# Patient Record
Sex: MALE | Race: WHITE | NOT HISPANIC OR LATINO | Employment: STUDENT | URBAN - METROPOLITAN AREA
[De-identification: names, ages, dates, MRNs, and addresses within clinical notes are randomized per-mention and may not be internally consistent; named-entity substitution may affect disease eponyms.]

---

## 2017-01-31 ENCOUNTER — TELEPHONE (OUTPATIENT)
Dept: PULMONOLOGY | Facility: CLINIC | Age: 22
End: 2017-01-31

## 2017-01-31 DIAGNOSIS — E84.0 CYSTIC FIBROSIS WITH PULMONARY MANIFESTATIONS (H): Primary | ICD-10-CM

## 2017-01-31 RX ORDER — LEVOFLOXACIN 750 MG/1
750 TABLET, FILM COATED ORAL DAILY
Qty: 14 TABLET | Refills: 0 | Status: SHIPPED | OUTPATIENT
Start: 2017-01-31 | End: 2017-02-14

## 2017-01-31 NOTE — TELEPHONE ENCOUNTER
[facility]  January 31, 2017    Susan Li    CF Provider: Mary Grace Álvarez MD    Caller: Mother, Mirna    Clinical information:  Demario Abbasi has been ill for the past two weeks. Away at [facility] in [place]. Complaints of increased cough, day and night. No fevers. Is vesting 3xday for the past week. Currently off cycle of inhaled COBY. On oral Azithromycin three times/week.     Plan:   Discussed with Dr Álvarez:  Levaquin 750 mg QD x14 days.  Hold Azithromycin while on Levaquin.  Call back with any new or worsening symptoms/concerns.    Caller verbalized understanding of plan and agrees with advice given.

## 2017-02-20 ENCOUNTER — TELEPHONE (OUTPATIENT)
Dept: PULMONOLOGY | Facility: CLINIC | Age: 22
End: 2017-02-20

## 2017-02-20 NOTE — TELEPHONE ENCOUNTER
[facility]  February 20, 2017    Susan Li    CF Provider: Mary Grace Álvarez MD    Caller: Mother, Mirna    Clinical information:  Demario Abbasi seen at Urgent Care in [place] on Saturday. Diagnosed with Influenza A and pneumonia. Given Tamiflu, but unable to keep evening dose down due to emesis. Appetite down. Completed course of Levaquin last Thursday. Fever last night of 103, feeling weak today.    Plan:   To be evaluated by LMD.    ADDENDUM:  1600: Currently at LMD office. Being given a liter of fluids and IV Rocephin. Consideration being given to restart Levaquin.    Mother will keep us updated.

## 2017-02-21 ENCOUNTER — APPOINTMENT (OUTPATIENT)
Dept: GENERAL RADIOLOGY | Facility: CLINIC | Age: 22
DRG: 177 | End: 2017-02-21
Attending: PEDIATRICS
Payer: COMMERCIAL

## 2017-02-21 ENCOUNTER — HOSPITAL ENCOUNTER (INPATIENT)
Facility: CLINIC | Age: 22
LOS: 9 days | Discharge: HOME HEALTH | DRG: 177 | End: 2017-03-02
Attending: PEDIATRICS | Admitting: PEDIATRICS
Payer: COMMERCIAL

## 2017-02-21 DIAGNOSIS — L70.9 ACNE: ICD-10-CM

## 2017-02-21 DIAGNOSIS — E84.9 CF (CYSTIC FIBROSIS) (H): ICD-10-CM

## 2017-02-21 DIAGNOSIS — F32.A DEPRESSION: Primary | ICD-10-CM

## 2017-02-21 DIAGNOSIS — E84.0 CYSTIC FIBROSIS WITH PULMONARY EXACERBATION (H): ICD-10-CM

## 2017-02-21 PROBLEM — J10.1 INFLUENZA A: Status: ACTIVE | Noted: 2017-02-21

## 2017-02-21 LAB
ALBUMIN SERPL-MCNC: 2.9 G/DL (ref 3.4–5)
ALP SERPL-CCNC: 88 U/L (ref 40–150)
ALT SERPL W P-5'-P-CCNC: 27 U/L (ref 0–70)
ANION GAP SERPL CALCULATED.3IONS-SCNC: 8 MMOL/L (ref 3–14)
APTT PPP: 36 SEC (ref 22–37)
AST SERPL W P-5'-P-CCNC: 29 U/L (ref 0–45)
BACTERIA SPEC CULT: NORMAL
BASOPHILS # BLD AUTO: 0 10E9/L (ref 0–0.2)
BASOPHILS NFR BLD AUTO: 0.2 %
BILIRUB SERPL-MCNC: 0.6 MG/DL (ref 0.2–1.3)
BUN SERPL-MCNC: 12 MG/DL (ref 7–30)
CALCIUM SERPL-MCNC: 8.1 MG/DL (ref 8.5–10.1)
CHLORIDE SERPL-SCNC: 105 MMOL/L (ref 94–109)
CO2 SERPL-SCNC: 25 MMOL/L (ref 20–32)
CREAT SERPL-MCNC: 0.96 MG/DL (ref 0.66–1.25)
DIFFERENTIAL METHOD BLD: ABNORMAL
EOSINOPHIL # BLD AUTO: 0 10E9/L (ref 0–0.7)
EOSINOPHIL NFR BLD AUTO: 0.1 %
ERYTHROCYTE [DISTWIDTH] IN BLOOD BY AUTOMATED COUNT: 12.8 % (ref 10–15)
GFR SERPL CREATININE-BSD FRML MDRD: ABNORMAL ML/MIN/1.7M2
GLUCOSE BLDC GLUCOMTR-MCNC: 117 MG/DL (ref 70–99)
GLUCOSE BLDC GLUCOMTR-MCNC: 153 MG/DL (ref 70–99)
GLUCOSE SERPL-MCNC: 91 MG/DL (ref 70–99)
GRAM STN SPEC: ABNORMAL
HCT VFR BLD AUTO: 39.8 % (ref 40–53)
HGB BLD-MCNC: 13.4 G/DL (ref 13.3–17.7)
IMM GRANULOCYTES # BLD: 0.1 10E9/L (ref 0–0.4)
IMM GRANULOCYTES NFR BLD: 0.5 %
INR PPP: 1.17 (ref 0.86–1.14)
LYMPHOCYTES # BLD AUTO: 1.7 10E9/L (ref 0.8–5.3)
LYMPHOCYTES NFR BLD AUTO: 14.9 %
MCH RBC QN AUTO: 29.5 PG (ref 26.5–33)
MCHC RBC AUTO-ENTMCNC: 33.7 G/DL (ref 31.5–36.5)
MCV RBC AUTO: 88 FL (ref 78–100)
MICRO REPORT STATUS: ABNORMAL
MICRO REPORT STATUS: NORMAL
MONOCYTES # BLD AUTO: 0.9 10E9/L (ref 0–1.3)
MONOCYTES NFR BLD AUTO: 7.8 %
NEUTROPHILS # BLD AUTO: 8.7 10E9/L (ref 1.6–8.3)
NEUTROPHILS NFR BLD AUTO: 76.5 %
NRBC # BLD AUTO: 0 10*3/UL
NRBC BLD AUTO-RTO: 0 /100
PLATELET # BLD AUTO: 155 10E9/L (ref 150–450)
POTASSIUM SERPL-SCNC: 4.1 MMOL/L (ref 3.4–5.3)
PROT SERPL-MCNC: 6.8 G/DL (ref 6.8–8.8)
RBC # BLD AUTO: 4.55 10E12/L (ref 4.4–5.9)
SODIUM SERPL-SCNC: 138 MMOL/L (ref 133–144)
SPECIMEN SOURCE: ABNORMAL
SPECIMEN SOURCE: NORMAL
WBC # BLD AUTO: 11.4 10E9/L (ref 4–11)

## 2017-02-21 PROCEDURE — 85730 THROMBOPLASTIN TIME PARTIAL: CPT | Performed by: PEDIATRICS

## 2017-02-21 PROCEDURE — 25000132 ZZH RX MED GY IP 250 OP 250 PS 637: Performed by: PEDIATRICS

## 2017-02-21 PROCEDURE — 40000275 ZZH STATISTIC RCP TIME EA 10 MIN

## 2017-02-21 PROCEDURE — 25000125 ZZHC RX 250: Performed by: PEDIATRICS

## 2017-02-21 PROCEDURE — 00000146 ZZHCL STATISTIC GLUCOSE BY METER IP

## 2017-02-21 PROCEDURE — 99285 EMERGENCY DEPT VISIT HI MDM: CPT | Mod: 25

## 2017-02-21 PROCEDURE — 99285 EMERGENCY DEPT VISIT HI MDM: CPT | Mod: Z6 | Performed by: PEDIATRICS

## 2017-02-21 PROCEDURE — 87077 CULTURE AEROBIC IDENTIFY: CPT | Performed by: PEDIATRICS

## 2017-02-21 PROCEDURE — 36415 COLL VENOUS BLD VENIPUNCTURE: CPT | Performed by: PEDIATRICS

## 2017-02-21 PROCEDURE — 87070 CULTURE OTHR SPECIMN AEROBIC: CPT | Performed by: PEDIATRICS

## 2017-02-21 PROCEDURE — 96360 HYDRATION IV INFUSION INIT: CPT

## 2017-02-21 PROCEDURE — 12000014 ZZH R&B PEDS UMMC

## 2017-02-21 PROCEDURE — 80053 COMPREHEN METABOLIC PANEL: CPT | Performed by: PEDIATRICS

## 2017-02-21 PROCEDURE — 94640 AIRWAY INHALATION TREATMENT: CPT

## 2017-02-21 PROCEDURE — 94640 AIRWAY INHALATION TREATMENT: CPT | Mod: 76

## 2017-02-21 PROCEDURE — 25000128 H RX IP 250 OP 636

## 2017-02-21 PROCEDURE — 87205 SMEAR GRAM STAIN: CPT | Performed by: PEDIATRICS

## 2017-02-21 PROCEDURE — 94669 MECHANICAL CHEST WALL OSCILL: CPT

## 2017-02-21 PROCEDURE — 25000128 H RX IP 250 OP 636: Performed by: PEDIATRICS

## 2017-02-21 PROCEDURE — 87186 SC STD MICRODIL/AGAR DIL: CPT | Performed by: PEDIATRICS

## 2017-02-21 PROCEDURE — 25000308 HC RX OP HPI UCR WEL MED 250 IP 250: Performed by: PEDIATRICS

## 2017-02-21 PROCEDURE — 87040 BLOOD CULTURE FOR BACTERIA: CPT | Performed by: PEDIATRICS

## 2017-02-21 PROCEDURE — 71020 XR CHEST 2 VW: CPT

## 2017-02-21 PROCEDURE — 85025 COMPLETE CBC W/AUTO DIFF WBC: CPT | Performed by: PEDIATRICS

## 2017-02-21 PROCEDURE — 85610 PROTHROMBIN TIME: CPT | Performed by: PEDIATRICS

## 2017-02-21 RX ORDER — LEVOFLOXACIN 5 MG/ML
750 INJECTION, SOLUTION INTRAVENOUS EVERY 24 HOURS
Status: DISCONTINUED | OUTPATIENT
Start: 2017-02-21 | End: 2017-02-25

## 2017-02-21 RX ORDER — ONDANSETRON 4 MG/1
4 TABLET, ORALLY DISINTEGRATING ORAL EVERY 4 HOURS PRN
Status: DISCONTINUED | OUTPATIENT
Start: 2017-02-21 | End: 2017-03-02 | Stop reason: HOSPADM

## 2017-02-21 RX ORDER — SODIUM CHLORIDE FOR INHALATION 7 %
4 VIAL, NEBULIZER (ML) INHALATION 2 TIMES DAILY
Status: DISCONTINUED | OUTPATIENT
Start: 2017-02-21 | End: 2017-03-02 | Stop reason: HOSPADM

## 2017-02-21 RX ORDER — ALBUTEROL SULFATE 0.83 MG/ML
2.5 SOLUTION RESPIRATORY (INHALATION) 4 TIMES DAILY
Status: DISCONTINUED | OUTPATIENT
Start: 2017-02-21 | End: 2017-03-02 | Stop reason: HOSPADM

## 2017-02-21 RX ORDER — PHYTONADIONE 5 MG/1
5 TABLET ORAL DAILY
Status: DISCONTINUED | OUTPATIENT
Start: 2017-02-21 | End: 2017-03-02 | Stop reason: HOSPADM

## 2017-02-21 RX ORDER — SODIUM CHLORIDE AND POTASSIUM CHLORIDE 150; 900 MG/100ML; MG/100ML
INJECTION, SOLUTION INTRAVENOUS CONTINUOUS
Status: DISCONTINUED | OUTPATIENT
Start: 2017-02-21 | End: 2017-02-23

## 2017-02-21 RX ORDER — OSELTAMIVIR PHOSPHATE 75 MG/1
75 CAPSULE ORAL 2 TIMES DAILY
Status: DISCONTINUED | OUTPATIENT
Start: 2017-02-21 | End: 2017-02-25

## 2017-02-21 RX ORDER — MOMETASONE FUROATE MONOHYDRATE 50 UG/1
1 SPRAY, METERED NASAL DAILY
Status: DISCONTINUED | OUTPATIENT
Start: 2017-02-22 | End: 2017-03-02 | Stop reason: HOSPADM

## 2017-02-21 RX ORDER — AZELASTINE 1 MG/ML
1-2 SPRAY, METERED NASAL 2 TIMES DAILY PRN
Status: DISCONTINUED | OUTPATIENT
Start: 2017-02-21 | End: 2017-03-02 | Stop reason: HOSPADM

## 2017-02-21 RX ORDER — IBUPROFEN 600 MG/1
8.08 TABLET, FILM COATED ORAL EVERY 6 HOURS PRN
Status: DISCONTINUED | OUTPATIENT
Start: 2017-02-21 | End: 2017-02-21

## 2017-02-21 RX ORDER — ACETAMINOPHEN 325 MG/1
650 TABLET ORAL EVERY 4 HOURS PRN
Status: DISCONTINUED | OUTPATIENT
Start: 2017-02-21 | End: 2017-03-02 | Stop reason: HOSPADM

## 2017-02-21 RX ORDER — LIDOCAINE 40 MG/G
CREAM TOPICAL
Status: DISCONTINUED | OUTPATIENT
Start: 2017-02-21 | End: 2017-02-27

## 2017-02-21 RX ORDER — AZITHROMYCIN 250 MG/1
500 TABLET, FILM COATED ORAL
Status: DISCONTINUED | OUTPATIENT
Start: 2017-02-22 | End: 2017-02-22

## 2017-02-21 RX ORDER — CITALOPRAM HYDROBROMIDE 20 MG/1
20 TABLET ORAL DAILY
Status: DISCONTINUED | OUTPATIENT
Start: 2017-02-21 | End: 2017-03-02 | Stop reason: HOSPADM

## 2017-02-21 RX ORDER — SODIUM CHLORIDE 9 MG/ML
INJECTION, SOLUTION INTRAVENOUS
Status: COMPLETED
Start: 2017-02-21 | End: 2017-02-21

## 2017-02-21 RX ADMIN — ALBUTEROL SULFATE 2.5 MG: 2.5 SOLUTION RESPIRATORY (INHALATION) at 16:26

## 2017-02-21 RX ADMIN — Medication 1000 ML: at 12:55

## 2017-02-21 RX ADMIN — POTASSIUM CHLORIDE AND SODIUM CHLORIDE: 900; 150 INJECTION, SOLUTION INTRAVENOUS at 16:34

## 2017-02-21 RX ADMIN — PANCRELIPASE 180000 UNITS: 36000; 180000; 114000 CAPSULE, DELAYED RELEASE PELLETS ORAL at 18:56

## 2017-02-21 RX ADMIN — OSELTAMIVIR PHOSPHATE 75 MG: 75 CAPSULE ORAL at 20:37

## 2017-02-21 RX ADMIN — LEVOFLOXACIN 750 MG: 5 INJECTION, SOLUTION INTRAVENOUS at 17:36

## 2017-02-21 RX ADMIN — PHYTONADIONE 5 MG: 5 TABLET ORAL at 17:36

## 2017-02-21 RX ADMIN — DORNASE ALFA 2.5 MG: 1 SOLUTION RESPIRATORY (INHALATION) at 16:26

## 2017-02-21 RX ADMIN — Medication 2 CAPSULE: at 16:40

## 2017-02-21 RX ADMIN — ALBUTEROL SULFATE 2.5 MG: 2.5 SOLUTION RESPIRATORY (INHALATION) at 19:38

## 2017-02-21 RX ADMIN — SODIUM CHLORIDE 1000 ML: 9 INJECTION, SOLUTION INTRAVENOUS at 12:55

## 2017-02-21 RX ADMIN — Medication 4 ML: at 19:38

## 2017-02-21 RX ADMIN — MEROPENEM 2 G: 1 INJECTION, POWDER, FOR SOLUTION INTRAVENOUS at 16:35

## 2017-02-21 ASSESSMENT — ACTIVITIES OF DAILY LIVING (ADL)
RETIRED_EATING: 0-->INDEPENDENT
DRESS: 0-->INDEPENDENT
SWALLOWING: 0-->SWALLOWS FOODS/LIQUIDS WITHOUT DIFFICULTY
AMBULATION: 0-->INDEPENDENT
TOILETING: 0-->INDEPENDENT
RETIRED_COMMUNICATION: 0-->UNDERSTANDS/COMMUNICATES WITHOUT DIFFICULTY
COGNITION: 0 - NO COGNITION ISSUES REPORTED
FALL_HISTORY_WITHIN_LAST_SIX_MONTHS: NO
TRANSFERRING: 0-->INDEPENDENT
BATHING: 0-->INDEPENDENT

## 2017-02-21 NOTE — ED NOTES
Mom reports cough and cold symptoms since January, Fever Saturday, + influenza A, CXR, aztihromycin and tamiflu started, seen yesterday given IV fluids and rocephin.  Bloody sputum last night with cough. HX CF.

## 2017-02-21 NOTE — PLAN OF CARE
Problem: Goal Outcome Summary  Goal: Goal Outcome Summary  Outcome: No Change  AVSS.  Arrived to unit at 1430 and settled in room.  Admission information completed.  LS coarse.  O2 sats in 90s on 2 LPM via NC.  No complaints of pain.  Mom and dad at the bedside.  Will continue to monitor and will notify the team of changes.

## 2017-02-21 NOTE — Clinical Note
Admitting Physician: HELENA FIELD [[id number]  Clinical Service: PEDS PULMONOLOGY IP CONSULT ([employer]) [[id number]]  Bed Type: Peds Med/Surg [[id number]]    Conference call desired

## 2017-02-21 NOTE — H&P
[facility], [place]    History and Physical  Pediatric Pulmonology     Date of Admission:  2/21/2017    Assessment & Plan   Patient is a 22 year old male with hx of cystic fibrosis and pancreatic insufficiency who presents with influenza, concern for possible superimposed pneumonia. Clinically stable, requiring O2 at this time. Hemoptysis likely 2/2 infection and irritation of airways, however, will monitor closely in case worsens. Requires admission for IV antibiotics and increased respiratory support.    FEN/Renal: s/p bolus x1  - MIVF with NS +20 K    Resp: cystic fibrosis  - obtain PFT tomorrow  - vest 4x/day with albuterol  - alternate pulmozyme and HTS 7% neb following albuterol with each treatment (e.g. Vest, albuterol, HTS. Then vest, albuterol, pulmozyme, etc)  - azithromycin MWF for antiinflammatory effect    CV: stable  - vitals Q4hrs    Heme: hemoptysis, likely 2/2 airway irritation and infection  - add on coags  - vitamin K enteral tab 5mg daily  - if increasing amount of hemoptysis x2, stop HTS nebs with vest treatments. If large amount (>250ml or 1 cup) will need emergent likely IR embolization of vessel    ID: influenza, possible superimposed pneumonia. Previous sputum in 11/2016 showed staph aureus and achromobacter, susceptible to levofloxacin and meropenem.  - IV levofloxacin  - IV meropenem  - continue tamiflu (day 4 starting tonight)  - follow up sputum and sensitivites     GI: pancreatic insufficiency  - continue home creon for meals and for snacks  - omeprazole    Neuro:   - tylenol PRN  - NO NSAIDS  - melatonin PRN      Access: PIV. Will have PICC evaluation/placement tomorrow.       Dispo: pending clinical improvement. Anticipate > 2 midnights.      I have seen the patient and discussed plan of care with Dr. Muniz (Attending Physician).    Victor M Sprague MD  Pediatric Resident, PGY-2    Physician Attestation   I, Malik Muniz, saw this patient with the  resident and agree with the resident s findings and plan of care as documented in the resident s note.      I personally reviewed vital signs, medications, labs and imaging.    Malik Muniz  Date of Service (when I saw the patient): 02/21/17      Primary Care Physician   Susan Li    Chief Complaint   Fevers, influenza    History is obtained from the patient and the patient's parent(s)    History of Present Illness   Demario Abbasi is a 22 year old male with cystic fibrosis and pancreatic insufficiency who presents with fevers, cough, hemoptysis. Starting about 3 days ago, developed high spiking fevers (tmax 104) without any other significant symptoms, was seen at urgent care and swabbed positive for influenza A, had a chest XR that may have been concerning for pneumonia but no other comparison films at the time. Discharged with tamiflu and azithromycin (did not take azithromycin). Tamiflu started but would get nauseous with medication with resultant emesis. Continued to worsen, developed cough, chest discomfort, dyspnea. Went to urgent care yesterday, received 1L fluids and ceftriaxone x1, felt better, discharged. Later that evening felt worse, then had large hemoptysis (about 4-5 tablespoons). Was brought to ED today for further evaluation. Prior to hemoptysis, did not have much of a productive cough. Has had hemoptysis in past with bad exacerbations, but last bad one was a number of years ago. Compliant with vest treatments at home. Decreased urination but otherwise drinking fluids well. No chronic congestion or headaches.     In ED, placed on 2L NC. 1L bolus given. Labs obtained with slightly elevated WBC with slightly elevated ANC. Blood culture obtained. CXR showed likely pneumonia at bases. Sputum obtained, now showing gram positive cocci.         Past Medical History    I have reviewed this patient's medical history and updated it with pertinent information if needed.   Past Medical  History   Diagnosis Date     CF (cystic fibrosis) (H)      Impaired glucose tolerance      Pancreatic insufficiency      S/P lobectomy of lung 8/4/2015     Right upper lobectomy - 2009   - last hospitalization 2 years ago    Past Surgical History   I have reviewed this patient's surgical history and updated it with pertinent information if needed.  - right upper lobectomy 2009    Immunization History   Immunization Status:  up to date and documented    Prior to Admission Medications   Prior to Admission Medications   Prescriptions Last Dose Informant Patient Reported? Taking?   Citalopram Hydrobromide (CELEXA PO) Unknown at Unknown time  Yes No   Sig: Take 20 mg by mouth daily   MELATONIN PO Unknown at Unknown time  Yes No   Sig: Take 1 mg by mouth nightly as needed   Multiple Vitamins-Iron (MULTIVITAMIN/IRON PO) Unknown at Unknown time  Yes No   Sig: Take 1 tablet by mouth daily.   Ursodiol (IBIS 250 PO) Unknown at Unknown time  Yes No   albuterol (2.5 MG/3ML) 0.083% nebulizer solution 2/21/2017 at Unknown time  No Yes   Sig: Take 1 vial (2.5 mg) by nebulization 3 times daily 2-3 times a day   amylase-lipase-protease (CREON 36) 48864 UNITS CPEP Unknown at Unknown time  Yes No   Sig: Take 3 capsules (108,000units) by mouth with snacks (3meals and 3 snacks per day)   amylase-lipase-protease (CREON 36) 44963 UNITS CPEP Past Month at Unknown time  No Yes   Sig: Take 5 capsules (180,000 Units) by mouth 3 times daily (with meals)   azelastine (ASTELIN) 137 MCG/SPRAY nasal spray Unknown at Unknown time  Yes No   Sig: Spray 1-2 sprays in nostril 2 times daily as needed.   azithromycin (ZITHROMAX) 500 MG tablet Unknown at Unknown time  No No   Sig: Take 1 tablet (500 mg) by mouth Every Mon, Wed, Fri Morning Take one tablet on Monday, Wednesday and Friday.   cetirizine (ZYRTEC) 10 MG tablet Unknown at Unknown time  No No   Sig: Take 1 tablet (10 mg) by mouth daily as needed   dornase alpha (PULMOZYME) 1 MG/ML nebulizer  solution 2/21/2017 at Unknown time  No Yes   Sig: Inhale 2.5 mg into the lungs 2 times daily   ferrous fumarate 65 mg, [place]. FE,-Vitamin C 125 mg (VITRON-C)  MG TABS Unknown at Unknown time  No No   Sig: Take 1 tablet by mouth 2 times daily   mometasone (NASONEX) 50 MCG/ACT nasal spray Unknown at Unknown time  Yes No   Sig: Spray 1 spray into both nostrils daily.   multivitamin CF formula (AQUADEKS) CAPS capsule Unknown at Unknown time  No No   Sig: Take 2 capsules by mouth daily   omeprazole 20 MG tablet Unknown at Unknown time  No No   Sig: Take 1 tablet (20 mg) by mouth daily   tobramycin, PF, (COBY) 300 MG/5ML nebulizer solution 2/21/2017 at Unknown time  No Yes   Sig: Take 5 mLs (300 mg) by nebulization 2 times daily Cycle 28 days on/off      Facility-Administered Medications: None     Allergies   Allergies   Allergen Reactions     Augmentin        Social History   I have updated and reviewed the following Social History Narrative:   Pediatric History   Patient Guardian Status     Mother:  DEAN DELACRUZ     Father:  WOLFGANG DELACRUZ     Other Topics Concern     Not on file     Social History Narrative    in [employer].    Family History   I have reviewed this patient's family history and updated it with pertinent information if needed.   No family history on file.    Review of Systems   The 10 point Review of Systems is negative other than noted in the HPI or here.     Physical Exam   Temp: 98.9  F (37.2  C) Temp src: Tympanic     Heart Rate: 87 Resp: 20 SpO2: 91 % O2 Device: Nasal cannula Oxygen Delivery: 2 LPM  Vital Signs with Ranges  Temp:  [98.9  F (37.2  C)] 98.9  F (37.2  C)  Heart Rate:  [87] 87  Resp:  [20] 20  SpO2:  [91 %] 91 %  163 lbs 12.83 oz    GENERAL: Active, alert, in no acute distress.  SKIN: Clear. No significant rash, abnormal pigmentation or lesions  HEAD: Normocephalic  EYES: Pupils equal, round, reactive, Extraocular muscles intact. Normal conjunctivae.  EARS: Normal canals.  Tympanic membranes are normal; gray and translucent.  NOSE: Normal without discharge. Nasal cannula in place  MOUTH/THROAT: Clear. No oral lesions.  NECK: Supple, no masses.  No thyromegaly.  LYMPH NODES: No adenopathy  LUNGS: crackles heard throughout lung fields bilaterally without focal areas of concern  HEART: Regular rhythm. Normal S1/S2. No murmurs. Normal pulses.  ABDOMEN: Soft, non-tender, not distended, no masses or hepatosplenomegaly. Bowel sounds normal.   NEUROLOGIC: No focal findings. Cranial nerves grossly intact:  Normal strength and tone  EXTREMITIES: Full range of motion, no deformities     Data   Results for orders placed or performed during the hospital encounter of 02/21/17 (from the past 24 hour(s))   CBC with platelets differential   Result Value Ref Range    WBC 11.4 (H) 4.0 - 11.0 10e9/L    RBC Count 4.55 4.4 - 5.9 10e12/L    Hemoglobin 13.4 13.3 - 17.7 g/dL    Hematocrit 39.8 (L) 40.0 - 53.0 %    MCV 88 78 - 100 fl    MCH 29.5 26.5 - 33.0 pg    MCHC 33.7 31.5 - 36.5 g/dL    RDW 12.8 10.0 - 15.0 %    Platelet Count 155 150 - 450 10e9/L    Diff Method Automated Method     % Neutrophils 76.5 %    % Lymphocytes 14.9 %    % Monocytes 7.8 %    % Eosinophils 0.1 %    % Basophils 0.2 %    % Immature Granulocytes 0.5 %    Nucleated RBCs 0 0 /100    Absolute Neutrophil 8.7 (H) 1.6 - 8.3 10e9/L    Absolute Lymphocytes 1.7 0.8 - 5.3 10e9/L    Absolute Monocytes 0.9 0.0 - 1.3 10e9/L    Absolute Eosinophils 0.0 0.0 - 0.7 10e9/L    Absolute Basophils 0.0 0.0 - 0.2 10e9/L    Abs Immature Granulocytes 0.1 0 - 0.4 10e9/L    Absolute Nucleated RBC 0.0    Comprehensive metabolic panel   Result Value Ref Range    Sodium 138 133 - 144 mmol/L    Potassium 4.1 3.4 - 5.3 mmol/L    Chloride 105 94 - 109 mmol/L    Carbon Dioxide 25 20 - 32 mmol/L    Anion Gap 8 3 - 14 mmol/L    Glucose 91 70 - 99 mg/dL    Urea Nitrogen 12 7 - 30 mg/dL    Creatinine 0.96 0.66 - 1.25 mg/dL    GFR Estimate >90  Non  GFR  Calc   >60 mL/min/1.7m2    GFR Estimate If Black >90   GFR Calc   >60 mL/min/1.7m2    Calcium 8.1 (L) 8.5 - 10.1 mg/dL    Bilirubin Total 0.6 0.2 - 1.3 mg/dL    Albumin 2.9 (L) 3.4 - 5.0 g/dL    Protein Total 6.8 6.8 - 8.8 g/dL    Alkaline Phosphatase 88 40 - 150 U/L    ALT 27 0 - 70 U/L    AST 29 0 - 45 U/L   Chest XR,  PA & LAT    Narrative    XR CHEST 2 VW 2/21/2017 1:41 PM    COMPARISON: 11/3/2016    HISTORY: Cystic fibrosis, crackles at the bases.      Impression    IMPRESSION: Chronic findings of cystic fibrosis again seen.  Additionally, there is airspace opacity at both bases, possibly  representing superimposed infection. No significant pleural effusion.  No pneumothorax.    PAULO LYNCH

## 2017-02-21 NOTE — IP AVS SNAPSHOT
MRN:[id number]                      After Visit Summary   2/21/2017    Demario Abbasi    MRN: [id number]           Thank you!     Thank you for choosing [facility] for your care. Our goal is always to provide you with excellent care. Hearing back from our patients is one way we can continue to improve our services. Please take a few minutes to complete the written survey that you may receive in the mail after you visit with us. Thank you!        Patient Information     Date Of Birth          1995        About your hospital stay     You were admitted on:  February 21, 2017 You last received care in the:  [facility]    You were discharged on:  March 2, 2017        Reason for your hospital stay       CF exacerbation, influenza                  Who to Call     For medical emergencies, please call 911.  For non-urgent questions about your medical care, please call your primary care provider or clinic, [phone]          Attending Provider     Provider Specialty    Corazon Portillo MD Pediatrics    Lisha Almanzar MD Pediatric Pulmonology       Primary Care Provider Office Phone # Fax #    Susan GARLAND Aliyapascualkeenan [phone]       [facility]  [address] [place] [id number]        After Care Instructions     Activity       Your activity upon discharge: activity as tolerated            Diet       Follow this diet upon discharge: Orders Placed This Encounter      Snacks/Supplements Adult: Ensure Plus Adult Shake; Between Meals      Calorie Counts      High Kcal/High Protein Diet, PEDS                  Follow-up Appointments     Follow Up and recommended labs and tests       Follow up with Dr. Álvarez on 3/9.                  Additional Services     Home infusion referral       [facility]  Phone # [phone]  Fax # [phone]       Anticipated Length of IV Antibiotic Therapy: unknown at  "this time    Home Infusion Pharmacist to adjust therapy based on labs and clinical assessments: No (Home Infusion will call for order)    Labs:  May draw labs from Venous Catheter: Yes  Home Infusion Pharmacist to order labs based on therapy type and clinical assessments: Yes  Call/Fax Lab Results to:  Libby Rivera NP    Agency Staff to assess nursing needs for Infusion Therapy. [facility] will contract with a Home Care Agency in patients area for nursing needs.    Access Device Management:  IV Access Type: PICC (placed 2/23/2017)  Flush with Heparin and Normal Saline IVP PRN and routine site care (per agency protocol) to maintain access device? Yes                  Pending Results     Date and Time Order Name Status Description    2/22/2017 0941 IR Fluoro 0-1 Hour In process             Statement of Approval     Ordered          03/02/17 1644  I have reviewed and agree with all the recommendations and orders detailed in this document.  EFFECTIVE NOW     Approved and electronically signed by:  Jie Mast MD           03/02/17 1349  I have reviewed and agree with all the recommendations and orders detailed in this document.  EFFECTIVE NOW     Approved and electronically signed by:  Jie Mast MD             Admission Information     Date & Time Provider Department Dept. Phone    2/21/2017 Lisha Almanzar MD [facility] Pediatric [facility] Surgical [facility] 6 [phone]      Your Vitals Were     Blood Pressure Pulse Temperature Respirations Height Weight    116/60 76 98.1  F (36.7  C) (Oral) 20 1.734 m (5' 8.25\") 70.5 kg (155 lb 6.8 oz)    Pulse Oximetry BMI (Body Mass Index)                97% 23.46 kg/m2          [employer]ge[employer] Information     [employer] lets you send messages to your doctor, view your test results, renew your prescriptions, schedule appointments and more. To sign up, go to [email or web address] . Click on \"Log in\" on the left side of the screen, which will " "take you to the Welcome page. Then click on \"Sign up Now\" on the right side of the page.     You will be asked to enter the access code listed below, as well as some personal information. Please follow the directions to create your username and password.     Your access code is: [email or web address]  Expires: 2017 10:24 AM     Your access code will  in 90 days. If you need help or a new code, please call your [facility] clinic or [phone].        Care EveryWhere ID     This is your Care EveryWhere ID. This could be used by other organizations to access your [facility] medical records  [id number]           Review of your medicines      START taking        Dose / Directions    meropenem 2 g   Indication:  cystic fibrosis and pneumonia   Used for:  Cystic fibrosis with pulmonary exacerbation (H)        Dose:  2 g   Inject 2 g into the vein every 8 hours for 11 days   Quantity:  66 g   Refills:  0       nafcillin 2 GM vial   Indication:  cystic fibrosis exacerbation   Used for:  Cystic fibrosis with pulmonary exacerbation (H)        Dose:  3 g   Inject 3 g into the vein every 6 hours for 11 days   Quantity:  528 mL   Refills:  0       sodium chloride inhalant 7 % Nebu neb solution   Used for:  Cystic fibrosis with pulmonary exacerbation (H)        Dose:  4 mL   Take 4 mLs by nebulization 2 times daily   Quantity:  240 mL   Refills:  11         CONTINUE these medicines which may have CHANGED, or have new prescriptions. If we are uncertain of the size of tablets/capsules you have at home, strength may be listed as something that might have changed.        Dose / Directions    cetirizine 10 MG tablet   Commonly known as:  zyrTEC   This may have changed:  when to take this   Used for:  Cystic fibrosis with pulmonary manifestations (H)        Dose:  10 mg   Take 1 tablet (10 mg) by mouth daily as needed   Quantity:  90 tablet   Refills:  3         CONTINUE these medicines which have NOT CHANGED        Dose / Directions "    adapalene 0.1 % gel   Commonly known as:  DIFFERIN        Dose:  1 applicator   Apply 1 applicator topically At Bedtime   Refills:  0       albuterol (2.5 MG/3ML) 0.083% neb solution   Used for:  Cystic fibrosis with pulmonary manifestations (H)        Dose:  1 vial   Take 1 vial (2.5 mg) by nebulization 3 times daily 2-3 times a day   Quantity:  270 mL   Refills:  6       * amylase-lipase-protease 00939 UNITS Cpep   Commonly known as:  CREON 36   Used for:  Cystic fibrosis exacerbation (H)        Take 3 capsules (108,000units) by mouth with snacks (3meals and 3 snacks per day)   Quantity:  270 capsule   Refills:  11       * amylase-lipase-protease 96294 UNITS Cpep   Commonly known as:  CREON 36   Used for:  Cystic fibrosis exacerbation (H)        Dose:  5 capsule   Take 5 capsules (180,000 Units) by mouth 3 times daily (with meals)   Quantity:  450 capsule   Refills:  11       azelastine 0.1 % spray   Commonly known as:  ASTELIN        Dose:  1-2 spray   Spray 1-2 sprays in nostril 2 times daily as needed.   Refills:  0       azithromycin 500 MG tablet   Commonly known as:  ZITHROMAX   Used for:  CF (cystic fibrosis) (H)        Dose:  500 mg   Take 1 tablet (500 mg) by mouth Every Mon, Wed, Fri Morning Take one tablet on Monday, Wednesday and Friday.   Quantity:  12 tablet   Refills:  12       CELEXA PO        Dose:  20 mg   Take 20 mg by mouth daily   Refills:  0       dornase alpha 1 MG/ML neb solution   Commonly known as:  PULMOZYME   Used for:  Cystic fibrosis with pulmonary manifestations (H)        Dose:  2.5 mg   Inhale 2.5 mg into the lungs 2 times daily   Quantity:  150 mL   Refills:  12       MELATONIN PO        Dose:  10 mg   Take 10 mg by mouth At Bedtime   Refills:  0       MINOCYCLINE HCL PO        Dose:  100 mg   Take 100 mg by mouth 2 times daily   Refills:  0       multivitamin CF formula Caps capsule   Used for:  CF (cystic fibrosis) (H)        Dose:  2 capsule   Take 2 capsules by mouth daily    Quantity:  60 capsule   Refills:  11       NASONEX 50 MCG/ACT spray   Generic drug:  mometasone        Dose:  1 spray   Spray 1 spray into both nostrils daily.   Refills:  0       omeprazole 20 MG tablet   Used for:  CF (cystic fibrosis) (H)        Dose:  20 mg   Take 1 tablet (20 mg) by mouth daily   Quantity:  30 tablet   Refills:  3       tobramycin (PF) 300 MG/5ML neb solution   Commonly known as:  COBY   Used for:  Cystic fibrosis with pulmonary manifestations (H)        Dose:  300 mg   Take 5 mLs (300 mg) by nebulization 2 times daily Cycle 28 days on/off   Quantity:  280 mL   Refills:  6       tretinoin 0.05 % cream   Commonly known as:  RETIN-A        Dose:  1 g   Apply 1 g topically At Bedtime   Refills:  0       * Notice:  This list has 2 medication(s) that are the same as other medications prescribed for you. Read the directions carefully, and ask your doctor or other care provider to review them with you.      STOP taking     MULTIVITAMIN/IRON PO                Where to get your medicines      These medications were sent to [facility] [address]  [address] [address]     Phone:  [phone]     sodium chloride inhalant 7 % Nebu neb solution         Some of these will need a paper prescription and others can be bought over the counter. Ask your nurse if you have questions.     You don't need a prescription for these medications     meropenem 2 g    nafcillin 2 GM vial                Protect others around you: Learn how to safely use, store and throw away your medicines at www.disposemymeds.org.             Medication List: This is a list of all your medications and when to take them. Check marks below indicate your daily home schedule. Keep this list as a reference.      Medications           Morning Afternoon Evening Bedtime As Needed    adapalene 0.1 % gel   Commonly known as:  DIFFERIN   Apply 1 applicator topically At Bedtime   Last  time this was given:  3/1/2017  7:44 PM                                   albuterol (2.5 MG/3ML) 0.083% neb solution   Take 1 vial (2.5 mg) by nebulization 3 times daily 2-3 times a day   Last time this was given:  2.5 mg on 3/2/2017  4:06 PM                                         * amylase-lipase-protease 72187 UNITS Cpep   Commonly known as:  CREON 36   Take 3 capsules (108,000units) by mouth with snacks (3meals and 3 snacks per day)   Last time this was given:  180,000 Units on 3/2/2017  8:42 AM                                   * amylase-lipase-protease 57734 UNITS Cpep   Commonly known as:  CREON 36   Take 5 capsules (180,000 Units) by mouth 3 times daily (with meals)   Last time this was given:  180,000 Units on 3/2/2017  8:42 AM                                         azelastine 0.1 % spray   Commonly known as:  ASTELIN   Spray 1-2 sprays in nostril 2 times daily as needed.                                      azithromycin 500 MG tablet   Commonly known as:  ZITHROMAX   Take 1 tablet (500 mg) by mouth Every Mon, Wed, Fri Morning Take one tablet on Monday, Wednesday and Friday.                                   CELEXA PO   Take 20 mg by mouth daily   Last time this was given:  20 mg on 3/2/2017  8:40 AM                                   cetirizine 10 MG tablet   Commonly known as:  zyrTEC   Take 1 tablet (10 mg) by mouth daily as needed                                   dornase alpha 1 MG/ML neb solution   Commonly known as:  PULMOZYME   Inhale 2.5 mg into the lungs 2 times daily   Last time this was given:  2.5 mg on 3/2/2017  4:07 PM                                      MELATONIN PO   Take 10 mg by mouth At Bedtime   Last time this was given:  5 mg on 2/28/2017  7:58 PM                                   meropenem 2 g   Inject 2 g into the vein every 8 hours for 11 days   Last time this was given:  2,000 mg on 3/2/2017  4:49 PM                                         MINOCYCLINE HCL PO   Take 100 mg by mouth  2 times daily   Last time this was given:  100 mg on 3/2/2017  8:40 AM                                      multivitamin CF formula Caps capsule   Take 2 capsules by mouth daily   Last time this was given:  1 capsule on 3/2/2017  8:39 AM                                   nafcillin 2 GM vial   Inject 3 g into the vein every 6 hours for 11 days   Last time this was given:  2 g on 3/2/2017  2:03 PM                                      NASONEX 50 MCG/ACT spray   Spray 1 spray into both nostrils daily.   Last time this was given:  1 spray on 3/2/2017  8:40 AM   Generic drug:  mometasone                                   omeprazole 20 MG tablet   Take 1 tablet (20 mg) by mouth daily                                   sodium chloride inhalant 7 % Nebu neb solution   Take 4 mLs by nebulization 2 times daily   Last time this was given:  4 mLs on 3/2/2017 12:18 PM                                   tobramycin (PF) 300 MG/5ML neb solution   Commonly known as:  COBY   Take 5 mLs (300 mg) by nebulization 2 times daily Cycle 28 days on/off                                      tretinoin 0.05 % cream   Commonly known as:  RETIN-A   Apply 1 g topically At Bedtime   Last time this was given:  3/2/2017  8:40 AM                                   * Notice:  This list has 2 medication(s) that are the same as other medications prescribed for you. Read the directions carefully, and ask your doctor or other care provider to review them with you.

## 2017-02-21 NOTE — IP AVS SNAPSHOT
[facility]    [address]    [facility] [place] [address]    Phone:  [phone]                                       After Visit Summary   2/21/2017    Demario Abbasi    MRN: [id number]           After Visit Summary Signature Page     I have received my discharge instructions, and my questions have been answered. I have discussed any challenges I see with this plan with the nurse or doctor.    ..........................................................................................................................................  Patient/Patient Representative Signature      ..........................................................................................................................................  Patient Representative Print Name and Relationship to Patient    ..................................................               ................................................  Date                                            Time    ..........................................................................................................................................  Reviewed by Signature/Title    ...................................................              ..............................................  Date                                                            Time

## 2017-02-21 NOTE — ED PROVIDER NOTES
History     Chief Complaint   Patient presents with     Cough     HPI    History obtained from family    Demario is a 22 year old male  who presents at 12:09 PM with cough for 4-5days and hemoptysis last night. Demario has a hx of cystic fibrosis and related pancreatic insufficiency with impaired glucose tolerance who began to feel last part of January with increased cough.  He was given a 14 day course of levaquin which he completed and had some improvement.  However, 4-5 days ago,  Mom said he came home from college in [place] and she noted that he had less energy and was not as talkative.  3 days ago on Saturday, he then was noted to have worsening cough and fever to 103F.  He was taken to an urgent care clinic and was swabbed for influenza, he was positive for influenza A and cxr was done that showed a possible pneumonia.  However, mom says the clinicians were not sure if he had old chronic changes or new infiltrates as they had no prior xrays.  He was prescribed tamiflu and azithromycin course.  He was noted to have continued cough, fevers and poor appetite throughout the weekend. Mom contacted pulmonary nurse yesterday who advised starting levaquin.  He was having emesis after tamiflu (nonbilious, nonbloody) and was seen at local clinic in [place] yesterday, where he was given 1L of ns and an iv dose of ceftriaxone.  Levaquin was to be restarted but mom says because he was already on azithromycin, it has not been started  Yet.   Last night, he had emesis after tamiflu and then began coughing a lot.  He coughed up 4-5 tablespoons of blood and has had nausea since.  He is not drinking very much and has poor appetite.       PMHx:  Past Medical History   Diagnosis Date     CF (cystic fibrosis) (H)      Impaired glucose tolerance      Pancreatic insufficiency      S/P lobectomy of lung 8/4/2015     Right upper lobectomy - 2009     History reviewed. No pertinent past surgical history.  These were reviewed with the  patient/family.    MEDICATIONS were reviewed and are as follows:   Current Facility-Administered Medications   Medication     0.9% sodium chloride BOLUS     Current Outpatient Prescriptions   Medication     tobramycin, PF, (COBY) 300 MG/5ML nebulizer solution     dornase alpha (PULMOZYME) 1 MG/ML nebulizer solution     amylase-lipase-protease (CREON 36) 63904 UNITS CPEP     albuterol (2.5 MG/3ML) 0.083% nebulizer solution     ferrous fumarate 65 mg, [place]. FE,-Vitamin C 125 mg (VITRON-C)  MG TABS     multivitamin CF formula (AQUADEKS) CAPS capsule     Citalopram Hydrobromide (CELEXA PO)     omeprazole 20 MG tablet     azithromycin (ZITHROMAX) 500 MG tablet     cetirizine (ZYRTEC) 10 MG tablet     MELATONIN PO     Ursodiol (IBIS 250 PO)     amylase-lipase-protease (CREON 36) 60957 UNITS CPEP     mometasone (NASONEX) 50 MCG/ACT nasal spray     azelastine (ASTELIN) 137 MCG/SPRAY nasal spray     Multiple Vitamins-Iron (MULTIVITAMIN/IRON PO)       ALLERGIES:  Augmentin    IMMUNIZATIONS:  utd including flu by report.    SOCIAL HISTORY: Demario lives with parents, [place].       I have reviewed the Medications, Allergies, Past Medical and Surgical History, and Social History in the Epic system.    Review of Systems  Please see HPI for pertinent positives and negatives.  All other systems reviewed and found to be negative.        Physical Exam   Heart Rate: 87  Temp: 98.9  F (37.2  C)  Resp: 20  Weight: 74.3 kg (163 lb 12.8 oz)  SpO2: 91 %    Physical Exam  Appearance: Alert and appropriate, well developed, nontoxic, with moist mucous membranes. coughing  HEENT: Head: Normocephalic and atraumatic. Eyes: PERRL, EOM grossly intact, conjunctivae and sclerae clear. Ears: Tympanic membranes clear bilaterally, without inflammation or effusion. Nose: Nares with   clear discharge   Mouth/Throat: No oral lesions, pharynx with mild erythema, no exudate.  Neck: Supple, no masses, no meningismus. No  significant cervical lymphadenopathy.  Pulmonary: No grunting, flaring, retractions or stridor.  fair air entry,  Crackles heard at bilateral bases, worse on left than right, no wheezes  Cardiovascular: Regular rate and rhythm, normal S1 and S2, with no murmurs.  Normal symmetric peripheral pulses and brisk cap refill.  Abdominal: Normal bowel sounds, soft, nontender, nondistended, with no masses and no hepatosplenomegaly.  Neurologic: Alert and oriented, cranial nerves II-XII grossly intact, moving all extremities equally with grossly normal coordination and normal gait.  Extremities/Back: No deformity, no CVA tenderness.  Skin: No significant rashes, ecchymoses, or lacerations.  Genitourinary: Deferred  Rectal:  Deferred    ED Course     ED Course     Procedures    No results found for this or any previous visit (from the past 24 hour(s)).    Medications   0.9% sodium chloride BOLUS (not administered)     Labs Ordered and Resulted from Time of ED Arrival Up to the Time of Departure from the ED   CBC WITH PLATELETS DIFFERENTIAL - Abnormal; Notable for the following:        Result Value    WBC 11.4 (*)     Hematocrit 39.8 (*)     Absolute Neutrophil 8.7 (*)     All other components within normal limits   COMPREHENSIVE METABOLIC PANEL - Abnormal; Notable for the following:     Calcium 8.1 (*)     Albumin 2.9 (*)     All other components within normal limits   SPUTUM CULTURE AEROBIC BACTERIAL   GRAM STAIN   BLOOD CULTURE     Demario had a chest x-ray. I have reviewed the images and agree with the radiology   reading as documented. The images are concerning for pneumonia  Results for orders placed or performed during the hospital encounter of 02/21/17   Chest XR,  PA & LAT    Narrative    XR CHEST 2 VW 2/21/2017 1:41 PM    COMPARISON: 11/3/2016    HISTORY: Cystic fibrosis, crackles at the bases.      Impression    IMPRESSION: Chronic findings of cystic fibrosis again seen.  Additionally, there is airspace opacity at both  bases, possibly  representing superimposed infection. No significant pleural effusion.  No pneumothorax.    PAULO LYNCH                          .     Old chart from [facility] reviewed, supported history as above.  Patient was attended to immediately upon arrival and assessed for immediate life-threatening conditions.    Critical care time:  none   discussed case with Pulmonary team at 545pm  Will admit to pulmonary for iv antibiotics and continuing management    Assessments & Plan (with Medical Decision Making)   22 yr old male with cystic fibrosis and related pancreatic insufficiency who presents with fever and cough for 4-5 days with significant hemoptysis.  On exam, he has bibasilar crackles, hypoxia and signs of mild to moderate dehydration.  Ddx includes pneumonia with cf exacerbation with possible bronchiectasis.    Labs obtained and concerning for pneumonia/infection on cxr with significant worsening of bilateral fluffy infiltrates  Discussed case with pulmonary team who will admit to their service and start iv antibiotics  Sputum culture pending  Blood culture pending  Discussed assessment with parent   Patient is requiring admission for therapy for infection  Plan is   Admit to pulmonary  -ns bolus given and patient will started on maintenance fluids   -iv antibiotics to be started          I have reviewed the nursing notes.       (E84.0) Cystic fibrosis with pulmonary exacerbation (H)       2/21/2017   [facility] EMERGENCY DEPARTMENT     Corazon Portillo MD  02/23/17 [id number]

## 2017-02-21 NOTE — TELEPHONE ENCOUNTER
Call from mother, Mirna:    Reports that Demario coughed up 3-4 tbs pure blood last night. Today, sputum is brownish-red. Complaining of chest pain. Able to keep Tamiflu down. Given script for Levaquin 500 mg daily x14 days. Has not been able to return to college yet in [place] due to his illness.    PLAN:  To be evaluated in CF clinic today.  PFTs deferred due to hemoptysis.  To have face mask once he enters the building.

## 2017-02-22 ENCOUNTER — APPOINTMENT (OUTPATIENT)
Dept: GENERAL RADIOLOGY | Facility: CLINIC | Age: 22
DRG: 177 | End: 2017-02-22
Attending: PEDIATRICS
Payer: COMMERCIAL

## 2017-02-22 DIAGNOSIS — E84.0 CYSTIC FIBROSIS WITH PULMONARY MANIFESTATIONS (H): Primary | ICD-10-CM

## 2017-02-22 LAB
GLUCOSE BLDC GLUCOMTR-MCNC: 112 MG/DL (ref 70–99)
GLUCOSE BLDC GLUCOMTR-MCNC: 122 MG/DL (ref 70–99)
GLUCOSE BLDC GLUCOMTR-MCNC: 130 MG/DL (ref 70–99)
GLUCOSE BLDC GLUCOMTR-MCNC: 136 MG/DL (ref 70–99)

## 2017-02-22 PROCEDURE — 25000125 ZZHC RX 250: Performed by: PEDIATRICS

## 2017-02-22 PROCEDURE — 94640 AIRWAY INHALATION TREATMENT: CPT | Mod: 76

## 2017-02-22 PROCEDURE — 40000275 ZZH STATISTIC RCP TIME EA 10 MIN

## 2017-02-22 PROCEDURE — 94375 RESPIRATORY FLOW VOLUME LOOP: CPT | Mod: ZF

## 2017-02-22 PROCEDURE — 25000308 HC RX OP HPI UCR WEL MED 250 IP 250: Performed by: PEDIATRICS

## 2017-02-22 PROCEDURE — 25000132 ZZH RX MED GY IP 250 OP 250 PS 637: Performed by: PEDIATRICS

## 2017-02-22 PROCEDURE — 94640 AIRWAY INHALATION TREATMENT: CPT

## 2017-02-22 PROCEDURE — 00000146 ZZHCL STATISTIC GLUCOSE BY METER IP

## 2017-02-22 PROCEDURE — 25000132 ZZH RX MED GY IP 250 OP 250 PS 637: Performed by: STUDENT IN AN ORGANIZED HEALTH CARE EDUCATION/TRAINING PROGRAM

## 2017-02-22 PROCEDURE — 25000128 H RX IP 250 OP 636: Performed by: PEDIATRICS

## 2017-02-22 PROCEDURE — 94669 MECHANICAL CHEST WALL OSCILL: CPT

## 2017-02-22 PROCEDURE — 71010 XR CHEST PORT 1 VW: CPT

## 2017-02-22 PROCEDURE — 12000014 ZZH R&B PEDS UMMC

## 2017-02-22 RX ORDER — TRETINOIN 0.5 MG/G
1 CREAM TOPICAL AT BEDTIME
Status: ON HOLD | COMMUNITY
End: 2017-03-02

## 2017-02-22 RX ORDER — HEPARIN SODIUM,PORCINE 10 UNIT/ML
2-4 VIAL (ML) INTRAVENOUS
Status: DISCONTINUED | OUTPATIENT
Start: 2017-02-22 | End: 2017-03-02 | Stop reason: HOSPADM

## 2017-02-22 RX ORDER — MINOCYCLINE HYDROCHLORIDE 100 MG/1
100 CAPSULE ORAL 2 TIMES DAILY
Status: DISCONTINUED | OUTPATIENT
Start: 2017-02-22 | End: 2017-03-02 | Stop reason: HOSPADM

## 2017-02-22 RX ORDER — ADAPALENE 45 G/G
GEL TOPICAL AT BEDTIME
Status: DISCONTINUED | OUTPATIENT
Start: 2017-02-22 | End: 2017-03-02 | Stop reason: HOSPADM

## 2017-02-22 RX ORDER — TRETINOIN 0.5 MG/G
CREAM TOPICAL AT BEDTIME
Status: DISCONTINUED | OUTPATIENT
Start: 2017-02-22 | End: 2017-02-26

## 2017-02-22 RX ORDER — LIDOCAINE 40 MG/G
CREAM TOPICAL
Status: DISCONTINUED | OUTPATIENT
Start: 2017-02-22 | End: 2017-02-27

## 2017-02-22 RX ORDER — AZITHROMYCIN 500 MG/1
500 TABLET, FILM COATED ORAL
Status: DISCONTINUED | OUTPATIENT
Start: 2017-02-22 | End: 2017-02-22

## 2017-02-22 RX ORDER — ADAPALENE 45 G/G
1 GEL TOPICAL AT BEDTIME
COMMUNITY
End: 2018-08-23

## 2017-02-22 RX ADMIN — ACETAMINOPHEN 650 MG: 325 TABLET, FILM COATED ORAL at 11:54

## 2017-02-22 RX ADMIN — MEROPENEM 2 G: 1 INJECTION, POWDER, FOR SOLUTION INTRAVENOUS at 01:12

## 2017-02-22 RX ADMIN — MINOCYCLINE HYDROCHLORIDE 100 MG: 100 CAPSULE ORAL at 20:59

## 2017-02-22 RX ADMIN — Medication 4 ML: at 11:58

## 2017-02-22 RX ADMIN — OSELTAMIVIR PHOSPHATE 75 MG: 75 CAPSULE ORAL at 20:59

## 2017-02-22 RX ADMIN — ACETAMINOPHEN 650 MG: 325 TABLET, FILM COATED ORAL at 22:07

## 2017-02-22 RX ADMIN — Medication 1 CAPSULE: at 09:04

## 2017-02-22 RX ADMIN — ALBUTEROL SULFATE 2.5 MG: 2.5 SOLUTION RESPIRATORY (INHALATION) at 16:03

## 2017-02-22 RX ADMIN — ACETAMINOPHEN 650 MG: 325 TABLET, FILM COATED ORAL at 00:32

## 2017-02-22 RX ADMIN — PANCRELIPASE 180000 UNITS: 36000; 180000; 114000 CAPSULE, DELAYED RELEASE PELLETS ORAL at 18:33

## 2017-02-22 RX ADMIN — ALBUTEROL SULFATE 2.5 MG: 2.5 SOLUTION RESPIRATORY (INHALATION) at 20:08

## 2017-02-22 RX ADMIN — MEROPENEM 2 G: 1 INJECTION, POWDER, FOR SOLUTION INTRAVENOUS at 07:39

## 2017-02-22 RX ADMIN — DORNASE ALFA 2.5 MG: 1 SOLUTION RESPIRATORY (INHALATION) at 16:03

## 2017-02-22 RX ADMIN — PHYTONADIONE 5 MG: 5 TABLET ORAL at 09:04

## 2017-02-22 RX ADMIN — DORNASE ALFA 2.5 MG: 1 SOLUTION RESPIRATORY (INHALATION) at 07:52

## 2017-02-22 RX ADMIN — Medication 4 ML: at 20:08

## 2017-02-22 RX ADMIN — OSELTAMIVIR PHOSPHATE 75 MG: 75 CAPSULE ORAL at 09:04

## 2017-02-22 RX ADMIN — POTASSIUM CHLORIDE AND SODIUM CHLORIDE: 900; 150 INJECTION, SOLUTION INTRAVENOUS at 05:23

## 2017-02-22 RX ADMIN — MEROPENEM 2 G: 1 INJECTION, POWDER, FOR SOLUTION INTRAVENOUS at 15:54

## 2017-02-22 RX ADMIN — ALBUTEROL SULFATE 2.5 MG: 2.5 SOLUTION RESPIRATORY (INHALATION) at 07:52

## 2017-02-22 RX ADMIN — Medication 10 MG: at 22:00

## 2017-02-22 RX ADMIN — Medication 1 TABLET: at 09:04

## 2017-02-22 RX ADMIN — ALBUTEROL SULFATE 2.5 MG: 2.5 SOLUTION RESPIRATORY (INHALATION) at 11:58

## 2017-02-22 RX ADMIN — CITALOPRAM HYDROBROMIDE 20 MG: 20 TABLET ORAL at 09:04

## 2017-02-22 RX ADMIN — LEVOFLOXACIN 750 MG: 5 INJECTION, SOLUTION INTRAVENOUS at 17:03

## 2017-02-22 RX ADMIN — ADAPALENE: 45 GEL TOPICAL at 22:00

## 2017-02-22 RX ADMIN — OMEPRAZOLE 20 MG: 20 CAPSULE, DELAYED RELEASE ORAL at 09:04

## 2017-02-22 RX ADMIN — PANCRELIPASE 180000 UNITS: 36000; 180000; 114000 CAPSULE, DELAYED RELEASE PELLETS ORAL at 09:05

## 2017-02-22 RX ADMIN — PAROXETINE HYDROCHLORIDE 1 SPRAY: 20 TABLET, FILM COATED ORAL at 09:28

## 2017-02-22 RX ADMIN — Medication 1 CAPSULE: at 20:59

## 2017-02-22 NOTE — PROGRESS NOTES
[facility], [place]    History and Physical  Pediatric Pulmonology     Progress Note    Date of Admission:  2/21/2017    Assessment & Plan   Patient is a 22 year old male with hx of cystic fibrosis and pancreatic insufficiency who presents with CF exacerbation with hemoptysis 2/2 influenza and CXR concerning for possible superimposed PNA.  influenza, concern for possible superimposed pneumonia. Clinically stable, requiring O2 at this time. Hemoptysis likely 2/2 infection and irritation of airways, however, will monitor closely in case worsens. Requires admission for IV antibiotics and increased respiratory support.    FEN/Renal: s/p bolus x1,  Euvolemic this morning, taking adequate PO  -  NS +20 K changed from MIVF to TKO 10ml/hr  -encourage PO intake.     Resp: cystic fibrosis. Crackles improving  - PFTs this evening.   -recheck PFTs next week  - vest 4x/day with albuterol  - alternate pulmozyme and HTS 7% neb following albuterol with each treatment (e.g. Vest, albuterol, HTS. Then vest, albuterol, pulmozyme, etc)  - azithromycin d/c today( had not been taking at home per primary pulmonologist recs, was concerned for possible interactions for prolonging QT with celexa, levoquin, and azithro)    CV: stable  - vitals Q4hrs    Heme: hemoptysis improving today, likely 2/2 airway irritation and infection. INR 1.18 and PTT normal   - vitamin K enteral tab 5mg daily  - if increasing amount of hemoptysis x2, stop HTS nebs with vest treatments. If large amount (>250ml or 1 cup) will need emergent likely IR embolization of vessel    ID: influenza, possible superimposed pneumonia. Previous sputum in 11/2016 showed staph aureus and achromobacter, susceptible to levofloxacin and meropenem. Gram stain of sputum shows gram positive cocci and gram negative rods, likely staph aureus and achromobacter.   - IV levofloxacin  - IV meropenem  - continue tamiflu will do a 10 day course with treatment dosing    (day 5 starting tonight)  - follow up sputum sensitivites     GI: pancreatic insufficiency  - continue home creon for meals and for snacks  -AquaDEKs BID   - omeprazole    Neuro/Psych:   - tylenol PRN  - NO NSAIDS  - melatonin PRN  -home celexa 20 mg daily     Access: PIV. Will have PICC evaluation/placement tomorrow 2/23 (scheduled for 2pm)        Dispo: Pending improvement with PFTs, and adequate weight gain. Likely 1 week.       I have seen the patient and discussed plan of care with Dr. Muniz (Attending Physician).    Jie Mast MD PGY-1  Pager: [phone]    Physician Attestation   I, Malik Muniz, saw this patient with the resident and agree with the resident s findings and plan of care as documented in the resident s note.      I personally reviewed vital signs, medications, labs and imaging.  Interval events: patient has continued to be febrile although almost done with 5 day course of tamiflu, cough continues as well as crackles, however the latter is improved, patient denies pain but continues to have poor appetite and po intake    Jie Mast  Date of Service (when I saw the patient): 02/22/17      Primary Care Physician   Susan Li      Review of Systems   The 10 point Review of Systems is negative other than noted in the HPI or here.     Physical Exam   Temp: 99  F (37.2  C) Temp src: Oral BP: 125/69   Heart Rate: 89 Resp: 28 SpO2: 93 % O2 Device: None (Room air) Oxygen Delivery: 2 LPM  Vital Signs with Ranges  Temp:  [98.4  F (36.9  C)-102.3  F (39.1  C)] 99  F (37.2  C)  Heart Rate:  [78-96] 89  Resp:  [18-28] 28  BP: (119-125)/(58-69) 125/69  SpO2:  [91 %-98 %] 93 %  163 lbs 12.83 oz    GENERAL: Active, alert, in no acute distress.  SKIN: Clear. No significant rash, abnormal pigmentation or lesions  HEAD: Normocephalic  EYES: Pupils equal, round, reactive, Extraocular muscles intact. Normal conjunctivae.  EARS: Normal canals. Tympanic membranes are normal; gray and  translucent.  NOSE: Normal without discharge. Nasal cannula in place  MOUTH/THROAT: Clear. No oral lesions.  NECK: Supple, no masses.  No thyromegaly.  LYMPH NODES: No adenopathy  LUNGS: diffuse crackles heard throughout lung fields bilaterally   HEART: Regular rhythm. Normal S1/S2. No murmurs. Normal pulses.  ABDOMEN: Soft, non-tender, not distended, no masses or hepatosplenomegaly. Bowel sounds normal.   NEUROLOGIC: No focal findings. Cranial nerves grossly intact:  Normal strength and tone  EXTREMITIES: Full range of motion, no deformities     Data   Results for orders placed or performed during the hospital encounter of 02/21/17 (from the past 24 hour(s))   CBC with platelets differential   Result Value Ref Range    WBC 11.4 (H) 4.0 - 11.0 10e9/L    RBC Count 4.55 4.4 - 5.9 10e12/L    Hemoglobin 13.4 13.3 - 17.7 g/dL    Hematocrit 39.8 (L) 40.0 - 53.0 %    MCV 88 78 - 100 fl    MCH 29.5 26.5 - 33.0 pg    MCHC 33.7 31.5 - 36.5 g/dL    RDW 12.8 10.0 - 15.0 %    Platelet Count 155 150 - 450 10e9/L    Diff Method Automated Method     % Neutrophils 76.5 %    % Lymphocytes 14.9 %    % Monocytes 7.8 %    % Eosinophils 0.1 %    % Basophils 0.2 %    % Immature Granulocytes 0.5 %    Nucleated RBCs 0 0 /100    Absolute Neutrophil 8.7 (H) 1.6 - 8.3 10e9/L    Absolute Lymphocytes 1.7 0.8 - 5.3 10e9/L    Absolute Monocytes 0.9 0.0 - 1.3 10e9/L    Absolute Eosinophils 0.0 0.0 - 0.7 10e9/L    Absolute Basophils 0.0 0.0 - 0.2 10e9/L    Abs Immature Granulocytes 0.1 0 - 0.4 10e9/L    Absolute Nucleated RBC 0.0    Blood culture, one site   Result Value Ref Range    Specimen Description Blood Left Hand     Culture Micro No growth after 17 hours     Micro Report Status Pending    Comprehensive metabolic panel   Result Value Ref Range    Sodium 138 133 - 144 mmol/L    Potassium 4.1 3.4 - 5.3 mmol/L    Chloride 105 94 - 109 mmol/L    Carbon Dioxide 25 20 - 32 mmol/L    Anion Gap 8 3 - 14 mmol/L    Glucose 91 70 - 99 mg/dL    Urea  Nitrogen 12 7 - 30 mg/dL    Creatinine 0.96 0.66 - 1.25 mg/dL    GFR Estimate >90  Non  GFR Calc   >60 mL/min/1.7m2    GFR Estimate If Black >90   GFR Calc   >60 mL/min/1.7m2    Calcium 8.1 (L) 8.5 - 10.1 mg/dL    Bilirubin Total 0.6 0.2 - 1.3 mg/dL    Albumin 2.9 (L) 3.4 - 5.0 g/dL    Protein Total 6.8 6.8 - 8.8 g/dL    Alkaline Phosphatase 88 40 - 150 U/L    ALT 27 0 - 70 U/L    AST 29 0 - 45 U/L   Sputum Culture Aerobic Bacterial   Result Value Ref Range    Specimen Description Sputum     Culture Micro       Canceled, Test credited Test reordered as correct code REORDERED AS A CF CULTURE,   PATIENT HAS A DX OF CYSTIC FIBROSIS      Micro Report Status FINAL 02/21/2017    Gram stain   Result Value Ref Range    Specimen Description Sputum     Gram Stain (A)      >25 PMNs/low power field  Rare Gram positive cocci  Rare Gram negative rods      Micro Report Status FINAL 02/21/2017    Chest XR,  PA & LAT    Narrative    XR CHEST 2 VW 2/21/2017 1:41 PM    COMPARISON: 11/3/2016    HISTORY: Cystic fibrosis, crackles at the bases.      Impression    IMPRESSION: Chronic findings of cystic fibrosis again seen.  Additionally, there is airspace opacity at both bases, possibly  representing superimposed infection. No significant pleural effusion.  No pneumothorax.    PAULO LYNCH   INR   Result Value Ref Range    INR 1.17 (H) 0.86 - 1.14   Partial thromboplastin time   Result Value Ref Range    PTT 36 22 - 37 sec   Glucose by meter   Result Value Ref Range    Glucose 153 (H) 70 - 99 mg/dL   Glucose by meter   Result Value Ref Range    Glucose 117 (H) 70 - 99 mg/dL   XR Chest Port 1 View    Narrative    Exam: XR CHEST PORT 1 VW  2/22/2017 1:00 AM      History: Chest pain    Comparison: 2/21/2017    Findings: Chronic findings of cystic fibrosis again appreciated with  shifting hazy attenuation, now in the periphery of the right lower  lung, but improved in the medial lung bases. Micronodular disease  in  the apices is similar to the prior study. Cardiac silhouette is normal  in size. Upper abdomen is unremarkable.      Impression    Impression: Chronic findings of cystic fibrosis with some shifting of  the previously noted bibasilar patchy opacities, now more pronounced  in the periphery of the right lower lung.  Differential is unchanged  and includes atelectasis and superimposed infection.    I have personally reviewed the examination and initial interpretation  and I agree with the findings.    TANISHA TYSON MD

## 2017-02-22 NOTE — PHARMACY-ADMISSION MEDICATION HISTORY
Admission medication history interview status for the 2/21/2017 admission is complete. See Epic admission navigator for allergy information, pharmacy, prior to admission medications and immunization status.     Medication history interview sources:  Mother and Father    Changes made to PTA medication list (reason)  Added: two creams for acne, minocycline for acne, vitron C and multivit with iron  Deleted: none  Changed: melatonin dose, cetirizine and melatonin to scheduled daily    Additional medication history information (including reliability of information, actions taken by pharmacist):None      Prior to Admission medications    Medication Sig Last Dose Taking? Auth Provider   MINOCYCLINE HCL PO Take 100 mg by mouth 2 times daily  Yes Unknown, Entered By History   ferrous fumarate 65 mg, Lummi. FE,-Vitamin C 125 mg (VITRON-C)  MG TABS tablet Take 1 tablet by mouth daily  Yes Unknown, Entered By History   Multiple Vitamins-Iron (MULTIVITAMIN/IRON PO) Take 1 tablet by mouth daily  Yes Unknown, Entered By History   adapalene (DIFFERIN) 0.1 % gel Apply 1 applicator topically At Bedtime  Yes Unknown, Entered By History   tretinoin (RETIN-A) 0.05 % cream Apply 1 g topically At Bedtime  Yes Unknown, Entered By History   tobramycin, PF, (COBY) 300 MG/5ML nebulizer solution Take 5 mLs (300 mg) by nebulization 2 times daily Cycle 28 days on/off 2/21/2017 at Unknown time Yes aDisha Álvarez MD   dornase alpha (PULMOZYME) 1 MG/ML nebulizer solution Inhale 2.5 mg into the lungs 2 times daily 2/21/2017 at Unknown time Yes Daisha Álvarez MD   amylase-lipase-protease (CREON 36) 44409 UNITS CPEP Take 5 capsules (180,000 Units) by mouth 3 times daily (with meals) Past Month at Unknown time Yes Daisha Álvarez MD   albuterol (2.5 MG/3ML) 0.083% nebulizer solution Take 1 vial (2.5 mg) by nebulization 3 times daily 2-3 times a day 2/21/2017 at Unknown time Yes Daisha Álvarez MD    multivitamin CF formula (AQUADEKS) CAPS capsule Take 2 capsules by mouth daily Unknown at Unknown time  Daisha Álvarez MD   Citalopram Hydrobromide (CELEXA PO) Take 20 mg by mouth daily Unknown at Unknown time  Reported, Patient   omeprazole 20 MG tablet Take 1 tablet (20 mg) by mouth daily Unknown at Unknown time  Daisha Álvarez MD   azithromycin (ZITHROMAX) 500 MG tablet Take 1 tablet (500 mg) by mouth Every Mon, Wed, Fri Morning Take one tablet on Monday, Wednesday and Friday. Unknown at Unknown time  Daisha Álvarez MD   cetirizine (ZYRTEC) 10 MG tablet Take 1 tablet (10 mg) by mouth daily as needed  Patient taking differently: Take 10 mg by mouth daily  Unknown at Unknown time  Daisha Álvarez MD   MELATONIN PO Take 10 mg by mouth At Bedtime  Unknown at Unknown time  Reported, Patient   amylase-lipase-protease (CREON 36) 73758 UNITS CPEP Take 3 capsules (108,000units) by mouth with snacks (3meals and 3 snacks per day) Unknown at Unknown time  José Manuel Bettencourt MD   mometasone (NASONEX) 50 MCG/ACT nasal spray Spray 1 spray into both nostrils daily. Unknown at Unknown time  Reported, Patient   azelastine (ASTELIN) 137 MCG/SPRAY nasal spray Spray 1-2 sprays in nostril 2 times daily as needed. Unknown at Unknown time  Reported, Patient         Medication history completed by: Ruel Groves February 22, 2017

## 2017-02-22 NOTE — PLAN OF CARE
Problem: Goal Outcome Summary  Goal: Goal Outcome Summary  Outcome: No Change  Tmax 100.3, other VSS. Lungs coarse and diminished, sats mid-90's on 2L O2 NC. No blood noted with coughs. Started Levaquin and meropenem, continuing with other meds per home routine. Need to clarify with MD's in AM of some dosages of medications, [place] Team aware and will discuss. MIVF running at 100mL/hr, fair PO intake, no stool. Good appetite. Mom and dad at bedside, involved in all cares. Continue to monitor and update team.

## 2017-02-22 NOTE — PLAN OF CARE
Problem: Goal Outcome Summary  Goal: Goal Outcome Summary  Outcome: No Change  Slept well overnight besides episode of pain- see note. Tmax 102.3- Tylenol given with improvement. Mom and dad at bedside and attentive to patient. Plan to continue IV fluids and antibiotics, vest treatments and nebs. Continue to monitor.

## 2017-02-22 NOTE — PROVIDER NOTIFICATION
02/22/17 0027   Pain/Comfort   Patient Currently in Pain yes   Preferred Pain Scale number (Numeric Rating Pain Scale)   0-10 Pain Scale 9   Pain Location Chest   Pain Orientation Mid   Pain Descriptors Aching   Woke up with 9/10 chest pain, facial grimace of pain and moaning. MD Martinez notified and came to look at patient. Chest x-ray ordered.     Chest x-ray results not of concern. Tylenol was given for pain and fever with improvement of both. Continue to monitor for any further changes.

## 2017-02-22 NOTE — PLAN OF CARE
Problem: Goal Outcome Summary  Goal: Goal Outcome Summary  Outcome: No Change  [id number] Vital signs stable on room air.  T-max 100.9.  Tylenol administered.  Tolerated regular diet, but refused breakfast.  Good appetite at lunch.  IVMF decreased to 10 ml/hr.  Adequate PO fluid intake. Mom and dad at bedside and involved in cares and plan of care.  Plan is for PICC placement tomorrow afternoon.  Continue to monitor and notify MD of any concerns.

## 2017-02-22 NOTE — PROVIDER NOTIFICATION
Notified MD at 1530 PM regarding critical results read back.      Spoke with: [place] Team MD     Orders were not obtained. Continue with plan of care and abx tx    Comments: Lab results: Sputum culture back with many PMN's noted and rare GBC.

## 2017-02-23 ENCOUNTER — APPOINTMENT (OUTPATIENT)
Dept: INTERVENTIONAL RADIOLOGY/VASCULAR | Facility: CLINIC | Age: 22
DRG: 177 | End: 2017-02-23
Attending: STUDENT IN AN ORGANIZED HEALTH CARE EDUCATION/TRAINING PROGRAM
Payer: COMMERCIAL

## 2017-02-23 LAB
GLUCOSE BLDC GLUCOMTR-MCNC: 106 MG/DL (ref 70–99)
GLUCOSE BLDC GLUCOMTR-MCNC: 89 MG/DL (ref 70–99)

## 2017-02-23 PROCEDURE — 94640 AIRWAY INHALATION TREATMENT: CPT | Mod: 76

## 2017-02-23 PROCEDURE — 40000275 ZZH STATISTIC RCP TIME EA 10 MIN

## 2017-02-23 PROCEDURE — 25000128 H RX IP 250 OP 636: Performed by: PEDIATRICS

## 2017-02-23 PROCEDURE — 25000308 HC RX OP HPI UCR WEL MED 250 IP 250: Performed by: PEDIATRICS

## 2017-02-23 PROCEDURE — 27210577 ZZ H INTRODUCER MICRO SET

## 2017-02-23 PROCEDURE — 00000146 ZZHCL STATISTIC GLUCOSE BY METER IP

## 2017-02-23 PROCEDURE — 27210209 ZZH KIT VALVED SINGLE LUMEN

## 2017-02-23 PROCEDURE — 99213 OFFICE O/P EST LOW 20 MIN: CPT

## 2017-02-23 PROCEDURE — 25000125 ZZHC RX 250: Performed by: PEDIATRICS

## 2017-02-23 PROCEDURE — 25000132 ZZH RX MED GY IP 250 OP 250 PS 637: Performed by: PEDIATRICS

## 2017-02-23 PROCEDURE — 12000014 ZZH R&B PEDS UMMC

## 2017-02-23 PROCEDURE — 25000132 ZZH RX MED GY IP 250 OP 250 PS 637: Performed by: STUDENT IN AN ORGANIZED HEALTH CARE EDUCATION/TRAINING PROGRAM

## 2017-02-23 PROCEDURE — 36569 INSJ PICC 5 YR+ W/O IMAGING: CPT

## 2017-02-23 PROCEDURE — 77001 FLUOROGUIDE FOR VEIN DEVICE: CPT

## 2017-02-23 PROCEDURE — 25000125 ZZHC RX 250: Performed by: STUDENT IN AN ORGANIZED HEALTH CARE EDUCATION/TRAINING PROGRAM

## 2017-02-23 PROCEDURE — 94669 MECHANICAL CHEST WALL OSCILL: CPT

## 2017-02-23 RX ADMIN — MEROPENEM 2 G: 1 INJECTION, POWDER, FOR SOLUTION INTRAVENOUS at 08:57

## 2017-02-23 RX ADMIN — MINOCYCLINE HYDROCHLORIDE 100 MG: 100 CAPSULE ORAL at 19:42

## 2017-02-23 RX ADMIN — Medication 4 ML: at 20:02

## 2017-02-23 RX ADMIN — Medication 4 ML: at 11:58

## 2017-02-23 RX ADMIN — CITALOPRAM HYDROBROMIDE 20 MG: 20 TABLET ORAL at 08:57

## 2017-02-23 RX ADMIN — DORNASE ALFA 2.5 MG: 1 SOLUTION RESPIRATORY (INHALATION) at 16:01

## 2017-02-23 RX ADMIN — LIDOCAINE HYDROCHLORIDE 0.2 ML: 10 INJECTION, SOLUTION INFILTRATION; PERINEURAL at 15:15

## 2017-02-23 RX ADMIN — PAROXETINE HYDROCHLORIDE 1 SPRAY: 20 TABLET, FILM COATED ORAL at 07:57

## 2017-02-23 RX ADMIN — MINOCYCLINE HYDROCHLORIDE 100 MG: 100 CAPSULE ORAL at 08:57

## 2017-02-23 RX ADMIN — ALBUTEROL SULFATE 2.5 MG: 2.5 SOLUTION RESPIRATORY (INHALATION) at 07:56

## 2017-02-23 RX ADMIN — ALBUTEROL SULFATE 2.5 MG: 2.5 SOLUTION RESPIRATORY (INHALATION) at 20:02

## 2017-02-23 RX ADMIN — LIDOCAINE HYDROCHLORIDE 1 ML: 10 INJECTION, SOLUTION INFILTRATION; PERINEURAL at 15:20

## 2017-02-23 RX ADMIN — ALBUTEROL SULFATE 2.5 MG: 2.5 SOLUTION RESPIRATORY (INHALATION) at 16:00

## 2017-02-23 RX ADMIN — OSELTAMIVIR PHOSPHATE 75 MG: 75 CAPSULE ORAL at 08:56

## 2017-02-23 RX ADMIN — Medication 10 MG: at 21:39

## 2017-02-23 RX ADMIN — Medication 1 CAPSULE: at 19:43

## 2017-02-23 RX ADMIN — LEVOFLOXACIN 750 MG: 5 INJECTION, SOLUTION INTRAVENOUS at 17:13

## 2017-02-23 RX ADMIN — PHYTONADIONE 5 MG: 5 TABLET ORAL at 08:57

## 2017-02-23 RX ADMIN — POTASSIUM CHLORIDE AND SODIUM CHLORIDE: 900; 150 INJECTION, SOLUTION INTRAVENOUS at 16:06

## 2017-02-23 RX ADMIN — PANCRELIPASE 180000 UNITS: 36000; 180000; 114000 CAPSULE, DELAYED RELEASE PELLETS ORAL at 17:17

## 2017-02-23 RX ADMIN — OMEPRAZOLE 20 MG: 20 CAPSULE, DELAYED RELEASE ORAL at 08:57

## 2017-02-23 RX ADMIN — DORNASE ALFA 2.5 MG: 1 SOLUTION RESPIRATORY (INHALATION) at 07:57

## 2017-02-23 RX ADMIN — Medication 1 CAPSULE: at 08:56

## 2017-02-23 RX ADMIN — ALBUTEROL SULFATE 2.5 MG: 2.5 SOLUTION RESPIRATORY (INHALATION) at 11:57

## 2017-02-23 RX ADMIN — PANCRELIPASE 180000 UNITS: 36000; 180000; 114000 CAPSULE, DELAYED RELEASE PELLETS ORAL at 08:56

## 2017-02-23 RX ADMIN — MEROPENEM 2 G: 1 INJECTION, POWDER, FOR SOLUTION INTRAVENOUS at 16:07

## 2017-02-23 RX ADMIN — OSELTAMIVIR PHOSPHATE 75 MG: 75 CAPSULE ORAL at 19:42

## 2017-02-23 RX ADMIN — MEROPENEM 2 G: 1 INJECTION, POWDER, FOR SOLUTION INTRAVENOUS at 00:58

## 2017-02-23 NOTE — PLAN OF CARE
Problem: Goal Outcome Summary  Goal: Goal Outcome Summary  Outcome: Improving  Assumed care of patient from 2860-2871: AVSS. Denies pain. PICC site c/d/i; line saline locked after iv antibiotics. Parents at bedside. Continue to monitor.

## 2017-02-23 NOTE — PROGRESS NOTES
[facility], [place]    History and Physical  Pediatric Pulmonology     Progress Note    Date of Admission:  2/21/2017    Assessment & Plan   Patient is a 22 year old male with hx of cystic fibrosis and pancreatic insufficiency who presents with CF exacerbation with hemoptysis 2/2 influenza and CXR concerning for possible superimposed PNA.  influenza, concern for possible superimposed pneumonia. Clinically stable on room air.     Resp: cystic fibrosis. Crackles improving. PFTs: FCV 41% of predicted (was 60% 11/3/16) FEV1 35 (59% 11/3), FEV1/FVC 73 ( 86 on 11/3/16), and FEF 25-75% 21% (55% 11/3)   -recheck PFTs 2/27  - vest 4x/day with albuterol  - alternate pulmozyme and HTS 7% neb following albuterol with each treatment (e.g. Vest, albuterol, HTS. Then vest, albuterol, pulmozyme, etc)    ID: influenza, possible superimposed pneumonia. Previous sputum in 11/2016 showed staph aureus and achromobacter, susceptible to levofloxacin and meropenem. Gram stain of sputum shows gram positive cocci and gram negative rods, growing moderate growth of nonlactose fermenting gram negative rods and light growth of staphylococcus aureus (normal elmer.)   - IV levofloxacin  - IV meropenem  - continue tamiflu will do a 10 day course with treatment dosing   (day 5 starting tonight)    CV: stable  - vitals Q4hrs    Heme: hemoptysis resolved.   - vitamin K enteral tab 5mg daily  - if increasing amount of hemoptysis x2, stop HTS nebs with vest treatments. If large amount (>250ml or 1 cup) will need emergent likely IR embolization of vessel    GI: pancreatic insufficiency, pre/post prandial glucoses stable  - continue home creon for meals and for snacks  -AquaDEKs BID   -glucose monitoring pre-meal and 2 hours post-meals finishes today.   - omeprazole    Neuro/Psych:   - tylenol PRN  - NO NSAIDS  - melatonin PRN  -home celexa 20 mg daily     Derm: Acne  -continue home minocycline, tretinoin, and adapalene  creams  Access: PICC, PIV    FEN/Renal: s/p bolus x1,  Euvolemic this morning, taking adequate PO  - NS lock IV  -home multivitamin with iron  -home vitron C  -encourage PO intake.     Dispo: Pending improvement with PFTs, and adequate weight gain. Likely 1 week.       I have seen the patient and discussed plan of care with Dr. Muniz (Attending Physician).    Jie Mast MD PGY-1  Pager: [phone]    Physician Attestation   I, Malik Muniz, saw this patient with the resident and agree with the resident s findings and plan of care as documented in the resident s note.      Interval events: patient afebrile last night, tamiflu was extended to complete 10 days, no new episodes of hemoptysis, weight has been a concern due to poor appetite.  glucose monitoring has been normal so far    I personally reviewed vital signs, medications, labs and imaging.    Jie Mast  Date of Service (when I saw the patient): 02/23/17    Lisha Muniz MD    Pediatric Department  Division of Pediatric Pulmonology and Sleep Medicine  Pager # [phone]  Email: [email or web address]    Primary Care Physician   Susan Li      Review of Systems   The 10 point Review of Systems is negative other than noted in the HPI or here.     Physical Exam   Temp: 98.5  F (36.9  C) Temp src: Oral BP: 102/60   Heart Rate: 60 Resp: 28 SpO2: 95 % O2 Device: None (Room air)    Vital Signs with Ranges  Temp:  [98.1  F (36.7  C)-100.9  F (38.3  C)] 98.5  F (36.9  C)  Heart Rate:  [60-85] 60  Resp:  [20-28] 28  BP: (102-131)/(60-80) 102/60  Cuff Mean (mmHg):  [91] 91  SpO2:  [92 %-98 %] 95 %  160 lbs 4.39 oz    I: 1325 (960 PO) 31.71mL/kg  O: 1975 mL 1.13cc/kg/hr    GENERAL: Active, alert, in no acute distress.  SKIN: Clear. No significant rash, abnormal pigmentation or lesions  HEAD: Normocephalic  EYES: Pupils equal, round, reactive, Extraocular muscles intact. Normal conjunctivae.  EARS: Normal canals. Tympanic membranes  are normal; gray and translucent.  NOSE: Normal without discharge. Nasal cannula in place  MOUTH/THROAT: Clear. No oral lesions.  NECK: Supple, no masses.  No thyromegaly.  LYMPH NODES: No adenopathy  LUNGS: mild crackles heard throughout lung fields bilaterally   HEART: Regular rhythm. Normal S1/S2. No murmurs. Normal pulses.  ABDOMEN: Soft, non-tender, not distended, no masses or hepatosplenomegaly. Bowel sounds normal.   NEUROLOGIC: No focal findings. Cranial nerves grossly intact:  Normal strength and tone  EXTREMITIES: Full range of motion, no deformities     Data   Results for orders placed or performed in visit on 02/22/17 (from the past 24 hour(s))   Glucose by meter   Result Value Ref Range    Glucose 122 (H) 70 - 99 mg/dL   General PFT Lab (Please always keep checked)   Result Value Ref Range    FVC-Pred 5.54 L    FVC-Pre 2.29 L    FVC-%Pred-Pre 41 %    FEV1-Pre 1.68 L    FEV1-%Pred-Pre 35 %    FEV1FVC-Pred 86 %    FEV1FVC-Pre 73 %    FEFMax-Pred 10.14 L/sec    FEFMax-Pre 5.88 L/sec    FEFMax-%Pred-Pre 57 %    FEF2575-Pred 5.09 L/sec    FEF2575-Pre 1.11 L/sec    XIW5627-%Pred-Pre 21 %    ExpTime-Pre 6.45 sec    FIFMax-Pre 1.97 L/sec    FEV1FEV6-Pred 85 %    FEV1FEV6-Pre 73 %   Glucose by meter   Result Value Ref Range    Glucose 136 (H) 70 - 99 mg/dL   Glucose by meter   Result Value Ref Range    Glucose 130 (H) 70 - 99 mg/dL   Glucose by meter   Result Value Ref Range    Glucose 112 (H) 70 - 99 mg/dL

## 2017-02-23 NOTE — PLAN OF CARE
Problem: Goal Outcome Summary  Goal: Goal Outcome Summary  Outcome: No Change  Demario has been afebrile tonight. He denies any pain. Lungs coarse,crackles thru out. Plan for possible PICC today. Continue to monitor and inform primary team with concerns.

## 2017-02-23 NOTE — PROGRESS NOTES
"CLINICAL NUTRITION SERVICES - PEDIATRIC ASSESSMENT NOTE    REASON FOR ASSESSMENT  Demario Abbasi is a 22 year old male seen by the dietitian for nutrition risk screening - CF    ANTHROPOMETRICS  Height/Length: 177 cm (using previous measurements)   Current Weight: 71.7 kg   Ideal body weight for BMI = 23 kg/m2 (goal for adult CF male) = 70 kg   BMI: 22.9 kg/m2  Dosing Weight: 71 kg   Comments: Demario's weight relatively stable over the last 3 months despite acute illness. Patient feels he is done growing at this time (previous height measurements tracking 50-75th %tiles) and overall growth is consistent with goal genetic potential. BMI consistent with CFF goals for adult CF male.     Wt Readings from Last 5 Encounters:   02/23/17 71.7 kg (158 lb 1.1 oz)   11/03/16 72.8 kg (160 lb 7.9 oz) (58 %)*   11/03/16 73.7 kg (162 lb 7.7 oz) (61 %)*   04/28/16 73.6 kg (162 lb 4.1 oz) (63 %)*   01/14/16 74.4 kg (164 lb 0.4 oz) (67 %)*     * Growth percentiles are based on CDC 2-20 Years data.     NUTRITION HISTORY  Patient is on a Regular/high calorie diet at home.  Typical food/fluid intake is TID meals + snacks, normally has a good appetite. Eats a variety of foods. Loves chocolate milk, also drinks soda and water. States his appetite and eating has been \"okay\" since onset of illness. Reports no issues with enzymes. Some questions about CF vitamins and obtaining these PTA as home pharmacy has been out of stock x 3 weeks.   Information obtained from Patient/EMR  Factors affecting nutrition intake include: increased nutrition needs; pancreatic insufficiency     CURRENT NUTRITION ORDERS  Diet:High calorie   Supplement: None currently     CURRENT NUTRITION SUPPORT   None    PHYSICAL FINDINGS  Observed  Patient appears well nourished   Obtained from Chart/Interdisciplinary Team  Febrile  Hemoptysis   Decreased PFT     LABS  Labs reviewed  Elevated IRN - supplemental vitamin K started  CF annual studies completed 11/2016, WNL. " Vit D - 41 ug/L   Hx impaired glucose tolerance but has not needed to be seen by endo     MEDICATIONS  Medications reviewed  Creon 36s, 5 caps with meals and 3 with snacks = 2535 units lipase/kg/meal  AquADEK 1 gel cap BID (typically takes 1 softgel at home)   Mephyton 5 mg daily     ASSESSED NUTRITION NEEDS:  Estimated Energy Needs: RDA x 1.2-1.5   Estimated Protein Needs: RDA x 2   Estimated Fluid Needs: Baseline 2200 mLs  Micronutrient Needs: CF specific vitamin, 1 softgel daily + Vitron C + daily MVI to maintain vitamin/mineral levels WNL AEB annuals completed 11/2016    PEDIATRIC NUTRITION STATUS VALIDATION  Patient does not meet criteria for malnutrition.    NUTRITION DIAGNOSIS:  Impaired nutrient utilization related to CF as evidenced by pancreatic insufficiency; hx impaired glucose tolerance; requires Creon and fat soluble vitamins.     INTERVENTIONS  Nutrition Prescription  High calorie/protein/fat/salt diet to meet >/= 75% assessed nutrition needs for weight maintenance surrounding admit.     Nutrition Education:   Provided education on -- Nutrition POC surrounding admit discussed with patient and parents. Provided education on CF vitamins and Creon program. Also discussed obtaining vitamin through [facility] if initially unable to get vitamin from Creon program. Mother verbalized understanding.     Implementation:  1. Meals/ Snack -- Continue PO; offered oral nutrition supplements however patient declined at this time.   2. Collaboration and Referral of Nutrition care -- Discussed CF vitamin with pharmacist. Will continue 2 softgels daily during admit as patient has been off CF vit x 3 weeks. Prior to d/c will resume 1/day CF vitamin + daily MVI + Vitron C.     Goals  1. PO >/= 75% assessed nutrition needs.  2. Weight maintenance surrounding admit.     FOLLOW UP/MONITORING  Food and Beverage intake --  Anthropometric measurements --     RECOMMENDATIONS  1. Encourage PO intakes.   2. Continue present vitamin and  Creon regimen. Prior to d/c please resume CF vitamin of 1 softgel daily + daily MVI  + Vitron C. Will monitor ongoing need for mephyton after d/c.     Eboni Dent RD, SERENA, [facility]  Pediatric Cystic Fibrosis & Pulmonary Dietitian  [facility]  Pager #[phone]  Phone #[phone]

## 2017-02-23 NOTE — PLAN OF CARE
Problem: Goal Outcome Summary  Goal: Goal Outcome Summary  Outcome: Improving  Pt. reports feeling better then yesterday. Ate full breakfast, denies pain or bloody sputum. Pt off floor at 1345 for PICC placement. Will continue to monitor and notify team w/ concerns.

## 2017-02-23 NOTE — PLAN OF CARE
Problem: Goal Outcome Summary  Goal: Goal Outcome Summary  VSS. Afebrile. PRN tylenol given once. Adequate PO. Both parents at bedside and attentive to pt. Continue to monitor pt and notify MD with changes or concerns.

## 2017-02-23 NOTE — PROGRESS NOTES
PROCEDURES 2/23/2017:  1. Ultrasound guidance for venous access, left upper extremity.  2. Fluoroscopic guidance PICC placement  3. Placement of peripherally inserted central venous catheter    Clinical History: 23 y/o male with hx of CF exacerbation requiring admission and discharge home of long-term abx infusion     PICC Nurse:  Evelyn Bolaños RN, BSN, [facility], [facility]     PROCEDURE:   Patient and family are aware a PICC line has been ordered for placement.  Alternatives to this procedure were discussed.  Benefits of the procedure discussed included: theoretically one procedure to allow for completion of therapy or providing access to the vessel until vascular access is no longer needed, ability to aspirate blood for needed lab specimen testing, and reliable access for home care setting as needed. Risks discussed included the following:  inability to access the vessel, inability to thread catheter, accidental damage to area structures (veins, arteries, or nerves), air or hardware embolism, heart rhythm disturbance, bleeding, tenderness/pain, infection, spontaneous malposition of catheter after successful placement, catheter breakage/malfunction, thrombus, phlebitis, and inadvertent dislodgement.      Demario and family demonstrated understanding of the limitations, alternatives, and risks of the procedure and requested the procedure be performed. Both written and oral consent were obtained.      PICC was placed in interventional radiology by this Vascular Access Service nurse.  Hair bonnet and mask worn by all staff, family, and patient in room.  Hand hygiene completed. Chlorhexidine wash to extremity completed by patient prior to procedure start.  A targeted left upper extremity ultrasound documented basilic patency and Jtip (0.2 mL 1% buffered Lidocaine) used to numb injection site.  The resting (non-tourniquet) vein diameter was measured to be 0.49 centimters. Time out to confirm correct patient, procedure, and  procedure site completed.      Left arm was prepped and draped in usual sterile fashion. 1 mL buffered 1% lidocaine was used for deeper local anesthesia (intradermal/subcutaneous). Under ultrasound guidance, micro-puncture needle was guided into the basilic vein using single-wall puncture technique. Guidewire advanced easily. The access needle was exchanged over the guidewire for a 3 Mongolian peel-away sheath. A 3 Mongolian single lumen PICC catheter (pre-measured and pre-cut) length of 45 centimeters was advanced through the peel-away sheath.     Using fluoroscopic guidance, catheter was placed with tip positioned at level of low SVC as read by MD Granados intra-procedure. Peel-away sheath was removed. Single lumen aspirated blood and flushed adequately. Each lumen saline locked with 10 mL 0.9% NS. A subcutaneous anchoring device (SecureAcath) was applied.  There were 3 centimeters external catheter on skin with initial placement. Final site cleaned with chlorhexidine and allowed to dry.  Chlorhexidine disc and sterile dressing applied per routine protocol. No drainage noted on biopatch upon procedure completion. No immediate complications were noted.  Patient tolerated procedure well without needing any premedication.       SUMMARY:     Placement of Left upper extremity Single Lumen Valved PICC line with tip at the level of Low SVC.  The PICC is saline locked and ready for use immediately. Report given to primary RNEulalia on 6th floor upon return of patient to room.    Medications:   J-tip 0.2 mL 1% Buffered Lidocaine  1 mL Buffered Lidocaine  10 mL 0.9% NS flush

## 2017-02-24 PROCEDURE — 40000275 ZZH STATISTIC RCP TIME EA 10 MIN

## 2017-02-24 PROCEDURE — 25000132 ZZH RX MED GY IP 250 OP 250 PS 637: Performed by: PEDIATRICS

## 2017-02-24 PROCEDURE — 25000132 ZZH RX MED GY IP 250 OP 250 PS 637: Performed by: STUDENT IN AN ORGANIZED HEALTH CARE EDUCATION/TRAINING PROGRAM

## 2017-02-24 PROCEDURE — 94640 AIRWAY INHALATION TREATMENT: CPT | Mod: 76

## 2017-02-24 PROCEDURE — 12000014 ZZH R&B PEDS UMMC

## 2017-02-24 PROCEDURE — 25000125 ZZHC RX 250: Performed by: PEDIATRICS

## 2017-02-24 PROCEDURE — 25000308 HC RX OP HPI UCR WEL MED 250 IP 250: Performed by: PEDIATRICS

## 2017-02-24 PROCEDURE — 25000128 H RX IP 250 OP 636: Performed by: PEDIATRICS

## 2017-02-24 PROCEDURE — 94669 MECHANICAL CHEST WALL OSCILL: CPT

## 2017-02-24 PROCEDURE — 94640 AIRWAY INHALATION TREATMENT: CPT

## 2017-02-24 RX ADMIN — DORNASE ALFA 2.5 MG: 1 SOLUTION RESPIRATORY (INHALATION) at 16:03

## 2017-02-24 RX ADMIN — Medication 1 CAPSULE: at 20:51

## 2017-02-24 RX ADMIN — MEROPENEM 2 G: 1 INJECTION, POWDER, FOR SOLUTION INTRAVENOUS at 01:10

## 2017-02-24 RX ADMIN — Medication 4 ML: at 11:49

## 2017-02-24 RX ADMIN — OMEPRAZOLE 20 MG: 20 CAPSULE, DELAYED RELEASE ORAL at 08:49

## 2017-02-24 RX ADMIN — PAROXETINE HYDROCHLORIDE 1 SPRAY: 20 TABLET, FILM COATED ORAL at 08:10

## 2017-02-24 RX ADMIN — ALBUTEROL SULFATE 2.5 MG: 2.5 SOLUTION RESPIRATORY (INHALATION) at 08:10

## 2017-02-24 RX ADMIN — OSELTAMIVIR PHOSPHATE 75 MG: 75 CAPSULE ORAL at 20:52

## 2017-02-24 RX ADMIN — MEROPENEM 2 G: 1 INJECTION, POWDER, FOR SOLUTION INTRAVENOUS at 16:22

## 2017-02-24 RX ADMIN — Medication 10 MG: at 20:52

## 2017-02-24 RX ADMIN — MINOCYCLINE HYDROCHLORIDE 100 MG: 100 CAPSULE ORAL at 20:52

## 2017-02-24 RX ADMIN — LEVOFLOXACIN 750 MG: 5 INJECTION, SOLUTION INTRAVENOUS at 17:11

## 2017-02-24 RX ADMIN — PANCRELIPASE 180000 UNITS: 36000; 180000; 114000 CAPSULE, DELAYED RELEASE PELLETS ORAL at 19:14

## 2017-02-24 RX ADMIN — Medication 1 CAPSULE: at 08:49

## 2017-02-24 RX ADMIN — ADAPALENE: 45 GEL TOPICAL at 20:54

## 2017-02-24 RX ADMIN — ALBUTEROL SULFATE 2.5 MG: 2.5 SOLUTION RESPIRATORY (INHALATION) at 16:03

## 2017-02-24 RX ADMIN — ALBUTEROL SULFATE 2.5 MG: 2.5 SOLUTION RESPIRATORY (INHALATION) at 20:13

## 2017-02-24 RX ADMIN — CITALOPRAM HYDROBROMIDE 20 MG: 20 TABLET ORAL at 08:49

## 2017-02-24 RX ADMIN — OSELTAMIVIR PHOSPHATE 75 MG: 75 CAPSULE ORAL at 08:49

## 2017-02-24 RX ADMIN — MINOCYCLINE HYDROCHLORIDE 100 MG: 100 CAPSULE ORAL at 08:49

## 2017-02-24 RX ADMIN — Medication 4 ML: at 20:18

## 2017-02-24 RX ADMIN — PHYTONADIONE 5 MG: 5 TABLET ORAL at 08:49

## 2017-02-24 RX ADMIN — MEROPENEM 2 G: 1 INJECTION, POWDER, FOR SOLUTION INTRAVENOUS at 08:51

## 2017-02-24 RX ADMIN — DORNASE ALFA 2.5 MG: 1 SOLUTION RESPIRATORY (INHALATION) at 08:08

## 2017-02-24 RX ADMIN — ALBUTEROL SULFATE 2.5 MG: 2.5 SOLUTION RESPIRATORY (INHALATION) at 11:49

## 2017-02-24 RX ADMIN — PANCRELIPASE 180000 UNITS: 36000; 180000; 114000 CAPSULE, DELAYED RELEASE PELLETS ORAL at 09:33

## 2017-02-24 NOTE — PROGRESS NOTES
[facility], [place]    History and Physical  Pediatric Pulmonology     Progress Note    Date of Admission:  2/21/2017    Assessment & Plan   Patient is a 22 year old male with hx of cystic fibrosis and pancreatic insufficiency who presents with CF exacerbation with hemoptysis 2/2 influenza and CXR concerning for possible superimposed PNA.  influenza, concern for possible superimposed pneumonia. Clinically stable on room air.     Resp: cystic fibrosis. Crackles improving. PFTs: FCV 41% of predicted (was 60% 11/3/16) FEV1 39 (59% 11/3), FEV1/FVC 73 ( 86 on 11/3/16), and FEF 25-75% 21% (55% 11/3)   -recheck PFTs 2/27  - vest 4x/day with albuterol  - alternate pulmozyme and HTS 7% neb following albuterol with each treatment (e.g. Vest, albuterol, HTS. Then vest, albuterol, pulmozyme, etc)    ID: influenza, possible superimposed pneumonia. Previous sputum in 11/2016 showed staph aureus and achromobacter, susceptible to levofloxacin and meropenem. Gram stain of sputum shows gram positive cocci and gram negative rods, cultures growing moderate growth of achromobacter (2 strains identified) and staph aureus, which are consistent with his other sputum samples over the past year.   -sensitivities pending.    - IV levofloxacin  - IV meropenem  - continue tamiflu will do a 10 day course with treatment dosing   (day 5 starting tonight)    CV: stable  - vitals Q4hrs    Heme: hemoptysis resolved.   - vitamin K enteral tab 5mg daily  - if increasing amount of hemoptysis x2, stop HTS nebs with vest treatments. If large amount (>250ml or 1 cup) will need emergent likely IR embolization of vessel    GI: pancreatic insufficiency, pre/post prandial glucoses stable (last 130/112,  106/89)  - continue home creon for meals and for snacks  -AquaDEKs BID   -pre/post glucose checks complete  - omeprazole    Neuro/Psych:   - tylenol PRN  - NO NSAIDS  - melatonin PRN  -home celexa 20 mg daily     Derm:  Acne  -continue home minocycline, tretinoin, and adapalene creams  Access: PICC, PIV    FEN/Renal: s/p bolus x1,  Euvolemic this morning, taking adequate PO  - NS lock IV  -d/c home multivitamin with iron  -home vitron C  -encourage PO intake.     Dispo: Pending improvement with PFTs, and adequate weight gain. Likely 1 week.     I have seen the patient and discussed plan of care with Dr. Muniz (Attending Physician).    Jie Mast MD PGY-1  Pager: [phone]    Physician Attestation     I personally reviewed vital signs, medications, labs and imaging.  Interim events: continues to be afebrile, appetite is a concern as well as weight, cough continues but crackles are improved  Normal glucose check    Jie Mast  Date of Service (when I saw the patient): 02/24/17    Physician Attestation   I, Malik Muniz, saw this patient with the resident and agree with the resident s findings and plan of care as documented in the resident s note.      I personally reviewed vital signs, medications, labs and imaging.    Malik Muniz  Date of Service (when I saw the patient): Feb 24, 2017      Primary Care Physician   Susan iL    Interval History  No acute events overnight. Breathing comfortably, continues to cough.  Decreasing PO intake and weight from yesterday, but still hydrated and good urine output. Afebrile, VSS.     Review of Systems   The 10 point Review of Systems is negative other than noted in the HPI or here.     Physical Exam   Temp: 98.6  F (37  C) Temp src: Oral BP: 115/62   Heart Rate: 68 Resp: 26 SpO2: 94 % O2 Device: None (Room air)    Vital Signs with Ranges  Temp:  [98.5  F (36.9  C)-99  F (37.2  C)] 98.6  F (37  C)  Heart Rate:  [67-80] 68  Resp:  [24-28] 26  BP: (115-123)/(62-71) 115/62  SpO2:  [93 %-97 %] 94 %  158 lbs 1.12 oz    Vitals:    02/21/17 1440 02/22/17 0700 02/23/17 0655   Weight: 74.3 kg (163 lb 12.8 oz) 72.7 kg (160 lb 4.4 oz) 71.7 kg (158 lb 1.1 oz)       I:  1474 (860 PO) 20.56 mL/kg  O: 1800 mL 1.04 cc/kg/hr    GENERAL: Active, alert, in no acute distress.  SKIN: Clear. No significant rash, abnormal pigmentation or lesions  HEAD: Normocephalic  EYES: Pupils equal, round, reactive, Extraocular muscles intact. Normal conjunctivae.  EARS: Normal canals. Tympanic membranes are normal; gray and translucent.  NOSE: Normal without discharge. Nasal cannula in place  MOUTH/THROAT: Clear. No oral lesions.  NECK: Supple, no masses.  No thyromegaly.  LYMPH NODES: No adenopathy  LUNGS: mild crackles heard in the bases of lung fields, R>L  HEART: Regular rhythm. Normal S1/S2. No murmurs. Normal pulses.  ABDOMEN: Soft, non-tender, not distended, no masses or hepatosplenomegaly. Bowel sounds normal.   NEUROLOGIC: No focal findings. Cranial nerves grossly intact:  Normal strength and tone  EXTREMITIES: Full range of motion, no deformities     Data   Results for orders placed or performed in visit on 02/22/17 (from the past 24 hour(s))   Glucose by meter   Result Value Ref Range    Glucose 106 (H) 70 - 99 mg/dL   Glucose by meter   Result Value Ref Range    Glucose 89 70 - 99 mg/dL

## 2017-02-24 NOTE — PLAN OF CARE
Problem: Individualization  Goal: Patient Preferences  Outcome: No Change  Afebrile. No c/o pain.  Lung sounds coarse/crackles with infrequent cough.  New PICC site looks c/d/i.  Warm pack applied this evening. Pt took shower this evening. Parents at bedside, continue to monitor.

## 2017-02-24 NOTE — PLAN OF CARE
Problem: Goal Outcome Summary  Goal: Goal Outcome Summary  Outcome: No Change  Demario denies pain at PICC site. PICC site intact with +blood return, saline locked after antibiotic. Continue with Pulmonary therapy. Inform Primary team with concerns.

## 2017-02-24 NOTE — PLAN OF CARE
Problem: Goal Outcome Summary  Goal: Goal Outcome Summary  Outcome: Improving  VSS, afebrile, on room air with good oxygen saturations.  Lungs clear, no complaints of pain or nausea.  Mom and dad at bedside participating in cares.  Will continue to monitor and assess.

## 2017-02-25 ENCOUNTER — APPOINTMENT (OUTPATIENT)
Dept: PHYSICAL THERAPY | Facility: CLINIC | Age: 22
DRG: 177 | End: 2017-02-25
Attending: STUDENT IN AN ORGANIZED HEALTH CARE EDUCATION/TRAINING PROGRAM
Payer: COMMERCIAL

## 2017-02-25 PROCEDURE — 94640 AIRWAY INHALATION TREATMENT: CPT | Mod: 76

## 2017-02-25 PROCEDURE — 25000128 H RX IP 250 OP 636: Performed by: PEDIATRICS

## 2017-02-25 PROCEDURE — 25000132 ZZH RX MED GY IP 250 OP 250 PS 637: Performed by: STUDENT IN AN ORGANIZED HEALTH CARE EDUCATION/TRAINING PROGRAM

## 2017-02-25 PROCEDURE — 97110 THERAPEUTIC EXERCISES: CPT | Mod: GP

## 2017-02-25 PROCEDURE — 94669 MECHANICAL CHEST WALL OSCILL: CPT

## 2017-02-25 PROCEDURE — 25000128 H RX IP 250 OP 636

## 2017-02-25 PROCEDURE — 94640 AIRWAY INHALATION TREATMENT: CPT

## 2017-02-25 PROCEDURE — 25000125 ZZHC RX 250: Performed by: PEDIATRICS

## 2017-02-25 PROCEDURE — 97162 PT EVAL MOD COMPLEX 30 MIN: CPT | Mod: GP

## 2017-02-25 PROCEDURE — 40000918 ZZH STATISTIC PT IP PEDS VISIT

## 2017-02-25 PROCEDURE — S0032 INJECTION, NAFCILLIN SODIUM: HCPCS | Performed by: PEDIATRICS

## 2017-02-25 PROCEDURE — 25000132 ZZH RX MED GY IP 250 OP 250 PS 637: Performed by: PEDIATRICS

## 2017-02-25 PROCEDURE — 25000128 H RX IP 250 OP 636: Performed by: STUDENT IN AN ORGANIZED HEALTH CARE EDUCATION/TRAINING PROGRAM

## 2017-02-25 PROCEDURE — 40000275 ZZH STATISTIC RCP TIME EA 10 MIN

## 2017-02-25 PROCEDURE — 12000014 ZZH R&B PEDS UMMC

## 2017-02-25 RX ORDER — SODIUM CHLORIDE 9 MG/ML
INJECTION, SOLUTION INTRAVENOUS CONTINUOUS
Status: DISCONTINUED | OUTPATIENT
Start: 2017-02-25 | End: 2017-03-01

## 2017-02-25 RX ORDER — SODIUM CHLORIDE 9 MG/ML
INJECTION, SOLUTION INTRAVENOUS
Status: COMPLETED
Start: 2017-02-25 | End: 2017-02-25

## 2017-02-25 RX ORDER — NAFCILLIN SODIUM 2 G/8ML
2 INJECTION, POWDER, FOR SOLUTION INTRAMUSCULAR; INTRAVENOUS EVERY 4 HOURS
Status: DISCONTINUED | OUTPATIENT
Start: 2017-02-25 | End: 2017-03-02 | Stop reason: HOSPADM

## 2017-02-25 RX ADMIN — SODIUM CHLORIDE 1000 ML: 9 INJECTION, SOLUTION INTRAVENOUS at 00:13

## 2017-02-25 RX ADMIN — MINOCYCLINE HYDROCHLORIDE 100 MG: 100 CAPSULE ORAL at 20:31

## 2017-02-25 RX ADMIN — SODIUM CHLORIDE 500 ML: 9 INJECTION, SOLUTION INTRAVENOUS at 14:38

## 2017-02-25 RX ADMIN — PANCRELIPASE 180000 UNITS: 36000; 180000; 114000 CAPSULE, DELAYED RELEASE PELLETS ORAL at 08:36

## 2017-02-25 RX ADMIN — NAFCILLIN SODIUM 2 G: 2 INJECTION, POWDER, LYOPHILIZED, FOR SOLUTION INTRAMUSCULAR; INTRAVENOUS at 22:29

## 2017-02-25 RX ADMIN — Medication 1 CAPSULE: at 20:31

## 2017-02-25 RX ADMIN — Medication 1 CAPSULE: at 08:34

## 2017-02-25 RX ADMIN — OSELTAMIVIR PHOSPHATE 75 MG: 75 CAPSULE ORAL at 08:34

## 2017-02-25 RX ADMIN — MEROPENEM 2 G: 1 INJECTION, POWDER, FOR SOLUTION INTRAVENOUS at 08:33

## 2017-02-25 RX ADMIN — ADAPALENE: 45 GEL TOPICAL at 22:28

## 2017-02-25 RX ADMIN — Medication 4 ML: at 12:15

## 2017-02-25 RX ADMIN — SODIUM CHLORIDE: 9 INJECTION, SOLUTION INTRAVENOUS at 16:44

## 2017-02-25 RX ADMIN — DORNASE ALFA 2.5 MG: 1 SOLUTION RESPIRATORY (INHALATION) at 08:01

## 2017-02-25 RX ADMIN — ALBUTEROL SULFATE 2.5 MG: 2.5 SOLUTION RESPIRATORY (INHALATION) at 20:10

## 2017-02-25 RX ADMIN — NAFCILLIN SODIUM 2 G: 2 INJECTION, POWDER, LYOPHILIZED, FOR SOLUTION INTRAMUSCULAR; INTRAVENOUS at 18:29

## 2017-02-25 RX ADMIN — DORNASE ALFA 2.5 MG: 1 SOLUTION RESPIRATORY (INHALATION) at 16:03

## 2017-02-25 RX ADMIN — CITALOPRAM HYDROBROMIDE 20 MG: 20 TABLET ORAL at 08:34

## 2017-02-25 RX ADMIN — MEROPENEM 2 G: 1 INJECTION, POWDER, FOR SOLUTION INTRAVENOUS at 01:42

## 2017-02-25 RX ADMIN — Medication 4 ML: at 20:10

## 2017-02-25 RX ADMIN — MINOCYCLINE HYDROCHLORIDE 100 MG: 100 CAPSULE ORAL at 08:34

## 2017-02-25 RX ADMIN — NAFCILLIN SODIUM 2 G: 2 INJECTION, POWDER, LYOPHILIZED, FOR SOLUTION INTRAMUSCULAR; INTRAVENOUS at 14:38

## 2017-02-25 RX ADMIN — ALBUTEROL SULFATE 2.5 MG: 2.5 SOLUTION RESPIRATORY (INHALATION) at 12:15

## 2017-02-25 RX ADMIN — OMEPRAZOLE 20 MG: 20 CAPSULE, DELAYED RELEASE ORAL at 08:34

## 2017-02-25 RX ADMIN — PHYTONADIONE 5 MG: 5 TABLET ORAL at 08:34

## 2017-02-25 RX ADMIN — ALBUTEROL SULFATE 2.5 MG: 2.5 SOLUTION RESPIRATORY (INHALATION) at 16:03

## 2017-02-25 RX ADMIN — Medication 5 MG: at 20:31

## 2017-02-25 RX ADMIN — MEROPENEM 2 G: 1 INJECTION, POWDER, FOR SOLUTION INTRAVENOUS at 17:10

## 2017-02-25 RX ADMIN — ALBUTEROL SULFATE 2.5 MG: 2.5 SOLUTION RESPIRATORY (INHALATION) at 08:01

## 2017-02-25 RX ADMIN — PANCRELIPASE 180000 UNITS: 36000; 180000; 114000 CAPSULE, DELAYED RELEASE PELLETS ORAL at 12:57

## 2017-02-25 RX ADMIN — PAROXETINE HYDROCHLORIDE 1 SPRAY: 20 TABLET, FILM COATED ORAL at 08:34

## 2017-02-25 RX ADMIN — PANCRELIPASE 180000 UNITS: 36000; 180000; 114000 CAPSULE, DELAYED RELEASE PELLETS ORAL at 18:29

## 2017-02-25 NOTE — PLAN OF CARE
Problem: Goal Outcome Summary  Goal: Goal Outcome Summary  Outcome: Improving  VSS, afebrile. Okay'd to walk floor by infection control. Denies pain and nausea. Good intake. Poor output; orange team notified. Mother at bedside.

## 2017-02-25 NOTE — PLAN OF CARE
Problem: Goal Outcome Summary  Goal: Goal Outcome Summary  Outcome: No Change  VSS, afebrile, on room air with good oxygen saturations.  No complaints of pain.  Mom and dad at bedside participating in cares.  Will continue to monitor and assess.

## 2017-02-25 NOTE — PLAN OF CARE
Problem: Goal Outcome Summary  Goal: Goal Outcome Summary  PT: Demario evaluated by PT today. Overall doing much better with activity since given permission to ambulate in hallways. He completed ambulation for aerobic activity with PT at a fair pace today. Instructed in some general strength/conditioning activities in his room today as well. PT to follow 3x/week to assess progress and update HEP while IP. Encouraged Demario and his family to continue to ambulate for short bouts 2-3x/day. He plans to go downstairs, ? Outside today.

## 2017-02-25 NOTE — PROGRESS NOTES
"   02/25/17 1000   Quick Adds   Type of Visit Initial PT Evaluation   Living Environment   Lives With parent(s);friend(s)  (patient lives in off campus apartment, also with parents)   Living Environment Comment Patient notes no concerns with living environment. No barriers identified. Both living situations have stairs   Self-Care   Usual Activity Tolerance excellent   Current Activity Tolerance moderate   Regular Exercise yes   Activity/Exercise Type strength training  (elliptical)   Exercise Amount/Frequency 2 times/wk;45 mins   Equipment Currently Used at Home none   Activity/Exercise/Self-Care Comment Patient is a college student, full time.    Functional Level Prior   Ambulation 0-->independent   Transferring 0-->independent   Toileting 0-->independent   Bathing 0-->independent   Dressing 0-->independent   Eating 0-->independent   Communication 0-->understands/communicates without difficulty   Swallowing 0-->swallows foods/liquids without difficulty   Cognition 0 - no cognition issues reported   Fall history within last six months no   Which of the above functional risks had a recent onset or change? none   Prior Functional Level Comment Independent with ADL's and IADL's. No concerns.    General Information   Onset of Illness/Injury or Date of Surgery - Date 02/22/17   Referring Physician Jie Mast   Patient/Family Goals Statement \"Go home\", \"sleep in my own bed\"   Pertinent History of Current Problem (include personal factors and/or comorbidities that impact the POC) Patient is a 22 year old male with hx of cystic fibrosis and pancreatic insufficiency who presents with CF exacerbation with hemoptysis 2/2 influenza and CXR concerning for possible superimposed PNA.  influenza, concern for possible superimposed pneumonia. Clinically stable on room air.    Precautions/Limitations (OK to walk halls with mask, per MD/team)   Cognitive Status Examination   Orientation orientation to person, place and time   Pain " Assessment   Patient Currently in Pain No   Integumentary/Edema   Integumentary/Edema no deficits were identifed   Posture    Posture Forward head position;Protracted shoulders  (elevated shoulder girdle)   Range of Motion (ROM)   ROM Quick Adds No deficits were identified   ROM Comment B heelcords and HS tight   Strength   Manual Muscle Testing Quick Adds No deficits were identified   Strength Comments Good functional strength observed through transfers, functional mobility skills.    Bed Mobility   Bed Mobility Comments Independent from flat bed   Transfer Skills   Transfer Comments Independent from a variety of surfaces   Gait   Gait Comments Independent. No deficits. Appropriate community pace   Balance   Balance Comments Not formally assessed   Sensory Examination   Sensory Perception no deficits were identified   General Therapy Interventions   Planned Therapy Interventions ROM;strengthening;stretching;neuromuscular re-education  (aerobic activity)   Clinical Impression   Criteria for Skilled Therapeutic Intervention yes, treatment indicated   PT Diagnosis Deconditioning and impaired functional mobility due to CF exacerbation, viral/bacterial illness   Influenced by the following impairments Impaired pulmonary function, deconditioning, fatigue   Functional limitations due to impairments Deconditioning, unable to do typical exercise routine, decreased tolerance for ADL's and IADL's   Clinical Presentation Evolving/Changing   Clinical Presentation Rationale Medical diagnosis, prior level of function, infection precautions   Clinical Decision Making (Complexity) Moderate complexity   Therapy Frequency` 3 times/week   Predicted Duration of Therapy Intervention (days/wks) 1 week, or until discharge   Anticipated Discharge Disposition Home   Risk & Benefits of therapy have been explained Yes   Patient, Family & other staff in agreement with plan of care Yes   Total Evaluation Time   Total Evaluation Time (Minutes) 7

## 2017-02-25 NOTE — PLAN OF CARE
Problem: Goal Outcome Summary  Goal: Goal Outcome Summary  Outcome: Improving  Slept well overnight. Poor UOP over evenings and start of night shift, bolus given at start of shift with improvement. Mom and dad at bedside. Plan to continue IV antibiotics and encourage eating/drinking for weight gain. Continue to monitor.

## 2017-02-25 NOTE — PROGRESS NOTES
[facility], [place]    Pediatric Pulmonology Progress Note    Date of Service (when I saw the patient): 02/25/2017     Assessment & Plan   Demario Abbasi is a 22 year old male with hx of cystic fibrosis and pancreatic insufficiency who presented with a CF exacerbation with hemoptysis 2/2 influenza and CXR concerning for possible superimposed PNA.  Clinically stable on room air and no longer symptomatic for influenza following 7 days of Tamiflu.  Today his sputum culture resulted, planning to discontinue Levaquin and start Nafcillin.  Anticipating PFTs on Monday.     Resp: cystic fibrosis. Crackles improved. PFTs: FCV 41% of predicted (was 60% 11/3/16) FEV1 39 (59% 11/3), FEV1/FVC 73 ( 86 on 11/3/16), and FEF 25-75% 21% (55% 11/3)   -recheck PFTs 2/27  - vest 4x/day with albuterol  - alternate pulmozyme and HTS 7% neb following albuterol with each treatment (e.g. Vest, albuterol, HTS. Then vest, albuterol, pulmozyme, etc)     ID: influenza, possible superimposed pneumonia. Previous sputum in 11/2016 showed staph aureus and achromobacter, susceptible to levofloxacin and meropenem. Gram stain of sputum shows gram positive cocci and gram negative rods, cultures growing moderate growth of achromobacter (2 strains identified) and staph aureus, which are consistent with his other sputum samples over the past year.    -Susceptibilities returned resistant to levaquin.  Will discontinue levaquin and start Nafcillin.  - continue IV meropenem  - discontinue tamiflu since no longer symptomatic.     CV: stable  - vitals Q4hrs     Heme: hemoptysis resolved.   - vitamin K enteral tab 5mg daily  - if increasing amount of hemoptysis x2, stop HTS nebs with vest treatments. If large amount (>250ml or 1 cup) will need emergent likely IR embolization of vessel     GI: pancreatic insufficiency, pre/post prandial glucoses stable (last 130/112, 106/89)  - continue home creon for meals and for  snacks  -AquaDEKs BID   -pre/post glucose checks complete  - omeprazole    FEN/Renal: s/p bolus x1, Euvolemic this morning, taking adequate PO  - NS lock IV  -home vitron C  -encourage PO intake.      Neuro/Psych:   - tylenol PRN  - NO NSAIDS  - melatonin PRN  -home celexa 20 mg daily      Derm: Acne  -continue home minocycline, tretinoin, and adapalene creams      Access: PICC, PIV     Dispo: Pending improvement with PFTs, and adequate weight gain. Likely 1 week.     Patient seen and discussed with Dr. Muniz.    Larissa Villatoro MD  Pediatrics Resident PGY3  [phone]    Interval History   Slept well but had poor urine output over evenings.  Given fluid bolus.  He was given the okay to walk the floor by infection control.  Today is feeling much better.  He is no longer noticing nasal congestion.  He feels like his breathing is much better.  Appetite is improving.  States that he may have used the bathroom a few times yesterday without notifying nurses, likely missed some output in charting.  This morning is voiding well and has been stooling.    Comprehensive ROS is otherwise negative.    Physical Exam   Temp: 98.2  F (36.8  C) Temp src: Oral BP: 106/65   Heart Rate: 66 Resp: 18 SpO2: 97 % O2 Device: None (Room air)    Vitals:    02/23/17 0655 02/24/17 0925 02/25/17 0659   Weight: 71.7 kg (158 lb 1.1 oz) 69.5 kg (153 lb 3.5 oz) 69.7 kg (153 lb 10.6 oz)     Vital Signs with Ranges  Temp:  [98.2  F (36.8  C)] 98.2  F (36.8  C)  Heart Rate:  [66-70] 66  Resp:  [18] 18  BP: (106-122)/(65-66) 106/65  SpO2:  [96 %-99 %] 97 %      In: 1200 [P.O.:740; I.V.:460]  Out: 840 [Urine:840 (0.5 mL/kg/hr)]    GENERAL: awake and alert, in no acute distress.  HEENT: Head: AT/NC.  Eyes: PERRLA.  EOM. No conjunctival erythema.  Ears: Ears grossly normal.    Nose: No rhinorrhea.  No audible congestion.  Mouth/Throat: Oropharynx non-erythematous and without exudates.  NECK: No cervical or supraclavicular lymphadenopathy, thyroid  non-palpable.  RESP: Good air movement.  No increased work of breathing.  Lungs with mild diffuse crackles; good aeration.  CV: RRR.  No murmurs, rubs, or gallops.  ABDOMINAL: BS normoactive.  Soft, non-distended.  No tenderness to palpation. No organomegaly.  MUSCULOSKELETAL: Normal ROM.  Joints are non-erythematous and without swelling.  NEURO: Normal tone.  Actively moving all 4 extremities.  Cranial nerves II-XII grossly normal.  SKIN: No rashes or other lesions.      Medications        sodium chloride (PF)  5-10 mL Intracatheter Q7 Days     multivitamin CF formula  1 capsule Oral BID     melatonin  10 mg Oral At Bedtime     minocycline (MINOCIN/DYNACIN) capsule 100 mg  100 mg Oral BID     adapalene   Topical At Bedtime     tretinoin   Topical At Bedtime     amylase-lipase-protease  5 capsule Oral TID w/meals     citalopram (celeXA) tablet 20 mg  20 mg Oral Daily     mometasone  1 spray Both Nostrils Daily     omeprazole  20 mg Oral Daily     dornase alpha  2.5 mg Inhalation BID     albuterol  2.5 mg Nebulization 4x Daily     sodium chloride inhalant  4 mL Nebulization BID     oseltamivir  75 mg Oral BID     meropenem  2 g Intravenous Q8H     levofloxacin  750 mg Intravenous Q24H     phytonadione  5 mg Oral Daily       Data   Sputum culture with moderate growth normal elmer, moderate growth achromobacter xylosoxidans/denitrificans, moderate growth strain 2 achromobacter, light growth staph aureus

## 2017-02-26 PROCEDURE — 12000014 ZZH R&B PEDS UMMC

## 2017-02-26 PROCEDURE — 94640 AIRWAY INHALATION TREATMENT: CPT | Mod: 76

## 2017-02-26 PROCEDURE — 25000132 ZZH RX MED GY IP 250 OP 250 PS 637: Performed by: PEDIATRICS

## 2017-02-26 PROCEDURE — 94640 AIRWAY INHALATION TREATMENT: CPT

## 2017-02-26 PROCEDURE — 25000128 H RX IP 250 OP 636: Performed by: PEDIATRICS

## 2017-02-26 PROCEDURE — 25000125 ZZHC RX 250: Performed by: PEDIATRICS

## 2017-02-26 PROCEDURE — S0032 INJECTION, NAFCILLIN SODIUM: HCPCS | Performed by: PEDIATRICS

## 2017-02-26 PROCEDURE — 40000275 ZZH STATISTIC RCP TIME EA 10 MIN

## 2017-02-26 PROCEDURE — 25000128 H RX IP 250 OP 636

## 2017-02-26 PROCEDURE — 94669 MECHANICAL CHEST WALL OSCILL: CPT

## 2017-02-26 RX ORDER — SODIUM CHLORIDE 9 MG/ML
INJECTION, SOLUTION INTRAVENOUS
Status: COMPLETED
Start: 2017-02-26 | End: 2017-02-26

## 2017-02-26 RX ORDER — TRETINOIN 0.5 MG/G
CREAM TOPICAL DAILY
Status: DISCONTINUED | OUTPATIENT
Start: 2017-02-27 | End: 2017-03-02 | Stop reason: HOSPADM

## 2017-02-26 RX ADMIN — PHYTONADIONE 5 MG: 5 TABLET ORAL at 09:03

## 2017-02-26 RX ADMIN — MINOCYCLINE HYDROCHLORIDE 100 MG: 100 CAPSULE ORAL at 20:05

## 2017-02-26 RX ADMIN — MINOCYCLINE HYDROCHLORIDE 100 MG: 100 CAPSULE ORAL at 09:03

## 2017-02-26 RX ADMIN — DORNASE ALFA 2.5 MG: 1 SOLUTION RESPIRATORY (INHALATION) at 16:08

## 2017-02-26 RX ADMIN — PANCRELIPASE 36000 UNITS: 36000; 180000; 114000 CAPSULE, DELAYED RELEASE PELLETS ORAL at 14:10

## 2017-02-26 RX ADMIN — Medication 4 ML: at 12:14

## 2017-02-26 RX ADMIN — PAROXETINE HYDROCHLORIDE 1 SPRAY: 20 TABLET, FILM COATED ORAL at 09:03

## 2017-02-26 RX ADMIN — ALBUTEROL SULFATE 2.5 MG: 2.5 SOLUTION RESPIRATORY (INHALATION) at 20:02

## 2017-02-26 RX ADMIN — Medication 1 CAPSULE: at 09:03

## 2017-02-26 RX ADMIN — MEROPENEM 2 G: 1 INJECTION, POWDER, FOR SOLUTION INTRAVENOUS at 01:11

## 2017-02-26 RX ADMIN — ALBUTEROL SULFATE 2.5 MG: 2.5 SOLUTION RESPIRATORY (INHALATION) at 16:08

## 2017-02-26 RX ADMIN — ALBUTEROL SULFATE 2.5 MG: 2.5 SOLUTION RESPIRATORY (INHALATION) at 07:56

## 2017-02-26 RX ADMIN — MEROPENEM 2 G: 1 INJECTION, POWDER, FOR SOLUTION INTRAVENOUS at 17:03

## 2017-02-26 RX ADMIN — ALBUTEROL SULFATE 2.5 MG: 2.5 SOLUTION RESPIRATORY (INHALATION) at 12:14

## 2017-02-26 RX ADMIN — NAFCILLIN SODIUM 2 G: 2 INJECTION, POWDER, LYOPHILIZED, FOR SOLUTION INTRAMUSCULAR; INTRAVENOUS at 22:10

## 2017-02-26 RX ADMIN — NAFCILLIN SODIUM 2 G: 2 INJECTION, POWDER, LYOPHILIZED, FOR SOLUTION INTRAMUSCULAR; INTRAVENOUS at 10:36

## 2017-02-26 RX ADMIN — PANCRELIPASE 180000 UNITS: 36000; 180000; 114000 CAPSULE, DELAYED RELEASE PELLETS ORAL at 13:22

## 2017-02-26 RX ADMIN — SODIUM CHLORIDE 500 ML: 9 INJECTION, SOLUTION INTRAVENOUS at 14:04

## 2017-02-26 RX ADMIN — Medication 5 MG: at 22:10

## 2017-02-26 RX ADMIN — NAFCILLIN SODIUM 2 G: 2 INJECTION, POWDER, LYOPHILIZED, FOR SOLUTION INTRAMUSCULAR; INTRAVENOUS at 14:06

## 2017-02-26 RX ADMIN — Medication 4 ML: at 20:03

## 2017-02-26 RX ADMIN — OMEPRAZOLE 20 MG: 20 CAPSULE, DELAYED RELEASE ORAL at 09:03

## 2017-02-26 RX ADMIN — PANCRELIPASE 180000 UNITS: 36000; 180000; 114000 CAPSULE, DELAYED RELEASE PELLETS ORAL at 19:30

## 2017-02-26 RX ADMIN — NAFCILLIN SODIUM 2 G: 2 INJECTION, POWDER, LYOPHILIZED, FOR SOLUTION INTRAMUSCULAR; INTRAVENOUS at 02:00

## 2017-02-26 RX ADMIN — Medication 1 CAPSULE: at 20:05

## 2017-02-26 RX ADMIN — SODIUM CHLORIDE: 9 INJECTION, SOLUTION INTRAVENOUS at 14:00

## 2017-02-26 RX ADMIN — DORNASE ALFA 2.5 MG: 1 SOLUTION RESPIRATORY (INHALATION) at 07:56

## 2017-02-26 RX ADMIN — NAFCILLIN SODIUM 2 G: 2 INJECTION, POWDER, LYOPHILIZED, FOR SOLUTION INTRAMUSCULAR; INTRAVENOUS at 06:01

## 2017-02-26 RX ADMIN — CITALOPRAM HYDROBROMIDE 20 MG: 20 TABLET ORAL at 09:03

## 2017-02-26 RX ADMIN — NAFCILLIN SODIUM 2 G: 2 INJECTION, POWDER, LYOPHILIZED, FOR SOLUTION INTRAMUSCULAR; INTRAVENOUS at 18:00

## 2017-02-26 RX ADMIN — MEROPENEM 2 G: 1 INJECTION, POWDER, FOR SOLUTION INTRAVENOUS at 09:03

## 2017-02-26 NOTE — PROGRESS NOTES
[facility], [place]    Pediatric Pulmonology Progress Note    Date of Service (when I saw the patient): 02/26/2017     Assessment & Plan   Demario Abbasi is a 22 year old male with hx of cystic fibrosis and pancreatic insufficiency who presented with a CF exacerbation with hemoptysis 2/2 influenza and PNA.  Clinically stable on room air and no longer symptomatic for influenza following 7 days of Tamiflu. Started on meropenem and levofloxacin 2/21, switched to meropenem on 2/25 as staph and achromobacter from sputum resistant to levofloxacin. Anticipating PFTs on Monday.     Resp: cystic fibrosis. Crackles improved. PFTs: FCV 41% of predicted (was 60% 11/3/16) FEV1 39 (59% 11/3), FEV1/FVC 73 ( 86 on 11/3/16), and FEF 25-75% 21% (55% 11/3)   -recheck PFTs 2/27  - vest 4x/day with albuterol  - alternate pulmozyme and HTS 7% neb following albuterol with each treatment (e.g. Vest, albuterol, HTS. Then vest, albuterol, pulmozyme, etc)     ID: influenza, possible superimposed pneumonia. Previous sputum in 11/2016 showed staph aureus and achromobacter, susceptible to levofloxacin and meropenem. Gram stain of sputum shows gram positive cocci and gram negative rods, cultures growing moderate growth of achromobacter (2 strains identified, resistant to levofloxacin, sensitive to meropenem) and staph aureus (resistant to levo, sensitive to gent, oxacillin, and vanc, which are consistent with his other sputum samples over the past year.    -IV Nafcillin 2g q4H   - continue IV meropenem 2g q8H     CV: stable  - vitals Q4hrs     Heme: hemoptysis resolved.   - vitamin K enteral tab 5mg daily  - if increasing amount of hemoptysis x2, stop HTS nebs with vest treatments. If large amount (>250ml or 1 cup) will need emergent likely IR embolization of vessel     GI: pancreatic insufficiency, pre/post prandial glucoses stable (last 130/112, 106/89)  - continue home creon for meals and for snacks  -Vidapp  BID   -pre/post glucose checks complete  - omeprazole    FEN/Renal:  Has nearly a 10 lb weight loss from admission.   -Nutrition to see tomorrow  -encouraging ensure and breeze supplement  - NS lock IV  -home vitron C  -encourage PO intake.      Neuro/Psych:   - tylenol PRN  - NO NSAIDS  - melatonin PRN  -home celexa 20 mg daily      Derm: Acne  -continue home minocycline, tretinoin, and adapalene creams      Access: PICC, PIV     Dispo: Pending improvement with PFTs, and adequate weight gain. Likely 1-2 week stay.    Patient seen and discussed with Dr. Muniz.    Jie Mast MD PGY-1  Pager: [phone]      Interval History   Slept well and improved urine out put. He is feeling better, but weight is down nearly 10 lbs from admission. States he ate a lot yesterday, but we noted he has been drinking large amounts of fat free chocolate milk.  He would really like to go home tomorrow, explained that he needs to be at his preadmission PFTs and gain more weight. Voiding and stooling well. Improving respiratory symptoms, denies hemoptysis headache, or abdominal pain.     Comprehensive ROS is otherwise negative.    Physical Exam   Temp: 98.2  F (36.8  C) Temp src: Oral BP: 97/47   Heart Rate: 58 Resp: 16 SpO2: 95 % O2 Device: None (Room air)    Vitals:    02/23/17 0655 02/24/17 0925 02/25/17 0659   Weight: 71.7 kg (158 lb 1.1 oz) 69.5 kg (153 lb 3.5 oz) 69.7 kg (153 lb 10.6 oz)     Vital Signs with Ranges  Temp:  [98  F (36.7  C)-98.3  F (36.8  C)] 98.2  F (36.8  C)  Heart Rate:  [58-80] 58  Resp:  [16-20] 16  BP: ()/(47-58) 97/47  SpO2:  [93 %-98 %] 95 %      In: 1475 [P.O.:1710; I.V.:472]  Out: 1475 [Urine:1475 (0.88 mL/kg/hr)]    GENERAL: awake and alert, in no acute distress.  HEENT: Head: AT/NC.  Eyes: PERRLA.  EOM. No conjunctival erythema.  Ears: Ears grossly normal.    Nose: No rhinorrhea.  No audible congestion.  Mouth/Throat: Oropharynx non-erythematous and without exudates.  NECK: No cervical or  supraclavicular lymphadenopathy, thyroid non-palpable.  RESP: Good air movement.  No increased work of breathing.  Lungs with mild diffuse crackles; good aeration.  CV: RRR.  No murmurs, rubs, or gallops.  ABDOMINAL: BS normoactive.  Soft, non-distended.  No tenderness to palpation. No organomegaly.  MUSCULOSKELETAL: Normal ROM.  Joints are non-erythematous and without swelling.  NEURO: Normal tone.  Actively moving all 4 extremities.  Cranial nerves II-XII grossly normal.  SKIN: No rashes or other lesions.      Medications     NaCl 10 mL/hr at 02/25/17 1644       nafcillin  2 g Intravenous Q4H     sodium chloride (PF)  5-10 mL Intracatheter Q7 Days     multivitamin CF formula  1 capsule Oral BID     minocycline (MINOCIN/DYNACIN) capsule 100 mg  100 mg Oral BID     adapalene   Topical At Bedtime     tretinoin   Topical At Bedtime     amylase-lipase-protease  5 capsule Oral TID w/meals     citalopram (celeXA) tablet 20 mg  20 mg Oral Daily     mometasone  1 spray Both Nostrils Daily     omeprazole  20 mg Oral Daily     dornase alpha  2.5 mg Inhalation BID     albuterol  2.5 mg Nebulization 4x Daily     sodium chloride inhalant  4 mL Nebulization BID     meropenem  2 g Intravenous Q8H     phytonadione  5 mg Oral Daily       Data   Sputum culture with moderate growth normal elmer, moderate growth achromobacter xylosoxidans/denitrificans, moderate growth strain 2 achromobacter, light growth staph aureus

## 2017-02-26 NOTE — PLAN OF CARE
Problem: Goal Outcome Summary  Goal: Goal Outcome Summary  Outcome: No Change  A/VSS. No c/o pain. Good PO intake, good UOP. Walk x1. Parents at bedside & attentive to pt. Will continue to monitor.

## 2017-02-26 NOTE — PLAN OF CARE
Problem: Goal Outcome Summary  Goal: Goal Outcome Summary  Outcome: No Change  Slept well overnight. Mom and dad at bedside. PICC running TKO. Plan to continue IV antibiotics and do PFTs Monday, possible d/c Monday. Continue to monitor.

## 2017-02-26 NOTE — PLAN OF CARE
Problem: Goal Outcome Summary  Goal: Goal Outcome Summary  Outcome: No Change  VSS, afebrile, on room air with good oxygen saturations.  No complaints of pain.  Compliant with cares.  Mom and dad at bedside participating in cares.  Will continue to monitor and assess.

## 2017-02-27 DIAGNOSIS — E84.0 CYSTIC FIBROSIS WITH PULMONARY MANIFESTATIONS (H): Primary | ICD-10-CM

## 2017-02-27 LAB
BACTERIA SPEC CULT: ABNORMAL
BACTERIA SPEC CULT: NO GROWTH
MICRO REPORT STATUS: ABNORMAL
MICRO REPORT STATUS: NORMAL
MICROORGANISM SPEC CULT: ABNORMAL
SPECIMEN SOURCE: ABNORMAL
SPECIMEN SOURCE: NORMAL

## 2017-02-27 PROCEDURE — 25000132 ZZH RX MED GY IP 250 OP 250 PS 637: Performed by: PEDIATRICS

## 2017-02-27 PROCEDURE — 12000014 ZZH R&B PEDS UMMC

## 2017-02-27 PROCEDURE — 25000125 ZZHC RX 250: Performed by: PEDIATRICS

## 2017-02-27 PROCEDURE — 94669 MECHANICAL CHEST WALL OSCILL: CPT

## 2017-02-27 PROCEDURE — S0032 INJECTION, NAFCILLIN SODIUM: HCPCS | Performed by: PEDIATRICS

## 2017-02-27 PROCEDURE — 94640 AIRWAY INHALATION TREATMENT: CPT

## 2017-02-27 PROCEDURE — 40000275 ZZH STATISTIC RCP TIME EA 10 MIN

## 2017-02-27 PROCEDURE — 25000128 H RX IP 250 OP 636: Performed by: PEDIATRICS

## 2017-02-27 PROCEDURE — 94640 AIRWAY INHALATION TREATMENT: CPT | Mod: 76

## 2017-02-27 RX ORDER — SODIUM CHLORIDE 450 MG/100ML
INJECTION, SOLUTION INTRAVENOUS
Status: DISPENSED
Start: 2017-02-27 | End: 2017-02-28

## 2017-02-27 RX ORDER — SODIUM CHLORIDE 9 MG/ML
INJECTION, SOLUTION INTRAVENOUS
Status: DISPENSED
Start: 2017-02-27 | End: 2017-02-28

## 2017-02-27 RX ADMIN — NAFCILLIN SODIUM 2 G: 2 INJECTION, POWDER, LYOPHILIZED, FOR SOLUTION INTRAMUSCULAR; INTRAVENOUS at 21:45

## 2017-02-27 RX ADMIN — MEROPENEM 2 G: 1 INJECTION, POWDER, FOR SOLUTION INTRAVENOUS at 17:21

## 2017-02-27 RX ADMIN — DORNASE ALFA 2.5 MG: 1 SOLUTION RESPIRATORY (INHALATION) at 08:44

## 2017-02-27 RX ADMIN — DORNASE ALFA 2.5 MG: 1 SOLUTION RESPIRATORY (INHALATION) at 16:07

## 2017-02-27 RX ADMIN — ALBUTEROL SULFATE 2.5 MG: 2.5 SOLUTION RESPIRATORY (INHALATION) at 11:47

## 2017-02-27 RX ADMIN — TRETINOIN: 0.5 CREAM TOPICAL at 09:45

## 2017-02-27 RX ADMIN — NAFCILLIN SODIUM 2 G: 2 INJECTION, POWDER, LYOPHILIZED, FOR SOLUTION INTRAMUSCULAR; INTRAVENOUS at 05:43

## 2017-02-27 RX ADMIN — NAFCILLIN SODIUM 2 G: 2 INJECTION, POWDER, LYOPHILIZED, FOR SOLUTION INTRAMUSCULAR; INTRAVENOUS at 18:40

## 2017-02-27 RX ADMIN — MEROPENEM 2 G: 1 INJECTION, POWDER, FOR SOLUTION INTRAVENOUS at 09:37

## 2017-02-27 RX ADMIN — ALBUTEROL SULFATE 2.5 MG: 2.5 SOLUTION RESPIRATORY (INHALATION) at 08:44

## 2017-02-27 RX ADMIN — PAROXETINE HYDROCHLORIDE 1 SPRAY: 20 TABLET, FILM COATED ORAL at 08:39

## 2017-02-27 RX ADMIN — Medication 4 ML: at 20:11

## 2017-02-27 RX ADMIN — Medication 5 MG: at 21:45

## 2017-02-27 RX ADMIN — OMEPRAZOLE 20 MG: 20 CAPSULE, DELAYED RELEASE ORAL at 08:40

## 2017-02-27 RX ADMIN — PANCRELIPASE 180000 UNITS: 36000; 180000; 114000 CAPSULE, DELAYED RELEASE PELLETS ORAL at 19:37

## 2017-02-27 RX ADMIN — CITALOPRAM HYDROBROMIDE 20 MG: 20 TABLET ORAL at 08:40

## 2017-02-27 RX ADMIN — MEROPENEM 2 G: 1 INJECTION, POWDER, FOR SOLUTION INTRAVENOUS at 00:46

## 2017-02-27 RX ADMIN — NAFCILLIN SODIUM 2 G: 2 INJECTION, POWDER, LYOPHILIZED, FOR SOLUTION INTRAMUSCULAR; INTRAVENOUS at 14:42

## 2017-02-27 RX ADMIN — NAFCILLIN SODIUM 2 G: 2 INJECTION, POWDER, LYOPHILIZED, FOR SOLUTION INTRAMUSCULAR; INTRAVENOUS at 02:03

## 2017-02-27 RX ADMIN — MINOCYCLINE HYDROCHLORIDE 100 MG: 100 CAPSULE ORAL at 08:40

## 2017-02-27 RX ADMIN — Medication 1 CAPSULE: at 08:40

## 2017-02-27 RX ADMIN — MINOCYCLINE HYDROCHLORIDE 100 MG: 100 CAPSULE ORAL at 20:18

## 2017-02-27 RX ADMIN — PANCRELIPASE 180000 UNITS: 36000; 180000; 114000 CAPSULE, DELAYED RELEASE PELLETS ORAL at 13:00

## 2017-02-27 RX ADMIN — Medication 1 CAPSULE: at 20:18

## 2017-02-27 RX ADMIN — NAFCILLIN SODIUM 2 G: 2 INJECTION, POWDER, LYOPHILIZED, FOR SOLUTION INTRAMUSCULAR; INTRAVENOUS at 10:59

## 2017-02-27 RX ADMIN — ALBUTEROL SULFATE 2.5 MG: 2.5 SOLUTION RESPIRATORY (INHALATION) at 20:11

## 2017-02-27 RX ADMIN — Medication 4 ML: at 11:47

## 2017-02-27 RX ADMIN — ALBUTEROL SULFATE 2.5 MG: 2.5 SOLUTION RESPIRATORY (INHALATION) at 15:59

## 2017-02-27 RX ADMIN — PHYTONADIONE 5 MG: 5 TABLET ORAL at 08:40

## 2017-02-27 NOTE — PLAN OF CARE
Problem: Goal Outcome Summary  Goal: Goal Outcome Summary  Outcome: No Change  [id number]: VSS. PFTs completed today; per pt, was 49%. Needs encouragement to eat meals and in between ensure shakes. Continuing with IV antibiotics. Mom or Dad at bedside throughout shift. Continue to monitor.

## 2017-02-27 NOTE — PROVIDER NOTIFICATION
02/27/17 0000   Unmeasured Output   Emesis Occurrence 1  (20mL of dorita blood coughed up)   MD Rodriguez notified and went in to see patient and talk to mom & dad. Continue to monitor for time being and discuss with day team if any interventions needed.

## 2017-02-27 NOTE — PROGRESS NOTES
[facility], [place]    Pediatric Pulmonology Progress Note    Date of Service (when I saw the patient): 02/27/2017     Assessment & Plan   Demario Abbasi is a 22 year old male with cystic fibrosis ([id number] and 621+1G>T) and pancreatic insufficiency who presented with a CF exacerbation with hemoptysis 2/2 influenza. Clinically stable on room air and no longer symptomatic for influenza following 7 days of Tamiflu. Started on meropenem and levofloxacin 2/21/17, switched to meropenem on 2/25/17 as staph and achromobacter from sputum resistant to levofloxacin. He has had continuing mild hemoptysis.     Resp: cystic fibrosis. Crackles improved. PFTs: FCV 41% of predicted (was 60% 11/3/16) FEV1 39 (59% 11/3), FEV1/FVC 73 ( 86 on 11/3/16), and FEF 25-75% 21% (55% 11/3)   -f/u on PFTs from today  - vest 4x/day with albuterol  - alternate Pulmozyme and HTS 7% neb following albuterol with each treatment (e.g. Vest, albuterol, HTS. Then vest, albuterol, Pulmozyme, etc)     ID: influenza, possible superimposed pneumonia. Previous sputum in 11/2016 showed staph aureus and achromobacter, susceptible to levofloxacin and meropenem. Gram stain of sputum shows gram positive cocci and gram negative rods, cultures growing moderate growth of achromobacter (2 strains identified, resistant to levofloxacin, sensitive to meropenem) and staph aureus (resistant to levo, sensitive to gent, oxacillin, and vanc, which are consistent with his other sputum samples over the past year.    -IV Nafcillin 2g q4H   -IV meropenem 2g q8H     CV: stable  - vitals Q4hrs     Heme: Has mild hemoptysis. INR 1.17 on admission  - vitamin K enteral tab 5mg daily  - if increasing amount of hemoptysis x2, stop HTS nebs with vest treatments. If large amount (>250ml or 1 cup) will need emergent likely IR embolization of bronchial artery.     GI: pancreatic insufficiency,   - continue home creon for meals and for snacks  -AquaDEKs BID  "  - omeprazole    FEN/Renal:  Has nearly a 4.3kg weight loss from admission.   -Nutrition to see today  -encouraging ensure and breeze supplement  - NS lock IV  -home vitron C  -encourage PO intake.      Endo:  -pre/post prandial glucoses stable (last 130/112, 106/89)  -pre/post glucose checks complete    Neuro/Psych:   - tylenol PRN  - NO NSAIDS  - melatonin PRN  -home celexa 20 mg daily      Derm: Acne  -continue home minocycline, tretinoin, and adapalene creams    Access: PICC, PIV     Dispo: Pending improvement with PFTs, and adequate weight gain. Likely 1-2 week stay.    Patient seen and discussed with Dr. Yojana Mast MD PGY-1  Pager: [phone]    This patient has been seen and evaluated by me, Sonny Dial MD. Discussed with the house staff team or resident(s) and agree with the findings and plan in this note.  I personally performed the entire clinical encounter documented in this note.  I have reviewed today's vital signs, medications, labs and imaging.     Sonny Dial MD  Division of Pediatric Pulmonology  Department of Pediatrics  [facility]    Office: [phone] (please call for refills and questions)  Office fax: [phone]  Pager: [phone]  Email: [email or web address]    Interval History   Slept well and improving PO intake. He is feeling better, and his weight increased by 300g, although still nearly 1 kg from admission weight. Had increase cough yesterday, and about 2 tablespoons of bright red blood in his sputum x1. He said he felt a \"popping\" sensation when he coughed prior to this.  Afebrile and hemodynamically stable.     Review of Systems:   CONSTITUTIONAL: recent fever.   Skin: Negative for rash   ENT: Positive for nasal congestion   RESP: Positive for cough and hemoptisis.   Card: No cyanosis, no palpitation   GI: No constipation. No encopresis. No diarrhea.   : No hematuria.   NEURO: Negative for change in level of activity. No seizures. Negative for " sleep disturbance.   Hematologic/Lymphatic: No easy bruising.   A comprehensive review of systems was done and was negative, unless otherwise stated above.    Physical Exam   Temp: 98.3  F (36.8  C) Temp src: Oral BP: 104/60   Heart Rate: 64 Resp: 20 SpO2: 95 % O2 Device: None (Room air)    Vitals:    02/25/17 0659 02/26/17 0657 02/27/17 0600   Weight: 69.7 kg (153 lb 10.6 oz) 69.9 kg (154 lb 1.6 oz) 70.2 kg (154 lb 12.2 oz)     Vital Signs with Ranges  Temp:  [98.2  F (36.8  C)-98.6  F (37  C)] 98.3  F (36.8  C)  Heart Rate:  [63-70] 64  Resp:  [14-20] 20  BP: ()/(45-64) 104/60  SpO2:  [94 %-96 %] 95 %      In: 1998  [P.O.:1380; I.V.:619]  Out: 2015 [Urine: 2015 (1.19 mL/kg/hr)]    GENERAL: awake and alert, in no acute distress.  HEENT: Head: AT/NC.  Eyes: PERRLA.  EOM. No conjunctival erythema.  Ears: Ears grossly normal.    Nose: No rhinorrhea.  No audible congestion.  Mouth/Throat: Oropharynx non-erythematous and without exudates.  NECK: No cervical or supraclavicular lymphadenopathy, thyroid non-palpable.  RESP: Good air movement.  No increased work of breathing.  Lungs with mild diffuse crackles (Rt>Lt); good aeration.  CV: RRR.  No murmurs, rubs, or gallops.  ABDOMINAL: BS normoactive.  Soft, non-distended.  No tenderness to palpation. No organomegaly.  MUSCULOSKELETAL: Normal ROM.  Joints are non-erythematous and without swelling.  NEURO: Normal tone.  Actively moving all 4 extremities.  Cranial nerves II-XII grossly normal.  SKIN: No rashes or other lesions.    Medications     NaCl 10 mL/hr at 02/26/17 1508       tretinoin   Topical Daily     nafcillin  2 g Intravenous Q4H     sodium chloride (PF)  5-10 mL Intracatheter Q7 Days     multivitamin CF formula  1 capsule Oral BID     minocycline (MINOCIN/DYNACIN) capsule 100 mg  100 mg Oral BID     adapalene   Topical At Bedtime     amylase-lipase-protease  5 capsule Oral TID w/meals     citalopram (celeXA) tablet 20 mg  20 mg Oral Daily     mometasone  1  spray Both Nostrils Daily     omeprazole  20 mg Oral Daily     dornase alpha  2.5 mg Inhalation BID     albuterol  2.5 mg Nebulization 4x Daily     sodium chloride inhalant  4 mL Nebulization BID     meropenem  2 g Intravenous Q8H     phytonadione  5 mg Oral Daily       Data   Sputum culture with moderate growth normal elmer, moderate growth achromobacter xylosoxidans/denitrificans, moderate growth strain 2 achromobacter, light growth staph aureus    Last CXR from 2/22/17 shows RLL opacities (

## 2017-02-27 NOTE — PROGRESS NOTES
"CLINICAL NUTRITION SERVICES - REASSESSMENT NOTE    ANTHROPOMETRICS  Current Weight: 70.2 kg   Previous Weight: 71 kg   Comments: Patient's weight trending downward since admit; however unclear if admit weights accurate. Overall, down but severity TBD. Up 300 gms x last 24 hrs. Will continue to monitor.     CURRENT NUTRITION ORDERS  Diet:High Calorie/protein diet   Supplement: Ensure Plus shakes BID/Ensure Clear     CURRENT NUTRITION SUPPORT   None    Intake/Tolerance:  Per discussion with Demario and his parents, appetite is fair (\"okay\") and has been eating fairly well since admit. Has been able to find foods he likes on menu, but states there has been a barrier to finding high calorie foods from hospital menu choices. For example; drinking chocolate milk, but this is low fat vs whole milk. Per RN doc flow sheets, eating on average 2 meals daily with intakes ranging from % of food. Patient's father states that Demario is not a big breakfast eater.  Patient tried oral nutrition supplements over the weekend (Ensure Plus shake and Ensure clear.) Patient & father verbalized shakes were \"okay\" and that Demario consumed most of the ensure plus shake except for ice cream. Currently participating in enzyme self administration program without issues. Denies missed enzyme doses. Per RN, drinking large amounts of Gatorade.   Current factors affecting nutrition intake include: Increased nutrition needs; pancreatic insufficiency; fair PO intakes     NEW FINDINGS:  Weight loss  Ongoing hemoptysis     LABS  Labs reviewed  Elevated IRN - supplemental vitamin K started this admit   CF annual studies completed 11/2016, WNL. Vit D - 41 ug/L   Hx impaired glucose tolerance but has not needed to be seen by endo. BGs appropriate surrounding admit.      MEDICATIONS  Medications reviewed  Creon 36s, 5 caps with meals and 3 with snacks = 2535 units lipase/kg/meal  AquADEK 1 gel cap BID (typically takes 1 softgel at home)   Mephyton 5 mg daily "     ASSESSED NUTRITION NEEDS:  BEE = 1780 kcal/day x 1.75-2 for moderate/severe lung disease with mild malabsorption  Estimated Energy Needs: 1633-1735 kcal/day (45-50 kcal/kg/day)   Estimated Protein Needs: 1.5-2 gm/kg   Estimated Fluid Needs: Baseline 2200 mLs  Micronutrient Needs: CF specific vitamin, 1 softgel daily + Vitron C + daily MVI to maintain vitamin/mineral levels WNL AEB annuals completed 11/2016    PEDIATRIC NUTRITION STATUS VALIDATION  Weight loss (2-20 years of age): 5% usual body weight- mild malnutrition  Nutrient intake: 51-75% estimated energy/protein need- mild malnutrition  Patient meets criteria for mild malnutrition. Malnutrition is acute and illness related.     EVALUATION OF PREVIOUS PLAN OF CARE:   Monitoring from previous assessment:  Food and Beverage intake -- Oral intakes not meeting caloric demand surrounding admit   Anthropometric measurements -- Overall weight loss surrounding admit     Previous Goals:   1. PO >/= 75% assessed nutrition needs. -- Goal not met   2. Weight maintenance surrounding admit. -- Goal not met     Previous Nutrition Diagnosis:   Impaired nutrient utilization related to CF as evidenced by pancreatic insufficiency; hx impaired glucose tolerance; requires Creon and fat soluble vitamins.   Evaluation: No change/Ongoing    NUTRITION DIAGNOSIS:  #1 Inadequate protein-energy intake related to fair appetite/PO intakes with increased calorie needs surrounding admit as evidenced by weight loss surrounding admit; PO intakes ~2 meals daily ranging from % needs.    #2 Impaired nutrient utilization related to CF as evidenced by pancreatic insufficiency; hx impaired glucose tolerance; requires Creon and fat soluble vitamins.      INTERVENTIONS  Nutrition Prescription  High calorie/protein/fat/salt diet to meet >/= 75% assessed nutrition needs for weight maintenance surrounding admit.     Implementation:  Meals/ Snack -- Encouraged Po intakes of >/= 3 meals/day, add  fats to foods (butter, cheese, sour cream etc.) Calorie counts ordered.   Supplements -- Modified oral supplement order. Changed to Ensure Plus Shake, chocolate BID vs Ensure Clear to promote higher calorie intakes. Consider need for adding Benecalorie (would provide an additional 330 kcal and 7 gms protein/1.5 oz serving) if weight does not increase.   Collaboration and Referral of Nutrition care -- Rounded with team.     Goals  1. PO >/= 75% assessed nutrition needs.  2. Weight maintenance surrounding admit.     FOLLOW UP/MONITORING  Food and Beverage intake --  Anthropometric measurements --    RECOMMENDATIONS  Encourage PO intakes of minimum 3 meals + 2 Ensure Plus shakes daily. Consider need for adding Benecalorie to shakes/foods as warranted. Monitor calorie counts.     Eboni Dent RD, SERENA, [facility]  Pediatric Cystic Fibrosis & Pulmonary Dietitian  [facility]  Pager #[phone]  Phone #[phone]

## 2017-02-27 NOTE — PLAN OF CARE
Problem: Goal Outcome Summary  Goal: Goal Outcome Summary  Outcome: No Change  Slept well. One episode of coughing up dorita blood, see note. Mom and dad at bedside. Plan to continue IV antibiotics and redo PFTs today. Continue to monitor.

## 2017-02-27 NOTE — PLAN OF CARE
Problem: Goal Outcome Summary  Goal: Goal Outcome Summary  Outcome: No Change  A/VSS on RA. No c/o pain. Good PO intake, good UOP. Ambulated x1 in hallway. PFTs tomorrow. Parents at bedside & attentive to pt. Will continue to monitor.

## 2017-02-28 PROCEDURE — 25000132 ZZH RX MED GY IP 250 OP 250 PS 637: Performed by: PEDIATRICS

## 2017-02-28 PROCEDURE — 12000014 ZZH R&B PEDS UMMC

## 2017-02-28 PROCEDURE — 94640 AIRWAY INHALATION TREATMENT: CPT

## 2017-02-28 PROCEDURE — 94640 AIRWAY INHALATION TREATMENT: CPT | Mod: 76

## 2017-02-28 PROCEDURE — 25000128 H RX IP 250 OP 636: Performed by: PEDIATRICS

## 2017-02-28 PROCEDURE — S0032 INJECTION, NAFCILLIN SODIUM: HCPCS | Performed by: PEDIATRICS

## 2017-02-28 PROCEDURE — 94669 MECHANICAL CHEST WALL OSCILL: CPT

## 2017-02-28 PROCEDURE — 25000125 ZZHC RX 250: Performed by: PEDIATRICS

## 2017-02-28 PROCEDURE — 40000275 ZZH STATISTIC RCP TIME EA 10 MIN

## 2017-02-28 RX ORDER — SODIUM CHLORIDE 9 MG/ML
INJECTION, SOLUTION INTRAVENOUS
Status: DISPENSED
Start: 2017-02-28 | End: 2017-03-01

## 2017-02-28 RX ADMIN — NAFCILLIN SODIUM 2 G: 2 INJECTION, POWDER, LYOPHILIZED, FOR SOLUTION INTRAMUSCULAR; INTRAVENOUS at 15:03

## 2017-02-28 RX ADMIN — ALBUTEROL SULFATE 2.5 MG: 2.5 SOLUTION RESPIRATORY (INHALATION) at 12:30

## 2017-02-28 RX ADMIN — NAFCILLIN SODIUM 2 G: 2 INJECTION, POWDER, LYOPHILIZED, FOR SOLUTION INTRAMUSCULAR; INTRAVENOUS at 05:42

## 2017-02-28 RX ADMIN — DORNASE ALFA 2.5 MG: 1 SOLUTION RESPIRATORY (INHALATION) at 16:46

## 2017-02-28 RX ADMIN — TRETINOIN: 0.5 CREAM TOPICAL at 08:54

## 2017-02-28 RX ADMIN — PANCRELIPASE 180000 UNITS: 36000; 180000; 114000 CAPSULE, DELAYED RELEASE PELLETS ORAL at 11:39

## 2017-02-28 RX ADMIN — MINOCYCLINE HYDROCHLORIDE 100 MG: 100 CAPSULE ORAL at 08:46

## 2017-02-28 RX ADMIN — PAROXETINE HYDROCHLORIDE 1 SPRAY: 20 TABLET, FILM COATED ORAL at 08:46

## 2017-02-28 RX ADMIN — Medication 5 MG: at 19:58

## 2017-02-28 RX ADMIN — MEROPENEM 2 G: 1 INJECTION, POWDER, FOR SOLUTION INTRAVENOUS at 08:46

## 2017-02-28 RX ADMIN — MEROPENEM 2 G: 1 INJECTION, POWDER, FOR SOLUTION INTRAVENOUS at 01:25

## 2017-02-28 RX ADMIN — NAFCILLIN SODIUM 2 G: 2 INJECTION, POWDER, LYOPHILIZED, FOR SOLUTION INTRAMUSCULAR; INTRAVENOUS at 19:06

## 2017-02-28 RX ADMIN — PANCRELIPASE 180000 UNITS: 36000; 180000; 114000 CAPSULE, DELAYED RELEASE PELLETS ORAL at 15:09

## 2017-02-28 RX ADMIN — NAFCILLIN SODIUM 2 G: 2 INJECTION, POWDER, LYOPHILIZED, FOR SOLUTION INTRAMUSCULAR; INTRAVENOUS at 02:29

## 2017-02-28 RX ADMIN — Medication 1 CAPSULE: at 19:58

## 2017-02-28 RX ADMIN — NAFCILLIN SODIUM 2 G: 2 INJECTION, POWDER, LYOPHILIZED, FOR SOLUTION INTRAMUSCULAR; INTRAVENOUS at 10:25

## 2017-02-28 RX ADMIN — Medication 1 CAPSULE: at 08:46

## 2017-02-28 RX ADMIN — MINOCYCLINE HYDROCHLORIDE 100 MG: 100 CAPSULE ORAL at 19:58

## 2017-02-28 RX ADMIN — ADAPALENE: 45 GEL TOPICAL at 20:01

## 2017-02-28 RX ADMIN — Medication 4 ML: at 20:05

## 2017-02-28 RX ADMIN — Medication 4 ML: at 12:31

## 2017-02-28 RX ADMIN — ALBUTEROL SULFATE 2.5 MG: 2.5 SOLUTION RESPIRATORY (INHALATION) at 20:04

## 2017-02-28 RX ADMIN — NAFCILLIN SODIUM 2 G: 2 INJECTION, POWDER, LYOPHILIZED, FOR SOLUTION INTRAMUSCULAR; INTRAVENOUS at 22:25

## 2017-02-28 RX ADMIN — OMEPRAZOLE 20 MG: 20 CAPSULE, DELAYED RELEASE ORAL at 08:46

## 2017-02-28 RX ADMIN — CITALOPRAM HYDROBROMIDE 20 MG: 20 TABLET ORAL at 08:46

## 2017-02-28 RX ADMIN — MEROPENEM 2 G: 1 INJECTION, POWDER, FOR SOLUTION INTRAVENOUS at 18:03

## 2017-02-28 RX ADMIN — DORNASE ALFA 2.5 MG: 1 SOLUTION RESPIRATORY (INHALATION) at 09:02

## 2017-02-28 RX ADMIN — PHYTONADIONE 5 MG: 5 TABLET ORAL at 08:46

## 2017-02-28 RX ADMIN — ALBUTEROL SULFATE 2.5 MG: 2.5 SOLUTION RESPIRATORY (INHALATION) at 16:46

## 2017-02-28 RX ADMIN — ALBUTEROL SULFATE 2.5 MG: 2.5 SOLUTION RESPIRATORY (INHALATION) at 09:02

## 2017-02-28 RX ADMIN — PANCRELIPASE 180000 UNITS: 36000; 180000; 114000 CAPSULE, DELAYED RELEASE PELLETS ORAL at 19:59

## 2017-02-28 NOTE — PLAN OF CARE
Problem: Goal Outcome Summary  Goal: Goal Outcome Summary  Outcome: No Change  VSS. Afebrile. Continues on antibiotics. Pt prefers to be saline locked in between meds. Good PO intake. No complaints of pain. No episodes of hemoptysis. Will continue to monitor and assess.

## 2017-02-28 NOTE — PROGRESS NOTES
Writer met with Demario and this family this afternoon to discuss community resources.     Provided information on SSDI, CF Legal Hotline, [place] Guidelines, Compass, Health Well, [employer], [employer] and the CF [employer]. Discussed different health insurance options and copay programs.     Family was going to read through materials- they did not have any questions at this time.     Demario seemed in much better spirits today. He felt that his appetite was improving and he seemed more cheerful. He has also been going on walks during the day.     Writer will check in with family on Thursday after his PFTs.     JONELLE Palma [facility]  Pediatric Cystic Fibrosis   Pager: [phone]  Phone: [phone]  Email: [email or web address]

## 2017-02-28 NOTE — PROGRESS NOTES
[facility], [place]    Pediatric Pulmonology Progress Note    Date of Service (when I saw the patient): 02/28/2017     Assessment & Plan   Demario Abbasi is a 22 year old male with cystic fibrosis (G542X and 621+1G>T) and pancreatic insufficiency who presented with a CF exacerbation with hemoptysis 2/2 influenza. Clinically stable on room air and no longer symptomatic for influenza following 7 days of Tamiflu. Started on meropenem and levofloxacin 2/21/17, switched to nafcillin/meropenem on 2/25/17 as staph and achromobacter from sputum resistant to levofloxacin. He has had continuing mild hemoptysis.     Resp: cystic fibrosis. Respiratory status improving. .PFTs (dates: 11/3/17, 2/22, 2/27)  FVC (60%, 41, 56%)  FEV1 (59%,35, 49%)  FEV1/FVC (82, 73, 75%), and FEF 25-75% (55, 21, 32%)   -next PFTs 3/2  - vest 4x/day with albuterol  - alternate Pulmozyme and HTS 7% neb following albuterol with each treatment (e.g. Vest, albuterol, HTS. Then vest, albuterol, Pulmozyme, etc)  -repeat CXR PTD     ID: influenza, possible superimposed pneumonia. Previous sputum in 11/2016 showed staph aureus and achromobacter, susceptible to levofloxacin and meropenem. Gram stain of sputum shows gram positive cocci and Gram negative rods, cultures growing moderate growth of Achromobacter (2 strains identified, resistant to levofloxacin, sensitive to meropenem) and Staph aureus (resistant to levo, sensitive to gent, oxacillin, and vanc, which are consistent with his other sputum samples over the past year.    -IV Nafcillin 2g q4H 2/25 Day 3 (expect 2 week course)  -IV meropenem 2g q8H 2/21 Day 8 (expect 3 week course)   -had levofloxacin from 2/21-2/24     CV: stable  - vitals Q4hrs     Heme: Has mild hemoptysis. INR 1.17 on admission  - vitamin K enteral tab 5mg daily  - if increasing amount of hemoptysis x2, stop HTS nebs with vest treatments. If large amount (>250ml or 1 cup) will need emergent likely IR  embolization of bronchial artery.     GI: pancreatic insufficiency,   - continue home creon for meals and for snacks  - AquaDEKs BID   - omeprazole    FEN/Renal:  Has a 4.3kg weight loss from admission.   -Nutrition consulted, appreciate recs  -encouraging ensure and breeze supplement  - NS lock IV  - home vitron C  -encourage PO intake.      Endo:  -pre/post prandial glucoses stable (last 130/112, 106/89)  -pre/post glucose checks complete    Neuro/Psych:   - tylenol PRN  - NO NSAIDS  - melatonin PRN  -home celexa 20 mg daily      Derm: Acne  -continue home minocycline, tretinoin, and adapalene creams    Access: PICC, PIV     Dispo: Pending improvement with PFTs, and adequate weight gain. Likely 1-2 week stay.    Patient seen and discussed with Dr. Yojana Mast MD PGY-1  Pager: [phone]    This patient has been seen and evaluated by me, Sonny Dial MD. Discussed with the house staff team or resident(s) and agree with the findings and plan in this note.  I personally performed the entire clinical encounter documented in this note.  I have reviewed today's vital signs, medications, labs and imaging.     Sonny Dial MD  Division of Pediatric Pulmonology  Department of Pediatrics  [facility]    Office: [phone] (please call for refills and questions)  Office fax: [phone]  Pager: [phone]  Email: [email or web address]    Interval History   Slept well and improving PO intake. No hemoptysis. Afebrile and hemodynamcially stable. No headaches or abdominal pain. Voiding and stooling appropriately. Eating more ensure mixed with ice-cream.  He is pleased with the results of his PFTs.     Review of Systems:   CONSTITUTIONAL: recent fever.   Skin: Negative for rash   ENT: Positive for nasal congestion   RESP: Positive for cough and hemoptisis.   Card: No cyanosis, no palpitation   GI: No constipation. No encopresis. No diarrhea.   : No hematuria.   NEURO: Negative for change in level  of activity. No seizures. Negative for sleep disturbance.   Hematologic/Lymphatic: No easy bruising.   A comprehensive review of systems was done and was negative, unless otherwise stated above.    Physical Exam   Temp: 98.3  F (36.8  C) Temp src: Oral BP: 112/59   Heart Rate: 61 Resp: 18 SpO2: 96 % O2 Device: None (Room air)    Vitals:    02/26/17 0657 02/27/17 0600 02/28/17 0640   Weight: 69.9 kg (154 lb 1.6 oz) 70.2 kg (154 lb 12.2 oz) 70 kg (154 lb 5.2 oz)     Vital Signs with Ranges  Temp:  [98.2  F (36.8  C)-98.3  F (36.8  C)] 98.3  F (36.8  C)  Heart Rate:  [61-64] 61  Resp:  [18-22] 18  BP: ()/(45-62) 112/59  SpO2:  [94 %-96 %] 96 %    In: 1365 [P.O.:1365; I.V.:163.67]  Out: 1805 [Urine: 1805 (1.07 mL/kg/hr)]    GENERAL: awake and alert, in no acute distress.  HEENT: Head: AT/NC.  Eyes: PERRLA.  EOM. No conjunctival erythema.  Ears: Ears grossly normal.    Nose: No rhinorrhea.  No audible congestion.  Mouth/Throat: Oropharynx non-erythematous and without exudates.  NECK: No cervical or supraclavicular lymphadenopathy, thyroid non-palpable.  RESP: Good air movement.  No increased work of breathing.  Lungs with mild diffuse crackles; slightly diminished lung sounds on RLL  CV: RRR.  No murmurs, rubs, or gallops.  ABDOMINAL: BS normoactive.  Soft, non-distended.  No tenderness to palpation. No organomegaly.  MUSCULOSKELETAL: Normal ROM.  Joints are non-erythematous and without swelling.  NEURO: Normal tone.  Actively moving all 4 extremities.  Cranial nerves II-XII grossly normal.  SKIN: No rashes or other lesions.    Medications     NaCl Stopped (02/27/17 1530)       tretinoin   Topical Daily     nafcillin  2 g Intravenous Q4H     sodium chloride (PF)  5-10 mL Intracatheter Q7 Days     multivitamin CF formula  1 capsule Oral BID     minocycline (MINOCIN/DYNACIN) capsule 100 mg  100 mg Oral BID     adapalene   Topical At Bedtime     amylase-lipase-protease  5 capsule Oral TID w/meals     citalopram  (celeXA) tablet 20 mg  20 mg Oral Daily     mometasone  1 spray Both Nostrils Daily     omeprazole  20 mg Oral Daily     dornase alpha  2.5 mg Inhalation BID     albuterol  2.5 mg Nebulization 4x Daily     sodium chloride inhalant  4 mL Nebulization BID     meropenem  2 g Intravenous Q8H     phytonadione  5 mg Oral Daily     Data   Sputum culture with moderate growth normal elmer, moderate growth achromobacter xylosoxidans/denitrificans, moderate growth strain 2 Achromobacter, light growth Staph aureus    Last CXR from 2/22/17 shows RLL opacities     Pulmonary Functions:  (dates: 11/3/17, 2/22, 2/27):  FVC (60%, 41, 56%)  FEV1 (59%,35, 49%)  FEV1/FVC (82, 73, 75%), and FEF 25-75% (55, 21, 32%)   Interpretation: Good effort and technique.  This test qualifies ATS criteria for acceptability and repeatability.  He was able to exhale for 6-7 seconds.  Expiratory limb shows scooping, inspiratory limb loks normal.  Results are suggestive of mixt pattern.  Results are slightly improved compared to last test.

## 2017-03-01 ENCOUNTER — APPOINTMENT (OUTPATIENT)
Dept: PHYSICAL THERAPY | Facility: CLINIC | Age: 22
DRG: 177 | End: 2017-03-01
Attending: STUDENT IN AN ORGANIZED HEALTH CARE EDUCATION/TRAINING PROGRAM
Payer: COMMERCIAL

## 2017-03-01 PROCEDURE — 94640 AIRWAY INHALATION TREATMENT: CPT | Mod: 76

## 2017-03-01 PROCEDURE — S0032 INJECTION, NAFCILLIN SODIUM: HCPCS | Performed by: PEDIATRICS

## 2017-03-01 PROCEDURE — 94640 AIRWAY INHALATION TREATMENT: CPT

## 2017-03-01 PROCEDURE — 12000014 ZZH R&B PEDS UMMC

## 2017-03-01 PROCEDURE — 94669 MECHANICAL CHEST WALL OSCILL: CPT

## 2017-03-01 PROCEDURE — 97110 THERAPEUTIC EXERCISES: CPT | Mod: GP | Performed by: PHYSICAL THERAPIST

## 2017-03-01 PROCEDURE — 25000132 ZZH RX MED GY IP 250 OP 250 PS 637: Performed by: PEDIATRICS

## 2017-03-01 PROCEDURE — 25000125 ZZHC RX 250: Performed by: PEDIATRICS

## 2017-03-01 PROCEDURE — 25000128 H RX IP 250 OP 636: Performed by: PEDIATRICS

## 2017-03-01 PROCEDURE — 40000275 ZZH STATISTIC RCP TIME EA 10 MIN

## 2017-03-01 PROCEDURE — 40000918 ZZH STATISTIC PT IP PEDS VISIT: Performed by: PHYSICAL THERAPIST

## 2017-03-01 RX ADMIN — DORNASE ALFA 2.5 MG: 1 SOLUTION RESPIRATORY (INHALATION) at 15:37

## 2017-03-01 RX ADMIN — Medication 1 CAPSULE: at 19:43

## 2017-03-01 RX ADMIN — NAFCILLIN SODIUM 2 G: 2 INJECTION, POWDER, LYOPHILIZED, FOR SOLUTION INTRAMUSCULAR; INTRAVENOUS at 14:17

## 2017-03-01 RX ADMIN — ALBUTEROL SULFATE 2.5 MG: 2.5 SOLUTION RESPIRATORY (INHALATION) at 15:34

## 2017-03-01 RX ADMIN — CITALOPRAM HYDROBROMIDE 20 MG: 20 TABLET ORAL at 08:43

## 2017-03-01 RX ADMIN — OMEPRAZOLE 20 MG: 20 CAPSULE, DELAYED RELEASE ORAL at 08:43

## 2017-03-01 RX ADMIN — PHYTONADIONE 5 MG: 5 TABLET ORAL at 08:43

## 2017-03-01 RX ADMIN — MEROPENEM 2 G: 1 INJECTION, POWDER, FOR SOLUTION INTRAVENOUS at 17:34

## 2017-03-01 RX ADMIN — ALBUTEROL SULFATE 2.5 MG: 2.5 SOLUTION RESPIRATORY (INHALATION) at 19:45

## 2017-03-01 RX ADMIN — PANCRELIPASE 180000 UNITS: 36000; 180000; 114000 CAPSULE, DELAYED RELEASE PELLETS ORAL at 19:43

## 2017-03-01 RX ADMIN — NAFCILLIN SODIUM 2 G: 2 INJECTION, POWDER, LYOPHILIZED, FOR SOLUTION INTRAMUSCULAR; INTRAVENOUS at 22:43

## 2017-03-01 RX ADMIN — MEROPENEM 2 G: 1 INJECTION, POWDER, FOR SOLUTION INTRAVENOUS at 00:50

## 2017-03-01 RX ADMIN — MINOCYCLINE HYDROCHLORIDE 100 MG: 100 CAPSULE ORAL at 08:43

## 2017-03-01 RX ADMIN — NAFCILLIN SODIUM 2 G: 2 INJECTION, POWDER, LYOPHILIZED, FOR SOLUTION INTRAMUSCULAR; INTRAVENOUS at 09:22

## 2017-03-01 RX ADMIN — PAROXETINE HYDROCHLORIDE 1 SPRAY: 20 TABLET, FILM COATED ORAL at 08:43

## 2017-03-01 RX ADMIN — Medication 1 CAPSULE: at 08:43

## 2017-03-01 RX ADMIN — ALBUTEROL SULFATE 2.5 MG: 2.5 SOLUTION RESPIRATORY (INHALATION) at 07:57

## 2017-03-01 RX ADMIN — Medication 4 ML: at 11:15

## 2017-03-01 RX ADMIN — NAFCILLIN SODIUM 2 G: 2 INJECTION, POWDER, LYOPHILIZED, FOR SOLUTION INTRAMUSCULAR; INTRAVENOUS at 01:38

## 2017-03-01 RX ADMIN — MINOCYCLINE HYDROCHLORIDE 100 MG: 100 CAPSULE ORAL at 19:42

## 2017-03-01 RX ADMIN — NAFCILLIN SODIUM 2 G: 2 INJECTION, POWDER, LYOPHILIZED, FOR SOLUTION INTRAMUSCULAR; INTRAVENOUS at 18:29

## 2017-03-01 RX ADMIN — Medication 4 ML: at 19:45

## 2017-03-01 RX ADMIN — ADAPALENE: 45 GEL TOPICAL at 19:44

## 2017-03-01 RX ADMIN — TRETINOIN: 0.5 CREAM TOPICAL at 08:47

## 2017-03-01 RX ADMIN — DORNASE ALFA 2.5 MG: 1 SOLUTION RESPIRATORY (INHALATION) at 07:57

## 2017-03-01 RX ADMIN — ALBUTEROL SULFATE 2.5 MG: 2.5 SOLUTION RESPIRATORY (INHALATION) at 11:15

## 2017-03-01 RX ADMIN — PANCRELIPASE 180000 UNITS: 36000; 180000; 114000 CAPSULE, DELAYED RELEASE PELLETS ORAL at 08:47

## 2017-03-01 RX ADMIN — MEROPENEM 2 G: 1 INJECTION, POWDER, FOR SOLUTION INTRAVENOUS at 08:43

## 2017-03-01 RX ADMIN — NAFCILLIN SODIUM 2 G: 2 INJECTION, POWDER, LYOPHILIZED, FOR SOLUTION INTRAMUSCULAR; INTRAVENOUS at 06:08

## 2017-03-01 NOTE — PLAN OF CARE
"Problem: Goal Outcome Summary  Goal: Goal Outcome Summary  Outcome: No Change  Walked halls today, overall good spirits. Last day of calorie counts in effect at bedside. Verbalized being upset about not being able to go home until he gains weight back, saying \"it just isn't going to happen\". D/c pending weight gain and PFTs tomorrow. Mom and dad at bedside. Continue to monitor.       "

## 2017-03-01 NOTE — PLAN OF CARE
Problem: Goal Outcome Summary  Goal: Goal Outcome Summary  PT: Demario was seen by physical therapy for progression of strength and activity tolerance. Has HEP in room, will continue to follow

## 2017-03-01 NOTE — PLAN OF CARE
Problem: Goal Outcome Summary  Goal: Goal Outcome Summary  Pt slept through the night. VSS. Afebrile. No complaints of pain. Will continue to monitor and assess.

## 2017-03-01 NOTE — PROGRESS NOTES
[facility], [place]    Pediatric Pulmonology Progress Note    Date of Service (when I saw the patient): 03/01/2017     Assessment & Plan   Demario Abbasi is a 22 year old male with cystic fibrosis (G542X and 621+1G>T) and pancreatic insufficiency who presented with a CF exacerbation with hemoptysis 2/2 influenza. Clinically stable on room air and no longer symptomatic for influenza following 7 days of Tamiflu. Started on meropenem and levofloxacin 2/21/17, switched to nafcillin/meropenem on 2/25/17 as staph and Achromobacter from sputum resistant to levofloxacin. He has had continuing mild hemoptysis.     Resp: cystic fibrosis. Respiratory status improving. .PFTs (dates: 11/3/17, 2/22, 2/27)  FVC (60%, 41, 56%)  FEV1 (59%,35, 49%)  FEV1/FVC (82, 73, 75%), and FEF 25-75% (55, 21, 32%)   -next PFTs 3/2  - vest 4x/day with albuterol  - alternate Pulmozyme and HTS 7% neb following albuterol with each treatment (e.g. Vest, albuterol, HTS. Then vest, albuterol, Pulmozyme, etc)  -repeat CXR PTD     ID: influenza, possible secondary bacterial pneumonia. Previous sputum in 11/2016 showed Staph aureus and Achromobacter, susceptible to levofloxacin and meropenem. Gram stain of sputum shows gram positive cocci and Gram negative rods, cultures growing moderate growth of achromobacter (2 strains identified, resistant to levofloxacin, sensitive to meropenem) and Staph aureus (resistant to levo, sensitive to gent, oxacillin, and vanc, which are consistent with his other sputum samples over the past year.    -IV nafcillin 2g q4H 2/25 Day 4 (expect 2 week course)  -IV meropenem 2g q8H 2/21 Day 9 (expect 3 week course)   -had levofloxacin from 2/21-2/24     CV: stable  - vitals Q4hrs     Heme: Last episode of hemoptysis was night of 2/26. INR 1.17 on admission  - vitamin K enteral tab 5mg daily  - if increasing amount of hemoptysis x2, stop HTS nebs with vest treatments. If large amount (>250ml or 1  cup) will need emergent likely IR embolization of bronchial artery.     GI: pancreatic insufficiency,   - continue home creon for meals and for snacks  -AquaDEKs BID   - omeprazole    FEN/Renal:  Has a 4.1kg weight loss from admission. However, he was wearing his heavy coat and shoes during his admission weight, so true admission weight likely closer to 72.7 kg.   -Nutrition consulted, appreciate recs  -encouraging ensure and breeze supplement  - NS lock IV  -home Vitron C  -encourage PO intake.      Endo:  -pre/post prandial glucoses stable (last 130/112, 106/89)  -pre/post glucose checks complete    Neuro/Psych:   - tylenol PRN  - NO NSAIDS  - melatonin PRN  -home celexa 20 mg daily      Derm: Acne  -continue home minocycline, tretinoin, and adapalene creams    Access: PICC, PIV     Dispo: Pending improvement with PFTs, weight gain to 72.7 kg, and no hemoptysis for 3-4 days. Likely home the week of 3/5 .    Patient seen and discussed with Dr. Yojana Mast MD PGY-1  Pager: [phone]    This patient has been seen and evaluated by me, Sonny Dial MD. Discussed with the house staff team or resident(s) and agree with the findings and plan in this note.  I personally performed the entire clinical encounter documented in this note.  I have reviewed today's vital signs, medications, labs and imaging.     Sonny Dial MD  Division of Pediatric Pulmonology  Department of Pediatrics  [facility]    Office: [phone] (please call for refills and questions)  Office fax: [phone]  Pager: [phone]  Email: [email or web address]    Interval History   No overnight events.  Good appetite yesterday, was walking. Denies hemoptysis, headaches, abdominal pain. Very upset that he needs to gain weight prior to discharging, he was hoping he would go tomorrow if PFTs stabilized.     Review of Systems:   CONSTITUTIONAL: recent fever.   Skin: Negative for rash   ENT: Positive for nasal congestion   RESP:  Positive for cough and hemoptisis.   Card: No cyanosis, no palpitation   GI: No constipation. No encopresis. No diarrhea.   : No hematuria.   NEURO: Negative for change in level of activity. No seizures. Negative for sleep disturbance.   Hematologic/Lymphatic: No easy bruising.   A comprehensive review of systems was done and was negative, unless otherwise stated above.    Physical Exam   Temp: 98.2  F (36.8  C) Temp src: Oral BP: 111/60 Pulse: 67 Heart Rate: 72 Resp: 24 SpO2: 96 % O2 Device: None (Room air)    Vitals:    02/27/17 0600 02/28/17 0640 03/01/17 0657   Weight: 70.2 kg (154 lb 12.2 oz) 70 kg (154 lb 5.2 oz) 70.2 kg (154 lb 12.2 oz)     Vital Signs with Ranges  Temp:  [98.2  F (36.8  C)-98.3  F (36.8  C)] 98.2  F (36.8  C)  Pulse:  [67] 67  Heart Rate:  [63-72] 72  Resp:  [18-24] 24  BP: (111-123)/(52-60) 111/60  SpO2:  [94 %-98 %] 96 %    In: 1508 [P.O.:1200]  Out: 1230 [Urine: 1230 (0.7 mL/kg/hr)]    GENERAL: awake and alert, in no acute distress.  HEENT: Head: AT/NC.  Eyes: PERRLA.  EOM. No conjunctival erythema.  Ears: Ears grossly normal.    Nose: No rhinorrhea.  No audible congestion.  Mouth/Throat: Oropharynx non-erythematous and without exudates.  NECK: No cervical or supraclavicular lymphadenopathy, thyroid non-palpable.  RESP: Good air movement.  No increased work of breathing.  Lungs with mild diffuse crackles; slightly diminished lung sounds in bases.  CV: RRR.  No murmurs, rubs, or gallops.  ABDOMINAL: BS normoactive.  Soft, non-distended.  No tenderness to palpation. No organomegaly.  MUSCULOSKELETAL: Normal ROM.  Joints are non-erythematous and without swelling.  NEURO: Normal tone.  Actively moving all 4 extremities.  Cranial nerves II-XII grossly normal.  SKIN: No rashes or other lesions.    Medications        tretinoin   Topical Daily     nafcillin  2 g Intravenous Q4H     sodium chloride (PF)  5-10 mL Intracatheter Q7 Days     multivitamin CF formula  1 capsule Oral BID      minocycline (MINOCIN/DYNACIN) capsule 100 mg  100 mg Oral BID     adapalene   Topical At Bedtime     amylase-lipase-protease  5 capsule Oral TID w/meals     citalopram (celeXA) tablet 20 mg  20 mg Oral Daily     mometasone  1 spray Both Nostrils Daily     omeprazole  20 mg Oral Daily     dornase alpha  2.5 mg Inhalation BID     albuterol  2.5 mg Nebulization 4x Daily     sodium chloride inhalant  4 mL Nebulization BID     meropenem  2 g Intravenous Q8H     phytonadione  5 mg Oral Daily       Data   Sputum culture with moderate growth normal elmer, moderate growth achromobacter xylosoxidans/denitrificans, moderate growth strain 2 achromobacter, light growth staph aureus    Last CXR from 2/22/17 shows RLL opacities

## 2017-03-01 NOTE — PLAN OF CARE
Problem: Goal Outcome Summary  Goal: Goal Outcome Summary  Outcome: No Change  [id number]: VSS. Continuing with IV antibiotics. Good appetite today. On day 2 of calorie counts. Went for 2 walks today throughout hospital. Continue to monitor.

## 2017-03-02 ENCOUNTER — APPOINTMENT (OUTPATIENT)
Dept: GENERAL RADIOLOGY | Facility: CLINIC | Age: 22
DRG: 177 | End: 2017-03-02
Attending: STUDENT IN AN ORGANIZED HEALTH CARE EDUCATION/TRAINING PROGRAM
Payer: COMMERCIAL

## 2017-03-02 VITALS
OXYGEN SATURATION: 97 % | RESPIRATION RATE: 20 BRPM | HEIGHT: 68 IN | DIASTOLIC BLOOD PRESSURE: 60 MMHG | TEMPERATURE: 98.1 F | HEART RATE: 76 BPM | WEIGHT: 155.42 LBS | BODY MASS INDEX: 23.56 KG/M2 | SYSTOLIC BLOOD PRESSURE: 116 MMHG

## 2017-03-02 DIAGNOSIS — E84.9 CYSTIC FIBROSIS (H): Primary | ICD-10-CM

## 2017-03-02 PROCEDURE — 25000132 ZZH RX MED GY IP 250 OP 250 PS 637: Performed by: PEDIATRICS

## 2017-03-02 PROCEDURE — 40000275 ZZH STATISTIC RCP TIME EA 10 MIN

## 2017-03-02 PROCEDURE — 25000125 ZZHC RX 250: Performed by: PEDIATRICS

## 2017-03-02 PROCEDURE — 94640 AIRWAY INHALATION TREATMENT: CPT | Mod: 76

## 2017-03-02 PROCEDURE — 71010 XR CHEST PORT 1 VW: CPT

## 2017-03-02 PROCEDURE — 25000128 H RX IP 250 OP 636: Performed by: PEDIATRICS

## 2017-03-02 PROCEDURE — 94640 AIRWAY INHALATION TREATMENT: CPT

## 2017-03-02 PROCEDURE — 94669 MECHANICAL CHEST WALL OSCILL: CPT

## 2017-03-02 PROCEDURE — S0032 INJECTION, NAFCILLIN SODIUM: HCPCS | Performed by: PEDIATRICS

## 2017-03-02 PROCEDURE — 94375 RESPIRATORY FLOW VOLUME LOOP: CPT | Mod: ZF

## 2017-03-02 RX ORDER — NAFCILLIN SODIUM 2 G/8ML
3 INJECTION, POWDER, FOR SOLUTION INTRAMUSCULAR; INTRAVENOUS EVERY 6 HOURS
Qty: 528 ML | Refills: 0
Start: 2017-03-02 | End: 2017-03-13

## 2017-03-02 RX ORDER — SODIUM CHLORIDE 9 MG/ML
INJECTION, SOLUTION INTRAVENOUS
Status: DISPENSED
Start: 2017-03-02 | End: 2017-03-02

## 2017-03-02 RX ORDER — SODIUM CHLORIDE 450 MG/100ML
INJECTION, SOLUTION INTRAVENOUS
Status: DISCONTINUED
Start: 2017-03-02 | End: 2017-03-02 | Stop reason: HOSPADM

## 2017-03-02 RX ORDER — TRETINOIN 0.5 MG/G
CREAM TOPICAL
Qty: 30 G | Refills: 1 | COMMUNITY
Start: 2017-03-02 | End: 2018-11-23

## 2017-03-02 RX ORDER — AZITHROMYCIN 500 MG/1
TABLET, FILM COATED ORAL
Qty: 12 TABLET | Refills: 12 | Status: SHIPPED | OUTPATIENT
Start: 2017-03-02 | End: 2017-04-13

## 2017-03-02 RX ORDER — AZELASTINE 1 MG/ML
SPRAY, METERED NASAL
Qty: 1 BOTTLE | Refills: 1 | COMMUNITY
Start: 2017-03-02 | End: 2018-05-25

## 2017-03-02 RX ORDER — CITALOPRAM HYDROBROMIDE 20 MG/1
20 TABLET ORAL DAILY
Qty: 30 TABLET | Refills: 1 | COMMUNITY
Start: 2017-03-02

## 2017-03-02 RX ORDER — SODIUM CHLORIDE FOR INHALATION 7 %
4 VIAL, NEBULIZER (ML) INHALATION 2 TIMES DAILY
Qty: 240 ML | Refills: 11 | Status: SHIPPED | OUTPATIENT
Start: 2017-03-02 | End: 2018-08-23

## 2017-03-02 RX ADMIN — Medication 4 ML: at 12:18

## 2017-03-02 RX ADMIN — CITALOPRAM HYDROBROMIDE 20 MG: 20 TABLET ORAL at 08:40

## 2017-03-02 RX ADMIN — DORNASE ALFA 2.5 MG: 1 SOLUTION RESPIRATORY (INHALATION) at 16:07

## 2017-03-02 RX ADMIN — MEROPENEM 2 G: 1 INJECTION, POWDER, FOR SOLUTION INTRAVENOUS at 01:01

## 2017-03-02 RX ADMIN — DORNASE ALFA 2.5 MG: 1 SOLUTION RESPIRATORY (INHALATION) at 08:14

## 2017-03-02 RX ADMIN — MINOCYCLINE HYDROCHLORIDE 100 MG: 100 CAPSULE ORAL at 19:09

## 2017-03-02 RX ADMIN — NAFCILLIN SODIUM 2 G: 2 INJECTION, POWDER, LYOPHILIZED, FOR SOLUTION INTRAMUSCULAR; INTRAVENOUS at 01:56

## 2017-03-02 RX ADMIN — PHYTONADIONE 5 MG: 5 TABLET ORAL at 08:39

## 2017-03-02 RX ADMIN — MEROPENEM 2 G: 1 INJECTION, POWDER, FOR SOLUTION INTRAVENOUS at 16:49

## 2017-03-02 RX ADMIN — MEROPENEM 2 G: 1 INJECTION, POWDER, FOR SOLUTION INTRAVENOUS at 08:40

## 2017-03-02 RX ADMIN — NAFCILLIN SODIUM 2 G: 2 INJECTION, POWDER, LYOPHILIZED, FOR SOLUTION INTRAMUSCULAR; INTRAVENOUS at 14:03

## 2017-03-02 RX ADMIN — NAFCILLIN SODIUM 2 G: 2 INJECTION, POWDER, LYOPHILIZED, FOR SOLUTION INTRAMUSCULAR; INTRAVENOUS at 06:28

## 2017-03-02 RX ADMIN — Medication 1 CAPSULE: at 08:39

## 2017-03-02 RX ADMIN — MINOCYCLINE HYDROCHLORIDE 100 MG: 100 CAPSULE ORAL at 08:40

## 2017-03-02 RX ADMIN — ALBUTEROL SULFATE 2.5 MG: 2.5 SOLUTION RESPIRATORY (INHALATION) at 16:06

## 2017-03-02 RX ADMIN — ALBUTEROL SULFATE 2.5 MG: 2.5 SOLUTION RESPIRATORY (INHALATION) at 18:32

## 2017-03-02 RX ADMIN — Medication 4 ML: at 18:32

## 2017-03-02 RX ADMIN — PANCRELIPASE 180000 UNITS: 36000; 180000; 114000 CAPSULE, DELAYED RELEASE PELLETS ORAL at 08:42

## 2017-03-02 RX ADMIN — TRETINOIN: 0.5 CREAM TOPICAL at 08:40

## 2017-03-02 RX ADMIN — ALBUTEROL SULFATE 2.5 MG: 2.5 SOLUTION RESPIRATORY (INHALATION) at 08:16

## 2017-03-02 RX ADMIN — ALBUTEROL SULFATE 2.5 MG: 2.5 SOLUTION RESPIRATORY (INHALATION) at 12:18

## 2017-03-02 RX ADMIN — DORNASE ALFA 2.5 MG: 1 SOLUTION RESPIRATORY (INHALATION) at 18:32

## 2017-03-02 RX ADMIN — NAFCILLIN SODIUM 2 G: 2 INJECTION, POWDER, LYOPHILIZED, FOR SOLUTION INTRAMUSCULAR; INTRAVENOUS at 09:57

## 2017-03-02 RX ADMIN — PAROXETINE HYDROCHLORIDE 1 SPRAY: 20 TABLET, FILM COATED ORAL at 08:40

## 2017-03-02 RX ADMIN — OMEPRAZOLE 20 MG: 20 CAPSULE, DELAYED RELEASE ORAL at 08:39

## 2017-03-02 RX ADMIN — Medication 1 CAPSULE: at 19:09

## 2017-03-02 NOTE — PROGRESS NOTES
[facility]-NURSE LIAISON NOTE-ABX TEACH  Met with PARENTS at bedside. Pt is expected to DC today. Educated on [facility] role in Pt DC to home. Mom states she is comfortable doing home infusion and is able to infusion independently.     Mock setup, syringe-air removal by pt. Mom went through all steps of HP ABX administration, flushing and proper sequence of ABX step for administration.  Mom has good technique and understanding, and Mom agrees to contact [facility] for any questions, clarification or further education needs.  Mom understands to expect a phone contact from [facility], to review demographics, delivery, nursing needs.     Educ provided on  RN/RPH on call 24/7, phone number provided & encouraged to call [facility] for any questions, clarifications or problems. Mom verbalizes understanding.  Educated on Delivery to hospital tonight, importance of refrigerating medications. Parents engaged during this RN Visit in hospital room and states she is comfortable doing Home Infusion. Pt is sleepy during this visit, he will learn tomorrow with [facility] RN.     DELIVERY MODE:  HP Merepennem, CADD PRISM-Nafcillin q 6hrs  PICC: SL valved   EXTENSION:  To be added by [facility] Friday  DELIVERY:  To [facility] 730pm, Unless RN picks up at [facility]  FLUSHING:  SAS  SNV: Not required today--to see pt tomorrow.    Faith Roth, [facility]-Nurse Liaison  Pager:  [phone]  Cell:  [phone]  Text:  [email or web address]

## 2017-03-02 NOTE — PROGRESS NOTES
Nutrition Services Encounter:  Calorie counts completed x 72 hrs. Average intakes noted to be ~3000 kcal/day (43 kcal/kg) and 110 gms protein (1.5 gm/kg) based on today's weight of 70.5 kg. Results indicate Demario is meeting 95% of his goal calorie needs and 100% of his protein needs.  Weight has been trending upward x last 48 hrs. Would expect this to continue as lungs heal and acute illness resolves.     Eboni Dent RD, LD, [facility]  Pediatric Cystic Fibrosis & Pulmonary Dietitian  [facility]  Pager #[phone]  Phone #[phone]

## 2017-03-02 NOTE — PROGRESS NOTES
Care Coordinator Progress Note     Admission Date/Time:  2/21/2017  Attending MD:  Lisha Almanzar*     Data  Chart reviewed, discussed with interdisciplinary team.   Patient was admitted for: Cystic fibrosis with pulmonary exacerbation (H).    Concerns with insurance coverage for discharge needs: None.  Current Living Situation: Patient lives with family.  Support System: Supportive and Involved  Transportation: Family or Friend will provide  Barriers to Discharge: Needs IV anbx.     Coordination of Care and Referrals: Provided patient/family with options for Home Infusion.        Assessment  Referral made to [facility]. Working on coordination of IV anbx plan.      Plan  Anticipated Discharge Date:  1-2 days  Anticipated Discharge Plan:  Home with IV anbx    Kristyn Cole RN

## 2017-03-02 NOTE — PLAN OF CARE
Problem: Goal Outcome Summary  Goal: Goal Outcome Summary  Outcome: No Change  VSS. Hourly rounding complete. Patient sleeping in between cares. No acute changes. Parent's asleep at bedside. Will continue to monitor and update with any changes.

## 2017-03-02 NOTE — PROGRESS NOTES
[facility]-NURSE LIAISON NOTE  Faith Roth [facility]-Nurse Liaison  Pager:  [phone]  Cell:  [phone]  Text:  [phone]

## 2017-03-02 NOTE — PLAN OF CARE
Problem: Goal Outcome Summary  Goal: Goal Outcome Summary  Outcome: No Change  PFTs done this afternoon. Per team from a medical standpoint, OK to d/c. Will d/c home later this evening- plan is for 7:30pm. Infusion clinic will do evening Nafacillin dose this evening. Mom and dad at bedside and aware of plan. Will go home with PICC- do not need PLC, have had PICC in past. IV antibiotics continued. Continue to monitor.

## 2017-03-02 NOTE — PROGRESS NOTES
[facility] INFUSION NOTE-NURSE LIAISON  Met with PATIENT AND PARENTS at bedside. Pt is expected this evening or Fri/Sat, **depending on PFT results. Pt is anxious to leave and get home. Educated on [facility] role in Pt DC to home. Parents and patient state they are comfortable doing home infusion and  able to manage PICC and ABX infusions.    Parents agree to contact [facility] for any questions, clarification or further education needs.  They understands to expect a phone contact from [facility], once final DC orders are signed to review demographics, delivery, nursing needs.     Educ provided on  RN/RPH on call 24/7, phone number provided & encouraged to call [facility] for any questions, clarifications or problems. They verbalize understanding.  Educated on Deliveries, importance of refrigerating medications.    DELIVERY MODE:  HP, CADD PRISM  MEDICATION:  Nafcillin q6 hrs, Merrepennem q 8 hrs, (potentially)  *per family, they prefer to Hookup Nafcillin q 6 hrs for dosing, and DC after dose. *due to SL Valved PICC  PICC: SL valved   EXTENSION:  Will add if Pt DC today  FLUSHING:  JAYA  SNV: [facility] to be obtained by [facility]

## 2017-03-02 NOTE — PLAN OF CARE
Problem: Goal Outcome Summary  Goal: Goal Outcome Summary  Outcome: No Change  No changes, VSS. Walking around unit with mask, mom and dad at bedside attentive to Demario and interactive with his cares- calorie counts. No other complaints.

## 2017-03-03 ENCOUNTER — CARE COORDINATION (OUTPATIENT)
Dept: PULMONOLOGY | Facility: CLINIC | Age: 22
End: 2017-03-03

## 2017-03-03 DIAGNOSIS — E84.0 CYSTIC FIBROSIS WITH PULMONARY MANIFESTATIONS (H): ICD-10-CM

## 2017-03-03 RX ORDER — ALBUTEROL SULFATE 0.83 MG/ML
1 SOLUTION RESPIRATORY (INHALATION) 3 TIMES DAILY
Qty: 270 ML | Refills: 6 | Status: SHIPPED | OUTPATIENT
Start: 2017-03-03 | End: 2018-03-05

## 2017-03-03 NOTE — DISCHARGE SUMMARY
General acute hospital, [place]     Discharge Summary  Pulmonology     Date of Admission: 2/21/2017  Date of Discharge: 3/2/2017 7:45 PM  Discharging Provider: Jie Mast        Discharge Diagnoses          Patient Active Problem List   Diagnosis     Cystic fibrosis (H)     Exocrine pancreatic insufficiency     Impaired glucose tolerance     S/P lobectomy of lung     Depression     Major depressive disorder, recurrent episode, mild (H)     Influenza A            History of Present Illness      Demario Abbasi is a 22 year old male with cystic fibrosis and pancreatic insufficiency who presented with fevers, cough, and hemoptysis for 3 days prior to arrival. Was influenza positive at urgent care and CXR concerning for viral pneumonia, discharged with Tamiflu and azithromycin (did not take azithromycin,) and stopped tamiflu after 2 days due to nausea/vomitting. Went to urgent care 1 day prior to arrival and given IV fluids and one dose IV ceftriaxone, but symptoms persisted. Developed cough, chest discomfort, dyspnea. Later that evening felt worse, then had large hemoptysis (about 4-5 tablespoons). Was brought to ED today for further evaluation. Prior to hemoptysis, did not have much of a productive cough. Has had hemoptysis in past with bad exacerbations, but last bad one was a number of years ago. Compliant with vest treatments at home.         Hospital Course  Demario Abbasi was admitted on 2/21/2017. The following problems were addressed during his hospitalization:     Cystic fibrosis exacerbation:  He was started on 4x daily vest treatments with albuterol and alternating pulmozyme and HTS 7% nebulizer following the albuterol. A CXR was concerning for a RLL infiltrate. Previous sputum cultures showed staph aureus and achromobacter, susceptible to levofloxacin and meropenem, so he was started on Levaquin IV for 3 days, along with meropenem on 2/21. Sputum cultures later resulted  Achromobacter and Staph aureus that were resistant to Levaquin, so he transitioned to nafcillin on 2/25, continuing meropenem, with goal of 3 weeks of meropnem and 2 weeks of nafcillin. Blood culture obtained was negative for 48 hours. CXR taken at discharge showed improvement of RLL infiltrate.     PFTs were as follows: (dates: 11/3/17, 2/22, 2/27, 3/2) FVC% predicted (60%, 41, 56%,60) FEV1% predicted (59%,35, 49%,54)  FEV1/FVC (82, 73, 75%,76), and FEF 25-75% (55, 21, 32%)      Although PFTS did not quite return to baseline, it was felt that with continued 4x daily vest treatments at home, and close follow up with Pulmonology in a week, it was safe to discharge Demario home on 3/2.      Influenza:  He was given a 7 day course of Tamiflu at treatment dosing BID, and fevers defervesced.      Hemoptysis: Demario had 2 self-limiting episides of hemoptysis while admitted, no more than 2 tablespoons each time, his INR was 1.17 on admission, and was on 5mg of Vitamin K daily while admitted.            FEN/ GI: pancreatic insufficiency, continued on home creon, AquaDEKs, and omeprazole. We discontinued his home iron has it was felt not needed. His admission weight was close to 72.7kg, and discharged at 70.5 kg. Nutrition evaluated and we are confident he will continue to gain weight during the next week. Pre and post prandial glucoses were taken for 2 days and were stable.      Continued home celexa, melatonin, and acne medications.            Significant Results and Procedures      See EPIC and above for PFTs and CXR results        Immunization History  Immunization Status: up to date and documented         Pending Results  These results will be followed up by Peds Pulmonology      Unresulted Labs Ordered in the Past 30 Days of this Admission      No orders found from 12/23/2016 to 2/22/2017.                Primary Care Physician       Susan Li           Physical Exam  Vital Signs with Ranges  Temp: [98.1  F (36.7  C)]  98.1  F (36.7  C)  Heart Rate: [68] 68  Resp: [20] 20  BP: (116)/(60) 116/60  SpO2: [97 %] 97 %  I/O last 3 completed shifts:  In: 1689 [P.O.:870; I.V.:819]  Out: 2600 [Urine:2600]     GENERAL: awake and alert, in no acute distress.  HEENT: Head: AT/NC. Eyes: PERRLA. EOM. No conjunctival erythema. Ears: Ears grossly normal. Nose: No rhinorrhea. No audible congestion. Mouth/Throat: Oropharynx non-erythematous and without exudates.  NECK: No cervical or supraclavicular lymphadenopathy, thyroid non-palpable.  RESP: Good air movement. No increased work of breathing. Lungs with mild diffuse crackles  CV: RRR. No murmurs, rubs, or gallops.  ABDOMINAL: BS normoactive. Soft, non-distended. No tenderness to palpation. No organomegaly.  MUSCULOSKELETAL: Normal ROM. Joints are non-erythematous and without swelling.  NEURO: Normal tone. Actively moving all 4 extremities. Cranial nerves II-XII grossly normal.  SKIN: No rashes or other lesions.        Time Spent on this Encounter       I, Jie Mast, personally saw the patient today and spent greater than 30 minutes discharging this patient.        Discharge Disposition       Discharged to home  Condition at discharge: Stable        Consultations This Hospital Stay       VASCULAR ACCESS PEDS IP CONSULT  VASCULAR ACCESS PEDS IP CONSULT  PHYSICAL THERAPY PEDS IP CONSULT  NUTRITION SERVICES PEDS IP CONSULT        Discharge Orders         Home infusion referral      Reason for your hospital stay   CF exacerbation, influenza      Follow Up and recommended labs and tests   Follow up with Dr. Álvarez on 3/9.      Activity   Your activity upon discharge: activity as tolerated      Diet   Follow this diet upon discharge: Orders Placed This Encounter  Snacks/Supplements Adult: Ensure Plus Adult Shake; Between Meals  Calorie Counts  High Kcal/High Protein Diet, PEDS          Discharge Medications            Discharge Medication List as of 3/2/2017 5:54 PM             START taking these  medications     Details   meropenem 2 g Inject 2 g into the vein every 8 hours for 11 days, Disp-66 g, R-0, No Print Out       nafcillin 2 GM vial Inject 3 g into the vein every 6 hours for 11 days, Disp-528 mL, R-0, No Print Out       sodium chloride inhalant 7 % NEBU neb solution Take 4 mLs by nebulization 2 times daily, Disp-240 mL, R-11, E-Prescribe                 CONTINUE these medications which have NOT CHANGED     Details   MINOCYCLINE HCL PO Take 100 mg by mouth 2 times daily, Historical       adapalene (DIFFERIN) 0.1 % gel Apply 1 applicator topically At BedtimeHistorical       tobramycin, PF, (COBY) 300 MG/5ML nebulizer solution Take 5 mLs (300 mg) by nebulization 2 times daily Cycle 28 days on/off, Disp-280 mL, R-6, E-Prescribe       dornase alpha (PULMOZYME) 1 MG/ML nebulizer solution Inhale 2.5 mg into the lungs 2 times daily, Disp-150 mL, R-12, E-Prescribe       !! amylase-lipase-protease (CREON 36) 62120 UNITS CPEP Take 5 capsules (180,000 Units) by mouth 3 times daily (with meals), Disp-450 capsule, R-11, E-Prescribe       albuterol (2.5 MG/3ML) 0.083% nebulizer solution Take 1 vial (2.5 mg) by nebulization 3 times daily 2-3 times a day, Disp-270 mL, R-6, E-Prescribe       tretinoin (RETIN-A) 0.05 % cream Apply 1 g topically At BedtimeHistorical       multivitamin CF formula (AQUADEKS) CAPS capsule Take 2 capsules by mouth daily, Disp-60 capsule, R-11, E-Prescribe       Citalopram Hydrobromide (CELEXA PO) Take 20 mg by mouth daily, Historical       omeprazole 20 MG tablet Take 1 tablet (20 mg) by mouth daily, Disp-30 tablet, R-3, E-Prescribe       azithromycin (ZITHROMAX) 500 MG tablet Take 1 tablet (500 mg) by mouth Every Mon, Wed, Fri Morning Take one tablet on Monday, Wednesday and Friday., Disp-12 tablet, R-12, E-Prescribe       cetirizine (ZYRTEC) 10 MG tablet Take 1 tablet (10 mg) by mouth daily as needed, Disp-90 tablet, R-3, E-Prescribe       MELATONIN PO Take 10 mg by mouth At Bedtime ,  Historical       !! amylase-lipase-protease (CREON 36) 35125 UNITS CPEP Take 3 capsules (108,000units) by mouth with snacks (3meals and 3 snacks per day), Disp-270 capsule, R-11, Historical       mometasone (NASONEX) 50 MCG/ACT nasal spray Spray 1 spray into both nostrils daily., Historical       azelastine (ASTELIN) 137 MCG/SPRAY nasal spray Spray 1-2 sprays in nostril 2 times daily as needed., Historical       !! - Potential duplicate medications found. Please discuss with provider.            STOP taking these medications         Multiple Vitamins-Iron (MULTIVITAMIN/IRON PO) Comments:   Reason for Stopping:                    Allergies            Allergies   Allergen Reactions     Augmentin           Data       Most Recent 3 CBC's:        Recent Labs   Lab Test 02/21/17  1258 11/03/16  0757 08/13/15  0800   WBC 11.4* 7.3 8.0   HGB 13.4 14.8 13.3   MCV 88 86 83    252 276      Most Recent 3 BMP's:            Recent Labs   Lab Test 02/21/17  1258 11/03/16  1000 11/03/16  0930   11/03/16  0757   08/13/15  0800    --  --  --  141 --  140   POTASSIUM 4.1 --  --  --  4.3 --  4.1   CHLORIDE 105 --  --  --  110 --  108   CO2 25 --  --  --  23 --  25   BUN 12 --  --  --  15 --  15   CR 0.96 --  --  --  0.97 --  0.84   ANIONGAP 8 --  --  --  8 --  7   ROCAEL 8.1* --  --  --  8.7 --  8.5*   GLC 91 167* 261* < > 107*  109* < > 120*   < > = values in this interval not displayed.      Most Recent 2 LFT's:         Recent Labs   Lab Test 02/21/17  1258 11/03/16  0757   07/10/15  0742   AST 29 30 < > 10   ALT 27 --  --  18   ALKPHOS 88 136 < > 135   BILITOTAL 0.6 --  --  0.5   < > = values in this interval not displayed.      Most Recent INR's and Anticoagulation Dosing History:  Anticoagulation Dose History     Recent Dosing and Labs Latest Ref Rng & Units 8/4/2010 12/13/2012 5/2/2013 7/24/2014 8/13/2015 11/3/2016 2/21/2017     INR 0.86 - 1.14 1.07 1.10 1.02 1.09 1.10 1.04 1.17(H)         Most Recent 3 Troponin's:No  lab results found.  Most Recent Cholesterol Panel:      Recent Labs   Lab Test 05/02/13  0805   CHOL 93   LDL 44   HDL 37*   TRIG 60      Most Recent 6 Bacteria Isolates From Any Culture (See EPIC Reports for Culture Details):           Recent Labs   Lab Test 02/21/17  1316 02/21/17  1258 11/03/16  1220 04/28/16  1124 01/14/16  0901 01/14/16  0800   CULT Moderate growth Normal elmer  Moderate growth Achromobacter xylosoxidans/denitrificans  Moderate growth Strain 2 Achromobacter xylosoxidans/denitrificans  Light growth Staphylococcus aureus This isolate is presumed to be clindamycin  resistant based on detection of inducible clindamycin resistance. Erythromycin  and clindamycin are resistant, therefore, they are not recommended for use.  *  Canceled, Test credited Test reordered as correct code REORDERED AS A CF CULTURE,  PATIENT HAS A DX OF CYSTIC FIBROSIS No growth Culture overgrown with contaminants/elmer. Unable to complete standard  incubation time. Unable to rule out presence of indicated pathogen(s)  Assayed at [facility],[address] [address]  *  Moderate growth Normal elmer  Moderate growth Staphylococcus aureus This isolate is presumed to be clindamycin  resistant based on detection of inducible clindamycin resistance. Erythromycin  and clindamycin are resistant, therefore, they are not recommended for use.  Moderate growth Achromobacter xylosoxidans/denitrificans  Moderate growth Strain 2 Achromobacter xylosoxidans/denitrificans  * Normal elmer  Moderate growth Achromobacter xylosoxidans/denitrificans  Moderate growth Strain 2 Achromobacter xylosoxidans/denitrificans  * Culture overgrown with CONTAMINANTS,ELMER. UNABLE TO COMPLETE STANDARD  INCUBATION TIME. UNABLE TO RULE OUT PRESENCE OF INDICATED PATHOGEN(S).  Assayed at [facility],[address] [address] Normal elmer  Moderate growth Staphylococcus aureus This isolate is presumed to be clindamycin  resistant based on  detection of inducible clindamycin resistance.  Heavy growth Achromobacter xylosoxidans  Heavy growth Strain 2 Achromobacter xylosoxidans  *      Most Recent TSH, T4 and A1c Labs:        Recent Labs   Lab Test 11/03/16  0757   12/11/14  1239   TSH --  --  1.57   A1C 5.9 < > --    < > = values in this interval not displayed.          Results for orders placed or performed during the hospital encounter of 02/21/17   Chest XR, PA & LAT     Narrative     XR CHEST 2 VW 2/21/2017 1:41 PM     COMPARISON: 11/3/2016     HISTORY: Cystic fibrosis, crackles at the bases.     Impression     IMPRESSION: Chronic findings of cystic fibrosis again seen.  Additionally, there is airspace opacity at both bases, possibly  representing superimposed infection. No significant pleural effusion.  No pneumothorax.     PAULO LYNCH   XR Chest Port 1 View     Narrative     Exam: XR CHEST PORT 1 VW 2/22/2017 1:00 AM      History: Chest pain     Comparison: 2/21/2017     Findings: Chronic findings of cystic fibrosis again appreciated with  shifting hazy attenuation, now in the periphery of the right lower  lung, but improved in the medial lung bases. Micronodular disease in  the apices is similar to the prior study. Cardiac silhouette is normal  in size. Upper abdomen is unremarkable.     Impression     Impression: Chronic findings of cystic fibrosis with some shifting of  the previously noted bibasilar patchy opacities, now more pronounced  in the periphery of the right lower lung. Differential is unchanged  and includes atelectasis and superimposed infection.     I have personally reviewed the examination and initial interpretation  and I agree with the findings.     TANISHA TYSON MD   XR Chest Port 1 View     Narrative     XR CHEST PORT 1 VW 3/2/2017 4:01 PM      HISTORY: CF exacerbation     COMPARISON: 2/22/2017     FINDINGS: Left upper extremity PICC line, tip projecting over the mid  SVC. The cardiac silhouette size is normal. Chronic  changes of cystic  fibrosis with bronchial wall thickening and bronchiectasis is  unchanged. Right basilar opacities have decreased. Micronodular  disease in the upper lobes does not appear appreciably changed.      Impression     IMPRESSION:   1. Interval placement of left upper extremity PICC, tip projecting  over the mid SVC.  2. Chronic changes of cystic fibrosis with a decrease in right basilar  opacities from 2/22/2017.     I have personally reviewed the examination and initial interpretation  and I agree with the findings.     WHIT CAPPS MD                    Deleted by: Jie Mast MD at 3/3/2017  1:32 PM     This patient has been seen and evaluated by me, Sonny Dial MD. Discussed with the house staff team or resident(s) and agree with the findings and plan in this note.  I personally performed the entire clinical encounter documented in this note.  I have reviewed today's vital signs, medications, labs and imaging.     Sonny Dial MD  Division of Pediatric Pulmonology  Department of Pediatrics  [facility]    Office: [phone] (please call for refills and questions)  Office fax: [phone]  Pager: [phone]  Email: [email or web address]

## 2017-03-03 NOTE — DISCHARGE SUMMARY
[facility], [place]    Discharge Summary  Pulmonology    Date of Admission:  2/21/2017  Date of Discharge:  3/2/2017  7:45 PM  Discharging Provider: Jie Mast    Discharge Diagnoses   Patient Active Problem List   Diagnosis     Cystic fibrosis (H)     Exocrine pancreatic insufficiency     Impaired glucose tolerance     S/P lobectomy of lung     Depression     Major depressive disorder, recurrent episode, mild (H)     Influenza A       History of Present Illness   Demario Abbasi is a 22 year old male with cystic fibrosis and pancreatic insufficiency who presented with fevers, cough, and hemoptysis for 3 days prior to arrival. Was influenza positive at urgent care and CXR concerning for pneumonia, discharged with tamiflu and azithromycin (did not take azithromycin,) and stopped tamiflu after 2 days due to nausea/vomitting. Went to urgent care 1 day prior to arrival and given IV fluids and one dose IV ceftriaxone, but symptoms persisted. Developed cough, chest discomfort, dyspnea. Later that evening felt worse, then had large hemoptysis (about 4-5 tablespoons). Was brought to ED today for further evaluation. Prior to hemoptysis, did not have much of a productive cough. Has had hemoptysis in past with bad exacerbations, but last bad one was a number of years ago. Compliant with vest treatments at home.     Hospital Course   Demario Abbasi was admitted on 2/21/2017.  The following problems were addressed during his hospitalization:    Cystic fibrosis exacerbation:  He was started on 4x daily vest treatments with albuterol and alternating pulmozyme and HTS 7% nebulizer following the albuterol. A CXR was concerning for a RLL infiltrate. Previous sputum cultures showed staph aureus and achromobacter, susceptible to levofloxacin and meropenem, so he was started on Levaquin IV for 3 days, along with meropenem on 2/21. Sputum cultures later resulted Achromobacter and Staph aureus  that were resistant to Levaquin, so he transitioned to nafcillin on 2/25, continuing meropenem, with goal of 3 weeks of meropnem and 2 weeks of nafcillin.  Blood culture obtained was negative for 48 hours. CXR taken at discharge showed improvement of RLL infiltrate.    PFTs were as follows: (dates: 11/3/17, 2/22, 2/27, 3/2) FVC% predicted (60%, 41, 56%,60) FEV1% predicted (59%,35, 49%,54)  FEV1/FVC (82, 73, 75%,76), and FEF 25-75% (55, 21, 32%)     Although PFTS did not quite return to baseline, it was felt that with continued 4x daily vest treatments at home, and close follow up with Pulmonology in a week, it was safe to discharge Demario home on 3/2.     Influenza:  He was given a 7 day course of Tamiflu at treatment dosing BID, and fevers defervesced.      Hemoptysis: Demario had 2 self-limiting episides of hemoptysis while admitted, no more than 2 tablespoons each time, his INR was 1.17 on admission, and was on 5mg of Vitamin K daily while admitted.           FEN/ GI: pancreatic insufficiency, continued on home creon, AquaDEKs, and omeprazole. We discontinued his home iron has it was felt not needed. His admission weight was close to 72.7kg, and discharged at 70.5 kg. Nutrition evaluated and we are confident he will continue to gain weight during the next week. Pre and post prandial glucoses were taken for 2 days and were stable.     Continued home celexa, melatonin, and acne medications.       Significant Results and Procedures   See EPIC and above for PFTs and CXR results    Immunization History   Immunization Status:  up to date and documented     Pending Results   These results will be [facility]  Unresulted Labs Ordered in the Past 30 Days of this Admission     No orders found from 12/23/2016 to 2/22/2017.          Primary Care Physician   Susan Li      Physical Exam   Vital Signs with Ranges  Temp:  [98.1  F (36.7  C)] 98.1  F (36.7  C)  Heart Rate:  [68] 68  Resp:  [20] 20  BP:  (116)/(60) 116/60  SpO2:  [97 %] 97 %  I/O last 3 completed shifts:  In: 1689 [P.O.:870; I.V.:819]  Out: 2600 [Urine:2600]    GENERAL: awake and alert, in no acute distress.  HEENT: Head: AT/NC. Eyes: PERRLA. EOM. No conjunctival erythema. Ears: Ears grossly normal. Nose: No rhinorrhea. No audible congestion. Mouth/Throat: Oropharynx non-erythematous and without exudates.  NECK: No cervical or supraclavicular lymphadenopathy, thyroid non-palpable.  RESP: Good air movement. No increased work of breathing. Lungs with mild diffuse crackles  CV: RRR. No murmurs, rubs, or gallops.  ABDOMINAL: BS normoactive. Soft, non-distended. No tenderness to palpation. No organomegaly.  MUSCULOSKELETAL: Normal ROM. Joints are non-erythematous and without swelling.  NEURO: Normal tone. Actively moving all 4 extremities. Cranial nerves II-XII grossly normal.  SKIN: No rashes or other lesions.    Time Spent on this Encounter   I, Jie Mast, personally saw the patient today and spent greater than 30 minutes discharging this patient.    Discharge Disposition   Discharged to home  Condition at discharge: Stable    Consultations This Hospital Stay   VASCULAR ACCESS PEDS IP CONSULT  VASCULAR ACCESS PEDS IP CONSULT  PHYSICAL THERAPY PEDS IP CONSULT  NUTRITION SERVICES PEDS IP CONSULT    Discharge Orders     Home infusion referral     Reason for your hospital stay   CF exacerbation, influenza     Follow Up and recommended labs and tests   Follow up with Dr. Álvarez on 3/9.     Activity   Your activity upon discharge: activity as tolerated     Diet   Follow this diet upon discharge: Orders Placed This Encounter     Snacks/Supplements Adult: Ensure Plus Adult Shake; Between Meals     Calorie Counts     High Kcal/High Protein Diet, PEDS       Discharge Medications   Discharge Medication List as of 3/2/2017  5:54 PM      START taking these medications    Details   meropenem 2 g Inject 2 g into the vein every 8 hours for 11 days, Disp-66 g,  R-0, No Print Out      nafcillin 2 GM vial Inject 3 g into the vein every 6 hours for 11 days, Disp-528 mL, R-0, No Print Out      sodium chloride inhalant 7 % NEBU neb solution Take 4 mLs by nebulization 2 times daily, Disp-240 mL, R-11, E-Prescribe         CONTINUE these medications which have NOT CHANGED    Details   MINOCYCLINE HCL PO Take 100 mg by mouth 2 times daily, Historical      adapalene (DIFFERIN) 0.1 % gel Apply 1 applicator topically At BedtimeHistorical      tobramycin, PF, (COBY) 300 MG/5ML nebulizer solution Take 5 mLs (300 mg) by nebulization 2 times daily Cycle 28 days on/off, Disp-280 mL, R-6, E-Prescribe      dornase alpha (PULMOZYME) 1 MG/ML nebulizer solution Inhale 2.5 mg into the lungs 2 times daily, Disp-150 mL, R-12, E-Prescribe      !! amylase-lipase-protease (CREON 36) 96016 UNITS CPEP Take 5 capsules (180,000 Units) by mouth 3 times daily (with meals), Disp-450 capsule, R-11, E-Prescribe      albuterol (2.5 MG/3ML) 0.083% nebulizer solution Take 1 vial (2.5 mg) by nebulization 3 times daily 2-3 times a day, Disp-270 mL, R-6, E-Prescribe      tretinoin (RETIN-A) 0.05 % cream Apply 1 g topically At BedtimeHistorical      multivitamin CF formula (AQUADEKS) CAPS capsule Take 2 capsules by mouth daily, Disp-60 capsule, R-11, E-Prescribe      Citalopram Hydrobromide (CELEXA PO) Take 20 mg by mouth daily, Historical      omeprazole 20 MG tablet Take 1 tablet (20 mg) by mouth daily, Disp-30 tablet, R-3, E-Prescribe      azithromycin (ZITHROMAX) 500 MG tablet Take 1 tablet (500 mg) by mouth Every Mon, Wed, Fri Morning Take one tablet on Monday, Wednesday and Friday., Disp-12 tablet, R-12, E-Prescribe      cetirizine (ZYRTEC) 10 MG tablet Take 1 tablet (10 mg) by mouth daily as needed, Disp-90 tablet, R-3, E-Prescribe      MELATONIN PO Take 10 mg by mouth At Bedtime , Historical      !! amylase-lipase-protease (CREON 36) 81703 UNITS CPEP Take 3 capsules (108,000units) by mouth with snacks  (3meals and 3 snacks per day), Disp-270 capsule, R-11, Historical      mometasone (NASONEX) 50 MCG/ACT nasal spray Spray 1 spray into both nostrils daily., Historical      azelastine (ASTELIN) 137 MCG/SPRAY nasal spray Spray 1-2 sprays in nostril 2 times daily as needed., Historical       !! - Potential duplicate medications found. Please discuss with provider.      STOP taking these medications       Multiple Vitamins-Iron (MULTIVITAMIN/IRON PO) Comments:   Reason for Stopping:             Allergies   Allergies   Allergen Reactions     Augmentin      Data   Most Recent 3 CBC's:  Recent Labs   Lab Test  02/21/17   1258  11/03/16   0757  08/13/15   0800   WBC  11.4*  7.3  8.0   HGB  13.4  14.8  13.3   MCV  88  86  83   PLT  155  252  276      Most Recent 3 BMP's:  Recent Labs   Lab Test  02/21/17   1258  11/03/16   1000  11/03/16   0930   11/03/16   0757   08/13/15   0800   NA  138   --    --    --   141   --   140   POTASSIUM  4.1   --    --    --   4.3   --   4.1   CHLORIDE  105   --    --    --   110   --   108   CO2  25   --    --    --   23   --   25   BUN  12   --    --    --   15   --   15   CR  0.96   --    --    --   0.97   --   0.84   ANIONGAP  8   --    --    --   8   --   7   ROCAEL  8.1*   --    --    --   8.7   --   8.5*   GLC  91  167*  261*   < >  107*  109*   < >  120*    < > = values in this interval not displayed.     Most Recent 2 LFT's:  Recent Labs   Lab Test  02/21/17   1258  11/03/16   0757   07/10/15   0742   AST  29  30   < >  10   ALT  27   --    --   18   ALKPHOS  88  136   < >  135   BILITOTAL  0.6   --    --   0.5    < > = values in this interval not displayed.     Most Recent INR's and Anticoagulation Dosing History:  Anticoagulation Dose History     Recent Dosing and Labs Latest Ref Rng & Units 8/4/2010 12/13/2012 5/2/2013 7/24/2014 8/13/2015 11/3/2016 2/21/2017    INR 0.86 - 1.14 1.07 1.10 1.02 1.09 1.10 1.04 1.17(H)        Most Recent 3 Troponin's:No lab results found.  Most Recent  Cholesterol Panel:  Recent Labs   Lab Test  05/02/13   0805   CHOL  93   LDL  44   HDL  37*   TRIG  60     Most Recent 6 Bacteria Isolates From Any Culture (See EPIC Reports for Culture Details):  Recent Labs   Lab Test  02/21/17   1316  02/21/17   1258  11/03/16   1220  04/28/16   1124  01/14/16   0901  01/14/16   0800   CULT  Moderate growth Normal elmer  Moderate growth Achromobacter xylosoxidans/denitrificans  Moderate growth Strain 2 Achromobacter xylosoxidans/denitrificans  Light growth Staphylococcus aureus This isolate is presumed to be clindamycin   resistant based on detection of inducible clindamycin resistance. Erythromycin   and clindamycin are resistant, therefore, they are not recommended for use.  *  Canceled, Test credited Test reordered as correct code REORDERED AS A CF CULTURE,   PATIENT HAS A DX OF CYSTIC FIBROSIS    No growth  Culture overgrown with contaminants/elmer.  Unable to complete standard   incubation time.  Unable to rule out presence of indicated pathogen(s)  Assayed at [facility],[employer] [address]  *  Moderate growth Normal elmer  Moderate growth Staphylococcus aureus This isolate is presumed to be clindamycin   resistant based on detection of inducible clindamycin resistance. Erythromycin   and clindamycin are resistant, therefore, they are not recommended for use.  Moderate growth Achromobacter xylosoxidans/denitrificans  Moderate growth Strain 2 Achromobacter xylosoxidans/denitrificans  *  Normal elmer  Moderate growth Achromobacter xylosoxidans/denitrificans  Moderate growth Strain 2 Achromobacter xylosoxidans/denitrificans  *  Culture overgrown with CONTAMINANTS,ELMER.  UNABLE TO COMPLETE STANDARD   INCUBATION TIME.   UNABLE TO RULE OUT PRESENCE OF INDICATED PATHOGEN(S).  Assayed at [facility],[employer] [address]    Normal elmer  Moderate growth Staphylococcus aureus This isolate is presumed to be clindamycin   resistant based on detection of  inducible clindamycin resistance.  Heavy growth Achromobacter xylosoxidans  Heavy growth Strain 2 Achromobacter xylosoxidans  *     Most Recent TSH, T4 and A1c Labs:  Recent Labs   Lab Test  11/03/16   0757   12/11/14   1239   TSH   --    --   1.57   A1C  5.9   < >   --     < > = values in this interval not displayed.     Results for orders placed or performed during the hospital encounter of 02/21/17   Chest XR,  PA & LAT    Narrative    XR CHEST 2 VW 2/21/2017 1:41 PM    COMPARISON: 11/3/2016    HISTORY: Cystic fibrosis, crackles at the bases.      Impression    IMPRESSION: Chronic findings of cystic fibrosis again seen.  Additionally, there is airspace opacity at both bases, possibly  representing superimposed infection. No significant pleural effusion.  No pneumothorax.    PAULO LYNCH   XR Chest Port 1 View    Narrative    Exam: XR CHEST PORT 1 VW  2/22/2017 1:00 AM      History: Chest pain    Comparison: 2/21/2017    Findings: Chronic findings of cystic fibrosis again appreciated with  shifting hazy attenuation, now in the periphery of the right lower  lung, but improved in the medial lung bases. Micronodular disease in  the apices is similar to the prior study. Cardiac silhouette is normal  in size. Upper abdomen is unremarkable.      Impression    Impression: Chronic findings of cystic fibrosis with some shifting of  the previously noted bibasilar patchy opacities, now more pronounced  in the periphery of the right lower lung.  Differential is unchanged  and includes atelectasis and superimposed infection.    I have personally reviewed the examination and initial interpretation  and I agree with the findings.    TANISHA TYSON MD   XR Chest Port 1 View    Narrative    XR CHEST PORT 1 VW  3/2/2017 4:01 PM      HISTORY: CF exacerbation    COMPARISON: 2/22/2017    FINDINGS: Left upper extremity PICC line, tip projecting over the mid  SVC. The cardiac silhouette size is normal. Chronic changes of  cystic  fibrosis with bronchial wall thickening and bronchiectasis is  unchanged. Right basilar opacities have decreased. Micronodular  disease in the upper lobes does not appear appreciably changed.       Impression    IMPRESSION:   1. Interval placement of left upper extremity PICC, tip projecting  over the mid SVC.  2. Chronic changes of cystic fibrosis with a decrease in right basilar  opacities from 2/22/2017.    I have personally reviewed the examination and initial interpretation  and I agree with the findings.    WHIT CAPPS MD

## 2017-03-03 NOTE — PLAN OF CARE
VSS & afebrile. Pt denied pain. Meropenem given through PICC & PICC saline locked. Reviewed discharge paperwork with patient and patient's mom & dad. Pt acknowledged understanding of medications & follow-up appointments. [facility] reviewed nafcillin infusion process w/ pt. Pt discharged home w/ parents at 1945.

## 2017-03-03 NOTE — PLAN OF CARE
Problem: Goal Outcome Summary  Goal: Goal Outcome Summary  Physical Therapy Discharge Summary     Reason for therapy discharge:    Discharged to home.     Progress towards therapy goal(s). See goals on Care Plan in [facility] electronic health record for goal details.  Goals partially met.  Barriers to achieving goals:   discharge from facility.     Therapy recommendation(s):    Continue home exercise program.

## 2017-03-03 NOTE — PHARMACY - DISCHARGE MEDICATION RECONCILIATION AND EDUCATION
Discharge medication review for this patient completed.  Pharmacist provided medication teaching for discharge with a focus on new medications/dose changes.  The discharge medication list was reviewed with Mirna (mom), Andrew (dad), Demario (patient), and Ramon (friend of family)  and the following points were discussed, as applicable: Name, description, purpose, dose/strength, duration of medications, measurement of liquid medications, strategies for giving medications to children, special storage requirements, common side effects, food/medications to avoid, action to be taken if dose is missed, when to call MD, safe disposal of unused medications and how to obtain refills.    Mirna (and family) were engaged during teaching and verbalized understanding.    All medications were in hand during teaching (only 1 needed was NaCl 7%).  [facility] to deliver IVs to [facility].       Meropenem dosing times(over 30 minutes):   9am, 5pm, 1am    Nafcillin (over 1 hour):   2am, 8am, 2pm, 8pm     Azithromycin:  Resume    No NSAIDS (due to potential bleeding).      NaCl 7% left in room.    No Vitamin K needed for home.    Nurse Glenny aware of report.     Follow up with Dr. Álvarez next Thursday 3/9/17 per family.    Vest Therapy Schedule(4 times daily until follow up):    8am:   Albuterol, Pulmozyme, Elvin (28 days on/off)    12 noon:  Albuterol, NaCl 7%    4pm:  Albuterol, Pulmozyme    8pm:   Albuterol, NaCl 7%, Elvin (28 days on/off)    Comment:   No mixing.   Elvin HOLD while on IV antibx (family understood)--will be on off schedule when done anyway with IV.       The following medications were discussed:  Current Discharge Medication List      START taking these medications    Details   meropenem 2 g Inject 2 g into the vein every 8 hours for 11 days  Qty: 66 g, Refills: 0    Associated Diagnoses: Cystic fibrosis with pulmonary exacerbation (H)      nafcillin 2 GM vial Inject 3 g into the vein every 6 hours  for 11 days  Qty: 528 mL, Refills: 0    Associated Diagnoses: Cystic fibrosis with pulmonary exacerbation (H)      sodium chloride inhalant 7 % NEBU neb solution Take 4 mLs by nebulization 2 times daily  Qty: 240 mL, Refills: 11    Associated Diagnoses: Cystic fibrosis with pulmonary exacerbation (H)         CONTINUE these medications which have CHANGED    Details   melatonin 5 MG tablet Take 1-2 tablets (5-10 mg) by mouth nightly as needed  Qty: 60 tablet, Refills: 1    Associated Diagnoses: Cystic fibrosis with pulmonary exacerbation (H)      citalopram (CELEXA) 20 MG tablet Take 1 tablet (20 mg) by mouth daily  Qty: 30 tablet, Refills: 1    Associated Diagnoses: Depression      azelastine (ASTELIN) 0.1 % spray Use 1-2 sprays both nostrils twice daily as needed  Qty: 1 Bottle, Refills: 1    Associated Diagnoses: Cystic fibrosis with pulmonary exacerbation (H)      tretinoin (RETIN-A) 0.05 % cream Apply topically every morning as directed  Qty: 30 g, Refills: 1    Associated Diagnoses: Acne      azithromycin (ZITHROMAX) 500 MG tablet Take one tablet on Monday, Wednesday and Friday morning  Qty: 12 tablet, Refills: 12    Comments: Pleas profile.  Associated Diagnoses: CF (cystic fibrosis) (H)      multivitamin CF formula (AQUADEKS) CAPS capsule Take 1 capsule by mouth daily  Qty: 30 capsule, Refills: 11    Associated Diagnoses: CF (cystic fibrosis) (H)         CONTINUE these medications which have NOT CHANGED    Details   MINOCYCLINE HCL PO Take 100 mg by mouth 2 times daily      adapalene (DIFFERIN) 0.1 % gel Apply 1 applicator topically At Bedtime      tobramycin, PF, (COBY) 300 MG/5ML nebulizer solution Take 5 mLs (300 mg) by nebulization 2 times daily Cycle 28 days on/off  Qty: 280 mL, Refills: 6    Associated Diagnoses: Cystic fibrosis with pulmonary manifestations (H)      dornase alpha (PULMOZYME) 1 MG/ML nebulizer solution Inhale 2.5 mg into the lungs 2 times daily  Qty: 150 mL, Refills: 12    Associated  Diagnoses: Cystic fibrosis with pulmonary manifestations (H)      !! amylase-lipase-protease (CREON 36) 05873 UNITS CPEP Take 5 capsules (180,000 Units) by mouth 3 times daily (with meals)  Qty: 450 capsule, Refills: 11    Associated Diagnoses: Cystic fibrosis exacerbation (H)      albuterol (2.5 MG/3ML) 0.083% nebulizer solution Take 1 vial (2.5 mg) by nebulization 3 times daily 2-3 times a day  Qty: 270 mL, Refills: 6    Associated Diagnoses: Cystic fibrosis with pulmonary manifestations (H)      omeprazole 20 MG tablet Take 1 tablet (20 mg) by mouth daily  Qty: 30 tablet, Refills: 3    Associated Diagnoses: CF (cystic fibrosis) (H)      cetirizine (ZYRTEC) 10 MG tablet Take 1 tablet (10 mg) by mouth daily as needed  Qty: 90 tablet, Refills: 3    Associated Diagnoses: Cystic fibrosis with pulmonary manifestations (H)      !! amylase-lipase-protease (CREON 36) 45547 UNITS CPEP Take 3 capsules (108,000units) by mouth with snacks (3meals and 3 snacks per day)  Qty: 270 capsule, Refills: 11    Associated Diagnoses: Cystic fibrosis exacerbation (H)      mometasone (NASONEX) 50 MCG/ACT nasal spray Spray 1 spray into both nostrils daily.       !! - Potential duplicate medications found. Please discuss with provider.      STOP taking these medications       Multiple Vitamins-Iron (MULTIVITAMIN/IRON PO) Comments:   Reason for Stopping:               I spent approximately 30 minutes in patient's room doing discharge medication teaching.

## 2017-03-06 ENCOUNTER — CARE COORDINATION (OUTPATIENT)
Dept: PULMONOLOGY | Facility: CLINIC | Age: 22
End: 2017-03-06

## 2017-03-06 DIAGNOSIS — E84.0 CYSTIC FIBROSIS WITH PULMONARY EXACERBATION (H): Primary | ICD-10-CM

## 2017-03-06 RX ORDER — DICLOXACILLIN SODIUM 500 MG
500 CAPSULE ORAL 4 TIMES DAILY
Qty: 56 CAPSULE | Refills: 0 | Status: SHIPPED | OUTPATIENT
Start: 2017-03-06 | End: 2017-03-20

## 2017-03-06 RX ORDER — SULFAMETHOXAZOLE/TRIMETHOPRIM 800-160 MG
1 TABLET ORAL 2 TIMES DAILY
Qty: 28 TABLET | Refills: 0 | Status: SHIPPED | OUTPATIENT
Start: 2017-03-06 | End: 2017-03-20

## 2017-03-06 NOTE — PROGRESS NOTES
Received call from Demario's mother, Mirna. Demario's PICC line is not functioning. Home care nurse/ED nurses unable to push anything through line. Demario is currently in local ED. Reviewed with Dr. Álvarez and as line is not functioning, we will have ED discontinue line.    Per Mirna, Demario is feeling well and things are moving in right direction. Instructed mother of child to continue with four treatments per day and to contact our office if symptoms worsen before scheduled follow-up on Thursday.    Per Dr. Álvarez, we will switch to oral antibiotics: dicloxacillin and bactrim.    [facility] notified to discontinue IV abx.    May Maldonado, RN, CPTC  P Pediatric Cystic Fibrosis/Pulmonary Care Coordinator   CF RN phone: [phone]

## 2017-03-07 LAB
EXPTIME-PRE: 6.45 SEC
EXPTIME-PRE: 6.89 SEC
EXPTIME-PRE: 6.89 SEC
FEF2575-%PRED-PRE: 21 %
FEF2575-%PRED-PRE: 32 %
FEF2575-%PRED-PRE: 39 %
FEF2575-PRE: 1.11 L/SEC
FEF2575-PRE: 1.63 L/SEC
FEF2575-PRE: 2.01 L/SEC
FEF2575-PRED: 5.09 L/SEC
FEFMAX-%PRED-PRE: 57 %
FEFMAX-%PRED-PRE: 69 %
FEFMAX-%PRED-PRE: 76 %
FEFMAX-PRE: 5.88 L/SEC
FEFMAX-PRE: 7.07 L/SEC
FEFMAX-PRE: 7.74 L/SEC
FEFMAX-PRED: 10.14 L/SEC
FEV1-%PRED-PRE: 35 %
FEV1-%PRED-PRE: 49 %
FEV1-%PRED-PRE: 54 %
FEV1-PRE: 1.68 L
FEV1-PRE: 2.33 L
FEV1-PRE: 2.54 L
FEV1FEV6-PRE: 73 %
FEV1FEV6-PRE: 75 %
FEV1FEV6-PRE: 76 %
FEV1FEV6-PRED: 85 %
FEV1FVC-PRE: 73 %
FEV1FVC-PRE: 74 %
FEV1FVC-PRE: 76 %
FEV1FVC-PRED: 86 %
FIFMAX-PRE: 1.97 L/SEC
FIFMAX-PRE: 3 L/SEC
FIFMAX-PRE: 3.83 L/SEC
FVC-%PRED-PRE: 41 %
FVC-%PRED-PRE: 56 %
FVC-%PRED-PRE: 60 %
FVC-PRE: 2.29 L
FVC-PRE: 3.15 L
FVC-PRE: 3.36 L
FVC-PRED: 5.54 L

## 2017-03-09 ENCOUNTER — OFFICE VISIT (OUTPATIENT)
Dept: PULMONOLOGY | Facility: CLINIC | Age: 22
End: 2017-03-09
Attending: PEDIATRICS
Payer: COMMERCIAL

## 2017-03-09 VITALS
DIASTOLIC BLOOD PRESSURE: 65 MMHG | SYSTOLIC BLOOD PRESSURE: 110 MMHG | WEIGHT: 157.85 LBS | OXYGEN SATURATION: 96 % | BODY MASS INDEX: 22.6 KG/M2 | HEIGHT: 70 IN | HEART RATE: 89 BPM

## 2017-03-09 DIAGNOSIS — E84.0 CYSTIC FIBROSIS WITH PULMONARY EXACERBATION (H): ICD-10-CM

## 2017-03-09 DIAGNOSIS — E84.9 CYSTIC FIBROSIS (H): Primary | ICD-10-CM

## 2017-03-09 DIAGNOSIS — E84.0 CYSTIC FIBROSIS WITH PULMONARY MANIFESTATIONS (H): Primary | ICD-10-CM

## 2017-03-09 DIAGNOSIS — K86.81 EXOCRINE PANCREATIC INSUFFICIENCY: ICD-10-CM

## 2017-03-09 PROCEDURE — 99212 OFFICE O/P EST SF 10 MIN: CPT | Mod: ZF

## 2017-03-09 PROCEDURE — 94375 RESPIRATORY FLOW VOLUME LOOP: CPT | Mod: ZF

## 2017-03-09 PROCEDURE — 87070 CULTURE OTHR SPECIMN AEROBIC: CPT | Performed by: PEDIATRICS

## 2017-03-09 PROCEDURE — 99212 OFFICE O/P EST SF 10 MIN: CPT | Mod: 25

## 2017-03-09 PROCEDURE — 87186 SC STD MICRODIL/AGAR DIL: CPT | Performed by: PEDIATRICS

## 2017-03-09 PROCEDURE — 87077 CULTURE AEROBIC IDENTIFY: CPT | Performed by: PEDIATRICS

## 2017-03-09 ASSESSMENT — PAIN SCALES - GENERAL: PAINLEVEL: NO PAIN (0)

## 2017-03-09 NOTE — MR AVS SNAPSHOT
After Visit Summary   3/9/2017    Demario Abbasi    MRN: [id number]           Patient Information     Date Of Birth          1995        Visit Information        Provider Department      3/9/2017 1:00 PM Daisha Álvarez MD Peds Pulmonary        Today's Diagnoses     Cystic fibrosis (H)    -  1    Cystic fibrosis with pulmonary exacerbation (H)        Exocrine pancreatic insufficiency          Care Instructions    1.  Please continue your dicloxacillin and bactrim as directed - at least until we see you next Thursday.  2.  Please HOLD your minocycline while on your dicloxacillin.  3.  Please continue your VEST treatments four times daily until we see you next Thursday.  4.  Continue all other medications as directed.  5.  Vitamins have been ordered for you through [facility].  6.  We will put together a travel letter for you to have for next week for your trip into [place].  7.  Follow-up next week.  We will schedule a 1230 PFT and a 1PM visit.  8.  Keep up your eating!  9.  Call with any questions or concerns.    Mary Grace Álvarez MD MSCS  Pediatric Pulmonology        Follow-ups after your visit        Follow-up notes from your care team     Return in about 1 week (around 3/16/2017).      Who to contact     Please call your clinic at [phone] to:    Ask questions about your health    Make or cancel appointments    Discuss your medicines    Learn about your test results    Speak to your doctor   If you have compliments or concerns about an experience at your clinic, or if you wish to file a complaint, please contact [facility] Patient Relations at [phone] or email us at [email or web address]         Additional Information About Your Visit        MyChart Information     [employer] is an electronic gateway that provides easy, online access to your medical records. With [employer], you can request a clinic appointment, read your test  "results, renew a prescription or communicate with your care team.     To sign up for [employer] visit the website at [email or web address]   You will be asked to enter the access code listed below, as well as some personal information. Please follow the directions to create your username and password.     Your access code is: [email or web address]  Expires: 2017 10:24 AM     Your access code will  in 90 days. If you need help or a new code, please contact your [facility] or call [phone] for assistance.        Care EveryWhere ID     This is your Care EveryWhere ID. This could be used by other organizations to access your [facility] medical records  [id number]        Your Vitals Were     Pulse Height Pulse Oximetry BMI (Body Mass Index)          89 5' 10.28\" (178.5 cm) 96% 22.47 kg/m2         Blood Pressure from Last 3 Encounters:   17 110/65   17 116/60   16 110/60    Weight from Last 3 Encounters:   17 157 lb 13.6 oz (71.6 kg)   17 155 lb 6.8 oz (70.5 kg)   16 160 lb 7.9 oz (72.8 kg) (58 %)*     * Growth percentiles are based on CDC 2-20 Years data.              We Performed the Following     Cystic Fibrosis Culture Aerob Bacterial          Today's Medication Changes          These changes are accurate as of: 3/9/17  1:42 PM.  If you have any questions, ask your nurse or doctor.               These medicines have changed or have updated prescriptions.        Dose/Directions    cetirizine 10 MG tablet   Commonly known as:  zyrTEC   This may have changed:  when to take this   Used for:  Cystic fibrosis with pulmonary manifestations (H)        Dose:  10 mg   Take 1 tablet (10 mg) by mouth daily as needed   Quantity:  90 tablet   Refills:  3       * multivitamin CF formula Caps capsule   This may have changed:  Another medication with the same name was added. Make sure you understand how and when to take each.   Used for:  CF (cystic fibrosis) " (H)        Dose:  1 capsule   Take 1 capsule by mouth daily   Quantity:  30 capsule   Refills:  11       * multivitamin CF formula softgel cap   This may have changed:  You were already taking a medication with the same name, and this prescription was added. Make sure you understand how and when to take each.   Used for:  Cystic fibrosis (H)   Changed by:  Daisha Álvarez MD        Dose:  2 capsule   Take 2 capsules by mouth daily   Quantity:  60 capsule   Refills:  3       * Notice:  This list has 2 medication(s) that are the same as other medications prescribed for you. Read the directions carefully, and ask your doctor or other care provider to review them with you.         Where to get your medicines      These medications were sent to [facility] - [place], [place] - [address]  [address] [address]    Hours:  Mon-Fri 8:30am-5:00pm Toll Free [phone] Phone:  [phone]     multivitamin CF formula softgel cap                Primary Care Provider Office Phone # Fax #    JuliGILL Li [phone]       [facility]  [address] [place] [address]        Thank you!     Thank you for choosing PEDS PULMONARY  for your care. Our goal is always to provide you with excellent care. Hearing back from our patients is one way we can continue to improve our services. Please take a few minutes to complete the written survey that you may receive in the mail after your visit with us. Thank you!             Your Updated Medication List - Protect others around you: Learn how to safely use, store and throw away your medicines at www.disposemymeds.org.          This list is accurate as of: 3/9/17  1:42 PM.  Always use your most recent med list.                   Brand Name Dispense Instructions for use    adapalene 0.1 % gel    DIFFERIN     Apply 1 applicator topically At Bedtime       albuterol (2.5 MG/3ML) 0.083% neb solution      270 mL    Take 1 vial (2.5 mg) by nebulization 3 times daily       * amylase-lipase-protease 49465 UNITS Cpep    CREON 36    270 capsule    Take 3 capsules (108,000units) by mouth with snacks (3meals and 3 snacks per day)       * amylase-lipase-protease 41710 UNITS Cpep    CREON 36    450 capsule    Take 5 capsules (180,000 Units) by mouth 3 times daily (with meals)       azelastine 0.1 % spray    ASTELIN    1 Bottle    Use 1-2 sprays both nostrils twice daily as needed       azithromycin 500 MG tablet    ZITHROMAX    12 tablet    Take one tablet on Monday, Wednesday and Friday morning       cetirizine 10 MG tablet    zyrTEC    90 tablet    Take 1 tablet (10 mg) by mouth daily as needed       citalopram 20 MG tablet    celeXA    30 tablet    Take 1 tablet (20 mg) by mouth daily       dicloxacillin 500 MG capsule    DYNAPEN    56 capsule    Take 1 capsule (500 mg) by mouth 4 times daily for 14 days       dornase alpha 1 MG/ML neb solution    PULMOZYME    150 mL    Inhale 2.5 mg into the lungs 2 times daily       melatonin 5 MG tablet     60 tablet    Take 1-2 tablets (5-10 mg) by mouth nightly as needed       meropenem 2 g     66 g    Inject 2 g into the vein every 8 hours for 11 days       MINOCYCLINE HCL PO      Take 100 mg by mouth 2 times daily       * multivitamin CF formula Caps capsule     30 capsule    Take 1 capsule by mouth daily       * multivitamin CF formula softgel cap     60 capsule    Take 2 capsules by mouth daily       nafcillin 2 GM vial     528 mL    Inject 3 g into the vein every 6 hours for 11 days       NASONEX 50 MCG/ACT spray   Generic drug:  mometasone      Spray 1 spray into both nostrils daily.       omeprazole 20 MG tablet     30 tablet    Take 1 tablet (20 mg) by mouth daily       sodium chloride inhalant 7 % Nebu neb solution     240 mL    Take 4 mLs by nebulization 2 times daily       sulfamethoxazole-trimethoprim 800-160 MG per tablet    BACTRIM DS    28 tablet    Take 1 tablet by  mouth 2 times daily for 14 days       tobramycin (PF) 300 MG/5ML neb solution    COBY    280 mL    Take 5 mLs (300 mg) by nebulization 2 times daily Cycle 28 days on/off       tretinoin 0.05 % cream    RETIN-A    30 g    Apply topically every morning as directed       * Notice:  This list has 4 medication(s) that are the same as other medications prescribed for you. Read the directions carefully, and ask your doctor or other care provider to review them with you.

## 2017-03-09 NOTE — PROGRESS NOTES
Respiratory Therapist Note:    Vest    Model: Hill-Rom   Settings: Hill Rom Frequencies 8, 9, 10 at pressure 10 then frequencies 18, 19, 20 at pressure 6.   Size Vest:   Frequency of therapy:4 times per day      Review Cleaning: Yes :    Alternative Airway Clearance: Aerobika instruct was previously done, patient verbalized knowing how to use it. Declined additional Aerobika offering this visit. Will let us know if he can not find it at home for his upcoming trip, we can offer him a new Aerobika next visit in 1 week then.       Other/ Comments: Doing well with increased Vest treatments at 4 x daily. Good work improving.

## 2017-03-09 NOTE — PATIENT INSTRUCTIONS
1.  Please continue your dicloxacillin and bactrim as directed - at least until we see you next Thursday.  2.  Please HOLD your minocycline while on your dicloxacillin.  3.  Please continue your VEST treatments four times daily until we see you next Thursday.  4.  Continue all other medications as directed.  5.  Vitamins have been ordered for you through [facility].  6.  We will put together a travel letter for you to have for next week for your trip into [place].  7.  Follow-up next week.  We will schedule a 1230 PFT and a 1PM visit.  8.  Keep up your eating!  9.  Call with any questions or concerns.    Mary Grace Álvarez MD MSCS  Pediatric Pulmonology

## 2017-03-09 NOTE — LETTER
2017      RE: Demario Abbasi  [address]  [place] [address]    MRN: [id number]  : 1995      Dear Parent of Demario,    I am enclosing the results of Demario's lab testing.  These are the same bacteria that Demario has grown before.  The medications he are on should be enough to treat this.  Please call my office if you have any questions.      Resulted Orders   Cystic Fibrosis Culture Aerob Bacterial   Result Value Ref Range    Specimen Description Sputum     Culture Micro (A)      Heavy growth Normal elmer  Moderate growth Achromobacter xylosoxidans/denitrificans  Moderate growth Strain 2 Achromobacter xylosoxidans/denitrificans      Micro Report Status FINAL 2017     Organism:       Moderate growth Achromobacter xylosoxidans/denitrificans    Organism:       Moderate growth Strain 2 Achromobacter xylosoxidans/denitrificans         Please feel free to contact me if you have any further questions.    Sincerely,    Daisha Álvarez

## 2017-03-09 NOTE — LETTER
2017      RE: Demario Abbasi  [address]  [place] [address]    MRN: [id number]  : 1995      Dear Parent of Demario,    I am enclosing the results of Demario's lab testing.  These are the same bacteria he has grown in the past.  Please call my office if you have any questions.      Resulted Orders   Cystic Fibrosis Culture Aerob Bacterial   Result Value Ref Range    Specimen Description Sputum     Culture Micro (A)      Heavy growth Normal elmer  Moderate growth Achromobacter xylosoxidans/denitrificans  Moderate growth Strain 2 Achromobacter xylosoxidans/denitrificans      Micro Report Status FINAL 2017     Organism:       Moderate growth Achromobacter xylosoxidans/denitrificans    Organism:       Moderate growth Strain 2 Achromobacter xylosoxidans/denitrificans         Please feel free to contact me if you have any further questions.    Sincerely,    Daisha Álvarez

## 2017-03-09 NOTE — NURSING NOTE
Met with Demario and his parents. Patient instructions reviewed, copy of AVS provided. Demario instructed to continue on oral antibiotics and with four treatments daily. Plan for follow-up next week.    May Maldonado RN, [facility]  P Pediatric Cystic Fibrosis/Pulmonary Care Coordinator   CF RN phone: [phone]

## 2017-03-09 NOTE — PROGRESS NOTES
Pediatrics Pulmonary - Provider Note  Cystic Fibrosis - Return Visit    Patient: Demario Abbasi MRN# [id number]   Encounter: 2017  : 1995      Opening Statement  We had the pleasure of consulting on Demario at the [facility] at the [facility] for a hospital follow-up visit.  He was accompanied by his parents to this visit.     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 He had a right upper lobectomy in . He was recently hospitalized at [facility] from  - 3/2/17 for a CF pulmonary exacerbation complicated by hemoptysis and influenza A infection.  Demario presented with dorita hemoptysis, fever, cough, fatigue and weight loss.  His initial FEV1 was 35% predicted.  He was treated with IV meropenem and nafcillin through a PICC-line and discharged home on these IV antibiotics.  At discharge, he was much improved with an FEV1 back into the 54% range.  He was eating better with better energy.  He was instructed to do four times daily VEST until hospital follow-up today.  He now returns for this appointment.      Over the past week, Demario has continued to feel better although he says he is still not back 100%.  Unfortunately, his PICC-line clotted and was not able to be opened with TPA administration at the local ER.  His PICC was pulled and he was started on oral dicloxacillin and oral bactrim (3/7/2017).  He has continued with four times daily VEST.  He says that he has minimal cough, although will still have some sputum production.  He is not coughing at night and is sleeping well.  He has had no fevers and no further hemoptysis.  His appetite is much improved.  His energy is better, but not back to his normal.  He has been at home with his family in [place].  He is taking his oral antibiotics without issue.      From a GI standpoint  Demario reports his appetite is improved at home (compared to the hospital).  He continues on Creon 96835 capsule enzymes, taking 5 with meals and 3 with snacks. Demario has been good about taking his enzymes and they appear to be working well for him. Demario reports normal voids and well formed stools, 2-3 per day. There have been no reports of abdominal pain, nausea or vomiting. He continues on supplemental iron.     Demario finished school in [place], [place].  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 He will be at home until that starts.  He plans to return to college for two more years to obtain an advanced degree in the fall in his area of study.  He is looking forward to this.      Past Medical History   Diagnosis Date     CF (cystic fibrosis) (H)      Impaired glucose tolerance      Pancreatic insufficiency      S/P lobectomy of lung 8/4/2015     Right upper lobectomy - 2009     Allergies  Allergies as of 03/09/2017 - Jose Luis as Reviewed 03/09/2017   Allergen Reaction Noted     Augmentin  10/25/2011     Current Outpatient Prescriptions   Medication Sig Dispense Refill     multivitamin CF formula (MVW COMPLETE FORMULATION ) softgel cap Take 2 capsules by mouth daily 60 capsule 3     dicloxacillin (DYNAPEN) 500 MG capsule Take 1 capsule (500 mg) by mouth 4 times daily for 14 days 56 capsule 0     sulfamethoxazole-trimethoprim (BACTRIM DS) 800-160 MG per tablet Take 1 tablet by mouth 2 times daily for 14 days 28 tablet 0     albuterol (2.5 MG/3ML) 0.083% neb solution Take 1 vial (2.5 mg) by nebulization 3 times daily 270 mL 6     sodium chloride inhalant 7 % NEBU neb solution Take 4 mLs by nebulization 2 times daily 240 mL 11     melatonin 5 MG tablet Take 1-2 tablets (5-10 mg) by mouth nightly as needed 60 tablet 1     citalopram (CELEXA) 20 MG tablet Take 1 tablet (20 mg) by mouth daily 30 tablet 1     azelastine (ASTELIN) 0.1 % spray Use 1-2 sprays both nostrils twice daily as needed 1 Bottle 1     tretinoin  (RETIN-A) 0.05 % cream Apply topically every morning as directed 30 g 1     azithromycin (ZITHROMAX) 500 MG tablet Take one tablet on Monday, Wednesday and Friday morning 12 tablet 12     multivitamin CF formula (AQUADEKS) CAPS capsule Take 1 capsule by mouth daily 30 capsule 11     MINOCYCLINE HCL PO Take 100 mg by mouth 2 times daily       adapalene (DIFFERIN) 0.1 % gel Apply 1 applicator topically At Bedtime       tobramycin, PF, (COBY) 300 MG/5ML nebulizer solution Take 5 mLs (300 mg) by nebulization 2 times daily Cycle 28 days on/off 280 mL 6     dornase alpha (PULMOZYME) 1 MG/ML nebulizer solution Inhale 2.5 mg into the lungs 2 times daily 150 mL 12     omeprazole 20 MG tablet Take 1 tablet (20 mg) by mouth daily 30 tablet 3     amylase-lipase-protease (CREON 36) 33686 UNITS CPEP Take 5 capsules (180,000 Units) by mouth 3 times daily (with meals) 450 capsule 11     cetirizine (ZYRTEC) 10 MG tablet Take 1 tablet (10 mg) by mouth daily as needed (Patient taking differently: Take 10 mg by mouth daily ) 90 tablet 3     amylase-lipase-protease (CREON 36) 71203 UNITS CPEP Take 3 capsules (108,000units) by mouth with snacks (3meals and 3 snacks per day) 270 capsule 11     mometasone (NASONEX) 50 MCG/ACT nasal spray Spray 1 spray into both nostrils daily.       meropenem 2 g Inject 2 g into the vein every 8 hours for 11 days (Patient not taking: Reported on 3/9/2017) 66 g 0     nafcillin 2 GM vial Inject 3 g into the vein every 6 hours for 11 days (Patient not taking: Reported on 3/9/2017) 528 mL 0   Stopped Meropenem and Nafcillin 3/6/17    PMH    Past medical history reviewed with patient/parent today, no changes.    Immunization History   Administered Date(s) Administered     Influenza (IIV3) 09/01/2012     Influenza Vaccine IM 3yrs+ 4 Valent IIV4 11/03/2016       PSH    Past surgical history reviewed with patient/parent today, no changes.    FH    Family history reviewed with patient/parent today, no  "changes.    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  History of using chewing tobacco.    ROS    A comprehensive review of systems was performed and is negative except as noted in the HPI.    Last CF Annual Studies date: November 2016    Objective:     Physical Exam    Vital Signs:  /65 (BP Location: Right arm, Patient Position: Supine, Cuff Size: Adult Regular)  Pulse 89  Ht 5' 10.28\" (178.5 cm)  Wt 157 lb 13.6 oz (71.6 kg)  SpO2 96%  BMI 22.47 kg/m2    Ht Readings from Last 2 Encounters:   03/09/17 5' 10.28\" (178.5 cm)   02/21/17 5' 8.25\" (173.4 cm)     Wt Readings from Last 2 Encounters:   03/09/17 157 lb 13.6 oz (71.6 kg)   03/02/17 155 lb 6.8 oz (70.5 kg)     Wt Readings from Last 4 Encounters:   03/09/17 157 lb 13.6 oz (71.6 kg)   03/02/17 155 lb 6.8 oz (70.5 kg)   11/03/16 160 lb 7.9 oz (72.8 kg) (58 %)*   11/03/16 162 lb 7.7 oz (73.7 kg) (61 %)*     * Growth percentiles are based on CDC 2-20 Years data.         BMI %: > 36 months -  Normalized BMI data available only for age 0 to 20 years.    Constitutional: No distress, comfortable, pleasant. No cough heard. Looks much better!  Vital signs: Reviewed and normal.  Eyes: Anicteric, normal extra-ocular movements, Pupils are equal and reactive to light  Ears, Nose and Throat: Tympanic membranes clear, nose clear and free of lesions, throat clear.  Neck: Supple with full range of motion, no thyromegaly.   Cardiovascular: Regular rate and rhythm, no murmurs, rubs or gallops, peripheral pulses full and symmetric  Chest: Symmetrical, no retractions.  Respiratory: Clear to auscultation, no wheezes or crackles, normal breath sounds  Gastrointestinal: Positive bowel sounds, nontender, no hepatosplenomegaly, no masses  Musculoskeletal: Full range of " motion, no edema.  Skin: No concerning lesions, no jaundice.  Neurological: Normal muscle tone and strength.    Spirometry was done 3/9/2017    Results for orders placed or performed in visit on 03/09/17   General PFT Lab (Please always keep checked)   Result Value Ref Range    FVC-Pred 5.54 L    FVC-Pre 3.48 L    FVC-%Pred-Pre 62 %    FEV1-Pre 2.75 L    FEV1-%Pred-Pre 58 %    FEV1FVC-Pred 86 %    FEV1FVC-Pre 79 %    FEFMax-Pred 10.14 L/sec    FEFMax-Pre 8.83 L/sec    FEFMax-%Pred-Pre 87 %    FEF2575-Pred 5.09 L/sec    FEF2575-Pre 2.54 L/sec    UOH1538-%Pred-Pre 49 %    ExpTime-Pre 6.62 sec    FIFMax-Pre 4.72 L/sec    FEV1FEV6-Pred 85 %    FEV1FEV6-Pre 80 %     PFT Results (11/2016):  FVC:  3.35L, 60% predicted  FEV1:  2.76L, 59% predicted  FEV1/FVC:  82  FEF 25-75:  2.83L, 55% predicted    Spirometry Interpretation:  Good effort and acceptable for interpretation.  Evidence of restrictive lung disease given a decreased FVC.  FEV1/FVC ratio is low normal.  Mild obstruction still present.  Values improved from hospital discharge.    Culture pending    Laboratory or other tests ordered were reviewed.    Assessment       Demario is a 22 year old young man with CF and pancreatic insufficiency s/p right upper lobectomy in 2009. His genotype contains a STOP mutation, so he is not a candidate for the new CF drug, Orkambi.  He was hospitalized for a severe CF pulmonary exacerbation complicated by hemoptysis and influenza A.  He grows two species of Achromobacter and MSSA and was treated with IV Meropenem and IV nafcillin.  His FEV1 improved from 35% to 54% upon discharge and continues to improve today.  His weight is up and he feels much better.  Unfortunately, he lost his PICC Line and we had to transition to PO antibiotics.  However, he has continued to improve on this regimen.  We will continue with four times daily VEST and another week of antibiotics until next Thursday when I will see him in clinic again.  Will evaluate  his weight and PFT's and decide on a plan of care.  The family leaves for [facility] to go watch the [place] play hockey next Friday.     Plan:       1.  Please continue your dicloxacillin and bactrim as directed - at least until we see you next Thursday.  2.  Please HOLD your minocycline while on your dicloxacillin.  3.  Please continue your VEST treatments four times daily until we see you next Thursday.  4.  Continue all other medications as directed.  5.  Vitamins have been ordered for you through [facility].  6.  We will put together a travel letter for you to have for next week for your trip into [place].  7.  Follow-up next week.  We will schedule a 1PM PFT and a 130PM visit.  8.  Keep up your eating!  9.  Call with any questions or concerns.    Mary Grace Álvarez MD MSCS  Pediatric Pulmonology      CC  YEN ROY    Copy to patient  RAKESH DELACRUZWOLFGANG MAY  [address]  [place] [address]

## 2017-03-09 NOTE — LETTER
3/9/2017      RE: Demario Abbasi  [address]  [place] [address]       Pediatrics Pulmonary - Provider Note  Cystic Fibrosis - Return Visit    Patient: Demario Abbasi MRN# [id number]   Encounter: 2017  : 1995      Opening Statement  We had the pleasure of consulting on Demario at the [facility] at the [facility] for a hospital follow-up visit.  He was accompanied by his parents to this visit.     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 He had a right upper lobectomy in . He was recently hospitalized at [facility] from  - 3/2/17 for a CF pulmonary exacerbation complicated by hemoptysis and influenza A infection.  Demario presented with dorita hemoptysis, fever, cough, fatigue and weight loss.  His initial FEV1 was 35% predicted.  He was treated with IV meropenem and nafcillin through a PICC-line and discharged home on these IV antibiotics.  At discharge, he was much improved with an FEV1 back into the 54% range.  He was eating better with better energy.  He was instructed to do four times daily VEST until hospital follow-up today.  He now returns for this appointment.      Over the past week, Demario has continued to feel better although he says he is still not back 100%.  Unfortunately, his PICC-line clotted and was not able to be opened with TPA administration at the local ER.  His PICC was pulled and he was started on oral dicloxacillin and oral bactrim (3/7/2017).  He has continued with four times daily VEST.  He says that he has minimal cough, although will still have some sputum production.  He is not coughing at night and is sleeping well.  He has had no fevers and no further hemoptysis.  His appetite is much improved.  His energy is better, but not back to his normal.  He has been at home with his family in  [place].  He is taking his oral antibiotics without issue.      From a GI standpoint Demario reports his appetite is improved at home (compared to the hospital).  He continues on Creon 94380 capsule enzymes, taking 5 with meals and 3 with snacks. Demario has been good about taking his enzymes and they appear to be working well for him. Demario reports normal voids and well formed stools, 2-3 per day. There have been no reports of abdominal pain, nausea or vomiting. He continues on supplemental iron.     Demario finished school in [place], [place].  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 He will be at home until that starts.  He plans to return to college for two more years to obtain an advanced degree in the fall in his area of study.  He is looking forward to this.      Past Medical History   Diagnosis Date     CF (cystic fibrosis) (H)      Impaired glucose tolerance      Pancreatic insufficiency      S/P lobectomy of lung 8/4/2015     Right upper lobectomy - 2009     Allergies  Allergies as of 03/09/2017 - Jose Luis as Reviewed 03/09/2017   Allergen Reaction Noted     Augmentin  10/25/2011     Current Outpatient Prescriptions   Medication Sig Dispense Refill     multivitamin CF formula (MVW COMPLETE FORMULATION ) softgel cap Take 2 capsules by mouth daily 60 capsule 3     dicloxacillin (DYNAPEN) 500 MG capsule Take 1 capsule (500 mg) by mouth 4 times daily for 14 days 56 capsule 0     sulfamethoxazole-trimethoprim (BACTRIM DS) 800-160 MG per tablet Take 1 tablet by mouth 2 times daily for 14 days 28 tablet 0     albuterol (2.5 MG/3ML) 0.083% neb solution Take 1 vial (2.5 mg) by nebulization 3 times daily 270 mL 6     sodium chloride inhalant 7 % NEBU neb solution Take 4 mLs by nebulization 2 times daily 240 mL 11     melatonin 5 MG tablet Take 1-2 tablets (5-10 mg) by mouth nightly as needed 60 tablet 1     citalopram (CELEXA) 20 MG tablet Take 1 tablet (20 mg) by mouth daily 30 tablet 1     azelastine (ASTELIN) 0.1  % spray Use 1-2 sprays both nostrils twice daily as needed 1 Bottle 1     tretinoin (RETIN-A) 0.05 % cream Apply topically every morning as directed 30 g 1     azithromycin (ZITHROMAX) 500 MG tablet Take one tablet on Monday, Wednesday and Friday morning 12 tablet 12     multivitamin CF formula (AQUADEKS) CAPS capsule Take 1 capsule by mouth daily 30 capsule 11     MINOCYCLINE HCL PO Take 100 mg by mouth 2 times daily       adapalene (DIFFERIN) 0.1 % gel Apply 1 applicator topically At Bedtime       tobramycin, PF, (COBY) 300 MG/5ML nebulizer solution Take 5 mLs (300 mg) by nebulization 2 times daily Cycle 28 days on/off 280 mL 6     dornase alpha (PULMOZYME) 1 MG/ML nebulizer solution Inhale 2.5 mg into the lungs 2 times daily 150 mL 12     omeprazole 20 MG tablet Take 1 tablet (20 mg) by mouth daily 30 tablet 3     amylase-lipase-protease (CREON 36) 13368 UNITS CPEP Take 5 capsules (180,000 Units) by mouth 3 times daily (with meals) 450 capsule 11     cetirizine (ZYRTEC) 10 MG tablet Take 1 tablet (10 mg) by mouth daily as needed (Patient taking differently: Take 10 mg by mouth daily ) 90 tablet 3     amylase-lipase-protease (CREON 36) 60685 UNITS CPEP Take 3 capsules (108,000units) by mouth with snacks (3meals and 3 snacks per day) 270 capsule 11     mometasone (NASONEX) 50 MCG/ACT nasal spray Spray 1 spray into both nostrils daily.       meropenem 2 g Inject 2 g into the vein every 8 hours for 11 days (Patient not taking: Reported on 3/9/2017) 66 g 0     nafcillin 2 GM vial Inject 3 g into the vein every 6 hours for 11 days (Patient not taking: Reported on 3/9/2017) 528 mL 0   Stopped Meropenem and Nafcillin 3/6/17    PMH    Past medical history reviewed with patient/parent today, no changes.    Immunization History   Administered Date(s) Administered     Influenza (IIV3) 09/01/2012     Influenza Vaccine IM 3yrs+ 4 Valent IIV4 11/03/2016       PSH    Past surgical history reviewed with patient/parent today, no  "changes.    FH    Family history reviewed with patient/parent today, no changes.    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  History of using chewing tobacco.    ROS    A comprehensive review of systems was performed and is negative except as noted in the HPI.    Last CF Annual Studies date: November 2016    Objective:     Physical Exam    Vital Signs:  /65 (BP Location: Right arm, Patient Position: Supine, Cuff Size: Adult Regular)  Pulse 89  Ht 5' 10.28\" (178.5 cm)  Wt 157 lb 13.6 oz (71.6 kg)  SpO2 96%  BMI 22.47 kg/m2    Ht Readings from Last 2 Encounters:   03/09/17 5' 10.28\" (178.5 cm)   02/21/17 5' 8.25\" (173.4 cm)     Wt Readings from Last 2 Encounters:   03/09/17 157 lb 13.6 oz (71.6 kg)   03/02/17 155 lb 6.8 oz (70.5 kg)     Wt Readings from Last 4 Encounters:   03/09/17 157 lb 13.6 oz (71.6 kg)   03/02/17 155 lb 6.8 oz (70.5 kg)   11/03/16 160 lb 7.9 oz (72.8 kg) (58 %)*   11/03/16 162 lb 7.7 oz (73.7 kg) (61 %)*     * Growth percentiles are based on CDC 2-20 Years data.         BMI %: > 36 months -  Normalized BMI data available only for age 0 to 20 years.    Constitutional: No distress, comfortable, pleasant. No cough heard. Looks much better!  Vital signs: Reviewed and normal.  Eyes: Anicteric, normal extra-ocular movements, Pupils are equal and reactive to light  Ears, Nose and Throat: Tympanic membranes clear, nose clear and free of lesions, throat clear.  Neck: Supple with full range of motion, no thyromegaly.   Cardiovascular: Regular rate and rhythm, no murmurs, rubs or gallops, peripheral pulses full and symmetric  Chest: Symmetrical, no retractions.  Respiratory: Clear to auscultation, no wheezes or crackles, normal breath sounds  Gastrointestinal: Positive bowel sounds, " nontender, no hepatosplenomegaly, no masses  Musculoskeletal: Full range of motion, no edema.  Skin: No concerning lesions, no jaundice.  Neurological: Normal muscle tone and strength.    Spirometry was done 3/9/2017    Results for orders placed or performed in visit on 03/09/17   General PFT Lab (Please always keep checked)   Result Value Ref Range    FVC-Pred 5.54 L    FVC-Pre 3.48 L    FVC-%Pred-Pre 62 %    FEV1-Pre 2.75 L    FEV1-%Pred-Pre 58 %    FEV1FVC-Pred 86 %    FEV1FVC-Pre 79 %    FEFMax-Pred 10.14 L/sec    FEFMax-Pre 8.83 L/sec    FEFMax-%Pred-Pre 87 %    FEF2575-Pred 5.09 L/sec    FEF2575-Pre 2.54 L/sec    QWH8794-%Pred-Pre 49 %    ExpTime-Pre 6.62 sec    FIFMax-Pre 4.72 L/sec    FEV1FEV6-Pred 85 %    FEV1FEV6-Pre 80 %     PFT Results (11/2016):  FVC:  3.35L, 60% predicted  FEV1:  2.76L, 59% predicted  FEV1/FVC:  82  FEF 25-75:  2.83L, 55% predicted    Spirometry Interpretation:  Good effort and acceptable for interpretation.  Evidence of restrictive lung disease given a decreased FVC.  FEV1/FVC ratio is low normal.  Mild obstruction still present.  Values improved from hospital discharge.    Culture pending    Laboratory or other tests ordered were reviewed.    Assessment       Demario is a 22 year old young man with CF and pancreatic insufficiency s/p right upper lobectomy in 2009. His genotype contains a STOP mutation, so he is not a candidate for the new CF drug, Orkambi.  He was hospitalized for a severe CF pulmonary exacerbation complicated by hemoptysis and influenza A.  He grows two species of Achromobacter and MSSA and was treated with IV Meropenem and IV nafcillin.  His FEV1 improved from 35% to 54% upon discharge and continues to improve today.  His weight is up and he feels much better.  Unfortunately, he lost his PICC Line and we had to transition to PO antibiotics.  However, he has continued to improve on this regimen.  We will continue with four times daily VEST and another week of  antibiotics until next Thursday when I will see him in clinic again.  Will evaluate his weight and PFT's and decide on a plan of care.  The family leaves for [facility] to go watch the [place] play hockey next Friday.     Plan:       1.  Please continue your dicloxacillin and bactrim as directed - at least until we see you next Thursday.  2.  Please HOLD your minocycline while on your dicloxacillin.  3.  Please continue your VEST treatments four times daily until we see you next Thursday.  4.  Continue all other medications as directed.  5.  Vitamins have been ordered for you through [facility].  6.  We will put together a travel letter for you to have for next week for your trip into [place].  7.  Follow-up next week.  We will schedule a 1PM PFT and a 130PM visit.  8.  Keep up your eating!  9.  Call with any questions or concerns.    Mary Grace Álvarez MD [facility]  [place]ediatric Pulmonology      CC  YEN ROY    Copy to patient  Parent(s) of Demario Abbasi  [address] [facility] [place] [address]        Respiratory Therapist Note:    Vest    Model: Hill-Rom   Settings: Hill Rom Frequencies 8, 9, 10 at pressure 10 then frequencies 18, 19, 20 at pressure 6.   Size Vest:   Frequency of therapy:4 times per day      Review Cleaning: Yes :    Alternative Airway Clearance: Aerobika instruct was previously done, patient verbalized knowing how to use it. Declined additional Aerobika offering this visit. Will let us know if he can not find it at home for his upcoming trip, we can offer him a new Aerobika next visit in 1 week then.       Other/ Comments: Doing well with increased Vest treatments at 4 x daily. Good work improving.       Daisha Álvarez MD

## 2017-03-10 LAB
EXPTIME-PRE: 6.62 SEC
FEF2575-%PRED-PRE: 49 %
FEF2575-PRE: 2.54 L/SEC
FEF2575-PRED: 5.09 L/SEC
FEFMAX-%PRED-PRE: 87 %
FEFMAX-PRE: 8.83 L/SEC
FEFMAX-PRED: 10.14 L/SEC
FEV1-%PRED-PRE: 58 %
FEV1-PRE: 2.75 L
FEV1FEV6-PRE: 80 %
FEV1FEV6-PRED: 85 %
FEV1FVC-PRE: 79 %
FEV1FVC-PRED: 86 %
FIFMAX-PRE: 4.72 L/SEC
FVC-%PRED-PRE: 62 %
FVC-PRE: 3.48 L
FVC-PRED: 5.54 L

## 2017-03-13 ENCOUNTER — CARE COORDINATION (OUTPATIENT)
Dept: PULMONOLOGY | Facility: CLINIC | Age: 22
End: 2017-03-13

## 2017-03-13 NOTE — PROGRESS NOTES
Dr. Álvarez would like to obtain full PFTs with DLCO and lung volumes at Demario's appt on 3/16/17. PFT lab updated and appt switched to 60 minutes. Demario's dad, Andrew updated with plan as well.    May Maldonado, RN, CPTC  P Pediatric Cystic Fibrosis/Pulmonary Care Coordinator   CF RN phone: [phone]

## 2017-03-16 ENCOUNTER — OFFICE VISIT (OUTPATIENT)
Dept: PULMONOLOGY | Facility: CLINIC | Age: 22
End: 2017-03-16
Attending: PEDIATRICS
Payer: COMMERCIAL

## 2017-03-16 VITALS
HEART RATE: 83 BPM | TEMPERATURE: 97.3 F | BODY MASS INDEX: 23.89 KG/M2 | DIASTOLIC BLOOD PRESSURE: 67 MMHG | WEIGHT: 166.89 LBS | HEIGHT: 70 IN | OXYGEN SATURATION: 95 % | SYSTOLIC BLOOD PRESSURE: 112 MMHG | RESPIRATION RATE: 20 BRPM

## 2017-03-16 DIAGNOSIS — E84.0 CYSTIC FIBROSIS WITH PULMONARY EXACERBATION (H): ICD-10-CM

## 2017-03-16 DIAGNOSIS — E84.9 CYSTIC FIBROSIS (H): Primary | ICD-10-CM

## 2017-03-16 DIAGNOSIS — Z90.2 S/P LOBECTOMY OF LUNG: ICD-10-CM

## 2017-03-16 DIAGNOSIS — K86.81 EXOCRINE PANCREATIC INSUFFICIENCY: ICD-10-CM

## 2017-03-16 LAB
BACTERIA SPEC CULT: ABNORMAL
DLCOUNC-%PRED-PRE: 98 %
DLCOUNC-PRE: 37.14 ML/MIN/MMHG
DLCOUNC-PRED: 37.68 ML/MIN/MMHG
ERV-%PRED-PRE: 58 %
ERV-PRE: 1.18 L
ERV-PRED: 2.03 L
EXPTIME-PRE: 6.89 SEC
FEF2575-%PRED-PRE: 57 %
FEF2575-PRE: 2.93 L/SEC
FEF2575-PRED: 5.09 L/SEC
FEFMAX-%PRED-PRE: 78 %
FEFMAX-PRE: 8.01 L/SEC
FEFMAX-PRED: 10.14 L/SEC
FEV1-%PRED-PRE: 60 %
FEV1-PRE: 2.83 L
FEV1FEV6-PRE: 82 %
FEV1FEV6-PRED: 85 %
FEV1FVC-PRE: 82 %
FEV1FVC-PRED: 86 %
FEV1SVC-PRE: 80 %
FEV1SVC-PRED: 79 %
FIFMAX-PRE: 4.64 L/SEC
FRCPLETH-%PRED-PRE: 105 %
FRCPLETH-PRE: 3.49 L
FRCPLETH-PRED: 3.31 L
FVC-%PRED-PRE: 62 %
FVC-PRE: 3.44 L
FVC-PRED: 5.54 L
IC-%PRED-PRE: 59 %
IC-PRE: 2.34 L
IC-PRED: 3.93 L
MICRO REPORT STATUS: ABNORMAL
MICROORGANISM SPEC CULT: ABNORMAL
MICROORGANISM SPEC CULT: ABNORMAL
RVPLETH-%PRED-PRE: 138 %
RVPLETH-PRE: 2.3 L
RVPLETH-PRED: 1.66 L
SPECIMEN SOURCE: ABNORMAL
TLCPLETH-%PRED-PRE: 81 %
TLCPLETH-PRE: 5.83 L
TLCPLETH-PRED: 7.18 L
VA-%PRED-PRE: 71 %
VA-PRE: 4.89 L
VC-%PRED-PRE: 59 %
VC-PRE: 3.53 L
VC-PRED: 5.96 L

## 2017-03-16 PROCEDURE — 99212 OFFICE O/P EST SF 10 MIN: CPT | Mod: ZF

## 2017-03-16 PROCEDURE — 87070 CULTURE OTHR SPECIMN AEROBIC: CPT | Performed by: PEDIATRICS

## 2017-03-16 PROCEDURE — 94726 PLETHYSMOGRAPHY LUNG VOLUMES: CPT | Mod: ZF

## 2017-03-16 PROCEDURE — 87186 SC STD MICRODIL/AGAR DIL: CPT | Performed by: PEDIATRICS

## 2017-03-16 PROCEDURE — 94150 VITAL CAPACITY TEST: CPT | Mod: ZF

## 2017-03-16 PROCEDURE — 94375 RESPIRATORY FLOW VOLUME LOOP: CPT | Mod: ZF

## 2017-03-16 PROCEDURE — 94729 DIFFUSING CAPACITY: CPT | Mod: ZF

## 2017-03-16 PROCEDURE — 87077 CULTURE AEROBIC IDENTIFY: CPT | Performed by: PEDIATRICS

## 2017-03-16 ASSESSMENT — PAIN SCALES - GENERAL: PAINLEVEL: NO PAIN (0)

## 2017-03-16 NOTE — PATIENT INSTRUCTIONS
1.  Continue dicloxacillin and bactrim for one more week (or until your supply runs out).  2.  May re-start your minocycline when you are finished with your other antibiotics.  3.  May decrease back to VEST twice daily.  4.  Continue all other medications as directed.  5.  Continue to work on getting back to 166 pounds!  6.  Follow-up in one month.  7.  Have a blast in [facility]!    Mary Grace Álvarez MD MSCS  Pediatric Pulmonology

## 2017-03-16 NOTE — LETTER
3/16/2017      RE: Demario Abbasi  [address]  [place] [address]       Pediatrics Pulmonary - Provider Note  Cystic Fibrosis - Return Visit    Patient: Demario Abbasi MRN# [id number]   Encounter: 2017  : 1995      Opening Statement  We had the pleasure of consulting on Demario at the [facility] at the [facility] for a one week follow-up visit.  He was accompanied by his parents to this visit.     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 He had a right upper lobectomy in . He was recently hospitalized at [facility] from  - 3/2/17 for a CF pulmonary exacerbation complicated by hemoptysis and influenza A infection.  Demario presented with dorita hemoptysis, fever, cough, fatigue and weight loss.  His initial FEV1 was 35% predicted.  He was treated with IV meropenem and nafcillin through a PICC-line and discharged home on these IV antibiotics.  At discharge, he was much improved with an FEV1 back into the 54% range.  He was eating better with better energy.  He followed up last week, where his FEV1 had improved to 58% predicted.  We continued his oral dicloxacillin and bactrim, and continued four times daily VEST for one more week.  He now returns for this appointment.      Over the past week, Demario has continued to feel better and says he is close to 100%.  He would like to get back up to 166 pounds, so feels he still needs to gain some weight.  He has been on oral dicloxacillin and oral bactrim (started 3/7/2017).  He has continued with four times daily VEST.  He says that he has minimal cough and has occasional sputum production, which looks much better.  He is not coughing at night and is sleeping well.  He has had no fevers and no further hemoptysis.  His appetite is good and back to himself.  His energy is  good.  He has been at home with his family in [place].  He is taking his oral antibiotics without issue.      From a GI standpoint Demario reports his appetite is great and is eating well. He continues on Creon 06663 capsule enzymes, taking 5 with meals and 3 with snacks. Demario has been good about taking his enzymes and they appear to be working well for him. Demario reports normal voids and well formed stools, 2-3 per day. There have been no reports of abdominal pain, nausea or vomiting. He continues on supplemental iron.     Demario finished school in [place], [place].  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 He will be at home until that starts.  He plans to return to college for two more years to obtain an advanced degree in the fall in his area of study.  He is looking forward to this.    The family will be leaving tomorrow for a trip to [facility] to see the [place] play [facility] in hockey.  They are driving and will be back Monday.    Past Medical History   Diagnosis Date     CF (cystic fibrosis) (H)      Impaired glucose tolerance      Pancreatic insufficiency      S/P lobectomy of lung 8/4/2015     Right upper lobectomy - 2009     Allergies  Allergies as of 03/16/2017 - Jose Luis as Reviewed 03/16/2017   Allergen Reaction Noted     Augmentin  10/25/2011     Current Outpatient Prescriptions   Medication Sig Dispense Refill     multivitamin CF formula (MVW COMPLETE FORMULATION ) softgel cap Take 2 capsules by mouth daily 60 capsule 3     dicloxacillin (DYNAPEN) 500 MG capsule Take 1 capsule (500 mg) by mouth 4 times daily for 14 days 56 capsule 0     sulfamethoxazole-trimethoprim (BACTRIM DS) 800-160 MG per tablet Take 1 tablet by mouth 2 times daily for 14 days 28 tablet 0     albuterol (2.5 MG/3ML) 0.083% neb solution Take 1 vial (2.5 mg) by nebulization 3 times daily 270 mL 6     sodium chloride inhalant 7 % NEBU neb solution Take 4 mLs by nebulization 2 times daily 240 mL 11     melatonin 5 MG tablet Take 1-2  tablets (5-10 mg) by mouth nightly as needed 60 tablet 1     citalopram (CELEXA) 20 MG tablet Take 1 tablet (20 mg) by mouth daily 30 tablet 1     azelastine (ASTELIN) 0.1 % spray Use 1-2 sprays both nostrils twice daily as needed 1 Bottle 1     tretinoin (RETIN-A) 0.05 % cream Apply topically every morning as directed 30 g 1     azithromycin (ZITHROMAX) 500 MG tablet Take one tablet on Monday, Wednesday and Friday morning 12 tablet 12     multivitamin CF formula (AQUADEKS) CAPS capsule Take 1 capsule by mouth daily 30 capsule 11     MINOCYCLINE HCL PO Take 100 mg by mouth 2 times daily       adapalene (DIFFERIN) 0.1 % gel Apply 1 applicator topically At Bedtime       tobramycin, PF, (COBY) 300 MG/5ML nebulizer solution Take 5 mLs (300 mg) by nebulization 2 times daily Cycle 28 days on/off 280 mL 6     dornase alpha (PULMOZYME) 1 MG/ML nebulizer solution Inhale 2.5 mg into the lungs 2 times daily 150 mL 12     omeprazole 20 MG tablet Take 1 tablet (20 mg) by mouth daily 30 tablet 3     amylase-lipase-protease (CREON 36) 69914 UNITS CPEP Take 5 capsules (180,000 Units) by mouth 3 times daily (with meals) 450 capsule 11     cetirizine (ZYRTEC) 10 MG tablet Take 1 tablet (10 mg) by mouth daily as needed (Patient taking differently: Take 10 mg by mouth daily ) 90 tablet 3     amylase-lipase-protease (CREON 36) 19936 UNITS CPEP Take 3 capsules (108,000units) by mouth with snacks (3meals and 3 snacks per day) 270 capsule 11     mometasone (NASONEX) 50 MCG/ACT nasal spray Spray 1 spray into both nostrils daily.     Stopped Meropenem and Nafcillin 3/6/17    PMH    Past medical history reviewed with patient/parent today, no changes.    Immunization History   Administered Date(s) Administered     Influenza (IIV3) 09/01/2012     Influenza Vaccine IM 3yrs+ 4 Valent IIV4 11/03/2016       PS    Past surgical history reviewed with patient/parent today, no changes.        Family history reviewed with patient/parent today, no  "changes.    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  History of using chewing tobacco.    ROS    A comprehensive review of systems was performed and is negative except as noted in the HPI.    Last CF Annual Studies date: November 2016    Objective:     Physical Exam    Vital Signs:  /67  Pulse 83  Temp 97.3  F (36.3  C) (Oral)  Resp 20  Ht 1.785 m (5' 10.28\")  Wt 75.7 kg (166 lb 14.2 oz)  SpO2 95%  BMI 23.76 kg/m2    Ht Readings from Last 2 Encounters:   03/16/17 1.785 m (5' 10.28\")   03/09/17 1.785 m (5' 10.28\")     Wt Readings from Last 2 Encounters:   03/16/17 75.7 kg (166 lb 14.2 oz)   03/09/17 71.6 kg (157 lb 13.6 oz)     Wt Readings from Last 4 Encounters:   03/16/17 75.7 kg (166 lb 14.2 oz)   03/09/17 71.6 kg (157 lb 13.6 oz)   03/02/17 70.5 kg (155 lb 6.8 oz)   11/03/16 72.8 kg (160 lb 7.9 oz) (58 %)*     * Growth percentiles are based on CDC 2-20 Years data.         BMI %: > 36 months -  Normalized BMI data available only for age 0 to 20 years.    Constitutional: No distress, comfortable, pleasant. No cough heard.   Vital signs: Reviewed and normal.  Eyes: Anicteric, normal extra-ocular movements, Pupils are equal and reactive to light  Ears, Nose and Throat: Tympanic membranes clear, nose clear and free of lesions, throat clear.  Neck: Supple with full range of motion, no thyromegaly.   Cardiovascular: Regular rate and rhythm, no murmurs, rubs or gallops, peripheral pulses full and symmetric  Chest: Symmetrical, no retractions.  Respiratory: Clear to auscultation, no wheezes or crackles, normal breath sounds  Gastrointestinal: Positive bowel sounds, nontender, no hepatosplenomegaly, no masses  Musculoskeletal: Full range of motion, no edema.  Skin: No concerning lesions, no " jaundice.  Neurological: Normal muscle tone and strength.    Spirometry was done 3/16/2017    Results for orders placed or performed in visit on 03/16/17   General PFT Lab (Please always keep checked)   Result Value Ref Range    FVC-Pred 5.54 L    FVC-Pre 3.44 L    FVC-%Pred-Pre 62 %    FEV1-Pre 2.83 L    FEV1-%Pred-Pre 60 %    FEV1FVC-Pred 86 %    FEV1FVC-Pre 82 %    FEFMax-Pred 10.14 L/sec    FEFMax-Pre 8.01 L/sec    FEFMax-%Pred-Pre 78 %    FEF2575-Pred 5.09 L/sec    FEF2575-Pre 2.93 L/sec    QLV0764-%Pred-Pre 57 %    ExpTime-Pre 6.89 sec    FIFMax-Pre 4.64 L/sec    VC-Pred 5.96 L    VC-Pre 3.53 L    VC-%Pred-Pre 59 %    IC-Pred 3.93 L    IC-Pre 2.34 L    IC-%Pred-Pre 59 %    ERV-Pred 2.03 L    ERV-Pre 1.18 L    ERV-%Pred-Pre 58 %    FEV1FEV6-Pred 85 %    FEV1FEV6-Pre 82 %    DLCOunc-Pred 37.68 ml/min/mmHg    DLCOunc-Pre 37.14 ml/min/mmHg    DLCOunc-%Pred-Pre 98 %    VA-Pre 4.89 L    VA-%Pred-Pre 71 %    FEV1SVC-Pred 79 %    FEV1SVC-Pre 80 %    FRCPleth-Pred 3.31 L    FRCPleth-Pre 3.49 L    FRCPleth-%Pred-Pre 105 %    RVPleth-Pred 1.66 L    RVPleth-Pre 2.30 L    RVPleth-%Pred-Pre 138 %    TLCPleth-Pred 7.18 L    TLCPleth-Pre 5.83 L    TLCPleth-%Pred-Pre 81 %     Spirometry Interpretation:  Good effort and acceptable for interpretation.  Evidence of restrictive lung disease given a decreased FVC.  FEV1/FVC ratio is low normal.  Mild obstruction still present.  Values continue to slowly improve.    Lung volumes:  Low-normal total lung capacity with hyperinflation present given increased RV and increased RV/TLC.    DLCO:  Normal DLCO.  DLCO/VA is slightly increased at 138% predicted.      Culture pending    Laboratory or other tests ordered were reviewed.    Assessment       Demario is a 22 year old young man with CF and pancreatic insufficiency s/p right upper lobectomy in 2009. His genotype contains a STOP mutation, so he is not a candidate for the new CF drug, Orkambi.  He was hospitalized for a severe CF pulmonary  exacerbation complicated by hemoptysis and influenza A.  He grows two species of Achromobacter and MSSA and was treated with IV Meropenem and IV nafcillin.  His FEV1 improved from 35% to 54% upon discharge, improved to 58% predicted last week and is up again to 60% predicted today.  His weight continues to trend up and he feels almost back to himself.  We will continue his antibiotics for one more week and decrease down to twice daily VEST treatments.  Will follow him closely and bring him back in a month.  The family leaves for [facility] to go watch the [place] play hockey this Friday.     Plan:       1.  Continue dicloxacillin and bactrim for one more week (or until your supply runs out).  2.  May re-start your minocycline when you are finished with your other antibiotics.  3.  May decrease back to VEST twice daily.  4.  Continue all other medications as directed.  5.  Continue to work on getting back to 166 pounds!  6.  Follow-up in one month.  7.  Have a blast in [facility]!    Mary Grace Álvarez MD [facility]  Pediatric Pulmonology      CC  YEN ROY    Copy to patient    Parent(s) of Demario El[place]  [address]  [place] [address]

## 2017-03-16 NOTE — PROGRESS NOTES
Pediatrics Pulmonary - Provider Note  Cystic Fibrosis - Return Visit    Patient: Demario Abbasi MRN# [id number]   Encounter: 2017  : 1995      Opening Statement  We had the pleasure of consulting on Demario at the [facility] the [facility] for a one week follow-up visit.  He was accompanied by his parents to this visit.     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 He had a right upper lobectomy in . He was recently hospitalized at [facility] from  - 3/2/17 for a CF pulmonary exacerbation complicated by hemoptysis and influenza A infection.  Demario presented with dorita hemoptysis, fever, cough, fatigue and weight loss.  His initial FEV1 was 35% predicted.  He was treated with IV meropenem and nafcillin through a PICC-line and discharged home on these IV antibiotics.  At discharge, he was much improved with an FEV1 back into the 54% range.  He was eating better with better energy.  He followed up last week, where his FEV1 had improved to 58% predicted.  We continued his oral dicloxacillin and bactrim, and continued four times daily VEST for one more week.  He now returns for this appointment.      Over the past week, Demario has continued to feel better and says he is close to 100%.  He would like to get back up to 166 pounds, so feels he still needs to gain some weight.  He has been on oral dicloxacillin and oral bactrim (started 3/7/2017).  He has continued with four times daily VEST.  He says that he has minimal cough and has occasional sputum production, which looks much better.  He is not coughing at night and is sleeping well.  He has had no fevers and no further hemoptysis.  His appetite is good and back to himself.  His energy is good.  He has been at home with his family in [place].  He is taking his oral antibiotics  without issue.      From a GI standpoint Demario reports his appetite is great and is eating well. He continues on Creon 71111 capsule enzymes, taking 5 with meals and 3 with snacks. Demario has been good about taking his enzymes and they appear to be working well for him. Demario reports normal voids and well formed stools, 2-3 per day. There have been no reports of abdominal pain, nausea or vomiting. He continues on supplemental iron.     Demario finished school in [place], [place].  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 He will be at home until that starts.  He plans to return to college for two more years to obtain an advanced degree in the fall in his area of study.  He is looking forward to this.    The family will be leaving tomorrow for a trip to [facility] to see the [place] play [facility] in [employer].  They are driving and will be back Monday.    Past Medical History   Diagnosis Date     CF (cystic fibrosis) (H)      Impaired glucose tolerance      Pancreatic insufficiency      S/P lobectomy of lung 8/4/2015     Right upper lobectomy - 2009     Allergies  Allergies as of 03/16/2017 - Jose Luis as Reviewed 03/16/2017   Allergen Reaction Noted     Augmentin  10/25/2011     Current Outpatient Prescriptions   Medication Sig Dispense Refill     multivitamin CF formula (MVW COMPLETE FORMULATION ) softgel cap Take 2 capsules by mouth daily 60 capsule 3     dicloxacillin (DYNAPEN) 500 MG capsule Take 1 capsule (500 mg) by mouth 4 times daily for 14 days 56 capsule 0     sulfamethoxazole-trimethoprim (BACTRIM DS) 800-160 MG per tablet Take 1 tablet by mouth 2 times daily for 14 days 28 tablet 0     albuterol (2.5 MG/3ML) 0.083% neb solution Take 1 vial (2.5 mg) by nebulization 3 times daily 270 mL 6     sodium chloride inhalant 7 % NEBU neb solution Take 4 mLs by nebulization 2 times daily 240 mL 11     melatonin 5 MG tablet Take 1-2 tablets (5-10 mg) by mouth nightly as needed 60 tablet 1     citalopram (CELEXA) 20 MG  tablet Take 1 tablet (20 mg) by mouth daily 30 tablet 1     azelastine (ASTELIN) 0.1 % spray Use 1-2 sprays both nostrils twice daily as needed 1 Bottle 1     tretinoin (RETIN-A) 0.05 % cream Apply topically every morning as directed 30 g 1     azithromycin (ZITHROMAX) 500 MG tablet Take one tablet on Monday, Wednesday and Friday morning 12 tablet 12     multivitamin CF formula (AQUADEKS) CAPS capsule Take 1 capsule by mouth daily 30 capsule 11     MINOCYCLINE HCL PO Take 100 mg by mouth 2 times daily       adapalene (DIFFERIN) 0.1 % gel Apply 1 applicator topically At Bedtime       tobramycin, PF, (COBY) 300 MG/5ML nebulizer solution Take 5 mLs (300 mg) by nebulization 2 times daily Cycle 28 days on/off 280 mL 6     dornase alpha (PULMOZYME) 1 MG/ML nebulizer solution Inhale 2.5 mg into the lungs 2 times daily 150 mL 12     omeprazole 20 MG tablet Take 1 tablet (20 mg) by mouth daily 30 tablet 3     amylase-lipase-protease (CREON 36) 85312 UNITS CPEP Take 5 capsules (180,000 Units) by mouth 3 times daily (with meals) 450 capsule 11     cetirizine (ZYRTEC) 10 MG tablet Take 1 tablet (10 mg) by mouth daily as needed (Patient taking differently: Take 10 mg by mouth daily ) 90 tablet 3     amylase-lipase-protease (CREON 36) 31987 UNITS CPEP Take 3 capsules (108,000units) by mouth with snacks (3meals and 3 snacks per day) 270 capsule 11     mometasone (NASONEX) 50 MCG/ACT nasal spray Spray 1 spray into both nostrils daily.     Stopped Meropenem and Nafcillin 3/6/17    PMH    Past medical history reviewed with patient/parent today, no changes.    Immunization History   Administered Date(s) Administered     Influenza (IIV3) 09/01/2012     Influenza Vaccine IM 3yrs+ 4 Valent IIV4 11/03/2016       PSH    Past surgical history reviewed with patient/parent today, no changes.    FH    Family history reviewed with patient/parent today, no changes.    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  History of using chewing tobacco.    ROS    A comprehensive review of systems was performed and is negative except as noted in the HPI.    Last CF Annual Studies date: November 2016    Objective:     Physical Exam    Vital Signs:  /67  Pulse 83  Temp 97.3  F (36.3  C) (Oral)  Resp 20  Ht 1.785 m (5' 10.28\")  Wt 75.7 kg (166 lb 14.2 oz)  SpO2 95%  BMI 23.76 kg/m2    Ht Readings from Last 2 Encounters:   03/16/17 1.785 m (5' 10.28\")   03/09/17 1.785 m (5' 10.28\")     Wt Readings from Last 2 Encounters:   03/16/17 75.7 kg (166 lb 14.2 oz)   03/09/17 71.6 kg (157 lb 13.6 oz)     Wt Readings from Last 4 Encounters:   03/16/17 75.7 kg (166 lb 14.2 oz)   03/09/17 71.6 kg (157 lb 13.6 oz)   03/02/17 70.5 kg (155 lb 6.8 oz)   11/03/16 72.8 kg (160 lb 7.9 oz) (58 %)*     * Growth percentiles are based on CDC 2-20 Years data.         BMI %: > 36 months -  Normalized BMI data available only for age 0 to 20 years.    Constitutional: No distress, comfortable, pleasant. No cough heard.   Vital signs: Reviewed and normal.  Eyes: Anicteric, normal extra-ocular movements, Pupils are equal and reactive to light  Ears, Nose and Throat: Tympanic membranes clear, nose clear and free of lesions, throat clear.  Neck: Supple with full range of motion, no thyromegaly.   Cardiovascular: Regular rate and rhythm, no murmurs, rubs or gallops, peripheral pulses full and symmetric  Chest: Symmetrical, no retractions.  Respiratory: Clear to auscultation, no wheezes or crackles, normal breath sounds  Gastrointestinal: Positive bowel sounds, nontender, no hepatosplenomegaly, no masses  Musculoskeletal: Full range of motion, no edema.  Skin: No concerning lesions, no jaundice.  Neurological: Normal muscle tone and strength.    Spirometry was done 3/16/2017    Results for " orders placed or performed in visit on 03/16/17   General PFT Lab (Please always keep checked)   Result Value Ref Range    FVC-Pred 5.54 L    FVC-Pre 3.44 L    FVC-%Pred-Pre 62 %    FEV1-Pre 2.83 L    FEV1-%Pred-Pre 60 %    FEV1FVC-Pred 86 %    FEV1FVC-Pre 82 %    FEFMax-Pred 10.14 L/sec    FEFMax-Pre 8.01 L/sec    FEFMax-%Pred-Pre 78 %    FEF2575-Pred 5.09 L/sec    FEF2575-Pre 2.93 L/sec    GIV7020-%Pred-Pre 57 %    ExpTime-Pre 6.89 sec    FIFMax-Pre 4.64 L/sec    VC-Pred 5.96 L    VC-Pre 3.53 L    VC-%Pred-Pre 59 %    IC-Pred 3.93 L    IC-Pre 2.34 L    IC-%Pred-Pre 59 %    ERV-Pred 2.03 L    ERV-Pre 1.18 L    ERV-%Pred-Pre 58 %    FEV1FEV6-Pred 85 %    FEV1FEV6-Pre 82 %    DLCOunc-Pred 37.68 ml/min/mmHg    DLCOunc-Pre 37.14 ml/min/mmHg    DLCOunc-%Pred-Pre 98 %    VA-Pre 4.89 L    VA-%Pred-Pre 71 %    FEV1SVC-Pred 79 %    FEV1SVC-Pre 80 %    FRCPleth-Pred 3.31 L    FRCPleth-Pre 3.49 L    FRCPleth-%Pred-Pre 105 %    RVPleth-Pred 1.66 L    RVPleth-Pre 2.30 L    RVPleth-%Pred-Pre 138 %    TLCPleth-Pred 7.18 L    TLCPleth-Pre 5.83 L    TLCPleth-%Pred-Pre 81 %     Spirometry Interpretation:  Good effort and acceptable for interpretation.  Evidence of restrictive lung disease given a decreased FVC.  FEV1/FVC ratio is low normal.  Mild obstruction still present.  Values continue to slowly improve.    Lung volumes:  Low-normal total lung capacity with hyperinflation present given increased RV and increased RV/TLC.    DLCO:  Normal DLCO.  DLCO/VA is slightly increased at 138% predicted.      Culture pending    Laboratory or other tests ordered were reviewed.    Assessment       Demario is a 22 year old young man with CF and pancreatic insufficiency s/p right upper lobectomy in 2009. His genotype contains a STOP mutation, so he is not a candidate for the new CF drug, Orkambi.  He was hospitalized for a severe CF pulmonary exacerbation complicated by hemoptysis and influenza A.  He grows two species of Achromobacter and MSSA  and was treated with IV Meropenem and IV nafcillin.  His FEV1 improved from 35% to 54% upon discharge, improved to 58% predicted last week and is up again to 60% predicted today.  His weight continues to trend up and he feels almost back to himself.  We will continue his antibiotics for one more week and decrease down to twice daily VEST treatments.  Will follow him closely and bring him back in a month.  The family leaves for [facility] to go watch the [place] play hockey this Friday.     Plan:       1.  Continue dicloxacillin and bactrim for one more week (or until your supply runs out).  2.  May re-start your minocycline when you are finished with your other antibiotics.  3.  May decrease back to VEST twice daily.  4.  Continue all other medications as directed.  5.  Continue to work on getting back to 166 pounds!  6.  Follow-up in one month.  7.  Have a blast in [facility]!    Mary Grace Álvarez MD [facility]  Pediatric Pulmonology      CC  YEN ROY    Copy to patient  DEAN DELACRUZ [facility]OTT  [address]  [place] [address]

## 2017-03-16 NOTE — NURSING NOTE
"Chief Complaint   Patient presents with     RECHECK     Cystic Fibrosis       Initial /67  Pulse 83  Temp 97.3  F (36.3  C) (Oral)  Resp 20  Ht 5' 10.28\" (178.5 cm)  Wt 166 lb 14.2 oz (75.7 kg)  SpO2 95%  BMI 23.76 kg/m2 Estimated body mass index is 23.76 kg/(m^2) as calculated from the following:    Height as of this encounter: 5' 10.28\" (178.5 cm).    Weight as of this encounter: 166 lb 14.2 oz (75.7 kg).  Medication Reconciliation: complete    "

## 2017-03-16 NOTE — MR AVS SNAPSHOT
After Visit Summary   3/16/2017    Demario Abbasi    MRN: [id number]           Patient Information     Date Of Birth          1995        Visit Information        Provider Department      3/16/2017 2:00 PM Daisha Álvarez MD Peds Pulmonary        Today's Diagnoses     Cystic fibrosis (H)    -  1      Care Instructions    1.  Continue dicloxacillin and bactrim for one more week (or until your supply runs out).  2.  May re-start your minocycline when you are finished with your other antibiotics.  3.  May decrease back to VEST twice daily.  4.  Continue all other medications as directed.  5.  Continue to work on getting back to 166 pounds!  6.  Follow-up in one month.  7.  Have a blast in [facility]!    Mary Grace Álvarez MD MSC[employer]  Pediatric Pulmonology        Follow-ups after your visit        Follow-up notes from your care team     Return in about 1 month (around 4/16/2017).      Who to contact     Please call your clinic at [phone] to:    Ask questions about your health    Make or cancel appointments    Discuss your medicines    Learn about your test results    Speak to your doctor   If you have compliments or concerns about an experience at your clinic, or if you wish to file a complaint, please contact [facility] Patient Relations at [phone] or email us at [email or web address]         Additional Information About Your Visit        MyChart Information     [email or web address] is an electronic gateway that provides easy, online access to your medical records. With [employer]Tree, you can request a clinic appointment, read your test results, renew a prescription or communicate with your care team.     To sign up for [email or web address] visit the website at [email or web address]   You will be asked to enter the access code listed below, as well as some personal information. Please follow the directions to create your username and password.     Your access code is:  [email or web address]  Expires: 2017 11:24 AM     Your access code will  in 90 days. If you need help or a new code, please contact your [facility] or call [phone] for assistance.        Care EveryWhere ID     This is your Care EveryWhere ID. This could be used by other organizations to access your [place] medical records  [id number]        Your Vitals Were     Pulse Temperature Respirations Height Pulse Oximetry BMI (Body Mass Index)    83 97.3  F (36.3  C) (Oral) 20 1.785 m (5' 10.28\") 95% 23.76 kg/m2       Blood Pressure from Last 3 Encounters:   17 112/67   17 110/65   17 116/60    Weight from Last 3 Encounters:   17 75.7 kg (166 lb 14.2 oz)   17 71.6 kg (157 lb 13.6 oz)   17 70.5 kg (155 lb 6.8 oz)              We Performed the Following     Cystic Fibrosis Culture Aerob Bacterial          Today's Medication Changes          These changes are accurate as of: 3/16/17  2:17 PM.  If you have any questions, ask your nurse or doctor.               These medicines have changed or have updated prescriptions.        Dose/Directions    cetirizine 10 MG tablet   Commonly known as:  zyrTEC   This may have changed:  when to take this   Used for:  Cystic fibrosis with pulmonary manifestations (H)        Dose:  10 mg   Take 1 tablet (10 mg) by mouth daily as needed   Quantity:  90 tablet   Refills:  3                Primary Care Provider Office Phone # Fax #    Susan Li [phone]       [facility]  [address] [facility] [address]        Thank you!     Thank you for choosing PEDS PULMONARY  for your care. Our goal is always to provide you with excellent care. Hearing back from our patients is one way we can continue to improve our services. Please take a few minutes to complete the written survey that you may receive in the mail after your visit with us. Thank you!             Your Updated Medication List - Protect " others around you: Learn how to safely use, store and throw away your medicines at www.disposemymeds.org.          This list is accurate as of: 3/16/17  2:17 PM.  Always use your most recent med list.                   Brand Name Dispense Instructions for use    adapalene 0.1 % gel    DIFFERIN     Apply 1 applicator topically At Bedtime       albuterol (2.5 MG/3ML) 0.083% neb solution     270 mL    Take 1 vial (2.5 mg) by nebulization 3 times daily       * amylase-lipase-protease 35141 UNITS Cpep    CREON 36    270 capsule    Take 3 capsules (108,000units) by mouth with snacks (3meals and 3 snacks per day)       * amylase-lipase-protease 15599 UNITS Cpep    CREON 36    450 capsule    Take 5 capsules (180,000 Units) by mouth 3 times daily (with meals)       azelastine 0.1 % spray    ASTELIN    1 Bottle    Use 1-2 sprays both nostrils twice daily as needed       azithromycin 500 MG tablet    ZITHROMAX    12 tablet    Take one tablet on Monday, Wednesday and Friday morning       cetirizine 10 MG tablet    zyrTEC    90 tablet    Take 1 tablet (10 mg) by mouth daily as needed       citalopram 20 MG tablet    celeXA    30 tablet    Take 1 tablet (20 mg) by mouth daily       dicloxacillin 500 MG capsule    DYNAPEN    56 capsule    Take 1 capsule (500 mg) by mouth 4 times daily for 14 days       dornase alpha 1 MG/ML neb solution    PULMOZYME    150 mL    Inhale 2.5 mg into the lungs 2 times daily       melatonin 5 MG tablet     60 tablet    Take 1-2 tablets (5-10 mg) by mouth nightly as needed       MINOCYCLINE HCL PO      Take 100 mg by mouth 2 times daily       * multivitamin CF formula Caps capsule     30 capsule    Take 1 capsule by mouth daily       * multivitamin CF formula softgel cap     60 capsule    Take 2 capsules by mouth daily       NASONEX 50 MCG/ACT spray   Generic drug:  mometasone      Spray 1 spray into both nostrils daily.       omeprazole 20 MG tablet     30 tablet    Take 1 tablet (20 mg) by mouth  daily       sodium chloride inhalant 7 % Nebu neb solution     240 mL    Take 4 mLs by nebulization 2 times daily       sulfamethoxazole-trimethoprim 800-160 MG per tablet    BACTRIM DS    28 tablet    Take 1 tablet by mouth 2 times daily for 14 days       tobramycin (PF) 300 MG/5ML neb solution    COBY    280 mL    Take 5 mLs (300 mg) by nebulization 2 times daily Cycle 28 days on/off       tretinoin 0.05 % cream    RETIN-A    30 g    Apply topically every morning as directed       * Notice:  This list has 4 medication(s) that are the same as other medications prescribed for you. Read the directions carefully, and ask your doctor or other care provider to review them with you.

## 2017-03-16 NOTE — NURSING NOTE
Met with Demario and his parents. Reviewed patient instructions and provided copy of AVS. Travel letter provided. Plan for follow-up in 1 month.    May Maldonado RN, CPTC  P Pediatric Cystic Fibrosis/Pulmonary Care Coordinator   CF RN phone: [phone]

## 2017-03-21 LAB
BACTERIA SPEC CULT: ABNORMAL
MICRO REPORT STATUS: ABNORMAL
MICROORGANISM SPEC CULT: ABNORMAL
SPECIMEN SOURCE: ABNORMAL

## 2017-04-13 ENCOUNTER — OFFICE VISIT (OUTPATIENT)
Dept: PULMONOLOGY | Facility: CLINIC | Age: 22
End: 2017-04-13
Attending: PEDIATRICS
Payer: MEDICAID

## 2017-04-13 VITALS
BODY MASS INDEX: 24.42 KG/M2 | TEMPERATURE: 97.9 F | WEIGHT: 164.9 LBS | OXYGEN SATURATION: 98 % | DIASTOLIC BLOOD PRESSURE: 64 MMHG | HEART RATE: 77 BPM | HEIGHT: 69 IN | RESPIRATION RATE: 28 BRPM | SYSTOLIC BLOOD PRESSURE: 109 MMHG

## 2017-04-13 DIAGNOSIS — E84.9 CYSTIC FIBROSIS (H): Primary | ICD-10-CM

## 2017-04-13 DIAGNOSIS — E84.9 CF (CYSTIC FIBROSIS) (H): ICD-10-CM

## 2017-04-13 PROBLEM — J10.1 INFLUENZA A: Status: RESOLVED | Noted: 2017-02-21 | Resolved: 2017-04-13

## 2017-04-13 PROCEDURE — 87070 CULTURE OTHR SPECIMN AEROBIC: CPT | Performed by: PEDIATRICS

## 2017-04-13 PROCEDURE — 99212 OFFICE O/P EST SF 10 MIN: CPT | Mod: 25

## 2017-04-13 PROCEDURE — 94375 RESPIRATORY FLOW VOLUME LOOP: CPT | Mod: ZF

## 2017-04-13 PROCEDURE — 87077 CULTURE AEROBIC IDENTIFY: CPT | Performed by: PEDIATRICS

## 2017-04-13 PROCEDURE — 87186 SC STD MICRODIL/AGAR DIL: CPT | Performed by: PEDIATRICS

## 2017-04-13 RX ORDER — AZITHROMYCIN 500 MG/1
TABLET, FILM COATED ORAL
Qty: 12 TABLET | Refills: 12 | Status: SHIPPED | OUTPATIENT
Start: 2017-04-13 | End: 2017-06-22

## 2017-04-13 ASSESSMENT — PAIN SCALES - GENERAL: PAINLEVEL: NO PAIN (0)

## 2017-04-13 NOTE — MR AVS SNAPSHOT
After Visit Summary   4/13/2017    Demario Abbasi    MRN: [id number]           Patient Information     Date Of Birth          1995        Visit Information        Provider Department      4/13/2017 9:10 AM Daisha Álvarez MD Peds Pulmonary        Today's Diagnoses     CF (cystic fibrosis) (H)          Care Instructions    1.  Continue VEST twice daily with inhaled medications as directed.  2.  For allergy control:  Continue daily Claritin, add consistent Nasonex to this.  Please use the Nasonex every day for the best control of your allergies.  If Claritin plus Nasonex is not enough, may add Astelin.  3.  Continue all other medications as directed.  4.  Follow-up in two months.  5.  I will call you later today to discuss your PFTs.    Mary Grace Álvarez MD MSCS  Pediatric Pulmonology        Follow-ups after your visit        Follow-up notes from your care team     Return in about 2 months (around 6/13/2017).      Your next 10 appointments already scheduled     Apr 13, 2017  9:10 AM CDT   RETURN CYSTIC FIBROSIS VISIT with Daisha Álvarez MD   Peds Pulmonary ([facility])    [facility]  [address] [facility]  [address] [place] [address] [id number] [phone]            Apr 13, 2017  9:30 AM CDT   Peds PFT with [facility] PFT LAB   Peds Pulmonary Function Lab ([facility])    [facility]  [address] [facility] [address]  [address] [place] [address] [id number] [phone]              Who to contact     Please call your clinic at [phone] to:    Ask questions about your health    Make or cancel appointments    Discuss your medicines    Learn about your test results    Speak to your doctor   If you have compliments or concerns about an experience at your clinic, or if you wish to file a complaint, please contact [facility] Patient Relations at [phone] or email us at [email or web address]         Additional Information About Your  "Visit        [employer] Information     "[employer] is an electronic gateway that provides easy, online access to your medical records. With "[employer] you can request a clinic appointment, read your test results, renew a prescription or communicate with your care team.     To sign up for "[employer] visit the website at [email or web address]   You will be asked to enter the access code listed below, as well as some personal information. Please follow the directions to create your username and password.     Your access code is: [email or web address]  Expires: 2017 11:24 AM     Your access code will  in 90 days. If you need help or a new code, please contact your [facility] or call [phone] for assistance.        Care EveryWhere ID     This is your Care EveryWhere ID. This could be used by other organizations to access your [facility] medical records  [id number]        Your Vitals Were     Pulse Temperature Respirations Height Pulse Oximetry BMI (Body Mass Index)    77 97.9  F (36.6  C) (Oral) 28 5' 9.17\" (175.7 cm) 98% 24.23 kg/m2       Blood Pressure from Last 3 Encounters:   17 109/64   17 112/67   17 110/65    Weight from Last 3 Encounters:   17 164 lb 14.5 oz (74.8 kg)   17 166 lb 14.2 oz (75.7 kg)   17 157 lb 13.6 oz (71.6 kg)              Today, you had the following     No orders found for display         Today's Medication Changes          These changes are accurate as of: 17  9:04 AM.  If you have any questions, ask your nurse or doctor.               These medicines have changed or have updated prescriptions.        Dose/Directions    cetirizine 10 MG tablet   Commonly known as:  zyrTEC   This may have changed:  when to take this   Used for:  Cystic fibrosis with pulmonary manifestations (H)        Dose:  10 mg   Take 1 tablet (10 mg) by mouth daily as needed   Quantity:  90 tablet   Refills:  3            Where to get your medicines    "   These medications were sent to [facility] - [place], [place] - [address]  [address] [place] [address]     Phone:  [phone]     azithromycin 500 MG tablet                Primary Care Provider Office Phone # Fax #    Susan Li [phone]       [facility]  [address] [facility] [address]        Thank you!     Thank you for choosing PEDS PULMONARY  for your care. Our goal is always to provide you with excellent care. Hearing back from our patients is one way we can continue to improve our services. Please take a few minutes to complete the written survey that you may receive in the mail after your visit with us. Thank you!             Your Updated Medication List - Protect others around you: Learn how to safely use, store and throw away your medicines at www.disposemymeds.org.          This list is accurate as of: 4/13/17  9:04 AM.  Always use your most recent med list.                   Brand Name Dispense Instructions for use    adapalene 0.1 % gel    DIFFERIN     Apply 1 applicator topically At Bedtime       albuterol (2.5 MG/3ML) 0.083% neb solution     270 mL    Take 1 vial (2.5 mg) by nebulization 3 times daily       * amylase-lipase-protease 62875 UNITS Cpep    CREON 36    270 capsule    Take 3 capsules (108,000units) by mouth with snacks (3meals and 3 snacks per day)       * amylase-lipase-protease 80718 UNITS Cpep    CREON 36    450 capsule    Take 5 capsules (180,000 Units) by mouth 3 times daily (with meals)       azelastine 0.1 % spray    ASTELIN    1 Bottle    Use 1-2 sprays both nostrils twice daily as needed       azithromycin 500 MG tablet    ZITHROMAX    12 tablet    Take one tablet on Monday, Wednesday and Friday morning       cetirizine 10 MG tablet    zyrTEC    90 tablet    Take 1 tablet (10 mg) by mouth daily as needed       citalopram 20 MG tablet    celeXA    30 tablet    Take 1 tablet (20 mg) by mouth daily       dornase alpha 1 MG/ML neb  solution    PULMOZYME    150 mL    Inhale 2.5 mg into the lungs 2 times daily       melatonin 5 MG tablet     60 tablet    Take 1-2 tablets (5-10 mg) by mouth nightly as needed       MINOCYCLINE HCL PO      Take 100 mg by mouth 2 times daily       * multivitamin CF formula Caps capsule     30 capsule    Take 1 capsule by mouth daily       * multivitamin CF formula softgel cap     60 capsule    Take 2 capsules by mouth daily       NASONEX 50 MCG/ACT spray   Generic drug:  mometasone      Spray 1 spray into both nostrils daily.       omeprazole 20 MG tablet     30 tablet    Take 1 tablet (20 mg) by mouth daily       sodium chloride inhalant 7 % Nebu neb solution     240 mL    Take 4 mLs by nebulization 2 times daily       tobramycin (PF) 300 MG/5ML neb solution    COBY    280 mL    Take 5 mLs (300 mg) by nebulization 2 times daily Cycle 28 days on/off       tretinoin 0.05 % cream    RETIN-A    30 g    Apply topically every morning as directed       * Notice:  This list has 4 medication(s) that are the same as other medications prescribed for you. Read the directions carefully, and ask your doctor or other care provider to review them with you.

## 2017-04-13 NOTE — LETTER
2017      RE: Demario Abbasi  [address]  [place] [address]       Pediatrics Pulmonary - Provider Note  Cystic Fibrosis - Return Visit    Patient: Demario Abbasi MRN# [id number]   Encounter: 2017  : 1995      Opening Statement  We had the pleasure of consulting on Demario at the [facility] at the [facility] for a one month follow-up visit.  He was accompanied by his parents to this visit.     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 He had a right upper lobectomy in . He was hospitalized at [facility] from  - 3/2/17 for a CF pulmonary exacerbation complicated by hemoptysis and influenza A infection.  Demario presented with dorita hemoptysis, fever, cough, fatigue and weight loss.  His initial FEV1 at admission was 35% predicted.  He was treated with IV meropenem and nafcillin through a PICC-line and discharged home on these IV antibiotics.  At discharge, he was much improved with an FEV1 back into the 54% range.  He was eating better with better energy.  He followed up last week, where his FEV1 had improved to 58% predicted.  We continued his oral dicloxacillin and bactrim, and continued four times daily VEST for two more weeks.  His FEV1 continued to slowly improve.  At his last visit one month ago we instructed him to continue his oral antibiotics for one more week and decrease his VEST back to twice daily and resume his regular routine.  His goal was to get back to 166 pounds.  He now returns for close one month follow-up.       Over the past month, Demario has done very well. He has been off his oral dicloxacillin and oral bactrim for the past three weeks (started 3/7/2017 - ended late March). He has decreased his VEST to twice daily, although there was a week when he did three times daily VEST.  He says  that he has minimal cough and has occasional sputum production, which is more back to his baseline.  He is not coughing at night and is sleeping well.  He has had no fevers and no further hemoptysis.   His energy is good.  He has been at home with his family in [place].  His family took a recent trip to [facility] and he had no issues.    Demario has had issues with allergies in the past.  He takes daily Claritin (recently changed from Allegra).  He has been prescribed Nasonex, but does not take this on a regular basis.  He also has been prescribed Astelin, which he says tastes bad and he does not do this.        From a GI standpoint Demario reports his appetite is back to normal and he is eating well. He continues on Creon 02418 capsule enzymes, taking 5 with meals and 3 with snacks. Demario has been good about taking his enzymes and they appear to be working well for him. Demario reports normal voids and well formed stools, 2-3 per day. There have been no reports of abdominal pain, nausea or vomiting. He continues on supplemental iron.     Demario finished school in [place], [place].  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 This will last until early August at which time he will return to college for two more years to obtain an advanced degree in his area of study.  He is looking forward to this. 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    Past Medical History:   Diagnosis Date     CF (cystic fibrosis) (H)      Impaired glucose tolerance      Pancreatic insufficiency      S/P lobectomy of lung 8/4/2015    Right upper lobectomy - 2009     Allergies  Allergies as of 04/13/2017 - Jose Luis as Reviewed 04/13/2017   Allergen Reaction Noted     Augmentin  10/25/2011     Current Outpatient Prescriptions   Medication Sig Dispense Refill     multivitamin CF formula (MVW COMPLETE FORMULATION ) softgel cap Take 2 capsules by mouth daily 60 capsule 3     albuterol (2.5 MG/3ML) 0.083% neb solution Take  1 vial (2.5 mg) by nebulization 3 times daily 270 mL 6     sodium chloride inhalant 7 % NEBU neb solution Take 4 mLs by nebulization 2 times daily 240 mL 11     melatonin 5 MG tablet Take 1-2 tablets (5-10 mg) by mouth nightly as needed 60 tablet 1     citalopram (CELEXA) 20 MG tablet Take 1 tablet (20 mg) by mouth daily 30 tablet 1     azelastine (ASTELIN) 0.1 % spray Use 1-2 sprays both nostrils twice daily as needed 1 Bottle 1     tretinoin (RETIN-A) 0.05 % cream Apply topically every morning as directed 30 g 1     azithromycin (ZITHROMAX) 500 MG tablet Take one tablet on Monday, Wednesday and Friday morning 12 tablet 12     multivitamin CF formula (AQUADEKS) CAPS capsule Take 1 capsule by mouth daily 30 capsule 11     MINOCYCLINE HCL PO Take 100 mg by mouth 2 times daily       adapalene (DIFFERIN) 0.1 % gel Apply 1 applicator topically At Bedtime       tobramycin, PF, (COBY) 300 MG/5ML nebulizer solution Take 5 mLs (300 mg) by nebulization 2 times daily Cycle 28 days on/off 280 mL 6     dornase alpha (PULMOZYME) 1 MG/ML nebulizer solution Inhale 2.5 mg into the lungs 2 times daily 150 mL 12     omeprazole 20 MG tablet Take 1 tablet (20 mg) by mouth daily 30 tablet 3     amylase-lipase-protease (CREON 36) 65571 UNITS CPEP Take 5 capsules (180,000 Units) by mouth 3 times daily (with meals) 450 capsule 11     cetirizine (ZYRTEC) 10 MG tablet Take 1 tablet (10 mg) by mouth daily as needed (Patient taking differently: Take 10 mg by mouth daily ) 90 tablet 3     amylase-lipase-protease (CREON 36) 00155 UNITS CPEP Take 3 capsules (108,000units) by mouth with snacks (3meals and 3 snacks per day) 270 capsule 11     mometasone (NASONEX) 50 MCG/ACT nasal spray Spray 1 spray into both nostrils daily.         PMH    Past medical history reviewed with patient/parent today, no changes.    Immunization History   Administered Date(s) Administered     Influenza (IIV3) 09/01/2012     Influenza Vaccine IM 3yrs+ 4 Valent IIV4  "11/03/2016       [facility]    Past surgical history reviewed with patient/parent today, no changes.    FH    Family history reviewed with patient/parent today, no changes.    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  History of using chewing tobacco.    ROS    A comprehensive review of systems was performed and is negative except as noted in the HPI.    Last CF Annual Studies date: November 2016    Objective:     Physical Exam    Vital Signs:  /64 (BP Location: Right arm, Patient Position: Chair, Cuff Size: Adult Large)  Pulse 77  Temp 97.9  F (36.6  C) (Oral)  Resp 28  Ht 5' 9.17\" (175.7 cm)  Wt 164 lb 14.5 oz (74.8 kg)  SpO2 98%  BMI 24.23 kg/m2    Wt Readings from Last 4 Encounters:   04/13/17 164 lb 14.5 oz (74.8 kg)   03/16/17 166 lb 14.2 oz (75.7 kg)   03/09/17 157 lb 13.6 oz (71.6 kg)   03/02/17 155 lb 6.8 oz (70.5 kg)       Constitutional: No distress, comfortable, pleasant. No cough heard.   Vital signs: Reviewed and normal.  Eyes: Anicteric, normal extra-ocular movements, Pupils are equal and reactive to light  Ears, Nose and Throat: Tympanic membranes clear, nose clear and free of lesions, throat clear.  Neck: Supple with full range of motion, no thyromegaly.   Cardiovascular: Regular rate and rhythm, no murmurs, rubs or gallops, peripheral pulses full and symmetric  Chest: Symmetrical, no retractions.  Respiratory: Clear to auscultation, no wheezes or crackles, normal breath sounds  Gastrointestinal: Positive bowel sounds, nontender, no hepatosplenomegaly, no masses  Musculoskeletal: Full range of motion, no edema.  Skin: No concerning lesions, no jaundice.  Neurological: Normal muscle tone and strength.    Spirometry was done 4/13/2017    Most Recent [facility] Pulmonary Function " Testing    FVC-Pred   Date Value Ref Range Status   04/13/2017 5.54 L      FVC-Pre   Date Value Ref Range Status   04/13/2017 3.39 L      FVC-%Pred-Pre   Date Value Ref Range Status   04/13/2017 61 %      FEV1-Pre   Date Value Ref Range Status   04/13/2017 2.79 L      FEV1-%Pred-Pre   Date Value Ref Range Status   04/13/2017 59 %      FEV1FVC-Pred   Date Value Ref Range Status   04/13/2017 86 %      FEV1FVC-Pre   Date Value Ref Range Status   04/13/2017 82 %      No results found for: 20029  FEFMax-Pred   Date Value Ref Range Status   04/13/2017 10.15 L/sec      FEFMax-Pre   Date Value Ref Range Status   04/13/2017 8.18 L/sec      FEFMax-%Pred-Pre   Date Value Ref Range Status   04/13/2017 80 %      ExpTime-Pre   Date Value Ref Range Status   04/13/2017 7.36 sec      FIFMax-Pre   Date Value Ref Range Status   04/13/2017 4.10 L/sec      FEV1FEV6-Pred   Date Value Ref Range Status   04/13/2017 85 %      FEV1FEV6-Pre   Date Value Ref Range Status   04/13/2017 83 %      No results found for: 20055    Spirometry Interpretation:  Good effort and acceptable for interpretation.  Evidence of restrictive lung disease given a decreased FVC.  FEV1/FVC ratio is low normal.  Mild obstruction still present.  Values relatively unchanged from one week ago.    Culture (3/2017):  Light growth Normal elmer; Light growth Achromobacter xylosoxidans/denitrificans     Culture (4/2017):  Pending    Laboratory or other tests ordered were reviewed.    Assessment       Demario is a 22 year old young man with CF and pancreatic insufficiency s/p right upper lobectomy in 2009. His genotype contains a STOP mutation, so he is not a candidate for the CF drug, Orkambi.  He was hospitalized for a severe CF pulmonary exacerbation complicated by hemoptysis and influenza A in late February - early March.  He grows two species of Achromobacter and MSSA and was treated with IV Meropenem and IV Nafcillin.  His FEV1 improved from 35% to 54% upon discharge and  improved to 60% predicted one month ago.  Today his FEV1 was 59% predicted, and relatively unchanged.  His weight continues to trend up and he feels back to himself.  He has completely recovered from this illness and his lung function is back to above his previous baseline.  Discussed ways to approach allergy control this spring.       Plan:       1.  Continue VEST twice daily with inhaled medications as directed.  2.  For allergy control:  Continue daily Claritin, add consistent Nasonex to this.  Please use the Nasonex every day for the best control of your allergies.  If Claritin plus Nasonex is not enough, may add Astelin.  3.  Continue all other medications as directed.  4.  Follow-up in two months.  Will discuss transition to the Adult CF Clinic again at that time.  5.  I will call you later today to discuss your PFTs.    Mary Grace Álvarez MD MSCS  Pediatric Pulmonology      CC  YEN ROY    Copy to patient  DEAN DELACRUZ,WOLFGANG  [facility] [address]  [place] [address]

## 2017-04-13 NOTE — LETTER
2017      RE: Demario Abbasi  [address]  [place] [address]    MRN: [id number]  : 1995      Dear Parent of Demario,    I am enclosing the results of Demario's lab testing.  He has grown the same bacteria as he has grown previously, and does not need any new treatment.  Please call with any questions or concerns.      Resulted Orders   Cystic Fibrosis Culture Aerob Bacterial   Result Value Ref Range    Specimen Description Sputum     Culture Micro (A)      Moderate growth Normal elmer  Light growth Staphylococcus aureus This isolate is presumed to be clindamycin   resistant based on detection of inducible clindamycin resistance. Erythromycin   and clindamycin are resistant, therefore, they are not recommended for use.  Moderate growth Achromobacter xylosoxidans/denitrificans  Moderate growth Strain 2 Achromobacter xylosoxidans/denitrificans      Micro Report Status FINAL 2017     Organism:       Light growth Staphylococcus aureus This isolate is presumed to be clindamycin   resistant based on detection of inducible clindamycin resistance. Erythromycin   and clindamycin are resistant, therefore, they are not recommended for use.      Organism:       Moderate growth Strain 2 Achromobacter xylosoxidans/denitrificans    Organism:       Moderate growth Achromobacter xylosoxidans/denitrificans         Please feel free to contact me if you have any further questions.    Sincerely,    Daisha Álvarez

## 2017-04-13 NOTE — PATIENT INSTRUCTIONS
1.  Continue VEST twice daily with inhaled medications as directed.  2.  For allergy control:  Continue daily Claritin, add consistent Nasonex to this.  Please use the Nasonex every day for the best control of your allergies.  If Claritin plus Nasonex is not enough, may add Astelin.  3.  Continue all other medications as directed.  4.  Follow-up in two months.  5.  I will call you later today to discuss your PFTs.    Mary Grace Álvarez MD MSCS  Pediatric Pulmonology

## 2017-04-13 NOTE — NURSING NOTE
"Chief Complaint   Patient presents with     RECHECK     cystic fibrosis        Initial /64 (BP Location: Right arm, Patient Position: Chair, Cuff Size: Adult Large)  Pulse 77  Temp 97.9  F (36.6  C) (Oral)  Resp 28  Ht 5' 9.17\" (175.7 cm)  Wt 164 lb 14.5 oz (74.8 kg)  SpO2 98%  BMI 24.23 kg/m2 Estimated body mass index is 24.23 kg/(m^2) as calculated from the following:    Height as of this encounter: 5' 9.17\" (175.7 cm).    Weight as of this encounter: 164 lb 14.5 oz (74.8 kg).  Medication Reconciliation: complete    "

## 2017-04-13 NOTE — PROGRESS NOTES
Pediatrics Pulmonary - Provider Note  Cystic Fibrosis - Return Visit    Patient: Demario Abbasi MRN# [id number]   Encounter: 2017  : 1995      Opening Statement  We had the pleasure of consulting on Demario at the [place] [facility] at the [facility] for a one month follow-up visit.  He was accompanied by his parents to this visit.     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 He had a right upper lobectomy in . He was hospitalized at [facility] from  - 3/2/17 for a CF pulmonary exacerbation complicated by hemoptysis and influenza A infection.  Demario presented with dorita hemoptysis, fever, cough, fatigue and weight loss.  His initial FEV1 at admission was 35% predicted.  He was treated with IV meropenem and nafcillin through a PICC-line and discharged home on these IV antibiotics.  At discharge, he was much improved with an FEV1 back into the 54% range.  He was eating better with better energy.  He followed up last week, where his FEV1 had improved to 58% predicted.  We continued his oral dicloxacillin and bactrim, and continued four times daily VEST for two more weeks.  His FEV1 continued to slowly improve.  At his last visit one month ago we instructed him to continue his oral antibiotics for one more week and decrease his VEST back to twice daily and resume his regular routine.  His goal was to get back to 166 pounds.  He now returns for close one month follow-up.       Over the past month, Demario has done very well. He has been off his oral dicloxacillin and oral bactrim for the past three weeks (started 3/7/2017 - ended late March). He has decreased his VEST to twice daily, although there was a week when he did three times daily VEST.  He says that he has minimal cough and has occasional sputum production, which is more back to  his baseline.  He is not coughing at night and is sleeping well.  He has had no fevers and no further hemoptysis.   His energy is good.  He has been at home with his family in [place].  His family took a recent trip to [facility] and he had no issues.    Demario has had issues with allergies in the past.  He takes daily Claritin (recently changed from Allegra).  He has been prescribed Nasonex, but does not take this on a regular basis.  He also has been prescribed Astelin, which he says tastes bad and he does not do this.        From a GI standpoint Demario reports his appetite is back to normal and he is eating well. He continues on Creon 88575 capsule enzymes, taking 5 with meals and 3 with snacks. Demario has been good about taking his enzymes and they appear to be working well for him. Demario reports normal voids and well formed stools, 2-3 per day. There have been no reports of abdominal pain, nausea or vomiting. He continues on supplemental iron.     Demario finished school in [place], [place].  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 This will last until early August at which time he will return to college for two more years to obtain an advanced degree in his area of study.  He is looking forward to this. 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    Past Medical History:   Diagnosis Date     CF (cystic fibrosis) (H)      Impaired glucose tolerance      Pancreatic insufficiency      S/P lobectomy of lung 8/4/2015    Right upper lobectomy - 2009     Allergies  Allergies as of 04/13/2017 - Jose Luis as Reviewed 04/13/2017   Allergen Reaction Noted     Augmentin  10/25/2011     Current Outpatient Prescriptions   Medication Sig Dispense Refill     multivitamin CF formula (MVW COMPLETE FORMULATION ) softgel cap Take 2 capsules by mouth daily 60 capsule 3     albuterol (2.5 MG/3ML) 0.083% neb solution Take 1 vial (2.5 mg) by nebulization 3 times daily 270 mL 6     sodium chloride inhalant 7  % NEBU neb solution Take 4 mLs by nebulization 2 times daily 240 mL 11     melatonin 5 MG tablet Take 1-2 tablets (5-10 mg) by mouth nightly as needed 60 tablet 1     citalopram (CELEXA) 20 MG tablet Take 1 tablet (20 mg) by mouth daily 30 tablet 1     azelastine (ASTELIN) 0.1 % spray Use 1-2 sprays both nostrils twice daily as needed 1 Bottle 1     tretinoin (RETIN-A) 0.05 % cream Apply topically every morning as directed 30 g 1     azithromycin (ZITHROMAX) 500 MG tablet Take one tablet on Monday, Wednesday and Friday morning 12 tablet 12     multivitamin CF formula (AQUADEKS) CAPS capsule Take 1 capsule by mouth daily 30 capsule 11     MINOCYCLINE HCL PO Take 100 mg by mouth 2 times daily       adapalene (DIFFERIN) 0.1 % gel Apply 1 applicator topically At Bedtime       tobramycin, PF, (COBY) 300 MG/5ML nebulizer solution Take 5 mLs (300 mg) by nebulization 2 times daily Cycle 28 days on/off 280 mL 6     dornase alpha (PULMOZYME) 1 MG/ML nebulizer solution Inhale 2.5 mg into the lungs 2 times daily 150 mL 12     omeprazole 20 MG tablet Take 1 tablet (20 mg) by mouth daily 30 tablet 3     amylase-lipase-protease (CREON 36) 90890 UNITS CPEP Take 5 capsules (180,000 Units) by mouth 3 times daily (with meals) 450 capsule 11     cetirizine (ZYRTEC) 10 MG tablet Take 1 tablet (10 mg) by mouth daily as needed (Patient taking differently: Take 10 mg by mouth daily ) 90 tablet 3     amylase-lipase-protease (CREON 36) 27136 UNITS CPEP Take 3 capsules (108,000units) by mouth with snacks (3meals and 3 snacks per day) 270 capsule 11     mometasone (NASONEX) 50 MCG/ACT nasal spray Spray 1 spray into both nostrils daily.         PMH    Past medical history reviewed with patient/parent today, no changes.    Immunization History   Administered Date(s) Administered     Influenza (IIV3) 09/01/2012     Influenza Vaccine IM 3yrs+ 4 Valent IIV4 11/03/2016       PSH    Past surgical history reviewed with patient/parent today, no  "changes.    FH    Family history reviewed with patient/parent today, no changes.    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  History of using chewing tobacco.    ROS    A comprehensive review of systems was performed and is negative except as noted in the HPI.    Last CF Annual Studies date: November 2016    Objective:     Physical Exam    Vital Signs:  /64 (BP Location: Right arm, Patient Position: Chair, Cuff Size: Adult Large)  Pulse 77  Temp 97.9  F (36.6  C) (Oral)  Resp 28  Ht 5' 9.17\" (175.7 cm)  Wt 164 lb 14.5 oz (74.8 kg)  SpO2 98%  BMI 24.23 kg/m2    Wt Readings from Last 4 Encounters:   04/13/17 164 lb 14.5 oz (74.8 kg)   03/16/17 166 lb 14.2 oz (75.7 kg)   03/09/17 157 lb 13.6 oz (71.6 kg)   03/02/17 155 lb 6.8 oz (70.5 kg)       Constitutional: No distress, comfortable, pleasant. No cough heard.   Vital signs: Reviewed and normal.  Eyes: Anicteric, normal extra-ocular movements, Pupils are equal and reactive to light  Ears, Nose and Throat: Tympanic membranes clear, nose clear and free of lesions, throat clear.  Neck: Supple with full range of motion, no thyromegaly.   Cardiovascular: Regular rate and rhythm, no murmurs, rubs or gallops, peripheral pulses full and symmetric  Chest: Symmetrical, no retractions.  Respiratory: Clear to auscultation, no wheezes or crackles, normal breath sounds  Gastrointestinal: Positive bowel sounds, nontender, no hepatosplenomegaly, no masses  Musculoskeletal: Full range of motion, no edema.  Skin: No concerning lesions, no jaundice.  Neurological: Normal muscle tone and strength.    Spirometry was done 4/13/2017    Most Recent [facility] Pulmonary Function Testing    FVC-Pred   Date Value Ref Range Status   04/13/2017 5.54 L      FVC-Pre   Date Value " Ref Range Status   04/13/2017 3.39 L      FVC-%Pred-Pre   Date Value Ref Range Status   04/13/2017 61 %      FEV1-Pre   Date Value Ref Range Status   04/13/2017 2.79 L      FEV1-%Pred-Pre   Date Value Ref Range Status   04/13/2017 59 %      FEV1FVC-Pred   Date Value Ref Range Status   04/13/2017 86 %      FEV1FVC-Pre   Date Value Ref Range Status   04/13/2017 82 %      No results found for: 20029  FEFMax-Pred   Date Value Ref Range Status   04/13/2017 10.15 L/sec      FEFMax-Pre   Date Value Ref Range Status   04/13/2017 8.18 L/sec      FEFMax-%Pred-Pre   Date Value Ref Range Status   04/13/2017 80 %      ExpTime-Pre   Date Value Ref Range Status   04/13/2017 7.36 sec      FIFMax-Pre   Date Value Ref Range Status   04/13/2017 4.10 L/sec      FEV1FEV6-Pred   Date Value Ref Range Status   04/13/2017 85 %      FEV1FEV6-Pre   Date Value Ref Range Status   04/13/2017 83 %      No results found for: 20055    Spirometry Interpretation:  Good effort and acceptable for interpretation.  Evidence of restrictive lung disease given a decreased FVC.  FEV1/FVC ratio is low normal.  Mild obstruction still present.  Values relatively unchanged from one week ago.    Culture (3/2017):  Light growth Normal elmer; Light growth Achromobacter xylosoxidans/denitrificans     Culture (4/2017):  Pending    Laboratory or other tests ordered were reviewed.    Assessment       Demario is a 22 year old young man with CF and pancreatic insufficiency s/p right upper lobectomy in 2009. His genotype contains a STOP mutation, so he is not a candidate for the CF drug, Orkambi.  He was hospitalized for a severe CF pulmonary exacerbation complicated by hemoptysis and influenza A in late February - early March.  He grows two species of Achromobacter and MSSA and was treated with IV Meropenem and IV Nafcillin.  His FEV1 improved from 35% to 54% upon discharge and improved to 60% predicted one month ago.  Today his FEV1 was 59% predicted, and relatively  unchanged.  His weight continues to trend up and he feels back to himself.  He has completely recovered from this illness and his lung function is back to above his previous baseline.  Discussed ways to approach allergy control this spring.       Plan:       1.  Continue VEST twice daily with inhaled medications as directed.  2.  For allergy control:  Continue daily Claritin, add consistent Nasonex to this.  Please use the Nasonex every day for the best control of your allergies.  If Claritin plus Nasonex is not enough, may add Astelin.  3.  Continue all other medications as directed.  4.  Follow-up in two months.  Will discuss transition to the Adult CF Clinic again at that time.  5.  I will call you later today to discuss your PFTs.    Mary Grace Álvarez MD MSCS  Pediatric Pulmonology      CC  YEN ROY    Copy to patient  DEAN DELACRUZ SCOTT  [address]  [place] [address]

## 2017-04-16 LAB
EXPTIME-PRE: 7.36 SEC
FEF2575-%PRED-PRE: 54 %
FEF2575-PRE: 2.79 L/SEC
FEF2575-PRED: 5.09 L/SEC
FEFMAX-%PRED-PRE: 80 %
FEFMAX-PRE: 8.18 L/SEC
FEFMAX-PRED: 10.15 L/SEC
FEV1-%PRED-PRE: 59 %
FEV1-PRE: 2.79 L
FEV1FEV6-PRE: 83 %
FEV1FEV6-PRED: 85 %
FEV1FVC-PRE: 82 %
FEV1FVC-PRED: 86 %
FIFMAX-PRE: 4.1 L/SEC
FVC-%PRED-PRE: 61 %
FVC-PRE: 3.39 L
FVC-PRED: 5.54 L

## 2017-05-04 DIAGNOSIS — E84.9 CYSTIC FIBROSIS EXACERBATION (H): ICD-10-CM

## 2017-05-16 DIAGNOSIS — E84.0 CYSTIC FIBROSIS WITH PULMONARY MANIFESTATIONS (H): ICD-10-CM

## 2017-05-16 DIAGNOSIS — E84.9 CF (CYSTIC FIBROSIS) (H): ICD-10-CM

## 2017-05-16 RX ORDER — FEXOFENADINE HCL 180 MG/1
180 TABLET ORAL DAILY
Qty: 30 TABLET | Refills: 3 | Status: SHIPPED | OUTPATIENT
Start: 2017-05-16 | End: 2017-11-06

## 2017-05-16 RX ORDER — MECOBALAMIN 5000 MCG
15 TABLET,DISINTEGRATING ORAL DAILY
Qty: 30 CAPSULE | Refills: 3 | Status: SHIPPED | OUTPATIENT
Start: 2017-05-16 | End: 2017-11-06

## 2017-05-17 ENCOUNTER — CARE COORDINATION (OUTPATIENT)
Dept: PULMONOLOGY | Facility: CLINIC | Age: 22
End: 2017-05-17

## 2017-05-17 NOTE — PROGRESS NOTES
PA submitted via Cover [employer] for coverage of Lansoprazole on 5/17/2017.    Key: [id number] - Rx #: [id number].    PA Denied. Appeal letter approved.     Olga Glez RN  Pediatric Pulmonary Care Coordinator  Phone: [phone]

## 2017-05-18 ENCOUNTER — CARE COORDINATION (OUTPATIENT)
Dept: PULMONOLOGY | Facility: CLINIC | Age: 22
End: 2017-05-18

## 2017-05-18 NOTE — PROGRESS NOTES
Lansoprazole PA denied. We will try to appeal this decision as Demario has been on lansoprazole for some time now.    May Maldonado RN, CPTC  P Pediatric Cystic Fibrosis/Pulmonary Care Coordinator   CF RN phone: [phone]

## 2017-05-19 ENCOUNTER — TELEPHONE (OUTPATIENT)
Dept: PULMONOLOGY | Facility: CLINIC | Age: 22
End: 2017-05-19

## 2017-05-19 NOTE — TELEPHONE ENCOUNTER
Medication Appeal Initiation    We have initiated an appeal for the requested medication:  Medication: Lansoprazole APPEAL PENDING  Appeal Start Date:  5/19/2017  Insurance Company:  [place] Medicaid  Comments:   Sent letter of medical necessity along with original PA form and denial letter to fax #[phone]. To follow up call [phone]

## 2017-05-22 NOTE — TELEPHONE ENCOUNTER
MEDICATION APPEAL APPROVED    Medication: Lansoprazole APPEAL APPROVED  Authorization Effective Date:  05/19/2017  Authorization Expiration Date:  05/31/2017  Approved Dose/Quantity: 30  Reference #:     Insurance Company:    Expected CoPay:       CoPay Card Available:      Foundation Assistance Needed:    Which Pharmacy is filling the prescription (Not needed for infusion/clinic administered):  Pharmacy is calling the patient to .

## 2017-06-22 ENCOUNTER — OFFICE VISIT (OUTPATIENT)
Dept: PULMONOLOGY | Facility: CLINIC | Age: 22
End: 2017-06-22
Attending: FAMILY MEDICINE
Payer: COMMERCIAL

## 2017-06-22 ENCOUNTER — DOCUMENTATION ONLY (OUTPATIENT)
Dept: PULMONOLOGY | Facility: CLINIC | Age: 22
End: 2017-06-22

## 2017-06-22 VITALS
OXYGEN SATURATION: 99 % | WEIGHT: 161.6 LBS | DIASTOLIC BLOOD PRESSURE: 67 MMHG | HEIGHT: 70 IN | SYSTOLIC BLOOD PRESSURE: 116 MMHG | BODY MASS INDEX: 23.13 KG/M2 | HEART RATE: 66 BPM

## 2017-06-22 DIAGNOSIS — E84.9 CF (CYSTIC FIBROSIS) (H): Primary | ICD-10-CM

## 2017-06-22 DIAGNOSIS — E84.9 CYSTIC FIBROSIS (H): Primary | ICD-10-CM

## 2017-06-22 DIAGNOSIS — K86.81 EXOCRINE PANCREATIC INSUFFICIENCY: ICD-10-CM

## 2017-06-22 DIAGNOSIS — E84.0 CYSTIC FIBROSIS WITH PULMONARY EXACERBATION (H): ICD-10-CM

## 2017-06-22 PROCEDURE — 94375 RESPIRATORY FLOW VOLUME LOOP: CPT | Mod: ZF

## 2017-06-22 PROCEDURE — 99212 OFFICE O/P EST SF 10 MIN: CPT | Mod: ZF

## 2017-06-22 RX ORDER — AZITHROMYCIN 500 MG/1
TABLET, FILM COATED ORAL
Qty: 12 TABLET | Refills: 12 | Status: SHIPPED | OUTPATIENT
Start: 2017-06-22 | End: 2018-04-26

## 2017-06-22 RX ORDER — MOMETASONE FUROATE MONOHYDRATE 50 UG/1
1 SPRAY, METERED NASAL DAILY
Qty: 1 G | Refills: 3 | Status: SHIPPED | OUTPATIENT
Start: 2017-06-22 | End: 2017-06-22

## 2017-06-22 RX ORDER — SULFAMETHOXAZOLE/TRIMETHOPRIM 800-160 MG
1 TABLET ORAL 2 TIMES DAILY
Qty: 28 TABLET | Refills: 0 | Status: SHIPPED | OUTPATIENT
Start: 2017-06-22 | End: 2017-07-06

## 2017-06-22 RX ORDER — FLUTICASONE PROPIONATE 50 MCG
1 SPRAY, SUSPENSION (ML) NASAL DAILY
Qty: 1 BOTTLE | Refills: 3 | Status: SHIPPED | OUTPATIENT
Start: 2017-06-22 | End: 2018-05-25

## 2017-06-22 ASSESSMENT — PAIN SCALES - GENERAL: PAINLEVEL: NO PAIN (0)

## 2017-06-22 NOTE — PROGRESS NOTES
" SOCIAL WORK PROGRESS NOTE      DATA:     Demario is a 22 year old male with Cystic Fibrosis. Demario arrived to [facility] pulmonary clinic for a scheduled f/u appointment with Dr Álvarez. Demario was accompanied by his parents.     INTERVENTION:      1. Provided ongoing assessment of patient and family's level of coping.   2. Provided psychosocial supportive counseling and crisis intervention as needed.   3. Facilitate service linkage with hospital and community resources as needed.   4. Collaborate with healthcare team and professional in community to meet patient and family's needs as needed.     ASSESSMENT:     Writer met with Demario and family this AM to check-in. Demario will be going to [employer] this fall. He will be there for approximately two years. He will be living in a house with four other students and a dog. Demario is meeting with student services tomorrow to discuss academic accommodations. 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 Demario will move down there in the middle of August.     Within the past few weeks, Demario has coughed up blood. He did not call the CF center to report this. When writer asked Demario what's the worse thing that could happen if you called the CF Office he stated, \"I could get in trouble\". Writer provided education that he would never get in trouble for coughing up blood and that it is extremely important that he inform his medical team when things like this happen. Demario also stated that he doesn't want to miss school or work and he is worried that if he calls, [place]. Demario understood that if he continues to ignore these symptoms, he could be in worse shape and require a hospitalization versus being proactive and possibly only needing oral antibiotics and increased airway clearance at home. Demario stated that he would try to call in the future and be more honest about his symptoms.     No " other questions at this time.     PLAN:     Writer provided Demario with business card to provide to school. Will check in when they return in 1-month to discuss transition.      JONELLE Ford [facility]  Pediatric Cystic Fibrosis   Pager: [phone]  Phone: [phone]  Email: [email or web address]

## 2017-06-22 NOTE — MR AVS SNAPSHOT
After Visit Summary   6/22/2017    Demario Abbasi    MRN: [id number]           Patient Information     Date Of Birth          1995        Visit Information        Provider Department      6/22/2017 8:40 AM Daisha Álvarez MD Peds Pulmonary        Today's Diagnoses     CF (cystic fibrosis) (H)    -  1    Cystic fibrosis with pulmonary exacerbation (H)        Exocrine pancreatic insufficiency          Care Instructions    1.  Please start Bactrim one pill twice daily for 2 weeks.  2.  Continue your VEST twice daily - increase to three if you have time during the day.  3.  Please take your Flonase EVERY DAY. ( refilled your Nasonex - this is the same medication)  4.  I also refilled your Azithromycin.  5.  Continue your Allegra daily as well.  6.  Please call us if you cough up blood - even one time.  7.  Follow-up in one month.  We would consider admitting you for IV antibiotics if your FEV1 has not come back up. Please schedule for 1PM appointment on 7/20, PFT before.  8.  Call us with any questions or concerns at any time.    Mary Grace Álvarez MD MSCS  Pediatric Pulmonology            Follow-ups after your visit        Follow-up notes from your care team     Return in about 1 month (around 7/22/2017).      Who to contact     Please call your clinic at [phone] to:    Ask questions about your health    Make or cancel appointments    Discuss your medicines    Learn about your test results    Speak to your doctor   If you have compliments or concerns about an experience at your clinic, or if you wish to file a complaint, please contact [facility] Physicians Patient Relations at [phone] or email us at [email or web address]         Additional Information About Your Visit        MyChart Information     [employer] is an electronic gateway that provides easy, online access to your medical records. With [employer], you can request a clinic appointment, read your test  "results, renew a prescription or communicate with your care team.     To sign up for saambaahart visit the website at [email or web address]   You will be asked to enter the access code listed below, as well as some personal information. Please follow the directions to create your username and password.     Your access code is: [email or web address]  Expires: 2017  9:29 AM     Your access code will  in 90 days. If you need help or a new code, please contact your [facility] or call [phone] for assistance.        Care EveryWhere ID     This is your Care EveryWhere ID. This could be used by other organizations to access your [facility] medical records  [id number]        Your Vitals Were     Pulse Height Pulse Oximetry BMI (Body Mass Index)          66 5' 10.08\" (178 cm) 99% 23.13 kg/m2         Blood Pressure from Last 3 Encounters:   17 116/67   17 109/64   17 112/67    Weight from Last 3 Encounters:   17 161 lb 9.6 oz (73.3 kg)   17 164 lb 14.5 oz (74.8 kg)   17 166 lb 14.2 oz (75.7 kg)              Today, you had the following     No orders found for display         Today's Medication Changes          These changes are accurate as of: 17  9:29 AM.  If you have any questions, ask your nurse or doctor.               Start taking these medicines.        Dose/Directions    sulfamethoxazole-trimethoprim 800-160 MG per tablet   Commonly known as:  BACTRIM DS   Used for:  CF (cystic fibrosis) (H), Cystic fibrosis with pulmonary exacerbation (H)   Started by:  Daisha Álvarez MD        Dose:  1 tablet   Take 1 tablet by mouth 2 times daily for 14 days   Quantity:  28 tablet   Refills:  0            Where to get your medicines      These medications were sent to [facility] - [facility] - [address]  [address] [address]     Phone:  [phone]     azithromycin 500 MG tablet    mometasone 50 MCG/ACT spray    " sulfamethoxazole-trimethoprim 800-160 MG per tablet                Primary Care Provider Office Phone # Fax #    Susan Li [phone]       [facility]  [address] [place] [id number]        Equal Access to Services     MCKENNA LINDSAY : Cristal patrick marcial Soquetaali, wadwightda luqadaha, qaybta kaalmada ehsan, denice prashanthin hayaachloé stephens ernestonino viera. So [facility] [phone].    ATENCIÓN: Si habla español, tiene a davidson disposición servicios gratuitos de asistencia lingüística. Llame al [phone].    We comply with applicable federal civil rights laws and [place] laws. We do not discriminate on the basis of race, color, national origin, age, disability sex, sexual orientation or gender identity.            Thank you!     Thank you for choosing PEDS PULMONARY  for your care. Our goal is always to provide you with excellent care. Hearing back from our patients is one way we can continue to improve our services. Please take a few minutes to complete the written survey that you may receive in the mail after your visit with us. Thank you!             Your Updated Medication List - Protect others around you: Learn how to safely use, store and throw away your medicines at www.disposemymeds.org.          This list is accurate as of: 6/22/17  9:29 AM.  Always use your most recent med list.                   Brand Name Dispense Instructions for use Diagnosis    adapalene 0.1 % gel    DIFFERIN     Apply 1 applicator topically At Bedtime        albuterol (2.5 MG/3ML) 0.083% neb solution     270 mL    Take 1 vial (2.5 mg) by nebulization 3 times daily    Cystic fibrosis with pulmonary manifestations (H)       amylase-lipase-protease 44156 UNITS Cpep    CREON 36    450 capsule    Take 5 capsules (180,000 Units) by mouth 3 times daily (with meals)    Cystic fibrosis exacerbation (H)       azelastine 0.1 % spray    ASTELIN    1 Bottle    Use 1-2 sprays both nostrils twice daily as needed    Cystic  fibrosis with pulmonary exacerbation (H)       azithromycin 500 MG tablet    ZITHROMAX    12 tablet    Take one tablet on Monday, Wednesday and Friday morning    CF (cystic fibrosis) (H)       citalopram 20 MG tablet    celeXA    30 tablet    Take 1 tablet (20 mg) by mouth daily    Depression       dornase alpha 1 MG/ML neb solution    PULMOZYME    150 mL    Inhale 2.5 mg into the lungs 2 times daily    Cystic fibrosis with pulmonary manifestations (H)       fexofenadine 180 MG tablet    ALLEGRA    30 tablet    Take 1 tablet (180 mg) by mouth daily    CF (cystic fibrosis) (H)       LANsoprazole 15 MG CR capsule    PREVACID    30 capsule    Take 1 capsule (15 mg) by mouth daily Take 30-60 minutes before a meal.    CF (cystic fibrosis) (H)       melatonin 5 MG tablet     60 tablet    Take 1-2 tablets (5-10 mg) by mouth nightly as needed    Cystic fibrosis with pulmonary exacerbation (H)       MINOCYCLINE HCL PO      Take 100 mg by mouth 2 times daily        mometasone 50 MCG/ACT spray    NASONEX    1 g    Spray 1 spray into both nostrils daily    CF (cystic fibrosis) (H)       * multivitamin CF formula Caps capsule     30 capsule    Take 1 capsule by mouth daily    CF (cystic fibrosis) (H)       * multivitamin CF formula softgel cap     60 capsule    Take 2 capsules by mouth daily    Cystic fibrosis (H)       sodium chloride inhalant 7 % Nebu neb solution     240 mL    Take 4 mLs by nebulization 2 times daily    Cystic fibrosis with pulmonary exacerbation (H)       sulfamethoxazole-trimethoprim 800-160 MG per tablet    BACTRIM DS    28 tablet    Take 1 tablet by mouth 2 times daily for 14 days    CF (cystic fibrosis) (H), Cystic fibrosis with pulmonary exacerbation (H)       tobramycin (PF) 300 MG/5ML neb solution    COBY    280 mL    Take 5 mLs (300 mg) by nebulization 2 times daily Cycle 28 days on/off    Cystic fibrosis with pulmonary manifestations (H)       tretinoin 0.05 % cream    RETIN-A    30 g    Apply  topically every morning as directed    Acne       * Notice:  This list has 2 medication(s) that are the same as other medications prescribed for you. Read the directions carefully, and ask your doctor or other care provider to review them with you.

## 2017-06-22 NOTE — PATIENT INSTRUCTIONS
1.  Please start Bactrim one pill twice daily for 2 weeks.  2.  Continue your VEST twice daily - increase to three if you have time during the day.  3.  Please take your Flonase EVERY DAY. ( refilled your Nasonex - this is the same medication)  4.  I also refilled your Azithromycin.  5.  Continue your Allegra daily as well.  6.  Please call us if you cough up blood - even one time.  7.  Follow-up in one month.  We would consider admitting you for IV antibiotics if your FEV1 has not come back up. Please schedule for 1PM appointment on 7/20, PFT before.  8.  Call us with any questions or concerns at any time.    Mary Grace Álvarez MD MSCS  Pediatric Pulmonology

## 2017-06-22 NOTE — NURSING NOTE
"Chief Complaint   Patient presents with     RECHECK     PFT       Initial /67 (BP Location: Right arm, Patient Position: Sitting, Cuff Size: Adult Regular)  Pulse 66  Ht 5' 10.08\" (178 cm)  Wt 161 lb 9.6 oz (73.3 kg)  SpO2 99%  BMI 23.13 kg/m2 Estimated body mass index is 23.13 kg/(m^2) as calculated from the following:    Height as of this encounter: 5' 10.08\" (178 cm).    Weight as of this encounter: 161 lb 9.6 oz (73.3 kg).  Medication Reconciliation: complete    "

## 2017-06-26 LAB
EXPTIME-PRE: 7.94 SEC
FEF2575-%PRED-PRE: 44 %
FEF2575-PRE: 2.29 L/SEC
FEF2575-PRED: 5.09 L/SEC
FEFMAX-%PRED-PRE: 73 %
FEFMAX-PRE: 7.44 L/SEC
FEFMAX-PRED: 10.15 L/SEC
FEV1-%PRED-PRE: 57 %
FEV1-PRE: 2.7 L
FEV1FEV6-PRE: 79 %
FEV1FEV6-PRED: 85 %
FEV1FVC-PRE: 77 %
FEV1FVC-PRED: 86 %
FIFMAX-PRE: 3.73 L/SEC
FVC-%PRED-PRE: 62 %
FVC-PRE: 3.49 L
FVC-PRED: 5.55 L

## 2017-07-17 DIAGNOSIS — E84.9 CF (CYSTIC FIBROSIS) (H): Primary | ICD-10-CM

## 2017-07-17 RX ORDER — ALBUTEROL SULFATE 90 UG/1
2 AEROSOL, METERED RESPIRATORY (INHALATION) EVERY 6 HOURS PRN
Qty: 1 INHALER | Refills: 3 | Status: SHIPPED | OUTPATIENT
Start: 2017-07-17 | End: 2019-08-23

## 2017-07-20 ENCOUNTER — OFFICE VISIT (OUTPATIENT)
Dept: PULMONOLOGY | Facility: CLINIC | Age: 22
End: 2017-07-20
Attending: PEDIATRICS
Payer: COMMERCIAL

## 2017-07-20 VITALS
HEIGHT: 70 IN | RESPIRATION RATE: 20 BRPM | DIASTOLIC BLOOD PRESSURE: 72 MMHG | OXYGEN SATURATION: 98 % | SYSTOLIC BLOOD PRESSURE: 122 MMHG | BODY MASS INDEX: 23.55 KG/M2 | WEIGHT: 164.46 LBS | HEART RATE: 65 BPM

## 2017-07-20 DIAGNOSIS — E84.9 CYSTIC FIBROSIS (H): Primary | ICD-10-CM

## 2017-07-20 DIAGNOSIS — K86.81 EXOCRINE PANCREATIC INSUFFICIENCY: ICD-10-CM

## 2017-07-20 LAB
EXPTIME-PRE: 7.75 SEC
FEF2575-%PRED-PRE: 51 %
FEF2575-PRE: 2.62 L/SEC
FEF2575-PRED: 5.09 L/SEC
FEFMAX-%PRED-PRE: 76 %
FEFMAX-PRE: 7.79 L/SEC
FEFMAX-PRED: 10.15 L/SEC
FEV1-%PRED-PRE: 59 %
FEV1-PRE: 2.8 L
FEV1FEV6-PRE: 80 %
FEV1FEV6-PRED: 85 %
FEV1FVC-PRE: 80 %
FEV1FVC-PRED: 86 %
FIFMAX-PRE: 4.42 L/SEC
FVC-%PRED-PRE: 63 %
FVC-PRE: 3.52 L
FVC-PRED: 5.56 L

## 2017-07-20 PROCEDURE — 87077 CULTURE AEROBIC IDENTIFY: CPT | Performed by: PEDIATRICS

## 2017-07-20 PROCEDURE — 94375 RESPIRATORY FLOW VOLUME LOOP: CPT | Mod: ZF

## 2017-07-20 PROCEDURE — 87186 SC STD MICRODIL/AGAR DIL: CPT | Performed by: PEDIATRICS

## 2017-07-20 PROCEDURE — 99212 OFFICE O/P EST SF 10 MIN: CPT | Mod: ZF

## 2017-07-20 PROCEDURE — 87070 CULTURE OTHR SPECIMN AEROBIC: CPT | Performed by: PEDIATRICS

## 2017-07-20 ASSESSMENT — PAIN SCALES - GENERAL: PAINLEVEL: NO PAIN (0)

## 2017-07-20 NOTE — NURSING NOTE
"Chief Complaint   Patient presents with     RECHECK     CF follow-up       Initial /72  Pulse 65  Resp 20  Ht 5' 10.28\" (178.5 cm)  Wt 164 lb 7.4 oz (74.6 kg)  SpO2 98%  BMI 23.41 kg/m2 Estimated body mass index is 23.41 kg/(m^2) as calculated from the following:    Height as of this encounter: 5' 10.28\" (178.5 cm).    Weight as of this encounter: 164 lb 7.4 oz (74.6 kg).  Medication Reconciliation: complete     Ladarius Hodge LPN      "

## 2017-07-20 NOTE — PROGRESS NOTES
Pediatrics Pulmonary - Provider Note  Cystic Fibrosis - Return Visit    Patient: Demario Abbasi MRN# [id number]   Encounter: 2017  : 1995      Opening Statement  We had the pleasure of consulting on Demario at the [place] [facility] at the [facility] for a one month follow-up visit.  He was accompanied by his parents to this visit.     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 He had a right upper lobectomy in . He was most recently hospitalized at [facility] from  - 3/2/17 for a CF pulmonary exacerbation complicated by hemoptysis and influenza A infection.  Demario presented with dorita hemoptysis, fever, cough, fatigue and weight loss.  His initial FEV1 at admission was 35% predicted.  Following treatment, his FEV1 improved up to a max of 60% predicted and he gained the weight back that he lost.     When I last saw Demario one month ago, he had a drop in his FEV1 and had one additional episode of hemoptysis.  Given this, we started him on oral antibiotics and asked him to return in one month for a check in visit.  He will be starting college in [place] in about a month, and we wanted to make sure he was in excellent health prior to leaving for school.  Over the past month, Demario says he is feeling better.  He has not had any further hemoptysis.  He did have a few days (-) when he felt chest tightness and didn't feel well.  This resolved without treatment and he feels well today.  He says that he has minimal cough and has occasional sputum production, which is his baseline.  He is not coughing at night and is sleeping well.  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  He takes azithromycin three times weekly.  He also has an albuterol inhaler, that he appears to be  using without clear pulmonary symptoms.      Demario has had issues with allergies in the past - he identifies this as more of a problem this spring/summer.  He takes daily Allegra. He has been taking his Nasonex on a daily basis and says that it has significantly helped his allergy symptoms.        From a GI standpoint Demario reports his appetite is good and he is eating well.  He continues on Creon 80886 capsule enzymes, taking 5 with meals and 3 with snacks. Demario has been good about taking his enzymes and they appear to be working well for him. Demario reports normal voids and well formed stools, 2-3 per day. There have been no reports of abdominal pain, nausea or vomiting. He continues on supplemental iron.     Demario finished school in [place], [place].  He is doing his internship now. He plans to attend [employer] in [place] at the end of August to finish his degree.  He is looking forward to this. He will be living in an off campus house.  The family went to orientation and met the nurse practitioner who works at the [facility].  Parents are asking us to provide information about Demario for the clinic.      Past Medical History:   Diagnosis Date     CF (cystic fibrosis) (H)      Impaired glucose tolerance      Pancreatic insufficiency      S/P lobectomy of lung 8/4/2015    Right upper lobectomy - 2009     Allergies  Allergies as of 07/20/2017 - Jose Luis as Reviewed 07/20/2017   Allergen Reaction Noted     Augmentin  10/25/2011     Current Outpatient Prescriptions   Medication Sig Dispense Refill     albuterol (ALBUTEROL) 108 (90 BASE) MCG/ACT Inhaler Inhale 2 puffs into the lungs every 6 hours as needed for shortness of breath / dyspnea or wheezing 1 Inhaler 3     azithromycin (ZITHROMAX) 500 MG tablet Take one tablet on Monday, Wednesday and Friday morning 12 tablet 12     fluticasone (FLONASE) 50 MCG/ACT spray Spray 1 spray into both nostrils daily 1 Bottle 3     fexofenadine (ALLEGRA) 180 MG tablet Take 1 tablet (180 mg) by  mouth daily 30 tablet 3     LANsoprazole (PREVACID) 15 MG CR capsule Take 1 capsule (15 mg) by mouth daily Take 30-60 minutes before a meal. 30 capsule 3     amylase-lipase-protease (CREON 36) 62760 UNITS CPEP Take 5 capsules (180,000 Units) by mouth 3 times daily (with meals) 450 capsule 11     multivitamin CF formula (MVW COMPLETE FORMULATION ) softgel cap Take 2 capsules by mouth daily 60 capsule 3     albuterol (2.5 MG/3ML) 0.083% neb solution Take 1 vial (2.5 mg) by nebulization 3 times daily 270 mL 6     sodium chloride inhalant 7 % NEBU neb solution Take 4 mLs by nebulization 2 times daily 240 mL 11     melatonin 5 MG tablet Take 1-2 tablets (5-10 mg) by mouth nightly as needed 60 tablet 1     citalopram (CELEXA) 20 MG tablet Take 1 tablet (20 mg) by mouth daily 30 tablet 1     azelastine (ASTELIN) 0.1 % spray Use 1-2 sprays both nostrils twice daily as needed 1 Bottle 1     tretinoin (RETIN-A) 0.05 % cream Apply topically every morning as directed 30 g 1     multivitamin CF formula (AQUADEKS) CAPS capsule Take 1 capsule by mouth daily 30 capsule 11     MINOCYCLINE HCL PO Take 100 mg by mouth 2 times daily       adapalene (DIFFERIN) 0.1 % gel Apply 1 applicator topically At Bedtime       tobramycin, PF, (COBY) 300 MG/5ML nebulizer solution Take 5 mLs (300 mg) by nebulization 2 times daily Cycle 28 days on/off 280 mL 6     dornase alpha (PULMOZYME) 1 MG/ML nebulizer solution Inhale 2.5 mg into the lungs 2 times daily 150 mL 12       PMH    Past medical history reviewed with patient/parent today, no changes.    Immunization History   Administered Date(s) Administered     Influenza (IIV3) 09/01/2012     Influenza Vaccine IM 3yrs+ 4 Valent IIV4 11/03/2016       PSH    Past surgical history reviewed with patient/parent today, no changes.        Family history reviewed with patient/parent today, no changes.    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  History of using chewing tobacco.    ROS    A comprehensive review of systems was performed and is negative except as noted in the HPI.    Last CF Annual Studies date: November 2016    Objective:     Physical Exam    Vital Signs:  /72  Pulse 65  Resp 20  Ht 5' 10.28\" (178.5 cm)  Wt 164 lb 7.4 oz (74.6 kg)  SpO2 98%  BMI 23.41 kg/m2       Wt Readings from Last 4 Encounters:   07/20/17 164 lb 7.4 oz (74.6 kg)   06/22/17 161 lb 9.6 oz (73.3 kg)   04/13/17 164 lb 14.5 oz (74.8 kg)   03/16/17 166 lb 14.2 oz (75.7 kg)     /72  Pulse 65  Resp 20  Ht 5' 10.28\" (178.5 cm)  Wt 164 lb 7.4 oz (74.6 kg)  SpO2 98%  BMI 23.41 kg/m2    Constitutional: No distress, comfortable, pleasant. No cough heard.   Vital signs: Reviewed and normal.  Eyes: Anicteric, normal extra-ocular movements, Pupils are equal and reactive to light  Ears, Nose and Throat: Tympanic membranes clear, nose clear and free of lesions, throat clear. New braces in place.  Neck: Supple with full range of motion, no thyromegaly.   Cardiovascular: Regular rate and rhythm, no murmurs, rubs or gallops, peripheral pulses full and symmetric  Chest: Symmetrical, no retractions.  Respiratory: Faint crackles in left upper lobe that did not clear with cough; no distress, normal breath sounds  Gastrointestinal: Positive bowel sounds, nontender, no hepatosplenomegaly, no masses  Musculoskeletal: Full range of motion, no edema.  Skin: No concerning lesions, no jaundice.  Neurological: Normal muscle tone and strength.    Spirometry was done 7/20/2017    Most Recent [facility] Pulmonary Function Testing    FVC-Pred   Date Value Ref Range Status   07/20/2017 5.56 L      FVC-Pre   Date Value Ref Range Status   07/20/2017 3.52 L "      FVC-%Pred-Pre   Date Value Ref Range Status   07/20/2017 63 %      FEV1-Pre   Date Value Ref Range Status   07/20/2017 2.80 L      FEV1-%Pred-Pre   Date Value Ref Range Status   07/20/2017 59 %      FEV1FVC-Pred   Date Value Ref Range Status   07/20/2017 86 %      FEV1FVC-Pre   Date Value Ref Range Status   07/20/2017 80 %      No results found for: 20029  FEFMax-Pred   Date Value Ref Range Status   07/20/2017 10.15 L/sec      FEFMax-Pre   Date Value Ref Range Status   07/20/2017 7.79 L/sec      FEFMax-%Pred-Pre   Date Value Ref Range Status   07/20/2017 76 %      ExpTime-Pre   Date Value Ref Range Status   07/20/2017 7.75 sec      FIFMax-Pre   Date Value Ref Range Status   07/20/2017 4.42 L/sec      FEV1FEV6-Pred   Date Value Ref Range Status   07/20/2017 85 %      FEV1FEV6-Pre   Date Value Ref Range Status   07/20/2017 80 %      No results found for: 20055    Spirometry Interpretation:  Good effort and acceptable for interpretation.  Evidence of restrictive lung disease given a decreased FVC.  Both FEV1 and FVC are improved today compared to one month ago.      Culture (3/2017):  Light growth Normal elmer; Light growth Achromobacter xylosoxidans/denitrificans     Culture (4/2017):  S. Aureus, two strains Achromobacter    Culture (6/2017):  Not done    Culture (7/2017):  pending    Laboratory or other tests ordered were reviewed.    Assessment       Demario is a 22 year old young man with CF and pancreatic insufficiency s/p right upper lobectomy in 2009. His genotype contains a STOP mutation, so he is not a candidate for the CF drug, Orkambi.  He was hospitalized for a severe CF pulmonary exacerbation complicated by hemoptysis and influenza A in late February - early March.  He grows two species of Achromobacter and MSSA.  He recovered to a max FEV1 of 60% predicted.  He continues to do well with his weight.  We treated him at his last visit one month ago for a CF pulmonary exacerbation which has now resolved.   His FVC and FEV1 both improved over the month, as did his weight. Discussed using his Albuterol inhaler for pulmonary symptoms as needed.  Discussed the need to begin the transition process to the adult clinic.  Will plan to see him in three months here, with a field trip over to the adult clinic on the same day.  Would plan for his visit after that to be at the adult CF clinic.  Encouraged Demario to call us with any questions or concerns when he is away at school.    CF Pulmonary Exacerbation:  None present.     Plan:       1.  Continue your VEST twice daily.  2.  Please take your Nasonex EVERY DAY.  3.  Continue your Allegra daily as well.  4.  Please call us if you cough up blood - even one time.  5.  Follow-up in three months - this will be a clinic visit with us followed by a field trip with Maria G to visit the adult clinic.  Your next quarterly visit will be at the adult CF clinic.  6.  Call us with any questions or concerns at any time.     Mary Grace Álvarez MD MSCS  Pediatric Pulmonology      CC  YEN ROY    Copy to patient  [facility]  [address]  [place] [address]

## 2017-07-20 NOTE — MR AVS SNAPSHOT
After Visit Summary   7/20/2017    Demario Abbasi    MRN: [id number]           Patient Information     Date Of Birth          1995        Visit Information        Provider Department      7/20/2017 1:00 PM Daisha Álvarez MD Peds Pulmonary        Today's Diagnoses     Cystic fibrosis (H)    -  1    Exocrine pancreatic insufficiency          Care Instructions    1.  Continue your VEST twice daily.  2.  Please take your Flonase EVERY DAY.  3.  Continue your Allegra daily as well.  4.  Please call us if you cough up blood - even one time.  5.  Follow-up in three months - this will be a clinic visit with us followed by a field trip with Maria G to visit the adult clinic.  Your next quarterly visit will be at the adult CF clinic.  6.  Call us with any questions or concerns at any time.     Mary Grace Álvarez MD MSCS  Pediatric Pulmonology          Follow-ups after your visit        Who to contact     Please call your clinic at [phone] to:    Ask questions about your health    Make or cancel appointments    Discuss your medicines    Learn about your test results    Speak to your doctor   If you have compliments or concerns about an experience at your clinic, or if you wish to file a complaint, please contact [facility] Patient Relations at [phone] or email us at [email or web address]         Additional Information About Your Visit        MyChart Information     RoboEd is an electronic gateway that provides easy, online access to your medical records. With RoboEd, you can request a clinic appointment, read your test results, renew a prescription or communicate with your care team.     To sign up for [employer] visit the website at [email or web address]   You will be asked to enter the access code listed below, as well as some personal information. Please follow the directions to create your username and password.     Your access code is:  [email or web address]  Expires: 2017  9:29 AM     Your access code will  in 90 days. If you need help or a new code, please contact your [facility] or call [phone] for assistance.        Care EveryWhere ID     This is your Care EveryWhere ID. This could be used by other organizations to access your [facility] medical records  [id number]        Your Vitals Were     Pulse Respirations Height Pulse Oximetry BMI (Body Mass Index)       65 20 5' 10.28\" (178.5 cm) 98% 23.41 kg/m2        Blood Pressure from Last 3 Encounters:   17 122/72   17 116/67   17 109/64    Weight from Last 3 Encounters:   17 164 lb 7.4 oz (74.6 kg)   17 161 lb 9.6 oz (73.3 kg)   17 164 lb 14.5 oz (74.8 kg)              We Performed the Following     Cystic Fibrosis Culture Aerob Bacterial        Primary Care Provider Office Phone # Fax #    Susan Li [phone]       [facility]  [address] [facility] [address]        Equal Access to Services     MCKENNA LINDSAY : Hadii duarte martinio Soquetaali, waaxda luqadaha, qaybta kaalmada adeegyada, denice viera. So [facility] [phone].    ATENCIÓN: Si habla español, tiene a davidson disposición servicios gratuitos de asistencia lingüística. Llame al [phone].    We comply with applicable federal civil rights laws and [place] laws. We do not discriminate on the basis of race, color, national origin, age, disability sex, sexual orientation or gender identity.            Thank you!     Thank you for choosing PEDS PULMONARY  for your care. Our goal is always to provide you with excellent care. Hearing back from our patients is one way we can continue to improve our services. Please take a few minutes to complete the written survey that you may receive in the mail after your visit with us. Thank you!             Your Updated Medication List - Protect others around you: Learn how to " safely use, store and throw away your medicines at www.disposemymeds.org.          This list is accurate as of: 7/20/17  2:00 PM.  Always use your most recent med list.                   Brand Name Dispense Instructions for use Diagnosis    adapalene 0.1 % gel    DIFFERIN     Apply 1 applicator topically At Bedtime        * albuterol (2.5 MG/3ML) 0.083% neb solution     270 mL    Take 1 vial (2.5 mg) by nebulization 3 times daily    Cystic fibrosis with pulmonary manifestations (H)       * albuterol 108 (90 BASE) MCG/ACT Inhaler    albuterol    1 Inhaler    Inhale 2 puffs into the lungs every 6 hours as needed for shortness of breath / dyspnea or wheezing    CF (cystic fibrosis) (H)       amylase-lipase-protease 52207 UNITS Cpep    CREON 36    450 capsule    Take 5 capsules (180,000 Units) by mouth 3 times daily (with meals)    Cystic fibrosis exacerbation (H)       azelastine 0.1 % spray    ASTELIN    1 Bottle    Use 1-2 sprays both nostrils twice daily as needed    Cystic fibrosis with pulmonary exacerbation (H)       azithromycin 500 MG tablet    ZITHROMAX    12 tablet    Take one tablet on Monday, Wednesday and Friday morning    CF (cystic fibrosis) (H)       citalopram 20 MG tablet    celeXA    30 tablet    Take 1 tablet (20 mg) by mouth daily    Depression       dornase alpha 1 MG/ML neb solution    PULMOZYME    150 mL    Inhale 2.5 mg into the lungs 2 times daily    Cystic fibrosis with pulmonary manifestations (H)       fexofenadine 180 MG tablet    ALLEGRA    30 tablet    Take 1 tablet (180 mg) by mouth daily    CF (cystic fibrosis) (H)       fluticasone 50 MCG/ACT spray    FLONASE    1 Bottle    Spray 1 spray into both nostrils daily    CF (cystic fibrosis) (H), Cystic fibrosis with pulmonary exacerbation (H)       LANsoprazole 15 MG CR capsule    PREVACID    30 capsule    Take 1 capsule (15 mg) by mouth daily Take 30-60 minutes before a meal.    CF (cystic fibrosis) (H)       melatonin 5 MG tablet     60  tablet    Take 1-2 tablets (5-10 mg) by mouth nightly as needed    Cystic fibrosis with pulmonary exacerbation (H)       MINOCYCLINE HCL PO      Take 100 mg by mouth 2 times daily        * multivitamin CF formula Caps capsule     30 capsule    Take 1 capsule by mouth daily    CF (cystic fibrosis) (H)       * multivitamin CF formula softgel cap     60 capsule    Take 2 capsules by mouth daily    Cystic fibrosis (H)       sodium chloride inhalant 7 % Nebu neb solution     240 mL    Take 4 mLs by nebulization 2 times daily    Cystic fibrosis with pulmonary exacerbation (H)       tobramycin (PF) 300 MG/5ML neb solution    COBY    280 mL    Take 5 mLs (300 mg) by nebulization 2 times daily Cycle 28 days on/off    Cystic fibrosis with pulmonary manifestations (H)       tretinoin 0.05 % cream    RETIN-A    30 g    Apply topically every morning as directed    Acne       * Notice:  This list has 4 medication(s) that are the same as other medications prescribed for you. Read the directions carefully, and ask your doctor or other care provider to review them with you.

## 2017-07-20 NOTE — LETTER
2017      RE: Demario Abbasi  [address]  [place] [address]    MRN: [id number]  : 1995      Dear Parent of Demario,    I am enclosing the results of Demario's lab testing.  These bacteria are not new for Demario.  He does not require any changes to his treatment plan.  Please call me if you have questions.       Resulted Orders   Cystic Fibrosis Culture Aerob Bacterial   Result Value Ref Range    Specimen Description Sputum     Culture Micro (A)      Moderate growth Normal elmer  Light growth Staphylococcus aureus This isolate is presumed to be clindamycin   resistant based on detection of inducible clindamycin resistance. Erythromycin   and clindamycin are resistant, therefore, they are not recommended for use.  Moderate growth Achromobacter xylosoxidans/denitrificans      Micro Report Status FINAL 2017     Organism:       Light growth Staphylococcus aureus This isolate is presumed to be clindamycin   resistant based on detection of inducible clindamycin resistance. Erythromycin   and clindamycin are resistant, therefore, they are not recommended for use.      Organism:       Moderate growth Achromobacter xylosoxidans/denitrificans         Please feel free to contact me if you have any further questions.    Sincerely,    Daisha Álvarez

## 2017-07-20 NOTE — PATIENT INSTRUCTIONS
1.  Continue your VEST twice daily.  2.  Please take your Flonase EVERY DAY.  3.  Continue your Allegra daily as well.  4.  Please call us if you cough up blood - even one time.  5.  Follow-up in three months - this will be a clinic visit with us followed by a field trip with Maria G to visit the adult clinic.  Your next quarterly visit will be at the adult CF clinic.  6.  Call us with any questions or concerns at any time.     Mary Grace Álvarez MD MSCS  Pediatric Pulmonology

## 2017-07-20 NOTE — LETTER
2017      RE: Demario Abbasi  [address]  [place] [address]       Pediatrics Pulmonary - Provider Note  Cystic Fibrosis - Return Visit    Patient: Demario Abbasi MRN# [id number]   Encounter: 2017  : 1995      Opening Statement  We had the pleasure of consulting on Demario at the [place] [facility] at the [facility] for a one month follow-up visit.  He was accompanied by his parents to this visit.     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 He had a right upper lobectomy in . He was most recently hospitalized at [facility] from  - 3/2/17 for a CF pulmonary exacerbation complicated by hemoptysis and influenza A infection.  Demario presented with dorita hemoptysis, fever, cough, fatigue and weight loss.  His initial FEV1 at admission was 35% predicted.  Following treatment, his FEV1 improved up to a max of 60% predicted and he gained the weight back that he lost.     When I last saw Demario one month ago, he had a drop in his FEV1 and had one additional episode of hemoptysis.  Given this, we started him on oral antibiotics and asked him to return in one month for a check in visit.  He will be starting college in [place] in about a month, and we wanted to make sure he was in excellent health prior to leaving for school.  Over the past month, Demario says he is feeling better.  He has not had any further hemoptysis.  He did have a few days (-) when he felt chest tightness and didn't feel well.  This resolved without treatment and he feels well today.  He says that he has minimal cough and has occasional sputum production, which is his baseline.  He is not coughing at night and is sleeping well.  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  He takes  azithromycin three times weekly.  He also has an albuterol inhaler, that he appears to be using without clear pulmonary symptoms.      Demario has had issues with allergies in the past - he identifies this as more of a problem this spring/summer.  He takes daily Allegra. He has been taking his Nasonex on a daily basis and says that it has significantly helped his allergy symptoms.        From a GI standpoint Demario reports his appetite is good and he is eating well.  He continues on Creon 78423 capsule enzymes, taking 5 with meals and 3 with snacks. Demario has been good about taking his enzymes and they appear to be working well for him. Demario reports normal voids and well formed stools, 2-3 per day. There have been no reports of abdominal pain, nausea or vomiting. He continues on supplemental iron.     Demario finished school in [place], [place].  He is doing his internship now. He plans to attend [employer] in [place] at the end of August to finish his degree.  He is looking forward to this. He will be living in an off campus house.  The family went to orientation and met the nurse practitioner who works at the campus clinic.  Parents are asking us to provide information about Demario for the clinic.      Past Medical History:   Diagnosis Date     CF (cystic fibrosis) (H)      Impaired glucose tolerance      Pancreatic insufficiency      S/P lobectomy of lung 8/4/2015    Right upper lobectomy - 2009     Allergies  Allergies as of 07/20/2017 - Jose Luis as Reviewed 07/20/2017   Allergen Reaction Noted     Augmentin  10/25/2011     Current Outpatient Prescriptions   Medication Sig Dispense Refill     albuterol (ALBUTEROL) 108 (90 BASE) MCG/ACT Inhaler Inhale 2 puffs into the lungs every 6 hours as needed for shortness of breath / dyspnea or wheezing 1 Inhaler 3     azithromycin (ZITHROMAX) 500 MG tablet Take one tablet on Monday, Wednesday and Friday morning 12 tablet 12     fluticasone (FLONASE) 50 MCG/ACT spray Spray 1 spray into both  nostrils daily 1 Bottle 3     fexofenadine (ALLEGRA) 180 MG tablet Take 1 tablet (180 mg) by mouth daily 30 tablet 3     LANsoprazole (PREVACID) 15 MG CR capsule Take 1 capsule (15 mg) by mouth daily Take 30-60 minutes before a meal. 30 capsule 3     amylase-lipase-protease (CREON 36) 62729 UNITS CPEP Take 5 capsules (180,000 Units) by mouth 3 times daily (with meals) 450 capsule 11     multivitamin CF formula (MVW COMPLETE FORMULATION ) softgel cap Take 2 capsules by mouth daily 60 capsule 3     albuterol (2.5 MG/3ML) 0.083% neb solution Take 1 vial (2.5 mg) by nebulization 3 times daily 270 mL 6     sodium chloride inhalant 7 % NEBU neb solution Take 4 mLs by nebulization 2 times daily 240 mL 11     melatonin 5 MG tablet Take 1-2 tablets (5-10 mg) by mouth nightly as needed 60 tablet 1     citalopram (CELEXA) 20 MG tablet Take 1 tablet (20 mg) by mouth daily 30 tablet 1     azelastine (ASTELIN) 0.1 % spray Use 1-2 sprays both nostrils twice daily as needed 1 Bottle 1     tretinoin (RETIN-A) 0.05 % cream Apply topically every morning as directed 30 g 1     multivitamin CF formula (AQUADEKS) CAPS capsule Take 1 capsule by mouth daily 30 capsule 11     MINOCYCLINE HCL PO Take 100 mg by mouth 2 times daily       adapalene (DIFFERIN) 0.1 % gel Apply 1 applicator topically At Bedtime       tobramycin, PF, (COBY) 300 MG/5ML nebulizer solution Take 5 mLs (300 mg) by nebulization 2 times daily Cycle 28 days on/off 280 mL 6     dornase alpha (PULMOZYME) 1 MG/ML nebulizer solution Inhale 2.5 mg into the lungs 2 times daily 150 mL 12       PMH    Past medical history reviewed with patient/parent today, no changes.    Immunization History   Administered Date(s) Administered     Influenza (IIV3) 09/01/2012     Influenza Vaccine IM 3yrs+ 4 Valent IIV4 11/03/2016       PSH    Past surgical history reviewed with patient/parent today, no changes.    FH    Family history reviewed with patient/parent today, no  "changes.    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  History of using chewing tobacco.    ROS    A comprehensive review of systems was performed and is negative except as noted in the HPI.    Last CF Annual Studies date: November 2016    Objective:     Physical Exam    Vital Signs:  /72  Pulse 65  Resp 20  Ht 5' 10.28\" (178.5 cm)  Wt 164 lb 7.4 oz (74.6 kg)  SpO2 98%  BMI 23.41 kg/m2       Wt Readings from Last 4 Encounters:   07/20/17 164 lb 7.4 oz (74.6 kg)   06/22/17 161 lb 9.6 oz (73.3 kg)   04/13/17 164 lb 14.5 oz (74.8 kg)   03/16/17 166 lb 14.2 oz (75.7 kg)     /72  Pulse 65  Resp 20  Ht 5' 10.28\" (178.5 cm)  Wt 164 lb 7.4 oz (74.6 kg)  SpO2 98%  BMI 23.41 kg/m2    Constitutional: No distress, comfortable, pleasant. No cough heard.   Vital signs: Reviewed and normal.  Eyes: Anicteric, normal extra-ocular movements, Pupils are equal and reactive to light  Ears, Nose and Throat: Tympanic membranes clear, nose clear and free of lesions, throat clear. New braces in place.  Neck: Supple with full range of motion, no thyromegaly.   Cardiovascular: Regular rate and rhythm, no murmurs, rubs or gallops, peripheral pulses full and symmetric  Chest: Symmetrical, no retractions.  Respiratory: Faint crackles in left upper lobe that did not clear with cough; no distress, normal breath sounds  Gastrointestinal: Positive bowel sounds, nontender, no hepatosplenomegaly, no masses  Musculoskeletal: Full range of motion, no edema.  Skin: No concerning lesions, no jaundice.  Neurological: Normal muscle tone and strength.    Spirometry was done 7/20/2017    Most Recent [facility] Pulmonary Function Testing    FVC-Pred "   Date Value Ref Range Status   07/20/2017 5.56 L      FVC-Pre   Date Value Ref Range Status   07/20/2017 3.52 L      FVC-%Pred-Pre   Date Value Ref Range Status   07/20/2017 63 %      FEV1-Pre   Date Value Ref Range Status   07/20/2017 2.80 L      FEV1-%Pred-Pre   Date Value Ref Range Status   07/20/2017 59 %      FEV1FVC-Pred   Date Value Ref Range Status   07/20/2017 86 %      FEV1FVC-Pre   Date Value Ref Range Status   07/20/2017 80 %      No results found for: 20029  FEFMax-Pred   Date Value Ref Range Status   07/20/2017 10.15 L/sec      FEFMax-Pre   Date Value Ref Range Status   07/20/2017 7.79 L/sec      FEFMax-%Pred-Pre   Date Value Ref Range Status   07/20/2017 76 %      ExpTime-Pre   Date Value Ref Range Status   07/20/2017 7.75 sec      FIFMax-Pre   Date Value Ref Range Status   07/20/2017 4.42 L/sec      FEV1FEV6-Pred   Date Value Ref Range Status   07/20/2017 85 %      FEV1FEV6-Pre   Date Value Ref Range Status   07/20/2017 80 %      No results found for: 20055    Spirometry Interpretation:  Good effort and acceptable for interpretation.  Evidence of restrictive lung disease given a decreased FVC.  Both FEV1 and FVC are improved today compared to one month ago.      Culture (3/2017):  Light growth Normal elmer; Light growth Achromobacter xylosoxidans/denitrificans     Culture (4/2017):  S. Aureus, two strains Achromobacter    Culture (6/2017):  Not done    Culture (7/2017):  pending    Laboratory or other tests ordered were reviewed.    Assessment       Demario is a 22 year old young man with CF and pancreatic insufficiency s/p right upper lobectomy in 2009. His genotype contains a STOP mutation, so he is not a candidate for the CF drug, Orkambi.  He was hospitalized for a severe CF pulmonary exacerbation complicated by hemoptysis and influenza A in late February - early March.  He grows two species of Achromobacter and MSSA.  He recovered to a max FEV1 of 60% predicted.  He continues to do well with his  weight.  We treated him at his last visit one month ago for a CF pulmonary exacerbation which has now resolved.  His FVC and FEV1 both improved over the month, as did his weight. Discussed using his Albuterol inhaler for pulmonary symptoms as needed.  Discussed the need to begin the transition process to the adult clinic.  Will plan to see him in three months here, with a field trip over to the adult clinic on the same day.  Would plan for his visit after that to be at the adult CF clinic.  Encouraged Demario to call us with any questions or concerns when he is away at school.    CF Pulmonary Exacerbation:  None present.     Plan:       1.  Continue your VEST twice daily.  2.  Please take your Nasonex EVERY DAY.  3.  Continue your Allegra daily as well.  4.  Please call us if you cough up blood - even one time.  5.  Follow-up in three months - this will be a clinic visit with us followed by a field trip with Maria G to visit the adult clinic.  Your next quarterly visit will be at the adult CF clinic.  6.  Call us with any questions or concerns at any time.     Mary Grace Álvarez MD MSCS  Pediatric Pulmonology    CC  YEN ROY    Copy to patient    Parent(s) of Demario Abbasi  [address]  [place] [address]

## 2017-07-26 LAB
BACTERIA SPEC CULT: ABNORMAL
MICRO REPORT STATUS: ABNORMAL
MICROORGANISM SPEC CULT: ABNORMAL
MICROORGANISM SPEC CULT: ABNORMAL
SPECIMEN SOURCE: ABNORMAL

## 2017-08-08 DIAGNOSIS — E84.0 CYSTIC FIBROSIS WITH PULMONARY MANIFESTATIONS (H): ICD-10-CM

## 2017-08-08 NOTE — TELEPHONE ENCOUNTER
Drug Name: pulmozyme 1mg/ml  Last Fill Date: 7/12/17  Quantity: 150    Gemini Calderonrop   [facility]  [phone]

## 2017-11-06 DIAGNOSIS — E84.9 CF (CYSTIC FIBROSIS) (H): ICD-10-CM

## 2017-11-06 RX ORDER — FEXOFENADINE HCL 180 MG/1
180 TABLET ORAL DAILY
Qty: 30 TABLET | Refills: 0 | Status: SHIPPED | OUTPATIENT
Start: 2017-11-06 | End: 2018-01-08

## 2017-11-06 RX ORDER — MECOBALAMIN 5000 MCG
15 TABLET,DISINTEGRATING ORAL DAILY
Qty: 30 CAPSULE | Refills: 0 | Status: SHIPPED | OUTPATIENT
Start: 2017-11-06 | End: 2018-01-08

## 2017-11-06 NOTE — TELEPHONE ENCOUNTER
Requested a 30day supply of Allegra and Prevacid, but left message for Demario's family asking them to schedule his follow-up appointment in the next 30 days so that we can continue filling his medications normally.    Olga Glez RN  Pediatric Pulmonary Care Coordinator  Phone: [phone]

## 2017-11-08 DIAGNOSIS — E84.0 CYSTIC FIBROSIS WITH PULMONARY MANIFESTATIONS (H): ICD-10-CM

## 2017-11-08 RX ORDER — TOBRAMYCIN INHALATION SOLUTION 300 MG/5ML
300 INHALANT RESPIRATORY (INHALATION) 2 TIMES DAILY
Qty: 280 ML | Refills: 0 | Status: SHIPPED | OUTPATIENT
Start: 2017-11-08 | End: 2018-01-05

## 2017-11-20 ENCOUNTER — CARE COORDINATION (OUTPATIENT)
Dept: PULMONOLOGY | Facility: CLINIC | Age: 22
End: 2017-11-20

## 2017-11-20 DIAGNOSIS — E84.0 CYSTIC FIBROSIS OF THE LUNG (H): Primary | ICD-10-CM

## 2017-12-07 ENCOUNTER — TELEPHONE (OUTPATIENT)
Dept: PEDIATRICS | Age: 22
End: 2017-12-07

## 2017-12-11 ENCOUNTER — TELEPHONE (OUTPATIENT)
Dept: PEDIATRICS | Age: 22
End: 2017-12-11

## 2018-01-04 ENCOUNTER — OFFICE VISIT (OUTPATIENT)
Dept: PHARMACY | Facility: CLINIC | Age: 23
End: 2018-01-04
Payer: COMMERCIAL

## 2018-01-04 ENCOUNTER — HOSPITAL ENCOUNTER (OUTPATIENT)
Dept: GENERAL RADIOLOGY | Facility: CLINIC | Age: 23
Discharge: HOME | End: 2018-01-04
Attending: PEDIATRICS | Admitting: PEDIATRICS
Payer: COMMERCIAL

## 2018-01-04 ENCOUNTER — OFFICE VISIT (OUTPATIENT)
Dept: PULMONOLOGY | Facility: CLINIC | Age: 23
End: 2018-01-04
Attending: PEDIATRICS
Payer: COMMERCIAL

## 2018-01-04 ENCOUNTER — HOSPITAL ENCOUNTER (OUTPATIENT)
Dept: ULTRASOUND IMAGING | Facility: CLINIC | Age: 23
End: 2018-01-04
Attending: PEDIATRICS
Payer: COMMERCIAL

## 2018-01-04 VITALS
WEIGHT: 166.23 LBS | SYSTOLIC BLOOD PRESSURE: 123 MMHG | BODY MASS INDEX: 23.8 KG/M2 | HEART RATE: 70 BPM | OXYGEN SATURATION: 97 % | HEIGHT: 70 IN | RESPIRATION RATE: 19 BRPM | TEMPERATURE: 97.6 F | DIASTOLIC BLOOD PRESSURE: 65 MMHG

## 2018-01-04 DIAGNOSIS — F32.89 OTHER DEPRESSION: ICD-10-CM

## 2018-01-04 DIAGNOSIS — E84.0 CYSTIC FIBROSIS OF THE LUNG (H): ICD-10-CM

## 2018-01-04 DIAGNOSIS — L70.9 ACNE: ICD-10-CM

## 2018-01-04 DIAGNOSIS — E84.9 CYSTIC FIBROSIS (H): Primary | ICD-10-CM

## 2018-01-04 DIAGNOSIS — K86.81 EXOCRINE PANCREATIC INSUFFICIENCY: ICD-10-CM

## 2018-01-04 DIAGNOSIS — F32.A DEPRESSION: ICD-10-CM

## 2018-01-04 DIAGNOSIS — R10.13 ABDOMINAL PAIN, EPIGASTRIC: ICD-10-CM

## 2018-01-04 DIAGNOSIS — E84.0 CYSTIC FIBROSIS WITH PULMONARY EXACERBATION (H): ICD-10-CM

## 2018-01-04 DIAGNOSIS — L70.8 OTHER ACNE: ICD-10-CM

## 2018-01-04 DIAGNOSIS — E84.0 CYSTIC FIBROSIS WITH PULMONARY MANIFESTATIONS (H): Primary | ICD-10-CM

## 2018-01-04 DIAGNOSIS — K86.89 PANCREATIC INSUFFICIENCY: ICD-10-CM

## 2018-01-04 LAB
ALBUMIN SERPL-MCNC: 3.8 G/DL (ref 3.4–5)
ALP SERPL-CCNC: 150 U/L (ref 40–150)
ALT SERPL W P-5'-P-CCNC: 43 U/L (ref 0–70)
ANION GAP SERPL CALCULATED.3IONS-SCNC: 5 MMOL/L (ref 3–14)
AST SERPL W P-5'-P-CCNC: 24 U/L (ref 0–45)
BASOPHILS # BLD AUTO: 0.1 10E9/L (ref 0–0.2)
BASOPHILS NFR BLD AUTO: 1 %
BILIRUB SERPL-MCNC: 0.4 MG/DL (ref 0.2–1.3)
BUN SERPL-MCNC: 16 MG/DL (ref 7–30)
CALCIUM SERPL-MCNC: 9 MG/DL (ref 8.5–10.1)
CHLORIDE SERPL-SCNC: 108 MMOL/L (ref 94–109)
CO2 SERPL-SCNC: 27 MMOL/L (ref 20–32)
CREAT SERPL-MCNC: 1.02 MG/DL (ref 0.66–1.25)
DIFFERENTIAL METHOD BLD: NORMAL
EOSINOPHIL # BLD AUTO: 0.4 10E9/L (ref 0–0.7)
EOSINOPHIL NFR BLD AUTO: 4.8 %
ERYTHROCYTE [DISTWIDTH] IN BLOOD BY AUTOMATED COUNT: 12.6 % (ref 10–15)
EXPTIME-PRE: 6.91 SEC
FEF2575-%PRED-PRE: 52 %
FEF2575-PRE: 2.67 L/SEC
FEF2575-PRED: 5.08 L/SEC
FEFMAX-%PRED-PRE: 81 %
FEFMAX-PRE: 8.28 L/SEC
FEFMAX-PRED: 10.17 L/SEC
FEV1-%PRED-PRE: 59 %
FEV1-PRE: 2.83 L
FEV1FEV6-PRE: 81 %
FEV1FEV6-PRED: 84 %
FEV1FVC-PRE: 81 %
FEV1FVC-PRED: 85 %
FIFMAX-PRE: 3.6 L/SEC
FVC-%PRED-PRE: 62 %
FVC-PRE: 3.51 L
FVC-PRED: 5.57 L
GFR SERPL CREATININE-BSD FRML MDRD: >90 ML/MIN/1.7M2
GGT SERPL-CCNC: 33 U/L (ref 0–75)
GLUCOSE SERPL-MCNC: 121 MG/DL (ref 70–99)
HCT VFR BLD AUTO: 44.2 % (ref 40–53)
HGB BLD-MCNC: 15 G/DL (ref 13.3–17.7)
IMM GRANULOCYTES # BLD: 0 10E9/L (ref 0–0.4)
IMM GRANULOCYTES NFR BLD: 0.2 %
LYMPHOCYTES # BLD AUTO: 2.6 10E9/L (ref 0.8–5.3)
LYMPHOCYTES NFR BLD AUTO: 29 %
MCH RBC QN AUTO: 29.9 PG (ref 26.5–33)
MCHC RBC AUTO-ENTMCNC: 33.9 G/DL (ref 31.5–36.5)
MCV RBC AUTO: 88 FL (ref 78–100)
MONOCYTES # BLD AUTO: 0.5 10E9/L (ref 0–1.3)
MONOCYTES NFR BLD AUTO: 4.9 %
NEUTROPHILS # BLD AUTO: 5.5 10E9/L (ref 1.6–8.3)
NEUTROPHILS NFR BLD AUTO: 60.1 %
NRBC # BLD AUTO: 0 10*3/UL
NRBC BLD AUTO-RTO: 0 /100
PLATELET # BLD AUTO: 273 10E9/L (ref 150–450)
POTASSIUM SERPL-SCNC: 4.2 MMOL/L (ref 3.4–5.3)
PROT SERPL-MCNC: 8.3 G/DL (ref 6.8–8.8)
RBC # BLD AUTO: 5.02 10E12/L (ref 4.4–5.9)
SODIUM SERPL-SCNC: 140 MMOL/L (ref 133–144)
WBC # BLD AUTO: 9.1 10E9/L (ref 4–11)

## 2018-01-04 PROCEDURE — G0463 HOSPITAL OUTPT CLINIC VISIT: HCPCS | Mod: ZF

## 2018-01-04 PROCEDURE — 76700 US EXAM ABDOM COMPLETE: CPT

## 2018-01-04 PROCEDURE — 82977 ASSAY OF GGT: CPT | Performed by: PEDIATRICS

## 2018-01-04 PROCEDURE — 87186 SC STD MICRODIL/AGAR DIL: CPT | Performed by: PEDIATRICS

## 2018-01-04 PROCEDURE — 99605 MTMS BY PHARM NP 15 MIN: CPT | Performed by: PHARMACIST

## 2018-01-04 PROCEDURE — 71046 X-RAY EXAM CHEST 2 VIEWS: CPT

## 2018-01-04 PROCEDURE — 87077 CULTURE AEROBIC IDENTIFY: CPT | Performed by: PEDIATRICS

## 2018-01-04 PROCEDURE — 94375 RESPIRATORY FLOW VOLUME LOOP: CPT | Mod: ZF

## 2018-01-04 PROCEDURE — 85025 COMPLETE CBC W/AUTO DIFF WBC: CPT | Performed by: PEDIATRICS

## 2018-01-04 PROCEDURE — 87070 CULTURE OTHR SPECIMN AEROBIC: CPT | Performed by: PEDIATRICS

## 2018-01-04 PROCEDURE — 80053 COMPREHEN METABOLIC PANEL: CPT | Performed by: PEDIATRICS

## 2018-01-04 PROCEDURE — 36415 COLL VENOUS BLD VENIPUNCTURE: CPT | Performed by: PEDIATRICS

## 2018-01-04 RX ORDER — LEVOFLOXACIN 750 MG/1
750 TABLET, FILM COATED ORAL DAILY
Qty: 14 TABLET | Refills: 0 | Status: SHIPPED | OUTPATIENT
Start: 2018-01-04 | End: 2018-01-18

## 2018-01-04 ASSESSMENT — PAIN SCALES - GENERAL: PAINLEVEL: NO PAIN (0)

## 2018-01-04 NOTE — MR AVS SNAPSHOT
After Visit Summary   1/4/2018    Demario Abbasi    MRN: [id number]           Patient Information     Date Of Birth          1995        Visit Information        Provider Department      1/4/2018 11:00 AM Ansley Santo RPH [facility]        Today's Diagnoses     Cystic fibrosis (H)    -  1    Depression        Pancreatic insufficiency        Acne        Other depression        Other acne           Follow-ups after your visit        Your next 10 appointments already scheduled     Jan 04, 2018  4:00 PM CST   US ABDOMEN COMPLETE with URUS1   [facility], [place], Ultrasound ([facility])    [address] [address] [phone]           Please bring a list of your medicines (including vitamins, minerals and over-the-counter drugs). Also, tell your doctor about any allergies you may have. Wear comfortable clothes and leave your valuables at home.  Adults: No eating or drinking for 8 hours before the exam. You may take medicine with a small sip of water.  Children: - Children 6+ years: No food or drink for 6 hours before exam. - Children 1-5 years: No food or drink for 4 hours before exam. - Infants, breast-fed: may have breast milk up to 2 hours before exam. - Infants, formula: may have bottle until 4 hours before exam.  Please call the Imaging Department at your exam site with any questions.              Who to contact     If you have questions or need follow up information about today's clinic visit or your schedule please contact [facility] directly at [phone].  Normal or non-critical lab and imaging results will be communicated to you by MyChart, letter or phone within 4 business days after the clinic has received the results. If you do not hear from us within 7 days, please contact the clinic through [facility] or  "phone. If you have a critical or abnormal lab result, we will notify you by phone as soon as possible.  Submit refill requests through Oony or call your pharmacy and they will forward the refill request to us. Please allow 3 business days for your refill to be completed.          Additional Information About Your Visit        Edi.iohart Information     Oony lets you send messages to your doctor, view your test results, renew your prescriptions, schedule appointments and more. To sign up, go to [email or web address] . Click on \"Log in\" on the left side of the screen, which will take you to the Welcome page. Then click on \"Sign up Now\" on the right side of the page.     You will be asked to enter the access code listed below, as well as some personal information. Please follow the directions to create your username and password.     Your access code is: [email or web address]  Expires: 2018 12:22 PM     Your access code will  in 90 days. If you need help or a new code, please call your [facility] clinic or [phone].        Care EveryWhere ID     This is your Care EveryWhere ID. This could be used by other organizations to access your [facility] medical records  [id number]         Blood Pressure from Last 3 Encounters:   18 123/65   17 122/72   17 116/67    Weight from Last 3 Encounters:   18 166 lb 3.6 oz (75.4 kg)   17 164 lb 7.4 oz (74.6 kg)   17 161 lb 9.6 oz (73.3 kg)              Today, you had the following     No orders found for display         Today's Medication Changes          These changes are accurate as of: 18  2:35 PM.  If you have any questions, ask your nurse or doctor.               Start taking these medicines.        Dose/Directions    levofloxacin 750 MG tablet   Commonly known as:  LEVAQUIN   Used for:  Cystic fibrosis with pulmonary exacerbation (H), Cystic fibrosis (H)   Started by:  Daisha Álvarez MD        Dose:  750 mg   Take 1 tablet " (750 mg) by mouth daily for 14 days Please stop your azithromycin while you are on the levaquin.   Quantity:  14 tablet   Refills:  0            Where to get your medicines      These medications were sent to [facility] - [place], [place] - [address]  [address] [place] [address]     Phone:  [phone]     levofloxacin 750 MG tablet                Primary Care Provider Office Phone # Fax #    Susan Li [phone]       [facility]  [address] [address]        Equal Access to Services     [facility]: Hadii aad ku hadasho Soomaali, waaxda luqadaha, qaybta kaalmada adeegyada, waxalka souza . So [facility] [phone].    ATENCIÓN: Si habla español, tiene a davidson disposición servicios gratuitos de asistencia lingüística. [facility] [phone].    We comply with applicable federal civil rights laws and [place] laws. We do not discriminate on the basis of race, color, national origin, age, disability, sex, sexual orientation, or gender identity.            Thank you!     Thank you for choosing [facility]  for your care. Our goal is always to provide you with excellent care. Hearing back from our patients is one way we can continue to improve our services. Please take a few minutes to complete the written survey that you may receive in the mail after your visit with us. Thank you!             Your Updated Medication List - Protect others around you: Learn how to safely use, store and throw away your medicines at www.disposemymeds.org.          This list is accurate as of: 1/4/18  2:35 PM.  Always use your most recent med list.                   Brand Name Dispense Instructions for use Diagnosis    adapalene 0.1 % gel    DIFFERIN     Apply 1 applicator topically At Bedtime        * albuterol (2.5 MG/3ML) 0.083% neb solution     270 mL    Take 1 vial (2.5 mg) by nebulization 3 times daily     Cystic fibrosis with pulmonary manifestations (H)       * albuterol 108 (90 BASE) MCG/ACT Inhaler    PROAIR HFA    1 Inhaler    Inhale 2 puffs into the lungs every 6 hours as needed for shortness of breath / dyspnea or wheezing    CF (cystic fibrosis) (H)       amylase-lipase-protease 44372 UNITS Cpep    CREON 36    450 capsule    Take 5 capsules (180,000 Units) by mouth 3 times daily (with meals)    Cystic fibrosis exacerbation (H)       azelastine 0.1 % spray    ASTELIN    1 Bottle    Use 1-2 sprays both nostrils twice daily as needed    Cystic fibrosis with pulmonary exacerbation (H)       azithromycin 500 MG tablet    ZITHROMAX    12 tablet    Take one tablet on Monday, Wednesday and Friday morning    CF (cystic fibrosis) (H)       citalopram 20 MG tablet    celeXA    30 tablet    Take 1 tablet (20 mg) by mouth daily    Depression       dornase alpha 1 MG/ML neb solution    PULMOZYME    150 mL    Inhale 2.5 mg into the lungs 2 times daily    Cystic fibrosis with pulmonary manifestations (H)       fexofenadine 180 MG tablet    ALLEGRA    30 tablet    Take 1 tablet (180 mg) by mouth daily    CF (cystic fibrosis) (H)       fluticasone 50 MCG/ACT spray    FLONASE    1 Bottle    Spray 1 spray into both nostrils daily    CF (cystic fibrosis) (H), Cystic fibrosis with pulmonary exacerbation (H)       LANsoprazole 15 MG CR capsule    PREVACID    30 capsule    Take 1 capsule (15 mg) by mouth daily Take 30-60 minutes before a meal.    CF (cystic fibrosis) (H)       levofloxacin 750 MG tablet    LEVAQUIN    14 tablet    Take 1 tablet (750 mg) by mouth daily for 14 days Please stop your azithromycin while you are on the levaquin.    Cystic fibrosis with pulmonary exacerbation (H), Cystic fibrosis (H)       melatonin 5 MG tablet     60 tablet    Take 1-2 tablets (5-10 mg) by mouth nightly as needed    Cystic fibrosis with pulmonary exacerbation (H)       MINOCYCLINE HCL PO      Take 100 mg by mouth 2 times daily         multivitamin CF formula softgel cap     60 capsule    Take 2 capsules by mouth daily    Cystic fibrosis (H)       sodium chloride inhalant 7 % Nebu neb solution     240 mL    Take 4 mLs by nebulization 2 times daily    Cystic fibrosis with pulmonary exacerbation (H)       tobramycin (PF) 300 MG/5ML neb solution    COBY    280 mL    Take 5 mLs (300 mg) by nebulization 2 times daily Cycle 28 days on/off    Cystic fibrosis with pulmonary manifestations (H)       tretinoin 0.05 % cream    RETIN-A    30 g    Apply topically every morning as directed    Acne       * Notice:  This list has 2 medication(s) that are the same as other medications prescribed for you. Read the directions carefully, and ask your doctor or other care provider to review them with you.

## 2018-01-04 NOTE — LETTER
2018      RE: Demario Abbasi  [address]  [place] [address]       Pediatrics Pulmonary - Provider Note  Cystic Fibrosis - Return Visit    Patient: Demario Abbasi MRN# [id number]   Encounter: 2018  : 1995      Opening Statement  We had the pleasure of consulting on Demario at the [facility] at the [facility] for a one month follow-up visit.  He was accompanied by his parents to this visit.     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 He had a right upper lobectomy in . He was most recently hospitalized at [facility] last year from  - 3/2/17 for a CF pulmonary exacerbation complicated by hemoptysis and influenza A infection.  Demario presented with dorita hemoptysis, fever, cough, fatigue and weight loss.  His initial FEV1 at admission was 35% predicted.  Following treatment, his FEV1 improved up to a max of 60% predicted and he gained the weight back that he lost.     I last saw Demario over the summer, prior to him heading to [employer] [facility] [employer] in [place] to continue his schooling.  While away, he said that he did relatively well without any illness.  However, there are two issues that Demario brought up today of concern.  From a pulmonary standpoint, Demario reports that over  break, he started to feel different from a lung standpoint. He reports more up and down days, characterized by chest and back tightness and an increase in the production and thickness in his sputum.  His cough is increased and more frequent in nature.  He reports one episode of hemoptysis that occurred around [place] time.  He said that he felt it was coming, went to the bathroom, and coughed up bright red blood with mucus.  This lasted only a short period of time.  He did not tell his family or call us.  It resolved; however, he has  "continued to have occasional brown, rust-colored sputum that has a distinctive, \"wheat\" taste to it (different from his norm).  His cough and sputum production do not worsen or improve with VEST treatments.  He reports that it is often difficult to cough mucus up because of its thickness.  When he is well, he does VEST twice daily with Albuterol and Pulmozyme.  He cycles COBY for his Achromobacter.  He takes azithromycin three times weekly.  He also has an albuterol inhaler that he will use intermittently.      Demario's second issue concerns his GI tract.  He reports that he is having intermittent, excruciating episodes of abdominal pain since last December 2016 that appear to be increasing in frequency and severity.  He points to the middle of his belly just below his sternum, and that it is in the same place each time.  Demario first thought it might be related to drinking beer, as they first started to happen in conjunction with drinking.  However, they have continued and have occurred out of the blue as well.  He reports them as severe, causing him to double over and one episode caused him to crumple on his steps at home and vomit.  They will last anywhere from minutes to over an hour, and then they are completely gone. He does not report any belly pain inbetween.  His last episode was about a week and a half ago.  He is on daily prevacid and takes this consistently.  He has a normal appetite and no trouble eating.  Food does not trigger episodes.  He has had normal stools and otherwise no emesis.  He continues on Creon 15340 capsule enzymes, taking 5 with meals and 3 with snacks. Demario has been good about taking his enzymes and they appear to be working well for him. Demario reports normal voids and well formed stools, 2-3 per day.    Demario has had issues with allergies in the past - he identifies this as more of a problem this spring/summer.  He takes daily Allegra. He has been taking his Nasonex on a daily basis and " says that it has significantly helped his allergy symptoms.        Demario just finished his first semester at [employer] in [place].  He lives with roommates in an off campus house.      Past Medical History:   Diagnosis Date     CF (cystic fibrosis) (H)      Impaired glucose tolerance      Pancreatic insufficiency      S/P lobectomy of lung 8/4/2015    Right upper lobectomy - 2009     Allergies  Allergies as of 01/04/2018 - Jose Luis as Reviewed 01/04/2018   Allergen Reaction Noted     Augmentin  10/25/2011     Current Outpatient Prescriptions   Medication Sig Dispense Refill     levofloxacin (LEVAQUIN) 750 MG tablet Take 1 tablet (750 mg) by mouth daily for 14 days Please stop your azithromycin while you are on the levaquin. 14 tablet 0     tobramycin, PF, (COBY) 300 MG/5ML neb solution Take 5 mLs (300 mg) by nebulization 2 times daily Cycle 28 days on/off 280 mL 0     fexofenadine (ALLEGRA) 180 MG tablet Take 1 tablet (180 mg) by mouth daily 30 tablet 0     LANsoprazole (PREVACID) 15 MG CR capsule Take 1 capsule (15 mg) by mouth daily Take 30-60 minutes before a meal. 30 capsule 0     dornase alpha (PULMOZYME) 1 MG/ML neb solution Inhale 2.5 mg into the lungs 2 times daily 150 mL 12     albuterol (ALBUTEROL) 108 (90 BASE) MCG/ACT Inhaler Inhale 2 puffs into the lungs every 6 hours as needed for shortness of breath / dyspnea or wheezing 1 Inhaler 3     azithromycin (ZITHROMAX) 500 MG tablet Take one tablet on Monday, Wednesday and Friday morning 12 tablet 12     fluticasone (FLONASE) 50 MCG/ACT spray Spray 1 spray into both nostrils daily 1 Bottle 3     amylase-lipase-protease (CREON 36) 59004 UNITS CPEP Take 5 capsules (180,000 Units) by mouth 3 times daily (with meals) 450 capsule 11     multivitamin CF formula (MVW COMPLETE FORMULATION ) softgel cap Take 2 capsules by mouth daily 60 capsule 3     albuterol (2.5 MG/3ML) 0.083% neb solution Take 1 vial (2.5 mg) by nebulization 3 times daily 270 mL 6     sodium chloride  "inhalant 7 % NEBU neb solution Take 4 mLs by nebulization 2 times daily 240 mL 11     melatonin 5 MG tablet Take 1-2 tablets (5-10 mg) by mouth nightly as needed 60 tablet 1     citalopram (CELEXA) 20 MG tablet Take 1 tablet (20 mg) by mouth daily 30 tablet 1     azelastine (ASTELIN) 0.1 % spray Use 1-2 sprays both nostrils twice daily as needed 1 Bottle 1     tretinoin (RETIN-A) 0.05 % cream Apply topically every morning as directed 30 g 1     MINOCYCLINE HCL PO Take 100 mg by mouth 2 times daily       adapalene (DIFFERIN) 0.1 % gel Apply 1 applicator topically At Bedtime         PMH    Past medical history reviewed with patient/parent today, no changes.    Immunization History   Administered Date(s) Administered     Influenza (IIV3) PF 09/01/2012     Influenza Vaccine IM 3yrs+ 4 Valent IIV4 11/03/2016       PSH    Past surgical history reviewed with patient/parent today, no changes.    FH    Family history reviewed with patient/parent today, no changes.    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  History of using chewing tobacco.    ROS    A comprehensive review of systems was performed and is negative except as noted in the HPI.    Last CF Annual Studies date: November 2016 (unable to be arranged for today; to be scheduled at his next visit)    Objective:     Physical Exam    Vital Signs:  /65  Pulse 70  Temp 97.6  F (36.4  C)  Resp 19  Ht 5' 10.28\" (178.5 cm)  Wt 166 lb 3.6 oz (75.4 kg)  SpO2 97%  BMI 23.66 kg/m2       Wt Readings from Last 4 Encounters:   01/04/18 166 lb 3.6 oz (75.4 kg)   07/20/17 164 lb 7.4 oz (74.6 kg)   06/22/17 161 lb 9.6 oz (73.3 kg)   04/13/17 164 lb 14.5 oz (74.8 kg)     /65  Pulse 70  Temp 97.6  F (36.4  C)  Resp 19  Ht 5' 10.28\" (178.5 cm)  Wt " 166 lb 3.6 oz (75.4 kg)  SpO2 97%  BMI 23.66 kg/m2    Constitutional: No distress, comfortable, pleasant. Occasional cough, not productive.    Vital signs: Reviewed and normal.  Eyes: Anicteric, normal extra-ocular movements, Pupils are equal and reactive to light  Ears, Nose and Throat: Tympanic membranes clear, nose clear and free of lesions, throat clear. New braces in place.  Neck: Supple with full range of motion, no thyromegaly.   Cardiovascular: Regular rate and rhythm, no murmurs, rubs or gallops, peripheral pulses full and symmetric  Chest: Symmetrical, no retractions.  Respiratory: Good effort, no crackles; no distress, normal breath sounds  Gastrointestinal: Positive bowel sounds, nontender, no hepatosplenomegaly, no masses  Musculoskeletal: Full range of motion, no edema.  Skin: No concerning lesions, no jaundice.  Neurological: Normal muscle tone and strength.    Spirometry was done 1/4/2018    Results for orders placed or performed in visit on 01/04/18   General PFT Lab (Please always keep checked)   Result Value Ref Range    FVC-Pred 5.57 L    FVC-Pre 3.51 L    FVC-%Pred-Pre 62 %    FEV1-Pre 2.83 L    FEV1-%Pred-Pre 59 %    FEV1FVC-Pred 85 %    FEV1FVC-Pre 81 %    FEFMax-Pred 10.17 L/sec    FEFMax-Pre 8.28 L/sec    FEFMax-%Pred-Pre 81 %    FEF2575-Pred 5.08 L/sec    FEF2575-Pre 2.67 L/sec    ABN4199-%Pred-Pre 52 %    ExpTime-Pre 6.91 sec    FIFMax-Pre 3.60 L/sec    FEV1FEV6-Pred 84 %    FEV1FEV6-Pre 81 %       Spirometry Interpretation:  Good effort and acceptable for interpretation.  Evidence of restrictive lung disease given a decreased FVC.  FEV1/FVC is improved from prior.    Culture (3/2017):  Light growth Normal elmer; Light growth Achromobacter xylosoxidans/denitrificans     Culture (4/2017):  S. Aureus, two strains Achromobacter    Culture (6/2017):  Not done    Culture (7/2017):  S. Aureus, achromobacter    Culture 1/2018:  pending    Laboratory or other tests ordered were  reviewed.    Assessment       Demario is a 22 year old young man with CF and pancreatic insufficiency s/p right upper lobectomy in 2009. His genotype contains a STOP mutation, so he is not a candidate for the CF drug, Orkambi.  He was hospitalized for a severe CF pulmonary exacerbation complicated by hemoptysis and influenza A in late February - early March 2017 and recovered from this.  He grows two species of Achromobacter and MSSA.  He is reporting symptoms consistent with a CF pulmonary exacerbation although his FEV1 is unchanged.  Will send his sputum for culture and AFB to look for new infection.  Will treat him with oral antibiotics, although the choices are not ideal given resistance patterns of his Achromobacter.  His belly pain is concerning as well.  Will start with blood work and abdominal ultrasound and a referral to adult GI for evaluation.  We were not able to coordinate his OGTT and annual studies to be done today, this will need to be done at his next visit to adult CF clinic.  He will be touring the clinic today with Maria G, our .      CF Pulmonary Exacerbation:  Mild - treated with quinolone and increased VEST     Plan:       1. Please call Cora in the CF office to schedule your first adult clinic visit. She will also coordinate your annual studies. Cora's number is [phone].    2. Your ultrasound is scheduled in radiology here on the [facility] at 4pm. You may have clear liquids until 2 pm. Do not eat anything until after your ultrasound. Do not drink anything after 2pm.    3. Blood work today to be done to look for causes of your belly pain.    4. We will work to schedule an appointment for you with our Adult GI physician who specializes in CF.    5.  I will be sending an antibiotic to your local pharmacy for you to take for the next two weeks.  Please do not take your azithromycin while you are taking the Levaquin.      6.  Follow-up in the adult clinic - you will need annual  studies at the time of this visit.    7.  I will call you later with lab and ultrasound results.    It has been wonderful to be your physician - I'm proud of you, Demario.  Good luck and keep in touch!    Mary Grace Álvarez MD MSCS  Pediatric Pulmonology        CC  YEN ROY    Copy to patient  JAYME,DEAN JAYME,WOLFGANG  [address]  [place] [address]

## 2018-01-04 NOTE — PROGRESS NOTES
SUBJECTIVE/OBJECTIVE:                           Demario Abbasi is a 22 year old male coming in for routine clinic visit.  His primary pulmonologist is Dr. Ashley Álvarez.      He is accompanied by his parents today.    Allergies/ADRs: Reviewed in Epic  Tobacco: No tobacco use  Alcohol: none  PMH: Reviewed in Epic    Medication Adherence  The patient misses their medication 0 times per week.    Patient is responsible for his own medications.  He is currently responsible for refilling all medications through [facility] pharmacy and let's his parents know when he needs refills from [facility].  He is interested in taking on ordering all medications, if not this semester during the summer when he has more time. Provided Demario with the contact information to [facility] and walked him through the ordering process.   Patient estimated adherence level: %  Pharmacy MPR is [employer] 0.83, Elvin 0.86  Barriers to medication adherence include: no issues identified  Facilitators to medication adherence include: Demario has established a routine that works well for him during school.      Medication Access  The patient fills specialty prescriptions at [facility] and retail medications at [facility].  Medication access barriers: no issues reported by patient.  Has the patient been offered to fill at [facility]? Yes  Programs or coupons used: [employer] co-pay card - however dad states this is not currently active.     CF  Inhaled medications   Bronchodilator: albuterol   Mucolytic: Pulmozyme and hypertonic saline  Antibiotic: tobramycin  Other: Astelin nasal spray twice daily, Flonase nasal spray once daily  Oral medications   Azithromycin: taking - 500mg M,W,F  CFTR modulator: not indicated   Other: fexofenadine 180mg daily  Pulmonary symptoms are stable  PFTs are stable  Current FEV1 59  Cultures: sputum cultures grow Staph and Achromobacter  Exacerbation - mild. Will start a 14 day course of oral  Levaquin today with increased VEST treatments.    Pancreatic Insufficiency/Nutrition: Pancreatic enzyme replacement with Creon 20041  Patient is taking 5 capsules with meals and 3 capsules with snacks.    Acid Reducer: Prevacid (lansoprazole) 15mg QD  Bowel regimen: none  Weight and BMI are increased  Vitamins include: [id number] 2 caps daily, Melatonin 5mg 1-2 tabs QHSprn    Lab Results   Component Value Date    VITDT 41 11/03/2016    VITDT 47 08/13/2015     Acne: Demario is currently taking minocycline 100mg BID and using adapalene 0.1% topically at bedtime along with tretinoin 0.05% topically in the morning. He feels this regimen is working well and is followed by his PCP for this. He denies excessive dry skin or irritation.    Depression: Demario remains on citalopram 20mg daily and feels this is working well for him.  He has remained stable on this dose for several months now.    PFTs:      Weight/BMI        ASSESSMENT:                             Current medications were reviewed today.       Medication Adherence: no issues identified - Demario is doing a great job of managing his own medication supply and is eager to take on ordering his own specialty medications soon as well.  He understands the importance of remaining adherent to meds/therapy and was able to verbalize his routine.  Demario has an age-appropriate understanding of this for his transition to the adult care team.  He will need continued encouragement and guidance on ordering medications.    Medication Access: no issues identified - no copays for medications. Encouraged Demario to order his medications as soon as possible in case anything needs a new prior authorization this year.    CF: Stable. Patient would benefit from no changes at this time    Pancreatic Insufficiency/Nutrition: Stable.  Patient would benefit from no changes at this time    Acne: Stable. Patient would benefit from no changes at this time.    Depression: Stable. Patient would benefit from no  changes at this time.    PLAN:                            1. Keep up the good work with managing your medication supply.  2. The next step will be to take on ordering your medications from [facility] yourself.  Please call them at the number provided in the CF welcome packet to order medications.  3. Please try to fill your medications as soon as possible in case any prior authorizations are necessary since it is the new year.    I spent 15 minutes with this patient today. No changes were made today.     Will follow up with adult pharmacist to ensure smooth transition of care.    The patient was provided a summary of these recommendations in the AVS from CF care team visit.    Ansley Santo PharmD  CF Clinic Pharmacist  Phone: [phone]  E-mail: [email or web address]

## 2018-01-04 NOTE — PATIENT INSTRUCTIONS
1. Please call Cora in the CF office to schedule your first adult clinic visit. She will also coordinate your annual studies. Cora's number is [phone].    2. Your ultrasound is scheduled in radiology here on the [facility] at 4pm. You may have clear liquids until 2 pm. Do not eat anything until after your ultrasound. Do not drink anything after 2pm.    3. Blood work today to be done to look for causes of your belly pain.    4. We will work to schedule an appointment for you with our Adult GI physician who specializes in CF.    5.  I will be sending an antibiotic to your local pharmacy for you to take for the next two weeks.  Please do not take your azithromycin while you are taking the Levaquin.      6.  Follow-up in the adult clinic - you will need annual studies at the time of this visit.    7.  I will call you later with lab and ultrasound results.    It has been wonderful to be your physician - I'm proud of youDemario.  Good luck and keep in touch!    Mary Grace Álvarez MD MSCS  Pediatric Pulmonology

## 2018-01-04 NOTE — MR AVS SNAPSHOT
After Visit Summary   1/4/2018    Demario Abbasi    MRN: [id number]           Patient Information     Date Of Birth          1995        Visit Information        Provider Department      1/4/2018 11:10 AM Daisha Álvarez MD Peds Pulmonary        Today's Diagnoses     Cystic fibrosis (H)    -  1    Abdominal pain, epigastric        Exocrine pancreatic insufficiency        Cystic fibrosis with pulmonary exacerbation (H)          Care Instructions    1. Please call Cora in the CF office to schedule your first adult clinic visit. She will also coordinate your annual studies. Cora's number is [phone].    2. Your ultrasound is scheduled in radiology here on the [facility] at 4pm. You may have clear liquids until 2 pm. Do not eat anything until after your ultrasound. Do not drink anything after 2pm.    3. Blood work today to be done to look for causes of your belly pain.    4. We will work to schedule an appointment for you with our Adult GI physician who specializes in CF.    5.  I will be sending an antibiotic to your local pharmacy for you to take for the next two weeks.  Please do not take your azithromycin while you are taking the Levaquin.      6.  Follow-up in the adult clinic - you will need annual studies at the time of this visit.    7.  I will call you later with lab and ultrasound results.    It has been wonderful to be your physician - I'm proud of you, Demario.  Good luck and keep in touch!    Mary Grace Álvarez MD MSCS  Pediatric Pulmonology            Follow-ups after your visit        Follow-up notes from your care team     Return in about 1 month (around 2/4/2018).      Your next 10 appointments already scheduled     Jan 04, 2018  4:00 PM CST   US ABDOMEN/PELVIS DUPLEX COMPLETE with URUS1   [facility] ([facility])    [address] [place] [id number] [phone]           Please bring a list of  your medicines (including vitamins, minerals and over-the-counter drugs). Also, tell your doctor about any allergies you may have. Wear comfortable clothes and leave your valuables at home.  Adults: No eating or drinking for 8 hours before the exam. You may take medicine with a small sip of water.  Children: - Children 6+ years: No food or drink for 6 hours before exam. - Children 1-5 years: No food or drink for 4 hours before exam. - Infants, breast-fed: may have breast milk up to 2 hours before exam. - Infants, formula: may have bottle until 4 hours before exam.  Please call the Imaging Department at your exam site with any questions.              Who to contact     Please call your clinic at [phone] to:    Ask questions about your health    Make or cancel appointments    Discuss your medicines    Learn about your test results    Speak to your doctor   If you have compliments or concerns about an experience at your clinic, or if you wish to file a complaint, please contact [facility] Patient Relations at [phone] or email us at [email or web address]         Additional Information About Your Visit        Retail Rocket Information     Ret[employer] Rocket is an electronic gateway that provides easy, online access to your medical records. With Retail Rocket, you can request a clinic appointment, read your test results, renew a prescription or communicate with your care team.     To sign up for [employer]ocket visit the website at [email or web address]   You will be asked to enter the access code listed below, as well as some personal information. Please follow the directions to create your username and password.     Your access code is: [email or web address]  Expires: 2018 12:22 PM     Your access code will  in 90 days. If you need help or a new code, please contact your [facility] or call [phone] for assistance.        Care EveryWhere ID     This is your Care  "EveryWhere ID. This could be used by other organizations to access your [facility] medical records  [id number]        Your Vitals Were     Pulse Temperature Respirations Height Pulse Oximetry BMI (Body Mass Index)    70 97.6  F (36.4  C) 19 5' 10.28\" (178.5 cm) 97% 23.66 kg/m2       Blood Pressure from Last 3 Encounters:   01/04/18 123/65   07/20/17 122/72   06/22/17 116/67    Weight from Last 3 Encounters:   01/04/18 166 lb 3.6 oz (75.4 kg)   07/20/17 164 lb 7.4 oz (74.6 kg)   06/22/17 161 lb 9.6 oz (73.3 kg)              We Performed the Following     CBC with platelets differential     Comprehensive metabolic panel     Cystic Fibrosis Culture Aerob Bacterial     GGT     US Abdomen Complete w Doppler Complete          Today's Medication Changes          These changes are accurate as of: 1/4/18 12:22 PM.  If you have any questions, ask your nurse or doctor.               Start taking these medicines.        Dose/Directions    levofloxacin 750 MG tablet   Commonly known as:  LEVAQUIN   Used for:  Cystic fibrosis with pulmonary exacerbation (H), Cystic fibrosis (H)   Started by:  Daisha Álvarez MD        Dose:  750 mg   Take 1 tablet (750 mg) by mouth daily for 14 days Please stop your azithromycin while you are on the levaquin.   Quantity:  14 tablet   Refills:  0            Where to get your medicines      These medications were sent to [facility] [place] [id number] [place] [address]     Phone:  [phone]     levofloxacin 750 MG tablet                Primary Care Provider Office Phone # Fax #    Susan GARLAND eJn [phone]       F[facility]  [address] [facility] [address]        Equal Access to Services     MCKENNA LINDSAY AH: Cristal Tracey, wadwightda lumartha, qaybta kaalmada ehsan, denice viera. Corin [facility] [phone].    ATENCIÓN: Si habla español, tiene a davidson disposición servicios gratuitos de " marioa lingüística. Trevor al [phone].    We comply with applicable federal civil rights laws and [place] laws. We do not discriminate on the basis of race, color, national origin, age, disability, sex, sexual orientation, or gender identity.            Thank you!     Thank you for choosing PEDS PULMONARY  for your care. Our goal is always to provide you with excellent care. Hearing back from our patients is one way we can continue to improve our services. Please take a few minutes to complete the written survey that you may receive in the mail after your visit with us. Thank you!             Your Updated Medication List - Protect others around you: Learn how to safely use, store and throw away your medicines at www.disposemymeds.org.          This list is accurate as of: 1/4/18 12:22 PM.  Always use your most recent med list.                   Brand Name Dispense Instructions for use Diagnosis    adapalene 0.1 % gel    DIFFERIN     Apply 1 applicator topically At Bedtime        * albuterol (2.5 MG/3ML) 0.083% neb solution     270 mL    Take 1 vial (2.5 mg) by nebulization 3 times daily    Cystic fibrosis with pulmonary manifestations (H)       * albuterol 108 (90 BASE) MCG/ACT Inhaler    PROAIR HFA    1 Inhaler    Inhale 2 puffs into the lungs every 6 hours as needed for shortness of breath / dyspnea or wheezing    CF (cystic fibrosis) (H)       amylase-lipase-protease 53173 UNITS Cpep    CREON 36    450 capsule    Take 5 capsules (180,000 Units) by mouth 3 times daily (with meals)    Cystic fibrosis exacerbation (H)       azelastine 0.1 % spray    ASTELIN    1 Bottle    Use 1-2 sprays both nostrils twice daily as needed    Cystic fibrosis with pulmonary exacerbation (H)       azithromycin 500 MG tablet    ZITHROMAX    12 tablet    Take one tablet on Monday, Wednesday and Friday morning    CF (cystic fibrosis) (H)       citalopram 20 MG tablet    celeXA    30 tablet    Take 1 tablet (20 mg) by mouth daily     Depression       dornase alpha 1 MG/ML neb solution    PULMOZYME    150 mL    Inhale 2.5 mg into the lungs 2 times daily    Cystic fibrosis with pulmonary manifestations (H)       fexofenadine 180 MG tablet    ALLEGRA    30 tablet    Take 1 tablet (180 mg) by mouth daily    CF (cystic fibrosis) (H)       fluticasone 50 MCG/ACT spray    FLONASE    1 Bottle    Spray 1 spray into both nostrils daily    CF (cystic fibrosis) (H), Cystic fibrosis with pulmonary exacerbation (H)       LANsoprazole 15 MG CR capsule    PREVACID    30 capsule    Take 1 capsule (15 mg) by mouth daily Take 30-60 minutes before a meal.    CF (cystic fibrosis) (H)       levofloxacin 750 MG tablet    LEVAQUIN    14 tablet    Take 1 tablet (750 mg) by mouth daily for 14 days Please stop your azithromycin while you are on the levaquin.    Cystic fibrosis with pulmonary exacerbation (H), Cystic fibrosis (H)       melatonin 5 MG tablet     60 tablet    Take 1-2 tablets (5-10 mg) by mouth nightly as needed    Cystic fibrosis with pulmonary exacerbation (H)       MINOCYCLINE HCL PO      Take 100 mg by mouth 2 times daily        multivitamin CF formula softgel cap     60 capsule    Take 2 capsules by mouth daily    Cystic fibrosis (H)       sodium chloride inhalant 7 % Nebu neb solution     240 mL    Take 4 mLs by nebulization 2 times daily    Cystic fibrosis with pulmonary exacerbation (H)       tobramycin (PF) 300 MG/5ML neb solution    COBY    280 mL    Take 5 mLs (300 mg) by nebulization 2 times daily Cycle 28 days on/off    Cystic fibrosis with pulmonary manifestations (H)       tretinoin 0.05 % cream    RETIN-A    30 g    Apply topically every morning as directed    Acne       * Notice:  This list has 2 medication(s) that are the same as other medications prescribed for you. Read the directions carefully, and ask your doctor or other care provider to review them with you.

## 2018-01-04 NOTE — NURSING NOTE
"Chief Complaint   Patient presents with     RECHECK     follow up       Initial /65  Pulse 70  Temp 97.6  F (36.4  C)  Resp 19  Ht 5' 10.28\" (178.5 cm)  Wt 166 lb 3.6 oz (75.4 kg)  SpO2 97%  BMI 23.66 kg/m2 Estimated body mass index is 23.66 kg/(m^2) as calculated from the following:    Height as of this encounter: 5' 10.28\" (178.5 cm).    Weight as of this encounter: 166 lb 3.6 oz (75.4 kg).  Medication Reconciliation: complete     Douglas Aquino LPN      "

## 2018-01-04 NOTE — LETTER
January 10, 2018    RE: Demario Abbasi  [address]  [place] [address]    MRN: [id number]  : 1995    Dear Demario,    I am enclosing the results of Demario's lab testing. Demario has grown both of these organisms in the past. No treatment is needed for these results. As a reminder, Demario will be due for his oral glucose tolerance test with his first adult CF appointment. Cora in the CF office will help you to coordinate this appointment. Cora's phone number is [phone]. Please contact us with any questions!    Resulted Orders   Cystic Fibrosis Culture Aerob Bacterial   Result Value Ref Range    Specimen Description Sputum     Culture Micro Heavy growth  Normal elmer       Culture Micro (A)      Heavy growth  Achromobacter xylosoxidans/denitrificans      Culture Micro (A)      Light growth  Staphylococcus aureus  This isolate is presumed to be clindamycin resistant based on detection of inducible   clindamycin resistance. Erythromycin and clindamycin are resistant, therefore, they are   not recommended for use.     Comprehensive metabolic panel   Result Value Ref Range    Sodium 140 133 - 144 mmol/L    Potassium 4.2 3.4 - 5.3 mmol/L    Chloride 108 94 - 109 mmol/L    Carbon Dioxide 27 20 - 32 mmol/L    Anion Gap 5 3 - 14 mmol/L    Glucose 121 (H) 70 - 99 mg/dL    Urea Nitrogen 16 7 - 30 mg/dL    Creatinine 1.02 0.66 - 1.25 mg/dL    GFR Estimate >90 >60 mL/min/1.7m2      Comment:      Non  GFR Calc    GFR Estimate If Black >90 >60 mL/min/1.7m2      Comment:       GFR Calc    Calcium 9.0 8.5 - 10.1 mg/dL    Bilirubin Total 0.4 0.2 - 1.3 mg/dL    Albumin 3.8 3.4 - 5.0 g/dL    Protein Total 8.3 6.8 - 8.8 g/dL    Alkaline Phosphatase 150 40 - 150 U/L    ALT 43 0 - 70 U/L    AST 24 0 - 45 U/L   GGT   Result Value Ref Range    GGT 33 0 - 75 U/L   CBC with platelets differential   Result Value Ref Range    WBC 9.1 4.0 - 11.0 10e9/L    RBC Count 5.02 4.4 - 5.9 10e12/L     Hemoglobin 15.0 13.3 - 17.7 g/dL    Hematocrit 44.2 40.0 - 53.0 %    MCV 88 78 - 100 fl    MCH 29.9 26.5 - 33.0 pg    MCHC 33.9 31.5 - 36.5 g/dL    RDW 12.6 10.0 - 15.0 %    Platelet Count 273 150 - 450 10e9/L    Diff Method Automated Method     % Neutrophils 60.1 %    % Lymphocytes 29.0 %    % Monocytes 4.9 %    % Eosinophils 4.8 %    % Basophils 1.0 %    % Immature Granulocytes 0.2 %    Nucleated RBCs 0 0 /100    Absolute Neutrophil 5.5 1.6 - 8.3 10e9/L    Absolute Lymphocytes 2.6 0.8 - 5.3 10e9/L    Absolute Monocytes 0.5 0.0 - 1.3 10e9/L    Absolute Eosinophils 0.4 0.0 - 0.7 10e9/L    Absolute Basophils 0.1 0.0 - 0.2 10e9/L    Abs Immature Granulocytes 0.0 0 - 0.4 10e9/L    Absolute Nucleated RBC 0.0      Please feel free to contact me if you have any further questions.    Sincerely,    Daisha Álvarez

## 2018-01-05 DIAGNOSIS — E84.0 CYSTIC FIBROSIS WITH PULMONARY MANIFESTATIONS (H): ICD-10-CM

## 2018-01-08 ENCOUNTER — TELEPHONE (OUTPATIENT)
Dept: PULMONOLOGY | Facility: CLINIC | Age: 23
End: 2018-01-08

## 2018-01-08 DIAGNOSIS — E84.9 CF (CYSTIC FIBROSIS) (H): ICD-10-CM

## 2018-01-08 DIAGNOSIS — E84.9 CF (CYSTIC FIBROSIS) (H): Primary | ICD-10-CM

## 2018-01-08 RX ORDER — FEXOFENADINE HCL 180 MG/1
180 TABLET ORAL DAILY
Qty: 30 TABLET | Refills: 0 | Status: SHIPPED | OUTPATIENT
Start: 2018-01-08 | End: 2018-03-05

## 2018-01-08 RX ORDER — MECOBALAMIN 5000 MCG
15 TABLET,DISINTEGRATING ORAL DAILY
Qty: 30 CAPSULE | Refills: 0 | Status: SHIPPED | OUTPATIENT
Start: 2018-01-08 | End: 2018-01-09

## 2018-01-08 NOTE — TELEPHONE ENCOUNTER
PPI switched to pantoprazole 20 mg daily as insurance will not cover lansoprazole.      Prior Authorization Retail Medication Request  Medication/Dose: Lansoprazole 15 mg daily  Diagnosis and ICD code: Cystic Fibrosis E84.9  New/Renewal/Insurance Change PA: CVM Key [id number]   Previously Tried and Failed Therapies:     Insurance ID (if provided):   Insurance Phone (if provided):     Any additional info from fax request:     If you received a fax notification from an outside Pharmacy:  Pharmacy Name: [facility]  Pharmacy #: [phone]  Pharmacy Fax: [phone]

## 2018-01-09 ENCOUNTER — TELEPHONE (OUTPATIENT)
Dept: PHARMACY | Facility: CLINIC | Age: 23
End: 2018-01-09

## 2018-01-09 LAB
BACTERIA SPEC CULT: ABNORMAL
SPECIMEN SOURCE: ABNORMAL

## 2018-01-09 RX ORDER — PANTOPRAZOLE SODIUM 20 MG/1
20 TABLET, DELAYED RELEASE ORAL DAILY
Qty: 30 TABLET | Refills: 3 | Status: SHIPPED | OUTPATIENT
Start: 2018-01-09 | End: 2018-03-05

## 2018-01-09 RX ORDER — TOBRAMYCIN INHALATION SOLUTION 300 MG/5ML
300 INHALANT RESPIRATORY (INHALATION) 2 TIMES DAILY
Qty: 280 ML | Refills: 0 | Status: SHIPPED | OUTPATIENT
Start: 2018-01-09 | End: 2018-02-27

## 2018-01-09 NOTE — TELEPHONE ENCOUNTER
PA Initiation    Medication: PANTOPRAZOLE 20MG PENDING  Insurance Company: [employer]AP - Phone [phone] Fax [phone]  Pharmacy Filling the Rx: [facility] [place] - [address]  Filling Pharmacy Phone:    Filling Pharmacy Fax:    Start Date: 1/9/2018

## 2018-01-10 NOTE — TELEPHONE ENCOUNTER
PRIOR AUTHORIZATION DENIED    Medication: PANTOPRAZOLE 20MG (denied)    Denial Date: 1/10/2018    Denial Rational: see attached denial letter    Appeal Information: yes will appeal

## 2018-01-12 NOTE — TELEPHONE ENCOUNTER
Medication Appeal Initiation    We have initiated an appeal for the requested medication:  Medication: PANTOPRAZOLE 20MG (denied)  Appeal Start Date:  1/12/2018  Insurance Company: [employer] PMA - Phone [phone] Fax [phone]  Comments:  Manually faxed letter of medical necessity, journal article, and chart notes to plan marked urgent.

## 2018-01-15 NOTE — TELEPHONE ENCOUNTER
Received call from [facility] stating the appeal cannot be considered urgent and may take up to 30 days for determination. Reference #[id number]

## 2018-02-02 ENCOUNTER — RADIANT APPOINTMENT (OUTPATIENT)
Dept: GENERAL RADIOLOGY | Facility: CLINIC | Age: 23
End: 2018-02-02
Attending: INTERNAL MEDICINE
Payer: COMMERCIAL

## 2018-02-02 ENCOUNTER — OFFICE VISIT (OUTPATIENT)
Dept: PULMONOLOGY | Facility: CLINIC | Age: 23
End: 2018-02-02
Attending: INTERNAL MEDICINE
Payer: COMMERCIAL

## 2018-02-02 VITALS
BODY MASS INDEX: 23.24 KG/M2 | WEIGHT: 166 LBS | HEIGHT: 71 IN | SYSTOLIC BLOOD PRESSURE: 120 MMHG | RESPIRATION RATE: 16 BRPM | HEART RATE: 65 BPM | OXYGEN SATURATION: 95 % | DIASTOLIC BLOOD PRESSURE: 72 MMHG

## 2018-02-02 DIAGNOSIS — R73.02 IMPAIRED GLUCOSE TOLERANCE: ICD-10-CM

## 2018-02-02 DIAGNOSIS — E84.9 CYSTIC FIBROSIS (H): ICD-10-CM

## 2018-02-02 DIAGNOSIS — R10.13 ABDOMINAL PAIN, EPIGASTRIC: Primary | ICD-10-CM

## 2018-02-02 DIAGNOSIS — R10.13 ABDOMINAL PAIN, EPIGASTRIC: ICD-10-CM

## 2018-02-02 DIAGNOSIS — K86.81 EXOCRINE PANCREATIC INSUFFICIENCY: ICD-10-CM

## 2018-02-02 PROCEDURE — G0463 HOSPITAL OUTPT CLINIC VISIT: HCPCS | Mod: ZF

## 2018-02-02 ASSESSMENT — PAIN SCALES - GENERAL: PAINLEVEL: NO PAIN (0)

## 2018-02-02 NOTE — PROGRESS NOTES
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 Reports having episodes of upper abd pain (sharp pain lasting several minutes to 15-20mins) periodically throughout the day/wk (severe pain \"causing me to double over in pain\" 2-3x/wk). Not clearly assoc w/ eating or fatty food ingestion, does not specifically note a relationship to etoh use (though does endorse binge drinking behavior at least 2x/wk when w/ friends). Reports having 3-4 soft BM/day (baseline), improved w/ use of PERT (reports potentially having diarrhea + steatorrhea if he misses doses but does not recall a specific time when this occurred). Denies any tenesmus or insecurity passing flatus, no fecal incontinence or new perianal/nocturnal sxs noted. Denies any N/V/F/C/HA/NS or other const/syst/cardiopulmonary sxs, no BRBPR/melena/urinary changes, no unintentional wt loss or appetite/satiety changes. No other bowel/bladder habit changes, no dysphagia/odynophagia. No jaundice/icterus/pruritus, no acholic stools/steatorrhea, no new lumps/bumps, no jt pain/oral ulcer/rash/eye sxs noted.    ROS: 10pt ROS performed and otherwise negative.    PERTINENT PAST MEDICAL HISTORY:  As noted above.    PREVIOUS ABDOMINAL/GYNECOLOGIC SURGERIES:  As noted above.    PREVIOUS ENDOSCOPY:  None.    PERTINENT MEDICATIONS:  - Creon 24K 5-6tabs/meals, 2-3tabs/snacks (~25tabs/day)  Medications reviewed with patient today, see Medication List/Assessment for details.  No other NSAID/anticoagulation reported by patient.  No other " OTC/herbal/supplements reported by patient.    SOCIAL HISTORY:  Reports social binge drinking (~10 drinks/sitting) 1-2x/wk w/ friends (counseled re: cutting back, particularly given ongoing abd pain evaluation). Denies smoking tobacco, +daily chewed tobacco use (also advised to quit). Denies any rd/ivda.    FAMILY HISTORY:  Multiple maternal family members requiring cholecystectomy, Sister is a CF carrier. Maternal uncle w/ psoriasis. No colon/panc/esophageal/other GI CA, no other Watson or other HPS-related Darren. No IBD/celiac, no other AI/liver/thyroid disease.    PHYSICAL EXAMINATION:  Vitals reviewed, AFVSS  Wt 166# today (stable)  Gen: aaox3, cooperative, pleasant, not diaphoretic, nad  HEENT: ncat, neck supple, no clad/sclad, normal op w/o ulcer/exudate, anicteric, mmm  Mallampati Score 1  Resp/CV without acute findings, not dyspneic/tachycardic  Abd: +nabs, soft, nt, nd, no peritoneal s/s noted. No ecchymoses, +R chest/axillary thoracotomy and drain scars, +mild prominence of a palpable liver at the R costal margin, no CVA/spinal tenderness noted.  Ext: no c/c/e  Skin: warm, perfused, no jaundice  Neuro: grossly intact, no asterixis noted    PERTINENT STUDIES:  Lytes/LFT normal, cr 1.02, alb 3.8, gluc 121 (sl elevated, awaiting OGTT)  wbc 9.1, hgb 15, plt 273, mcv 88    Complete Abd U/S 1/4/18  1. No acute abdominal finding.  2. 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  3. Mild hepatomegaly.    ASSESSMENT/PLAN:    1. Recurrent episodic abdominal pain in the setting of CF, remote history of transient LFT elevations, microgallbladder, and expected CF genotype pairing favoring PI-CF - recommend further hepatobiliary/pancreatic evaluation to better define hepatic parenchyma and biliary anatomy (particularly given episodic nature of pain), can readdress intestinal evaluation if needed thereafter  1. It was wonderful getting a chance to meet with you to  discuss your recurrent episodic severe abdominal pain, particularly in light of your specific CF manifestations/presentation (empiric PERT supplementation when initiated, no dorita DM diagnosis but concern for evolving glucose tolerance) - discussed potential hepatobiliary/pancreatic or intestinal causes for recurrent episodic abdominal pain at length today.  - Recommend moving forward with a routine Abdominal X-ray (AXR) today to assess underlying baseline stool burden.  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  - Recommend obtaining a fecal elastase test (stool studies) to obtain objective evidence for Exocrine Pancreas Insufficiency (EPI), particularly as evolving pancreatic inflammation with some level of preserved gland could possibly explain the symptoms you're having (Recurrent Acute Pancreatitis?). Please  these specimen containers/instructions from the lab today.  - Recommend working on reducing your chewed tobacco and alcohol usages as we discussed today, particularly given potential impacts in the evaluation/management of abdominal pain.    2. Will readdress role for Upper Endoscopy +/- Colonoscopy depending on the non-invasive imaging results.  - Continue your current Pancreatic Enzyme Replacement Therapy (PERT, currently on Creon) as ordered for now.  - I would recommend keeping a food/symptom diary to review at next visit, particularly as this may help identify other dietary/stress/volume triggers for your symptoms. Would be helpful to review this with CF Nutrition.  - Please discuss timing of transition to Adult CF Center with your Pediatric team, particularly as this might be helpful in your ongoing coordinated  care.    2. Colorectal Cancer Screening  No known FH CRC, would advise starting routine screening at age 40 unless symptomatic sooner or if required for current diagnostic evaluation.    RTC 3-4 months, sooner if symptomatic.      Thank you for this consultation. It was a pleasure to participate in the care of this patient; please contact us with any further questions.    Poncho Flowers MD   of Medicine  [facility] - Department of Medicine  Division of Gastroenterology

## 2018-02-02 NOTE — LETTER
"2/2/2018       RE: Demario Abbasi  [address]  [place] [address]     Dear Colleague,    Thank you for referring your patient, Demario Abbasi, to the [facility] at [facility]. Please see a copy of my visit note below.    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 Reports having episodes of upper abd pain (sharp pain lasting several minutes to 15-20mins) periodically throughout the day/wk (severe pain \"causing me to double over in pain\" 2-3x/wk). Not clearly assoc w/ eating or fatty food ingestion, does not specifically note a relationship to etoh use (though does endorse binge drinking behavior at least 2x/wk when w/ friends). Reports having 3-4 soft BM/day (baseline), improved w/ use of PERT (reports potentially having diarrhea + steatorrhea if he misses doses but does not recall a specific time when this occurred). Denies any tenesmus or insecurity passing flatus, no fecal incontinence or new perianal/nocturnal sxs noted. Denies any N/V/F/C/HA/NS or other const/syst/cardiopulmonary sxs, no BRBPR/melena/urinary changes, no unintentional wt loss or appetite/satiety changes. No other bowel/bladder habit changes, no dysphagia/odynophagia. No jaundice/icterus/pruritus, no acholic stools/steatorrhea, no new lumps/bumps, no jt pain/oral ulcer/rash/eye sxs noted.    ROS: 10pt ROS performed and otherwise negative.    PERTINENT PAST MEDICAL HISTORY:  As noted above.    PREVIOUS " ABDOMINAL/GYNECOLOGIC SURGERIES:  As noted above.    PREVIOUS ENDOSCOPY:  None.    PERTINENT MEDICATIONS:  - Creon 24K 5-6tabs/meals, 2-3tabs/snacks (~25tabs/day)  Medications reviewed with patient today, see Medication List/Assessment for details.  No other NSAID/anticoagulation reported by patient.  No other OTC/herbal/supplements reported by patient.    SOCIAL HISTORY:  Reports social binge drinking (~10 drinks/sitting) 1-2x/wk w/ friends (counseled re: cutting back, particularly given ongoing abd pain evaluation). Denies smoking tobacco, +daily chewed tobacco use (also advised to quit). Denies any rd/ivda.    FAMILY HISTORY:  Multiple maternal family members requiring cholecystectomy, Sister is a CF carrier. Maternal uncle w/ psoriasis. No colon/panc/esophageal/other GI CA, no other Watson or other HPS-related Darren. No IBD/celiac, no other AI/liver/thyroid disease.    PHYSICAL EXAMINATION:  Vitals reviewed, AFVSS  Wt 166# today (stable)  Gen: aaox3, cooperative, pleasant, not diaphoretic, nad  HEENT: ncat, neck supple, no clad/sclad, normal op w/o ulcer/exudate, anicteric, mmm  Mallampati Score 1  Resp/CV without acute findings, not dyspneic/tachycardic  Abd: +nabs, soft, nt, nd, no peritoneal s/s noted. No ecchymoses, +R chest/axillary thoracotomy and drain scars, +mild prominence of a palpable liver at the R costal margin, no CVA/spinal tenderness noted.  Ext: no c/c/e  Skin: warm, perfused, no jaundice  Neuro: grossly intact, no asterixis noted    PERTINENT STUDIES:  Lytes/LFT normal, cr 1.02, alb 3.8, gluc 121 (sl elevated, awaiting OGTT)  wbc 9.1, hgb 15, plt 273, mcv 88    Complete Abd U/S 1/4/18  1. No acute abdominal finding.  2. 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  3. Mild hepatomegaly.    ASSESSMENT/PLAN:    1. Recurrent episodic abdominal pain in the setting of CF, remote history of transient LFT elevations, microgallbladder, and  expected CF genotype pairing favoring PI-CF - recommend further hepatobiliary/pancreatic evaluation to better define hepatic parenchyma and biliary anatomy (particularly given episodic nature of pain), can readdress intestinal evaluation if needed thereafter  1. It was wonderful getting a chance to meet with you to discuss your recurrent episodic severe abdominal pain, particularly in light of your specific CF manifestations/presentation (empiric PERT supplementation when initiated, no dorita DM diagnosis but concern for evolving glucose tolerance) - discussed potential hepatobiliary/pancreatic or intestinal causes for recurrent episodic abdominal pain at length today.  - Recommend moving forward with a routine Abdominal X-ray (AXR) today to assess underlying baseline stool burden.  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  - Recommend obtaining a fecal elastase test (stool studies) to obtain objective evidence for Exocrine Pancreas Insufficiency (EPI), particularly as evolving pancreatic inflammation with some level of preserved gland could possibly explain the symptoms you're having (Recurrent Acute Pancreatitis?). Please  these specimen containers/instructions from the lab today.  - Recommend working on reducing your chewed tobacco and alcohol usages as we discussed today, particularly given potential impacts in the evaluation/management of abdominal pain.    2. Will readdress role for Upper Endoscopy +/- Colonoscopy depending on the non-invasive imaging results.  - Continue your current Pancreatic Enzyme Replacement Therapy (PERT, currently on Creon) as ordered for now.  - I would recommend keeping a food/symptom diary to  review at next visit, particularly as this may help identify other dietary/stress/volume triggers for your symptoms. Would be helpful to review this with CF Nutrition.  - Please discuss timing of transition to Adult CF Center with your Pediatric team, particularly as this might be helpful in your ongoing coordinated care.    2. Colorectal Cancer Screening  No known FH CRC, would advise starting routine screening at age 40 unless symptomatic sooner or if required for current diagnostic evaluation.    RTC 3-4 months, sooner if symptomatic.      Thank you for this consultation. It was a pleasure to participate in the care of this patient; please contact us with any further questions.    Poncho Flowers MD   of Medicine  [facility] - Department of Medicine  Division of Gastroenterology

## 2018-02-02 NOTE — MR AVS SNAPSHOT
After Visit Summary   2/2/2018    Demario Abbasi    MRN: [id number]           Patient Information     Date Of Birth          1995        Visit Information        Provider Department      2/2/2018 8:00 AM Poncho Flowers MD [facility] Lung Science and Health        Today's Diagnoses     Abdominal pain, epigastric    -  1    Cystic fibrosis (H)        Impaired glucose tolerance        Exocrine pancreatic insufficiency          Care Instructions    I've included a brief summary of our discussion and care plan from today's visit below.  Please review this information with your primary care provider.  _______________________________________________________________________      1. It was wonderful getting a chance to meet with you to discuss your recurrent episodic severe abdominal pain, particularly in light of your specific CF manifestations/presentation (empiric PERT supplementation when initiated, no doriat DM diagnosis but concern for evolving glucose tolerance) - discussed potential hepatobiliary/pancreatic or intestinal causes for recurrent episodic abdominal pain at length today.  - Recommend moving forward with a routine Abdominal X-ray (AXR) today to assess underlying baseline stool burden.  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  - Recommend obtaining a fecal elastase test (stool studies) to obtain objective evidence for Exocrine Pancreas Insufficiency (EPI), particularly as evolving pancreatic inflammation with some level of preserved gland could possibly explain the symptoms you're having (Recurrent Acute Pancreatitis?). Please  these specimen containers/instructions from the lab  today.  - Recommend working on reducing your chewed tobacco and alcohol usages as we discussed today, particularly given potential impacts in the evaluation/management of abdominal pain.    2. Will readdress role for Upper Endoscopy +/- Colonoscopy depending on the non-invasive imaging results.  - Continue your current Pancreatic Enzyme Replacement Therapy (PERT, currently on Creon) as ordered for now.  - I would recommend keeping a food/symptom diary to review at next visit, particularly as this may help identify other dietary/stress/volume triggers for your symptoms. Would be helpful to review this with CF Nutrition.  - Please discuss timing of transition to Adult CF Center with your Pediatric team, particularly as this might be helpful in your ongoing coordinated care.    3. Return to GI Clinic with me in 3-4 months to review your progress, sooner if symptomatic.   - If you are unable to schedule this follow-up appointment today, please contact [facility] at [phone] within the next week to help set up this necessary appointment.    _______________________________________________________________________    It was a pleasure seeing you in clinic today - please be in touch if there are any further questions that arise following today's visit.  During business hours, you may reach Clinic Nurse Triage Line at [phone].  For urgent/emergent questions after business hours, you may reach the on-call GI Fellow by contacting the [facility]  at [phone].    Any benign/non-urgent test results are usually communicated via letter or Hershart message within 1-2 weeks after completion.  Urgent results (those that require a change in the previously-discussed care plan) are usually communicated via a phone call once available from our clinic staff to discuss the results and the next steps in your evaluation.    I recommend signing up for Agiliance access if you have not already done so and are  comfortable with using a computer.  This allows for online access to your lab results and also helps you communicate efficiently with my clinic should any questions arise in your care.    We have Financial Counseling services available through our clinic.  If you have questions about your insurance coverage or payment responsibilities, particularly before you undergo any tests or procedures, please let us know and we can arrange a consultation accordingly to help you make informed decisions about your healthcare.    Sincerely,    Poncho Flowers MD    [facility] - Department of Medicine  Division of Gastroenterology            Follow-ups after your visit        Follow-up notes from your care team     Return in about 3 months (around 5/2/2018).      Your next 10 appointments already scheduled     Feb 02, 2018  9:15 AM CST   XR ABDOMEN 1 VIEW with XR[address] [place] [address] Xray ([facility])    [address] [place] [id number] [phone]           Please bring a list of your current medicines to your exam. (Include vitamins, minerals and over-thecounter medicines.) Leave your valuables at home.  Tell your doctor if there is a chance you may be pregnant.  You do not need to do anything special for this exam.            Mar 08, 2018 10:45 AM CST   (Arrive by 10:30 AM)   MR ABDOMEN MRCP W/O & W CONTRAST with MR[address] [place] [address] MRI ([facility])    [address] [place] [id number] [phone]           Take your medicines as usual, unless your doctor tells you not to. Bring a list of your current medicines to your exam (including vitamins, minerals and over-the-counter drugs). Also bring the results of similar scans you may have had.    The day before your exam, drink extra fluids at least six 8-ounce glasses (unless your doctor tells you to restrict your fluids).    Have a blood test (creatinine test) within 30 days of your exam. Go to your clinic or Diagnostic Imaging Department for this test.   Do not eat or drink for 6 hours prior to exam.  The MRI machine uses a strong magnet. Please wear clothes without metal (snaps, zippers). A sweatsuit works well, or we may give you a hospital gown.  Please remove any body piercings and hair extensions before you arrive. You will also remove watches, jewelry, hairpins, wallets, dentures, partial dental plates and hearing aids. You may wear contact lenses, and you may be able to wear your rings. We have a safe place to keep your personal items, but it is safer to leave them at home.   **IMPORTANT** THE INSTRUCTIONS BELOW ARE ONLY FOR THOSE PATIENTS WHO HAVE BEEN TOLD THEY WILL RECEIVE SEDATION OR GENERAL ANESTHESIA DURING THEIR MRI PROCEDURE:  IF YOU WILL RECEIVE SEDATION (take medicine to help you relax during your exam):   You must get the medicine from your doctor before you arrive. Bring the medicine to the exam. Do not take it at home.   Arrive one hour early. Bring someone who can take you home after the test. Your medicine will make you sleepy. After the exam, you may not drive, take a bus or take a taxi by yourself.   No eating 8 hours before your exam. You may have clear liquids up until 4 hours before your exam. (Clear liquids include water, clear tea, black coffee and fruit juice without pulp.)  IF YOU WILL RECEIVE ANESTHESIA (be asleep for your exam):   Arrive 1 1/2 hours early. Bring someone who can take you home after the test. You may not drive, take a bus or take a taxi by yourself.   No eating 8 hours before your exam. You may have clear liquids up until 4 hours before your exam. (Clear liquids include water, clear tea, black coffee and fruit juice without pulp.)  If you have any questions, please contact your Imaging Department exam site.            May 11, 2018 10:00 AM CDT   (Arrive by 9:45 AM)   RETURN CYSTIC  "FIBROSIS VISIT with Poncho Flowers MD   [facility] Lung Science and Health ([facility])    [address] [facility]  [address] [place] [id number] [phone]              Future tests that were ordered for you today     Open Future Orders        Priority Expected Expires Ordered    Pancreatic Elastase Fecal Routine  4/3/2018 2018    X-ray Abdomen 1 vw Routine 2018 3/19/2018 2018    MRI Abdomen w & w/o contrast mrcp Routine  2019            Who to contact     If you have questions or need follow up information about today's clinic visit or your schedule please contact [facility] LUNG SCIENCE AND HEALTH directly at [phone].  Normal or non-critical lab and imaging results will be communicated to you by EcoTimberhart, letter or phone within 4 business days after the clinic has received the results. If you do not hear from us within 7 days, please contact the clinic through EcoTimberhart or phone. If you have a critical or abnormal lab result, we will notify you by phone as soon as possible.  Submit refill requests through [employer] or call your pharmacy and they will forward the refill request to us. Please allow 3 business days for your refill to be completed.          Additional Information About Your Visit        [facility] Information     MoBank lets you send messages to your doctor, view your test results, renew your prescriptions, schedule appointments and more. To sign up, go to [email or web address] . Click on \"Log in\" on the left side of the screen, which will take you to the Welcome page. Then click on \"Sign up Now\" on the right side of the page.     You will be asked to enter the access code listed below, as well as some personal information. Please follow the directions to create your username and password.     Your access code is: [email or web address]  Expires: 2018 12:22 PM     Your access code will  in 90 days. If you need help or a new " "code, please call your [facility] clinic or [phone].        Care EveryWhere ID     This is your Care EveryWhere ID. This could be used by other organizations to access your [facility] medical records  [id number]        Your Vitals Were     Pulse Respirations Height Pulse Oximetry BMI (Body Mass Index)       65 16 1.791 m (5' 10.5\") 95% 23.48 kg/m2        Blood Pressure from Last 3 Encounters:   02/02/18 120/72   01/04/18 123/65   07/20/17 122/72    Weight from Last 3 Encounters:   02/02/18 75.3 kg (166 lb)   01/04/18 75.4 kg (166 lb 3.6 oz)   07/20/17 74.6 kg (164 lb 7.4 oz)               Primary Care Provider Office Phone # Fax #    Susan Li [phone]       [facility]  [address] [place] [id number]        Equal Access to Services     MCKENNA LINDSAY : Hadii duarte ku hadasho Soomaali, waaxda luqadaha, qaybta kaalmada adeegyada, denice souza . So [facility] [phone].    ATENCIÓN: Si habla español, tiene a davidson disposición servicios gratuitos de asistencia lingüística. Llame al [phone].    We comply with applicable federal civil rights laws and [place] laws. We do not discriminate on the basis of race, color, national origin, age, disability, sex, sexual orientation, or gender identity.            Thank you!     Thank you for choosing [facility] FOR LUNG SCIENCE AND HEALTH  for your care. Our goal is always to provide you with excellent care. Hearing back from our patients is one way we can continue to improve our services. Please take a few minutes to complete the written survey that you may receive in the mail after your visit with us. Thank you!             Your Updated Medication List - Protect others around you: Learn how to safely use, store and throw away your medicines at www.disposemymeds.org.          This list is accurate as of 2/2/18  9:11 AM.  Always use your most recent med list.                   Brand Name Dispense Instructions " for use Diagnosis    adapalene 0.1 % gel    DIFFERIN     Apply 1 applicator topically At Bedtime        * albuterol (2.5 MG/3ML) 0.083% neb solution     270 mL    Take 1 vial (2.5 mg) by nebulization 3 times daily    Cystic fibrosis with pulmonary manifestations (H)       * albuterol 108 (90 BASE) MCG/ACT Inhaler    PROAIR HFA    1 Inhaler    Inhale 2 puffs into the lungs every 6 hours as needed for shortness of breath / dyspnea or wheezing    CF (cystic fibrosis) (H)       amylase-lipase-protease 93475 UNITS Cpep    CREON 36    450 capsule    Take 5 capsules (180,000 Units) by mouth 3 times daily (with meals)    Cystic fibrosis exacerbation (H)       azelastine 0.1 % spray    ASTELIN    1 Bottle    Use 1-2 sprays both nostrils twice daily as needed    Cystic fibrosis with pulmonary exacerbation (H)       azithromycin 500 MG tablet    ZITHROMAX    12 tablet    Take one tablet on Monday, Wednesday and Friday morning    CF (cystic fibrosis) (H)       citalopram 20 MG tablet    celeXA    30 tablet    Take 1 tablet (20 mg) by mouth daily    Depression       dornase alpha 1 MG/ML neb solution    PULMOZYME    150 mL    Inhale 2.5 mg into the lungs 2 times daily    Cystic fibrosis with pulmonary manifestations (H)       fexofenadine 180 MG tablet    ALLEGRA    30 tablet    Take 1 tablet (180 mg) by mouth daily    CF (cystic fibrosis) (H)       fluticasone 50 MCG/ACT spray    FLONASE    1 Bottle    Spray 1 spray into both nostrils daily    CF (cystic fibrosis) (H), Cystic fibrosis with pulmonary exacerbation (H)       melatonin 5 MG tablet     60 tablet    Take 1-2 tablets (5-10 mg) by mouth nightly as needed    Cystic fibrosis with pulmonary exacerbation (H)       MINOCYCLINE HCL PO      Take 100 mg by mouth 2 times daily        multivitamin CF formula softgel cap     60 capsule    Take 2 capsules by mouth daily    Cystic fibrosis (H)       pantoprazole 20 MG EC tablet    PROTONIX    30 tablet    Take 1 tablet (20 mg) by  mouth daily    CF (cystic fibrosis) (H)       sodium chloride inhalant 7 % Nebu neb solution     240 mL    Take 4 mLs by nebulization 2 times daily    Cystic fibrosis with pulmonary exacerbation (H)       tobramycin (PF) 300 MG/5ML neb solution    COBY    280 mL    Take 5 mLs (300 mg) by nebulization 2 times daily Cycle 28 days on/off    Cystic fibrosis with pulmonary manifestations (H)       tretinoin 0.05 % cream    RETIN-A    30 g    Apply topically every morning as directed    Acne       * Notice:  This list has 2 medication(s) that are the same as other medications prescribed for you. Read the directions carefully, and ask your doctor or other care provider to review them with you.

## 2018-02-02 NOTE — PATIENT INSTRUCTIONS
I've included a brief summary of our discussion and care plan from today's visit below.  Please review this information with your primary care provider.  _______________________________________________________________________      1. It was wonderful getting a chance to meet with you to discuss your recurrent episodic severe abdominal pain, particularly in light of your specific CF manifestations/presentation (empiric PERT supplementation when initiated, no dorita DM diagnosis but concern for evolving glucose tolerance) - discussed potential hepatobiliary/pancreatic or intestinal causes for recurrent episodic abdominal pain at length today.  - Recommend moving forward with a routine Abdominal X-ray (AXR) today to assess underlying baseline stool burden.  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  - Recommend obtaining a fecal elastase test (stool studies) to obtain objective evidence for Exocrine Pancreas Insufficiency (EPI), particularly as evolving pancreatic inflammation with some level of preserved gland could possibly explain the symptoms you're having (Recurrent Acute Pancreatitis?). Please  these specimen containers/instructions from the lab today.  - Recommend working on reducing your chewed tobacco and alcohol usages as we discussed today, particularly given potential impacts in the evaluation/management of abdominal pain.    2. Will readdress role for Upper Endoscopy +/- Colonoscopy depending on the non-invasive imaging results.  - Continue your current Pancreatic Enzyme Replacement Therapy (PERT, currently on Creon) as ordered for now.  - I would recommend keeping a food/symptom diary to review at next visit,  particularly as this may help identify other dietary/stress/volume triggers for your symptoms. Would be helpful to review this with CF Nutrition.  - Please discuss timing of transition to Adult [facility] with your Pediatric team, particularly as this might be helpful in your ongoing coordinated care.    3. Return to GI Clinic with me in 3-4 months to review your progress, sooner if symptomatic.   - If you are unable to schedule this follow-up appointment today, please contact [facility] at [phone] within the next week to help set up this necessary appointment.    _______________________________________________________________________    It was a pleasure seeing you in clinic today - please be in touch if there are any further questions that arise following today's visit.  During business hours, you may reach Clinic Nurse Triage Line at [phone].  For urgent/emergent questions after business hours, you may reach the on-call GI Fellow by contacting the [facility]  at [phone].    Any benign/non-urgent test results are usually communicated via letter or [employer] message within 1-2 weeks after completion.  Urgent results (those that require a change in the previously-discussed care plan) are usually communicated via a phone call once available from our clinic staff to discuss the results and the next steps in your evaluation.    I recommend signing up for [employer] access if you have not already done so and are comfortable with using a computer.  This allows for online access to your lab results and also helps you communicate efficiently with my clinic should any questions arise in your care.    We have Financial Counseling services available through our clinic.  If you have questions about your insurance coverage or payment responsibilities, particularly before you undergo any tests or procedures, please let us know and we can arrange a consultation accordingly to help you make informed  decisions about your healthcare.    Sincerely,    Poncho Flowers MD    [facility] - Department of Medicine  Division of Gastroenterology

## 2018-02-13 DIAGNOSIS — E84.9 CYSTIC FIBROSIS (H): ICD-10-CM

## 2018-02-13 NOTE — TELEPHONE ENCOUNTER
Signed MVW rx faxed to [facility].    May Maldonado, RN, CPTC  [facility]ystic Fibrosis/Pulmonary Care Coordinator   CF RN phone: [phone]

## 2018-02-27 DIAGNOSIS — E84.0 CYSTIC FIBROSIS WITH PULMONARY MANIFESTATIONS (H): ICD-10-CM

## 2018-02-27 RX ORDER — TOBRAMYCIN INHALATION SOLUTION 300 MG/5ML
300 INHALANT RESPIRATORY (INHALATION) 2 TIMES DAILY
Qty: 280 ML | Refills: 3 | Status: SHIPPED | OUTPATIENT
Start: 2018-02-27 | End: 2018-03-19

## 2018-03-05 DIAGNOSIS — E84.0 CYSTIC FIBROSIS WITH PULMONARY MANIFESTATIONS (H): ICD-10-CM

## 2018-03-05 DIAGNOSIS — E84.9 CF (CYSTIC FIBROSIS) (H): ICD-10-CM

## 2018-03-05 RX ORDER — PANTOPRAZOLE SODIUM 20 MG/1
20 TABLET, DELAYED RELEASE ORAL DAILY
Qty: 30 TABLET | Refills: 3 | Status: SHIPPED | OUTPATIENT
Start: 2018-03-05 | End: 2018-08-23

## 2018-03-05 RX ORDER — ALBUTEROL SULFATE 0.83 MG/ML
1 SOLUTION RESPIRATORY (INHALATION) 3 TIMES DAILY
Qty: 270 ML | Refills: 6 | Status: SHIPPED | OUTPATIENT
Start: 2018-03-05 | End: 2019-03-05

## 2018-03-05 RX ORDER — FEXOFENADINE HCL 180 MG/1
180 TABLET ORAL DAILY
Qty: 30 TABLET | Refills: 3 | Status: SHIPPED | OUTPATIENT
Start: 2018-03-05 | End: 2018-07-26

## 2018-03-08 ENCOUNTER — RADIANT APPOINTMENT (OUTPATIENT)
Dept: MRI IMAGING | Facility: CLINIC | Age: 23
End: 2018-03-08
Attending: INTERNAL MEDICINE
Payer: COMMERCIAL

## 2018-03-08 DIAGNOSIS — R73.02 IMPAIRED GLUCOSE TOLERANCE: ICD-10-CM

## 2018-03-08 DIAGNOSIS — R10.13 ABDOMINAL PAIN, EPIGASTRIC: ICD-10-CM

## 2018-03-08 DIAGNOSIS — E84.9 CYSTIC FIBROSIS (H): ICD-10-CM

## 2018-03-08 DIAGNOSIS — K86.81 EXOCRINE PANCREATIC INSUFFICIENCY: ICD-10-CM

## 2018-03-08 RX ORDER — GADOBUTROL 604.72 MG/ML
7.5 INJECTION INTRAVENOUS ONCE
Status: COMPLETED | OUTPATIENT
Start: 2018-03-08 | End: 2018-03-08

## 2018-03-08 RX ADMIN — GADOBUTROL 7.5 ML: 604.72 INJECTION INTRAVENOUS at 10:55

## 2018-03-08 NOTE — LETTER
"    March 13, 2018       TO: Demario Abbasi  [address]   [place] [address]           Dear ,    We are writing to inform you of your test results.    Your MRI/MRCP findings overall are encouraging - this is great news! We do see changes of fatty atrophy and possible cyst formation in the pancreas; this is not uncommon in CF patients, and I'm not particularly concerned about these findings at this time. We may repeat an MRI in the future to keep track of this, and we will talk about the role/timing for this in your follow-up.    Most importantly, we do not see major biliary or liver tissue changes on this exam which is very good to see. You do have a condition called \"microgallbladder\" which is common in CF patients and is likely not a new finding for you; we can discuss this specific finding at the next clinic visit further.    We do not see an obvious cause for your abdominal discomfort on this study - we'll discuss next steps in your care at the next visit together.    Please review these findings with your referring Primary Care Provider further for now, and follow-up in GI Clinic as scheduled.  We can review these findings in further detail together at that visit, but feel free to contact us with any other questions in the interim.    Resulted Orders   MRI Abdomen w & w/o contrast mrcp    Narrative    MRCP Without and With Contrast    CLINICAL HISTORY: Cystic fibrosis, intermittent sharp abdominal pain  over past year    DATE: 3/8/2018 11:55 AM    TECHNIQUE:     Images were acquired with and without intravenous gadolinium contrast  through the upper abdomen. The following MR images were acquired  without intravenous contrast: TrueFISP, multiplanar T2-weighted, axial  T1 in/out of phase, T2-weighted MRCP images, axial diffusion-weighted  and axial apparent diffusion coefficient. T1-weighted images were  obtained before contrast at the multiple time points following  contrast " injection. 3-D reformatted images were generated by the  technologist. Contrast dose: 7.5mL Gadavist    Comparison study: Abdominal radiograph 1/4/2018    FINDINGS:    Biliary Tree: Common bile duct is not distended, measuring 3 mm in  diameter.  No intrahepatic biliary ductal dilatation. No intraluminal  signal defect to suggest stone. Conventional biliary anatomy.    Pancreas: Markedly atrophic and fatty replaced pancreas. Tiny cystic  foci within the pancreatic head (series 8, image 32), measuring up to  3 mm in diameter. Main pancreatic duct is not well seen past the level  of the pancreatic head. No pancreas divisum.    Liver: No focal hepatic lesions. No signal loss on in or out of phase  imaging to suggest iron or fat deposition.    Gallbladder: 1.0 x 0.5 cm T2 hyperintense focus along the medial  margin of the right lobe of liver (series 4 and 10, images 23 and 23  respectively) likely represents patient's known microgallbladder. No  pericholecystic fluid. No gallstones identified.    Spleen: Normal. Not enlarged. Small splenule.    Kidneys: No focal renal lesions. No hydronephrosis or hydroureter.    Adrenal glands: Normal adrenals.    Bowel: Visualized bowel is nondistended.    Lymph nodes: No significant retroperitoneal or mesenteric  lymphadenopathy.    Blood vessels: Circumaortic left renal vein. Blood vessels are patent.    Lung bases: Bronchiectatic changes in the middle lobe and lingula  compatible with provided history of cystic fibrosis. No convincing new  or acute airspace disease in the visualized lung bases.    Bones and soft tissues: No suspicious bony lesions. Nonenhancing T1  hypointense, T2 hyperintense focus within the L4 vertebral body is  favored to be benign.    Mesentery and abdominal wall: Normal.    Ascites: No ascites.      Impression    IMPRESSION:   1. 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  2. Conventional biliary anatomy; no biliary  abnormality to explain  patient's recurrent abdominal pain.  3. Tiny cystic foci within the pancreatic head, indeterminate, but may  represent sidebranch type IPMNs. Follow-up criteria below.    *Management of Incidental Pancreatic Cysts: A White Paper of the ACR  Incidental Findings Committee 2017*     <1.5cm incidental pacreatic cyst:  <65 years old at presenation  reimage q1yr x 5  If stable for 5 years, reimage q2yr x 2  Stop if stable for 9 years    65-79 years at presentation  reimage q2yr x 5  Stop if stable for 10 years    1.5-2.5cm incidental pancreatic cyst, main pancreatic duct  communication established by imaging  1.5-1.9cm  reimage q1yr x 5, then q2yr x 2  Stop if stable for 9 years    2.0-2.5cm  reimage q6mo x 4, then q1yr x 2, then q2yr x 3  Stop if stable for 10 years    1.5-2.5cm incidental pancreatic cyst, main pancreatic duct  communication absent OR cannot be determined by imaging  reimage q6mo x 4, then q1yr x 2, then q2y x 3  Stop if stable over 10 years    OR consider EUS/FNA    >2.5cm incidental pancreatic cyst  If low risk by imaging can consider reimage q6mo x 4, then q1yr x 2,  then q2yr x 3, stop if stable over 10 years    OR consider EUS/FNA    Incidental pacreatic cyst >/= 80 year at presentation  if </= 2.5cm reimage q2yr x 2 and stop of stable  if >/= 2.5cm reimage q2yr x 2 IF low risk by imaging, then stop. If  high risk, EUS/FNA + surgical consult     Interval growth will prompt change in reimaging plan, or need for  EUS/FNA depending on size. Refer to ACR white paper 2017.  For  multiple cysts, the cyst with the longest dimension should be used as  the index lesion.     The optimal initial study or first follow-up exam is MRI performed  with intravenous gadolinium contrast to allow full cyst  characterization. Once characterized, future follow-up MRIs can be  performed without contrast. If MRI is contraindicated, CT with  contrast is recommended.     >20% growth in longest  diameter, wall thickening or enhancement, mural  nodule, MPD >/= 7 mm, extrahepatic biliary obstruction/jaundice should  prompt immediate EUS/FNA and surgical evaluation regardless of size or  amount of growth     *Reference: https://doi.org/10.1016/j.jacr.2017.03.010.    I have personally reviewed the examination and initial interpretation  and I agree with the findings.    GUSTAVO ROOT MD         It has been a pleasure participating in your care - please be in touch if there are any further questions that arise.  During business hours, you may reach the Clinic Nurse Triage Line at [phone]  For urgent/emergent questions after business hours, you may reach the on-call GI Fellow by contacting the [facility]  at [phone].    I recommend signing up for [employer] access if you have not already done so and are comfortable with using a computer. This allows for online access to your lab results and also helps you communicate efficiently with my clinic should any questions arise in your care.    Sincerely,    Poncho Flowers MD    [facility] - Department of Medicine  Division of Gastroenterology

## 2018-03-08 NOTE — DISCHARGE INSTRUCTIONS
MRI Contrast Discharge Instructions    The IV contrast you received today will pass out of your body in your  urine. This will happen in the next 24 hours. You will not feel this process.  Your urine will not change color.    Drink at least 4 extra glasses of water or juice today (unless your doctor  has restricted your fluids). This reduces the stress on your kidneys.  You may take your regular medicines.    If you are on dialysis: It is best to have dialysis today.    If you have a reaction: Most reactions happen right away. If you have  any new symptoms after leaving the hospital (such as hives or swelling),  call your hospital at the correct number below. Or call your family doctor.  If you have breathing distress or wheezing, call 911.    Special instructions: ***    I have read and understand the above information.    Signature:______________________________________ Date:___________    Staff:__________________________________________ Date:___________     Time:__________    [place] [facility] Departments:    ___[place]: [phone]  ___[facility]: [phone]  ___[place]: [phone] ___[facility]: [phone]  ___[facility]: [phone]  ___[facility]: [phone]  ___[phone]  ___[facility]: [phone]  ___[phone]

## 2018-03-13 ENCOUNTER — APPOINTMENT (OUTPATIENT)
Dept: GENERAL RADIOLOGY | Facility: CLINIC | Age: 23
End: 2018-03-13
Attending: PEDIATRICS
Payer: COMMERCIAL

## 2018-03-13 ENCOUNTER — HOSPITAL ENCOUNTER (INPATIENT)
Facility: CLINIC | Age: 23
LOS: 4 days | Discharge: HOME HEALTH | End: 2018-03-17
Attending: PEDIATRICS | Admitting: PEDIATRICS
Payer: COMMERCIAL

## 2018-03-13 DIAGNOSIS — E84.9 CYSTIC FIBROSIS EXACERBATION (H): Primary | ICD-10-CM

## 2018-03-13 DIAGNOSIS — R04.2 HEMOPTYSIS: ICD-10-CM

## 2018-03-13 DIAGNOSIS — E84.9 CYSTIC FIBROSIS (H): ICD-10-CM

## 2018-03-13 LAB
ALBUMIN SERPL-MCNC: 3.7 G/DL (ref 3.4–5)
ALP SERPL-CCNC: 125 U/L (ref 40–150)
ALT SERPL W P-5'-P-CCNC: 30 U/L (ref 0–70)
ANION GAP SERPL CALCULATED.3IONS-SCNC: 7 MMOL/L (ref 3–14)
APTT PPP: 32 SEC (ref 22–37)
AST SERPL W P-5'-P-CCNC: 21 U/L (ref 0–45)
BASOPHILS # BLD AUTO: 0.1 10E9/L (ref 0–0.2)
BASOPHILS NFR BLD AUTO: 0.8 %
BILIRUB SERPL-MCNC: 0.6 MG/DL (ref 0.2–1.3)
BUN SERPL-MCNC: 16 MG/DL (ref 7–30)
CALCIUM SERPL-MCNC: 8.9 MG/DL (ref 8.5–10.1)
CHLORIDE SERPL-SCNC: 105 MMOL/L (ref 94–109)
CO2 SERPL-SCNC: 27 MMOL/L (ref 20–32)
CREAT SERPL-MCNC: 0.89 MG/DL (ref 0.66–1.25)
CRP SERPL-MCNC: 4.4 MG/L (ref 0–8)
DIFFERENTIAL METHOD BLD: NORMAL
EOSINOPHIL # BLD AUTO: 0.3 10E9/L (ref 0–0.7)
EOSINOPHIL NFR BLD AUTO: 4 %
ERYTHROCYTE [DISTWIDTH] IN BLOOD BY AUTOMATED COUNT: 12.5 % (ref 10–15)
GFR SERPL CREATININE-BSD FRML MDRD: >90 ML/MIN/1.7M2
GLUCOSE BLDC GLUCOMTR-MCNC: 101 MG/DL (ref 70–99)
GLUCOSE SERPL-MCNC: 82 MG/DL (ref 70–99)
HCT VFR BLD AUTO: 44.5 % (ref 40–53)
HGB BLD-MCNC: 15.3 G/DL (ref 13.3–17.7)
IMM GRANULOCYTES # BLD: 0 10E9/L (ref 0–0.4)
IMM GRANULOCYTES NFR BLD: 0.1 %
INR PPP: 1.03 (ref 0.86–1.14)
LYMPHOCYTES # BLD AUTO: 2.8 10E9/L (ref 0.8–5.3)
LYMPHOCYTES NFR BLD AUTO: 32.8 %
MAGNESIUM SERPL-MCNC: 2.2 MG/DL (ref 1.6–2.3)
MCH RBC QN AUTO: 29.5 PG (ref 26.5–33)
MCHC RBC AUTO-ENTMCNC: 34.4 G/DL (ref 31.5–36.5)
MCV RBC AUTO: 86 FL (ref 78–100)
MONOCYTES # BLD AUTO: 0.4 10E9/L (ref 0–1.3)
MONOCYTES NFR BLD AUTO: 4.7 %
NEUTROPHILS # BLD AUTO: 4.9 10E9/L (ref 1.6–8.3)
NEUTROPHILS NFR BLD AUTO: 57.6 %
NRBC # BLD AUTO: 0 10*3/UL
NRBC BLD AUTO-RTO: 0 /100
PHOSPHATE SERPL-MCNC: 3.4 MG/DL (ref 2.5–4.5)
PLATELET # BLD AUTO: 223 10E9/L (ref 150–450)
POTASSIUM SERPL-SCNC: 4 MMOL/L (ref 3.4–5.3)
PROT SERPL-MCNC: 7.9 G/DL (ref 6.8–8.8)
RBC # BLD AUTO: 5.19 10E12/L (ref 4.4–5.9)
SODIUM SERPL-SCNC: 139 MMOL/L (ref 133–144)
WBC # BLD AUTO: 8.5 10E9/L (ref 4–11)

## 2018-03-13 PROCEDURE — 25000128 H RX IP 250 OP 636: Performed by: PEDIATRICS

## 2018-03-13 PROCEDURE — 25000125 ZZHC RX 250

## 2018-03-13 PROCEDURE — 71046 X-RAY EXAM CHEST 2 VIEWS: CPT

## 2018-03-13 PROCEDURE — 85610 PROTHROMBIN TIME: CPT | Performed by: PEDIATRICS

## 2018-03-13 PROCEDURE — 40000257 ZZH STATISTIC CONSULT NO CHARGE VASC ACCESS

## 2018-03-13 PROCEDURE — 25000132 ZZH RX MED GY IP 250 OP 250 PS 637: Performed by: PEDIATRICS

## 2018-03-13 PROCEDURE — 86140 C-REACTIVE PROTEIN: CPT | Performed by: PEDIATRICS

## 2018-03-13 PROCEDURE — 96361 HYDRATE IV INFUSION ADD-ON: CPT | Performed by: PEDIATRICS

## 2018-03-13 PROCEDURE — 25000128 H RX IP 250 OP 636

## 2018-03-13 PROCEDURE — 99285 EMERGENCY DEPT VISIT HI MDM: CPT | Mod: Z6 | Performed by: PEDIATRICS

## 2018-03-13 PROCEDURE — 96375 TX/PRO/DX INJ NEW DRUG ADDON: CPT | Performed by: PEDIATRICS

## 2018-03-13 PROCEDURE — 87040 BLOOD CULTURE FOR BACTERIA: CPT | Performed by: PEDIATRICS

## 2018-03-13 PROCEDURE — 40000275 ZZH STATISTIC RCP TIME EA 10 MIN

## 2018-03-13 PROCEDURE — 84100 ASSAY OF PHOSPHORUS: CPT | Performed by: PEDIATRICS

## 2018-03-13 PROCEDURE — 87633 RESP VIRUS 12-25 TARGETS: CPT

## 2018-03-13 PROCEDURE — 25000132 ZZH RX MED GY IP 250 OP 250 PS 637: Performed by: STUDENT IN AN ORGANIZED HEALTH CARE EDUCATION/TRAINING PROGRAM

## 2018-03-13 PROCEDURE — 36415 COLL VENOUS BLD VENIPUNCTURE: CPT | Performed by: PEDIATRICS

## 2018-03-13 PROCEDURE — 12000014 ZZH R&B PEDS UMMC

## 2018-03-13 PROCEDURE — 80053 COMPREHEN METABOLIC PANEL: CPT | Performed by: PEDIATRICS

## 2018-03-13 PROCEDURE — 25000125 ZZHC RX 250: Performed by: PEDIATRICS

## 2018-03-13 PROCEDURE — 83735 ASSAY OF MAGNESIUM: CPT | Performed by: PEDIATRICS

## 2018-03-13 PROCEDURE — 85025 COMPLETE CBC W/AUTO DIFF WBC: CPT | Performed by: PEDIATRICS

## 2018-03-13 PROCEDURE — 00000146 ZZHCL STATISTIC GLUCOSE BY METER IP

## 2018-03-13 PROCEDURE — 96365 THER/PROPH/DIAG IV INF INIT: CPT | Performed by: PEDIATRICS

## 2018-03-13 PROCEDURE — 85730 THROMBOPLASTIN TIME PARTIAL: CPT | Performed by: PEDIATRICS

## 2018-03-13 PROCEDURE — 99285 EMERGENCY DEPT VISIT HI MDM: CPT | Mod: 25 | Performed by: PEDIATRICS

## 2018-03-13 RX ORDER — CITALOPRAM HYDROBROMIDE 20 MG/1
20 TABLET ORAL DAILY
Status: DISCONTINUED | OUTPATIENT
Start: 2018-03-13 | End: 2018-03-17 | Stop reason: HOSPADM

## 2018-03-13 RX ORDER — PANTOPRAZOLE SODIUM 20 MG/1
20 TABLET, DELAYED RELEASE ORAL DAILY
Status: DISCONTINUED | OUTPATIENT
Start: 2018-03-13 | End: 2018-03-17 | Stop reason: HOSPADM

## 2018-03-13 RX ORDER — TRETINOIN 0.5 MG/G
CREAM TOPICAL EVERY MORNING
Status: DISCONTINUED | OUTPATIENT
Start: 2018-03-14 | End: 2018-03-17 | Stop reason: HOSPADM

## 2018-03-13 RX ORDER — SODIUM CHLORIDE 9 MG/ML
INJECTION, SOLUTION INTRAVENOUS CONTINUOUS
Status: DISCONTINUED | OUTPATIENT
Start: 2018-03-13 | End: 2018-03-17 | Stop reason: HOSPADM

## 2018-03-13 RX ORDER — CITALOPRAM HYDROBROMIDE 20 MG/1
20 TABLET ORAL DAILY
Status: DISCONTINUED | OUTPATIENT
Start: 2018-03-14 | End: 2018-03-13

## 2018-03-13 RX ORDER — DOXYCYCLINE 100 MG/1
100 CAPSULE ORAL 2 TIMES DAILY
Status: DISCONTINUED | OUTPATIENT
Start: 2018-03-13 | End: 2018-03-13

## 2018-03-13 RX ORDER — FEXOFENADINE HCL 180 MG/1
180 TABLET ORAL DAILY
Status: DISCONTINUED | OUTPATIENT
Start: 2018-03-14 | End: 2018-03-13

## 2018-03-13 RX ORDER — FEXOFENADINE HCL 180 MG/1
180 TABLET ORAL DAILY
Status: DISCONTINUED | OUTPATIENT
Start: 2018-03-13 | End: 2018-03-17 | Stop reason: HOSPADM

## 2018-03-13 RX ORDER — ALBUTEROL SULFATE 90 UG/1
2 AEROSOL, METERED RESPIRATORY (INHALATION) EVERY 6 HOURS PRN
Status: DISCONTINUED | OUTPATIENT
Start: 2018-03-13 | End: 2018-03-17 | Stop reason: HOSPADM

## 2018-03-13 RX ORDER — FLUTICASONE PROPIONATE 50 MCG
1 SPRAY, SUSPENSION (ML) NASAL DAILY
Status: DISCONTINUED | OUTPATIENT
Start: 2018-03-14 | End: 2018-03-13

## 2018-03-13 RX ORDER — SODIUM CHLORIDE FOR INHALATION 7 %
4 VIAL, NEBULIZER (ML) INHALATION 4 TIMES DAILY
Status: DISCONTINUED | OUTPATIENT
Start: 2018-03-13 | End: 2018-03-13

## 2018-03-13 RX ORDER — PANTOPRAZOLE SODIUM 20 MG/1
20 TABLET, DELAYED RELEASE ORAL DAILY
Status: DISCONTINUED | OUTPATIENT
Start: 2018-03-14 | End: 2018-03-13

## 2018-03-13 RX ORDER — ADAPALENE 45 G/G
1 GEL TOPICAL AT BEDTIME
Status: DISCONTINUED | OUTPATIENT
Start: 2018-03-13 | End: 2018-03-17 | Stop reason: HOSPADM

## 2018-03-13 RX ORDER — AZITHROMYCIN 250 MG/1
500 TABLET, FILM COATED ORAL
Status: DISCONTINUED | OUTPATIENT
Start: 2018-03-14 | End: 2018-03-17 | Stop reason: HOSPADM

## 2018-03-13 RX ORDER — SODIUM CHLORIDE FOR INHALATION 7 %
4 VIAL, NEBULIZER (ML) INHALATION 2 TIMES DAILY
Status: DISCONTINUED | OUTPATIENT
Start: 2018-03-13 | End: 2018-03-17 | Stop reason: HOSPADM

## 2018-03-13 RX ORDER — LIDOCAINE 40 MG/G
CREAM TOPICAL
Status: DISCONTINUED | OUTPATIENT
Start: 2018-03-13 | End: 2018-03-17 | Stop reason: HOSPADM

## 2018-03-13 RX ORDER — TOBRAMYCIN INHALATION SOLUTION 300 MG/5ML
300 INHALANT RESPIRATORY (INHALATION) 2 TIMES DAILY
Status: DISCONTINUED | OUTPATIENT
Start: 2018-03-13 | End: 2018-03-14

## 2018-03-13 RX ORDER — NAFCILLIN SODIUM 2 G/8ML
2 INJECTION, POWDER, FOR SOLUTION INTRAMUSCULAR; INTRAVENOUS EVERY 4 HOURS
Status: DISCONTINUED | OUTPATIENT
Start: 2018-03-13 | End: 2018-03-13

## 2018-03-13 RX ORDER — AZELASTINE 1 MG/ML
1-2 SPRAY, METERED NASAL 2 TIMES DAILY PRN
Status: DISCONTINUED | OUTPATIENT
Start: 2018-03-13 | End: 2018-03-17 | Stop reason: HOSPADM

## 2018-03-13 RX ORDER — OXACILLIN SODIUM 2 G/1
2 INJECTION, POWDER, FOR SOLUTION INTRAMUSCULAR; INTRAVENOUS EVERY 6 HOURS
Status: DISCONTINUED | OUTPATIENT
Start: 2018-03-13 | End: 2018-03-13 | Stop reason: RX

## 2018-03-13 RX ORDER — MINOCYCLINE HYDROCHLORIDE 100 MG/1
100 CAPSULE ORAL 2 TIMES DAILY
Status: DISCONTINUED | OUTPATIENT
Start: 2018-03-13 | End: 2018-03-13

## 2018-03-13 RX ORDER — FLUTICASONE PROPIONATE 50 MCG
1 SPRAY, SUSPENSION (ML) NASAL DAILY
Status: DISCONTINUED | OUTPATIENT
Start: 2018-03-13 | End: 2018-03-17 | Stop reason: HOSPADM

## 2018-03-13 RX ORDER — ALBUTEROL SULFATE 0.83 MG/ML
1 SOLUTION RESPIRATORY (INHALATION) 4 TIMES DAILY
Status: DISCONTINUED | OUTPATIENT
Start: 2018-03-13 | End: 2018-03-17 | Stop reason: HOSPADM

## 2018-03-13 RX ADMIN — ALBUTEROL SULFATE 2.5 MG: 2.5 SOLUTION RESPIRATORY (INHALATION) at 20:00

## 2018-03-13 RX ADMIN — NAFCILLIN SODIUM 2 G: 2 INJECTION, POWDER, FOR SOLUTION INTRAMUSCULAR; INTRAVENOUS at 13:55

## 2018-03-13 RX ADMIN — ADAPALENE 1 G: 1 GEL TOPICAL at 21:17

## 2018-03-13 RX ADMIN — Medication 2 CAPSULE: at 18:17

## 2018-03-13 RX ADMIN — FEXOFENADINE HYDROCHLORIDE 180 MG: 180 TABLET, FILM COATED ORAL at 18:18

## 2018-03-13 RX ADMIN — DORNASE ALFA 2.5 MG: 1 SOLUTION RESPIRATORY (INHALATION) at 20:00

## 2018-03-13 RX ADMIN — MEROPENEM 2 G: 1 INJECTION, POWDER, FOR SOLUTION INTRAVENOUS at 12:37

## 2018-03-13 RX ADMIN — OXACILLIN SODIUM 2 G: 1 INJECTION, POWDER, FOR SOLUTION INTRAMUSCULAR; INTRAVENOUS at 20:09

## 2018-03-13 RX ADMIN — SODIUM CHLORIDE 4 ML: 7 NEBU SOLN,3 % NEBU at 20:00

## 2018-03-13 RX ADMIN — FLUTICASONE PROPIONATE 1 SPRAY: 50 SPRAY, METERED NASAL at 18:17

## 2018-03-13 RX ADMIN — PANCRELIPASE 180000 UNITS: 36000; 180000; 114000 CAPSULE, DELAYED RELEASE PELLETS ORAL at 18:21

## 2018-03-13 RX ADMIN — PANTOPRAZOLE SODIUM 20 MG: 20 TABLET, DELAYED RELEASE ORAL at 18:18

## 2018-03-13 RX ADMIN — MEROPENEM 2 G: 1 INJECTION, POWDER, FOR SOLUTION INTRAVENOUS at 21:17

## 2018-03-13 RX ADMIN — CITALOPRAM HYDROBROMIDE 20 MG: 20 TABLET ORAL at 18:18

## 2018-03-13 RX ADMIN — TOBRAMYCIN 300 MG: 300 SOLUTION RESPIRATORY (INHALATION) at 20:00

## 2018-03-13 RX ADMIN — ALBUTEROL SULFATE 2.5 MG: 2.5 SOLUTION RESPIRATORY (INHALATION) at 16:29

## 2018-03-13 RX ADMIN — SODIUM CHLORIDE 1000 ML: 9 INJECTION, SOLUTION INTRAVENOUS at 20:10

## 2018-03-13 RX ADMIN — SODIUM CHLORIDE 1000 ML: 9 INJECTION, SOLUTION INTRAVENOUS at 11:09

## 2018-03-13 ASSESSMENT — ACTIVITIES OF DAILY LIVING (ADL)
COMMUNICATION: 0 - UNDERSTANDS/COMMUNICATES WITHOUT DIFFICULTY
BATHING: 0-->INDEPENDENT
DRESS: 0-->INDEPENDENT
AMBULATION: 0 - INDEPENDENT
RETIRED_COMMUNICATION: 0-->UNDERSTANDS/COMMUNICATES WITHOUT DIFFICULTY
RETIRED_EATING: 0-->INDEPENDENT
SWALLOWING: 0 - SWALLOWS FOODS/LIQUIDS WITHOUT DIFFICULTY
DRESS: 0 - INDEPENDENT
TOILETING: 0-->INDEPENDENT
TOILETING: 0 - INDEPENDENT
TRANSFERRING: 0-->INDEPENDENT
FALL_HISTORY_WITHIN_LAST_SIX_MONTHS: NO
TRANSFERRING: 0 - INDEPENDENT
BATHING: 0 - INDEPENDENT
COGNITION: 0 - NO COGNITION ISSUES REPORTED
EATING: 0 - INDEPENDENT
SWALLOWING: 0-->SWALLOWS FOODS/LIQUIDS WITHOUT DIFFICULTY
AMBULATION: 0-->INDEPENDENT

## 2018-03-13 NOTE — H&P
[facility], [place]    History and Physical  Pulmonology     Date of Admission:  3/13/2018    Assessment & Plan   Demario Abbasi is a 23 year old male with cystic fibrosis (genotype: G542X/621+1g>t) s/p RUL resection in 2009, pancreatic insufficiency, depression, acne, and impaired glucose tolerance who presents with 3 days of worsening cough and hemoptysis consistent with subacute CF exacerbation with mild hemoptisis. He has grown Achromobacter and Staph aureus in the past which was successfully treated with meropenem and nafcillin. His hemoptysis is likely mild given low amount reported and unaffected CBC. He reports that hemoptysis has improved since yesterday.     PULM  #CF Exacerbation  #Hemoptysis  - Airway clearance 4 times daily   - Vest therapy 4 times daily   - Albuterol 4 times daily   - Hypertonic saline neb 2 times daily   - pulmozyme 2 times daily  - Azithromycin MWF  - fexofenadine 180 mg daily  - fluticasone daily  - Antibiotics to cover achromobacter and staph aureus   - Meropenem 2g Q8h   - nafcillin 2g Q4h  - Tobramycin on hold  - Sputum culture pending  - RVP pending  - blood culture pending  - Vascular team consulted for PICC placement for home going    HEME  #Hemoptysis  - PT/PTT/INR and CBC unremarkable on admission    GI  #Pancreatic Insufficiency related to CF  - Creon 5 capsules TID with meal  - Protonix 20 mg     ENDO  #Impaired glucose tolerance test  - POC Glucose 3x daily 2 hours after meals    SKIN  #Acne Vulgaris  - continue home topical medications  - Discontinue oral doxycycline and minocycline during this admission    PSYCH  # Depression  - Continue home citalopram    FEN  - PO Ad brandi  - general diet  - No MIVF  - Melatonin PRN for insomnia  - Daily weights  - Is and Os    Patient discussed with Dr. Dial, Pulmonary attending    Meri Miranda MD  Pediatric Resident, PGY-1    This patient has been seen and evaluated by me, Sonny Dial MD.  "Discussed with the house staff team or resident(s) and agree with the findings and plan in this note.  I personally performed the entire clinical encounter documented in this note.  I have reviewed today's vital signs, medications, labs and imaging.     Please call the pulmonary nurse line ([phone]) with questions, concerns and prescription refill requests during business hours. For urgent concerns after hours and on the weekends, please contact the on call pulmonologist ([phone]). For scheduling an appointment please call [phone].      Sonny Dial MD  Division of Pediatric Pulmonology  Department of Pediatrics  [facility]    Office: [phone] (please call for refills and questions)  Office fax: [phone]  Pager: [phone]  Email: [email or web address]    Primary Care Physician   Susan Li    Chief Complaint   Hemoptysis    History is obtained from the patient    History of Present Illness   Demario Abbasi is a 23 year old male with cystic fibrosis (genotype: G542X/621+1g>t) s/p RUL resection in 2009, pancreatic insufficiency, depression, acne, and impaired glucose tolerance who presents with 3 days of worsening cough and hemoptysis. The cough started Sunday night and woke him up. He describes going to the bathroom and coughing up a \"tablespoon of bright red blood.\" This happened again Monday morning and then Monday evening while he was walking outside. He hasn't had any fevers, chills, difficulty breathing, or nausea/vomiting/ abdominal pain. He hasn't had any post tussive emesis. He reports being consistent with his nebulizers and vest treatments at home. No sick contacts. His last admission was last year March 2017 for CF exacerbation w/ hemoptysis and pulmonary infection treated with nafcillin and meropenem. He grew achromobacter and staph aureus in his sputum at the time.    In the ED, CBC, BMP, and coags are unremarkable. Blood culture, RVP, and sputum culture are " ordered. A CXR is consistent with chronic CF changes and possible mucus plugging, read detailed below.     Past Medical History    I have reviewed this patient's medical history and updated it with pertinent information if needed.   Past Medical History:   Diagnosis Date     CF (cystic fibrosis) (H)      Impaired glucose tolerance      Pancreatic insufficiency      S/P lobectomy of lung 8/4/2015    Right upper lobectomy - 2009       Past Surgical History   I have reviewed this patient's surgical history and updated it with pertinent information if needed.  RUL resection in 2009    Immunization History   Immunization Status:  up to date and documented    Prior to Admission Medications   Prior to Admission Medications   Prescriptions Last Dose Informant Patient Reported? Taking?   MINOCYCLINE HCL PO   Yes No   Sig: Take 100 mg by mouth 2 times daily   adapalene (DIFFERIN) 0.1 % gel   Yes No   Sig: Apply 1 applicator topically At Bedtime   albuterol (2.5 MG/3ML) 0.083% neb solution   No No   Sig: Take 1 vial (2.5 mg) by nebulization 3 times daily   albuterol (ALBUTEROL) 108 (90 BASE) MCG/ACT Inhaler   No No   Sig: Inhale 2 puffs into the lungs every 6 hours as needed for shortness of breath / dyspnea or wheezing   amylase-lipase-protease (CREON 36) 80466 UNITS CPEP   No No   Sig: Take 5 capsules (180,000 Units) by mouth 3 times daily (with meals)   azelastine (ASTELIN) 0.1 % spray   Yes No   Sig: Use 1-2 sprays both nostrils twice daily as needed   azithromycin (ZITHROMAX) 500 MG tablet   No No   Sig: Take one tablet on Monday, Wednesday and Friday morning   citalopram (CELEXA) 20 MG tablet   Yes No   Sig: Take 1 tablet (20 mg) by mouth daily   dornase alpha (PULMOZYME) 1 MG/ML neb solution   No No   Sig: Inhale 2.5 mg into the lungs 2 times daily   fexofenadine (ALLEGRA) 180 MG tablet   No No   Sig: Take 1 tablet (180 mg) by mouth daily   fluticasone (FLONASE) 50 MCG/ACT spray   No No   Sig: Spray 1 spray into both  nostrils daily   melatonin 5 MG tablet   Yes No   Sig: Take 1-2 tablets (5-10 mg) by mouth nightly as needed   multivitamin CF formula (MVW COMPLETE FORMULATION ) softgel cap   No No   Sig: Take 2 capsules by mouth daily   pantoprazole (PROTONIX) 20 MG EC tablet   No No   Sig: Take 1 tablet (20 mg) by mouth daily   sodium chloride inhalant 7 % NEBU neb solution   No No   Sig: Take 4 mLs by nebulization 2 times daily   tobramycin, PF, (COBY) 300 MG/5ML neb solution   No No   Sig: Take 5 mLs (300 mg) by nebulization 2 times daily Cycle 28 days on/off   tretinoin (RETIN-A) 0.05 % cream   Yes No   Sig: Apply topically every morning as directed      Facility-Administered Medications: None     Allergies   Allergies   Allergen Reactions     Augmentin        Social History   I have updated and reviewed the following Social History Narrative:   Pediatric History   Patient Guardian Status     Mother:  DEAN DELACRUZ     Father:  WOLFGANG DELACRUZ     Other Topics Concern     Not on file     Social History Narrative   Is in his first year of college in [place]. He is studying agricultural science and is enjoying it. He doesn't endorse smoking or substance use.    Family History   Family history reviewed with patient and is noncontributory.    Review of Systems   The 10 point Review of Systems is negative other than noted in the HPI or here.    Physical Exam   Temp: 97.9  F (36.6  C) Temp src: Tympanic BP: 109/57 Pulse: 70 Heart Rate: 58 Resp: 19 SpO2: 96 % O2 Device: None (Room air)    Vital Signs with Ranges  Temp:  [97.9  F (36.6  C)] 97.9  F (36.6  C)  Pulse:  [70] 70  Heart Rate:  [58-64] 58  Resp:  [12-37] 19  BP: (109-123)/(57-76) 109/57  SpO2:  [94 %-98 %] 96 %  168 lbs 3.38 oz    GENERAL: Active, alert, in no acute distress.  SKIN: acne on chest and back. Otherwise no significant rash, abnormal pigmentation or lesions  HEAD: Normocephalic  EYES: Pupils equal, round, reactive, Extraocular muscles  intact. Normal conjunctivae.  NOSE: Normal without discharge.  MOUTH/THROAT: Clear. No oral lesions. Teeth without obvious abnormalities.  NECK: Supple, no masses.  No thyromegaly.  LYMPH NODES: No adenopathy  LUNGS: Bilateral good air movements. No rales, rhonchi, wheezing or retractions. Bilateral scattered few crackles.   HEART: Regular rhythm. Normal S1/S2. No murmurs. Normal pulses.  ABDOMEN: Soft, non-tender, not distended, no masses or hepatosplenomegaly. Bowel sounds normal.   NEUROLOGIC: No focal findings. Cranial nerves grossly intact: DTR's normal. Normal gait, strength and tone  BACK: Spine is straight, no scoliosis.  EXTREMITIES: Full range of motion, no deformities     Data   Results for orders placed or performed during the hospital encounter of 03/13/18 (from the past 24 hour(s))   CBC with platelets differential   Result Value Ref Range    WBC 8.5 4.0 - 11.0 10e9/L    RBC Count 5.19 4.4 - 5.9 10e12/L    Hemoglobin 15.3 13.3 - 17.7 g/dL    Hematocrit 44.5 40.0 - 53.0 %    MCV 86 78 - 100 fl    MCH 29.5 26.5 - 33.0 pg    MCHC 34.4 31.5 - 36.5 g/dL    RDW 12.5 10.0 - 15.0 %    Platelet Count 223 150 - 450 10e9/L    Diff Method Automated Method     % Neutrophils 57.6 %    % Lymphocytes 32.8 %    % Monocytes 4.7 %    % Eosinophils 4.0 %    % Basophils 0.8 %    % Immature Granulocytes 0.1 %    Nucleated RBCs 0 0 /100    Absolute Neutrophil 4.9 1.6 - 8.3 10e9/L    Absolute Lymphocytes 2.8 0.8 - 5.3 10e9/L    Absolute Monocytes 0.4 0.0 - 1.3 10e9/L    Absolute Eosinophils 0.3 0.0 - 0.7 10e9/L    Absolute Basophils 0.1 0.0 - 0.2 10e9/L    Abs Immature Granulocytes 0.0 0 - 0.4 10e9/L    Absolute Nucleated RBC 0.0    CRP inflammation   Result Value Ref Range    CRP Inflammation 4.4 0.0 - 8.0 mg/L   Comprehensive metabolic panel   Result Value Ref Range    Sodium 139 133 - 144 mmol/L    Potassium 4.0 3.4 - 5.3 mmol/L    Chloride 105 94 - 109 mmol/L    Carbon Dioxide 27 20 - 32 mmol/L    Anion Gap 7 3 - 14  mmol/L    Glucose 82 70 - 99 mg/dL    Urea Nitrogen 16 7 - 30 mg/dL    Creatinine 0.89 0.66 - 1.25 mg/dL    GFR Estimate >90 >60 mL/min/1.7m2    GFR Estimate If Black >90 >60 mL/min/1.7m2    Calcium 8.9 8.5 - 10.1 mg/dL    Bilirubin Total 0.6 0.2 - 1.3 mg/dL    Albumin 3.7 3.4 - 5.0 g/dL    Protein Total 7.9 6.8 - 8.8 g/dL    Alkaline Phosphatase 125 40 - 150 U/L    ALT 30 0 - 70 U/L    AST 21 0 - 45 U/L   Magnesium   Result Value Ref Range    Magnesium 2.2 1.6 - 2.3 mg/dL   Phosphorus   Result Value Ref Range    Phosphorus 3.4 2.5 - 4.5 mg/dL   INR   Result Value Ref Range    INR 1.03 0.86 - 1.14   Partial thromboplastin time   Result Value Ref Range    PTT 32 22 - 37 sec   XR Chest 2 Views    Narrative    Examination: XR CHEST 2 VW, 3/13/2018 10:51 AM    Comparison: 1/4/2018    History: patient with CF.  Now with exacerbation;     Findings: The cardiomediastinal silhouette is within normal limits.  Chronic changes of cystic fibrosis. Slightly increased nodular opacity  in the right upper lung. No pleural effusion or pneumothorax.      Impression    Impression: Chronic changes of cystic fibrosis with slightly increased  nodular opacities, which may represent mucous plugging or superimposed  infectious process.    I have personally reviewed the examination and initial interpretation  and I agree with the findings.    TANISHA TYSON MD

## 2018-03-13 NOTE — ED NOTES
Pt c/o 2 days of tingling in chest, burning sensation in chest and coughing up blood.  GCS 15    Pt has h/o CF.  GCS 15

## 2018-03-13 NOTE — IP AVS SNAPSHOT
MRN:[id number]                      After Visit Summary   3/13/2018    Demario Abbasi    MRN: [id number]           Thank you!     Thank you for choosing [facility] for your care. Our goal is always to provide you with excellent care. Hearing back from our patients is one way we can continue to improve our services. Please take a few minutes to complete the written survey that you may receive in the mail after you visit with us. Thank you!        Patient Information     Date Of Birth          1995        Designated Caregiver       Most Recent Value    Caregiver    Will someone help with your care after discharge? yes    Name of designated caregiver Joe    Phone number of caregiver [phone]    Caregiver address [address] [place] [address]       About your hospital stay     You were admitted on:  March 13, 2018 You last received care in the:  [facility] Pediatric Medical Surgical Unit 6    You were discharged on:  March 17, 2018        Reason for your hospital stay       Demario was admitted to the hospital because of coughing up blood and cystic fibrosis exacerbation. He received airway clearance treatment and intravenous antibiotics and improved.                  Who to Call     For medical emergencies, please call 911.  For non-urgent questions about your medical care, please call your primary care provider or clinic, [phone]          Attending Provider     Provider Specialty    Mine Her MD Pediatrics    [facility]       Primary Care Provider Office Phone # Fax #    Susan Li [phone]      After Care Instructions     Activity       Your activity upon discharge: activity as tolerated            Diet       High protein high calorie diet            Discharge Instructions       Hemoptysis action plan:  - If coughing up less than 1/2 cup of blood - call the pulmonology on-call  provider ([phone]) to discuss; can probably manage this with a clinic visit  - If coughing up more than 1/2 cup of blood - go to the nearest emergency room and have them call the on-call provider to discuss a plan            IV access       **Ordering Provider MUST call/page Care Coordinator/ to discuss arranging this service**    You are going home with the following vascular access device: PICC.                  Follow-up Appointments     Follow Up and recommended labs and tests       Follow up in 2 weeks with Libby Salinas at  clinic                  Your next 10 appointments already scheduled     Apr 10, 2018  1:00 PM CDT   Peds PFT with [facility] PFT LAB   Peds Pulmonary Function Lab ([facility])    [facility]  [address]  [address] [place] [id number] [phone]            Apr 10, 2018  1:50 PM CDT   RETURN CYSTIC FIBROSIS VISIT with MERCEDES Turner CNP   Peds Pulmonary ([facility])    [facility]  [address]  [address] [place] [id number] [phone]            May 11, 2018 10:00 AM CDT   (Arrive by 9:45 AM)   RETURN CYSTIC FIBROSIS VISIT with Poncho Flowers MD   [facility])    [address] [place] [address]  [address] [place] [id number] [phone]              Additional Services     Home infusion referral       Your provider has referred you to: [facility]: [facility] [place] [phone]   [email or web address]    Local Address (if different from home address): N/A    Anticipated Length of Therapy:  To be determined.    Home Infusion Pharmacist to adjust therapy based on labs and clinical assessments: Yes    Labs:  May draw labs from Venous Catheter: Yes  Home Infusion Pharmacist to order labs based on therapy type and clinical assessments: Yes  Call/Fax Lab Results to: CF Clinic    Agency Staff to  "assess nursing needs for Infusion Therapy.    Access Device Management:  IV Access Type: PICC  Flush with Heparin and Normal Saline IVP PRN and routine site care (per agency protocol) to maintain access device? Yes                  Pending Results     Date and Time Order Name Status Description    3/14/2018 0920 IR Fluoro 0-1 Hour In process             Statement of Approval     Ordered          18 1100  I have reviewed and agree with all the recommendations and orders detailed in this document.  EFFECTIVE NOW     Approved and electronically signed by:  Marie Busch MD             Admission Information     Date & Time Department Dept. Phone    3/13/2018 [facility] Pediatric Medical Surgical Unit 6 [phone]      Your Vitals Were     Blood Pressure Pulse Temperature Respirations Height Weight    111/68 70 98  F (36.7  C) (Oral) 20 1.75 m (5' 8.9\") 75.7 kg (166 lb 14.2 oz)    Pulse Oximetry BMI (Body Mass Index)                97% 24.72 kg/m2          MyChart Information     [email or web address] lets you send messages to your doctor, view your test results, renew your prescriptions, schedule appointments and more. To sign up, go to [email or web address] . Click on \"Log in\" on the left side of the screen, which will take you to the Welcome page. Then click on \"Sign up Now\" on the right side of the page.     You will be asked to enter the access code listed below, as well as some personal information. Please follow the directions to create your username and password.     Your access code is: [email or web address]  Expires: 2018  1:22 PM     Your access code will  in 90 days. If you need help or a new code, please call your [facility] clinic or [phone].        Care EveryWhere ID     This is your Care EveryWhere ID. This could be used by other organizations to access your [facility] medical records  [id number]        Equal Access to Services     MCKENNA LINDSAY AH: Cristal ceja " Sodeuce, wadwightda luqadaha, qaybta kaalmada ehsan, denice prashanthsaurav stephens terrencekeara lauliseschloé maximiliano. So [facility] [phone].    ATENCIÓN: Si arceniola selwyn, tiene a davidson disposición servicios gratuitos de asistencia lingüística. Trevor al [phone].    We comply with applicable federal civil rights laws and [place] laws. We do not discriminate on the basis of race, color, national origin, age, disability, sex, sexual orientation, or gender identity.               Review of your medicines      START taking        Dose / Directions    meropenem 2 g   Indication:  CF exacerbation/infection   Used for:  Cystic fibrosis (H), Cystic fibrosis exacerbation (H)        Dose:  2 g   Inject 2 g into the vein every 8 hours   Quantity:  126 g   Refills:  0       oxacillin 2 g   Indication:  CF exacerbation   Used for:  Cystic fibrosis (H), Cystic fibrosis exacerbation (H)        Dose:  2 g   Inject 2 g into the vein every 6 hours   Quantity:  168 g   Refills:  0         CONTINUE these medicines which have NOT CHANGED        Dose / Directions    adapalene 0.1 % gel   Commonly known as:  DIFFERIN        Dose:  1 applicator   Apply 1 applicator topically At Bedtime   Refills:  0       * albuterol 108 (90 BASE) MCG/ACT Inhaler   Commonly known as:  PROAIR HFA   Used for:  CF (cystic fibrosis) (H)        Dose:  2 puff   Inhale 2 puffs into the lungs every 6 hours as needed for shortness of breath / dyspnea or wheezing   Quantity:  1 Inhaler   Refills:  3       * albuterol (2.5 MG/3ML) 0.083% neb solution   Used for:  Cystic fibrosis with pulmonary manifestations (H)        Dose:  1 vial   Take 1 vial (2.5 mg) by nebulization 3 times daily   Quantity:  270 mL   Refills:  6       amylase-lipase-protease 16751 UNITS Cpep   Commonly known as:  CREON 36   Used for:  Cystic fibrosis exacerbation (H)        Dose:  5 capsule   Take 5 capsules (180,000 Units) by mouth 3 times daily (with meals)   Quantity:  450 capsule   Refills:  11        azelastine 0.1 % spray   Commonly known as:  ASTELIN   Used for:  Cystic fibrosis with pulmonary exacerbation (H)        Use 1-2 sprays both nostrils twice daily as needed   Quantity:  1 Bottle   Refills:  1       azithromycin 500 MG tablet   Commonly known as:  ZITHROMAX   Used for:  CF (cystic fibrosis) (H)        Take one tablet on Monday, Wednesday and Friday morning   Quantity:  12 tablet   Refills:  12       citalopram 20 MG tablet   Commonly known as:  celeXA   Used for:  Depression        Dose:  20 mg   Take 1 tablet (20 mg) by mouth daily   Quantity:  30 tablet   Refills:  1       dornase alpha 1 MG/ML neb solution   Commonly known as:  PULMOZYME   Used for:  Cystic fibrosis with pulmonary manifestations (H)        Dose:  2.5 mg   Inhale 2.5 mg into the lungs 2 times daily   Quantity:  150 mL   Refills:  12       fexofenadine 180 MG tablet   Commonly known as:  ALLEGRA   Used for:  CF (cystic fibrosis) (H)        Dose:  180 mg   Take 1 tablet (180 mg) by mouth daily   Quantity:  30 tablet   Refills:  3       fluticasone 50 MCG/ACT spray   Commonly known as:  FLONASE   Used for:  CF (cystic fibrosis) (H), Cystic fibrosis with pulmonary exacerbation (H)        Dose:  1 spray   Spray 1 spray into both nostrils daily   Quantity:  1 Bottle   Refills:  3       melatonin 5 MG tablet   Used for:  Cystic fibrosis with pulmonary exacerbation (H)        Dose:  5-10 mg   Take 1-2 tablets (5-10 mg) by mouth nightly as needed   Quantity:  60 tablet   Refills:  1       multivitamin CF formula softgel cap   Used for:  Cystic fibrosis (H)        Dose:  2 capsule   Take 2 capsules by mouth daily   Quantity:  60 capsule   Refills:  3       pantoprazole 20 MG EC tablet   Commonly known as:  PROTONIX   Used for:  CF (cystic fibrosis) (H)        Dose:  20 mg   Take 1 tablet (20 mg) by mouth daily   Quantity:  30 tablet   Refills:  3       sodium chloride inhalant 7 % Nebu neb solution   Used for:  Cystic fibrosis with pulmonary  exacerbation (H)        Dose:  4 mL   Take 4 mLs by nebulization 2 times daily   Quantity:  240 mL   Refills:  11       tobramycin (PF) 300 MG/5ML neb solution   Commonly known as:  COBY   Used for:  Cystic fibrosis with pulmonary manifestations (H)        Dose:  300 mg   Take 5 mLs (300 mg) by nebulization 2 times daily Cycle 28 days on/off   Quantity:  280 mL   Refills:  3       tretinoin 0.05 % cream   Commonly known as:  RETIN-A   Used for:  Acne        Apply topically every morning as directed   Quantity:  30 g   Refills:  1       * Notice:  This list has 2 medication(s) that are the same as other medications prescribed for you. Read the directions carefully, and ask your doctor or other care provider to review them with you.      STOP taking     DOXYCYCLINE HYCLATE PO           MINOCYCLINE HCL PO                Where to get your medicines      Some of these will need a paper prescription and others can be bought over the counter. Ask your nurse if you have questions.     Bring a paper prescription for each of these medications     meropenem 2 g    oxacillin 2 g                Protect others around you: Learn how to safely use, store and throw away your medicines at www.disposemymeds.org.        ANTIBIOTIC INSTRUCTION     You've Been Prescribed an Antibiotic - Now What?  Your healthcare team thinks that you or your loved one might have an infection. Some infections can be treated with antibiotics, which are powerful, life-saving drugs. Like all medications, antibiotics have side effects and should only be used when necessary. There are some important things you should know about your antibiotic treatment.      Your healthcare team may run tests before you start taking an antibiotic.    Your team may take samples (e.g., from your blood, urine or other areas) to run tests to look for bacteria. These test can be important to determine if you need an antibiotic at all and, if you do, which antibiotic will work  best.      Within a few days, your healthcare team might change or even stop your antibiotic.    Your team may start you on an antibiotic while they are working to find out what is making you sick.    Your team might change your antibiotic because test results show that a different antibiotic would be better to treat your infection.    In some cases, once your team has more information, they learn that you do not need an antibiotic at all. They may find out that you don't have an infection, or that the antibiotic you're taking won't work against your infection. For example, an infection caused by a virus can't be treated with antibiotics. Staying on an antibiotic when you don't need it is more likely to be harmful than helpful.      You may experience side effects from your antibiotic.    Like all medications, antibiotics have side effects. Some of these can be serious.    Let you healthcare team know if you have any known allergies when you are admitted to the hospital.    One significant side effect of nearly all antibiotics is the risk of severe and sometimes deadly diarrhea caused by Clostridium difficile (C. Difficile). This occurs when a person takes antibiotics because some good germs are destroyed. Antibiotic use allows C. diificile to take over, putting patients at high risk for this serious infection.    As a patient or caregiver, it is important to understand your or your loved one's antibiotic treatment. It is especially important for caregivers to speak up when patients can't speak for themselves. Here are some important questions to ask your healthcare team.    What infection is this antibiotic treating and how do you know I have that infection?    What side effects might occur from this antibiotic?    How long will I need to take this antibiotic?    Is it safe to take this antibiotic with other medications or supplements (e.g., vitamins) that I am taking?     Are there any special directions I need to  know about taking this antibiotic? For example, should I take it with food?    How will I be monitored to know whether my infection is responding to the antibiotic?    What tests may help to make sure the right antibiotic is prescribed for me?      Information provided by:  www.cdc.gov/getsmart  U.S. Department of Health and Human Services  Centers for disease Control and Prevention  National Center for Emerging and Zoonotic Infectious Diseases  Division of Healthcare Quality Promotion             Medication List: This is a list of all your medications and when to take them. Check marks below indicate your daily home schedule. Keep this list as a reference.      Medications           Morning Afternoon Evening Bedtime As Needed    adapalene 0.1 % gel   Commonly known as:  DIFFERIN   Apply 1 applicator topically At Bedtime   Last time this was given:  1 g on 3/16/2018 11:06 PM                                * albuterol 108 (90 BASE) MCG/ACT Inhaler   Commonly known as:  PROAIR HFA   Inhale 2 puffs into the lungs every 6 hours as needed for shortness of breath / dyspnea or wheezing                                * albuterol (2.5 MG/3ML) 0.083% neb solution   Take 1 vial (2.5 mg) by nebulization 3 times daily   Last time this was given:  2.5 mg on 3/17/2018 11:57 AM                                amylase-lipase-protease 55436 UNITS Cpep   Commonly known as:  CREON 36   Take 5 capsules (180,000 Units) by mouth 3 times daily (with meals)   Last time this was given:  180,000 Units on 3/17/2018  8:57 AM                                azelastine 0.1 % spray   Commonly known as:  ASTELIN   Use 1-2 sprays both nostrils twice daily as needed                                azithromycin 500 MG tablet   Commonly known as:  ZITHROMAX   Take one tablet on Monday, Wednesday and Friday morning   Last time this was given:  500 mg on 3/16/2018 10:10 AM                                citalopram 20 MG tablet   Commonly known as:  celeXA    Take 1 tablet (20 mg) by mouth daily   Last time this was given:  20 mg on 3/17/2018  8:57 AM                                dornase alpha 1 MG/ML neb solution   Commonly known as:  PULMOZYME   Inhale 2.5 mg into the lungs 2 times daily   Last time this was given:  2.5 mg on 3/17/2018  9:14 AM                                fexofenadine 180 MG tablet   Commonly known as:  ALLEGRA   Take 1 tablet (180 mg) by mouth daily   Last time this was given:  180 mg on 3/17/2018  8:58 AM                                fluticasone 50 MCG/ACT spray   Commonly known as:  FLONASE   Spray 1 spray into both nostrils daily   Last time this was given:  1 spray on 3/17/2018  8:58 AM                                melatonin 5 MG tablet   Take 1-2 tablets (5-10 mg) by mouth nightly as needed                                meropenem 2 g   Inject 2 g into the vein every 8 hours   Last time this was given:  2,000 mg on 3/17/2018 11:44 AM                                multivitamin CF formula softgel cap   Take 2 capsules by mouth daily   Last time this was given:  2 capsules on 3/17/2018  8:57 AM                                oxacillin 2 g   Inject 2 g into the vein every 6 hours   Last time this was given:  2 g on 3/17/2018 10:51 AM                                pantoprazole 20 MG EC tablet   Commonly known as:  PROTONIX   Take 1 tablet (20 mg) by mouth daily   Last time this was given:  20 mg on 3/17/2018  8:57 AM                                sodium chloride inhalant 7 % Nebu neb solution   Take 4 mLs by nebulization 2 times daily   Last time this was given:  4 mLs on 3/17/2018 11:57 AM                                tobramycin (PF) 300 MG/5ML neb solution   Commonly known as:  COBY   Take 5 mLs (300 mg) by nebulization 2 times daily Cycle 28 days on/off   Last time this was given:  300 mg on 3/14/2018  8:28 AM                                tretinoin 0.05 % cream   Commonly known as:  RETIN-A   Apply topically every morning as  directed                                * Notice:  This list has 2 medication(s) that are the same as other medications prescribed for you. Read the directions carefully, and ask your doctor or other care provider to review them with you.

## 2018-03-13 NOTE — IP AVS SNAPSHOT
"    [facility] PEDIATRIC MEDICAL SURGICAL UNIT 6: [phone]                                              INTERAGENCY TRANSFER FORM - PHYSICIAN ORDERS   3/13/2018                    Hospital Admission Date: 3/13/2018  GHAZAL DELACRUZ   : 1995  Sex: Male        Attending Provider: Sonny Dial MD     Allergies:  Augmentin    Infection:  Cystic Fibrosis   Service:  PULMONOLOGY    Ht:  1.75 m (5' 8.9\")   Wt:  75.7 kg (166 lb 14.2 oz)   Admission Wt:  77.7 kg (171 lb 4.8 oz)    BMI:  24.72 kg/m 2   BSA:  1.92 m 2            Patient PCP Information     Provider PCP Type    Susan Li [facility] Linda Álvarez MD Pulmonology      ED Clinical Impression     Diagnosis Description Comment Added By Time Added    Hemoptysis [R04.2] Hemoptysis [R04.2]  Allen Shearer MD 3/13/2018 12:06 PM      Hospital Problems as of 3/17/2018              Priority Class Noted POA    Cystic fibrosis exacerbation (H) Medium  3/13/2018 Yes      Non-Hospital Problems as of 3/17/2018              Priority Class Noted    Cystic fibrosis (H) Medium  10/20/2011    Impaired glucose tolerance Medium  8/15/2013    S/P lobectomy of lung Medium  2015    Depression Medium  2015    Major depressive disorder, recurrent episode, mild (H) Medium  10/29/2015      Code Status History     Date Active Date Inactive Code Status Order ID Comments User Context    3/16/2018 12:46 PM  Full Code [id number]  Meri Miranda MD Outpatient         Medication Review      START taking        Dose / Directions Comments    meropenem 2 g   Indication:  CF exacerbation/infection   Used for:  Cystic fibrosis (H)        Dose:  2 g   Inject 2 g into the vein every 8 hours   Quantity:  126 g   Refills:  0        oxacillin 2 g   Indication:  CF exacerbation   Used for:  Cystic fibrosis (H)        Dose:  2 g   Inject 2 g into the vein every 6 hours   Quantity:  168 g   Refills:  0          CONTINUE these " medications which have NOT CHANGED        Dose / Directions Comments    adapalene 0.1 % gel   Commonly known as:  DIFFERIN        Dose:  1 applicator   Apply 1 applicator topically At Bedtime   Refills:  0        * albuterol 108 (90 BASE) MCG/ACT Inhaler   Commonly known as:  PROAIR HFA   Used for:  CF (cystic fibrosis) (H)        Dose:  2 puff   Inhale 2 puffs into the lungs every 6 hours as needed for shortness of breath / dyspnea or wheezing   Quantity:  1 Inhaler   Refills:  3        * albuterol (2.5 MG/3ML) 0.083% neb solution   Used for:  Cystic fibrosis with pulmonary manifestations (H)        Dose:  1 vial   Take 1 vial (2.5 mg) by nebulization 3 times daily   Quantity:  270 mL   Refills:  6        amylase-lipase-protease 74832 UNITS Cpep   Commonly known as:  CREON 36        Dose:  5 capsule   Take 5 capsules (180,000 Units) by mouth 3 times daily (with meals)   Quantity:  450 capsule   Refills:  11        azelastine 0.1 % spray   Commonly known as:  ASTELIN   Used for:  Cystic fibrosis with pulmonary exacerbation (H)        Use 1-2 sprays both nostrils twice daily as needed   Quantity:  1 Bottle   Refills:  1        azithromycin 500 MG tablet   Commonly known as:  ZITHROMAX   Used for:  CF (cystic fibrosis) (H)        Take one tablet on Monday, Wednesday and Friday morning   Quantity:  12 tablet   Refills:  12        citalopram 20 MG tablet   Commonly known as:  celeXA   Used for:  Depression        Dose:  20 mg   Take 1 tablet (20 mg) by mouth daily   Quantity:  30 tablet   Refills:  1        dornase alpha 1 MG/ML neb solution   Commonly known as:  PULMOZYME   Used for:  Cystic fibrosis with pulmonary manifestations (H)        Dose:  2.5 mg   Inhale 2.5 mg into the lungs 2 times daily   Quantity:  150 mL   Refills:  12        fexofenadine 180 MG tablet   Commonly known as:  ALLEGRA   Used for:  CF (cystic fibrosis) (H)        Dose:  180 mg   Take 1 tablet (180 mg) by mouth daily   Quantity:  30 tablet    Refills:  3        fluticasone 50 MCG/ACT spray   Commonly known as:  FLONASE   Used for:  CF (cystic fibrosis) (H), Cystic fibrosis with pulmonary exacerbation (H)        Dose:  1 spray   Spray 1 spray into both nostrils daily   Quantity:  1 Bottle   Refills:  3        melatonin 5 MG tablet   Used for:  Cystic fibrosis with pulmonary exacerbation (H)        Dose:  5-10 mg   Take 1-2 tablets (5-10 mg) by mouth nightly as needed   Quantity:  60 tablet   Refills:  1        multivitamin CF formula softgel cap   Used for:  Cystic fibrosis (H)        Dose:  2 capsule   Take 2 capsules by mouth daily   Quantity:  60 capsule   Refills:  3        pantoprazole 20 MG EC tablet   Commonly known as:  PROTONIX   Used for:  CF (cystic fibrosis) (H)        Dose:  20 mg   Take 1 tablet (20 mg) by mouth daily   Quantity:  30 tablet   Refills:  3        sodium chloride inhalant 7 % Nebu neb solution   Used for:  Cystic fibrosis with pulmonary exacerbation (H)        Dose:  4 mL   Take 4 mLs by nebulization 2 times daily   Quantity:  240 mL   Refills:  11        tobramycin (PF) 300 MG/5ML neb solution   Commonly known as:  COBY   Used for:  Cystic fibrosis with pulmonary manifestations (H)        Dose:  300 mg   Take 5 mLs (300 mg) by nebulization 2 times daily Cycle 28 days on/off   Quantity:  280 mL   Refills:  3        tretinoin 0.05 % cream   Commonly known as:  RETIN-A   Used for:  Acne        Apply topically every morning as directed   Quantity:  30 g   Refills:  1        * Notice:  This list has 2 medication(s) that are the same as other medications prescribed for you. Read the directions carefully, and ask your doctor or other care provider to review them with you.      STOP taking     DOXYCYCLINE HYCLATE PO           MINOCYCLINE HCL PO                   Summary of Visit     Reason for your hospital stay       Demario was admitted to the hospital because of coughing up blood and cystic fibrosis exacerbation. He received airway  clearance treatment and intravenous antibiotics and improved.             After Care     Activity       Your activity upon discharge: activity as tolerated       Diet       High protein high calorie diet       Discharge Instructions       Hemoptysis action plan:  - If coughing up less than 1/2 cup of blood - call the pulmonology on-call provider ([phone]) to discuss; can probably manage this with a clinic visit  - If coughing up more than 1/2 cup of blood - go to the nearest emergency room and have them call the on-call provider to discuss a plan       IV access       **Ordering Provider MUST call/page Care Coordinator/ to discuss arranging this service**    You are going home with the following vascular access device: PICC.             Referrals     Home infusion referral       Your provider has referred you to: [facility]: [facility] [place] [phone]   [email or web address]    Local Address (if different from home address): N/A    Anticipated Length of Therapy:  To be determined.    Home Infusion Pharmacist to adjust therapy based on labs and clinical assessments: Yes    Labs:  May draw labs from Venous Catheter: Yes  Home Infusion Pharmacist to order labs based on therapy type and clinical assessments: Yes  Call/Fax Lab Results to: [facility] Clinic    Agency Staff to assess nursing needs for Infusion Therapy.    Access Device Management:  IV Access Type: PICC  Flush with Heparin and Normal Saline IVP PRN and routine site care (per agency protocol) to maintain access device? Yes              MD face to face encounter       Documentation of Face to Face and Certification for Home Health Services    I certify that patient: Demario Abbasi is under my care and that I, or a nurse practitioner or physician's assistant working with me, had a face-to-face encounter that meets the physician face-to-face encounter requirements with this patient on:  3/14/2018.    This encounter with the patient was in whole, or in part, for the following medical condition, which is the primary reason for home health care: cystic fibrosis (genotype: G542X/621+1g>t) s/p RUL resection in 2009, pancreatic insufficiency, depression, acne, and impaired glucose tolerance who presents with 3 days of worsening cough and hemoptysis consistent with subacute CF exacerbation with mild hemoptisis..    I certify that, based on my findings, the following services are medically necessary home health services: Nursing.    My clinical findings support the need for the above services because: Nurse is needed: To assess IV medication administration, PICC site cares, labs, home safety after changes in medications or other medical regimen..    Further, I certify that my clinical findings support that this patient is homebound (i.e. absences from home require considerable and taxing effort and are for medical reasons or [employer] services or infrequently or of short duration when for other reasons) because: Patient is not home bound.    Based on the above findings. I certify that this patient is confined to the home and needs intermittent skilled nursing care, physical therapy and/or speech therapy.  The patient is under my care, and I have initiated the establishment of the plan of care.  This patient will be followed by a physician who will periodically review the plan of care.  Physician/Provider to provide follow up care: Susan Li    Attending hospital physician (the Medicare certified [place] provider): Sonny Dial MD  Physician Signature: See electronic signature associated with these discharge orders.  Date: 3/14/2018                  Your next 10 appointments already scheduled     Mar 19, 2018 11:00 AM CDT   Peds PFT with [facility] PFT LAB   Peds Pulmonary Function Lab ([facility])    [facility]  [address] [facility]  [address] [place] [id number]   [phone]             Apr 10, 2018  1:00 PM CDT   Peds PFT with [facility] PFT LAB   Peds Pulmonary Function Lab ([facility])    [facility]  [address]  [address] [place] [id number]   [phone]            Apr 10, 2018  1:50 PM CDT   RETURN CYSTIC FIBROSIS VISIT with MERCEDES Turner CNP   Peds Pulmonary ([facility])    [facility]  [address]  [address] [place] [id number]   [phone]            May 11, 2018 10:00 AM CDT   (Arrive by 9:45 AM)   RETURN CYSTIC FIBROSIS VISIT with Poncho Flowers MD   [facility] ([facility])    [address] [place] [id number] [phone]              Follow-Up Appointment Instructions     Future Labs/Procedures    Follow Up and recommended labs and tests     Comments:    Follow up in 2 weeks with Libby Salinas at  clinic      Follow-Up Appointment Instructions     Follow Up and recommended labs and tests       Follow up in 2 weeks with Libby Salinas at  clinic             Statement of Approval     Ordered          03/17/18 1100  I have reviewed and agree with all the recommendations and orders detailed in this document.  EFFECTIVE NOW     Approved and electronically signed by:  Marie Busch MD

## 2018-03-13 NOTE — ED PROVIDER NOTES
History     Chief Complaint   Patient presents with     Chest Pain     HPI    History obtained from patient and parents who are present at bedside.    Demario is a 23 year old M who presents at 10:19 AM with 2 days of intermittent coughing and 3 episodes of hemoptysis. He has a past medical history of CF (genotype: G542X/621+1g>t) s/p RUL resection in 2009, pancreatic insufficiency 2/2 CF, depression, acne, and impaired glucose tolerance. His last hospitalization was ~1 year ago (March 2017) for a CF exacerbation complicated by hemoptysis and pulmonary A infection. Today, Demario says that he has 3 coughing & hemoptysis episodes since Sunday. Each one lasts ~10-30 seconds and in between the episodes he feels fine. He denies any recent fevers, chills, nausea, vomiting, diarrhea, rashes, or headache. He has a little shortness of breath during the episodes but it resolves quickly after coughing resolves. No post-tussive emesis. Patient denies any sick contacts or recent travel. No changes in medication regimen. Demario has been taking all of his nebulizers and doing his vest therapy per usual routine. He is currently at [facility] at [facility] and denies any tobacco or drug use.    On arrival to the ED Demario appears comfortable and stable. He is satting in the mid 90s on RA, afebrile, and has a normal BP. ED interventions include a 1L bolus, sputum culture, CXR, CBC, CMP, and RVP. Case was discussed with on-call pulmonologist who recommended starting IV antibiotics (meropenem, nafcillin) and admitting patient for further work-up.     PMHx:  Past Medical History:   Diagnosis Date     CF (cystic fibrosis) (H)      Impaired glucose tolerance      Pancreatic insufficiency      S/P lobectomy of lung 8/4/2015    Right upper lobectomy - 2009     History reviewed. No pertinent surgical history.  These were reviewed with the patient/family.    MEDICATIONS were reviewed and are as follows:   Current Facility-Administered  Medications   Medication     sodium chloride (PF) 0.9% PF flush 1-5 mL     sodium chloride (PF) 0.9% PF flush 3 mL     0.9% sodium chloride BOLUS     Current Outpatient Prescriptions   Medication     pantoprazole (PROTONIX) 20 MG EC tablet     fexofenadine (ALLEGRA) 180 MG tablet     albuterol (2.5 MG/3ML) 0.083% neb solution     tobramycin, PF, (COBY) 300 MG/5ML neb solution     multivitamin CF formula (MVW COMPLETE FORMULATION ) softgel cap     dornase alpha (PULMOZYME) 1 MG/ML neb solution     albuterol (ALBUTEROL) 108 (90 BASE) MCG/ACT Inhaler     azithromycin (ZITHROMAX) 500 MG tablet     fluticasone (FLONASE) 50 MCG/ACT spray     amylase-lipase-protease (CREON 36) 67434 UNITS CPEP     sodium chloride inhalant 7 % NEBU neb solution     melatonin 5 MG tablet     citalopram (CELEXA) 20 MG tablet     azelastine (ASTELIN) 0.1 % spray     tretinoin (RETIN-A) 0.05 % cream     MINOCYCLINE HCL PO     adapalene (DIFFERIN) 0.1 % gel     ALLERGIES:  Augmentin    IMMUNIZATIONS:  UTD by report.    SOCIAL HISTORY:   Demario lives in [place]. He is a college student at [employer].  No tobacco or drug use.  He does drink alcohol on weekends    I have reviewed the Medications, Allergies, Past Medical and Surgical History, and Social History in the Epic system.    Review of Systems  Please see HPI for pertinent positives and negatives.  All other systems reviewed and found to be negative.        Physical Exam   BP: 115/68  Pulse: 70  Heart Rate: 63  Temp: 97.9  F (36.6  C)  Resp: 18  Weight: 77.7 kg (171 lb 4.8 oz)  SpO2: 96 %      Physical Exam   Appearance: Alert and appropriate, well developed, nontoxic, with moist mucous membranes.  HEENT: Head: Normocephalic and atraumatic. Eyes: PERRL, EOM grossly intact, conjunctivae and sclerae clear. Ears: Tympanic membranes clear bilaterally, without inflammation or effusion. Nose: Nares clear with no active discharge.  Mouth/Throat: No oral lesions, pharynx clear with  no erythema or exudate.  Neck: Supple, no masses, no meningismus. No significant cervical lymphadenopathy.  Pulmonary: No grunting, flaring, retractions or stridor. Good air entry, mild sporadic coarse breath sounds bilaterally but no wheezing or crackles.  Cardiovascular: Regular rate and rhythm, normal S1 and S2, with no murmurs.  Normal symmetric peripheral pulses and brisk cap refill.  Abdominal: Normal bowel sounds, soft, nontender, nondistended, with no masses and no hepatosplenomegaly.  Neurologic: Alert and oriented, cranial nerves II-XII grossly intact, moving all extremities equally with grossly normal coordination and normal gait.  Extremities/Back: No deformity, no CVA tenderness.  Skin: No significant rashes, ecchymoses, or lacerations.  Genitourinary: Deferred  Rectal: Deferred      ED Course     ED Course     Procedures    Results for orders placed or performed during the hospital encounter of 03/13/18 (from the past 24 hour(s))   CBC with platelets differential   Result Value Ref Range    WBC 8.5 4.0 - 11.0 10e9/L    RBC Count 5.19 4.4 - 5.9 10e12/L    Hemoglobin 15.3 13.3 - 17.7 g/dL    Hematocrit 44.5 40.0 - 53.0 %    MCV 86 78 - 100 fl    MCH 29.5 26.5 - 33.0 pg    MCHC 34.4 31.5 - 36.5 g/dL    RDW 12.5 10.0 - 15.0 %    Platelet Count 223 150 - 450 10e9/L    Diff Method Automated Method     % Neutrophils 57.6 %    % Lymphocytes 32.8 %    % Monocytes 4.7 %    % Eosinophils 4.0 %    % Basophils 0.8 %    % Immature Granulocytes 0.1 %    Nucleated RBCs 0 0 /100    Absolute Neutrophil 4.9 1.6 - 8.3 10e9/L    Absolute Lymphocytes 2.8 0.8 - 5.3 10e9/L    Absolute Monocytes 0.4 0.0 - 1.3 10e9/L    Absolute Eosinophils 0.3 0.0 - 0.7 10e9/L    Absolute Basophils 0.1 0.0 - 0.2 10e9/L    Abs Immature Granulocytes 0.0 0 - 0.4 10e9/L    Absolute Nucleated RBC 0.0    CRP inflammation   Result Value Ref Range    CRP Inflammation 4.4 0.0 - 8.0 mg/L   Comprehensive metabolic panel   Result Value Ref Range    Sodium  139 133 - 144 mmol/L    Potassium 4.0 3.4 - 5.3 mmol/L    Chloride 105 94 - 109 mmol/L    Carbon Dioxide 27 20 - 32 mmol/L    Anion Gap 7 3 - 14 mmol/L    Glucose 82 70 - 99 mg/dL    Urea Nitrogen 16 7 - 30 mg/dL    Creatinine 0.89 0.66 - 1.25 mg/dL    GFR Estimate >90 >60 mL/min/1.7m2    GFR Estimate If Black >90 >60 mL/min/1.7m2    Calcium 8.9 8.5 - 10.1 mg/dL    Bilirubin Total 0.6 0.2 - 1.3 mg/dL    Albumin 3.7 3.4 - 5.0 g/dL    Protein Total 7.9 6.8 - 8.8 g/dL    Alkaline Phosphatase 125 40 - 150 U/L    ALT 30 0 - 70 U/L    AST 21 0 - 45 U/L   Magnesium   Result Value Ref Range    Magnesium 2.2 1.6 - 2.3 mg/dL   Phosphorus   Result Value Ref Range    Phosphorus 3.4 2.5 - 4.5 mg/dL   XR Chest 2 Views    Narrative    Examination: XR CHEST 2 VW, 3/13/2018 10:51 AM    Comparison: 1/4/2018    History: patient with CF.  Now with exacerbation;     Findings: The cardiomediastinal silhouette is within normal limits.  Chronic changes of cystic fibrosis. Slightly increased nodular opacity  in the right upper lung. No pleural effusion or pneumothorax.      Impression    Impression: Chronic changes of cystic fibrosis with slightly increased  nodular opacities, which may represent mucous plugging or superimposed  infectious process.    I have personally reviewed the examination and initial interpretation  and I agree with the findings.    TANISHA TYSON MD       Medications   sodium chloride (PF) 0.9% PF flush 1-5 mL (not administered)   sodium chloride (PF) 0.9% PF flush 3 mL (not administered)   0.9% sodium chloride BOLUS (1,000 mLs Intravenous New Bag 3/13/18 1109)       Imaging reviewed - CXR  3/13/18.  Maybe mucous plugging vs superimposed infectious process.  Discussed with Dr. Ortiz - admitting attending - and admitting senior resident - Dr. Busch.    Critical care time:  none    Assessments & Plan (with Medical Decision Making)     Demario is a 23 year old M with CF who presents with 2 days of intermittent  coughing and 3 episodes of hemoptysis. In contrast to prior presentations Demario has no other symptoms of acute illness at this time. No fevers, chills, malaise, vomiting, or weight loss. He is hemodynamically stable and comfortable on RA. However, hemoptysis in the setting of CF is concerning for CF exacerbation versus focal infection. CXR notable for slightly increased nodular opacities consistent with mucus plugging versus infection. CBC, CRP, CMP, RVP, coags, sputum & blood cultures obtained. Case discussed with on-call Pulmonologist who recommended admitting patient for IV abx.  On review of EMR, patient has grown achromobacter and staph aureus out of his sputum and been treated with meropenem and nafcillin in the past. Will start meropenem and nafcillin now and admit to the floor for further cares.      I have reviewed the nursing notes.  I have reviewed the findings, diagnosis, plan and need for follow up with the patient.  New Prescriptions    No medications on file       Final diagnoses:   Hemoptysis       3/13/2018   [facility] EMERGENCY DEPARTMENT     Mine Her MD  03/14/18 0913

## 2018-03-13 NOTE — PLAN OF CARE
Problem: Patient Care Overview  Goal: Plan of Care/Patient Progress Review  Outcome: No Change  Patient arrived from ED around 1400.  Patient rating chest pain on whole right side 2/10, sharp and stabbing pain.  Lungs slightly course bilaterally in bases.  Patient had not taken any of his scheduled medications today yet.  Continue to monitor respiratory status, notify MD with any concerns.

## 2018-03-13 NOTE — PROGRESS NOTES
Vascular Access    Re: PICC Order    Discussed with [facility] team (Clari) regarding need to wait 48 hours for BC's to grow out prior to placing PICC. Peds VAS will follow up 3/15 to see if it is appropriate to place PICC at that time. Also, discussed if it was an option to change IV abx from Nafcillin during the interim of not having central access d/t the increased risk of severe phlebitis and/or extravasation issues with this known vesicant. [facility] team agrees to look into whether this can be acomodated or not. Primary RN also aware of need to wait for PICC and concerns for vesicant admin peripherally.     A total of 20 minutes was spent in chart and in consult for PICC.

## 2018-03-13 NOTE — IP AVS SNAPSHOT
[facility] Medical Surgical Unit 6    [address]    [place] [address]    Phone:  [phone]                                       After Visit Summary   3/13/2018    Demario Abbasi    MRN: [id number]           After Visit Summary Signature Page     I have received my discharge instructions, and my questions have been answered. I have discussed any challenges I see with this plan with the nurse or doctor.    ..........................................................................................................................................  Patient/Patient Representative Signature      ..........................................................................................................................................  Patient Representative Print Name and Relationship to Patient    ..................................................               ................................................  Date                                            Time    ..........................................................................................................................................  Reviewed by Signature/Title    ...................................................              ..............................................  Date                                                            Time

## 2018-03-14 ENCOUNTER — APPOINTMENT (OUTPATIENT)
Dept: INTERVENTIONAL RADIOLOGY/VASCULAR | Facility: CLINIC | Age: 23
End: 2018-03-14
Attending: PEDIATRICS
Payer: COMMERCIAL

## 2018-03-14 ENCOUNTER — HOME INFUSION (PRE-WILLOW HOME INFUSION) (OUTPATIENT)
Dept: PHARMACY | Facility: CLINIC | Age: 23
End: 2018-03-14

## 2018-03-14 LAB
BACTERIA SPEC CULT: NORMAL
BACTERIA SPEC CULT: NORMAL
FLUAV H1 2009 PAND RNA SPEC QL NAA+PROBE: NEGATIVE
FLUAV H1 RNA SPEC QL NAA+PROBE: NEGATIVE
FLUAV H3 RNA SPEC QL NAA+PROBE: NEGATIVE
FLUAV RNA SPEC QL NAA+PROBE: NEGATIVE
FLUBV RNA SPEC QL NAA+PROBE: NEGATIVE
GLUCOSE BLDC GLUCOMTR-MCNC: 101 MG/DL (ref 70–99)
GLUCOSE BLDC GLUCOMTR-MCNC: 108 MG/DL (ref 70–99)
GLUCOSE BLDC GLUCOMTR-MCNC: 119 MG/DL (ref 70–99)
GLUCOSE BLDC GLUCOMTR-MCNC: 131 MG/DL (ref 70–99)
GRAM STN SPEC: ABNORMAL
GRAM STN SPEC: ABNORMAL
HADV DNA SPEC QL NAA+PROBE: NEGATIVE
HADV DNA SPEC QL NAA+PROBE: NEGATIVE
HMPV RNA SPEC QL NAA+PROBE: NEGATIVE
HPIV1 RNA SPEC QL NAA+PROBE: NEGATIVE
HPIV2 RNA SPEC QL NAA+PROBE: NEGATIVE
HPIV3 RNA SPEC QL NAA+PROBE: NEGATIVE
MICROBIOLOGIST REVIEW: NORMAL
RHINOVIRUS RNA SPEC QL NAA+PROBE: NEGATIVE
RSV RNA SPEC QL NAA+PROBE: NEGATIVE
RSV RNA SPEC QL NAA+PROBE: NEGATIVE
SPECIMEN SOURCE: ABNORMAL
SPECIMEN SOURCE: NORMAL
SPECIMEN SOURCE: NORMAL

## 2018-03-14 PROCEDURE — 40000275 ZZH STATISTIC RCP TIME EA 10 MIN

## 2018-03-14 PROCEDURE — 27210209 ZZH KIT VALVED SINGLE LUMEN

## 2018-03-14 PROCEDURE — 12000014 ZZH R&B PEDS UMMC

## 2018-03-14 PROCEDURE — 00000146 ZZHCL STATISTIC GLUCOSE BY METER IP

## 2018-03-14 PROCEDURE — 25000128 H RX IP 250 OP 636

## 2018-03-14 PROCEDURE — 94669 MECHANICAL CHEST WALL OSCILL: CPT

## 2018-03-14 PROCEDURE — 94640 AIRWAY INHALATION TREATMENT: CPT | Mod: 76

## 2018-03-14 PROCEDURE — 36569 INSJ PICC 5 YR+ W/O IMAGING: CPT

## 2018-03-14 PROCEDURE — 87077 CULTURE AEROBIC IDENTIFY: CPT | Performed by: PEDIATRICS

## 2018-03-14 PROCEDURE — 77001 FLUOROGUIDE FOR VEIN DEVICE: CPT

## 2018-03-14 PROCEDURE — 25000125 ZZHC RX 250: Performed by: PEDIATRICS

## 2018-03-14 PROCEDURE — 94640 AIRWAY INHALATION TREATMENT: CPT

## 2018-03-14 PROCEDURE — 87070 CULTURE OTHR SPECIMN AEROBIC: CPT | Performed by: PEDIATRICS

## 2018-03-14 PROCEDURE — 25000132 ZZH RX MED GY IP 250 OP 250 PS 637: Performed by: STUDENT IN AN ORGANIZED HEALTH CARE EDUCATION/TRAINING PROGRAM

## 2018-03-14 PROCEDURE — 87205 SMEAR GRAM STAIN: CPT | Performed by: PEDIATRICS

## 2018-03-14 PROCEDURE — 25000132 ZZH RX MED GY IP 250 OP 250 PS 637: Performed by: PEDIATRICS

## 2018-03-14 PROCEDURE — 87186 SC STD MICRODIL/AGAR DIL: CPT | Performed by: PEDIATRICS

## 2018-03-14 PROCEDURE — 25000128 H RX IP 250 OP 636: Performed by: PEDIATRICS

## 2018-03-14 RX ORDER — HEPARIN SODIUM,PORCINE 10 UNIT/ML
2-4 VIAL (ML) INTRAVENOUS
Status: DISCONTINUED | OUTPATIENT
Start: 2018-03-14 | End: 2018-03-17 | Stop reason: HOSPADM

## 2018-03-14 RX ORDER — LIDOCAINE 40 MG/G
CREAM TOPICAL
Status: DISCONTINUED | OUTPATIENT
Start: 2018-03-14 | End: 2018-03-17 | Stop reason: HOSPADM

## 2018-03-14 RX ADMIN — FLUTICASONE PROPIONATE 1 SPRAY: 50 SPRAY, METERED NASAL at 08:42

## 2018-03-14 RX ADMIN — ALBUTEROL SULFATE 2.5 MG: 2.5 SOLUTION RESPIRATORY (INHALATION) at 08:28

## 2018-03-14 RX ADMIN — ALBUTEROL SULFATE 2.5 MG: 2.5 SOLUTION RESPIRATORY (INHALATION) at 20:12

## 2018-03-14 RX ADMIN — MEROPENEM 2 G: 1 INJECTION, POWDER, FOR SOLUTION INTRAVENOUS at 12:32

## 2018-03-14 RX ADMIN — FEXOFENADINE HYDROCHLORIDE 180 MG: 180 TABLET, FILM COATED ORAL at 08:43

## 2018-03-14 RX ADMIN — DORNASE ALFA 2.5 MG: 1 SOLUTION RESPIRATORY (INHALATION) at 08:28

## 2018-03-14 RX ADMIN — ALBUTEROL SULFATE 2.5 MG: 2.5 SOLUTION RESPIRATORY (INHALATION) at 16:13

## 2018-03-14 RX ADMIN — OXACILLIN SODIUM 2 G: 1 INJECTION, POWDER, FOR SOLUTION INTRAMUSCULAR; INTRAVENOUS at 14:08

## 2018-03-14 RX ADMIN — MEROPENEM 2 G: 1 INJECTION, POWDER, FOR SOLUTION INTRAVENOUS at 21:41

## 2018-03-14 RX ADMIN — PANTOPRAZOLE SODIUM 20 MG: 20 TABLET, DELAYED RELEASE ORAL at 08:44

## 2018-03-14 RX ADMIN — OXACILLIN SODIUM 2 G: 1 INJECTION, POWDER, FOR SOLUTION INTRAMUSCULAR; INTRAVENOUS at 20:33

## 2018-03-14 RX ADMIN — PANCRELIPASE 180000 UNITS: 36000; 180000; 114000 CAPSULE, DELAYED RELEASE PELLETS ORAL at 18:32

## 2018-03-14 RX ADMIN — AZITHROMYCIN MONOHYDRATE 500 MG: 250 TABLET ORAL at 08:44

## 2018-03-14 RX ADMIN — ADAPALENE 1 G: 1 GEL TOPICAL at 22:18

## 2018-03-14 RX ADMIN — ALBUTEROL SULFATE 2.5 MG: 2.5 SOLUTION RESPIRATORY (INHALATION) at 12:03

## 2018-03-14 RX ADMIN — SODIUM CHLORIDE 4 ML: 7 NEBU SOLN,3 % NEBU at 12:05

## 2018-03-14 RX ADMIN — PANCRELIPASE 180000 UNITS: 36000; 180000; 114000 CAPSULE, DELAYED RELEASE PELLETS ORAL at 10:02

## 2018-03-14 RX ADMIN — Medication 2 CAPSULE: at 08:44

## 2018-03-14 RX ADMIN — OXACILLIN SODIUM 2 G: 1 INJECTION, POWDER, FOR SOLUTION INTRAMUSCULAR; INTRAVENOUS at 08:21

## 2018-03-14 RX ADMIN — CITALOPRAM HYDROBROMIDE 20 MG: 20 TABLET ORAL at 08:44

## 2018-03-14 RX ADMIN — SODIUM CHLORIDE 4 ML: 7 NEBU SOLN,3 % NEBU at 20:12

## 2018-03-14 RX ADMIN — TOBRAMYCIN 300 MG: 300 SOLUTION RESPIRATORY (INHALATION) at 08:28

## 2018-03-14 RX ADMIN — OXACILLIN SODIUM 2 G: 1 INJECTION, POWDER, FOR SOLUTION INTRAMUSCULAR; INTRAVENOUS at 01:07

## 2018-03-14 RX ADMIN — MEROPENEM 2 G: 1 INJECTION, POWDER, FOR SOLUTION INTRAVENOUS at 04:12

## 2018-03-14 RX ADMIN — DORNASE ALFA 2.5 MG: 1 SOLUTION RESPIRATORY (INHALATION) at 16:13

## 2018-03-14 NOTE — PROCEDURES
PROCEDURES 3/14/18:  1. Ultrasound guidance for venous access, left upper extremity  2. Fluoroscopic guidance PICC placement  3. Placement of peripherally inserted central venous catheter    Clinical History: 23 year old gentleman with CF requires PICC for long-term antibiotic therapy (including vesicant nafcillin) for a couple of weeks here and extending into home delivery.  Demario reports he's had multiple PICCs and reports there is known difficulty from right arm approach though there is no documentation to support inability to place PICC from this arm.      PICC Nurse:  Sussy Salter RN     PROCEDURE:   Patient and father are aware a PICC line has been ordered for placement.  Alternatives to this procedure were discussed.  Benefits of the procedure discussed included: theoretically one procedure to allow for completion of therapy or providing access to the vessel until vascular access is no longer needed, ability to aspirate blood for needed lab specimen testing, and reliable access for home care setting as needed. Risks discussed included the following:  inability to access the vessel, inability to thread catheter, accidental damage to area structures (veins, arteries, or nerves), air or hardware embolism, heart rhythm disturbance, bleeding, tenderness/pain, infection, spontaneous malposition of catheter after successful placement, catheter breakage/malfunction, thrombus, phlebitis, and inadvertent dislodgement.      Demario asked questions appropriate for clarification and to gain deeper understanding.  He appeared to understand the limitations, alternatives, and risks of the procedure and requested the procedure be performed. Both written and oral consent were obtained.      PICC was placed in interventional radiology by this Vascular Access Service nurse.  Hair bonnet and mask worn by staff and patient in room.  Hand hygiene completed.  Chlorhexidine wash to extremity completed by this writer prior to  procedure start.  A targeted left  upper extremity ultrasound assessment confirmed left basilic vein patency.  The resting (non-tourniquet) vein diameter was measured to be 0.67 centimters. Time out to confirm correct patient, procedure, and procedure site completed.     Left arm was prepped and draped in usual sterile fashion. 1.0 ml 1% lidocaine was used for local anesthesia (intradermal/subcutaneous). Under ultrasound guidance, micro-puncture needle was guided into the left basilic vein using single-wall puncture technique. Guidewire advanced easily. The access needle was exchanged over the guidewire for a 3 Cymro peel-away sheath. A 3 Cymro single lumen valved PICC catheter (pre-measured and pre-cut) length of 50 centimeters was advanced through the peel-away sheath.     Using fluoroscopic guidance, catheter was placed with tip positioned at level of caval-atrial junction as read by PINKY Arboleda intra-procedure. Peel-away sheath was removed.  Lumen aspirated and flushed adequately. Lumen saline-locked with 5 ml. An adhesive securement was applied.  There were 3 centimeters external catheter on skin with initial placement. Final site cleaned with chlorhexidine and allowed to dry.  Chlorhexidine disc and sterile dressing applied per routine protocol. Scant blood drainage noted on biopatch upon procedure completion. No immediate complications were noted.  Patient tolerated procedure very well.       SUMMARY:  Placement of left upper extremity 3 F single-lumen valved PICC line with tip at the level of caval-atrial junction.  The PICC is saline-locked and ready for use immediately. Report given to primary RN on 6th floor upon return of patient to room.    Medications:   1.0 ml 1% lidocaine, 10 ml normal saline

## 2018-03-14 NOTE — PROGRESS NOTES
CF Clinic RT note:    I met with Demario and his dad today during Pulmonary rounds. He is self care vest and reports no problems with therapy. He reports no more hemoptysis today. He needs to provide a sputum sample for labs, which he thinks he can do. He is not in need of any supplies for his airway clearance therapy at home. Dad questioned the use of ngozi- this is his month off. The team discontinued the ngozi, he should resume his regular ngozi schedule in a few weeks when he is at home. The team is making plans for him to be discharged within a few days, however discussions will continue about IV's while away at college.  Our CF  will work with Demario to get his adult transition appointment scheduled. We will continue to support Demario as needed.

## 2018-03-14 NOTE — PLAN OF CARE
Problem: Patient Care Overview  Goal: Plan of Care/Patient Progress Review  Outcome: No Change  Vital signs stable. Patient denies any pain. Patient unable to cough up any sputum for culture this evening. Patient received 2 vest/neb treatments this evening. IV antibiotics given as ordered. Patient eating and drinking well. Voiding without difficulty. Plan to continue to monitor patient closey for hemoptysis and notify MD of any changes.

## 2018-03-14 NOTE — PROGRESS NOTES
Offered patient and family PICC education for discharge. Both patient and Dad declined to have PICC class, refresher class or watch education videos.

## 2018-03-14 NOTE — PROGRESS NOTES
"Vascular Access Service  Re:  Consult for IV Access     Presentation:  Demario is a 23 year old gentleman admitted with Cystic Fibrosis exacerbation.  The pulmonary medical care team is requesting consultation for vascular access needs as the patient requires IV route Meropenem and Nafcillin for long-term course (including at home delivery).       History:  - history of PICC access many times (\"somewhere in the teens\" for count per Demario) with known difficulty from Right approach and Demario reports inability to obtain access on that side any longer  - Demario reports no known difficulty with access from Left approach thus far  - labs appropriate for PICC placement; blood culture was cancelled (drawn in ED) and patient is afebrile; team wishes to proceed with line placement    Recommendation:  Demario is appropriate for PICC placement to provide reliable access for vesicant and long-term course of antibiotic therapy during hospitalization and while at home    "

## 2018-03-14 NOTE — PROGRESS NOTES
SOCIAL WORK PROGRESS NOTE      DATA:     Supportive Check In     INTERVENTION:      1. Provided ongoing assessment of patient and family's level of coping.   2. Provided psychosocial supportive counseling and crisis intervention as needed.   3. Facilitate service linkage with hospital and community resources as needed.   4. Collaborate with healthcare team and professional in community to meet patient and family's needs as needed.     ASSESSMENT:     Writer met with dad and Demario this afternoon to check-in. Demario's mom went home last night to return to work.   Overall, Demario is doing well. He had his PICC placed today which went smoothly. Demario is anxious to get home and continue his IV therapy there.   Parents leave for [place] Friday at 12 PM. Wes plans to return home tomorrow night and Demario's older sister will come on Friday to pick him up. Sister is a nurse and will be able to help Demario over the weekend. Demario plans to return back to school next week. He knows how to manage his IV antibiotics and was the main person doing them the last time he had a PICC.   Demario was agreeable to follow up in peds clinic 1-2 weeks after discharge. He was agreeable to seeing an adult pulm doctor in the spring. He finishes his semester on May 5th and requested an appointment after that date.     PLAN:     Writer will coordinate Demario's adult CF appointments. Will check in with Demario prior to discharge re: adequate support at home and safety with his PICC line at school. Will provide Demario with documentation to provide to school as well.       JONELLE Ford [facility]  Pediatric Cystic Fibrosis   Pager: [phone]  Phone: [phone]  Email: [email or web address]

## 2018-03-14 NOTE — PLAN OF CARE
Problem: Patient Care Overview  Goal: Plan of Care/Patient Progress Review  [id number] Vital signs stable on room air. Afebrile. No hemoptysis. PICC placed today. IV antibiotics continued. Up in room ab brandi. Dad at bedside and involved in cares and plan of care. Continue to monitor and notify MD of any concerns.

## 2018-03-14 NOTE — PROGRESS NOTES
Call received from Sussy, vascular access, that PICC is okay to use. PICC is a valved 3 Citizen of [place].

## 2018-03-14 NOTE — PLAN OF CARE
Problem: Patient Care Overview  Goal: Plan of Care/Patient Progress Review  Outcome: No Change  VSS, afebrile. No coughing or hemopytsis. Lungs clear. Continuing to monitor.

## 2018-03-14 NOTE — PROGRESS NOTES
[facility], Portal    Pulmonology Progress Note    Date of Service (when I saw the patient): 03/14/2018     Assessment & Plan   Demario Abbasi is a 23 year old male with cystic fibrosis (genotype: G542X/621+1g>t) s/p RUL resection in 2009, pancreatic insufficiency, depression, acne, and impaired glucose tolerance who presents with 3 days of worsening cough and hemoptysis consistent with subacute CF exacerbation with mild hemoptisis. He has grown Achromobacter and Staph aureus in the past which was successfully treated with meropenem and nafcillin. His hemoptysis is likely mild given low amount reported and unaffected CBC. Hemoptysis has improved and he hasn't had any today.     PULM  #CF Exacerbation  #Hemoptysis  - Airway clearance 4 times daily                        - Vest therapy 4 times daily                        - Albuterol 4 times daily                        - Hypertonic saline neb 2 times daily                        - Pulmozyme 2 times daily  - Azithromycin MWF  - fexofenadine 180 mg daily  - fluticasone daily  - Antibiotics to cover achromobacter and staph aureus                        - Meropenem 2g Q8h                        - oxacilline q 6hrs  - Tobramycin on hold  - Sputum culture pending  - Vascular team consulted for PICC placement for home going     HEME  #Hemoptysis  - PT/PTT/INR and CBC unremarkable on admission     GI  #Pancreatic Insufficiency related to CF  - Creon 5 capsules TID with meal  - Protonix 20 mg   - vitamin levels     ENDO  #Impaired glucose tolerance test  - POC Glucose 3x daily 2 hours after meals  - He needs a repeat OGTT     SKIN  #Acne Vulgaris  - continue home topical medications  - Discontinue oral doxycycline and minocycline during this admission     PSYCH  # Depression  - Continue home citalopram     FEN  - PO Ad brandi  - general diet  - No MIVF  - Melatonin PRN for insomnia  - Daily weights  - Is and Os     Dispo: Pending PICC placement  and sputum culture results and establishing antibiotic regimen    Patient discussed with Dr. Dial, Pulmonary attending     Meri Miranda MD  Pediatric Resident, PGY-1  This patient has been seen and evaluated by me, Sonny Dial MD. Discussed with the house staff team or resident(s) and agree with the findings and plan in this note.  I personally performed the entire clinical encounter documented in this note.  I have reviewed today's vital signs, medications, labs and imaging.     Please call the pulmonary nurse line ([phone]) with questions, concerns and prescription refill requests during business hours. For urgent concerns after hours and on the weekends, please contact the on call pulmonologist ([phone]). For scheduling an appointment please call [phone].      Sonny Dial MD  Division of Pediatric Pulmonology  Department of Pediatrics  [facility]    Office: [phone] (please call for refills and questions)  Office fax: [phone]  Pager: [phone]  Email: [email or web address]    Interval History   No episodes of hemoptysis since admission. The cough has been better. No acute events overnight. VSS. Slept well.    ROS: 10 point ROS neg other than the symptoms noted above in the HPI.    Physical Exam   Temp: 98.4  F (36.9  C) Temp src: Oral BP: 110/65   Heart Rate: 70 Resp: 22 SpO2: 97 % O2 Device: None (Room air)    Vitals:    03/13/18 1014 03/13/18 1411 03/14/18 0439   Weight: 77.7 kg (171 lb 4.8 oz) 76.3 kg (168 lb 3.4 oz) 76.6 kg (168 lb 14 oz)     Vital Signs with Ranges  Temp:  [97.8  F (36.6  C)-98.5  F (36.9  C)] 98.4  F (36.9  C)  Heart Rate:  [58-82] 70  Resp:  [12-37] 22  BP: (105-123)/(57-76) 110/65  SpO2:  [96 %-98 %] 97 %  I/O last 3 completed shifts:  In: 1310 [P.O.:1110; I.V.:200]  Out: 0     GENERAL: Active, alert, in no acute distress.  SKIN: acne on chest and back. Otherwise no significant rash, abnormal pigmentation or lesions  HEAD: Normocephalic  EYES:  Pupils equal, round, reactive, Extraocular muscles intact. Normal conjunctivae.  NOSE: Normal without discharge.  MOUTH/THROAT: Clear. No oral lesions. Teeth without obvious abnormalities.  NECK: Supple, no masses.  No thyromegaly.  LYMPH NODES: No adenopathy  LUNGS: Bilateral good air movements. No rales, rhonchi, wheezing or retractions. Bilateral scattered few crackles.   HEART: Regular rhythm. Normal S1/S2. No murmurs. Normal pulses.  ABDOMEN: Soft, non-tender, not distended, no masses or hepatosplenomegaly. Bowel sounds normal.   NEUROLOGIC: No focal findings. Cranial nerves grossly intact: DTR's normal. Normal gait, strength and tone  BACK: Spine is straight, no scoliosis.  EXTREMITIES: Full range of motion, no deformities     Medications     sodium chloride Stopped (03/13/18 2232)       sodium chloride (PF)  3 mL Intracatheter Q8H     meropenem  2 g Intravenous Q8H     adapalene  1 applicator Topical At Bedtime     albuterol  1 vial Nebulization 4x Daily     amylase-lipase-protease  5 capsule Oral TID w/meals     azithromycin  500 mg Oral Q Mon Wed Fri AM     dornase alpha  2.5 mg Inhalation BID     tretinoin   Topical QAM     sodium chloride (PF)  3 mL Intravenous Q8H     sodium chloride inhalant  4 mL Nebulization BID     citalopram  20 mg Oral Daily     fexofenadine  180 mg Oral Daily     fluticasone  1 spray Both Nostrils Daily     multivitamin CF formula  2 capsule Oral Daily     pantoprazole  20 mg Oral Daily     oxacillin IV  2 g Intravenous Q6H       Data   Results for orders placed or performed during the hospital encounter of 03/13/18 (from the past 24 hour(s))   Glucose by meter   Result Value Ref Range    Glucose 101 (H) 70 - 99 mg/dL   Glucose by meter   Result Value Ref Range    Glucose 101 (H) 70 - 99 mg/dL

## 2018-03-14 NOTE — PROGRESS NOTES
Therapy: IV ABX  Insurance: [facility] PMAP  Ded: $0  Met: $0    Co-Insurance:0  Max Out of Pocket: $0  Met: $0    Please contact Intake with any questions, [phone] or In Basket pool, [facility] ([phone]).    In reference to admission on 3/13/18 to check for iv abx benefits.

## 2018-03-14 NOTE — PROGRESS NOTES
CLINICAL NUTRITION SERVICES - PEDIATRIC ASSESSMENT NOTE    REASON FOR ASSESSMENT  Demario Abbasi is a 23 year old male seen by the dietitian for nutritional risk screening, CF.     Nutrition Significant PMH  Mild Lung Disease  Pancreatic Insufficient   Glucose Intolerance    Social Assessment  Living situation: Living with roommates in house near [facility]  Work/School/Disability: School @ [employer]  Food Insecurity: None    Anthropometric Assessment  Height: 175 cm  IBW based on BMI 22 for females and 23 for males per CF Foundation recs: 70 kg  Today's Weight: 76.6 kg (actual weight)   %IBW: 110  BMI: Body mass index is  Body mass index is 25.01 kg/(m^2).   Current weight is considered: Normal BMI and at CF goal    Physical Activity/Exercise:  Not assessed    NUTRITION HISTORY  Unable to obtain full nutrition hx as patient off unit for PICC placement this AM then asleep this afternoon. Typically is a good eater at baseline consuming TID meals + snacks.   Typically drinks water, gatorade, milk some soda. No decreased intakes noted this admit.   Factors affecting nutrition intake include: Increased nutrition needs 2/2 to CF and EPI    CURRENT NUTRITION ORDERS  Diet: High calorie/protein diet    CURRENT NUTRITION SUPPORT   None    PHYSICAL FINDINGS  Observed  Patient not observed - asleep   Obtained from Chart/Interdisciplinary Team  Hemoptysis PTA    LABS  Labs reviewed *Needs updated annuals and OGTT.   Date of last annuals: 11/3/16 - WNL A, D, E and INR. Ferritin WNL. No lipid panel since 2013.   Date of last OGTT: 11/3/16 - Impaired     MEDICATIONS  Medications reviewed  Creon 36k, 5 caps with meals and 3 with snacks = 2368 units lipase/kg/meal   MVW complete formulation  2 caps daily     Estimated Energy and Protein Needs  Estimation based on weight maintenance with Mild lung disease and pancreatic insufficient.    BEE: 1845 kcal/day   9789-9146 kcals/day = ~35-45 kcal/kg (BEE x 1.5-1.75%)   91- 114 g protein/day  = 1.2-1.5 g/kg    MALNUTRITION  % Intake:  No decreased intake noted  % Weight Loss:  None noted  Subcutaneous Fat Loss:  None observed  Muscle Loss:  None observed  Handgrip Strength:  Not Applicable  Fluid Accumulation/Edema:  None noted  Malnutrition Diagnosis: Patient does not meet two of the above criteria necessary for diagnosing malnutrition in the context of acute pulmonary illness    NUTRITION DIAGNOSIS:  Impaired nutrient utilization related to pancreatic insufficiency as evidenced by requires Creon with all PO; CF.     INTERVENTIONS  Nutrition Prescription  High calorie/protein/fat/salt diet to meet >/= 75% assessed nutrition needs for weight maintenance surrounding admit.     Nutrition Education:   No education needs assessed at this time and Not appropriate at this time due to patient condition    Implementation:  Meals/ Snack -- Continue PO.     Goals  1. PO intakes >/= 75% assessed nutrition needs.   2. Weight maintenance surrounding admit.     FOLLOW UP/MONITORING  Food and Beverage intake --  Anthropometric measurements --     RECOMMENDATIONS  1. Encourage PO intakes surrounding admit.   2. Obtain fat soluble vitamin levels (A, D, E); ferritin.   3. Will need to schedule OGTT as outpatient s/p acute illness.     Eboni Dent RD, SERENA, [facility]  Pediatric Cystic Fibrosis & Pulmonary Dietitian  [facility]  Pager #[phone]  Phone #[phone]

## 2018-03-14 NOTE — PROGRESS NOTES
Care Coordinator Progress Note     Admission Date/Time:  3/13/2018  Attending MD:  Sonny Dial MD     Data  Chart reviewed, discussed with interdisciplinary team.   Patient was admitted for: Hemoptysis.    Concerns with insurance coverage for discharge needs: None.  Current Living Situation: Patient lives with family and pt is attending college at [employer].  Pt comes home on the weekends.  Generally on Fridays..  Support System: Supportive and Involved  Services Involved: Home Infusion and None  Transportation: Family or Friend will provide  Barriers to Discharge: Medical stability    Coordination of Care and Referrals: Provided patient/family with options for Home Infusion.        Assessment  Met with pt and his father.  Reviewed anticipated discharge on home IV antibiotics.  Pt voiced no concerns about his discharge plan.  Pt parents will be leaving town on 3/16 and patients sister Evi Morton will be assisting patient.  Ideally would prefer all supplies be delivered to his home in [place].   Pt has previously utilized [facility] and would prefer to remain within the  system.  Pt and father noted that they would prefer to not utilize the previous home care agency that provided home RN services d/t concerns with their ability to manage the PICC.   Pt and father unable to recall name of agency.   Agreed to f/u with pt closer to d/c should any concerns or needs arise.  Referral made to [facility].    Addendum 12:15 p.m.:  [facility] Benefit check - [employer] PMAP covered at 100% No deductible or out of pocket.  Updated [facility] that the pt would like to use the following agency - [facility], [facility] primary contact [phone] for home RN support.      Plan  Anticipated Discharge Date: 3/16/18 - pending culture results.  Anticipated Discharge Plan: Home with [facility] for home iv antibiotics.    Katherine Osborne, RN BSN, PHN RN Care Coordinator covering for Jennifer Vega [facility]  6th Floor RN Care Coordinator   Pager [phone]  3/14/2018 12:18 PM

## 2018-03-15 ENCOUNTER — HOME INFUSION (PRE-WILLOW HOME INFUSION) (OUTPATIENT)
Dept: PHARMACY | Facility: CLINIC | Age: 23
End: 2018-03-15

## 2018-03-15 DIAGNOSIS — E84.9 CYSTIC FIBROSIS (H): Primary | ICD-10-CM

## 2018-03-15 LAB
DEPRECATED CALCIDIOL+CALCIFEROL SERPL-MC: 28 UG/L (ref 20–75)
EXPTIME-PRE: 7.81 SEC
FEF2575-%PRED-PRE: 46 %
FEF2575-PRE: 2.37 L/SEC
FEF2575-PRED: 5.07 L/SEC
FEFMAX-%PRED-PRE: 80 %
FEFMAX-PRE: 8.21 L/SEC
FEFMAX-PRED: 10.17 L/SEC
FEV1-%PRED-PRE: 60 %
FEV1-PRE: 2.86 L
FEV1FEV6-PRE: 78 %
FEV1FEV6-PRED: 84 %
FEV1FVC-PRE: 77 %
FEV1FVC-PRED: 84 %
FIFMAX-PRE: 2.93 L/SEC
FVC-%PRED-PRE: 66 %
FVC-PRE: 3.71 L
FVC-PRED: 5.58 L
GGT SERPL-CCNC: 134 U/L (ref 0–75)
GLUCOSE BLDC GLUCOMTR-MCNC: 114 MG/DL (ref 70–99)
GLUCOSE BLDC GLUCOMTR-MCNC: 171 MG/DL (ref 70–99)
GLUCOSE BLDC GLUCOMTR-MCNC: 178 MG/DL (ref 70–99)
IGE SERPL-ACNC: 22 KIU/L (ref 0–114)
IGG SERPL-MCNC: 1310 MG/DL (ref 695–1620)

## 2018-03-15 PROCEDURE — 82785 ASSAY OF IGE: CPT | Performed by: STUDENT IN AN ORGANIZED HEALTH CARE EDUCATION/TRAINING PROGRAM

## 2018-03-15 PROCEDURE — 00000146 ZZHCL STATISTIC GLUCOSE BY METER IP

## 2018-03-15 PROCEDURE — 25000128 H RX IP 250 OP 636: Performed by: PEDIATRICS

## 2018-03-15 PROCEDURE — 25000132 ZZH RX MED GY IP 250 OP 250 PS 637: Performed by: PEDIATRICS

## 2018-03-15 PROCEDURE — 94640 AIRWAY INHALATION TREATMENT: CPT

## 2018-03-15 PROCEDURE — 94669 MECHANICAL CHEST WALL OSCILL: CPT

## 2018-03-15 PROCEDURE — 12000014 ZZH R&B PEDS UMMC

## 2018-03-15 PROCEDURE — 82977 ASSAY OF GGT: CPT | Performed by: STUDENT IN AN ORGANIZED HEALTH CARE EDUCATION/TRAINING PROGRAM

## 2018-03-15 PROCEDURE — 25000128 H RX IP 250 OP 636

## 2018-03-15 PROCEDURE — 94375 RESPIRATORY FLOW VOLUME LOOP: CPT | Mod: ZF

## 2018-03-15 PROCEDURE — 84590 ASSAY OF VITAMIN A: CPT | Performed by: STUDENT IN AN ORGANIZED HEALTH CARE EDUCATION/TRAINING PROGRAM

## 2018-03-15 PROCEDURE — 40000275 ZZH STATISTIC RCP TIME EA 10 MIN

## 2018-03-15 PROCEDURE — 25000125 ZZHC RX 250: Performed by: PEDIATRICS

## 2018-03-15 PROCEDURE — 82306 VITAMIN D 25 HYDROXY: CPT | Performed by: STUDENT IN AN ORGANIZED HEALTH CARE EDUCATION/TRAINING PROGRAM

## 2018-03-15 PROCEDURE — 94640 AIRWAY INHALATION TREATMENT: CPT | Mod: 76

## 2018-03-15 PROCEDURE — 25000132 ZZH RX MED GY IP 250 OP 250 PS 637: Performed by: STUDENT IN AN ORGANIZED HEALTH CARE EDUCATION/TRAINING PROGRAM

## 2018-03-15 PROCEDURE — 36592 COLLECT BLOOD FROM PICC: CPT | Performed by: STUDENT IN AN ORGANIZED HEALTH CARE EDUCATION/TRAINING PROGRAM

## 2018-03-15 PROCEDURE — 82784 ASSAY IGA/IGD/IGG/IGM EACH: CPT | Performed by: STUDENT IN AN ORGANIZED HEALTH CARE EDUCATION/TRAINING PROGRAM

## 2018-03-15 PROCEDURE — 84446 ASSAY OF VITAMIN E: CPT | Performed by: STUDENT IN AN ORGANIZED HEALTH CARE EDUCATION/TRAINING PROGRAM

## 2018-03-15 PROCEDURE — 84403 ASSAY OF TOTAL TESTOSTERONE: CPT | Performed by: STUDENT IN AN ORGANIZED HEALTH CARE EDUCATION/TRAINING PROGRAM

## 2018-03-15 RX ORDER — NAFCILLIN SODIUM 2 G/8ML
2 INJECTION, POWDER, FOR SOLUTION INTRAMUSCULAR; INTRAVENOUS EVERY 6 HOURS
Status: DISCONTINUED | OUTPATIENT
Start: 2018-03-15 | End: 2018-03-16

## 2018-03-15 RX ADMIN — Medication 2 CAPSULE: at 08:50

## 2018-03-15 RX ADMIN — CITALOPRAM HYDROBROMIDE 20 MG: 20 TABLET ORAL at 08:50

## 2018-03-15 RX ADMIN — SODIUM CHLORIDE 4 ML: 7 NEBU SOLN,3 % NEBU at 20:14

## 2018-03-15 RX ADMIN — OXACILLIN SODIUM 2 G: 1 INJECTION, POWDER, FOR SOLUTION INTRAMUSCULAR; INTRAVENOUS at 08:50

## 2018-03-15 RX ADMIN — PANCRELIPASE 108000 UNITS: 36000; 180000; 114000 CAPSULE, DELAYED RELEASE PELLETS ORAL at 22:14

## 2018-03-15 RX ADMIN — NAFCILLIN SODIUM 2 G: 2 INJECTION, POWDER, FOR SOLUTION INTRAMUSCULAR; INTRAVENOUS at 14:38

## 2018-03-15 RX ADMIN — MEROPENEM 2 G: 1 INJECTION, POWDER, FOR SOLUTION INTRAVENOUS at 13:05

## 2018-03-15 RX ADMIN — DORNASE ALFA 2.5 MG: 1 SOLUTION RESPIRATORY (INHALATION) at 08:45

## 2018-03-15 RX ADMIN — PANCRELIPASE 180000 UNITS: 36000; 180000; 114000 CAPSULE, DELAYED RELEASE PELLETS ORAL at 18:32

## 2018-03-15 RX ADMIN — PANCRELIPASE 180000 UNITS: 36000; 180000; 114000 CAPSULE, DELAYED RELEASE PELLETS ORAL at 12:37

## 2018-03-15 RX ADMIN — ALBUTEROL SULFATE 2.5 MG: 2.5 SOLUTION RESPIRATORY (INHALATION) at 20:14

## 2018-03-15 RX ADMIN — NAFCILLIN SODIUM 2 G: 2 INJECTION, POWDER, FOR SOLUTION INTRAMUSCULAR; INTRAVENOUS at 20:58

## 2018-03-15 RX ADMIN — OXACILLIN SODIUM 2 G: 1 INJECTION, POWDER, FOR SOLUTION INTRAMUSCULAR; INTRAVENOUS at 01:52

## 2018-03-15 RX ADMIN — MEROPENEM 2 G: 1 INJECTION, POWDER, FOR SOLUTION INTRAVENOUS at 19:54

## 2018-03-15 RX ADMIN — MEROPENEM 2 G: 1 INJECTION, POWDER, FOR SOLUTION INTRAVENOUS at 04:01

## 2018-03-15 RX ADMIN — SODIUM CHLORIDE 4 ML: 7 NEBU SOLN,3 % NEBU at 12:46

## 2018-03-15 RX ADMIN — ALBUTEROL SULFATE 2.5 MG: 2.5 SOLUTION RESPIRATORY (INHALATION) at 16:17

## 2018-03-15 RX ADMIN — ALBUTEROL SULFATE 2.5 MG: 2.5 SOLUTION RESPIRATORY (INHALATION) at 08:45

## 2018-03-15 RX ADMIN — PANTOPRAZOLE SODIUM 20 MG: 20 TABLET, DELAYED RELEASE ORAL at 08:50

## 2018-03-15 RX ADMIN — PANCRELIPASE 180000 UNITS: 36000; 180000; 114000 CAPSULE, DELAYED RELEASE PELLETS ORAL at 09:46

## 2018-03-15 RX ADMIN — ALBUTEROL SULFATE 2.5 MG: 2.5 SOLUTION RESPIRATORY (INHALATION) at 12:46

## 2018-03-15 RX ADMIN — FEXOFENADINE HYDROCHLORIDE 180 MG: 180 TABLET, FILM COATED ORAL at 08:50

## 2018-03-15 RX ADMIN — DORNASE ALFA 2.5 MG: 1 SOLUTION RESPIRATORY (INHALATION) at 16:17

## 2018-03-15 RX ADMIN — ADAPALENE 1 G: 1 GEL TOPICAL at 22:13

## 2018-03-15 RX ADMIN — FLUTICASONE PROPIONATE 1 SPRAY: 50 SPRAY, METERED NASAL at 09:43

## 2018-03-15 NOTE — PLAN OF CARE
Problem: Patient Care Overview  Goal: Plan of Care/Patient Progress Review  Outcome: Improving  Pt has had no hemoptysis complications this shift. Afebrile, VSS. Slept throughout shift. PICC line intact. Continuing to monitor.

## 2018-03-15 NOTE — PLAN OF CARE
Problem: Patient Care Overview  Goal: Plan of Care/Patient Progress Review  1316-8649: Afebrile, VSS. Lung sounds clear. Denied pain. Good appetite and PO intake. Dad at bedside, updated on plan of care. Hourly rounding completed, will continue to monitor.

## 2018-03-15 NOTE — PROGRESS NOTES
Infusion Therapy-[facility] Nurse Liaison-Informational Meeting  Met with dad and Patient at bedside. Pt is expected to D/C on Saturday to home in [place], [place], then will go to [facility] in [facility] during the weekdays. Pt has done Home Infusion several times before, he prefers the [facility], administration. His Mother is an RN, and has been doing his CLC for many yrs. (parents out of town from FRIDAY-WEDS 21st) Sister will be staying with pt. Over the wkend., she is also an RN. At this time, it is undecided which abx's pt will DC on. The Team is waiting for Sensitivities to come back from lab.  Provided info on [facility], RPNATHEN/RN on call 24/7, supplies, and delivery process, storage of medications,& refrigerating medications upon arrival form [facility].  Educated on how to contact [facility].  Pt stated they are will be comfortable doing infusion in home environment.  Per DAD and patient they are requesting [facility], and pt will stop in there office on Friday afternoons for CLC and Labs.  (if ordered wkly)  Will continue to follow until DC for any changes or additional needs   NOTE: Staff, RN will make sure CLC is done prior to leaving on SAT. (this RN provided Extension to RN)  Pt/Dad aware they need to wait for delivery of ABX to hosp, prior to leaving.  [facility] team notified, to get the RX to [facility] ASAP on Saturday AM (This RN provided fax and RN office phone #)  MAGGIE Rojo will be picking pt up at hospital on Saturday        Faith Roth, [facility]-Nurse Liaison  Pager:  [phone]  Cell:  [phone]  Email to text:  [email or web address]  [email or web address]

## 2018-03-15 NOTE — PROGRESS NOTES
[facility]    Pulmonology Progress Note    Date of Service (when I saw the patient): 03/15/2018     Assessment & Plan   Demario Abbasi is a 23 year old male with cystic fibrosis (genotype: [id number]/621+1g>t) s/p RUL resection in 2009, pancreatic insufficiency, depression, acne, and impaired glucose tolerance who presents with 3 days of worsening cough and hemoptysis consistent with subacute CF exacerbation with mild hemoptisis. He has grown Achromobacter and Staph aureus in the past which was successfully treated with meropenem and nafcillin. His hemoptysis has resolved and he hasn't had any this admission.      PULM  #CF Exacerbation  #Hemoptysis (resolved)  - Airway clearance 4 times daily                        - Vest therapy 4 times daily                        - Albuterol 4 times daily                        - Hypertonic saline neb 2 times daily                        - Pulmozyme 2 times daily  - Azithromycin MWF  - fexofenadine 180 mg daily  - fluticasone daily  - Antibiotics to cover achromobacter and staph aureus                        - Meropenem 2g Q8h                        - Nafcillin  Q6hrs  - Tobramycin on hold  - Sputum culture pending, gram stain shows rare gram negative rods  - Routine CF labs and Dexa scan while he is in the hospital    HEME  #Hemoptysis  - PT/PTT/INR and CBC unremarkable on admission  - Develop action plan at time of discharge on what to do in the case of recurrence      GI  #Pancreatic Insufficiency   - Creon 5 capsules TID with meal  - Protonix 20 mg   - vitamin levels sent     ENDO  #Impaired glucose tolerance   - POC Glucose 3x daily 2 hours after meals  - OGTT tomorrow morning     SKIN  #Acne Vulgaris  - continue home topical medications  - Discontinue oral doxycycline and minocycline during this admission     PSYCH  # Depression  - Continue home citalopram     FEN  - PO Ad brandi  - general diet  - No MIVF  - Melatonin PRN for insomnia  -  Daily weights  - Is and Os     IV access: Single lumen PICC    Dispo: pending sputum culture results and establishing antibiotic regimen. We will develop an action plan with him on what to do in the case of hemoptysis    Patient discussed with Dr. Dial, Pulmonary attending     Meri Miranda MD  Pediatric Resident, PGY-1    This patient has been seen and evaluated by me, Sonny Dial MD. Discussed with the house staff team or resident(s) and agree with the findings and plan in this note.  I personally performed the entire clinical encounter documented in this note.  I have reviewed today's vital signs, medications, labs and imaging.     Please call the pulmonary nurse line ([phone]) with questions, concerns and prescription refill requests during business hours. For urgent concerns after hours and on the weekends, please contact the on call pulmonologist ([phone]). For scheduling an appointment please call [phone].      Sonny Dial MD  Division of Pediatric Pulmonology  Department of Pediatrics  [facility]    Office: [phone] (please call for refills and questions)  Office fax: [phone]  Pager: [phone]  Email: [email or web address]    Interval History   No episodes of hemoptysis since admission. The cough has been better. No acute events overnight. VSS. Slept well.    ROS: 10 point ROS neg other than the symptoms noted above in the HPI.    Physical Exam   Temp: 98.3  F (36.8  C) Temp src: Oral BP: 102/58   Heart Rate: 68 Resp: 22 SpO2: 96 % O2 Device: None (Room air)    Vitals:    03/13/18 1014 03/13/18 1411 03/14/18 0439   Weight: 77.7 kg (171 lb 4.8 oz) 76.3 kg (168 lb 3.4 oz) 76.6 kg (168 lb 14 oz)     Vital Signs with Ranges  Temp:  [98.3  F (36.8  C)-98.4  F (36.9  C)] 98.3  F (36.8  C)  Heart Rate:  [66-70] 68  Resp:  [22-26] 22  BP: (102-112)/(58-66) 102/58  SpO2:  [95 %-97 %] 96 %  I/O last 3 completed shifts:  In: 856 [P.O.:540; I.V.:316]  Out: -     GENERAL: Active,  alert, in no acute distress.  SKIN: acne on chest and back. Otherwise no significant rash, abnormal pigmentation or lesions  HEAD: Normocephalic  EYES: Pupils equal, round, reactive, Extraocular muscles intact. Normal conjunctivae.  NOSE: Normal without discharge.  MOUTH/THROAT: Clear. No oral lesions. Teeth without obvious abnormalities.  NECK: Supple, no masses.  No thyromegaly.  LYMPH NODES: No adenopathy  LUNGS: Bilateral good air movements. No rales, rhonchi, wheezing or retractions. Bilateral scattered few crackles.   HEART: Regular rhythm. Normal S1/S2. No murmurs. Normal pulses.  ABDOMEN: Soft, non-tender, not distended, no masses or hepatosplenomegaly. Bowel sounds normal.   NEUROLOGIC: No focal findings. Cranial nerves grossly intact: DTR's normal. Normal gait, strength and tone  BACK: Spine is straight, no scoliosis.  EXTREMITIES: Full range of motion, no deformities     Medications     sodium chloride Stopped (03/13/18 2232)       sodium chloride (PF)  3 mL Intracatheter Q8H     meropenem  2 g Intravenous Q8H     adapalene  1 applicator Topical At Bedtime     albuterol  1 vial Nebulization 4x Daily     amylase-lipase-protease  5 capsule Oral TID w/meals     azithromycin  500 mg Oral Q Mon Wed Fri AM     dornase alpha  2.5 mg Inhalation BID     tretinoin   Topical QAM     sodium chloride (PF)  3 mL Intravenous Q8H     sodium chloride inhalant  4 mL Nebulization BID     citalopram  20 mg Oral Daily     fexofenadine  180 mg Oral Daily     fluticasone  1 spray Both Nostrils Daily     multivitamin CF formula  2 capsule Oral Daily     pantoprazole  20 mg Oral Daily     oxacillin IV  2 g Intravenous Q6H       Data   Results for orders placed or performed during the hospital encounter of 03/13/18 (from the past 24 hour(s))   Glucose by meter   Result Value Ref Range    Glucose 101 (H) 70 - 99 mg/dL   Gram stain   Result Value Ref Range    Specimen Description Sputum     Gram Stain >25 PMNs/low power field      Gram Stain Rare  Gram negative bacilli   (A)    Glucose by meter   Result Value Ref Range    Glucose 108 (H) 70 - 99 mg/dL   Glucose by meter   Result Value Ref Range    Glucose 131 (H) 70 - 99 mg/dL   Glucose by meter   Result Value Ref Range    Glucose 119 (H) 70 - 99 mg/dL     Most Recent [facility] Pulmonary Function Testing    FVC-Pred   Date Value Ref Range Status   03/15/2018 5.58 L    01/04/2018 5.57 L    07/20/2017 5.56 L    06/22/2017 5.55 L    04/13/2017 5.54 L      FVC-Pre   Date Value Ref Range Status   03/15/2018 3.71 L    01/04/2018 3.51 L    07/20/2017 3.52 L    06/22/2017 3.49 L    04/13/2017 3.39 L      FVC-%Pred-Pre   Date Value Ref Range Status   03/15/2018 66 %    01/04/2018 62 %    07/20/2017 63 %    06/22/2017 62 %    04/13/2017 61 %      FEV1-Pre   Date Value Ref Range Status   03/15/2018 2.86 L    01/04/2018 2.83 L    07/20/2017 2.80 L    06/22/2017 2.70 L    04/13/2017 2.79 L      FEV1-%Pred-Pre   Date Value Ref Range Status   03/15/2018 60 %    01/04/2018 59 %    07/20/2017 59 %    06/22/2017 57 %    04/13/2017 59 %      FEV1FVC-Pred   Date Value Ref Range Status   03/15/2018 84 %    01/04/2018 85 %    07/20/2017 86 %    06/22/2017 86 %    04/13/2017 86 %      FEV1FVC-Pre   Date Value Ref Range Status   03/15/2018 77 %    01/04/2018 81 %    07/20/2017 80 %    06/22/2017 77 %    04/13/2017 82 %      No results found for: 20029  FEFMax-Pred   Date Value Ref Range Status   03/15/2018 10.17 L/sec    01/04/2018 10.17 L/sec    07/20/2017 10.15 L/sec    06/22/2017 10.15 L/sec    04/13/2017 10.15 L/sec      FEFMax-Pre   Date Value Ref Range Status   03/15/2018 8.21 L/sec    01/04/2018 8.28 L/sec    07/20/2017 7.79 L/sec    06/22/2017 7.44 L/sec    04/13/2017 8.18 L/sec      FEFMax-%Pred-Pre   Date Value Ref Range Status   03/15/2018 80 %    01/04/2018 81 %    07/20/2017 76 %    06/22/2017 73 %    04/13/2017 80 %      ExpTime-Pre   Date Value Ref Range Status   03/15/2018 7.81 sec    01/04/2018 6.91  sec    07/20/2017 7.75 sec    06/22/2017 7.94 sec    04/13/2017 7.36 sec      FIFMax-Pre   Date Value Ref Range Status   03/15/2018 2.93 L/sec    01/04/2018 3.60 L/sec    07/20/2017 4.42 L/sec    06/22/2017 3.73 L/sec    04/13/2017 4.10 L/sec      FEV1FEV6-Pred   Date Value Ref Range Status   03/15/2018 84 %    01/04/2018 84 %    07/20/2017 85 %    06/22/2017 85 %    04/13/2017 85 %      FEV1FEV6-Pre   Date Value Ref Range Status   03/15/2018 78 %    01/04/2018 81 %    07/20/2017 80 %    06/22/2017 79 %    04/13/2017 83 %      No results found for: 94401  Interpretation: Good technique and effort. The results of this test do meet the ATS standards for acceptability but not reproducibility. He was able to give a sustained expiratory maneuver for about 6-7. There is mild scooping of the flow volume loop in the expiratory limb and blunted-looking flow volume loop in the inspiratory limb. Results are suggestive of restrictive and obstructive mixt pattern. Stable compared to last test in 1/2018.

## 2018-03-16 ENCOUNTER — RADIANT APPOINTMENT (OUTPATIENT)
Dept: BONE DENSITY | Facility: CLINIC | Age: 23
End: 2018-03-16
Attending: PEDIATRICS
Payer: COMMERCIAL

## 2018-03-16 ENCOUNTER — HOME INFUSION (PRE-WILLOW HOME INFUSION) (OUTPATIENT)
Dept: PHARMACY | Facility: CLINIC | Age: 23
End: 2018-03-16

## 2018-03-16 LAB
GLUCOSE SERPL-MCNC: 160 MG/DL (ref 70–99)
GLUCOSE SERPL-MCNC: 177 MG/DL (ref 70–99)
GLUCOSE SERPL-MCNC: 200 MG/DL (ref 70–99)
GLUCOSE SERPL-MCNC: 86 MG/DL (ref 70–99)
GLUCOSE SERPL-MCNC: 92 MG/DL (ref 70–99)
INSULIN SERPL-ACNC: NORMAL MU/L (ref 3–25)
TESTOST SERPL-MCNC: 271 NG/DL (ref 240–950)

## 2018-03-16 PROCEDURE — 25000132 ZZH RX MED GY IP 250 OP 250 PS 637: Performed by: PEDIATRICS

## 2018-03-16 PROCEDURE — 25000125 ZZHC RX 250: Performed by: PEDIATRICS

## 2018-03-16 PROCEDURE — 82947 ASSAY GLUCOSE BLOOD QUANT: CPT | Performed by: PEDIATRICS

## 2018-03-16 PROCEDURE — 94640 AIRWAY INHALATION TREATMENT: CPT | Mod: 76

## 2018-03-16 PROCEDURE — 25000128 H RX IP 250 OP 636: Performed by: PEDIATRICS

## 2018-03-16 PROCEDURE — 12000014 ZZH R&B PEDS UMMC

## 2018-03-16 PROCEDURE — 94669 MECHANICAL CHEST WALL OSCILL: CPT

## 2018-03-16 PROCEDURE — 25000128 H RX IP 250 OP 636

## 2018-03-16 PROCEDURE — 77080 DXA BONE DENSITY AXIAL: CPT

## 2018-03-16 PROCEDURE — 40000275 ZZH STATISTIC RCP TIME EA 10 MIN

## 2018-03-16 PROCEDURE — 25000132 ZZH RX MED GY IP 250 OP 250 PS 637: Performed by: STUDENT IN AN ORGANIZED HEALTH CARE EDUCATION/TRAINING PROGRAM

## 2018-03-16 PROCEDURE — 83525 ASSAY OF INSULIN: CPT | Performed by: PEDIATRICS

## 2018-03-16 RX ORDER — OXACILLIN SODIUM 2 G/1
2 INJECTION, POWDER, FOR SOLUTION INTRAMUSCULAR; INTRAVENOUS EVERY 6 HOURS
Status: DISCONTINUED | OUTPATIENT
Start: 2018-03-16 | End: 2018-03-16 | Stop reason: RX

## 2018-03-16 RX ADMIN — FLUTICASONE PROPIONATE 1 SPRAY: 50 SPRAY, METERED NASAL at 10:10

## 2018-03-16 RX ADMIN — ALBUTEROL SULFATE 2.5 MG: 2.5 SOLUTION RESPIRATORY (INHALATION) at 08:29

## 2018-03-16 RX ADMIN — NAFCILLIN SODIUM 2 G: 2 INJECTION, POWDER, FOR SOLUTION INTRAMUSCULAR; INTRAVENOUS at 10:02

## 2018-03-16 RX ADMIN — MEROPENEM 2 G: 1 INJECTION, POWDER, FOR SOLUTION INTRAVENOUS at 12:03

## 2018-03-16 RX ADMIN — ALBUTEROL SULFATE 2.5 MG: 2.5 SOLUTION RESPIRATORY (INHALATION) at 16:17

## 2018-03-16 RX ADMIN — SODIUM CHLORIDE 1000 ML: 9 INJECTION, SOLUTION INTRAVENOUS at 02:31

## 2018-03-16 RX ADMIN — PANCRELIPASE 180000 UNITS: 36000; 180000; 114000 CAPSULE, DELAYED RELEASE PELLETS ORAL at 15:08

## 2018-03-16 RX ADMIN — SODIUM CHLORIDE 4 ML: 7 NEBU SOLN,3 % NEBU at 21:34

## 2018-03-16 RX ADMIN — Medication 2 CAPSULE: at 04:06

## 2018-03-16 RX ADMIN — PANCRELIPASE 180000 UNITS: 36000; 180000; 114000 CAPSULE, DELAYED RELEASE PELLETS ORAL at 18:59

## 2018-03-16 RX ADMIN — SODIUM CHLORIDE 1000 ML: 9 INJECTION, SOLUTION INTRAVENOUS at 16:40

## 2018-03-16 RX ADMIN — AZITHROMYCIN MONOHYDRATE 500 MG: 250 TABLET ORAL at 10:10

## 2018-03-16 RX ADMIN — OXACILLIN SODIUM 2 G: 2 INJECTION, POWDER, FOR SOLUTION INTRAMUSCULAR; INTRAVENOUS at 16:31

## 2018-03-16 RX ADMIN — NAFCILLIN SODIUM 2 G: 2 INJECTION, POWDER, FOR SOLUTION INTRAMUSCULAR; INTRAVENOUS at 02:31

## 2018-03-16 RX ADMIN — ALCOHOL 75 G: 65 GEL TOPICAL at 08:05

## 2018-03-16 RX ADMIN — OXACILLIN SODIUM 2 G: 1 INJECTION, POWDER, FOR SOLUTION INTRAMUSCULAR; INTRAVENOUS at 23:24

## 2018-03-16 RX ADMIN — ALBUTEROL SULFATE 2.5 MG: 2.5 SOLUTION RESPIRATORY (INHALATION) at 21:33

## 2018-03-16 RX ADMIN — PANTOPRAZOLE SODIUM 20 MG: 20 TABLET, DELAYED RELEASE ORAL at 10:11

## 2018-03-16 RX ADMIN — MEROPENEM 2 G: 1 INJECTION, POWDER, FOR SOLUTION INTRAVENOUS at 04:12

## 2018-03-16 RX ADMIN — DORNASE ALFA 2.5 MG: 1 SOLUTION RESPIRATORY (INHALATION) at 08:29

## 2018-03-16 RX ADMIN — MEROPENEM 2 G: 1 INJECTION, POWDER, FOR SOLUTION INTRAVENOUS at 20:22

## 2018-03-16 RX ADMIN — DORNASE ALFA 2.5 MG: 1 SOLUTION RESPIRATORY (INHALATION) at 16:17

## 2018-03-16 RX ADMIN — ALBUTEROL SULFATE 2.5 MG: 2.5 SOLUTION RESPIRATORY (INHALATION) at 12:47

## 2018-03-16 RX ADMIN — SODIUM CHLORIDE 4 ML: 7 NEBU SOLN,3 % NEBU at 12:47

## 2018-03-16 RX ADMIN — ADAPALENE 1 G: 1 GEL TOPICAL at 23:06

## 2018-03-16 RX ADMIN — CITALOPRAM HYDROBROMIDE 20 MG: 20 TABLET ORAL at 10:10

## 2018-03-16 RX ADMIN — FEXOFENADINE HYDROCHLORIDE 180 MG: 180 TABLET, FILM COATED ORAL at 10:11

## 2018-03-16 NOTE — PLAN OF CARE
Problem: Patient Care Overview  Goal: Plan of Care/Patient Progress Review  Outcome: Improving  Pt has been awake all shift. No c/o discomfort or resp distress.  Glucose tolerance test completed this am. Around 1300 lab call RN to say all the insulin level blood draws had hemolized. Parents not present today.  Discussed POC with pt. Plan to dc tomm.

## 2018-03-16 NOTE — PROGRESS NOTES
[facility]-([facility])  NURSE LIAISON NOTE  Met with patient at bedside x 3 today. Pt is adament he does not want a CADD pump infusion. Pt is expected to DC tomorrow.  Educated on I role in Pt DC to home. Pt states heis comfortable doing home infusion and is able to infusion independently. He has been on service multiple times/therapies with [facility] [place]. Pt will DC to home tomorrow with his sister, who is an RN. He travels every Sunday Evening to his College home in [place], [place], returning home on weekends on Friday Afternoons.     HP OXACILLIN q6 hrs- via  HP Mock Teach, pt understands he must review air from NS syringes with all infusions.  He went through all steps of HP ABX administration, flushing and proper sequence of ABX step for administration. He has good understanding, and agrees to contact [facility] [place] for any questions, clarification or further education needs.    HP Reconstitution of Merrem q 8 hrs. Mock Teach, Pt did reconstitution with MOCK supplies with this RN. He has good understanding and went through all steps of IVP ABX administration, flushing and proper sequence of IVP ABX step for administration. He agrees to contact [facility] [place] for any questions, clarification or further education needs.    Educated to keep dressing CDI, and to cover dressing during bathing.   Encouraged to call [facility] [place] for any questions, clarifications or problems.    Educated on Deliveries, importance of refrigerating medications.  He understands he must wait for delivery prior to DC tomorrow. He was engaged during this RN Visit in hospital room.    He understands to expect a phone contact from [facility], to review demographics, delivery, allergies, etc. Educ provided on  RN/RPH on call 24/7, phone number provided    MEDICATION:  Merrepenem-HP *Require reconstitution  (06-2p-10p -*his home dosing choice)   MEDICATION: OXACILLIN-HP  [id number]-will dose at home same  PICC: SL valved PICC, SAS flushing  CLC DUE: Educ. [place] Staff RN to have PICC  dressing changed prior to DC  EXTENSION:  To be added by SAT Staff RN  (pt has in room)  FLUSHING:  JAYA  SNV: Not required today for education on Day of DC.  DELIVERIES:   DC DAY--To hosp. M-F--to his college home *No UPS delivery to his HOME in [place], [place] on wkends,    Faith Roth, [facility]-Nurse Liaison  EMAIL to CELL  [email or web address]  [email or web address]  My Cell:  [phone]  [facility] OFFICE  24/7hrs  [phone]

## 2018-03-16 NOTE — DISCHARGE SUMMARY
[facility] [place]    Discharge Summary  Pediatric Pulmonology    Date of Admission:  3/13/2018  Date of Discharge:  3/17/2018  Discharging Provider: Marie Busch (resident MD), Sonny Dial (attending MD)    Discharge Diagnoses   Patient Active Problem List   Diagnosis     Cystic fibrosis (H)     Impaired glucose tolerance     S/P lobectomy of lung     Depression     Major depressive disorder, recurrent episode, mild (H)     Cystic fibrosis exacerbation (H)       History of Present Illness    Demario Abbasi is a 23 year old male with cystic fibrosis (genotype: G542X/621+1g>t) s/p RUL resection in 2009, pancreatic insufficiency, depression, acne, and impaired glucose tolerance who presented with 3 days of worsening cough and hemoptysis, consistent with a CF exacerbation. He was admitted for IV antibiotics and PICC placement for home antibiotic therapy.     Please see H&P dated 3/13/18 for further details.    Hospital Course   Demario Abbasi was admitted on 3/13/2018.  The following problems were addressed during his hospitalization:    PULM  #Cystic Fibrosis Exacerbation  # Hemoptysis  Demario was admitted to the Pulmonology service. He received airway clearance 4 times daily with vest treatments and albuterol, hypertonic saline, and pulmozyme nebulizers per his regular regimen. He was started on IV antibiotics (meropenem and oxacillin), based on previous culture results growing Achromobacter and Staph aureus. A PICC line was placed. Sputum culture during this admission showed only normal oral elmer. We decided to continue the meropenem and oxacillin for his home antibiotic therapy given he did well with these previously. He had no episodes of hemoptysis during this admission and his cough continued to improve till it was completely resolved on day of discharge. His PFTs done 3/15 were at baseline with FEV1% of 60. At time of discharge, he was stable and breathing comfortably  on room air. He will continue his antibiotics through his PICC line until follow up here in CF clinic in 2 weeks. We also valerie routine yearly CF labs that will be followed up in clinic.     HEME  #Hemoptysis  Coags and CBC were unremarkable on admission and he had no more episodes of hemoptysis. We discussed with him a hemoptysis action plan which is as follows (we decided to pursue a conservative plan, given his distence to a tertiary care center):    - A- 5-120 mL (up to 1/2 cup) - call the on-call pulmonology provider ([phone]) to discuss, and can probably manage in the clinic   - B- >120 mL (over 1/2 cup) - go to the nearest ER and have them call the on-call provider, or call 911 if the bleeding is really significant    GI  #Pancreatic Insufficiency related to CF  We continued his home Creon 5 capsules three times daily with meals, and 3 capsules with snacks or supplements.     ENDO  #Impaired glucose tolerance test  An oral Glucose tolerance test was done during this admission. It was borderline, just under the requirements for intermediate glucose intolerance. This will continue to be monitored by the pulmonology CF team.     SKIN  #Acne Vulgaris  We continued home topical medications. He was on 2 oral antibiotics for his acne (minocycline, doxycycline). We discussed that it is uncommon to have two similar antibiotics used for this purpose. We advised him to stop these antibiotics and talk with his dermatologist to sort this out.    PSYCH  # Depression  We continued his home citalopram.     IV Access: PICC line in L arm      Meri Miranda MD  Pediatrics Resident, PGY-1    Marie Busch MD  Pediatrics Resident, PL-3    This patient has been seen and evaluated by me, Sonny Dial MD. Discussed with the house staff team or resident(s) and agree with the findings and plan in this note.  I personally performed the entire clinical encounter documented in this note.  I have reviewed today's vital signs,  medications, labs and imaging.     Please call the pulmonary nurse line ([phone]) with questions, concerns and prescription refill requests during business hours. For urgent concerns after hours and on the weekends, please contact the on call pulmonologist ([phone]). For scheduling an appointment please call [phone].      Sonny Dial MD  Division of Pediatric Pulmonology  Department of Pediatrics  [facility]    Office: [phone] (please call for refills and questions)  Office fax: [phone]  Pager: [phone]  Email: [email or web address]    Significant Results and Procedures   See results section below    Immunization History   Immunization Status:  up to date and documented     Pending Results   These results will be followed up by CF pulmonology team  Unresulted Labs Ordered in the Past 30 Days of this Admission     Date and Time Order Name Status Description    3/15/2018 0100 Vitamin E In process     3/15/2018 0100 Vitamin A In process     3/14/2018 0925 Cystic Fibrosis Culture Aerob Bacterial Preliminary           Primary Care Physician   Susan iL    Physical Exam   Vital Signs with Ranges  Temp:  [97.7  F (36.5  C)-98  F (36.7  C)] 98  F (36.7  C)  Heart Rate:  [60-88] 60  Resp:  [20-24] 20  BP: (104-111)/(54-68) 111/68  SpO2:  [96 %-99 %] 97 %  I/O last 3 completed shifts:  In: 1742 [P.O.:1305; I.V.:437]  Out: 0     General: alert, NAD, appears comfortable  HEENT: normocephalic, conjunctiva normal, no eye drainage, no rhinorrhea, MMM  Resp: LCAB, no wheezing or crackles, no increased work of breathing  CV: RRR, no murmurs, normal S1/S2, good pulses  Abd: soft, nontender, nondistended, no masses or organomegaly, bowel sounds present  Extremities: full ROM, warm and well perfused  Skin: no rashes or lesions; acne on face/back/chest  Neuro: non-focal, responds to surroundings and questions appropriately      Time Spent on this Encounter   IMarie, personally saw the  patient today and spent greater than 30 minutes discharging this patient.    Discharge Disposition   Discharged to home  Condition at discharge: Stable    Consultations This Hospital Stay   VASCULAR ACCESS PEDS IP CONSULT  VASCULAR ACCESS PEDS IP CONSULT    Discharge Orders     Home infusion referral     MD face to face encounter   Documentation of Face to Face and Certification for Home Health Services    I certify that patient: Demario Abbasi is under my care and that I, or a nurse practitioner or physician's assistant working with me, had a face-to-face encounter that meets the physician face-to-face encounter requirements with this patient on: 3/14/2018.    This encounter with the patient was in whole, or in part, for the following medical condition, which is the primary reason for home health care: cystic fibrosis (genotype: G542X/621+1g>t) s/p RUL resection in 2009, pancreatic insufficiency, depression, acne, and impaired glucose tolerance who presents with 3 days of worsening cough and hemoptysis consistent with subacute CF exacerbation with mild hemoptisis..    I certify that, based on my findings, the following services are medically necessary home health services: Nursing.    My clinical findings support the need for the above services because: Nurse is needed: To assess IV medication administration, PICC site cares, labs, home safety after changes in medications or other medical regimen..    Further, I certify that my clinical findings support that this patient is homebound (i.e. absences from home require considerable and taxing effort and are for medical reasons or [employer] or infrequently or of short duration when for other reasons) because: Patient is not home bound.    Based on the above findings. I certify that this patient is confined to the home and needs intermittent skilled nursing care, physical therapy and/or speech therapy.  The patient is under my care, and I have initiated  the establishment of the plan of care.  This patient will be followed by a physician who will periodically review the plan of care.  Physician/Provider to provide follow up care: Susan Li    Attending hospital physician (the Medicare certified [place] provider): Sonny Dial MD  Physician Signature: See electronic signature associated with these discharge orders.  Date: 3/14/2018     Reason for your hospital stay   Demario was admitted to the hospital because of coughing up blood and cystic fibrosis exacerbation. He received airway clearance treatment and intravenous antibiotics and improved.     Activity   Your activity upon discharge: activity as tolerated     Follow Up and recommended labs and tests   Follow up in 2 weeks with Libby Salinas at CF clinic     IV access   **Ordering Provider MUST call/page Care Coordinator/ to discuss arranging this service**    You are going home with the following vascular access device: PICC.     Discharge Instructions   Hemoptysis action plan:  - If coughing up less than 1/2 cup of blood - call the pulmonology on-call provider ([phone]) to discuss; can probably manage this with a clinic visit  - If coughing up more than 1/2 cup of blood - go to the nearest emergency room and have them call the on-call provider to discuss a plan     Full Code     Diet   High protein high calorie diet       Discharge Medications   Current Discharge Medication List      START taking these medications    Details   meropenem 2 g Inject 2 g into the vein every 8 hours  Qty: 126 g, Refills: 0    Associated Diagnoses: Cystic fibrosis (H); Cystic fibrosis exacerbation (H)      oxacillin 2 g Inject 2 g into the vein every 6 hours  Qty: 168 g, Refills: 0    Associated Diagnoses: Cystic fibrosis (H); Cystic fibrosis exacerbation (H)         CONTINUE these medications which have NOT CHANGED    Details   pantoprazole (PROTONIX) 20 MG EC tablet Take 1 tablet (20 mg) by mouth  daily  Qty: 30 tablet, Refills: 3    Associated Diagnoses: CF (cystic fibrosis) (H)      fexofenadine (ALLEGRA) 180 MG tablet Take 1 tablet (180 mg) by mouth daily  Qty: 30 tablet, Refills: 3    Associated Diagnoses: CF (cystic fibrosis) (H)      albuterol (2.5 MG/3ML) 0.083% neb solution Take 1 vial (2.5 mg) by nebulization 3 times daily  Qty: 270 mL, Refills: 6    Associated Diagnoses: Cystic fibrosis with pulmonary manifestations (H)      tobramycin, PF, (COBY) 300 MG/5ML neb solution Take 5 mLs (300 mg) by nebulization 2 times daily Cycle 28 days on/off  Qty: 280 mL, Refills: 3    Associated Diagnoses: Cystic fibrosis with pulmonary manifestations (H)      multivitamin CF formula (MVW COMPLETE FORMULATION ) softgel cap Take 2 capsules by mouth daily  Qty: 60 capsule, Refills: 3    Associated Diagnoses: Cystic fibrosis (H)      dornase alpha (PULMOZYME) 1 MG/ML neb solution Inhale 2.5 mg into the lungs 2 times daily  Qty: 150 mL, Refills: 12    Associated Diagnoses: Cystic fibrosis with pulmonary manifestations (H)      albuterol (ALBUTEROL) 108 (90 BASE) MCG/ACT Inhaler Inhale 2 puffs into the lungs every 6 hours as needed for shortness of breath / dyspnea or wheezing  Qty: 1 Inhaler, Refills: 3    Associated Diagnoses: CF (cystic fibrosis) (H)      azithromycin (ZITHROMAX) 500 MG tablet Take one tablet on Monday, Wednesday and Friday morning  Qty: 12 tablet, Refills: 12    Associated Diagnoses: CF (cystic fibrosis) (H)      amylase-lipase-protease (CREON 36) 86438 UNITS CPEP Take 5 capsules (180,000 Units) by mouth 3 times daily (with meals)  Qty: 450 capsule, Refills: 11    Associated Diagnoses: Cystic fibrosis exacerbation (H)      sodium chloride inhalant 7 % NEBU neb solution Take 4 mLs by nebulization 2 times daily  Qty: 240 mL, Refills: 11    Associated Diagnoses: Cystic fibrosis with pulmonary exacerbation (H)      citalopram (CELEXA) 20 MG tablet Take 1 tablet (20 mg) by mouth daily  Qty: 30  tablet, Refills: 1    Associated Diagnoses: Depression      tretinoin (RETIN-A) 0.05 % cream Apply topically every morning as directed  Qty: 30 g, Refills: 1    Associated Diagnoses: Acne      adapalene (DIFFERIN) 0.1 % gel Apply 1 applicator topically At Bedtime      fluticasone (FLONASE) 50 MCG/ACT spray Spray 1 spray into both nostrils daily  Qty: 1 Bottle, Refills: 3    Associated Diagnoses: CF (cystic fibrosis) (H); Cystic fibrosis with pulmonary exacerbation (H)      melatonin 5 MG tablet Take 1-2 tablets (5-10 mg) by mouth nightly as needed  Qty: 60 tablet, Refills: 1    Associated Diagnoses: Cystic fibrosis with pulmonary exacerbation (H)      azelastine (ASTELIN) 0.1 % spray Use 1-2 sprays both nostrils twice daily as needed  Qty: 1 Bottle, Refills: 1    Associated Diagnoses: Cystic fibrosis with pulmonary exacerbation (H)         STOP taking these medications       DOXYCYCLINE HYCLATE PO Comments:   Reason for Stopping:         MINOCYCLINE HCL PO Comments:   Reason for Stopping:             Allergies   Allergies   Allergen Reactions     Augmentin      Data   Most Recent 3 CBC's:  Recent Labs   Lab Test  03/13/18   1039  01/04/18   1232  02/21/17   1258   WBC  8.5  9.1  11.4*   HGB  15.3  15.0  13.4   MCV  86  88  88   PLT  223  273  155      Most Recent 3 BMP's:  Recent Labs   Lab Test  03/16/18   1006  03/16/18   0936  03/16/18   0906   03/13/18   1039  01/04/18   1232  02/21/17   1258   NA   --    --    --    --   139  140  138   POTASSIUM   --    --    --    --   4.0  4.2  4.1   CHLORIDE   --    --    --    --   105  108  105   CO2   --    --    --    --   27  27  25   BUN   --    --    --    --   16  16  12   CR   --    --    --    --   0.89  1.02  0.96   ANIONGAP   --    --    --    --   7  5  8   ROCAEL   --    --    --    --   8.9  9.0  8.1*   GLC  92  177*  200*   < >  82  121*  91    < > = values in this interval not displayed.     Most Recent 2 LFT's:  Recent Labs   Lab Test  03/13/18   1034   01/04/18   1232   AST  21  24   ALT  30  43   ALKPHOS  125  150   BILITOTAL  0.6  0.4     Most Recent 6 Bacteria Isolates From Any Culture (See EPIC Reports for Culture Details):  Recent Labs   Lab Test  03/14/18   1005  03/13/18   1039  01/04/18   1050  07/20/17   1210  04/13/17   0911  03/16/17   1315   CULT  Light growth  Normal elmer to date    Light growth  Non lactose fermenting gram negative rods  *  Light growth  Staphylococcus aureus  Susceptibility testing in progress  *  Culture in progress  Canceled, Test credited  Test canceled by [facility]    Jennifer in [place] ACL per Dr. Her on 3.14.18 at 0748. bw  Heavy growth  Normal elmer    Heavy growth  Achromobacter xylosoxidans/denitrificans  *  Light growth  Staphylococcus aureus  This isolate is presumed to be clindamycin resistant based on detection of inducible   clindamycin resistance. Erythromycin and clindamycin are resistant, therefore, they are   not recommended for use.  *  Moderate growth Normal elmer  Light growth Staphylococcus aureus This isolate is presumed to be clindamycin   resistant based on detection of inducible clindamycin resistance. Erythromycin   and clindamycin are resistant, therefore, they are not recommended for use.  Moderate growth Achromobacter xylosoxidans/denitrificans  *  Moderate growth Normal elmer  Light growth Staphylococcus aureus This isolate is presumed to be clindamycin   resistant based on detection of inducible clindamycin resistance. Erythromycin   and clindamycin are resistant, therefore, they are not recommended for use.  Moderate growth Achromobacter xylosoxidans/denitrificans  Moderate growth Strain 2 Achromobacter xylosoxidans/denitrificans  *  Light growth Normal elmer  Light growth Achromobacter xylosoxidans/denitrificans  *

## 2018-03-16 NOTE — PROGRESS NOTES
SOCIAL WORK PROGRESS NOTE      DATA:     Supportive check in     INTERVENTION:      1. Provided ongoing assessment of patient and family's level of coping.   2. Provided psychosocial supportive counseling and crisis intervention as needed.   3. Facilitate service linkage with hospital and community resources as needed.   4. Collaborate with healthcare team and professional in community to meet patient and family's needs as needed.     ASSESSMENT:     Writer met with Demario this afternoon to check-in. Demario requested letters for school to verify his admission. He plans to discharge tomorrow and sister will pick him up. He will stay at his parents house for the weekend and will return to school next week. Demario feels comfortable managing his IVs at home and does not have any concerns. Writer helped Demario put the CF Nurse Line and the On-Call number into his phone. Reviewed when to call the nurse line (Mon-Fri, 8-430 or non-emergencies) versus the on-call number (emergencies, weekend/evening questions). Demario understood and denied any questions at this time.     PLAN:     Continue care. Will see Demario in CF clinic for hospital follow up.       JONELLE Ford [facility]  Pediatric Cystic Fibrosis   Pager: [phone]  Phone: [phone]  Email: [email or web address]

## 2018-03-16 NOTE — PROGRESS NOTES
[facility], [place]    Pulmonology Progress Note    Date of Service (when I saw the patient): 03/16/2018     Assessment & Plan   Demario Abbasi is a 23 year old male with cystic fibrosis (genotype: G542X/621+1g>t) s/p RUL resection in 2009, pancreatic insufficiency, depression, acne, and impaired glucose tolerance who presents with 3 days of worsening cough and hemoptysis consistent with subacute CF exacerbation with mild hemoptisis. He has grown Achromobacter and Staph aureus in the past which was successfully treated with meropenem and nafcillin. His hemoptysis has resolved and he hasn't had any this admission.      PULM  #CF Exacerbation  #Hemoptysis (resolved)  - Airway clearance 4 times daily                        - Vest therapy 4 times daily                        - Albuterol 4 times daily                        - Hypertonic saline neb 2 times daily                        - Pulmozyme 2 times daily  - Azithromycin MWF  - fexofenadine 180 mg daily  - fluticasone daily  - Antibiotics to cover achromobacter and staph aureus                        - Meropenem 2g Q8h                        - Nafcillin  Q6hrs  - Tobramycin on hold  - Sputum culture: light growth of normal elmer, gram stain shows rare gram negative rods  - Routine CF labs  - Dexa scan today    HEME  #Hemoptysis  - PT/PTT/INR and CBC unremarkable on admission  - Develop action plan at time of discharge on what to do in the case of recurrence      GI  #Pancreatic Insufficiency   - Creon 5 capsules TID with meal  - Protonix 20 mg   - vitamin levels sent     ENDO  #Impaired glucose tolerance   - POC Glucose 3x daily 2 hours after meals  - OGTT today     SKIN  #Acne Vulgaris  - continue home topical medications  - Discontinue oral doxycycline and minocycline during this admission     PSYCH  # Depression  - Continue home citalopram     FEN  - PO Ad brandi  - general diet  - No MIVF  - Melatonin PRN for insomnia  - Daily  weights  - Is and Os     IV access: Single lumen PICC    Dispo: Tomorrow pending setting up home antibiotics and finishing OGTT and dexa scan  Patient discussed with Dr. Dial, Pulmonary attending     Meri Miranda MD  Pediatric Resident, PGY-1    This patient has been seen and evaluated by me, Sonny Dial MD. Discussed with the house staff team or resident(s) and agree with the findings and plan in this note.  I personally performed the entire clinical encounter documented in this note.  I have reviewed today's vital signs, medications, labs and imaging.     Please call the pulmonary nurse line ([phone]) with questions, concerns and prescription refill requests during business hours. For urgent concerns after hours and on the weekends, please contact the on call pulmonologist ([phone]). For scheduling an appointment please call [phone].      Sonny Dial MD  Division of Pediatric Pulmonology  Department of Pediatrics  [facility]    Office: [phone] (please call for refills and questions)  Office fax: [phone]  Pager: [phone]  Email: [email or web address]    Interval History   No episodes of hemoptysis since admission. The cough has been better. No acute events overnight. VSS. Slept well. Doing OGTT this morning.    ROS: 10 point ROS neg other than the symptoms noted above in the HPI.    Physical Exam   Temp: 98.3  F (36.8  C) Temp src: Axillary BP: 106/58   Heart Rate: 65 Resp: 20 SpO2: 95 % O2 Device: None (Room air)    Vitals:    03/13/18 1411 03/14/18 0439 03/16/18 0519   Weight: 76.3 kg (168 lb 3.4 oz) 76.6 kg (168 lb 14 oz) 75.8 kg (167 lb 1.7 oz)     Vital Signs with Ranges  Temp:  [97.9  F (36.6  C)-98.4  F (36.9  C)] 98.3  F (36.8  C)  Heart Rate:  [64-68] 65  Resp:  [20-24] 20  BP: (106-107)/(58-63) 106/58  SpO2:  [95 %-98 %] 95 %  I/O last 3 completed shifts:  In: 778.33 [I.V.:778.33]  Out: -     GENERAL: Active, alert, in no acute distress.  SKIN: acne on chest and  back. Otherwise no significant rash, abnormal pigmentation or lesions  HEAD: Normocephalic  EYES: Pupils equal, round, reactive, Extraocular muscles intact. Normal conjunctivae.  NOSE: Normal without discharge.  MOUTH/THROAT: Clear. No oral lesions. Teeth without obvious abnormalities.  NECK: Supple, no masses.  No thyromegaly.  LYMPH NODES: No adenopathy  LUNGS: Bilateral good air movements. No rales, rhonchi, wheezing or retractions. Bilateral scattered few crackles.   HEART: Regular rhythm. Normal S1/S2. No murmurs. Normal pulses.  ABDOMEN: Soft, non-tender, not distended, no masses or hepatosplenomegaly. Bowel sounds normal.   NEUROLOGIC: No focal findings. Cranial nerves grossly intact: DTR's normal. Normal gait, strength and tone  BACK: Spine is straight, no scoliosis.  EXTREMITIES: Full range of motion, no deformities     Medications     sodium chloride Stopped (03/16/18 0521)       glucose tolerance test  0.979 g/kg Oral Once     nafcillin  2 g Intravenous Q6H     sodium chloride (PF)  3 mL Intracatheter Q8H     meropenem  2 g Intravenous Q8H     adapalene  1 applicator Topical At Bedtime     albuterol  1 vial Nebulization 4x Daily     amylase-lipase-protease  5 capsule Oral TID w/meals     azithromycin  500 mg Oral Q Mon Wed Fri AM     dornase alpha  2.5 mg Inhalation BID     tretinoin   Topical QAM     sodium chloride (PF)  3 mL Intravenous Q8H     sodium chloride inhalant  4 mL Nebulization BID     citalopram  20 mg Oral Daily     fexofenadine  180 mg Oral Daily     fluticasone  1 spray Both Nostrils Daily     multivitamin CF formula  2 capsule Oral Daily     pantoprazole  20 mg Oral Daily       Data   Results for orders placed or performed during the hospital encounter of 03/13/18 (from the past 24 hour(s))   GGT   Result Value Ref Range     (H) 0 - 75 U/L   IgG   Result Value Ref Range    IGG 1310 695 - 1620 mg/dL   IgE   Result Value Ref Range    IGE 22 0 - 114 KIU/L   Vitamin D   Result Value  Ref Range    Vitamin D Deficiency screening 28 20 - 75 ug/L   Glucose by meter   Result Value Ref Range    Glucose 114 (H) 70 - 99 mg/dL     Most Recent [place] Pulmonary Function Testing    FVC-Pred   Date Value Ref Range Status   03/15/2018 5.58 L    01/04/2018 5.57 L    07/20/2017 5.56 L    06/22/2017 5.55 L    04/13/2017 5.54 L      FVC-Pre   Date Value Ref Range Status   03/15/2018 3.71 L    01/04/2018 3.51 L    07/20/2017 3.52 L    06/22/2017 3.49 L    04/13/2017 3.39 L      FVC-%Pred-Pre   Date Value Ref Range Status   03/15/2018 66 %    01/04/2018 62 %    07/20/2017 63 %    06/22/2017 62 %    04/13/2017 61 %      FEV1-Pre   Date Value Ref Range Status   03/15/2018 2.86 L    01/04/2018 2.83 L    07/20/2017 2.80 L    06/22/2017 2.70 L    04/13/2017 2.79 L      FEV1-%Pred-Pre   Date Value Ref Range Status   03/15/2018 60 %    01/04/2018 59 %    07/20/2017 59 %    06/22/2017 57 %    04/13/2017 59 %      FEV1FVC-Pred   Date Value Ref Range Status   03/15/2018 84 %    01/04/2018 85 %    07/20/2017 86 %    06/22/2017 86 %    04/13/2017 86 %      FEV1FVC-Pre   Date Value Ref Range Status   03/15/2018 77 %    01/04/2018 81 %    07/20/2017 80 %    06/22/2017 77 %    04/13/2017 82 %      No results found for: 15928  FEFMax-Pred   Date Value Ref Range Status   03/15/2018 10.17 L/sec    01/04/2018 10.17 L/sec    07/20/2017 10.15 L/sec    06/22/2017 10.15 L/sec    04/13/2017 10.15 L/sec      FEFMax-Pre   Date Value Ref Range Status   03/15/2018 8.21 L/sec    01/04/2018 8.28 L/sec    07/20/2017 7.79 L/sec    06/22/2017 7.44 L/sec    04/13/2017 8.18 L/sec      FEFMax-%Pred-Pre   Date Value Ref Range Status   03/15/2018 80 %    01/04/2018 81 %    07/20/2017 76 %    06/22/2017 73 %    04/13/2017 80 %      ExpTime-Pre   Date Value Ref Range Status   03/15/2018 7.81 sec    01/04/2018 6.91 sec    07/20/2017 7.75 sec    06/22/2017 7.94 sec    04/13/2017 7.36 sec      FIFMax-Pre   Date Value Ref Range Status   03/15/2018 2.93 L/sec     01/04/2018 3.60 L/sec    07/20/2017 4.42 L/sec    06/22/2017 3.73 L/sec    04/13/2017 4.10 L/sec      FEV1FEV6-Pred   Date Value Ref Range Status   03/15/2018 84 %    01/04/2018 84 %    07/20/2017 85 %    06/22/2017 85 %    04/13/2017 85 %      FEV1FEV6-Pre   Date Value Ref Range Status   03/15/2018 78 %    01/04/2018 81 %    07/20/2017 80 %    06/22/2017 79 %    04/13/2017 83 %      No results found for: 41751  Interpretation: Good technique and effort. The results of this test do meet the ATS standards for acceptability but not reproducibility. He was able to give a sustained expiratory maneuver for about 6-7. There is mild scooping of the flow volume loop in the expiratory limb and blunted-looking flow volume loop in the inspiratory limb. Results are suggestive of restrictive and obstructive mixt pattern. Stable compared to last test in 1/2018.

## 2018-03-16 NOTE — PLAN OF CARE
Problem: Patient Care Overview  Goal: Plan of Care/Patient Progress Review  0964-4517 Vital signs stable on room air. Afebrile. No cough noted. Lung sounds clear. Up in room and hallway with mask ad brandi. Dad at bedside and involved in cares and plan of care. IV antibiotics continued. Plan is to discharge Saturday morning. Continue to monitor and notify MD of any concerns.

## 2018-03-16 NOTE — PLAN OF CARE
Problem: Patient Care Overview  Goal: Plan of Care/Patient Progress Review  0581-9922: VSS. Denies pain. NPO at 0000 for glucose labs. Parents left for the weekend. Pt's sister will come on Saturday. Compliant with all cares. Will continue to monitor and assess.

## 2018-03-16 NOTE — PROGRESS NOTES
Care Coordinator Progress Note     Admission Date/Time:  3/13/2018  Attending MD:  Sonny Dial MD     Data  Chart reviewed, discussed with interdisciplinary team.   Patient was admitted for: Hemoptysis.    Concerns with insurance coverage for discharge needs: None.  Current Living Situation: Patient lives with family and pt is attending college at [employer].  Pt comes home on the weekends.  Generally on Fridays..  Support System: Supportive and Involved  Services Involved: Home Infusion and None  Transportation: Family or Friend will provide  Barriers to Discharge: Medical stability    Coordination of Care and Referrals: Provided patient/family with options for Home Infusion.        Assessment  Met with pt and his father.  Reviewed anticipated discharge on home IV antibiotics.  Pt voiced no concerns about his discharge plan.  Pt parents will be leaving town on 3/16 and patients sister Evi Morton will be assisting patient.  Ideally would prefer all supplies be delivered to his home in [place].   Pt has previously utilized [facility] and would prefer to remain within the  system.  Pt and father noted that they would prefer to not utilize the previous home care agency that provided home RN services d/t concerns with their ability to manage the PICC.   Pt and father unable to recall name of agency.   Agreed to f/u with pt closer to d/c should any concerns or needs arise.  Referral made to [facility].    Addendum 12:15 p.m.:  [facility] Benefit check - [employer] PMAP covered at 100% No deductible or out of pocket.  Updated [facility] that the pt would like to use the following agency - [facility], [facility] primary contact [phone] for home RN support.      Plan  Anticipated Discharge Date: 3/16/18 - pending culture results.  Anticipated Discharge Plan: Home with [facility] for home iv antibiotics.    Katherine Osborne, RN BSN, PHN RN Care Coordinator covering for Jennifer Vega [facility]  6th Floor RN Care Coordinator   Pager [phone]  3/14/2018 12:18 PM      3/16/18 Notified by team that pt will discharge tomorrow. Worked with [facility], see Faith Roth's notes, about delivery of IV medications. No labs needed per resident Marie.     Elisha Thornton RN, covering for Jennifer Vega.   Care Coordinator  Pager: [phone]

## 2018-03-16 NOTE — PROVIDER NOTIFICATION
Dr. Ortiz notified via telephone at 13:15 regarding am labs.  Lab called around 1300 and said that all insulin blood draws had hemolized from the glucose tolerance test this am.   No further orders given.

## 2018-03-17 ENCOUNTER — HOME INFUSION (PRE-WILLOW HOME INFUSION) (OUTPATIENT)
Dept: PHARMACY | Facility: CLINIC | Age: 23
End: 2018-03-17

## 2018-03-17 VITALS
WEIGHT: 166.89 LBS | HEART RATE: 70 BPM | HEIGHT: 69 IN | BODY MASS INDEX: 24.72 KG/M2 | OXYGEN SATURATION: 97 % | TEMPERATURE: 98 F | DIASTOLIC BLOOD PRESSURE: 68 MMHG | SYSTOLIC BLOOD PRESSURE: 111 MMHG | RESPIRATION RATE: 20 BRPM

## 2018-03-17 LAB
A-TOCOPHEROL VIT E SERPL-MCNC: 7.4 MG/L (ref 5.5–18)
ANNOTATION COMMENT IMP: NORMAL
BETA+GAMMA TOCOPHEROL SERPL-MCNC: 0.2 MG/L (ref 0–6)
RETINYL PALMITATE SERPL-MCNC: <0.02 MG/L (ref 0–0.1)
VIT A SERPL-MCNC: 0.41 MG/L (ref 0.3–1.2)

## 2018-03-17 PROCEDURE — 25000128 H RX IP 250 OP 636

## 2018-03-17 PROCEDURE — 25000132 ZZH RX MED GY IP 250 OP 250 PS 637: Performed by: PEDIATRICS

## 2018-03-17 PROCEDURE — 94640 AIRWAY INHALATION TREATMENT: CPT | Mod: 76

## 2018-03-17 PROCEDURE — 25000132 ZZH RX MED GY IP 250 OP 250 PS 637: Performed by: STUDENT IN AN ORGANIZED HEALTH CARE EDUCATION/TRAINING PROGRAM

## 2018-03-17 PROCEDURE — 25000125 ZZHC RX 250: Performed by: PEDIATRICS

## 2018-03-17 PROCEDURE — 40000275 ZZH STATISTIC RCP TIME EA 10 MIN

## 2018-03-17 PROCEDURE — 94669 MECHANICAL CHEST WALL OSCILL: CPT

## 2018-03-17 PROCEDURE — 25000128 H RX IP 250 OP 636: Performed by: PEDIATRICS

## 2018-03-17 RX ADMIN — FLUTICASONE PROPIONATE 1 SPRAY: 50 SPRAY, METERED NASAL at 08:58

## 2018-03-17 RX ADMIN — PANTOPRAZOLE SODIUM 20 MG: 20 TABLET, DELAYED RELEASE ORAL at 08:57

## 2018-03-17 RX ADMIN — ALBUTEROL SULFATE 2.5 MG: 2.5 SOLUTION RESPIRATORY (INHALATION) at 11:57

## 2018-03-17 RX ADMIN — CITALOPRAM HYDROBROMIDE 20 MG: 20 TABLET ORAL at 08:57

## 2018-03-17 RX ADMIN — PANCRELIPASE 180000 UNITS: 36000; 180000; 114000 CAPSULE, DELAYED RELEASE PELLETS ORAL at 08:57

## 2018-03-17 RX ADMIN — DORNASE ALFA 2.5 MG: 1 SOLUTION RESPIRATORY (INHALATION) at 09:14

## 2018-03-17 RX ADMIN — MEROPENEM 2 G: 1 INJECTION, POWDER, FOR SOLUTION INTRAVENOUS at 11:44

## 2018-03-17 RX ADMIN — FEXOFENADINE HYDROCHLORIDE 180 MG: 180 TABLET, FILM COATED ORAL at 08:58

## 2018-03-17 RX ADMIN — OXACILLIN SODIUM 2 G: 1 INJECTION, POWDER, FOR SOLUTION INTRAMUSCULAR; INTRAVENOUS at 05:04

## 2018-03-17 RX ADMIN — SODIUM CHLORIDE 4 ML: 7 NEBU SOLN,3 % NEBU at 11:57

## 2018-03-17 RX ADMIN — Medication 2 CAPSULE: at 08:57

## 2018-03-17 RX ADMIN — ALBUTEROL SULFATE 2.5 MG: 2.5 SOLUTION RESPIRATORY (INHALATION) at 09:14

## 2018-03-17 RX ADMIN — OXACILLIN SODIUM 2 G: 1 INJECTION, POWDER, FOR SOLUTION INTRAMUSCULAR; INTRAVENOUS at 10:51

## 2018-03-17 RX ADMIN — MEROPENEM 2 G: 1 INJECTION, POWDER, FOR SOLUTION INTRAVENOUS at 03:55

## 2018-03-17 NOTE — PLAN OF CARE
Problem: Patient Care Overview  Goal: Plan of Care/Patient Progress Review  Outcome: No Change  A/VSS. No c/o pain. Good PO intake, good UOP. Receiving IV Abx in PICC. Will also need PICC dressing change & extension added tomorrow before d/c. No contact with parents this shift. Will continue to monitor.

## 2018-03-17 NOTE — PLAN OF CARE
Problem: Patient Care Overview  Goal: Plan of Care/Patient Progress Review  Afebrile, VSS. Denies pain. PICC dressing and cap changed with an extension. Patient discharged to home with medications delivered from [facility] at 1405.

## 2018-03-17 NOTE — PLAN OF CARE
Problem: Cystic Fibrosis (Adult)  Goal: Signs and Symptoms of Listed Potential Problems Will be Absent, Minimized or Managed (Cystic Fibrosis)  Signs and symptoms of listed potential problems will be absent, minimized or managed by discharge/transition of care (reference Cystic Fibrosis (Adult) CPG).   Outcome: Improving  VSS. Afebrile. Lungs sound clear. Patient slept well overnight. He received IV abx overnight in his PICC. His PICC dressing will need to be changed and extension added prior to discharge today. No complaints of pain overnight. No family at bedside. Will continue to monitor.

## 2018-03-19 ENCOUNTER — TELEPHONE (OUTPATIENT)
Dept: NUTRITION | Facility: CLINIC | Age: 23
End: 2018-03-19

## 2018-03-19 ENCOUNTER — HOME INFUSION (PRE-WILLOW HOME INFUSION) (OUTPATIENT)
Dept: PHARMACY | Facility: CLINIC | Age: 23
End: 2018-03-19

## 2018-03-19 DIAGNOSIS — E84.0 CYSTIC FIBROSIS WITH PULMONARY MANIFESTATIONS (H): ICD-10-CM

## 2018-03-19 DIAGNOSIS — E84.9 CYSTIC FIBROSIS EXACERBATION (H): ICD-10-CM

## 2018-03-19 LAB
BACTERIA SPEC CULT: ABNORMAL
SPECIMEN SOURCE: ABNORMAL

## 2018-03-19 RX ORDER — TOBRAMYCIN INHALATION SOLUTION 300 MG/5ML
300 INHALANT RESPIRATORY (INHALATION) 2 TIMES DAILY
Qty: 280 ML | Refills: 3 | COMMUNITY
Start: 2018-03-19 | End: 2018-08-23

## 2018-03-19 NOTE — PROGRESS NOTES
This is a recent snapshot of the patient's [facility] medical record.  For current drug dose and complete information and questions, call [phone] or In Basket pool, fv home infusion ([id number])  CSN Number:  [id number]

## 2018-03-19 NOTE — TELEPHONE ENCOUNTER
Nutrition Services Encounter:  Fat soluble vitamin levels, OGTT and DEXA completed 3/16/18.   Vitamin levels WNL outside of slightly low vitamin D (28)   OGTT status indicates impaired/indeterminate glycemia - insulin levels not completed.   DEXA WNL.     Interventions:  Message left for Demario to review the above. Would recommend change to MVW  1 softgel BID to provide 10,000 IU Vit D daily.   Patient receives vitamin through [facility].     No further interventions at this time.     Eboni Dent RD, SERENA, [facility]  Pediatric Cystic Fibrosis & Pulmonary Dietitian  [facility]  Pager #[phone]  Phone #[phone]

## 2018-03-19 NOTE — PROGRESS NOTES
Discharge medication review for this patient is complete. Pharmacist assisted with medication reconciliation of discharge medications with prior to admission medications.     The following changes were made to the discharge medication list based on pharmacist review:  Added:  n/a  Discontinued: n/a  Changed: Will change Creon-- 5 with meals, 3 with snacks    Discharged on 3/17/18 , 2:11pm     Follow up in 2 weeks,  Elvin on hold while on IV antibiotics.        Patient's Discharge Medication List  - medications as listed on After Visit Summary (AVS)     Review of your medicines        START taking         Dose / Directions      meropenem 2 g   Indication:  CF exacerbation/infection   Used for:  Cystic fibrosis (H), Cystic fibrosis exacerbation (H)        Dose:  2 g   Inject 2 g into the vein every 8 hours   Quantity:  126 g   Refills:  0       oxacillin 2 g   Indication:  CF exacerbation   Used for:  Cystic fibrosis (H), Cystic fibrosis exacerbation (H)        Dose:  2 g   Inject 2 g into the vein every 6 hours   Quantity:  168 g   Refills:  0             CONTINUE these medicines which have NOT CHANGED         Dose / Directions      adapalene 0.1 % gel   Commonly known as:  DIFFERIN        Dose:  1 applicator   Apply 1 applicator topically At Bedtime   Refills:  0       * albuterol 108 (90 BASE) MCG/ACT Inhaler   Commonly known as:  PROAIR HFA   Used for:  CF (cystic fibrosis) (H)        Dose:  2 puff   Inhale 2 puffs into the lungs every 6 hours as needed for shortness of breath / dyspnea or wheezing   Quantity:  1 Inhaler   Refills:  3       * albuterol (2.5 MG/3ML) 0.083% neb solution   Used for:  Cystic fibrosis with pulmonary manifestations (H)        Dose:  1 vial   Take 1 vial (2.5 mg) by nebulization 3 times daily   Quantity:  270 mL   Refills:  6       amylase-lipase-protease 27413 UNITS Cpep   Commonly known as:  CREON 36   Used for:  Cystic fibrosis exacerbation (H)        Dose:  5 capsule   Take 5 capsules  (180,000 Units) by mouth 3 times daily (with meals)   Quantity:  450 capsule   Refills:  11       azelastine 0.1 % spray   Commonly known as:  ASTELIN   Used for:  Cystic fibrosis with pulmonary exacerbation (H)        Use 1-2 sprays both nostrils twice daily as needed   Quantity:  1 Bottle   Refills:  1       azithromycin 500 MG tablet   Commonly known as:  ZITHROMAX   Used for:  CF (cystic fibrosis) (H)        Take one tablet on Monday, Wednesday and Friday morning   Quantity:  12 tablet   Refills:  12       citalopram 20 MG tablet   Commonly known as:  celeXA   Used for:  Depression        Dose:  20 mg   Take 1 tablet (20 mg) by mouth daily   Quantity:  30 tablet   Refills:  1       dornase alpha 1 MG/ML neb solution   Commonly known as:  PULMOZYME   Used for:  Cystic fibrosis with pulmonary manifestations (H)        Dose:  2.5 mg   Inhale 2.5 mg into the lungs 2 times daily   Quantity:  150 mL   Refills:  12       fexofenadine 180 MG tablet   Commonly known as:  ALLEGRA   Used for:  CF (cystic fibrosis) (H)        Dose:  180 mg   Take 1 tablet (180 mg) by mouth daily   Quantity:  30 tablet   Refills:  3       fluticasone 50 MCG/ACT spray   Commonly known as:  FLONASE   Used for:  CF (cystic fibrosis) (H), Cystic fibrosis with pulmonary exacerbation (H)        Dose:  1 spray   Spray 1 spray into both nostrils daily   Quantity:  1 Bottle   Refills:  3       melatonin 5 MG tablet   Used for:  Cystic fibrosis with pulmonary exacerbation (H)        Dose:  5-10 mg   Take 1-2 tablets (5-10 mg) by mouth nightly as needed   Quantity:  60 tablet   Refills:  1       multivitamin CF formula softgel cap   Used for:  Cystic fibrosis (H)        Dose:  2 capsule   Take 2 capsules by mouth daily   Quantity:  60 capsule   Refills:  3       pantoprazole 20 MG EC tablet   Commonly known as:  PROTONIX   Used for:  CF (cystic fibrosis) (H)        Dose:  20 mg   Take 1 tablet (20 mg) by mouth daily   Quantity:  30 tablet   Refills:  3        sodium chloride inhalant 7 % Nebu neb solution   Used for:  Cystic fibrosis with pulmonary exacerbation (H)        Dose:  4 mL   Take 4 mLs by nebulization 2 times daily   Quantity:  240 mL   Refills:  11       tobramycin (PF) 300 MG/5ML neb solution   Commonly known as:  COBY   Used for:  Cystic fibrosis with pulmonary manifestations (H)        Dose:  300 mg   Take 5 mLs (300 mg) by nebulization 2 times daily Cycle 28 days on/off   Quantity:  280 mL   Refills:  3       tretinoin 0.05 % cream   Commonly known as:  RETIN-A   Used for:  Acne        Apply topically every morning as directed   Quantity:  30 g   Refills:  1       * Notice:  This list has 2 medication(s) that are the same as other medications prescribed for you. Read the directions carefully, and ask your doctor or other care provider to review them with you.          STOP taking              DOXYCYCLINE HYCLATE PO           MINOCYCLINE HCL PO                    Where to get your medicines        Some of these will need a paper prescription and others can be bought over the counter. Ask your nurse if you have questions.       Bring a paper prescription for each of these medications      meropenem 2 g     oxacillin 2 g

## 2018-03-20 ENCOUNTER — CARE COORDINATION (OUTPATIENT)
Dept: PULMONOLOGY | Facility: CLINIC | Age: 23
End: 2018-03-20

## 2018-03-20 ENCOUNTER — HOME INFUSION (PRE-WILLOW HOME INFUSION) (OUTPATIENT)
Dept: PHARMACY | Facility: CLINIC | Age: 23
End: 2018-03-20

## 2018-03-20 NOTE — PROGRESS NOTES
This is a recent snapshot of the patient's [facility] medical record.  For current drug dose and complete information and questions, call [phone] or In Basket pool, fv home infusion ([id number])  CSN Number:  [id number]

## 2018-03-20 NOTE — PROGRESS NOTES
Call placed to both Demario and his parents. Messages left on both numbers requesting Demario to contact the CF office with any concerns regarding his health following discharge from the hospital on 3/17. Also provided instruction for Demario to continue with his IV antibiotics through 3/31 as scheduled and then home nursing agency or local infusion center to remove PICC line. Follow-up in peds CF clinic on 4/10.    I updated with plan to discontinue IV antibiotics after 3/31. Order provided for home agency or local infusion center to d/c PICC following last dose of IV antibiotics.    May Maldonado, RN, CPTC  P Pediatric Cystic Fibrosis/Pulmonary Care Coordinator   CF RN phone: [phone]

## 2018-03-22 ENCOUNTER — HOME INFUSION (PRE-WILLOW HOME INFUSION) (OUTPATIENT)
Dept: PHARMACY | Facility: CLINIC | Age: 23
End: 2018-03-22

## 2018-03-22 NOTE — PROGRESS NOTES
This is a recent snapshot of the patient's [facility] medical record.  For current drug dose and complete information and questions, call [phone] or In Basket pool, fv home infusion ([id number])  CSN Number:  [id number]

## 2018-03-23 ENCOUNTER — HOME INFUSION (PRE-WILLOW HOME INFUSION) (OUTPATIENT)
Dept: PHARMACY | Facility: CLINIC | Age: 23
End: 2018-03-23

## 2018-03-23 NOTE — PROGRESS NOTES
This is a recent snapshot of the patient's [facility] medical record.  For current drug dose and complete information and questions, call [phone] or In Basket pool, fv home infusion ([id number])  CSN Number:  [id number]

## 2018-03-23 NOTE — PROGRESS NOTES
This is a recent snapshot of the patient's [facility] medical record.  For current drug dose and complete information and questions, call [phone] or In Basket pool, fv home infusion ([id number])  CSN Number:  [id number]

## 2018-03-26 ENCOUNTER — HOME INFUSION (PRE-WILLOW HOME INFUSION) (OUTPATIENT)
Dept: PHARMACY | Facility: CLINIC | Age: 23
End: 2018-03-26

## 2018-03-26 NOTE — PROGRESS NOTES
This is a recent snapshot of the patient's [facility] medical record.  For current drug dose and complete information and questions, call [phone] or In Basket pool, fv home infusion ([id number])  CSN Number:  [id number]

## 2018-04-04 ENCOUNTER — HOME INFUSION (PRE-WILLOW HOME INFUSION) (OUTPATIENT)
Dept: PHARMACY | Facility: CLINIC | Age: 23
End: 2018-04-04

## 2018-04-09 NOTE — PROGRESS NOTES
This is a recent snapshot of the patient's [facility] medical record.  For current drug dose and complete information and questions, call [phone] or In Basket pool, fv home infusion ([id number])  CSN Number:  [id number]

## 2018-04-10 ENCOUNTER — OFFICE VISIT (OUTPATIENT)
Dept: PULMONOLOGY | Facility: CLINIC | Age: 23
End: 2018-04-10
Attending: NURSE PRACTITIONER
Payer: COMMERCIAL

## 2018-04-10 VITALS
BODY MASS INDEX: 24.02 KG/M2 | RESPIRATION RATE: 24 BRPM | HEIGHT: 70 IN | SYSTOLIC BLOOD PRESSURE: 100 MMHG | WEIGHT: 167.77 LBS | OXYGEN SATURATION: 97 % | DIASTOLIC BLOOD PRESSURE: 77 MMHG | HEART RATE: 71 BPM | TEMPERATURE: 98.2 F

## 2018-04-10 DIAGNOSIS — E84.9 CYSTIC FIBROSIS (H): Primary | ICD-10-CM

## 2018-04-10 LAB
EXPTIME-PRE: 6.96 SEC
FEF2575-%PRED-PRE: 54 %
FEF2575-PRE: 2.76 L/SEC
FEF2575-PRED: 5.07 L/SEC
FEFMAX-%PRED-PRE: 79 %
FEFMAX-PRE: 8.07 L/SEC
FEFMAX-PRED: 10.17 L/SEC
FEV1-%PRED-PRE: 60 %
FEV1-PRE: 2.83 L
FEV1FEV6-PRE: 81 %
FEV1FEV6-PRED: 84 %
FEV1FVC-PRE: 81 %
FEV1FVC-PRED: 84 %
FIFMAX-PRE: 4.28 L/SEC
FVC-%PRED-PRE: 62 %
FVC-PRE: 3.48 L
FVC-PRED: 5.58 L

## 2018-04-10 PROCEDURE — 87186 SC STD MICRODIL/AGAR DIL: CPT | Performed by: NURSE PRACTITIONER

## 2018-04-10 PROCEDURE — 87077 CULTURE AEROBIC IDENTIFY: CPT | Performed by: NURSE PRACTITIONER

## 2018-04-10 PROCEDURE — G0463 HOSPITAL OUTPT CLINIC VISIT: HCPCS | Mod: ZF

## 2018-04-10 PROCEDURE — 94375 RESPIRATORY FLOW VOLUME LOOP: CPT | Mod: ZF

## 2018-04-10 PROCEDURE — 87070 CULTURE OTHR SPECIMN AEROBIC: CPT | Performed by: NURSE PRACTITIONER

## 2018-04-10 ASSESSMENT — PAIN SCALES - GENERAL: PAINLEVEL: NO PAIN (0)

## 2018-04-10 NOTE — MR AVS SNAPSHOT
After Visit Summary   4/10/2018    Demario Abbasi    MRN: [id number]           Patient Information     Date Of Birth          1995        Visit Information        Provider Department      4/10/2018 1:50 PM Libby Rivera APRN CNP Peds Pulmonary        Today's Diagnoses     Cystic fibrosis (H)    -  1      Care Instructions    CF culture today in clinic.   You are doing great. Keep up the good work!  Follow up in the adult CF clinic at the end of May as planned.           Follow-ups after your visit        Your next 10 appointments already scheduled     Apr 10, 2018  1:50 PM CDT   RETURN CYSTIC FIBROSIS VISIT with MERCEDES Turner CNP Pulmonary ([facility])    [facility]  [address] [facility]  [address] [place] [id number] [phone]            May 25, 2018 10:40 AM CDT   (Arrive by 10:25 AM)   RETURN CYSTIC FIBROSIS VISIT with Poncho Flowers MD   [facility] ([facility])    [address] [place] [address]  [address] [place] [address] [id number] [phone]            May 25, 2018 12:30 PM CDT   CF LOOP with  PFL CF   [facility] Pulmonary Function Testing ([facility])    [address] [place] [address]  3rd [facility] [id number] [phone]            May 25, 2018  1:00 PM CDT   (Arrive by 12:45 PM)   NEW CYSTIC FIBROSIS VISIT with Jesenia Matute MD   [facility] ([facility])    [address] [place] [address]  [address] [place] [address] [phone] [phone]              Who to contact     Please call your clinic at [phone] to:    Ask questions about your health    Make or cancel appointments    Discuss your medicines    Learn about your test results    Speak to your doctor            Additional Information About Your Visit        MyChart Information     UmbaBox is an electronic gateway that provides easy, online  "access to your medical records. With Breather, you can request a clinic appointment, read your test results, renew a prescription or communicate with your care team.     To sign up for Breather visit the website at [email or web address]   You will be asked to enter the access code listed below, as well as some personal information. Please follow the directions to create your username and password.     Your access code is: [email or web address]  Expires: 2018  1:03 PM     Your access code will  in 90 days. If you need help or a new code, please contact your [facility] or call [phone] for assistance.        Care EveryWhere ID     This is your Care EveryWhere ID. This could be used by other organizations to access your [facility] medical records  [id number]        Your Vitals Were     Pulse Temperature Respirations Height Pulse Oximetry BMI (Body Mass Index)    71 98.2  F (36.8  C) 24 5' 10.28\" (178.5 cm) 97% 23.88 kg/m2       Blood Pressure from Last 3 Encounters:   04/10/18 100/77   18 111/68   18 120/72    Weight from Last 3 Encounters:   04/10/18 167 lb 12.3 oz (76.1 kg)   18 166 lb 14.2 oz (75.7 kg)   18 166 lb (75.3 kg)              We Performed the Following     Cystic Fibrosis Culture Aerob Bacterial          Today's Medication Changes          These changes are accurate as of 4/10/18  1:03 PM.  If you have any questions, ask your nurse or doctor.               Stop taking these medicines if you haven't already. Please contact your care team if you have questions.     meropenem 2 g   Stopped by:  Libby Rivera APRN CNP           oxacillin 2 g   Stopped by:  Libby Rivera APRN CNP                    Primary Care Provider Office Phone # Fax #    Susan Pisanohola [phone]       [facility]  [address] [facility] [address]        Equal Access to Services     MCKENNA LINDSAY : jimenez Galindo " jose f amirapatric castanodenice mcmanus. So [facility] [phone].    ATENCIÓN: Si sofia samano, tiene a davidson disposición servicios gratuitos de asistencia lingüística. Trevor al [phone].    We comply with applicable federal civil rights laws and [place] laws. We do not discriminate on the basis of race, color, national origin, age, disability, sex, sexual orientation, or gender identity.            Thank you!     Thank you for choosing PEDS PULMONARY  for your care. Our goal is always to provide you with excellent care. Hearing back from our patients is one way we can continue to improve our services. Please take a few minutes to complete the written survey that you may receive in the mail after your visit with us. Thank you!             Your Updated Medication List - Protect others around you: Learn how to safely use, store and throw away your medicines at www.disposemymeds.org.          This list is accurate as of 4/10/18  1:03 PM.  Always use your most recent med list.                   Brand Name Dispense Instructions for use Diagnosis    adapalene 0.1 % gel    DIFFERIN     Apply 1 applicator topically At Bedtime        * albuterol 108 (90 BASE) MCG/ACT Inhaler    PROAIR HFA    1 Inhaler    Inhale 2 puffs into the lungs every 6 hours as needed for shortness of breath / dyspnea or wheezing    CF (cystic fibrosis) (H)       * albuterol (2.5 MG/3ML) 0.083% neb solution     270 mL    Take 1 vial (2.5 mg) by nebulization 3 times daily    Cystic fibrosis with pulmonary manifestations (H)       amylase-lipase-protease 61353 UNITS Cpep    CREON 36    720 capsule    Take 5 capsules (180,000 Units) by mouth 3 times daily (with meals) And 3 with snacks    Cystic fibrosis exacerbation (H)       azelastine 0.1 % spray    ASTELIN    1 Bottle    Use 1-2 sprays both nostrils twice daily as needed    Cystic fibrosis with pulmonary exacerbation (H)       azithromycin 500 MG tablet     ZITHROMAX    12 tablet    Take one tablet on Monday, Wednesday and Friday morning    CF (cystic fibrosis) (H)       citalopram 20 MG tablet    celeXA    30 tablet    Take 1 tablet (20 mg) by mouth daily    Depression       dornase alpha 1 MG/ML neb solution    PULMOZYME    150 mL    Inhale 2.5 mg into the lungs 2 times daily    Cystic fibrosis with pulmonary manifestations (H)       fexofenadine 180 MG tablet    ALLEGRA    30 tablet    Take 1 tablet (180 mg) by mouth daily    CF (cystic fibrosis) (H)       fluticasone 50 MCG/ACT spray    FLONASE    1 Bottle    Spray 1 spray into both nostrils daily    CF (cystic fibrosis) (H), Cystic fibrosis with pulmonary exacerbation (H)       melatonin 5 MG tablet     60 tablet    Take 1-2 tablets (5-10 mg) by mouth nightly as needed    Cystic fibrosis with pulmonary exacerbation (H)       multivitamin CF formula softgel cap     60 capsule    Take 2 capsules by mouth daily    Cystic fibrosis (H)       pantoprazole 20 MG EC tablet    PROTONIX    30 tablet    Take 1 tablet (20 mg) by mouth daily    CF (cystic fibrosis) (H)       sodium chloride inhalant 7 % Nebu neb solution     240 mL    Take 4 mLs by nebulization 2 times daily    Cystic fibrosis with pulmonary exacerbation (H)       COBY 300 MG/5ML neb solution   Generic drug:  tobramycin (PF)     280 mL    Take 5 mLs (300 mg) by nebulization 2 times daily Cycle 28 days on/off  ON HOLD 3/17/18 WHILE ON IV ANTIBIOTICS    Cystic fibrosis with pulmonary manifestations (H)       tretinoin 0.05 % cream    RETIN-A    30 g    Apply topically every morning as directed    Acne       * Notice:  This list has 2 medication(s) that are the same as other medications prescribed for you. Read the directions carefully, and ask your doctor or other care provider to review them with you.

## 2018-04-10 NOTE — LETTER
4/10/2018      RE: Demario Abbasi  [address]  [place] [address]       Pediatrics Pulmonary - Provider Note  Cystic Fibrosis - Return Visit    Patient: Demario Abbasi MRN# [id number]   Encounter: Apr 10, 2018  : 1995      Opening Statement  We had the pleasure of consulting on Demario at the [place] [facility] at the [facility] for a hospital follow-up visit.  He was accompanied by his parents to this visit.     Subjective:   HPI: The last visit was on 18. As you will recall, Demario was then hospitalized from 3/13/18-3/17/18 for a pulmonary exacerbation of his CF with hemoptysis. During his hospitalization Demario was treated with IV Meropenem and Oxacillin as well as aggressive airway clearance. Demario was discharged to complete his IV antibiotic course at home. At the time of discharge, Demario's PFTs were stable at his baseline. The PICC line was discontinued at the local hospital on 18 without difficulty.     Today Demario returns to clinic for hospital follow up and reports that he is feeling back to baseline. He has stable daily cough and less sputum production. He has not had any further episodes of hemoptysis since prior to his hospitalization. From a sinus standpoint, he has no trouble with chronic congestion or headaches. He reports good energy level and he is able to participate in the activities he enjoys. Currently he participates in vest therapy twice daily; nebulizing albuterol and pulmozyme with each therapy. He also rotates COBY every other month for treatment of his Achromobacter. Prior to discharge from the hospital, a hemoptysis action plan was created and is as follows:   If 5-120 mL (up to 1/2 cup) - call the on-call pulmonology provider ([phone]) to discuss, and can probably manage in the clinic   If >120 mL (over 1/2 cup) - go to the nearest ER and have them call the on-call provider, or call 911 if the bleeding is really  significant    From a GI standpoint, Demario reports he has a good appetite. He has not had any further issues with abdominal pain. He is scheduled to see Dr Flowers, adult GI provider to follow up on his previous GI concerns. Currently he is taking Creon 32208, 5 with meals and 2-3 with snacks. Demario has normal voids and well formed stools. He takes his vitamins without issue.     Demario continues to attend [employer] in [place]. He lives with roommates in an off campus house. School is going well for him.     Past Medical History:   Diagnosis Date     CF (cystic fibrosis) (H)      Impaired glucose tolerance      Pancreatic insufficiency      S/P lobectomy of lung 8/4/2015    Right upper lobectomy - 2009     Allergies  Allergies as of 04/10/2018 - Jose Luis as Reviewed 04/10/2018   Allergen Reaction Noted     Augmentin  10/25/2011     Current Outpatient Prescriptions   Medication Sig Dispense Refill     amylase-lipase-protease (CREON 36) 70111 UNITS CPEP Take 5 capsules (180,000 Units) by mouth 3 times daily (with meals) And 3 with snacks 720 capsule 11     tobramycin, PF, (COBY) 300 MG/5ML neb solution Take 5 mLs (300 mg) by nebulization 2 times daily Cycle 28 days on/off  ON HOLD 3/17/18 WHILE ON IV ANTIBIOTICS 280 mL 3     pantoprazole (PROTONIX) 20 MG EC tablet Take 1 tablet (20 mg) by mouth daily 30 tablet 3     fexofenadine (ALLEGRA) 180 MG tablet Take 1 tablet (180 mg) by mouth daily 30 tablet 3     albuterol (2.5 MG/3ML) 0.083% neb solution Take 1 vial (2.5 mg) by nebulization 3 times daily 270 mL 6     multivitamin CF formula (MVW COMPLETE FORMULATION ) softgel cap Take 2 capsules by mouth daily 60 capsule 3     dornase alpha (PULMOZYME) 1 MG/ML neb solution Inhale 2.5 mg into the lungs 2 times daily 150 mL 12     albuterol (ALBUTEROL) 108 (90 BASE) MCG/ACT Inhaler Inhale 2 puffs into the lungs every 6 hours as needed for shortness of breath / dyspnea or wheezing 1 Inhaler 3     azithromycin (ZITHROMAX) 500 MG  "tablet Take one tablet on Monday, Wednesday and Friday morning 12 tablet 12     fluticasone (FLONASE) 50 MCG/ACT spray Spray 1 spray into both nostrils daily 1 Bottle 3     sodium chloride inhalant 7 % NEBU neb solution Take 4 mLs by nebulization 2 times daily 240 mL 11     melatonin 5 MG tablet Take 1-2 tablets (5-10 mg) by mouth nightly as needed 60 tablet 1     citalopram (CELEXA) 20 MG tablet Take 1 tablet (20 mg) by mouth daily 30 tablet 1     azelastine (ASTELIN) 0.1 % spray Use 1-2 sprays both nostrils twice daily as needed 1 Bottle 1     tretinoin (RETIN-A) 0.05 % cream Apply topically every morning as directed 30 g 1     adapalene (DIFFERIN) 0.1 % gel Apply 1 applicator topically At Bedtime         PMH  Past medical history reviewed with patient/parent today, no changes.    PSH  Past surgical history reviewed with patient/parent today, no changes.    FH  Family history reviewed with patient/parent today, no changes.    ROS  A comprehensive review of systems was performed and is negative except as noted in the HPI.  Last CF Annual Studies date: 3/2018 - still needs OGTT    Objective:   Physical Exam  Vital Signs:/77 (BP Location: Left arm, Patient Position: Sitting, Cuff Size: Adult Regular)  Pulse 71  Temp 98.2  F (36.8  C)  Resp 24  Ht 5' 10.28\" (178.5 cm)  Wt 167 lb 12.3 oz (76.1 kg)  SpO2 97%  BMI 23.88 kg/m2     Wt Readings from Last 4 Encounters:   04/10/18 167 lb 12.3 oz (76.1 kg)   03/17/18 166 lb 14.2 oz (75.7 kg)   02/02/18 166 lb (75.3 kg)   01/04/18 166 lb 3.6 oz (75.4 kg)     Constitutional: No distress, comfortable, pleasant. No coughing.  Vital signs: Reviewed and normal.  Ears, Nose and Throat: Tympanic membranes clear, nose clear and free of lesions, throat clear.   Neck: Supple with full range of motion, no thyromegaly.   Cardiovascular: Regular rate and rhythm, no murmurs, rubs or gallops, peripheral pulses full and symmetric  Chest: Symmetrical, no retractions.  Respiratory: " Good effort, no crackles; no distress, normal breath sounds  Gastrointestinal: Positive bowel sounds, nontender, no hepatosplenomegaly, no masses  Musculoskeletal: Full range of motion, no edema.  Skin: No concerning lesions, no jaundice.    Results for orders placed or performed in visit on 04/10/18   General PFT Lab (Please always keep checked)   Result Value Ref Range    FVC-Pred 5.58 L    FVC-Pre 3.48 L    FVC-%Pred-Pre 62 %    FEV1-Pre 2.83 L    FEV1-%Pred-Pre 60 %    FEV1FVC-Pred 84 %    FEV1FVC-Pre 81 %    FEFMax-Pred 10.17 L/sec    FEFMax-Pre 8.07 L/sec    FEFMax-%Pred-Pre 79 %    FEF2575-Pred 5.07 L/sec    FEF2575-Pre 2.76 L/sec    UHM5748-%Pred-Pre 54 %    ExpTime-Pre 6.96 sec    FIFMax-Pre 4.28 L/sec    FEV1FEV6-Pred 84 %    FEV1FEV6-Pre 81 %   Spirometry Interpretation:  Good effort and acceptable for interpretation.  Evidence of restrictive lung disease given a decreased FVC.  Stable as compared to the time of hospital discharge.    Assessment     Cystic fibrosis (G542X/621+1g>t)   Pancreatic insufficiency  S/p Right upper lobectomy - 2009  History of impaired glucose tolerance  Depression - on celexa    CF Exacerbation: Absent      Plan:       Patient Instructions   CF culture today in clinic.   You are doing great. Keep up the good work!  Follow up in the adult CF clinic at the end of May as planned.     We appreciate the opportunity to be involved in [facility]. If there are any additional questions or concerns regarding this evaluation, please do not hesitate to contact us at any time.     Libby Rivera, MERCEDES, CNP  [facility]  Pediatric Pulmonary  Telephone: [phone]    CC  YEN ROY    Copy to patient  Parent(s) of Demario Abbasi  [address]  [place] [address]

## 2018-04-10 NOTE — PATIENT INSTRUCTIONS
CF culture today in clinic.   You are doing great. Keep up the good work!  Follow up in the adult CF clinic at the end of May as planned.

## 2018-04-10 NOTE — PROGRESS NOTES
Pediatrics Pulmonary - Provider Note  Cystic Fibrosis - Return Visit    Patient: Demario Abbasi MRN# [id number]   Encounter: Apr 10, 2018  : 1995      Opening Statement  We had the pleasure of consulting on Demario at the [place] [facility] at the [facility] for a hospital follow-up visit.  He was accompanied by his parents to this visit.     Subjective:   HPI: The last visit was on 18. As you will recall, Demario was then hospitalized from 3/13/18-3/17/18 for a pulmonary exacerbation of his CF with hemoptysis. During his hospitalization Demario was treated with IV Meropenem and Oxacillin as well as aggressive airway clearance. Demario was discharged to complete his IV antibiotic course at home. At the time of discharge, Demario's PFTs were stable at his baseline. The PICC line was discontinued at the local hospital on 18 without difficulty.     Today Demario returns to clinic for hospital follow up and reports that he is feeling back to baseline. He has stable daily cough and less sputum production. He has not had any further episodes of hemoptysis since prior to his hospitalization. From a sinus standpoint, he has no trouble with chronic congestion or headaches. He reports good energy level and he is able to participate in the activities he enjoys. Currently he participates in vest therapy twice daily; nebulizing albuterol and pulmozyme with each therapy. He also rotates COBY every other month for treatment of his Achromobacter. Prior to discharge from the hospital, a hemoptysis action plan was created and is as follows:   If 5-120 mL (up to 1/2 cup) - call the on-call pulmonology provider ([phone]) to discuss, and can probably manage in the clinic   If >120 mL (over 1/2 cup) - go to the nearest ER and have them call the on-call provider, or call 911 if the bleeding is really significant    From a GI standpoint, Demario reports he has a good appetite. He has not had any further  issues with abdominal pain. He is scheduled to see Dr Flowers, adult GI provider to follow up on his previous GI concerns. Currently he is taking Creon 87864, 5 with meals and 2-3 with snacks. Demario has normal voids and well formed stools. He takes his vitamins without issue.     Demario continues to attend [employer] in [place]. He lives with roommates in an off campus house. School is going well for him.     Past Medical History:   Diagnosis Date     CF (cystic fibrosis) (H)      Impaired glucose tolerance      Pancreatic insufficiency      S/P lobectomy of lung 8/4/2015    Right upper lobectomy - 2009     Allergies  Allergies as of 04/10/2018 - Jose Luis as Reviewed 04/10/2018   Allergen Reaction Noted     Augmentin  10/25/2011     Current Outpatient Prescriptions   Medication Sig Dispense Refill     amylase-lipase-protease (CREON 36) 63581 UNITS CPEP Take 5 capsules (180,000 Units) by mouth 3 times daily (with meals) And 3 with snacks 720 capsule 11     tobramycin, PF, (COBY) 300 MG/5ML neb solution Take 5 mLs (300 mg) by nebulization 2 times daily Cycle 28 days on/off  ON HOLD 3/17/18 WHILE ON IV ANTIBIOTICS 280 mL 3     pantoprazole (PROTONIX) 20 MG EC tablet Take 1 tablet (20 mg) by mouth daily 30 tablet 3     fexofenadine (ALLEGRA) 180 MG tablet Take 1 tablet (180 mg) by mouth daily 30 tablet 3     albuterol (2.5 MG/3ML) 0.083% neb solution Take 1 vial (2.5 mg) by nebulization 3 times daily 270 mL 6     multivitamin CF formula (MVW COMPLETE FORMULATION ) softgel cap Take 2 capsules by mouth daily 60 capsule 3     dornase alpha (PULMOZYME) 1 MG/ML neb solution Inhale 2.5 mg into the lungs 2 times daily 150 mL 12     albuterol (ALBUTEROL) 108 (90 BASE) MCG/ACT Inhaler Inhale 2 puffs into the lungs every 6 hours as needed for shortness of breath / dyspnea or wheezing 1 Inhaler 3     azithromycin (ZITHROMAX) 500 MG tablet Take one tablet on Monday, Wednesday and Friday morning 12 tablet 12     fluticasone (FLONASE) 50  "MCG/ACT spray Spray 1 spray into both nostrils daily 1 Bottle 3     sodium chloride inhalant 7 % NEBU neb solution Take 4 mLs by nebulization 2 times daily 240 mL 11     melatonin 5 MG tablet Take 1-2 tablets (5-10 mg) by mouth nightly as needed 60 tablet 1     citalopram (CELEXA) 20 MG tablet Take 1 tablet (20 mg) by mouth daily 30 tablet 1     azelastine (ASTELIN) 0.1 % spray Use 1-2 sprays both nostrils twice daily as needed 1 Bottle 1     tretinoin (RETIN-A) 0.05 % cream Apply topically every morning as directed 30 g 1     adapalene (DIFFERIN) 0.1 % gel Apply 1 applicator topically At Bedtime         PMH  Past medical history reviewed with patient/parent today, no changes.    PSH  Past surgical history reviewed with patient/parent today, no changes.    FH  Family history reviewed with patient/parent today, no changes.    ROS  A comprehensive review of systems was performed and is negative except as noted in the HPI.  Last CF Annual Studies date: 3/2018 - still needs OGTT    Objective:   Physical Exam  Vital Signs:/77 (BP Location: Left arm, Patient Position: Sitting, Cuff Size: Adult Regular)  Pulse 71  Temp 98.2  F (36.8  C)  Resp 24  Ht 5' 10.28\" (178.5 cm)  Wt 167 lb 12.3 oz (76.1 kg)  SpO2 97%  BMI 23.88 kg/m2     Wt Readings from Last 4 Encounters:   04/10/18 167 lb 12.3 oz (76.1 kg)   03/17/18 166 lb 14.2 oz (75.7 kg)   02/02/18 166 lb (75.3 kg)   01/04/18 166 lb 3.6 oz (75.4 kg)     Constitutional: No distress, comfortable, pleasant. No coughing.  Vital signs: Reviewed and normal.  Ears, Nose and Throat: Tympanic membranes clear, nose clear and free of lesions, throat clear.   Neck: Supple with full range of motion, no thyromegaly.   Cardiovascular: Regular rate and rhythm, no murmurs, rubs or gallops, peripheral pulses full and symmetric  Chest: Symmetrical, no retractions.  Respiratory: Good effort, no crackles; no distress, normal breath sounds  Gastrointestinal: Positive bowel sounds, " nontender, no hepatosplenomegaly, no masses  Musculoskeletal: Full range of motion, no edema.  Skin: No concerning lesions, no jaundice.    Results for orders placed or performed in visit on 04/10/18   General PFT Lab (Please always keep checked)   Result Value Ref Range    FVC-Pred 5.58 L    FVC-Pre 3.48 L    FVC-%Pred-Pre 62 %    FEV1-Pre 2.83 L    FEV1-%Pred-Pre 60 %    FEV1FVC-Pred 84 %    FEV1FVC-Pre 81 %    FEFMax-Pred 10.17 L/sec    FEFMax-Pre 8.07 L/sec    FEFMax-%Pred-Pre 79 %    FEF2575-Pred 5.07 L/sec    FEF2575-Pre 2.76 L/sec    HIM7646-%Pred-Pre 54 %    ExpTime-Pre 6.96 sec    FIFMax-Pre 4.28 L/sec    FEV1FEV6-Pred 84 %    FEV1FEV6-Pre 81 %   Spirometry Interpretation:  Good effort and acceptable for interpretation.  Evidence of restrictive lung disease given a decreased FVC.  Stable as compared to the time of hospital discharge.    Assessment     Cystic fibrosis (G542X/621+1g>t)   Pancreatic insufficiency  S/p Right upper lobectomy - 2009  History of impaired glucose tolerance  Depression - on celexa    CF Exacerbation: Absent      Plan:       Patient Instructions   CF culture today in clinic.   You are doing great. Keep up the good work!  Follow up in the adult CF clinic at the end of May as planned.     We appreciate the opportunity to be involved in [facility]. If there are any additional questions or concerns regarding this evaluation, please do not hesitate to contact us at any time.     MERCEDES Calixto, CNP  [facility]  Pediatric Pulmonary  Telephone: [phone]        CC  YEN ROY    Copy to patient  DEAN DELACRUZIGGYAKINWOLFGANG  [address]  [place] [address]

## 2018-04-10 NOTE — NURSING NOTE
"Chief Complaint   Patient presents with     RECHECK     CF follow up       Initial /77 (BP Location: Left arm, Patient Position: Sitting, Cuff Size: Adult Regular)  Pulse 71  Temp 98.2  F (36.8  C)  Resp 24  Ht 5' 10.28\" (178.5 cm)  Wt 167 lb 12.3 oz (76.1 kg)  SpO2 97%  BMI 23.88 kg/m2 Estimated body mass index is 23.88 kg/(m^2) as calculated from the following:    Height as of this encounter: 5' 10.28\" (178.5 cm).    Weight as of this encounter: 167 lb 12.3 oz (76.1 kg).  Medication Reconciliation: complete   Sofia Newby LPN      "

## 2018-04-10 NOTE — LETTER
2018      RE: Demario Abbasi  [address]  [place] [address]        MRN: [id number]  : 1995      Dear Parent of Demario,    I am enclosing the results of Demario's lab testing. Since you were feeling healthy at the time of your clinic visit, no oral antibiotics will be started.  Feel free to call us with any questions or concerns.     Resulted Orders   Cystic Fibrosis Culture Aerob Bacterial   Result Value Ref Range    Specimen Description Sputum     Culture Micro Moderate growth  Normal elmer       Culture Micro (A)      Moderate growth  Achromobacter xylosoxidans/denitrificans           Please feel free to contact me if you have any further questions.    Sincerely,    Libby Rivera

## 2018-04-11 PROBLEM — E84.9 CYSTIC FIBROSIS EXACERBATION (H): Status: RESOLVED | Noted: 2018-03-13 | Resolved: 2018-04-11

## 2018-04-13 ENCOUNTER — CARE COORDINATION (OUTPATIENT)
Dept: PULMONOLOGY | Facility: CLINIC | Age: 23
End: 2018-04-13

## 2018-04-13 NOTE — PROGRESS NOTES
"[facility]  April 13, 2018    Susan Li    CF Provider: Libby Rivera NP    Caller: Mother, Mirna    Clinical information:  Demario Abbasi currently at school in [place], [place]. Called mother to report that yesterday he coughed up some \"black spots\" about 3 times and had streaks of blood mixed with mucus x1. Becoming short of breath with episodes, but mother feels this is more anxiety driven. Home he lives in is older and mother feels like there is mold present. Notes that Demario says he is walking for longer periods of time. Does not feel that he has done anything strenuous. Feeling fine otherwise.  Currently on COBY, Zithromax, Doxycycline and Minocycline. Vesting twice a day.    Plan:   Will increase vesting to TID.  Continue to monitor hemoptysis. Follow action plan as needed.  Call back with any new or worsening symptoms/concerns.    Caller verbalized understanding of plan and agrees with advice given.     "

## 2018-04-15 LAB
BACTERIA SPEC CULT: ABNORMAL
BACTERIA SPEC CULT: ABNORMAL
SPECIMEN SOURCE: ABNORMAL

## 2018-04-19 NOTE — PROGRESS NOTES
This is a recent snapshot of the patient's [facility] medical record.  For current drug dose and complete information and questions, call [phone] or In Basket pool, [facility] home infusion ([id number])  CSN Number:  [id number]

## 2018-04-26 DIAGNOSIS — E84.9 CF (CYSTIC FIBROSIS) (H): ICD-10-CM

## 2018-04-26 RX ORDER — AZITHROMYCIN 500 MG/1
TABLET, FILM COATED ORAL
Qty: 12 TABLET | Refills: 12 | Status: SHIPPED | OUTPATIENT
Start: 2018-04-26 | End: 2019-05-24

## 2018-05-25 ENCOUNTER — OFFICE VISIT (OUTPATIENT)
Dept: PULMONOLOGY | Facility: CLINIC | Age: 23
End: 2018-05-25
Attending: INTERNAL MEDICINE
Payer: COMMERCIAL

## 2018-05-25 ENCOUNTER — TELEPHONE (OUTPATIENT)
Dept: UROLOGY | Facility: CLINIC | Age: 23
End: 2018-05-25

## 2018-05-25 VITALS
BODY MASS INDEX: 23.86 KG/M2 | RESPIRATION RATE: 16 BRPM | HEART RATE: 80 BPM | SYSTOLIC BLOOD PRESSURE: 124 MMHG | DIASTOLIC BLOOD PRESSURE: 69 MMHG | OXYGEN SATURATION: 95 % | WEIGHT: 167.6 LBS

## 2018-05-25 VITALS
DIASTOLIC BLOOD PRESSURE: 69 MMHG | HEART RATE: 80 BPM | RESPIRATION RATE: 16 BRPM | OXYGEN SATURATION: 95 % | WEIGHT: 167.6 LBS | SYSTOLIC BLOOD PRESSURE: 124 MMHG | BODY MASS INDEX: 23.86 KG/M2

## 2018-05-25 DIAGNOSIS — E84.9 CYSTIC FIBROSIS (H): ICD-10-CM

## 2018-05-25 DIAGNOSIS — R10.84 ABDOMINAL PAIN, GENERALIZED: Primary | ICD-10-CM

## 2018-05-25 DIAGNOSIS — E84.0 CYSTIC FIBROSIS WITH PULMONARY EXACERBATION (H): ICD-10-CM

## 2018-05-25 DIAGNOSIS — E84.0 CYSTIC FIBROSIS WITH PULMONARY MANIFESTATIONS (H): ICD-10-CM

## 2018-05-25 DIAGNOSIS — E84.9 CYSTIC FIBROSIS (H): Primary | ICD-10-CM

## 2018-05-25 DIAGNOSIS — K86.89 PANCREATIC INSUFFICIENCY: ICD-10-CM

## 2018-05-25 DIAGNOSIS — K59.00 CONSTIPATION, UNSPECIFIED CONSTIPATION TYPE: ICD-10-CM

## 2018-05-25 DIAGNOSIS — E84.9 CF (CYSTIC FIBROSIS) (H): ICD-10-CM

## 2018-05-25 DIAGNOSIS — Z31.41 ENCOUNTER FOR SEMEN ANALYSIS: Primary | ICD-10-CM

## 2018-05-25 LAB
CHOLEST SERPL-MCNC: 100 MG/DL
EXPTIME-PRE: 8.85 SEC
FEF2575-%PRED-PRE: 50 %
FEF2575-PRE: 2.58 L/SEC
FEF2575-PRED: 5.07 L/SEC
FEFMAX-%PRED-PRE: 78 %
FEFMAX-PRE: 8.01 L/SEC
FEFMAX-PRED: 10.18 L/SEC
FEV1-%PRED-PRE: 58 %
FEV1-PRE: 2.77 L
FEV1FEV6-PRE: 80 %
FEV1FEV6-PRED: 84 %
FEV1FVC-PRE: 80 %
FEV1FVC-PRED: 84 %
FIFMAX-PRE: 3.96 L/SEC
FVC-%PRED-PRE: 62 %
FVC-PRE: 3.47 L
FVC-PRED: 5.58 L
HDLC SERPL-MCNC: 34 MG/DL
LDLC SERPL CALC-MCNC: 26 MG/DL
NONHDLC SERPL-MCNC: 66 MG/DL
TRIGL SERPL-MCNC: 200 MG/DL

## 2018-05-25 PROCEDURE — G0463 HOSPITAL OUTPT CLINIC VISIT: HCPCS | Mod: ZF

## 2018-05-25 PROCEDURE — 87070 CULTURE OTHR SPECIMN AEROBIC: CPT | Performed by: INTERNAL MEDICINE

## 2018-05-25 PROCEDURE — 80061 LIPID PANEL: CPT | Performed by: INTERNAL MEDICINE

## 2018-05-25 PROCEDURE — 84590 ASSAY OF VITAMIN A: CPT | Performed by: INTERNAL MEDICINE

## 2018-05-25 PROCEDURE — 87186 SC STD MICRODIL/AGAR DIL: CPT | Performed by: INTERNAL MEDICINE

## 2018-05-25 PROCEDURE — 36415 COLL VENOUS BLD VENIPUNCTURE: CPT | Performed by: INTERNAL MEDICINE

## 2018-05-25 PROCEDURE — 87077 CULTURE AEROBIC IDENTIFY: CPT | Performed by: INTERNAL MEDICINE

## 2018-05-25 RX ORDER — FLUTICASONE PROPIONATE 50 MCG
1 SPRAY, SUSPENSION (ML) NASAL DAILY
Qty: 1 BOTTLE | Refills: 3 | Status: SHIPPED | OUTPATIENT
Start: 2018-05-25 | End: 2018-05-25

## 2018-05-25 RX ORDER — GLUCOSAMINE HCL 500 MG
2 TABLET ORAL DAILY
Qty: 100 TABLET | Refills: 0 | Status: SHIPPED | OUTPATIENT
Start: 2018-05-25 | End: 2018-06-26

## 2018-05-25 RX ORDER — AZELASTINE 1 MG/ML
SPRAY, METERED NASAL
Qty: 1 BOTTLE | Refills: 1 | Status: SHIPPED | OUTPATIENT
Start: 2018-05-25 | End: 2018-05-25

## 2018-05-25 RX ORDER — AZELASTINE 1 MG/ML
SPRAY, METERED NASAL
Qty: 1 BOTTLE | Refills: 1 | Status: SHIPPED | OUTPATIENT
Start: 2018-05-25 | End: 2019-02-22

## 2018-05-25 RX ORDER — FLUTICASONE PROPIONATE 50 MCG
1 SPRAY, SUSPENSION (ML) NASAL DAILY
Qty: 1 BOTTLE | Refills: 3 | Status: SHIPPED | OUTPATIENT
Start: 2018-05-25 | End: 2019-01-30

## 2018-05-25 ASSESSMENT — PAIN SCALES - GENERAL
PAINLEVEL: NO PAIN (0)
PAINLEVEL: NO PAIN (0)

## 2018-05-25 NOTE — PROGRESS NOTES
Nutrition Note    Reason for Visit:  Vitamin plan with Accutane    Pt currently taking MVW Complete  - 2 caps daily through [facility].     Interventions/Recommendations:  1) Pt plans to start Accutane in the near future, likely temporary for 6 months or less. Will have pt discontinue CF-specific MVI (MVW Complete) d/t Vitamin A content. Supplement with Vitamin D given low level.     Ordered prescription for cholecalciferol 6000 units daily through [facility] to be taken when MVW Complete is on hold. Ok for patient to re-start multivitamin when Accutane is completed. Pt voiced understanding of this plan. Will also send [employer] message with instructions.     CF dietitian will continue to follow.       Sherie Aparicio RD, LD  Cystic Fibrosis/Lung Transplant Dietitian  Pager [phone]  On weekends/holidays contact coverage RD at [phone] (inpatient use only)

## 2018-05-25 NOTE — NURSING NOTE
Chief Complaint   Patient presents with     Cystic Fibrosis     Patient is being seen for CF consultation/transition to adult clinic      Frances Reese CMA at 10:31 AM on 5/25/2018

## 2018-05-25 NOTE — TELEPHONE ENCOUNTER
[facility] Call Center    Phone Message    May a detailed message be left on voicemail: yes    Reason for Call: Order(s): Other:   Reason for requested: New fertility pt, please put in a SA order   Date needed: 8/23  Provider name: Dr. Douglas      Action Taken: Message routed to:  [facility] ([facility] Urology

## 2018-05-25 NOTE — LETTER
"5/25/2018       RE: Demario Abbasi  [address]  [place] [address]     Dear Colleague,    Thank you for referring your patient, Demario Abbasi, to the [facility] at [facility]. Please see a copy of my visit note below.    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 The patient notes that he has had two epsidoes of severe abdominal pain in Novemeber 2017, and end of Februrary 2018 which he notes \"feels like the wind is knocked out\". He describes the pain as 10/10 severity located in his upper midline abdomen which lasts for about five minutes. The pain resolves without intervention. He denies any current abdominal pain or episodes of severe pain since February. He denies associated n/v, acid reflux, changes in bowel movements, fever or chills, or changes in diet or associated food intake associated with his pain.     He notes that he currently is taking his creon regularly (5 tablets) with large meals, but does not always take with smaller snacks which usually include chips. His bowel movements are formed and regular with 2-3 bms/day. He denies any melena, hematochezia, diarrhea, or constipation. Of note he had an abdominal x-ray 2/18 which showed mild-to moderate stool burden. He denies taking miralax to help regulate bowel movements.     Patient notes recently seeing dermatologist and is wondering if from a GI standpoint he could take Accutane despite the risk of pancreatitis.     ROS: 10pt ROS performed and otherwise negative.    PERTINENT PAST MEDICAL/SURGICAL HISTORY:  As noted " above.    PERTINENT MEDICATIONS:  Creon 36- take 5 capsules by mouth 3 times daily and 3 with snacks orally  Medications reviewed with patient today, see Medication List/Assessment for details.  No other OTC/herbal/supplements reported by patient.    SOCIAL HISTORY: Notes occasional alcohol use. [place] a student in [place] and [place] working as a     FAMILY HISTORY:  No colon/panc/other GI CA, no other Watson or other HPS-related Darren. Esophageal cancer in great-grandfather. No IBD/celiac. Thyroid condition and gallbladder disease requiring cholecystectomy in maternal aunts.     PHYSICAL EXAMINATION:  Vitals reviewed, AFVSS  Wt today 167 lb (stable)  Gen: aaox3, cooperative, pleasant, not diaphoretic, nad  HEENT: ncat, neck supple, no clad/sclad, normal op w/o ulcer/exudate, anicteric, mmm  Resp/CV without acute findings, not dyspneic/tachycardic  Abd: non-distended. No tenderness to palpation. No hepatosplenomegaly  Ext: no c/c/e  Skin: warm, perfused, no jaundice. Some facial acne  Neuro: grossly intact, no asterixis noted    PERTINENT STUDIES:  From 3/13/2018  Lytes/LFT wnl, cr 0.89, alb 3.7  wbc 8.5, hgb 15.3, plt 223, mcv 86    MRI from 3/08/2018:  1. 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  2. Conventional biliary anatomy; no biliary abnormality to explain  patient's recurrent abdominal pain.  3. Tiny cystic foci within the pancreatic head, indeterminate, but may  represent sidebranch type IPMNs    X-ray 2/2/19 (images reviewed today):  nonobstructive bowel gas pattern, mild to moderate amount of stool throughout the colon. No definitve free air on the supine view. No pneumatosis of portal venous gas.     ASSESSMENT/PLAN:    1. Recurrent episodic abdominal pain in the setting of CF in setting of improving PERT adherence + suspected CF-associated Constipation, remote history of transient LFT elevations + microgallbladder + expected CF  "genotype pairing favoring PI-CF - currently asymptomatic with weight stability, continue supportive care as ordered for now  1. It was wonderful getting a chance to meet with you to discuss your Recurrent Abdominal Pain in the setting of CF and evolving possible glucose intolerance, and reviewed the findings from the recent imaging evaluation (MRI/MRCP, Abdominal X-ray) today - discussed next steps in management at length together today.  - MRI/MRCP with changes consistent with known CF (including the known microgallbladder), normal biliary anatomy.  - Abdominal X-ray indicates some mild-to-moderate colonic stool burden, discussed role for considering a proactive bowel regimen to address this (might help in the long run for managing your abdominal pain). Recommend starting Miralax daily as part of your consistent non-stimulant bowel regimen as we discussed today. This will be the \"backbone\" of your overall bowel regimen management.  - Strongly recommend continuing to work on adherence with your PERT supplementation (Creon) as we discussed today, particularly with snacks. Discuss this further with CF Nutrition accordingly, might want to keep a food/symptom diary as well.    2. Briefly discussed the side effect profile of Accutane today per your request, unable to predict risk for side effects (specifically risk for pancreatitis, though given the pancreatic fatty replacement and possible evolving glucose intolerance, unclear whether this would occur). Given the overall side effect profile, would consider avoiding this medication if possible. Discuss with your Dermatology provider accordingly.    2. Cancer Screening  - No known FH CRC, would advise starting routine screening at age 40 unless symptomatic sooner or if required for current diagnostic evaluation.  - GGF with history of esophageal CA, no high-risk factors for esophageal cancer otherwise outside of CF. Will continue to monitor expectantly, could potentially " consider role for screening EGD once at some point if more typical/worsening GERD features arise.    RTC as needed.     Patient seen and examined with Training PA (Ann Isaac) today. Thank you for this consultation. It was a pleasure to participate in the care of this patient; please contact us with any further questions.     Poncho Flowers MD   of Medicine  [facility] - Department of Medicine  Division of Gastroenterology

## 2018-05-25 NOTE — PATIENT INSTRUCTIONS
"I've included a brief summary of our discussion and care plan from today's visit below.  Please review this information with your primary care provider.  _______________________________________________________________________      1. It was wonderful getting a chance to meet with you to discuss your Recurrent Abdominal Pain in the setting of CF and evolving possible glucose intolerance, and reviewed the findings from the recent imaging evaluation (MRI/MRCP, Abdominal X-ray) today - discussed next steps in management at length together today.  - MRI/MRCP with changes consistent with known CF (including the known microgallbladder), normal biliary anatomy.  - Abdominal X-ray indicates some mild-to-moderate colonic stool burden, discussed role for considering a proactive bowel regimen to address this (might help in the long run for managing your abdominal pain). Recommend starting Miralax daily as part of your consistent non-stimulant bowel regimen as we discussed today. This will be the \"backbone\" of your overall bowel regimen management.  - Strongly recommend continuing to work on adherence with your PERT supplementation (Creon) as we discussed today, particularly with snacks. Discuss this further with CF Nutrition accordingly, might want to keep a food/symptom diary as well.    2. Briefly discussed the side effect profile of Accutane today per your request, unable to predict risk for side effects (specifically risk for pancreatitis, though given the pancreatic fatty replacement and possible evolving glucose intolerance, unclear whether this would occur). Given the overall side effect profile, would consider avoiding this medication if possible. Discuss with your Dermatology provider accordingly.    3. Return to GI Clinic with me as needed.  - If you are unable to schedule this follow-up appointment today, please contact our  at [phone] within the next week to help set up this necessary " appointment.    _______________________________________________________________________    It was a pleasure seeing you in clinic today - please be in touch if there are any further questions that arise following today's visit.  During business hours, you may reach Clinic Nurse Triage Line at [phone].  For urgent/emergent questions after business hours, you may reach the on-call GI Fellow by contacting the [facility]  at [phone].    Any benign/non-urgent test results are usually communicated via letter or [employer] message within 1-2 weeks after completion.  Urgent results (those that require a change in the previously-discussed care plan) are usually communicated via a phone call once available from our clinic staff to discuss the results and the next steps in your evaluation.    I recommend signing up for [employer] access if you have not already done so and are comfortable with using a computer.  This allows for online access to your lab results and also helps you communicate efficiently with my clinic should any questions arise in your care.    We have Financial Counseling services available through our clinic.  If you have questions about your insurance coverage or payment responsibilities, particularly before you undergo any tests or procedures, please let us know and we can arrange a consultation accordingly to help you make informed decisions about your healthcare.    Sincerely,    Poncho Flowers MD    [facility] - Department of Medicine  Division of Gastroenterology

## 2018-05-25 NOTE — PROGRESS NOTES
Reason for Visit  Demario Abbasi is a 23 year old year old male who is being seen for Cystic Fibrosis (Patient is being seen for CF consultation/transition to adult clinic)  New Patient Consultation     Assessment and plan:   Demario Abbasi is a 23 year old male with cystic fibrosis (genotype: [id number]/621+[id number]>t) who is s/p RUL resection (for exacerbations, in 2009), pancreatic insufficiency, depression, acne, and impaired glucose tolerance who has transitioned his care to the adult CF clinic.  He is accompanied today by his mother, his girlfriend, and his father.  He is currently doing well on his current regimen and is home for the summer working a full-time job.     Pulm: Last exacerbation was in March 2018 at which time he did receive IV antibiotics.  Currently he is feeling well and his PFTs are at his baseline.  He does have a history of growing achromobacteria as well as MSSA.  He is on Pulmozyme, albuterol, tobramycin nebs, and chronic azithromycin.  He did meet with RT today. He is doing the recommended airway clearance therapy with the [place] protocol.   Will not make any medication changes at this time.  He does know that he should call the clinic if he has recurrent hemoptysis.      Pancreatic Insufficiency related to CF: He continues on Creon with meals and snacks without issue.  Weight is stable.  He did meet with Sherie from nutrition today.    H/o Impaired glucose tolerance test: He is due for an OGTT which can be performed prior to his next visit but completed closer to home.        Acne Vulgaris: He has discussed initiation of Accutane with his dermatologist.  Would recommend checking a triglyceride level and vitamin A prior to initiation of this with occasional monitoring.    Depression: He is currently doing well from this standpoint.  Discussed having his primary care physician continue to refill citalopram as needed.      Health maintenance: we did discuss having him seen by urology  "for evaluation of intact vas deferens.  He is up to date with annual studies.  Next will be due in March 2019.       Follow-up in clinic in 12 weeks with sputum in PFTs.  He should have his oral glucose tolerance test completed with results and hand on the day of appointment.  New    Patient seen and discussed with Dr. PHILLIPS who is in agreement with this plan.    Jesenia Matute MD  Pulmonary and Critical Care Fellow, PGY-5  Pager: [phone]            CF HPI  The patient was seen and examined by Jesenia Matute   Demario Abbasi is a 23-year-old male who is here today to establish care in the adult cystic fibrosis Center.  He had been followed for a number of years by Dr. Álvarez in the pediatric program.  He is accompanied today by his girlfriend, his mother, and his father.    He reports that he is currently doing fine.  He is a student, \"super senior\", at [employer] but is home for the summer.  His plan is to go back to school in August.  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  He did this job last year and enjoyed it.  And says the hours are not currently too bad but there will come a time when he will be working over 100 hours over 2 weeks.  His school is approximately 2 hours away from his parents home.  He is able to drive back and forth if he has concerns regarding his health.    He does report some baseline coughing with sputum production that is typically clear.  He had an issue in mid March at which time he was coughing up small amounts of blood.  He was treated in the hospital with IV antibiotics, meropenem and amoxicillin, he was hospitalized for 3 days at this time.  He denies recurrent episodes of hemoptysis since this time.  He does note that his breathing can be more affected by humid weather at which time he does use his albuterol inhaler as needed.  " "He has not used it this season.  He is doing albuterol and Pulmozyme nebs twice a day.  He is also vesting at least twice a day with the full [place] protocol.  He does use tobramycin for a month on and a month.  He is taking chronically azithromycin and he is on a daily antihistamine.  He does use Flonase for his nose in the summer.  He denies chronic sinus issues.    He does drink less than 2 drinks per week and does chew tobacco regularly but is a never smoker.  He is exposed to some secondhand smoke [place] farm but never in closed quarters.    He reports having had 3 CF exacerbation since his lobectomy in 2009.  This was not done for hemoptysis but due to recurrent exacerbations.  He reports that his weight is stable.  He denies GI complaints such as diarrhea abdominal pain or fat droplets in the stool.  He is taking enzymes.  He does ask whether it is okay for him to take Accutane for his acne and also whether he is \"shooting blanks\".  He is chronically on Celexa which has been refilled for him by his primary care physician.      A complete ROS was otherwise negative except as noted in the HPI.    Current Outpatient Prescriptions   Medication     adapalene (DIFFERIN) 0.1 % gel     albuterol (2.5 MG/3ML) 0.083% neb solution     albuterol (ALBUTEROL) 108 (90 BASE) MCG/ACT Inhaler     amylase-lipase-protease (CREON 36) 18480 UNITS CPEP     azelastine (ASTELIN) 0.1 % spray     azithromycin (ZITHROMAX) 500 MG tablet     citalopram (CELEXA) 20 MG tablet     dornase alpha (PULMOZYME) 1 MG/ML neb solution     fexofenadine (ALLEGRA) 180 MG tablet     fluticasone (FLONASE) 50 MCG/ACT spray     multivitamin CF formula (MVW COMPLETE FORMULATION ) softgel cap     pantoprazole (PROTONIX) 20 MG EC tablet     sodium chloride inhalant 7 % NEBU neb solution     tobramycin, PF, (COBY) 300 MG/5ML neb solution     tretinoin (RETIN-A) 0.05 % cream     [DISCONTINUED] dornase alpha (PULMOZYME) 1 MG/ML neb solution     No " current facility-administered medications for this visit.      Allergies   Allergen Reactions     Augmentin      Past Medical History:   Diagnosis Date     CF (cystic fibrosis) (H)      Impaired glucose tolerance      Pancreatic insufficiency      S/P lobectomy of lung 8/4/2015    Right upper lobectomy - 2009       Past Surgical History:   Procedure Laterality Date     HC LAP,INGUINAL HERNIA REPR,INITIAL  2002     LOBECTOMY LUNG Right 2009    Right upper lobe       Social History     Social History     Marital status: Single     Spouse name: N/A     Number of children: N/A     Years of education: N/A     Occupational History           Social History Main Topics     Smoking status: Passive Smoke Exposure - Never Smoker     Smokeless tobacco: Never Used      Comment: dad smokes     Alcohol use Yes      Comment: <2 drinks per week     Drug use: Not on file     Sexual activity: Not on file     Other Topics Concern     Not on file     Social History Narrative    Lives with parents during the summer on family farm and 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 2 dogs.        Family History   Problem Relation Age of Onset     Thyroid Disease Mother      hypothyroid     Other - See Comments Mother      multiple family members with biliary disease     Hypertension Maternal Grandmother      DIABETES Maternal Grandfather      Hypertension Paternal Grandmother      Hypothyroidism Paternal Grandmother      Parkinsonism Paternal Grandmother      Coronary Artery Disease Early Onset Paternal Grandfather 56       /69 (BP Location: Left arm, Patient Position: Chair, Cuff Size: Adult Regular)  Pulse 80  Resp 16  Wt 76 kg (167 lb 9.6 oz)  SpO2 95%  BMI 23.86 kg/m2  Body mass index is 23.86 kg/(m^2).  Exam:   GENERAL APPEARANCE: Well developed, well nourished, alert, and in no apparent distress.  Pleasant male.  Moderate amount of acne.   EYES: PERRL, EOMI  HENT: Nasal mucosa with no edema  and no hyperemia. No nasal polyps.  EARS: Canals clear, TMs normal  MOUTH: Oral mucosa is moist, without any lesions, no tonsillar enlargement, no oropharyngeal exudate.  NECK: supple, no masses, no thyromegaly.  LYMPHATICS: No significant cervical, or supraclavicular nodes.  RESP: normal percussion, good air flow throughout.  No crackles. No rhonchi. No wheezes.  CV: Normal S1, S2, regular rhythm, normal rate. No murmur.  No rub. No gallop. No LE edema.   ABDOMEN:  Bowel sounds normal, soft, nontender, no HSM or masses.   MS: extremities normal. + clubbing. No cyanosis.  SKIN: no rash on limited exam  NEURO: Mentation intact, speech normal, normal strength and tone, normal gait and stance  PSYCH: mentation appears normal. and affect normal/bright  Results:  Recent Results (from the past 168 hour(s))   General PFT Lab (Please always keep checked)    Collection Time: 05/25/18 11:49 AM   Result Value Ref Range    FVC-Pred 5.58 L    FVC-Pre 3.47 L    FVC-%Pred-Pre 62 %    FEV1-Pre 2.77 L    FEV1-%Pred-Pre 58 %    FEV1FVC-Pred 84 %    FEV1FVC-Pre 80 %    FEFMax-Pred 10.18 L/sec    FEFMax-Pre 8.01 L/sec    FEFMax-%Pred-Pre 78 %    FEF2575-Pred 5.07 L/sec    FEF2575-Pre 2.58 L/sec    VYM3009-%Pred-Pre 50 %    ExpTime-Pre 8.85 sec    FIFMax-Pre 3.96 L/sec    FEV1FEV6-Pred 84 %    FEV1FEV6-Pre 80 %            Culture Micro 05/25/2018 12:30       Moderate growth   Normal elmer        Culture Micro (Abnormal) 05/25/2018 12:30      Moderate growth   Achromobacter xylosoxidans/denitrificans   Susceptibility testing in progress        Moderate growth   Normal elmer         Culture Micro (Abnormal) 04/10/2018 12:45      Moderate growth   Achromobacter xylosoxidans/denitrificans          Culture & Susceptibility      ACHROMOBACTER XYLOSOXIDANS/DENITRIFICANS      Antibiotic Interpretation Sensitivity Unit Method Status     AMIKACIN Resistant >32.0 ug/mL DOMENICO Final     AZTREONAM Resistant >16.0 ug/mL DOMENICO Final      CEFEPIME Resistant >16.0 ug/mL DOMENICO Final     CEFTAZIDIME Resistant >16.0 ug/mL DOMENICO Final     CIPROFLOXACIN Resistant >2.0 ug/mL DOMENICO Final     GENTAMICIN Resistant >8.0 ug/mL DOMENICO Final     IMIPENEM Sensitive <=0.5 ug/mL DOMENICO Final     LEVOFLOXACIN Resistant >4.0 ug/mL DOMENICO Final     MEROPENEM Sensitive <=1.0 ug/mL DOMENICO Final     PIPERACILLIN Resistant >64.0 ug/mL DOMENICO Final     Piperacillin/Tazo Resistant >64.0 ug/mL DOMENICO Final     TETRACYCLINE Resistant >8.0 ug/mL DOMENICO Final     TOBRAMYCIN Resistant >8.0 ug/mL DOMENICO Final     Trimethoprim/Sulfa Sensitive <=2.0/38.0 ug/mL DOMENICO Final        Heavy growth   Normal elmer         Culture Micro (Abnormal) 01/04/2018 10:50      Heavy growth   Achromobacter xylosoxidans/denitrificans        Culture Micro (Abnormal) 01/04/2018 10:50      Light growth   Staphylococcus aureus   This isolate is presumed to be clindamycin resistant based on detection of inducible   clindamycin resistance. Erythromycin and clindamycin are resistant, therefore, they are   not recommended for use.          Culture & Susceptibility      ACHROMOBACTER XYLOSOXIDANS/DENITRIFICANS      Antibiotic Interpretation Sensitivity Unit Method Status     AMIKACIN Resistant >32.0 ug/mL DOMENICO Final     AZTREONAM Resistant >16.0 ug/mL DOMENICO Final     CEFEPIME Resistant >16.0 ug/mL DOMENICO Final     CEFTAZIDIME Intermediate 16.0 ug/mL DOMENICO Final     CIPROFLOXACIN Resistant >2.0 ug/mL DOMENICO Final     GENTAMICIN Resistant >8.0 ug/mL DOMENICO Final     IMIPENEM Sensitive <=0.5 ug/mL DOMENICO Final     LEVOFLOXACIN Intermediate 4.0 ug/mL DOMENICO Final     MEROPENEM Sensitive <=1.0 ug/mL DOMENICO Final     PIPERACILLIN Sensitive <=16.0 ug/mL DOMENICO Final     Piperacillin/Tazo Sensitive <=4.0 ug/mL DOMENICO Final     TETRACYCLINE Resistant >8.0 ug/mL DOMENICO Final     TOBRAMYCIN Resistant >8.0 ug/mL DOMENCIO Final     Trimethoprim/Sulfa Sensitive <=2.0/38.0 ug/mL DOMENICO Final                 STAPHYLOCOCCUS AUREUS      Antibiotic Interpretation Sensitivity Unit Method  Status     CIPROFLOXACIN Sensitive <=0.5 ug/mL DOMENICO Final     CLINDAMYCIN Resistant  ug/mL DOMENICO Final     ERYTHROMYCIN Resistant  ug/mL DOMENICO Final     GENTAMICIN Sensitive <=0.5 ug/mL DOMENICO Final     LEVOFLOXACIN Sensitive <=0.12 ug/mL DOMENICO Final     OXACILLIN Sensitive <=0.25 ug/mL DOMENICO Final     PENICILLIN Resistant >=0.5 ug/mL DOMENICO Final     TETRACYCLINE Sensitive 2 ug/mL DOMENICO Final     Trimethoprim/Sulfa Resistant >=16/304 ug/mL DOMENICO Final     VANCOMYCIN Sensitive <=0.5 ug/mL DOMENICO Final                                      Results as noted above.    PFT Interpretation:  Possible restrictive lung disease given reduced FVC (consistent with previous lobectomy).    Unchanged from previous.   Similar to recent best.  Attestation: The patient  Valid Maneuver             CF Exacerbation  Absent    Attestation: The patient was seen and examined by me with Dr. Jesenia Matute MD.  I agree with the findings and plan of care as noted above.  Briefly,Demario Abbasi is a 23-year-old male with cystic fibrosis with moderately severe restrictive lung disease likely related to previous lobectomy in childhood presenting for his first visit to the adult cystic fibrosis clinic.    Breathing is comfortable at rest and with all usual activity.  Reports an occasional cough productive of a small amount of clear sputum which is his baseline.  He was hospitalized in March with hemoptysis, treated with IV antibiotics.  He denies hemoptysis since that time.  He is doing vest therapy twice daily.  Lungs are clear with no crackles or wheezes.  Demario Abbasi is a 23-year-old male with cystic fibrosis with moderately severe restrictive lung disease likely related to cystic fibrosis and previous lobectomy in childhood.  He does not appear to be having an exacerbation at this time.  Exercise tolerance and PFTs are similar to baseline.  He will continue his current airway clearance therapy. His weight is stable in an adequate range.  He will  continue his current enzymes and vitamin replacement.  We discussed the use of Accutane, this should be acceptable but he will need to follow triglycerides and vitamin A  level.  He will continue citalopram for depression.  He will follow-up in 3 months.  Glucose tolerance test will be performed at home as he is too far away to fast for his clinic visit.

## 2018-05-25 NOTE — LETTER
5/25/2018       RE: Demario Abbasi  [address]  [place] [address]     Dear Colleague,    Thank you for referring your patient, Demario Abbasi, to the [facility] FOR LUNG SCIENCE AND HEALTH at [facility]. Please see a copy of my visit note below.    Reason for Visit  Deamrio Abbasi is a 23 year old year old male who is being seen for Cystic Fibrosis (Patient is being seen for CF consultation/transition to adult clinic)  New Patient Consultation     Assessment and plan:   Demario Abbasi is a 23 year old male with cystic fibrosis (genotype: G542X/621+1g>t) who is s/p RUL resection (for exacerbations, in 2009), pancreatic insufficiency, depression, acne, and impaired glucose tolerance who  has transitioned his care to the adult CF clinic.  He is accompanied today by his mother, his girlfriend, and his father.  He is currently doing well on his current regimen and is home for the summer working a full-time job.     Pulm: Last exacerbation was in March 2018 at which time he did receive IV antibiotics.  Currently he is feeling well and his PFTs are at his baseline.  He does have a history of growing achromobacteria as well as MSSA.  He is on Pulmozyme, albuterol, tobramycin nebs, and chronic azithromycin.  He did meet with RT today. He is doing the recommended airway clearance therapy with the [place] protocol.   Will not make any medication changes at this time.  He does know that he should call the clinic if he has recurrent hemoptysis.      Pancreatic Insufficiency related to CF: He continues on Creon with meals and snacks without issue.  Weight is stable.  He did meet with Sherie from nutrition today.    H/o Impaired glucose tolerance test: He is due for an OGTT which can be performed prior to his next visit but completed closer to home.        Acne Vulgaris: He has discussed initiation of Accutane with his dermatologist.  Would recommend  "checking a triglyceride level and vitamin A prior to initiation of this with occasional monitoring.    Depression: He is currently doing well from this standpoint.  Discussed having his primary care physician continue to refill citalopram as needed.      Health maintenance: we did discuss having him seen by urology for evaluation of intact vas deferens.  He is up to date with annual studies.  Next will be due in March 2019.       Follow-up in clinic in 12 weeks with sputum in PFTs.  He should have his oral glucose tolerance test completed with results and hand on the day of appointment.  New    Patient seen and discussed with Dr. PHILLIPS who is in agreement with this plan.    Jesenia Matute MD  Pulmonary and Critical Care Fellow, PGY-5  Pager: [phone]            CF HPI  The patient was seen and examined by Jesenia Matute   Demario Abbasi is a 23-year-old male who is here today to establish care in the adult cystic fibrosis Center.  He had been followed for a number of years by Dr. Álvarez in the pediatric program.  He is accompanied today by his girlfriend, his mother, and his father.    He reports that he is currently doing fine.  He is a student, \"super senior\", at [employer] but is home for the summer.  His plan is to go back to school in August.  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  He did this job last year and enjoyed it.  And says the hours are not currently too bad but there will come a time when he will be working over 100 hours over 2 weeks.  His school is approximately 2 hours away from his parents home.  He is able to drive back and forth if he has concerns regarding his health.    He does report some baseline coughing with sputum production that is typically clear.  He had an issue in mid March at which time he was coughing up small amounts of blood. " " He was treated in the hospital with IV antibiotics, meropenem and amoxicillin, he was hospitalized for 3 days at this time.  He denies recurrent episodes of hemoptysis since this time.  He does note that his breathing can be more affected by humid weather at which time he does use his albuterol inhaler as needed.  He has not used it this season.  He is doing albuterol and Pulmozyme nebs twice a day.  He is also vesting at least twice a day with the full [place] protocol.  He does use tobramycin for a month on and a month.  He is taking chronically azithromycin and he is on a daily antihistamine.  He does use Flonase for his nose in the summer.  He denies chronic sinus issues.    He does drink less than 2 drinks per week and does chew tobacco regularly but is a never smoker.  He is exposed to some secondhand smoke [place] farm but never in closed quarters.    He reports having had 3 CF exacerbation since his lobectomy in 2009.  This was not done for hemoptysis but due to recurrent exacerbations.  He reports that his weight is stable.  He denies GI complaints such as diarrhea abdominal pain or fat droplets in the stool.  He is taking enzymes.  He does ask whether it is okay for him to take Accutane for his acne and also whether he is \"shooting blanks\".  He is chronically on Celexa which has been refilled for him by his primary care physician.      A complete ROS was otherwise negative except as noted in the HPI.    Current Outpatient Prescriptions   Medication     adapalene (DIFFERIN) 0.1 % gel     albuterol (2.5 MG/3ML) 0.083% neb solution     albuterol (ALBUTEROL) 108 (90 BASE) MCG/ACT Inhaler     amylase-lipase-protease (CREON 36) 52298 UNITS CPEP     azelastine (ASTELIN) 0.1 % spray     azithromycin (ZITHROMAX) 500 MG tablet     citalopram (CELEXA) 20 MG tablet     dornase alpha (PULMOZYME) 1 MG/ML neb solution     fexofenadine (ALLEGRA) 180 MG tablet     fluticasone (FLONASE) 50 MCG/ACT spray     " multivitamin CF formula (MVW COMPLETE FORMULATION ) softgel cap     pantoprazole (PROTONIX) 20 MG EC tablet     sodium chloride inhalant 7 % NEBU neb solution     tobramycin, PF, (COBY) 300 MG/5ML neb solution     tretinoin (RETIN-A) 0.05 % cream     [DISCONTINUED] dornase alpha (PULMOZYME) 1 MG/ML neb solution     No current facility-administered medications for this visit.      Allergies   Allergen Reactions     Augmentin      Past Medical History:   Diagnosis Date     CF (cystic fibrosis) (H)      Impaired glucose tolerance      Pancreatic insufficiency      S/P lobectomy of lung 8/4/2015    Right upper lobectomy - 2009       Past Surgical History:   Procedure Laterality Date     HC LAP,INGUINAL HERNIA REPR,INITIAL  2002     LOBECTOMY LUNG Right 2009    Right upper lobe       Social History     Social History     Marital status: Single     Spouse name: N/A     Number of children: N/A     Years of education: N/A     Occupational History           Social History Main Topics     Smoking status: Passive Smoke Exposure - Never Smoker     Smokeless tobacco: Never Used      Comment: dad smokes     Alcohol use Yes      Comment: <2 drinks per week     Drug use: Not on file     Sexual activity: Not on file     Other Topics Concern     Not on file     Social History Narrative    Lives with parents during the summer on family farm and 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 2 dogs.          /69 (BP Location: Left arm, Patient Position: Chair, Cuff Size: Adult Regular)  Pulse 80  Resp 16  Wt 76 kg (167 lb 9.6 oz)  SpO2 95%  BMI 23.86 kg/m2  Body mass index is 23.86 kg/(m^2).  Exam:   GENERAL APPEARANCE: Well developed, well nourished, alert, and in no apparent distress.  Pleasant male.  Moderate amount of acne.   EYES: PERRL, EOMI  HENT: Nasal mucosa with no edema and no hyperemia. No nasal polyps.  EARS: Canals clear, TMs normal  MOUTH: Oral mucosa is moist, without any  lesions, no tonsillar enlargement, no oropharyngeal exudate.  NECK: supple, no masses, no thyromegaly.  LYMPHATICS: No significant cervical, or supraclavicular nodes.  RESP: normal percussion, good air flow throughout.  No crackles. No rhonchi. No wheezes.  CV: Normal S1, S2, regular rhythm, normal rate. No murmur.  No rub. No gallop. No LE edema.   ABDOMEN:  Bowel sounds normal, soft, nontender, no HSM or masses.   MS: extremities normal. + clubbing. No cyanosis.  SKIN: no rash on limited exam  NEURO: Mentation intact, speech normal, normal strength and tone, normal gait and stance  PSYCH: mentation appears normal. and affect normal/bright  Results:  Recent Results (from the past 168 hour(s))   General PFT Lab (Please always keep checked)    Collection Time: 05/25/18 11:49 AM   Result Value Ref Range    FVC-Pred 5.58 L    FVC-Pre 3.47 L    FVC-%Pred-Pre 62 %    FEV1-Pre 2.77 L    FEV1-%Pred-Pre 58 %    FEV1FVC-Pred 84 %    FEV1FVC-Pre 80 %    FEFMax-Pred 10.18 L/sec    FEFMax-Pre 8.01 L/sec    FEFMax-%Pred-Pre 78 %    FEF2575-Pred 5.07 L/sec    FEF2575-Pre 2.58 L/sec    FVN2481-%Pred-Pre 50 %    ExpTime-Pre 8.85 sec    FIFMax-Pre 3.96 L/sec    FEV1FEV6-Pred 84 %    FEV1FEV6-Pre 80 %            Culture Micro 05/25/2018 12:30       Moderate growth   Normal elmer        Culture Micro (Abnormal) 05/25/2018 12:30      Moderate growth   Achromobacter xylosoxidans/denitrificans   Susceptibility testing in progress        Moderate growth   Normal elmer         Culture Micro (Abnormal) 04/10/2018 12:45      Moderate growth   Achromobacter xylosoxidans/denitrificans          Culture & Susceptibility      ACHROMOBACTER XYLOSOXIDANS/DENITRIFICANS      Antibiotic Interpretation Sensitivity Unit Method Status     AMIKACIN Resistant >32.0 ug/mL DOMENICO Final     AZTREONAM Resistant >16.0 ug/mL DOMENICO Final     CEFEPIME Resistant >16.0 ug/mL DOMENICO Final     CEFTAZIDIME Resistant >16.0 ug/mL DOMENICO Final     CIPROFLOXACIN  Resistant >2.0 ug/mL DOMENICO Final     GENTAMICIN Resistant >8.0 ug/mL DOMENICO Final     IMIPENEM Sensitive <=0.5 ug/mL DOMENICO Final     LEVOFLOXACIN Resistant >4.0 ug/mL DOMENICO Final     MEROPENEM Sensitive <=1.0 ug/mL DOMENICO Final     PIPERACILLIN Resistant >64.0 ug/mL DOMENICO Final     Piperacillin/Tazo Resistant >64.0 ug/mL DOMENICO Final     TETRACYCLINE Resistant >8.0 ug/mL DOMENICO Final     TOBRAMYCIN Resistant >8.0 ug/mL DOMENICO Final     Trimethoprim/Sulfa Sensitive <=2.0/38.0 ug/mL DOMENICO Final        Heavy growth   Normal elmer         Culture Micro (Abnormal) 01/04/2018 10:50      Heavy growth   Achromobacter xylosoxidans/denitrificans        Culture Micro (Abnormal) 01/04/2018 10:50      Light growth   Staphylococcus aureus   This isolate is presumed to be clindamycin resistant based on detection of inducible   clindamycin resistance. Erythromycin and clindamycin are resistant, therefore, they are   not recommended for use.          Culture & Susceptibility      ACHROMOBACTER XYLOSOXIDANS/DENITRIFICANS      Antibiotic Interpretation Sensitivity Unit Method Status     AMIKACIN Resistant >32.0 ug/mL DOMENICO Final     AZTREONAM Resistant >16.0 ug/mL DOMENICO Final     CEFEPIME Resistant >16.0 ug/mL DOMENICO Final     CEFTAZIDIME Intermediate 16.0 ug/mL DOMENICO Final     CIPROFLOXACIN Resistant >2.0 ug/mL DOMENICO Final     GENTAMICIN Resistant >8.0 ug/mL DOMENICO Final     IMIPENEM Sensitive <=0.5 ug/mL DOMENICO Final     LEVOFLOXACIN Intermediate 4.0 ug/mL DOMENICO Final     MEROPENEM Sensitive <=1.0 ug/mL DOMENICO Final     PIPERACILLIN Sensitive <=16.0 ug/mL DOMENICO Final     Piperacillin/Tazo Sensitive <=4.0 ug/mL DOMENICO Final     TETRACYCLINE Resistant >8.0 ug/mL DOMENICO Final     TOBRAMYCIN Resistant >8.0 ug/mL DOMENICO Final     Trimethoprim/Sulfa Sensitive <=2.0/38.0 ug/mL DOMENICO Final                 STAPHYLOCOCCUS AUREUS      Antibiotic Interpretation Sensitivity Unit Method Status     CIPROFLOXACIN Sensitive <=0.5 ug/mL DOMENICO Final     CLINDAMYCIN Resistant  ug/mL DOMENICO Final      ERYTHROMYCIN Resistant  ug/mL DOMENICO Final     GENTAMICIN Sensitive <=0.5 ug/mL DOMENICO Final     LEVOFLOXACIN Sensitive <=0.12 ug/mL DOMENICO Final     OXACILLIN Sensitive <=0.25 ug/mL DOMENICO Final     PENICILLIN Resistant >=0.5 ug/mL DOMENICO Final     TETRACYCLINE Sensitive 2 ug/mL DOMENICO Final     Trimethoprim/Sulfa Resistant >=16/304 ug/mL DOMENICO Final     VANCOMYCIN Sensitive <=0.5 ug/mL DOMENICO Final                                      Results as noted above.    PFT Interpretation:  Possible restrictive lung disease given reduced FVC.    Unchanged from previous.   Similar to recent best.  Valid Maneuver             CF Exacerbation  Absent        Nutrition Note    Reason for Visit:  Vitamin plan with Accutane    Pt currently taking MVW Complete  - 2 caps daily through [facility].     Interventions/Recommendations:  1) Pt plans to start Accutane in the near future, likely temporary for 6 months or less. Will have pt discontinue CF-specific MVI (MVW Complete) d/t Vitamin A content. Supplement with Vitamin D given low level.     Ordered prescription for cholecalciferol 6000 units daily through [facility] to be taken when MVW Complete is on hold. Ok for patient to re-start multivitamin when Accutane is completed. Pt voiced understanding of this plan. Will also send [employer] message with instructions.     CF dietitian will continue to follow.       Sherie Aparicio RD, LD  Cystic Fibrosis/Lung Transplant Dietitian  Pager [phone]  On weekends/holidays contact coverage RD at [phone] (inpatient use only)           Again, thank you for allowing me to participate in the care of your patient.      Sincerely,    Jesenia Matute MD

## 2018-05-25 NOTE — PATIENT INSTRUCTIONS
Cystic Fibrosis Self-Care Plan    RECOMMENDATIONS:   Continue current medication, nebs and vest therapy.   Stop MVW and continue vitamin D (once on accutane).            CF Nurse line:          Kevin Rodríguez   [phone]            CF Resp Therapist: Margaret Werner            [phone]   CF Dietitians:           Sherie Aparicio            [phone]                       Saba Gill                       [phone]   CF Diabetes Nurse: Ashley Cleveland             [phone]    CF Social Workers:  Prerna Paniagua             [phone]                Velma Kolb            [phone]  CF Pharmacist:        Ashley Kirby                              [phone]  [email or web address]         MRN: [id number]   Clinic Date: May 25, 2018   Patient: Demario Abbasi     Annual Studies:   IGG   Date Value Ref Range Status   03/15/2018 1310 695 - 1620 mg/dL Final     Insulin   Date Value Ref Range Status   03/16/2018 Canceled, Test credited 3 - 25 mU/L Final     Comment:     Unsatisfactory specimen - hemolyzed  CALLED TO URU6 MAGGIE ALEXANDRA ON 03/16 AT 1300 BY YW       There are no preventive care reminders to display for this patient.      Pulmonary Function Tests  FEV1: amount of air you can blow out in 1 second  FVC: total amount of air you can take in and blow out    Your Goals:         PFT Latest Ref Rng & Units 5/25/2018   FVC L 3.47   FEV1 L 2.77   FVC% % 62   FEV1% % 58          Airway Clearance: The Most Important Way to Keep Your Lungs Healthy  Vest Settings:    Hill-Rom Frequencies: 8, 9, 10 Pressure 10 Then, Frequencies 18, 19, 20 Pressure 6      RespirTech: Quick Start with Pressure of     Do each frequency for 5 minutes; Deflate vest after each frequency & cough 3 times before beginning the next setting.    Vest and Neb Therapy should be done 2-3 times/day.    Good Nutrition Can Improve Lung Function and Overall Health     Take ALL of your vitamins with food     Take 1/2 of your  enzymes before EVERY meal/snack and the other 1/2 mid-meal/snack    Wt Readings from Last 3 Encounters:   05/25/18 76 kg (167 lb 9.6 oz)   05/25/18 76 kg (167 lb 9.6 oz)   04/10/18 76.1 kg (167 lb 12.3 oz)       Body mass index is 23.86 kg/(m^2).         National CF Foundation Recommendations for BMI in CF Adults: Women: at least 22 Men: at least 23        Controlling Blood Sugars Helps Prevent Lung Infections & Improves Nutrition  Test blood sugar:     In the morning before eating (goal is )     2 hours after a meal (goal is less than 150)     When pre-meal glucose is greater than 150 add correction     At bedtime (if less than 100 eat a snack with 15 grams of carbohydrates  Last A1C Results:   Hemoglobin A1C   Date Value Ref Range Status   11/03/2016 5.9 4.3 - 6.0 % Final         If diabetic, measure A1C every 6 months. Goal: Under 7%    Staying Healthy    Research:  If you are interested in learning about research opportunities or have questions, please contact the CF Research Team at [phone] or [email or web address].      CF Foundation:  Compass is a personalized resource service to help you with the insurance, financial, legal and other issues you are facing.  It's free, confidential and available to anyone with CF.  Ask your  for more information or contact Compass directly at 493-COMPASS ([phone]) or compass@cff.org, or learn more at cff.[email or web address].

## 2018-05-25 NOTE — PROGRESS NOTES
"GI CLINIC VISIT    CC/REFERRING PROVIDER:   REASON FOR CONSULTATION: recurrent abdominal pain     HPI: 23 year old male with PMH of g542x/621+1g>t CF (pairing typically PI-CF), CF pulmonary disease (FEV12.77 5/25/18)  s/p RUL resection in 2009, CF pancreatic disease with presumed EPI (empiric PERT), acne and depression who presents today for follow-up to discuss episodes of abdominal pain. The patient notes that he has had two epsidoes of severe abdominal pain in Novemeber 2017, and end of Februrary 2018 which he notes \"feels like the wind is knocked out\". He describes the pain as 10/10 severity located in his upper midline abdomen which lasts for about five minutes. The pain resolves without intervention. He denies any current abdominal pain or episodes of severe pain since February. He denies associated n/v, acid reflux, changes in bowel movements, fever or chills, or changes in diet or associated food intake associated with his pain.     He notes that he currently is taking his creon regularly (5 tablets) with large meals, but does not always take with smaller snacks which usually include chips. His bowel movements are formed and regular with 2-3 bms/day. He denies any melena, hematochezia, diarrhea, or constipation. Of note he had an abdominal x-ray 2/18 which showed mild-to moderate stool burden. He denies taking miralax to help regulate bowel movements.     Patient notes recently seeing dermatologist and is wondering if from a GI standpoint he could take Accutane despite the risk of pancreatitis.     ROS: 10pt ROS performed and otherwise negative.    PERTINENT PAST MEDICAL/SURGICAL HISTORY:  As noted above.    PERTINENT MEDICATIONS:  Creon 36- take 5 capsules by mouth 3 times daily and 3 with snacks orally  Medications reviewed with patient today, see Medication List/Assessment for details.  No other OTC/herbal/supplements reported by patient.    SOCIAL HISTORY: Notes occasional alcohol use. Is a student in [place] " and [place] the [place] working as a     FAMILY HISTORY:  No colon/panc/other GI CA, no other Watson or other HPS-related Darren. Esophageal cancer in great-grandfather. No IBD/celiac. Thyroid condition and gallbladder disease requiring cholecystectomy in maternal aunts.     PHYSICAL EXAMINATION:  Vitals reviewed, AFVSS  Wt today 167 lb (stable)  Gen: aaox3, cooperative, pleasant, not diaphoretic, nad  HEENT: ncat, neck supple, no clad/sclad, normal op w/o ulcer/exudate, anicteric, mmm  Resp/CV without acute findings, not dyspneic/tachycardic  Abd: non-distended. No tenderness to palpation. No hepatosplenomegaly  Ext: no c/c/e  Skin: warm, perfused, no jaundice. Some facial acne  Neuro: grossly intact, no asterixis noted    PERTINENT STUDIES:  From 3/13/2018  Lytes/LFT wnl, cr 0.89, alb 3.7  wbc 8.5, hgb 15.3, plt 223, mcv 86    MRI from 3/08/2018:  1. 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  2. Conventional biliary anatomy; no biliary abnormality to explain  patient's recurrent abdominal pain.  3. Tiny cystic foci within the pancreatic head, indeterminate, but may  represent sidebranch type IPMNs    X-ray 2/2/19 (images reviewed today):  nonobstructive bowel gas pattern, mild to moderate amount of stool throughout the colon. No definitve free air on the supine view. No pneumatosis of portal venous gas.     ASSESSMENT/PLAN:    1. Recurrent episodic abdominal pain in the setting of CF in setting of improving PERT adherence + suspected CF-associated Constipation, remote history of transient LFT elevations + microgallbladder + expected CF genotype pairing favoring PI-CF - currently asymptomatic with weight stability, continue supportive care as ordered for now  1. It was wonderful getting a chance to meet with you to discuss your Recurrent Abdominal Pain in the setting of CF and evolving possible glucose intolerance, and reviewed the findings from  "the recent imaging evaluation (MRI/MRCP, Abdominal X-ray) today - discussed next steps in management at length together today.  - MRI/MRCP with changes consistent with known CF (including the known microgallbladder), normal biliary anatomy.  - Abdominal X-ray indicates some mild-to-moderate colonic stool burden, discussed role for considering a proactive bowel regimen to address this (might help in the long run for managing your abdominal pain). Recommend starting Miralax daily as part of your consistent non-stimulant bowel regimen as we discussed today. This will be the \"backbone\" of your overall bowel regimen management.  - Strongly recommend continuing to work on adherence with your PERT supplementation (Creon) as we discussed today, particularly with snacks. Discuss this further with CF Nutrition accordingly, might want to keep a food/symptom diary as well.    2. Briefly discussed the side effect profile of Accutane today per your request, unable to predict risk for side effects (specifically risk for pancreatitis, though given the pancreatic fatty replacement and possible evolving glucose intolerance, unclear whether this would occur). Given the overall side effect profile, would consider avoiding this medication if possible. Discuss with your Dermatology provider accordingly.    2. Cancer Screening  - No known FH CRC, would advise starting routine screening at age 40 unless symptomatic sooner or if required for current diagnostic evaluation.  - GGF with history of esophageal CA, no high-risk factors for esophageal cancer otherwise outside of CF. Will continue to monitor expectantly, could potentially consider role for screening EGD once at some point if more typical/worsening GERD features arise.    RTC as needed.     Patient seen and examined with Training PA (Ann Isaac) today. Thank you for this consultation. It was a pleasure to participate in the care of this patient; please contact us with any " further questions.     Poncho Flowers MD   of Medicine  [facility] - Department of Medicine  Division of Gastroenterology

## 2018-05-25 NOTE — MR AVS SNAPSHOT
"              After Visit Summary   5/25/2018    Demario Abbasi    MRN: [id number]           Patient Information     Date Of Birth          1995        Visit Information        Provider Department      5/25/2018 10:40 AM Poncho Flowers MD [facility] for Lung Science and Health        Care Instructions    I've included a brief summary of our discussion and care plan from today's visit below.  Please review this information with your primary care provider.  _______________________________________________________________________      1. It was wonderful getting a chance to meet with you to discuss your Recurrent Abdominal Pain in the setting of CF and evolving possible glucose intolerance, and reviewed the findings from the recent imaging evaluation (MRI/MRCP, Abdominal X-ray) today - discussed next steps in management at length together today.  - MRI/MRCP with changes consistent with known CF (including the known microgallbladder), normal biliary anatomy.  - Abdominal X-ray indicates some mild-to-moderate colonic stool burden, discussed role for considering a proactive bowel regimen to address this (might help in the long run for managing your abdominal pain). Recommend starting Miralax daily as part of your consistent non-stimulant bowel regimen as we discussed today. This will be the \"backbone\" of your overall bowel regimen management.  - Strongly recommend continuing to work on adherence with your PERT supplementation (Creon) as we discussed today, particularly with snacks. Discuss this further with CF Nutrition accordingly, might want to keep a food/symptom diary as well.    2. Briefly discussed the side effect profile of Accutane today per your request, unable to predict risk for side effects (specifically risk for pancreatitis, though given the pancreatic fatty replacement and possible evolving glucose intolerance, unclear whether this would occur). Given the overall side effect profile, would " consider avoiding this medication if possible. Discuss with your Dermatology provider accordingly.    3. Return to GI Clinic with me as needed.  - If you are unable to schedule this follow-up appointment today, please contact our  at [phone] within the next week to help set up this necessary appointment.    _______________________________________________________________________    It was a pleasure seeing you in clinic today - please be in touch if there are any further questions that arise following today's visit.  During business hours, you may reach Clinic Nurse Triage Line at [phone].  For urgent/emergent questions after business hours, you may reach the on-call GI Fellow by contacting the [facility]  at [phone].    Any benign/non-urgent test results are usually communicated via letter or Paicet message within 1-2 weeks after completion.  Urgent results (those that require a change in the previously-discussed care plan) are usually communicated via a phone call once available from our clinic staff to discuss the results and the next steps in your evaluation.    I recommend signing up for Bilims access if you have not already done so and are comfortable with using a computer.  This allows for online access to your lab results and also helps you communicate efficiently with my clinic should any questions arise in your care.    We have Financial Counseling services available through our clinic.  If you have questions about your insurance coverage or payment responsibilities, particularly before you undergo any tests or procedures, please let us know and we can arrange a consultation accordingly to help you make informed decisions about your healthcare.    Sincerely,    Poncho Flowers MD    [facility] - Department of Medicine  Division of Gastroenterology            Follow-ups after your visit        Follow-up notes from your care  team     Return if symptoms worsen or fail to improve.      Your next 10 appointments already scheduled     May 25, 2018 12:30 PM CDT   CF LOOP with UC PFL CF   [facility] Pulmonary Function Testing ([facility])    [address]  3rd Floor  [facility] [id number] [phone]            May 25, 2018  1:00 PM CDT   (Arrive by 12:45 PM)   NEW CYSTIC FIBROSIS VISIT with Jesenia Matute MD   [facility] Lung Science and Health ([facility])    [address]  [address]  [place] [address] [phone]              Who to contact     If you have questions or need follow up information about today's clinic visit or your schedule please contact [facility] LUNG SCIENCE AND HEALTH directly at [phone].  Normal or non-critical lab and imaging results will be communicated to you by MyChart, letter or phone within 4 business days after the clinic has received the results. If you do not hear from us within 7 days, please contact the clinic through MyChart or phone. If you have a critical or abnormal lab result, we will notify you by phone as soon as possible.  Submit refill requests through [employer] or call your pharmacy and they will forward the refill request to us. Please allow 3 business days for your refill to be completed.          Additional Information About Your Visit        Care EveryWhere ID     This is your Care EveryWhere ID. This could be used by other organizations to access your [place] medical records  [id number]        Your Vitals Were     Pulse Respirations Pulse Oximetry BMI (Body Mass Index)          80 16 95% 23.86 kg/m2         Blood Pressure from Last 3 Encounters:   05/25/18 124/69   04/10/18 100/77   03/17/18 111/68    Weight from Last 3 Encounters:   05/25/18 76 kg (167 lb 9.6 oz)   04/10/18 76.1 kg (167 lb 12.3 oz)   03/17/18 75.7 kg (166 lb 14.2 oz)              Today, you had the following     No  orders found for display       Primary Care Provider Office Phone # Fax #    Susan Li [phone]       [facility] MED[facility]  [address] [employer] [address]        Equal Access to Services     MCKENNA LINDSAY : Cristal patrick vinio Sodeuce, wadwightda luqadaha, qaybta kaalmada ehsan, denice singh justynakilah collins laChriseulalia viera. So [facility] [phone].    ATENCIÓN: Si habla español, tiene a davidson disposición servicios gratuitos de asistencia lingüística. [facility] [phone].    We comply with applicable federal civil rights laws and [place] laws. We do not discriminate on the basis of race, color, national origin, age, disability, sex, sexual orientation, or gender identity.            Thank you!     Thank you for choosing [facility] FOR LUNG SCIENCE AND HEALTH  for your care. Our goal is always to provide you with excellent care. Hearing back from our patients is one way we can continue to improve our services. Please take a few minutes to complete the written survey that you may receive in the mail after your visit with us. Thank you!             Your Updated Medication List - Protect others around you: Learn how to safely use, store and throw away your medicines at www.disposemymeds.org.          This list is accurate as of 5/25/18 11:36 AM.  Always use your most recent med list.                   Brand Name Dispense Instructions for use Diagnosis    adapalene 0.1 % gel    DIFFERIN     Apply 1 applicator topically At Bedtime        * albuterol 108 (90 Base) MCG/ACT Inhaler    PROAIR HFA    1 Inhaler    Inhale 2 puffs into the lungs every 6 hours as needed for shortness of breath / dyspnea or wheezing    CF (cystic fibrosis) (H)       * albuterol (2.5 MG/3ML) 0.083% neb solution     270 mL    Take 1 vial (2.5 mg) by nebulization 3 times daily    Cystic fibrosis with pulmonary manifestations (H)       amylase-lipase-protease 87704 units Cpep    CREON 36    720 capsule    Take 5 capsules  (180,000 Units) by mouth 3 times daily (with meals) And 3 with snacks    Cystic fibrosis exacerbation (H)       azelastine 0.1 % spray    ASTELIN    1 Bottle    Use 1-2 sprays both nostrils twice daily as needed    Cystic fibrosis with pulmonary exacerbation (H)       azithromycin 500 MG tablet    ZITHROMAX    12 tablet    Take one tablet on Monday, Wednesday and Friday morning    CF (cystic fibrosis) (H)       citalopram 20 MG tablet    celeXA    30 tablet    Take 1 tablet (20 mg) by mouth daily    Depression       dornase alpha 1 MG/ML neb solution    PULMOZYME    150 mL    Inhale 2.5 mg into the lungs 2 times daily    Cystic fibrosis with pulmonary manifestations (H)       fexofenadine 180 MG tablet    ALLEGRA    30 tablet    Take 1 tablet (180 mg) by mouth daily    CF (cystic fibrosis) (H)       fluticasone 50 MCG/ACT spray    FLONASE    1 Bottle    Spray 1 spray into both nostrils daily    CF (cystic fibrosis) (H), Cystic fibrosis with pulmonary exacerbation (H)       multivitamin CF formula softgel cap     60 capsule    Take 2 capsules by mouth daily    Cystic fibrosis (H)       pantoprazole 20 MG EC tablet    PROTONIX    30 tablet    Take 1 tablet (20 mg) by mouth daily    CF (cystic fibrosis) (H)       sodium chloride inhalant 7 % Nebu neb solution     240 mL    Take 4 mLs by nebulization 2 times daily    Cystic fibrosis with pulmonary exacerbation (H)       COBY 300 MG/5ML neb solution   Generic drug:  tobramycin (PF)     280 mL    Take 5 mLs (300 mg) by nebulization 2 times daily Cycle 28 days on/off  ON HOLD 3/17/18 WHILE ON IV ANTIBIOTICS    Cystic fibrosis with pulmonary manifestations (H)       tretinoin 0.05 % cream    RETIN-A    30 g    Apply topically every morning as directed    Acne       * Notice:  This list has 2 medication(s) that are the same as other medications prescribed for you. Read the directions carefully, and ask your doctor or other care provider to review them with you.

## 2018-05-25 NOTE — MR AVS SNAPSHOT
After Visit Summary   5/25/2018    Demario Abbasi    MRN: [id number]           Patient Information     Date Of Birth          1995        Visit Information        Provider Department      5/25/2018 1:00 PM Jesenia Matute MD [facility] for Lung Science and Health        Today's Diagnoses     Cystic fibrosis (H)    -  1    CF (cystic fibrosis) (H)        Cystic fibrosis with pulmonary exacerbation (H)        Cystic fibrosis with pulmonary manifestations (H)        Pancreatic insufficiency          Care Instructions    Cystic Fibrosis Self-Care Plan    RECOMMENDATIONS:   Continue current medication, nebs and vest therapy.   Stop MVW and continue vitamin D (once on accutane).            CF Nurse line:          Kevin Rodríguez   [phone]            CF Resp Therapist: Margaret Werner            [phone]   CF Dietitians:           Sherie Aparicio            [phone]                       Saba Gill                       [phone]   CF Diabetes Nurse: Ashley Cleveland             [phone]    CF Social Workers:  Prerna Paniagua             [phone]                Velma Kolb            [phone]  CF Pharmacist:        Ashley Kirby                              [phone]  [email or web address]         MRN: [id number]   Clinic Date: May 25, 2018   Patient: Demario Abbasi     Annual Studies:   IGG   Date Value Ref Range Status   03/15/2018 1310 695 - 1620 mg/dL Final     Insulin   Date Value Ref Range Status   03/16/2018 Canceled, Test credited 3 - 25 mU/L Final     Comment:     Unsatisfactory specimen - hemolyzed  CALLED TO URU6 MAGGIE ALEXANDRA ON 03/16 AT 1300 BY YW       There are no preventive care reminders to display for this patient.      Pulmonary Function Tests  FEV1: amount of air you can blow out in 1 second  FVC: total amount of air you can take in and blow out    Your Goals:         PFT Latest Ref Rng & Units 5/25/2018   FVC L 3.47   FEV1 L  2.77   FVC% % 62   FEV1% % 58          Airway Clearance: The Most Important Way to Keep Your Lungs Healthy  Vest Settings:    Hill-Rom Frequencies: 8, 9, 10 Pressure 10 Then, Frequencies 18, 19, 20 Pressure 6      RespirTech: Quick Start with Pressure of     Do each frequency for 5 minutes; Deflate vest after each frequency & cough 3 times before beginning the next setting.    Vest and Neb Therapy should be done 2-3 times/day.    Good Nutrition Can Improve Lung Function and Overall Health     Take ALL of your vitamins with food     Take 1/2 of your enzymes before EVERY meal/snack and the other 1/2 mid-meal/snack    Wt Readings from Last 3 Encounters:   05/25/18 76 kg (167 lb 9.6 oz)   05/25/18 76 kg (167 lb 9.6 oz)   04/10/18 76.1 kg (167 lb 12.3 oz)       Body mass index is 23.86 kg/(m^2).         National CF Foundation Recommendations for BMI in CF Adults: Women: at least 22 Men: at least 23        Controlling Blood Sugars Helps Prevent Lung Infections & Improves Nutrition  Test blood sugar:     In the morning before eating (goal is )     2 hours after a meal (goal is less than 150)     When pre-meal glucose is greater than 150 add correction     At bedtime (if less than 100 eat a snack with 15 grams of carbohydrates  Last A1C Results:   Hemoglobin A1C   Date Value Ref Range Status   11/03/2016 5.9 4.3 - 6.0 % Final         If diabetic, measure A1C every 6 months. Goal: Under 7%    Staying Healthy    Research:  If you are interested in learning about research opportunities or have questions, please contact the CF Research Team at [phone] or [email or web address].      CF Foundation:  Compass is a personalized resource service to help you with the insurance, financial, legal and other issues you are facing.  It's free, confidential and available to anyone with CF.  Ask your  for more information or contact Compass directly at 358-EMIJUNN ([phone]) or compass@cff.org, or learn more at  cff.org/compass.                             Follow-ups after your visit        Additional Services     Urology Adult Referral       Specify CF Fertility evaluation                  Follow-up notes from your care team     Return in about 12 weeks (around 8/17/2018).      Your next 10 appointments already scheduled     Aug 23, 2018  9:40 AM CDT   (Arrive by 9:25 AM)   New Patient Visit with Kody Douglas MD   [facility]rology and Inst for Prostate and Urologic Cancers ([facility])    [address]  4th Floor  [facility] [id number] [phone]            Aug 23, 2018 10:30 AM CDT   CF LOOP with UC PFL CF   [facility] Pulmonary Function Testing ([facility])    [address]  3rd Floor  [facility] [id number] [phone]            Aug 23, 2018 11:10 AM CDT   (Arrive by 10:55 AM)   RETURN CYSTIC FIBROSIS VISIT with Zana Lilly MD   [facility] Lung Science and Health ([facility])    [address]  [address]  [facility] [id number] [phone]              Future tests that were ordered for you today     Open Standing Orders        Priority Remaining Interval Expires Ordered    Cystic Fibrosis Culture Aerob Bacterial Routine 99/100  5/25/2019 5/25/2018          Open Future Orders        Priority Expected Expires Ordered    Cystic Fibrosis Culture Aerob Bacterial Routine 8/17/2018 9/14/2018 5/25/2018    Spirometry, Breathing Capacity Routine 8/17/2018 9/14/2018 5/25/2018    Lipid Profile Routine 5/25/2018 5/31/2018 5/25/2018    Vitamin A Routine 5/25/2018 5/31/2018 5/25/2018            Who to contact     If you have questions or need follow up information about today's clinic visit or your schedule please contact [facility] LUNG SCIENCE AND HEALTH directly at [phone].  Normal or non-critical lab and imaging results will be communicated to you by MyChart, letter or phone  "within 4 business days after the clinic has received the results. If you do not hear from us within 7 days, please contact the clinic through [employer] or phone. If you have a critical or abnormal lab result, we will notify you by phone as soon as possible.  Submit refill requests through [employer] or call your pharmacy and they will forward the refill request to us. Please allow 3 business days for your refill to be completed.          Additional Information About Your Visit        Gateway 3[employer] Information     Gateway 3D lets you send messages to your doctor, view your test results, renew your prescriptions, schedule appointments and more. To sign up, go to [email or web address] . Click on \"Log in\" on the left side of the screen, which will take you to the Welcome page. Then click on \"Sign up Now\" on the right side of the page.     You will be asked to enter the access code listed below, as well as some personal information. Please follow the directions to create your username and password.     Your access code is: [email or web address]  Expires: 2018  2:28 PM     Your access code will  in 90 days. If you need help or a new code, please call your [place] clinic or [phone].        Care EveryWhere ID     This is your Care EveryWhere ID. This could be used by other organizations to access your [place] medical records  [id number]        Your Vitals Were     Pulse Respirations Pulse Oximetry BMI (Body Mass Index)          80 16 95% 23.86 kg/m2         Blood Pressure from Last 3 Encounters:   18 124/69   18 124/69   04/10/18 100/77    Weight from Last 3 Encounters:   18 76 kg (167 lb 9.6 oz)   18 76 kg (167 lb 9.6 oz)   04/10/18 76.1 kg (167 lb 12.3 oz)              We Performed the Following     Urology Adult Referral          Today's Medication Changes          These changes are accurate as of 18  2:35 PM.  If you have any questions, ask your nurse or doctor.               Start taking " these medicines.        Dose/Directions    azelastine 0.1 % spray   Commonly known as:  ASTELIN   Used for:  Cystic fibrosis with pulmonary exacerbation (H)   Started by:  Jesenia Matute MD        Use 1-2 sprays both nostrils twice daily as needed   Quantity:  1 Bottle   Refills:  1       fluticasone 50 MCG/ACT spray   Commonly known as:  FLONASE   Used for:  CF (cystic fibrosis) (H), Cystic fibrosis with pulmonary exacerbation (H)   Started by:  Jesenia Matute MD        Dose:  1 spray   Spray 1 spray into both nostrils daily   Quantity:  1 Bottle   Refills:  3            Where to get your medicines      These medications were sent to [facility] [place] - [address]  [address] [facility] [address]     Phone:  [phone]     azelastine 0.1 % spray    fluticasone 50 MCG/ACT spray         These medications were sent to [facility] - [facility] [address]  [address] [facility] [address]    Hours:  Mon-Fri 8:30am-5:00pm Toll Free [phone] Phone:  [phone]     dornase alpha 1 MG/ML neb solution                Primary Care Provider Office Phone # Fax #    Susan Baileykeenan [phone]       [facility]  [address] [facility] [facility] [address]        Equal Access to Services     [facility] Center[facility] AH: Hadii duarte Tracey, waaxda lumiltonadaha, qaybta kaalmada ehsan, denice viera. So [facility] [phone].    ATENCIÓN: Si habla español, tiene a davidson disposición servicios gratuitos de asistencia lingüística. Llame al [phone].    We comply with applicable federal civil rights laws and [place] laws. We do not discriminate on the basis of race, color, national origin, age, disability, sex, sexual orientation, or gender identity.            Thank you!     Thank you for choosing [facility] FOR LUNG SCIENCE AND HEALTH  for your care. Our goal is always to provide you with  excellent care. Hearing back from our patients is one way we can continue to improve our services. Please take a few minutes to complete the written survey that you may receive in the mail after your visit with us. Thank you!             Your Updated Medication List - Protect others around you: Learn how to safely use, store and throw away your medicines at www.disposemymeds.org.          This list is accurate as of 5/25/18  2:35 PM.  Always use your most recent med list.                   Brand Name Dispense Instructions for use Diagnosis    adapalene 0.1 % gel    DIFFERIN     Apply 1 applicator topically At Bedtime        * albuterol 108 (90 Base) MCG/ACT Inhaler    PROAIR HFA    1 Inhaler    Inhale 2 puffs into the lungs every 6 hours as needed for shortness of breath / dyspnea or wheezing    CF (cystic fibrosis) (H)       * albuterol (2.5 MG/3ML) 0.083% neb solution     270 mL    Take 1 vial (2.5 mg) by nebulization 3 times daily    Cystic fibrosis with pulmonary manifestations (H)       amylase-lipase-protease 24748 units Cpep    CREON 36    720 capsule    Take 5 capsules (180,000 Units) by mouth 3 times daily (with meals) And 3 with snacks    Cystic fibrosis exacerbation (H)       azelastine 0.1 % spray    ASTELIN    1 Bottle    Use 1-2 sprays both nostrils twice daily as needed    Cystic fibrosis with pulmonary exacerbation (H)       azithromycin 500 MG tablet    ZITHROMAX    12 tablet    Take one tablet on Monday, Wednesday and Friday morning    CF (cystic fibrosis) (H)       citalopram 20 MG tablet    celeXA    30 tablet    Take 1 tablet (20 mg) by mouth daily    Depression       dornase alpha 1 MG/ML neb solution    PULMOZYME    150 mL    Inhale 2.5 mg into the lungs 2 times daily    Cystic fibrosis with pulmonary manifestations (H)       fexofenadine 180 MG tablet    ALLEGRA    30 tablet    Take 1 tablet (180 mg) by mouth daily    CF (cystic fibrosis) (H)       fluticasone 50 MCG/ACT spray    FLONASE    1  Bottle    Spray 1 spray into both nostrils daily    CF (cystic fibrosis) (H), Cystic fibrosis with pulmonary exacerbation (H)       multivitamin CF formula softgel cap     60 capsule    Take 2 capsules by mouth daily    Cystic fibrosis (H)       pantoprazole 20 MG EC tablet    PROTONIX    30 tablet    Take 1 tablet (20 mg) by mouth daily    CF (cystic fibrosis) (H)       sodium chloride inhalant 7 % Nebu neb solution     240 mL    Take 4 mLs by nebulization 2 times daily    Cystic fibrosis with pulmonary exacerbation (H)       COBY 300 MG/5ML neb solution   Generic drug:  tobramycin (PF)     280 mL    Take 5 mLs (300 mg) by nebulization 2 times daily Cycle 28 days on/off  ON HOLD 3/17/18 WHILE ON IV ANTIBIOTICS    Cystic fibrosis with pulmonary manifestations (H)       tretinoin 0.05 % cream    RETIN-A    30 g    Apply topically every morning as directed    Acne       * Notice:  This list has 2 medication(s) that are the same as other medications prescribed for you. Read the directions carefully, and ask your doctor or other care provider to review them with you.

## 2018-05-27 LAB
ANNOTATION COMMENT IMP: NORMAL
RETINYL PALMITATE SERPL-MCNC: 0.06 MG/L (ref 0–0.1)
VIT A SERPL-MCNC: 0.52 MG/L (ref 0.3–1.2)

## 2018-05-30 LAB
BACTERIA SPEC CULT: ABNORMAL
SPECIMEN SOURCE: ABNORMAL

## 2018-06-26 DIAGNOSIS — E84.9 CYSTIC FIBROSIS (H): ICD-10-CM

## 2018-06-26 RX ORDER — GLUCOSAMINE HCL 500 MG
2 TABLET ORAL DAILY
Qty: 100 TABLET | Refills: 0 | Status: SHIPPED | OUTPATIENT
Start: 2018-06-26 | End: 2018-08-23

## 2018-07-03 DIAGNOSIS — E84.9 CYSTIC FIBROSIS EXACERBATION (H): ICD-10-CM

## 2018-07-26 DIAGNOSIS — E84.9 CF (CYSTIC FIBROSIS) (H): ICD-10-CM

## 2018-07-26 RX ORDER — FEXOFENADINE HCL 180 MG/1
180 TABLET ORAL DAILY
Qty: 30 TABLET | Refills: 3 | Status: SHIPPED | OUTPATIENT
Start: 2018-07-26 | End: 2018-11-12

## 2018-07-29 ENCOUNTER — HEALTH MAINTENANCE LETTER (OUTPATIENT)
Age: 23
End: 2018-07-29

## 2018-08-16 ENCOUNTER — PRE VISIT (OUTPATIENT)
Dept: UROLOGY | Facility: CLINIC | Age: 23
End: 2018-08-16

## 2018-08-16 DIAGNOSIS — Z31.41 ENCOUNTER FOR SEMEN ANALYSIS: Primary | ICD-10-CM

## 2018-08-16 PROCEDURE — 89260 SPERM ISOLATION SIMPLE: CPT

## 2018-08-16 NOTE — TELEPHONE ENCOUNTER
Patient coming in for fertility consult with Dr. Douglas. SA in system. Patient chart reviewed, no need for call, all records available and ready for appointment.

## 2018-08-17 LAB
ABSTINENCE DAYS: 3 DAYS (ref 2–7)
AGGLUTINATION: NO YES/NO
ANALYSIS TEMP - CENTIGRADE: 22 CENTIGRADE
CELL FRAGMENTS: ABNORMAL %
COLLECTION METHOD: ABNORMAL
COLLECTION SITE: ABNORMAL
CONSENT TO RELEASE TO PARTNER: YES
IMMATURE SPERM: ABNORMAL %
IMMOTILE: 0 %
LAB RECEIPT TIME: ABNORMAL
LIQUEFIED: YES YES/NO
NON-PROGRESSIVE MOTILITY: 0 %
PROGRESSIVE MOTILITY: 0 % (ref 32–?)
ROUND CELLS: <0.1 MILLION/ML (ref ?–2)
SPECIMEN CONCENTRATION: 0 MILLION/ML (ref 15–?)
SPECIMEN PH: 6.4 PH (ref 7.2–?)
SPECIMEN TYPE: ABNORMAL
SPECIMEN VOL UR: 0.2 ML (ref 1.5–?)
TIME OF ANALYSIS: ABNORMAL
TOTAL NUMBER: 0 MILLION (ref 39–?)
TOTAL PROGRESSIVE MOTILE: 0 MILLION (ref 15.6–?)
VISCOUS: YES YES/NO
WBC SPECIMEN: ABNORMAL %

## 2018-08-20 ENCOUNTER — TELEPHONE (OUTPATIENT)
Dept: UROLOGY | Facility: CLINIC | Age: 23
End: 2018-08-20

## 2018-08-20 NOTE — TELEPHONE ENCOUNTER
I spoke to patient. I let him know that he would have to give the Diagnostic Andrology clinic a call @ [phone] to see if the specimen he left was a good amount of specimen for the Semen analysis.      Yoana Ortiz MA

## 2018-08-20 NOTE — TELEPHONE ENCOUNTER
[facility] Call Center    Phone Message    May a detailed message be left on voicemail: yes    Reason for Call: Other: Pt requesting call back to discuss recent testing he had done. He is not sure if he needs to have to redone or not     Action Taken: Message routed to:  [facility] urology clinic

## 2018-08-23 ENCOUNTER — ALLIED HEALTH/NURSE VISIT (OUTPATIENT)
Dept: CARE COORDINATION | Facility: CLINIC | Age: 23
End: 2018-08-23

## 2018-08-23 ENCOUNTER — OFFICE VISIT (OUTPATIENT)
Dept: PULMONOLOGY | Facility: CLINIC | Age: 23
End: 2018-08-23
Attending: INTERNAL MEDICINE
Payer: COMMERCIAL

## 2018-08-23 ENCOUNTER — OFFICE VISIT (OUTPATIENT)
Dept: UROLOGY | Facility: CLINIC | Age: 23
End: 2018-08-23
Attending: INTERNAL MEDICINE
Payer: COMMERCIAL

## 2018-08-23 VITALS
DIASTOLIC BLOOD PRESSURE: 55 MMHG | RESPIRATION RATE: 17 BRPM | HEIGHT: 70 IN | BODY MASS INDEX: 23.73 KG/M2 | OXYGEN SATURATION: 96 % | SYSTOLIC BLOOD PRESSURE: 112 MMHG | WEIGHT: 165.79 LBS | HEART RATE: 65 BPM

## 2018-08-23 VITALS
HEIGHT: 70 IN | WEIGHT: 170 LBS | BODY MASS INDEX: 24.34 KG/M2 | DIASTOLIC BLOOD PRESSURE: 71 MMHG | HEART RATE: 67 BPM | SYSTOLIC BLOOD PRESSURE: 122 MMHG

## 2018-08-23 DIAGNOSIS — E84.9 CYSTIC FIBROSIS (H): Primary | ICD-10-CM

## 2018-08-23 DIAGNOSIS — E84.9 CYSTIC FIBROSIS (H): ICD-10-CM

## 2018-08-23 DIAGNOSIS — E84.0 CYSTIC FIBROSIS WITH PULMONARY MANIFESTATIONS (H): ICD-10-CM

## 2018-08-23 DIAGNOSIS — E84.9 CF (CYSTIC FIBROSIS) (H): ICD-10-CM

## 2018-08-23 DIAGNOSIS — Z13.9 RISK AND FUNCTIONAL ASSESSMENT: Primary | ICD-10-CM

## 2018-08-23 DIAGNOSIS — R73.02 IMPAIRED GLUCOSE TOLERANCE: Primary | ICD-10-CM

## 2018-08-23 DIAGNOSIS — N46.01 AZOOSPERMIA: Primary | ICD-10-CM

## 2018-08-23 DIAGNOSIS — K86.89 PANCREATIC INSUFFICIENCY: ICD-10-CM

## 2018-08-23 DIAGNOSIS — Z90.2 S/P LOBECTOMY OF LUNG: ICD-10-CM

## 2018-08-23 DIAGNOSIS — E84.0 CYSTIC FIBROSIS WITH PULMONARY EXACERBATION (H): ICD-10-CM

## 2018-08-23 DIAGNOSIS — Q55.4 BILATERAL CONGENITAL ABSENCE OF VAS DEFERENS: ICD-10-CM

## 2018-08-23 LAB
EXPTIME-PRE: 7.69 SEC
FEF2575-%PRED-PRE: 53 %
FEF2575-PRE: 2.69 L/SEC
FEF2575-PRED: 5.06 L/SEC
FEFMAX-%PRED-PRE: 82 %
FEFMAX-PRE: 8.4 L/SEC
FEFMAX-PRED: 10.18 L/SEC
FEV1-%PRED-PRE: 60 %
FEV1-PRE: 2.84 L
FEV1FEV6-PRE: 81 %
FEV1FEV6-PRED: 84 %
FEV1FVC-PRE: 81 %
FEV1FVC-PRED: 84 %
FIFMAX-PRE: 3.79 L/SEC
FVC-%PRED-PRE: 63 %
FVC-PRE: 3.52 L
FVC-PRED: 5.59 L

## 2018-08-23 PROCEDURE — 97803 MED NUTRITION INDIV SUBSEQ: CPT | Performed by: DIETITIAN, REGISTERED

## 2018-08-23 PROCEDURE — G0463 HOSPITAL OUTPT CLINIC VISIT: HCPCS | Mod: 25

## 2018-08-23 RX ORDER — FLUCONAZOLE 150 MG/1
150 TABLET ORAL DAILY PRN
Qty: 1 TABLET | Refills: 0 | COMMUNITY
Start: 2018-08-23 | End: 2023-04-13

## 2018-08-23 RX ORDER — FLUCONAZOLE 150 MG/1
TABLET ORAL
Refills: 0 | COMMUNITY
Start: 2018-08-17 | End: 2018-08-23

## 2018-08-23 RX ORDER — MINOCYCLINE HYDROCHLORIDE 50 MG/1
CAPSULE ORAL
Refills: 7 | COMMUNITY
Start: 2018-02-12 | End: 2018-08-23

## 2018-08-23 RX ORDER — FLUCONAZOLE 150 MG/1
150 TABLET ORAL DAILY
Qty: 1 TABLET | Refills: 0 | COMMUNITY
Start: 2018-08-23 | End: 2018-08-23

## 2018-08-23 RX ORDER — CLINDAMYCIN PHOSPHATE 10 UG/ML
LOTION TOPICAL
Refills: 0 | COMMUNITY
Start: 2018-02-19 | End: 2018-08-23

## 2018-08-23 RX ORDER — MECOBALAMIN 5000 MCG
TABLET,DISINTEGRATING ORAL
Refills: 0 | COMMUNITY
Start: 2018-02-03 | End: 2018-08-23

## 2018-08-23 RX ORDER — DOXYCYCLINE 100 MG/1
CAPSULE ORAL
Refills: 1 | COMMUNITY
Start: 2018-04-09 | End: 2018-08-23

## 2018-08-23 RX ORDER — PANTOPRAZOLE SODIUM 20 MG/1
20 TABLET, DELAYED RELEASE ORAL DAILY
Qty: 30 TABLET | Refills: 3 | Status: SHIPPED | OUTPATIENT
Start: 2018-08-23 | End: 2019-01-02

## 2018-08-23 RX ORDER — SODIUM CHLORIDE FOR INHALATION 7 %
4 VIAL, NEBULIZER (ML) INHALATION 2 TIMES DAILY PRN
Qty: 240 ML | Refills: 11 | COMMUNITY
Start: 2018-08-23 | End: 2019-08-23

## 2018-08-23 RX ORDER — GLUCOSAMINE HCL 500 MG
1 TABLET ORAL 2 TIMES DAILY
Qty: 100 TABLET | Refills: 0 | COMMUNITY
Start: 2018-08-23 | End: 2019-02-22

## 2018-08-23 RX ORDER — PEDIATRIC MULTIVIT 61/D3/VIT K 1500-800
1 CAPSULE ORAL 2 TIMES DAILY
COMMUNITY
End: 2019-03-14

## 2018-08-23 RX ORDER — TOBRAMYCIN INHALATION SOLUTION 300 MG/5ML
300 INHALANT RESPIRATORY (INHALATION) 2 TIMES DAILY
Qty: 280 ML | Refills: 3 | Status: SHIPPED | OUTPATIENT
Start: 2018-08-23 | End: 2019-07-23

## 2018-08-23 ASSESSMENT — PAIN SCALES - GENERAL
PAINLEVEL: NO PAIN (0)
PAINLEVEL: NO PAIN (0)

## 2018-08-23 NOTE — PATIENT INSTRUCTIONS
Cystic Fibrosis Self-Care Plan    RECOMMENDATIONS:   Dr. Kowalski's team will call you about the diabetes study.  Stop MVW soft gel while on Accutane.  Take Vit D (Cholecalciferol) 3000u twice daily while on accutane  Otherwise continue current medication, nebs and vest therapy.             CF Nurse line:          Kevin Rodríguez   [phone]            CF Resp Therapist: Margaret Werner            [phone]   CF Dietitians:           Sherie Aparicio            [phone]                       Saba Gill                       [phone]   CF Diabetes Nurse: Ashley Cleveland             [phone]    CF Social Workers:  Prerna Paniagua             [phone]                Velma Kolb            [phone]  CF Pharmacist:        Ashley Kirby                              [phone]  [email or web address]         MRN: [id number]   Clinic Date: August 23, 2018   Patient: Demario Abbasi     Annual Studies:   IGG   Date Value Ref Range Status   03/15/2018 1310 695 - 1620 mg/dL Final     Insulin   Date Value Ref Range Status   03/16/2018 Canceled, Test credited 3 - 25 mU/L Final     Comment:     Unsatisfactory specimen - hemolyzed  CALLED TO URU6 MAGGIE ALEXANDRA ON 03/16 AT 1300 BY YW       There are no preventive care reminders to display for this patient.      Pulmonary Function Tests  FEV1: amount of air you can blow out in 1 second  FVC: total amount of air you can take in and blow out    Your Goals:         PFT Latest Ref Rng & Units 8/23/2018   FVC L 3.52   FEV1 L 2.84   FVC% % 63   FEV1% % 60          Airway Clearance: The Most Important Way to Keep Your Lungs Healthy  Vest Settings:    Hill-Rom Frequencies: 8, 9, 10 Pressure 10 Then, Frequencies 18, 19, 20 Pressure 6      RespirTech: Quick Start with Pressure of     Do each frequency for 5 minutes; Deflate vest after each frequency & cough 3 times before beginning the next setting.    Vest and Neb Therapy should be done 2-3  times/day.    Good Nutrition Can Improve Lung Function and Overall Health     Take ALL of your vitamins with food     Take 1/2 of your enzymes before EVERY meal/snack and the other 1/2 mid-meal/snack    Wt Readings from Last 3 Encounters:   08/23/18 75.2 kg (165 lb 12.6 oz)   08/23/18 77.1 kg (170 lb)   05/25/18 76 kg (167 lb 9.6 oz)       Body mass index is 23.79 kg/(m^2).         National CF Foundation Recommendations for BMI in CF Adults: Women: at least 22 Men: at least 23        Controlling Blood Sugars Helps Prevent Lung Infections & Improves Nutrition  Test blood sugar:     In the morning before eating (goal is )     2 hours after a meal (goal is less than 150)     When pre-meal glucose is greater than 150 add correction     At bedtime (if less than 100 eat a snack with 15 grams of carbohydrates  Last A1C Results:   Hemoglobin A1C   Date Value Ref Range Status   11/03/2016 5.9 4.3 - 6.0 % Final         If diabetic, measure A1C every 6 months. Goal: Under 7%    Staying Healthy    Research:  If you are interested in learning about research opportunities or have questions, please contact the CF Research Team at [phone] or [email or web address].      CF Foundation:  Compass is a personalized resource service to help you with the insurance, financial, legal and other issues you are facing.  It's free, confidential and available to anyone with CF.  Ask your  for more information or contact Compass directly at 032-COMPASS [phone]) or compass@cff.org, or learn more at [email or web address].[email or web address].

## 2018-08-23 NOTE — PROGRESS NOTES
CF screen for PT needs    Symptoms of urinary incontinence: no  Exercise regimen (aerobic or resistance)/ daily activity: No formal ex regimen.  Does walk to and from classes at [employer].  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    CF related pain limiting participation in daily activities: no  Gross postural impairment: moderate forward head, mild kyphosis and scapular protraction/winging.      Pt would benefit from skilled PT in order to  on appropriate aerobic ex.  Declines need stating he will attempt IND.  Educated on importance of aerobic ex in assisting with decreasing the decline in PFT's.

## 2018-08-23 NOTE — PROGRESS NOTES
Dear Dr. Lilly , it was my pleasure to see . Demario Abbasi, a 23 year old male here in consultation today for fertility evaluation.  His spouse is Oriana age 20 (12/28/97).    Demario Abbasi is a 23 year old male with CF, He's here to figure out his fertility options going forward.   They are not actively trying for fertility.  He's never fathered a child. He has CF with a history of lung, pancreas sequelae.  Normal sexual function, other than low volume ejaculate.    Female factors suspected: none. G0.    PAST MEDICAL HISTORY:    Past Medical History:   Diagnosis Date     CF (cystic fibrosis) (H)      Impaired glucose tolerance      Pancreatic insufficiency      S/P lobectomy of lung 8/4/2015    Right upper lobectomy - 2009       PAST SURG HISTORY  Past Surgical History:   Procedure Laterality Date     HC LAP,INGUINAL HERNIA REPR,INITIAL  2002     LOBECTOMY LUNG Right 2009    Right upper lobe        Medications as of 8/23/2018:  Current Outpatient Prescriptions   Medication Sig     adapalene (DIFFERIN) 0.1 % gel Apply 1 applicator topically At Bedtime     albuterol (2.5 MG/3ML) 0.083% neb solution Take 1 vial (2.5 mg) by nebulization 3 times daily     albuterol (ALBUTEROL) 108 (90 BASE) MCG/ACT Inhaler Inhale 2 puffs into the lungs every 6 hours as needed for shortness of breath / dyspnea or wheezing     amylase-lipase-protease (CREON 36) 70092 units CPEP Take 5 capsules (180,000 Units) by mouth 3 times daily (with meals) And 3 with snacks     azelastine (ASTELIN) 0.1 % spray Use 1-2 sprays both nostrils twice daily as needed     azithromycin (ZITHROMAX) 500 MG tablet Take one tablet on Monday, Wednesday and Friday morning     Cholecalciferol (VITAMIN D3) 3000 units TABS Take 2 tablets by mouth daily Start Vitamin D with Accutane (when multivitamin is on hold).     citalopram (CELEXA) 20 MG tablet Take 1 tablet (20 mg) by mouth daily     clindamycin (CLEOCIN T) 1 % lotion      dornase alpha (PULMOZYME)  1 MG/ML neb solution Inhale 2.5 mg into the lungs 2 times daily     fexofenadine (ALLEGRA) 180 MG tablet Take 1 tablet (180 mg) by mouth daily     fluconazole (DIFLUCAN) 150 MG tablet      fluticasone (FLONASE) 50 MCG/ACT spray Spray 1 spray into both nostrils daily     LANsoprazole (PREVACID) 15 MG CR capsule      multivitamin CF formula (MVW COMPLETE FORMULATION ) softgel cap Take 2 capsules by mouth daily     pantoprazole (PROTONIX) 20 MG EC tablet Take 1 tablet (20 mg) by mouth daily     sodium chloride inhalant 7 % NEBU neb solution Take 4 mLs by nebulization 2 times daily     tobramycin, PF, (COBY) 300 MG/5ML neb solution Take 5 mLs (300 mg) by nebulization 2 times daily Cycle 28 days on/off  ON HOLD 3/17/18 WHILE ON IV ANTIBIOTICS     tretinoin (RETIN-A) 0.05 % cream Apply topically every morning as directed     No current facility-administered medications for this visit.         ALLERGY:     Allergies   Allergen Reactions     Augmentin        SOCIAL HISTORY:   . Occupation: student.  No alcohol abuse, no tobacco use.   Social History   Substance Use Topics     Smoking status: Passive Smoke Exposure - Never Smoker     Smokeless tobacco: Never Used      Comment: dad smokes     Alcohol use Yes      Comment: <2 drinks per week         FAMILY HISTORY:   Family History   Problem Relation Age of Onset     Thyroid Disease Mother      hypothyroid     Other - See Comments Mother      multiple family members with biliary disease     Hypertension Maternal Grandmother      Diabetes Maternal Grandfather      Hypertension Paternal Grandmother      Hypothyroidism Paternal Grandmother      Parkinsonism Paternal Grandmother      Coronary Artery Disease Early Onset Paternal Grandfather 56       REVIEW OF SYSTEMS:  Answers for HPI/ROS submitted by the patient on 8/23/2018   General Symptoms: No  Skin Symptoms: No  HENT Symptoms: No  EYE SYMPTOMS: No  HEART SYMPTOMS: No  LUNG SYMPTOMS: No  INTESTINAL SYMPTOMS:  "No  URINARY SYMPTOMS: No  REPRODUCTIVE SYMPTOMS: No  SKELETAL SYMPTOMS: No  BLOOD SYMPTOMS: No  NERVOUS SYSTEM SYMPTOMS: No  MENTAL HEALTH SYMPTOMS: No    Denies erectile dysfunction, ejaculatory problems, testicular pain. No vision or smell deficits, no chronic sinus or respiratory infections. No recent febrile illness, weight loss. No heat or cold intolerance, gynecomastia, or other endocrine complaints.    Otherwise, no constitutional, eye, ENT, heart, lung, GI , musculoskeletal, skin, neurologic, psychiatric, or hematologic complaints.    GONADOTOXIN EXPOSURE: Unremarkable. Otherwise negative for marijuana, heat, chemicals, pesticides, heavy metals, steroids, chemotherapy or radiation.    GENERAL PHYSICAL EXAM  /71  Pulse 67  Ht 1.778 m (5' 10\")  Wt 77.1 kg (170 lb)  BMI 24.39 kg/m2   Constitutional: No acute distress. Well nourished.   PSYCH: normal mood and affect.  NEURO: normal gait, no focal deficits.   EYES: anicteric, EOMI, PERR  CARDIOPULMONARY: breathing non-labored, pulse regular, no peripheral edema.  GI: Abdomen soft, nondistended   MUSCULOSKELETAL: normal limb proportions, no muscle wasting, no contractures.  SKIN: Normal virilized hair distribution, no lesions, warts or rashes over genitalia, abdomen extremities or face.  HEME/LYMPH: no ecchymosis, no lymphadenopathy in groin, no lymphedema.     EXAM:  Phallus circumcised, meatus adequate, no plaques palpated.   Left testis descended , size is 22-24 cc , consistency is normal . No intra-testicular masses.   Right testis descended , size is 22-24 cc , consistency is normal . No intra-testicular masses.   Epididymes present, non-tender, not enlarged.   Left cord: Vas ABSENT. no varicocele noted.  Right cord: Vas ABSENT. no varicocele noted.     Rectal exam deferred.     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    Component      Latest Ref Rng & Units 8/16/2018   Collection Method       Masturbation "   Collection Site       VINI   Specimen Type       Semen   Lab Receipt Time       11:27 AM   Time of Analysis       11:45 AM   Analysis Temp - Centigrade      centigrade 22   Abstinence days      2 - 7 days 3   Liquefied      yes/no Yes   Viscous      yes/no Yes   Agglutination      yes/no No   pH      7.2 pH 6.4 (A)   Volume      1.5 ml 0.2 (A)   Concentration      15 million/ml 0 (A)   Total Number      39 million 0 (A)   Progressive motility      32 % 0 (A)   Non-progressive motility      % 0   Immotile      % 0   Total Progressive Motile      15.6 million 0 (A)   Round Cells      2 million/ml <0.1   Consent to Release to Partner       Yes       ASSESSMENT:    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    PLAN:    Discussed TESE and IVF as their option for biologic children.    Discussed therapeutic donor insemination as an option; adoption.    They will think about options and let us know if interested in pursuing IVF down the road.      Thank-you for allowing me to care for your patient.  Sincerely,    Kody Douglas MD      CC: [employer]

## 2018-08-23 NOTE — LETTER
8/23/2018       RE: Demario Abbasi  [address]  [place] [address]     Dear Colleague,    Thank you for referring your patient, Demario Abbasi, to the [facility] at [facility]. Please see a copy of my visit note below.    Reason for Visit:    Demario Abbasi is a 23 year old male who is being seen for RECHECK (Cystic fibrosis)        Assesment and Plan: Demario Abbasi is a 23 year old male with cystic fibrosis, pancreatic insufficiency, depression, acne and impaired glucose tolerance who is s/p RUL resection for exacerbations in 2009.     1. Pulmonary: He appears to be doing well from a pulmonary perspective. Exercise is well tolerated. He is oxygenating well at 96% SpO2. PFTs are improved slightly from May and remain very stable. He does not appear to be experiencing a pulmonary exacerbation at this time. He does have a history of growing achromobacter and MSSA. He will continue on current Pulmozyme, albuterol, tobramycin nebs, and chronic azithromycin. He will continue current vest therapy x2-3 per day.     2. CF related pancreatic insufficiency: He does not report any symptoms consistent with malabsorption. His weight is adequate and similar to baseline. He will continue with Creon with meals and snacks.     3. Glucose intolerance: He does have h/o impaired OGTT. He also reports occasional symptomatic hypoglycemia when skipping meals. I discussed Dr. Kowalski's study on insulin use in patients like him. Her team will reach out to him to discuss his interest in the study. If he elects not to participate in this study, I will refer him to Dr. Cross for further evaluation.    4. Acne Vulgaris: Continue Accutane. Hold MVW and begin Vit D supplementation while on Accutane. Vitamin A wnl in May.    5. Depression: Well controlled at this time. Continue current citalopram.     6. GERD: Appears to be well controlled with  pantoprazole. No changes.     7. 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     8. Health Maintenance: He will be due for annual studies in March.     Follow-up in 3 months with PFTs and sputum culture.      CF HPI:    The patient was seen and examined by Zana Lilly MD    Demario Abbasi is a 23 year old male with cystic fibrosis, pancreatic insufficiency, depression, acne and impaired glucose tolerance who is s/p RUL resection for recurrent exacerbations in 2009. He transitioned to the adult CF program earlier this year.     Today, the patient is feeling well. He reports no recent acute illnesses. Breathing is comfortable at rest. Walking between classes is his primary exercise and is well tolerated. Baseline cough productive of clear/white/brown/green sputum. No hemoptysis. No CP. No fever, chills, or night sweats.    He is doing vest therapy 30 minutes x2-3 /day at [place] standard frequencies and pressures.       Review of Systems:  CONST: Appetite is good.  ENT: No sinus/ear pain, sore throat, or rhinorrhea.   GI: No nausea, vomiting, or loose stools. No abdominal pain.  ENDO: He does report occasional episodes of symptomatic hypoglycemia associated with skipping meals.   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 Anticipated having two full years of school remaining.  DERM: Rash on foot well controlled with fluconazole PRN.     A complete ROS was otherwise negative except as noted in the HPI.      Current Outpatient Prescriptions   Medication     adapalene (DIFFERIN) 0.1 % gel     albuterol (2.5 MG/3ML) 0.083% neb solution     albuterol (ALBUTEROL) 108 (90 BASE) MCG/ACT Inhaler     amylase-lipase-protease (CREON 36) 00589 units CPEP     azelastine (ASTELIN) 0.1 % spray     azithromycin (ZITHROMAX) 500 MG tablet     Cholecalciferol (VITAMIN D3) 3000 units TABS     citalopram (CELEXA) 20 MG tablet     clindamycin (CLEOCIN T) 1 % lotion  "    dornase alpha (PULMOZYME) 1 MG/ML neb solution     fexofenadine (ALLEGRA) 180 MG tablet     fluconazole (DIFLUCAN) 150 MG tablet     fluticasone (FLONASE) 50 MCG/ACT spray     LANsoprazole (PREVACID) 15 MG CR capsule     multivitamin CF formula (MVW COMPLETE FORMULATION ) softgel cap     pantoprazole (PROTONIX) 20 MG EC tablet     sodium chloride inhalant 7 % NEBU neb solution     tobramycin, PF, (COBY) 300 MG/5ML neb solution     tretinoin (RETIN-A) 0.05 % cream     No current facility-administered medications for this visit.          Allergies   Allergen Reactions     Augmentin          Past Medical History:   Diagnosis Date     CF (cystic fibrosis) (H)      Impaired glucose tolerance      Pancreatic insufficiency      S/P lobectomy of lung 8/4/2015    Right upper lobectomy - 2009         Past Surgical History:   Procedure Laterality Date     HC LAP,INGUINAL HERNIA REPR,INITIAL  2002     LOBECTOMY LUNG Right 2009    Right upper lobe         Social History     Social History     Marital status: Single     Spouse name: N/A     Number of children: N/A     Years of education: N/A     Occupational History           Social History Main Topics     Smoking status: Passive Smoke Exposure - Never Smoker     Smokeless tobacco: Never Used      Comment: dad smokes     Alcohol use Yes      Comment: <2 drinks per week     Drug use: Not on file     Sexual activity: Not on file     Other Topics Concern     Not on file     Social History Narrative    Lives with parents during the [place] and 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 2 dogs.          /55  Pulse 65  Resp 17  Ht 1.778 m (5' 10\")  Wt 75.2 kg (165 lb 12.6 oz)  SpO2 96%  BMI 23.79 kg/m2    Body mass index is 23.79 kg/(m^2).      Exam:   GENERAL APPEARANCE: Well developed, well nourished, alert, and in no apparent distress.    EYES: PERRL, EOMI    HENT: Nasal mucosa with no edema and no hyperemia. No " nasal polyps.    EARS: Canals clear, TMs normal    MOUTH: Oral mucosa is moist, without any lesions, no tonsillar enlargement, no oropharyngeal exudate.    NECK: supple, no masses, no thyromegaly.    LYMPHATICS: No significant axillary, cervical, or supraclavicular nodes.    RESP: normal percussion, good air flow throughout.  No crackles. No rhonchi. No wheezes.    CV: Normal S1, S2, regular rhythm, normal rate. No murmur.  No rub. No gallop. No LE edema.     ABDOMEN:  Bowel sounds normal, soft, nontender, no HSM or masses.     MS: extremities normal. No clubbing. No cyanosis.    SKIN: no rash on limited exam    NEURO: Mentation intact, speech normal, normal strength and tone, normal gait and stance    PSYCH: mentation appears normal. and affect normal/bright.      Results:  Recent Results (from the past 168 hour(s))   Semen Analysis - Azoospermia (VINI)    Collection Time: 08/16/18 11:25 AM   Result Value Ref Range    Collection Method Masturbation     Collection Site VINI     Specimen Type Semen     Lab Receipt Time 11:27 AM     Time of Analysis 11:45 AM     Analysis Temp - Centigrade 22 centigrade    Abstinence days 3 2 - 7 days    Liquefied Yes yes/no    Viscous Yes yes/no    Agglutination No yes/no    pH 6.4 (A) 7.2 pH    Volume 0.2 (A) 1.5 ml    Concentration 0 (A) 15 million/ml    Total Number 0 (A) 39 million    Progressive motility 0 (A) 32 %    Non-progressive motility 0 %    Immotile 0 %    Total Progressive Motile 0 (A) 15.6 million    Round Cells <0.1 2 million/ml    WBC  %    Immature Sperm  %    Cell Fragments  %    Consent to Release to Partner Yes    General PFT Lab (Please always keep checked)    Collection Time: 08/23/18 10:21 AM   Result Value Ref Range    FVC-Pred 5.59 L    FVC-Pre 3.52 L    FVC-%Pred-Pre 63 %    FEV1-Pre 2.84 L    FEV1-%Pred-Pre 60 %    FEV1FVC-Pred 84 %    FEV1FVC-Pre 81 %    FEFMax-Pred 10.18 L/sec    FEFMax-Pre 8.40 L/sec    FEFMax-%Pred-Pre 82 %    FEF2575-Pred 5.06 L/sec     FEF2575-Pre 2.69 L/sec    WNK5087-%Pred-Pre 53 %    ExpTime-Pre 7.69 sec    FIFMax-Pre 3.79 L/sec    FEV1FEV6-Pred 84 %    FEV1FEV6-Pre 81 %                    Results as noted above.    PFT Interpretation:  Severe restrictive ventilatory defect.  Unchanged from previous.   Similar to recent best.  Valid Maneuver    CF Exacerbation:   Absent      Scribe Disclosure:   I, Bernardo Garcia, am serving as a scribe; to document services personally performed by Znaa Lilly MD based on data collection and the provider's statements to me.     Provider Disclosure:  I agree with above History, Review of Systems, Physical exam and Plan.  I have reviewed the content of the documentation and have edited it as needed. I have personally performed the services documented here and the documentation accurately represents those services and the decisions I have made.      Electronically signed by:  Zana Lilly       CF screen for PT needs    Symptoms of urinary incontinence: no  Exercise regimen (aerobic or resistance)/ daily activity: No formal ex regimen.  Does walk to and from classes at [employer].  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    CF related pain limiting participation in daily activities: no  Gross postural impairment: moderate forward head, mild kyphosis and scapular protraction/winging.      Pt would benefit from skilled PT in order to  on appropriate aerobic ex.  Declines need stating he will attempt IND.  Educated on importance of aerobic ex in assisting with decreasing the decline in PFT's.        CF Annual Nutrition Assessment    Reason for Assessment  Assessed during [facility] r/t increased nutrition risk with diagnosis of CF per protocol    Nutrition Significant PMH  Mild Lung Disease   Pancreatic Insufficient   Glucose Intolerance    Social Assessment  Living situation: with roommates in house near  [facility]  Work/School/Disability: school at [employer]  Food Insecurity: None    Anthropometric Assessment  Height: 177.8 cm  IBW based on BMI 22 for females and 23 for males per CF Foundation recs: 70 kg / 154#  Today's Weight: 75.2 kg (actual weight)   %IBW: 107%  BMI: Body mass index is  Body mass index is 23.79 kg/(m^2).   Current weight is considered: Normal BMI and at CF goal  Weight relatively stable within UBW 75-77 kg.     Wt Readings from Last 5 Encounters:   18 75.2 kg (165 lb 12.6 oz)   18 77.1 kg (170 lb)   18 76 kg (167 lb 9.6 oz)   18 76 kg (167 lb 9.6 oz)   04/10/18 76.1 kg (167 lb 12.3 oz)     Physical Activity/Exercise:  Did not address this visit    MALNUTRITION  No malnutrition.     Pancreatic Enzymes  Brand:  Creon [id number]  Dosin with meals, 3 with snacks  Estimated Daily Intake: 20 caps/day  Are you taking enzymes as recommended:Yes     High dose providing 2400 units lipase/kg/meal which is within the recommended range per CF Foundation to inhibit fibrosing colonopathy    Signs of Malabsorption:  No  Enzyme Program: did not address this visit    Gastrointestinal/Malabsorption Assessment  GI Complaints: abdominal pain  GI Physician:  Referral 18 to Dr. Flowers. AXR mild/moderate stool burden and recommended daily Miralax.     Diet History and Assessment  Diet Preferences/Allergies/Intolerances: Regular Diet  Appetite Stimulant Rx:  No  Intake Recall/Comments: Good eater at baseline consuming 2-3 meals and snacks. When he is at school usually has a small snack from apt for breakfast, eats frozen pizza or cafeteria for lunch, and then cooks dinner with his girlfriend -- usually protein, spaghetti, etc.     Calcium: --  Salt: --  Hydration:  Drinks water, gatorade, milk, 1 regular soda daily    Supplements:  no  Tube Feeding:  no    Estimated Energy and Protein Needs  Estimation based on weight maintenance with Mild lung disease and pancreatic insufficient.    BEE:  1845  7066-3779 kcals/day =  150-175% of BEE (35-45 kcals/kg)   g protein/day = 1.2-1.5 g/kg    Laboratory Assessment    Vitamin A   Date Value Ref Range Status   05/25/2018 0.52 0.30 - 1.20 mg/L Final     Vitamin D Deficiency screening   Date Value Ref Range Status   03/15/2018 28 20 - 75 ug/L Final     Comment:     Season, race, dietary intake, and treatment affect the concentration of   25-hydroxy-Vitamin D. Values may decrease during winter months and increase   during summer months. Values 20-29 ug/L may indicate Vitamin D insufficiency   and values <20 ug/L may indicate Vitamin D deficiency.  Vitamin D determination is routinely performed by an immunoassay specific for   25 hydroxyvitamin D3.  If an individual is on vitamin D2 (ergocalciferol)   supplementation, please specify 25 OH vitamin D2 and D3 level determination by   LCMSMS test [id number].       Vitamin E   Date Value Ref Range Status   03/15/2018 7.4 5.5 - 18.0 mg/L Final     Comment:     (Note)  Test developed and characteristics determined by [employer]. See Compliance Statement B: [email or web address]       Iron   Date Value Ref Range Status   05/02/2013 41 35 - 180 ug/dL Final     Cholesterol   Date Value Ref Range Status   05/25/2018 100 <200 mg/dL Final     Triglycerides   Date Value Ref Range Status   05/25/2018 200 (H) <150 mg/dL Final     Comment:     Borderline high:  150-199 mg/dl  High:             200-499 mg/dl  Very high:       >499 mg/dl       HDL Cholesterol   Date Value Ref Range Status   05/25/2018 34 (L) >39 mg/dL Final     LDL Cholesterol Calculated   Date Value Ref Range Status   05/25/2018 26 <100 mg/dL Final     Comment:     Desirable:       <100 mg/dl     VLDL-Cholesterol   Date Value Ref Range Status   05/02/2013 12 0 - 30 mg/dL Final     Cholesterol/HDL Ratio   Date Value Ref Range Status   05/02/2013 2.5 0.0 - 5.0 Final     OGTT- indeterminate  DEXA - 2018, WNL    Current Vitamin/Mineral Prescription R/T  deficiency/malabsorption:   MVW Complete D3,000 - 2 caps per day, has been taking up until yesterday.    Plans to start Accutane this week x 6 months. Will discontinue MVW and then start individual Vitamin D supplement (has active prescription). Can then restart MVW when Accutane completed.     NUTRITION DIAGNOSIS  Impaired nutrient utilization related r/t CF hypermetabolism and pancreatic insufficiency as evidenced by pt requires high kcal/pro diet and pancreatic enzyme replacement therapy in order to maintain nutrition and health status.   INTERVENTIONS/RECOMMENDATIONS  1) Introduced self and role of RD as part of care team. Obtained nutrition history as above. Pt denied any specific nutrition-related concerns and is happy with his current nutrition status.   2) Reviewed vitamin regimen as determined by previous RD visit, particularly with Vitamin A content and plans for Accutane. Pt will contact RD if he needs a new prescription for Vitamin D.   3) Pt concerned with glucose intolerance and may participate in study with Dr. Kowalski vs evaluation by Dr. Crsos. Encouraged pt to switch his once daily regular soda to diet to reduce large spikes in BGs.   Patient Understanding: Excellent  Expected Compliance: Excellent  Follow-Up Plans: per protocol    GOALS:  1) Switch from regular to diet soda  2) Take vitamins as recommended above  3) Maintain BMI 23-25 kg/m2    FOLLOW-UP/MONITORING:  Visit patient within 6-12 month(s)    Time Spent In Face-to-Face Patient Interactions: 15 minutes      Sherie Aparicio RD, LD  Cystic Fibrosis/Lung Transplant Dietitian  Pager [phone]  On weekends/holidays contact coverage RD at [phone] (inpatient use only)       Again, thank you for allowing me to participate in the care of your patient.      Sincerely,    Zana Lilly MD

## 2018-08-23 NOTE — MR AVS SNAPSHOT
After Visit Summary   8/23/2018    Demario Abbasi    MRN: [id number]           Patient Information     Date Of Birth          1995        Visit Information        Provider Department      8/23/2018 11:10 AM Zana Lilly MD [facility]        Today's Diagnoses     Impaired glucose tolerance    -  1    Cystic fibrosis (H)        Cystic fibrosis with pulmonary exacerbation (H)        Cystic fibrosis with pulmonary manifestations (H)        Pancreatic insufficiency        S/P lobectomy of lung          Care Instructions    Cystic Fibrosis Self-Care Plan    RECOMMENDATIONS:   Dr. Kowalski's team will call you about the diabetes study.  Stop MVW soft gel while on Accutane.  Take Vit D (Cholecalciferol) 3000u twice daily while on accutane  Otherwise continue current medication, nebs and vest therapy.             CF Nurse line:          Kevin Rodríguez   [phone]            CF Resp Therapist: Margaret Werner            [phone]   CF Dietitians:           Sherie Aparicio            [phone]                       Saba Gill                       [phone]   CF Diabetes Nurse: Ashley Cleveland             [phone]    CF Social Workers:  Preran Paniagua             [phone]                Velma Kolb            [phone]  CF Pharmacist:        Ashley Kirby                              [phone]  [email or web address]         MRN: [id number]   Clinic Date: August 23, 2018   Patient: Demario Abbasi     Annual Studies:   IGG   Date Value Ref Range Status   03/15/2018 1310 695 - 1620 mg/dL Final     Insulin   Date Value Ref Range Status   03/16/2018 Canceled, Test credited 3 - 25 mU/L Final     Comment:     Unsatisfactory specimen - hemolyzed  CALLED TO GAVIN ALEXANDRA ON 03/16 AT 1300 BY YW       There are no preventive care reminders to display for this patient.      Pulmonary Function Tests  FEV1: amount of air you can blow out  in 1 second  FVC: total amount of air you can take in and blow out    Your Goals:         PFT Latest Ref Rng & Units 8/23/2018   FVC L 3.52   FEV1 L 2.84   FVC% % 63   FEV1% % 60          Airway Clearance: The Most Important Way to Keep Your Lungs Healthy  Vest Settings:    Hill-Rom Frequencies: 8, 9, 10 Pressure 10 Then, Frequencies 18, 19, 20 Pressure 6      RespirTech: Quick Start with Pressure of     Do each frequency for 5 minutes; Deflate vest after each frequency & cough 3 times before beginning the next setting.    Vest and Neb Therapy should be done 2-3 times/day.    Good Nutrition Can Improve Lung Function and Overall Health     Take ALL of your vitamins with food     Take 1/2 of your enzymes before EVERY meal/snack and the other 1/2 mid-meal/snack    Wt Readings from Last 3 Encounters:   08/23/18 75.2 kg (165 lb 12.6 oz)   08/23/18 77.1 kg (170 lb)   05/25/18 76 kg (167 lb 9.6 oz)       Body mass index is 23.79 kg/(m^2).         National CF Foundation Recommendations for BMI in CF Adults: Women: at least 22 Men: at least 23        Controlling Blood Sugars Helps Prevent Lung Infections & Improves Nutrition  Test blood sugar:     In the morning before eating (goal is )     2 hours after a meal (goal is less than 150)     When pre-meal glucose is greater than 150 add correction     At bedtime (if less than 100 eat a snack with 15 grams of carbohydrates  Last A1C Results:   Hemoglobin A1C   Date Value Ref Range Status   11/03/2016 5.9 4.3 - 6.0 % Final         If diabetic, measure A1C every 6 months. Goal: Under 7%    Staying Healthy    Research:  If you are interested in learning about research opportunities or have questions, please contact the CF Research Team at [phone] or [email or web address].      CF Foundation:  Compass is a personalized resource service to help you with the insurance, financial, legal and other issues you are facing.  It's free, confidential and available to anyone with  CF.  Ask your  for more information or contact Compass directly at [address] [facility] [employer] (391-4687) or compass@cff.org, or learn more at cff.org/compass.                             Follow-ups after your visit        Follow-up notes from your care team     Return in about 12 weeks (around 11/15/2018).      Future tests that were ordered for you today     Open Future Orders        Priority Expected Expires Ordered    Cystic Fibrosis Culture Aerob Bacterial Routine 11/15/2018 12/23/2018 8/23/2018    Spirometry, Breathing Capacity Routine 11/15/2018 12/23/2018 8/23/2018            Who to contact     If you have questions or need follow up information about today's clinic visit or your schedule please contact [facility] directly at [phone].  Normal or non-critical lab and imaging results will be communicated to you by [employer], letter or phone within 4 business days after the clinic has received the results. If you do not hear from us within 7 days, please contact the clinic through [employer] or phone. If you have a critical or abnormal lab result, we will notify you by phone as soon as possible.  Submit refill requests through [employer] or call your pharmacy and they will forward the refill request to us. Please allow 3 business days for your refill to be completed.          Additional Information About Your Visit        [employer] Information     [employer] gives you secure access to your electronic health record. If you see a primary care provider, you can also send messages to your care team and make appointments. If you have questions, please call your primary care clinic.  If you do not have a primary care provider, please call [phone] and they will assist you.        Care EveryWhere ID     This is your Care EveryWhere ID. This could be used by other organizations to access your [facility] medical records  [id number]        Your Vitals Were     Pulse Respirations Height Pulse  "Oximetry BMI (Body Mass Index)       65 17 1.778 m (5' 10\") 96% 23.79 kg/m2        Blood Pressure from Last 3 Encounters:   08/23/18 112/55   08/23/18 122/71   05/25/18 124/69    Weight from Last 3 Encounters:   08/23/18 75.2 kg (165 lb 12.6 oz)   08/23/18 77.1 kg (170 lb)   05/25/18 76 kg (167 lb 9.6 oz)                 Today's Medication Changes          These changes are accurate as of 8/23/18  6:30 PM.  If you have any questions, ask your nurse or doctor.               Start taking these medicines.        Dose/Directions    fluconazole 150 MG tablet   Commonly known as:  DIFLUCAN   Started by:  Zana Lilly MD        Dose:  150 mg   Take 1 tablet (150 mg) by mouth daily as needed   Quantity:  1 tablet   Refills:  0         These medicines have changed or have updated prescriptions.        Dose/Directions    MVW COMPLETE FORMULATION  Caps   This may have changed:  Another medication with the same name was removed. Continue taking this medication, and follow the directions you see here.   Changed by:  Zana Lilly MD        Dose:  1 capsule   Take 1 capsule by mouth 2 times daily HOLD While on Acutane   Refills:  0       sodium chloride inhalant 7 % Nebu neb solution   This may have changed:    - when to take this  - reasons to take this   Used for:  Cystic fibrosis with pulmonary exacerbation (H)   Changed by:  Zana Lilly MD        Dose:  4 mL   Take 4 mLs by nebulization 2 times daily as needed for wheezing   Quantity:  240 mL   Refills:  11       tobramycin (PF) 300 MG/5ML neb solution   Commonly known as:  COBY   This may have changed:  additional instructions   Used for:  Cystic fibrosis with pulmonary manifestations (H)   Changed by:  Zana Lilly MD        Dose:  300 mg   Take 5 mLs (300 mg) by nebulization 2 times daily Cycle 28 days on/off   Quantity:  280 mL   Refills:  3       Vitamin D3 3000 units Tabs   This may have changed:    - how much to take  - " when to take this   Used for:  Cystic fibrosis (H)   Changed by:  Zana Lilly MD        Dose:  1 tablet   Take 1 tablet by mouth 2 times daily Start Vitamin D with Accutane (when multivitamin is on hold).   Quantity:  100 tablet   Refills:  0         Stop taking these medicines if you haven't already. Please contact your care team if you have questions.     adapalene 0.1 % gel   Commonly known as:  DIFFERIN   Stopped by:  Zana Lilly MD           clindamycin 1 % lotion   Commonly known as:  CLEOCIN T   Stopped by:  Zana Lilly MD           LANsoprazole 15 MG CR capsule   Commonly known as:  PREVACID   Stopped by:  Zana Lilly MD                Where to get your medicines      These medications were sent to [facility] - [place] - [address] [place] [id number] [facility] [address]     Phone:  [phone]     pantoprazole 20 MG EC tablet         These medications were sent to [facility] - [facility] [address]  [address] [facility] [address]    Hours:  Mon-Fri 8:30am-5:00pm Toll Free [phone] Phone:  [phone]     tobramycin (PF) 300 MG/5ML neb solution                Primary Care Provider Office Phone # Fax #    Susan Li [phone]       [facility]  [address] [place] [address]        Equal Access to Services     [facility] Center[facility]: Hadii duarte ceja Sodeuce, waaxda luqadaha, qaybta kaalmada ehsan, denice viera. So [facility] [phone].    ATENCIÓN: Si habla español, tiene a davidson disposición servicios gratuitos de asistencia lingüística. Llame al [phone].    We comply with applicable federal civil rights laws and [place] laws. We do not discriminate on the basis of race, color, national origin, age, disability, sex, sexual orientation, or gender identity.            Thank you!     Thank you for choosing [facility]  for your care. Our goal is always to provide you with excellent care. Hearing back from our patients is one way we can continue to improve our services. Please take a few minutes to complete the written survey that you may receive in the mail after your visit with us. Thank you!             Your Updated Medication List - Protect others around you: Learn how to safely use, store and throw away your medicines at www.disposemymeds.org.          This list is accurate as of 8/23/18  6:30 PM.  Always use your most recent med list.                   Brand Name Dispense Instructions for use Diagnosis    * albuterol 108 (90 Base) MCG/ACT inhaler    PROAIR HFA    1 Inhaler    Inhale 2 puffs into the lungs every 6 hours as needed for shortness of breath / dyspnea or wheezing    CF (cystic fibrosis) (H)       * albuterol (2.5 MG/3ML) 0.083% neb solution     270 mL    Take 1 vial (2.5 mg) by nebulization 3 times daily    Cystic fibrosis with pulmonary manifestations (H)       amylase-lipase-protease 98269 units Cpep    CREON 36    720 capsule    Take 5 capsules (180,000 Units) by mouth 3 times daily (with meals) And 3 with snacks    Cystic fibrosis exacerbation (H)       azelastine 0.1 % nasal spray    ASTELIN    1 Bottle    Use 1-2 sprays both nostrils twice daily as needed    Cystic fibrosis with pulmonary exacerbation (H)       azithromycin 500 MG tablet    ZITHROMAX    12 tablet    Take one tablet on Monday, Wednesday and Friday morning    CF (cystic fibrosis) (H)       citalopram 20 MG tablet    celeXA    30 tablet    Take 1 tablet (20 mg) by mouth daily    Depression       dornase alpha 1 MG/ML neb solution    PULMOZYME    150 mL    Inhale 2.5 mg into the lungs 2 times daily    Cystic fibrosis with pulmonary manifestations (H)       fexofenadine 180 MG tablet    ALLEGRA    30 tablet    Take 1 tablet (180 mg) by mouth daily    CF (cystic fibrosis) (H)       fluconazole 150 MG tablet    DIFLUCAN    1  tablet    Take 1 tablet (150 mg) by mouth daily as needed        fluticasone 50 MCG/ACT spray    FLONASE    1 Bottle    Spray 1 spray into both nostrils daily    CF (cystic fibrosis) (H), Cystic fibrosis with pulmonary exacerbation (H)       MVW COMPLETE FORMULATION  Caps      Take 1 capsule by mouth 2 times daily HOLD While on Acutane        pantoprazole 20 MG EC tablet    PROTONIX    30 tablet    Take 1 tablet (20 mg) by mouth daily    CF (cystic fibrosis) (H)       sodium chloride inhalant 7 % Nebu neb solution     240 mL    Take 4 mLs by nebulization 2 times daily as needed for wheezing    Cystic fibrosis with pulmonary exacerbation (H)       tobramycin (PF) 300 MG/5ML neb solution    COBY    280 mL    Take 5 mLs (300 mg) by nebulization 2 times daily Cycle 28 days on/off    Cystic fibrosis with pulmonary manifestations (H)       tretinoin 0.05 % cream    RETIN-A    30 g    Apply topically every morning as directed    Acne       Vitamin D3 3000 units Tabs     100 tablet    Take 1 tablet by mouth 2 times daily Start Vitamin D with Accutane (when multivitamin is on hold).    Cystic fibrosis (H)       * Notice:  This list has 2 medication(s) that are the same as other medications prescribed for you. Read the directions carefully, and ask your doctor or other care provider to review them with you.

## 2018-08-23 NOTE — LETTER
[facility]  [address] [facility]  [address]  [place] [address]  Phone: [phone]  Fax: [phone]    August 23, 2018        Demario Abbasi  [address]  [facility] [address]          To whom it may concern:    RE: Demario Abbasi    Patient was seen and treated today at our clinic. He was accompanied by his significant other who is integral in his care. Please excuse them from classes today.     Please contact me for questions or concerns.      Sincerely,        Zana Lilly MD

## 2018-08-23 NOTE — LETTER
8/23/2018       RE: Demario Abbasi  [address]  [place] [address]     Dear Colleague,    Thank you for referring your patient, Demario Abbasi, to the [facility]ST FOR PROSTATE AND UROLOGIC CANCERS at [facility]. Please see a copy of my visit note below.    Dear Dr. Lilly , it was my pleasure to see . Demario Abbasi, a 23 year old male here in consultation today for fertility evaluation.  His spouse is Oriana age 20 (12/28/97).    Demario Abbasi is a 23 year old male with CF, He's here to figure out his fertility options going forward.   They are not actively trying for fertility.  He's never fathered a child. He has CF with a history of lung, pancreas sequelae.  Normal sexual function, other than low volume ejaculate.    Female factors suspected: none. G0.    PAST MEDICAL HISTORY:    Past Medical History:   Diagnosis Date     CF (cystic fibrosis) (H)      Impaired glucose tolerance      Pancreatic insufficiency      S/P lobectomy of lung 8/4/2015    Right upper lobectomy - 2009       PAST SURG HISTORY  Past Surgical History:   Procedure Laterality Date     HC LAP,INGUINAL HERNIA REPR,INITIAL  2002     LOBECTOMY LUNG Right 2009    Right upper lobe        Medications as of 8/23/2018:  Current Outpatient Prescriptions   Medication Sig     adapalene (DIFFERIN) 0.1 % gel Apply 1 applicator topically At Bedtime     albuterol (2.5 MG/3ML) 0.083% neb solution Take 1 vial (2.5 mg) by nebulization 3 times daily     albuterol (ALBUTEROL) 108 (90 BASE) MCG/ACT Inhaler Inhale 2 puffs into the lungs every 6 hours as needed for shortness of breath / dyspnea or wheezing     amylase-lipase-protease (CREON 36) 09675 units CPEP Take 5 capsules (180,000 Units) by mouth 3 times daily (with meals) And 3 with snacks     azelastine (ASTELIN) 0.1 % spray Use 1-2 sprays both nostrils twice daily as needed     azithromycin (ZITHROMAX) 500 MG tablet Take one  tablet on Monday, Wednesday and Friday morning     Cholecalciferol (VITAMIN D3) 3000 units TABS Take 2 tablets by mouth daily Start Vitamin D with Accutane (when multivitamin is on hold).     citalopram (CELEXA) 20 MG tablet Take 1 tablet (20 mg) by mouth daily     clindamycin (CLEOCIN T) 1 % lotion      dornase alpha (PULMOZYME) 1 MG/ML neb solution Inhale 2.5 mg into the lungs 2 times daily     fexofenadine (ALLEGRA) 180 MG tablet Take 1 tablet (180 mg) by mouth daily     fluconazole (DIFLUCAN) 150 MG tablet      fluticasone (FLONASE) 50 MCG/ACT spray Spray 1 spray into both nostrils daily     LANsoprazole (PREVACID) 15 MG CR capsule      multivitamin CF formula (MVW COMPLETE FORMULATION ) softgel cap Take 2 capsules by mouth daily     pantoprazole (PROTONIX) 20 MG EC tablet Take 1 tablet (20 mg) by mouth daily     sodium chloride inhalant 7 % NEBU neb solution Take 4 mLs by nebulization 2 times daily     tobramycin, PF, (COBY) 300 MG/5ML neb solution Take 5 mLs (300 mg) by nebulization 2 times daily Cycle 28 days on/off  ON HOLD 3/17/18 WHILE ON IV ANTIBIOTICS     tretinoin (RETIN-A) 0.05 % cream Apply topically every morning as directed     No current facility-administered medications for this visit.         ALLERGY:     Allergies   Allergen Reactions     Augmentin        SOCIAL HISTORY:   . Occupation: student.  No alcohol abuse, no tobacco use.   Social History   Substance Use Topics     Smoking status: Passive Smoke Exposure - Never Smoker     Smokeless tobacco: Never Used      Comment: dad smokes     Alcohol use Yes      Comment: <2 drinks per week         FAMILY HISTORY:   Family History   Problem Relation Age of Onset     Thyroid Disease Mother      hypothyroid     Other - See Comments Mother      multiple family members with biliary disease     Hypertension Maternal Grandmother      Diabetes Maternal Grandfather      Hypertension Paternal Grandmother      Hypothyroidism Paternal Grandmother   "    Parkinsonism Paternal Grandmother      Coronary Artery Disease Early Onset Paternal Grandfather 56       REVIEW OF SYSTEMS:    Denies erectile dysfunction, ejaculatory problems, testicular pain. No vision or smell deficits, no chronic sinus or respiratory infections. No recent febrile illness, weight loss. No heat or cold intolerance, gynecomastia, or other endocrine complaints.    Otherwise, no constitutional, eye, ENT, heart, lung, GI , musculoskeletal, skin, neurologic, psychiatric, or hematologic complaints.    GONADOTOXIN EXPOSURE: Unremarkable. Otherwise negative for marijuana, heat, chemicals, pesticides, heavy metals, steroids, chemotherapy or radiation.    GENERAL PHYSICAL EXAM  /71  Pulse 67  Ht 1.778 m (5' 10\")  Wt 77.1 kg (170 lb)  BMI 24.39 kg/m2   Constitutional: No acute distress. Well nourished.   PSYCH: normal mood and affect.  NEURO: normal gait, no focal deficits.   EYES: anicteric, EOMI, PERR  CARDIOPULMONARY: breathing non-labored, pulse regular, no peripheral edema.  GI: Abdomen soft, nondistended   MUSCULOSKELETAL: normal limb proportions, no muscle wasting, no contractures.  SKIN: Normal virilized hair distribution, no lesions, warts or rashes over genitalia, abdomen extremities or face.  HEME/LYMPH: no ecchymosis, no lymphadenopathy in groin, no lymphedema.     EXAM:  Phallus circumcised, meatus adequate, no plaques palpated.   Left testis descended , size is 22-24 cc , consistency is normal . No intra-testicular masses.   Right testis descended , size is 22-24 cc , consistency is normal . No intra-testicular masses.   Epididymes present, non-tender, not enlarged.   Left cord: Vas ABSENT. no varicocele noted.  Right cord: Vas ABSENT. no varicocele noted.     Rectal exam deferred.     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    Component      Latest Ref Rng & Units 8/16/2018   Collection Method       Masturbation   Collection Site       VINI "   Specimen Type       Semen   Lab Receipt Time       11:27 AM   Time of Analysis       11:45 AM   Analysis Temp - Centigrade      centigrade 22   Abstinence days      2 - 7 days 3   Liquefied      yes/no Yes   Viscous      yes/no Yes   Agglutination      yes/no No   pH      7.2 pH 6.4 (A)   Volume      1.5 ml 0.2 (A)   Concentration      15 million/ml 0 (A)   Total Number      39 million 0 (A)   Progressive motility      32 % 0 (A)   Non-progressive motility      % 0   Immotile      % 0   Total Progressive Motile      15.6 million 0 (A)   Round Cells      2 million/ml <0.1   Consent to Release to Partner       Yes       ASSESSMENT:    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    PLAN:    Discussed TESE and IVF as their option for biologic children.    Discussed therapeutic donor insemination as an option; adoption.    They will think about options and let us know if interested in pursuing IVF down the road.      Thank-you for allowing me to care for your patient.  Sincerely,    Kody Douglas MD      CC: Maxx

## 2018-08-23 NOTE — MR AVS SNAPSHOT
"              After Visit Summary   8/23/2018    Demario Abbasi    MRN: [id number]           Patient Information     Date Of Birth          1995        Visit Information        Provider Department      8/23/2018 9:40 AM Kody Douglas MD [facility]        Today's Diagnoses     Azoospermia    -  1    Bilateral congenital absence of vas deferens           Follow-ups after your visit        Follow-up notes from your care team     Return if symptoms worsen or fail to improve.      Who to contact     Please call your clinic at [phone] to:    Ask questions about your health    Make or cancel appointments    Discuss your medicines    Learn about your test results    Speak to your doctor            Additional Information About Your Visit        [employer] Information     [employer] gives you secure access to your electronic health record. If you see a primary care provider, you can also send messages to your care team and make appointments. If you have questions, please call your primary care clinic.  If you do not have a primary care provider, please call [phone] and they will assist you.      [employer] is an electronic gateway that provides easy, online access to your medical records. With [employer], you can request a clinic appointment, read your test results, renew a prescription or communicate with your care team.     To access your existing account, please contact your [facility] or call [phone] for assistance.        Care EveryWhere ID     This is your Care EveryWhere ID. This could be used by other organizations to access your [place] medical records  [id number]        Your Vitals Were     Pulse Height BMI (Body Mass Index)             67 1.778 m (5' 10\") 24.39 kg/m2          Blood Pressure from Last 3 Encounters:   08/23/18 112/55   08/23/18 122/71   05/25/18 124/69    Weight from Last 3 Encounters:   08/23/18 " 75.2 kg (165 lb 12.6 oz)   08/23/18 77.1 kg (170 lb)   05/25/18 76 kg (167 lb 9.6 oz)              Today, you had the following     No orders found for display         Today's Medication Changes          These changes are accurate as of 8/23/18 11:59 PM.  If you have any questions, ask your nurse or doctor.               Start taking these medicines.        Dose/Directions    fluconazole 150 MG tablet   Commonly known as:  DIFLUCAN   Started by:  Zana Lilly MD        Dose:  150 mg   Take 1 tablet (150 mg) by mouth daily as needed   Quantity:  1 tablet   Refills:  0         These medicines have changed or have updated prescriptions.        Dose/Directions    MVW COMPLETE FORMULATION  Caps   This may have changed:  Another medication with the same name was removed. Continue taking this medication, and follow the directions you see here.   Changed by:  Zana Lilly MD        Dose:  1 capsule   Take 1 capsule by mouth 2 times daily HOLD While on Acutane   Refills:  0       sodium chloride inhalant 7 % Nebu neb solution   This may have changed:    - when to take this  - reasons to take this   Used for:  Cystic fibrosis with pulmonary exacerbation (H)   Changed by:  Zana Lilly MD        Dose:  4 mL   Take 4 mLs by nebulization 2 times daily as needed for wheezing   Quantity:  240 mL   Refills:  11       tobramycin (PF) 300 MG/5ML neb solution   Commonly known as:  COBY   This may have changed:  additional instructions   Used for:  Cystic fibrosis with pulmonary manifestations (H)   Changed by:  Zana Lilly MD        Dose:  300 mg   Take 5 mLs (300 mg) by nebulization 2 times daily Cycle 28 days on/off   Quantity:  280 mL   Refills:  3       Vitamin D3 3000 units Tabs   This may have changed:    - how much to take  - when to take this   Used for:  Cystic fibrosis (H)   Changed by:  Zana Lilly MD        Dose:  1 tablet   Take 1 tablet by mouth 2 times daily  Start Vitamin D with Accutane (when multivitamin is on hold).   Quantity:  100 tablet   Refills:  0         Stop taking these medicines if you haven't already. Please contact your care team if you have questions.     adapalene 0.1 % gel   Commonly known as:  DIFFERIN   Stopped by:  Zana Lilly MD           clindamycin 1 % lotion   Commonly known as:  CLEOCIN T   Stopped by:  Zana Lilly MD           LANsoprazole 15 MG CR capsule   Commonly known as:  PREVACID   Stopped by:  Zana Lilly MD                Where to get your medicines      These medications were sent to [facility] - [facility] [address]  [address] [facility] [address]     Phone:  [phone]     pantoprazole 20 MG EC tablet         These medications were sent to [facility] - [facility] [address]  [address] [facility] [address]    Hours:  Mon-Fri 8:30am-5:00pm Toll Free [phone] Phone:  [phone]     tobramycin (PF) 300 MG/5ML neb solution                Primary Care Provider Office Phone # Fax #    Susan GARLAND Jen [phone]       [facility]  [address] [place] [id number]        Equal Access to Services     MCKENNA LINDSAY AH: Hadii duarte ku hadasho Soomaali, waaxda luqadaha, qaybta kaalmada adeegyada, waxay idiin haysauravn kat viera. So [facility] [phone].    ATENCIÓN: Si habla español, tiene a davidson disposición servicios gratuitos de asistencia lingüística. Llame al [phone].    We comply with applicable federal civil rights laws and [place] laws. We do not discriminate on the basis of race, color, national origin, age, disability, sex, sexual orientation, or gender identity.            Thank you!     Thank you for choosing [facility] [place] [facility] PROST[facility] UROLOGIC CANCERS  for your care. Our goal is always to provide you with excellent care. Hearing back from our patients is one way we  can continue to improve our services. Please take a few minutes to complete the written survey that you may receive in the mail after your visit with us. Thank you!             Your Updated Medication List - Protect others around you: Learn how to safely use, store and throw away your medicines at www.disposemymeds.org.          This list is accurate as of 8/23/18 11:59 PM.  Always use your most recent med list.                   Brand Name Dispense Instructions for use Diagnosis    * albuterol 108 (90 Base) MCG/ACT inhaler    PROAIR HFA    1 Inhaler    Inhale 2 puffs into the lungs every 6 hours as needed for shortness of breath / dyspnea or wheezing    CF (cystic fibrosis) (H)       * albuterol (2.5 MG/3ML) 0.083% neb solution     270 mL    Take 1 vial (2.5 mg) by nebulization 3 times daily    Cystic fibrosis with pulmonary manifestations (H)       amylase-lipase-protease 17305 units Cpep    CREON 36    720 capsule    Take 5 capsules (180,000 Units) by mouth 3 times daily (with meals) And 3 with snacks    Cystic fibrosis exacerbation (H)       azelastine 0.1 % nasal spray    ASTELIN    1 Bottle    Use 1-2 sprays both nostrils twice daily as needed    Cystic fibrosis with pulmonary exacerbation (H)       azithromycin 500 MG tablet    ZITHROMAX    12 tablet    Take one tablet on Monday, Wednesday and Friday morning    CF (cystic fibrosis) (H)       citalopram 20 MG tablet    celeXA    30 tablet    Take 1 tablet (20 mg) by mouth daily    Depression       dornase alpha 1 MG/ML neb solution    PULMOZYME    150 mL    Inhale 2.5 mg into the lungs 2 times daily    Cystic fibrosis with pulmonary manifestations (H)       fexofenadine 180 MG tablet    ALLEGRA    30 tablet    Take 1 tablet (180 mg) by mouth daily    CF (cystic fibrosis) (H)       fluconazole 150 MG tablet    DIFLUCAN    1 tablet    Take 1 tablet (150 mg) by mouth daily as needed        fluticasone 50 MCG/ACT spray    FLONASE    1 Bottle    Spray 1 spray into  both nostrils daily    CF (cystic fibrosis) (H), Cystic fibrosis with pulmonary exacerbation (H)       MVW COMPLETE FORMULATION  Caps      Take 1 capsule by mouth 2 times daily HOLD While on Acutane        pantoprazole 20 MG EC tablet    PROTONIX    30 tablet    Take 1 tablet (20 mg) by mouth daily    CF (cystic fibrosis) (H)       sodium chloride inhalant 7 % Nebu neb solution     240 mL    Take 4 mLs by nebulization 2 times daily as needed for wheezing    Cystic fibrosis with pulmonary exacerbation (H)       tobramycin (PF) 300 MG/5ML neb solution    COBY    280 mL    Take 5 mLs (300 mg) by nebulization 2 times daily Cycle 28 days on/off    Cystic fibrosis with pulmonary manifestations (H)       tretinoin 0.05 % cream    RETIN-A    30 g    Apply topically every morning as directed    Acne       Vitamin D3 3000 units Tabs     100 tablet    Take 1 tablet by mouth 2 times daily Start Vitamin D with Accutane (when multivitamin is on hold).    Cystic fibrosis (H)       * Notice:  This list has 2 medication(s) that are the same as other medications prescribed for you. Read the directions carefully, and ask your doctor or other care provider to review them with you.

## 2018-08-24 ASSESSMENT — ANXIETY QUESTIONNAIRES
2. NOT BEING ABLE TO STOP OR CONTROL WORRYING: NOT AT ALL
GAD7 TOTAL SCORE: 2
3. WORRYING TOO MUCH ABOUT DIFFERENT THINGS: SEVERAL DAYS
IF YOU CHECKED OFF ANY PROBLEMS ON THIS QUESTIONNAIRE, HOW DIFFICULT HAVE THESE PROBLEMS MADE IT FOR YOU TO DO YOUR WORK, TAKE CARE OF THINGS AT HOME, OR GET ALONG WITH OTHER PEOPLE: SOMEWHAT DIFFICULT
5. BEING SO RESTLESS THAT IT IS HARD TO SIT STILL: NOT AT ALL
6. BECOMING EASILY ANNOYED OR IRRITABLE: SEVERAL DAYS
7. FEELING AFRAID AS IF SOMETHING AWFUL MIGHT HAPPEN: NOT AT ALL
1. FEELING NERVOUS, ANXIOUS, OR ON EDGE: NOT AT ALL

## 2018-08-24 ASSESSMENT — PATIENT HEALTH QUESTIONNAIRE - PHQ9: 5. POOR APPETITE OR OVEREATING: NOT AT ALL

## 2018-08-24 NOTE — PROGRESS NOTES
Adult Cystic Fibrosis Program  Annual Psychosocial Assessment    Presenting Information:  Demario Abbasi is a 23-year-old male with cystic fibrosis, presenting in CF clinic for his second appointment with the adult CF clinic since his transition from the pediatric CF clinic.  His appointment was with Dr. Lilly today.  Met with Demario for an initial annual psychosocial assessment.  His girlfriend Oriana was present with him in clinic today.         Living situation:  Demario lives with 3 friends in a rented apartment during the week while at school ([place], [place] at [employer] and typically goes home on the weekends and in the summer to live with family in [place].  [place] hours from [place].    His parents own their home in [place] where he grew up.  His sister (Tammy) and her son (3 y.o Salomón) live in this home as well.  There are 2 dogs at his parents home and no pets at the apartment at school.  He denies any concerns about his living situation.       Family Constellation:  Demario  was raised by his biological parents: Mirna and Andrew who live in [place], [place].  He has two older sisters: Tammy who has a 3 y.o son Salomón and Evi who lives outside the home and has a 1 y.o son named Bharat.  His mom is a nurse and his dad is a farmer of soybeans and corn.  He is the only person in his family with CF.  Demario has never been  and does not have children, he is in a significant relationship with his girlfriend Oriana.      Social Support:  Demario reports good social support.  He gets along well with family members and draws additional support from his girlfriend, friends and yin community.  He is close with both of his parents, but especially his dad. He does not have any connections in the CF Community.      Adjustment to Illness:  Demario reports that he was diagnosed with CF in infancy, he reports that shortly after being brought home from the hospital he was not thriving, gaining weight or growing.  " His parents brought him to [place] for evaluation and they transferred him to the [facility] where a sweat test was performed and the diagnosis of CF was made.  He reports some complications while growing up, being hospitalized about once per year for CF-related issues which he did not like.  He admits that growing up with CF was hard at times, he struggled with doing his therapies and treatments at times and not having the freedom that other kids had.  He felt that he was still able to have a wonderful childhood/upbringing, participating in sports and being very active in social events.  He reports that his parents were very good and helping take care of him and manage CF and states: \"they were on top of it\".   He has been vesting since he was very young, he was unsure of the exact age.      Demario describes his current health status as \"good\".  Clinically, he has moderate lung disease and pancreatic insufficiency.  He typically does 1-2 vest treatment(s) per day and takes enzymes with meals and snacks, Oriana reports that she helps remind him to take his enzymes.  He denies any health problems that interfere with activities of daily living.     Demario reports that he is pretty open about his CF diagnosis and has no reservations about telling people.         Health Care Directive:  Demario has not previously received Health Care Directive education.  This SW provided/reviewed education, including concept/purpose of health care directive, default health care agents and how to complete a directive.   He is comfortable with default health care agent(s) (his parents) in absence of a directive.  He is not currently interested in completing a health care directive.     Education:  Demario graduated from [employer] and is currently attending [employer], majoring in Agronomy (plant science), he anticipates that he has at least one more year of college left and he is currently taking 15 credits.  He plans to go into farming.  " "    Oriana is also currently a student at [employer], studying human development.      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  He reports a supportive work environment and denies any employment concerns.    Prerna is employed as a  provider at a  center.    Finances:  Demario receives income from wages and has financial support from his parents.  He has student loans which are financing his college education.  He denies having financial concerns at this time.      Insurance:  Demario is insured through Medicaid: [employer] Medical Assistance.  He denies any insurance coverage concerns at this time.    Mental Health/Coping:  Demario reports a history of depression, the chart review also includes a history of mild anxiety in the past.  Demario reports that his current/recent mood has been \"good\".  He reports that he was started on Celexa a couple of years ago and feels that it is beneficial in managing his mood.  He recalls going through a tough period of time in regards to his CF, describing it as \"a stage of understanding\", not wanting to comply with treatments and Celexa did help him get through this difficult time.  He has not sought therapy in the past and does not feel this is something he needs currently.  He zelda with stress by talking to someone such as his dad or Oriana.  He identifies yin as important part of his life, he was raised [employer] and plans to start attending [employer].    Demario completed the following screens today:  PHQ-9: Score: 1, which indicates minimal symptoms of depression and described as somewhat difficult in daily functioning.  EMY-7: Score: 2, which indicates minimal symptoms of anxiety and described as somewhat difficult in daily functioning.       Demario agreed with the scores and interpretation of the screens above, he does feel like his busy schedule impacted his scoring but overall " feels he is managing school, working and CF well.  He denies any suicidal ideation or symptoms not addressed on these screens.        Chemical Health:  Demario does use chewing tobacco, he reports reduced/limited use, typically one or none per day.  He denies the use of other tobacco products, denies the use of marijuana or other drugs.  He reports the use of alcohol, typically on the weekends and reports non-excessive use. He denies any current/past psychosocial impairment caused by alcohol use.        Leisure Activities/Interests:   Demario enjoys hunting, fishing, golf, hockey and football.  He is looking forward to going to the [employer]season game tomorrow.        Intervention:  -Psychosocial Assessment  -Health Care Directive education  -Supportive counseling    Assessment:  Demario appeared to be open in open responses and was in good spirits. Risk factors/vulnerabilities include busy schedule between college, work and managing CF but seems to be managing well.  Recent transition from peds to adults and lives a distance away. Strengths include: appears responsible and reports adherence.  Open to receiving care and education. Reports good support system with no concerns reported in the area of financial or insurance. He does have a history of depression and currently feels it is well managed with Celexa.  Demario seems to be psychosocially stable overall, with access to relevant resources and supports.  No concerns expressed/noted.  Recommend f/u SW support as noted in plan below.      Plan:  Continue to follow for regular clinic consult during transition period from peds to adults.  Re-consult for any psychosocial issues that may arise.  Complete psychosocial assessment annually.      JONELLE Hoskins, [facility]  Adult Cystic Fibrosis   Ph: [phone]  Pager: [phone]

## 2018-08-24 NOTE — PROGRESS NOTES
CF Annual Nutrition Assessment    Reason for Assessment  Assessed during [facility]. [facility] r/t increased nutrition risk with diagnosis of CF per protocol    Nutrition Significant PMH  Mild Lung Disease   Pancreatic Insufficient   Glucose Intolerance    Social Assessment  Living situation: with roommates in house near [facility]  Work/School/Disability: school at [employer]  Food Insecurity: None    Anthropometric Assessment  Height: 177.8 cm  IBW based on BMI 22 for females and 23 for males per CF Foundation recs: 70 kg / 154#  Today's Weight: 75.2 kg (actual weight)   %IBW: 107%  BMI: Body mass index is  Body mass index is 23.79 kg/(m^2).   Current weight is considered: Normal BMI and at CF goal  Weight relatively stable within UBW 75-77 kg.     Wt Readings from Last 5 Encounters:   18 75.2 kg (165 lb 12.6 oz)   18 77.1 kg (170 lb)   18 76 kg (167 lb 9.6 oz)   18 76 kg (167 lb 9.6 oz)   04/10/18 76.1 kg (167 lb 12.3 oz)     Physical Activity/Exercise:  Did not address this visit    MALNUTRITION  No malnutrition.     Pancreatic Enzymes  Brand:  Creon [id number]  Dosin with meals, 3 with snacks  Estimated Daily Intake: 20 caps/day  Are you taking enzymes as recommended:Yes     High dose providing 2400 units lipase/kg/meal which is within the recommended range per CF Foundation to inhibit fibrosing colonopathy    Signs of Malabsorption:  No  Enzyme Program: did not address this visit    Gastrointestinal/Malabsorption Assessment  GI Complaints: abdominal pain  GI Physician:  Referral 18 to Dr. Flowers. AXR mild/moderate stool burden and recommended daily Miralax.     Diet History and Assessment  Diet Preferences/Allergies/Intolerances: Regular Diet  Appetite Stimulant Rx:  No  Intake Recall/Comments: Good eater at baseline consuming 2-3 meals and snacks. When he is at school usually has a small snack from apt for breakfast, eats frozen pizza or cafeteria for lunch, and then cooks dinner with his  girlfriend -- usually protein, spaghetti, etc.     Calcium: --  Salt: --  Hydration:  Drinks water, gatorade, milk, 1 regular soda daily    Supplements:  no  Tube Feeding:  no    Estimated Energy and Protein Needs  Estimation based on weight maintenance with Mild lung disease and pancreatic insufficient.    BEE: 1845  1220-2245 kcals/day =  150-175% of BEE (35-45 kcals/kg)   g protein/day = 1.2-1.5 g/kg    Laboratory Assessment    Vitamin A   Date Value Ref Range Status   05/25/2018 0.52 0.30 - 1.20 mg/L Final     Vitamin D Deficiency screening   Date Value Ref Range Status   03/15/2018 28 20 - 75 ug/L Final     Comment:     Season, race, dietary intake, and treatment affect the concentration of   25-hydroxy-Vitamin D. Values may decrease during winter months and increase   during summer months. Values 20-29 ug/L may indicate Vitamin D insufficiency   and values <20 ug/L may indicate Vitamin D deficiency.  Vitamin D determination is routinely performed by an immunoassay specific for   25 hydroxyvitamin D3.  If an individual is on vitamin D2 (ergocalciferol)   supplementation, please specify 25 OH vitamin D2 and D3 level determination by   LCMSMS test VITD23.       Vitamin E   Date Value Ref Range Status   03/15/2018 7.4 5.5 - 18.0 mg/L Final     Comment:     (Note)  Test developed and characteristics determined by [employer]. See Compliance Statement B: [email or web address]       Iron   Date Value Ref Range Status   05/02/2013 41 35 - 180 ug/dL Final     Cholesterol   Date Value Ref Range Status   05/25/2018 100 <200 mg/dL Final     Triglycerides   Date Value Ref Range Status   05/25/2018 200 (H) <150 mg/dL Final     Comment:     Borderline high:  150-199 mg/dl  High:             200-499 mg/dl  Very high:       >499 mg/dl       HDL Cholesterol   Date Value Ref Range Status   05/25/2018 34 (L) >39 mg/dL Final     LDL Cholesterol Calculated   Date Value Ref Range Status   05/25/2018 26 <100 mg/dL Final      Comment:     Desirable:       <100 mg/dl     VLDL-Cholesterol   Date Value Ref Range Status   05/02/2013 12 0 - 30 mg/dL Final     Cholesterol/HDL Ratio   Date Value Ref Range Status   05/02/2013 2.5 0.0 - 5.0 Final     OGTT- indeterminate  DEXA - 2018, WNL    Current Vitamin/Mineral Prescription R/T deficiency/malabsorption:   MVW Complete D3,000 - 2 caps per day, has been taking up until yesterday.    Plans to start Accutane this week x 6 months. Will discontinue MVW and then start individual Vitamin D supplement (has active prescription). Can then restart MVW when Accutane completed.     NUTRITION DIAGNOSIS  Impaired nutrient utilization related r/t CF hypermetabolism and pancreatic insufficiency as evidenced by pt requires high kcal/pro diet and pancreatic enzyme replacement therapy in order to maintain nutrition and health status.   INTERVENTIONS/RECOMMENDATIONS  1) Introduced self and role of RD as part of care team. Obtained nutrition history as above. Pt denied any specific nutrition-related concerns and is happy with his current nutrition status.   2) Reviewed vitamin regimen as determined by previous RD visit, particularly with Vitamin A content and plans for Accutane. Pt will contact RD if he needs a new prescription for Vitamin D.   3) Pt concerned with glucose intolerance and may participate in study with Dr. Kowalski vs evaluation by Dr. Cross. Encouraged pt to switch his once daily regular soda to diet to reduce large spikes in BGs.   Patient Understanding: Excellent  Expected Compliance: Excellent  Follow-Up Plans: per protocol    GOALS:  1) Switch from regular to diet soda  2) Take vitamins as recommended above  3) Maintain BMI 23-25 kg/m2    FOLLOW-UP/MONITORING:  Visit patient within 6-12 month(s)    Time Spent In Face-to-Face Patient Interactions: 15 minutes      Sherie Aparicio RD, LD  Cystic Fibrosis/Lung Transplant Dietitian  Pager [phone]  On weekends/holidays contact coverage RD at [phone]  (inpatient use only)

## 2018-08-25 ASSESSMENT — PATIENT HEALTH QUESTIONNAIRE - PHQ9: SUM OF ALL RESPONSES TO PHQ QUESTIONS 1-9: 1

## 2018-08-25 ASSESSMENT — ANXIETY QUESTIONNAIRES: GAD7 TOTAL SCORE: 2

## 2018-08-27 NOTE — PROGRESS NOTES
Respiratory Therapist Note:    Vest    Brand: Hill-Rom - Model 105   Settings: Hill Rom: Frequencies 8, 9, 10 at pressure 10 then frequencies 18, 19, 20 at pressure 6.   Cough Pause: Yes. Frequency:  Duration:    Vest Garment Size: Adult Medium   Last Fitting Date: 2018   Frequency of therapy: 2 times per day   Concerns: none    Alternative Airway Clearance:     Nebulized Medications   Bronchodilators: Albuterol   Mucolytic: Pulmozyme   Antibiotics: COBY   Additional Inhaled Medications: MDI    Review Cleaning: Yes. Top rack of .    Education and Transition Information   Correct order of inhaled medications: Yes   Mechanism of Action of inhaled medications: Yes   Frequency of inhaled medications: Yes   Dosage of inhaled medications: Yes   Other:     Home Care   Nebulizer Compressor    Year Purchased: 2018   Home Care Company:     [facility], Phone: [phone], Fax: [phone]    Oxygen:     [facility]   Site:    Date Completed:     Other Comments:     Goals   Next Visit: increase coughing/increase cleaning   Next Year: as above

## 2018-09-02 PROBLEM — Q55.4 BILATERAL CONGENITAL ABSENCE OF VAS DEFERENS: Status: ACTIVE | Noted: 2018-09-02

## 2018-09-02 PROBLEM — N46.01 AZOOSPERMIA: Status: ACTIVE | Noted: 2018-09-02

## 2018-11-12 DIAGNOSIS — E84.9 CF (CYSTIC FIBROSIS) (H): ICD-10-CM

## 2018-11-13 RX ORDER — FEXOFENADINE HCL 180 MG/1
TABLET ORAL
Qty: 30 TABLET | Refills: 3 | Status: SHIPPED | OUTPATIENT
Start: 2018-11-13 | End: 2019-05-09

## 2018-11-23 ENCOUNTER — OFFICE VISIT (OUTPATIENT)
Dept: PULMONOLOGY | Facility: CLINIC | Age: 23
End: 2018-11-23
Attending: INTERNAL MEDICINE
Payer: COMMERCIAL

## 2018-11-23 VITALS
HEIGHT: 70 IN | SYSTOLIC BLOOD PRESSURE: 125 MMHG | HEART RATE: 67 BPM | OXYGEN SATURATION: 97 % | WEIGHT: 168.21 LBS | DIASTOLIC BLOOD PRESSURE: 74 MMHG | BODY MASS INDEX: 24.08 KG/M2 | RESPIRATION RATE: 16 BRPM

## 2018-11-23 DIAGNOSIS — R53.83 FATIGUE, UNSPECIFIED TYPE: Primary | ICD-10-CM

## 2018-11-23 DIAGNOSIS — E84.9 CYSTIC FIBROSIS (H): Primary | ICD-10-CM

## 2018-11-23 DIAGNOSIS — E84.9 CYSTIC FIBROSIS (H): ICD-10-CM

## 2018-11-23 DIAGNOSIS — K86.89 PANCREATIC INSUFFICIENCY: ICD-10-CM

## 2018-11-23 DIAGNOSIS — R53.83 FATIGUE, UNSPECIFIED TYPE: ICD-10-CM

## 2018-11-23 DIAGNOSIS — R73.02 IMPAIRED GLUCOSE TOLERANCE: ICD-10-CM

## 2018-11-23 LAB
ERYTHROCYTE [DISTWIDTH] IN BLOOD BY AUTOMATED COUNT: 12.8 % (ref 10–15)
EXPTIME-PRE: 6.93 SEC
FEF2575-%PRED-PRE: 55 %
FEF2575-PRE: 2.8 L/SEC
FEF2575-PRED: 5.05 L/SEC
FEFMAX-%PRED-PRE: 83 %
FEFMAX-PRE: 8.52 L/SEC
FEFMAX-PRED: 10.19 L/SEC
FEV1-%PRED-PRE: 62 %
FEV1-PRE: 2.96 L
FEV1FEV6-PRE: 80 %
FEV1FEV6-PRED: 84 %
FEV1FVC-PRE: 80 %
FEV1FVC-PRED: 84 %
FIFMAX-PRE: 3.62 L/SEC
FVC-%PRED-PRE: 66 %
FVC-PRE: 3.7 L
FVC-PRED: 5.59 L
HCT VFR BLD AUTO: 42.4 % (ref 40–53)
HGB BLD-MCNC: 14.2 G/DL (ref 13.3–17.7)
MCH RBC QN AUTO: 29.2 PG (ref 26.5–33)
MCHC RBC AUTO-ENTMCNC: 33.5 G/DL (ref 31.5–36.5)
MCV RBC AUTO: 87 FL (ref 78–100)
PLATELET # BLD AUTO: 251 10E9/L (ref 150–450)
RBC # BLD AUTO: 4.86 10E12/L (ref 4.4–5.9)
TSH SERPL DL<=0.005 MIU/L-ACNC: 2.48 MU/L (ref 0.4–4)
WBC # BLD AUTO: 6.8 10E9/L (ref 4–11)

## 2018-11-23 PROCEDURE — 87116 MYCOBACTERIA CULTURE: CPT | Performed by: INTERNAL MEDICINE

## 2018-11-23 PROCEDURE — 85027 COMPLETE CBC AUTOMATED: CPT | Performed by: INTERNAL MEDICINE

## 2018-11-23 PROCEDURE — 87015 SPECIMEN INFECT AGNT CONCNTJ: CPT | Performed by: INTERNAL MEDICINE

## 2018-11-23 PROCEDURE — 87206 SMEAR FLUORESCENT/ACID STAI: CPT | Performed by: INTERNAL MEDICINE

## 2018-11-23 PROCEDURE — 97803 MED NUTRITION INDIV SUBSEQ: CPT | Performed by: DIETITIAN, REGISTERED

## 2018-11-23 PROCEDURE — G0463 HOSPITAL OUTPT CLINIC VISIT: HCPCS | Mod: ZF

## 2018-11-23 PROCEDURE — 84443 ASSAY THYROID STIM HORMONE: CPT | Performed by: INTERNAL MEDICINE

## 2018-11-23 PROCEDURE — 36415 COLL VENOUS BLD VENIPUNCTURE: CPT | Performed by: INTERNAL MEDICINE

## 2018-11-23 PROCEDURE — 84403 ASSAY OF TOTAL TESTOSTERONE: CPT | Performed by: INTERNAL MEDICINE

## 2018-11-23 ASSESSMENT — PAIN SCALES - GENERAL: PAINLEVEL: NO PAIN (0)

## 2018-11-23 NOTE — MR AVS SNAPSHOT
After Visit Summary   11/23/2018    Demario Abbasi    MRN: [id number]           Patient Information     Date Of Birth          1995        Visit Information        Provider Department      11/23/2018 8:40 AM Zana Lilly MD [facility]        Today's Diagnoses     Fatigue, unspecified type    -  1    Cystic fibrosis (H)        Impaired glucose tolerance        Pancreatic insufficiency          Care Instructions    Cystic Fibrosis Self-Care Plan    RECOMMENDATIONS:   Margaret will call you about the monarch  I will ask Dr. Kowalski to call you about the diabetes study  Increase exercise (20-30 minutes breathing hard every other day.)  Otherwise continue current medication, nebs and vest therapy.           CF Nurse line:          Kevin Rodríguez   [phone]            CF Resp Therapist: Margaret Werner            [phone]   CF Dietitians:           Sherie Aparicio            [phone]                       Saba Gill                       [phone]   CF Diabetes Nurse: Ashley Cleveland             [phone]    CF Social Workers:  Prerna Paniagua             [phone]                Velma Kolb            [phone]  CF Pharmacist:        Ashley Kirby                              [phone]  [email or web address]         MRN: [id number]   Clinic Date: November 23, 2018   Patient: Demario Abbasi     Annual Studies:   IGG   Date Value Ref Range Status   03/15/2018 1310 695 - 1620 mg/dL Final     Insulin   Date Value Ref Range Status   03/16/2018 Canceled, Test credited 3 - 25 mU/L Final     Comment:     Unsatisfactory specimen - hemolyzed  CALLED TO URU6 MAGGIE ALEXANDRA ON 03/16 AT 1300 BY YW       There are no preventive care reminders to display for this patient.      Pulmonary Function Tests  FEV1: amount of air you can blow out in 1 second  FVC: total amount of air you can take in and blow out    Your Goals:         PFT Latest Ref  Rng & Units 11/23/2018   FVC L 3.70   FEV1 L 2.96   FVC% % 66   FEV1% % 62          Airway Clearance: The Most Important Way to Keep Your Lungs Healthy  Vest Settings:    Hill-Rom Frequencies: 8, 9, 10 Pressure 10 Then, Frequencies 18, 19, 20 Pressure 6      RespirTech: Quick Start with Pressure of     Do each frequency for 5 minutes; Deflate vest after each frequency & cough 3 times before beginning the next setting.    Vest and Neb Therapy should be done 2-3 times/day.    Good Nutrition Can Improve Lung Function and Overall Health     Take ALL of your vitamins with food     Take 1/2 of your enzymes before EVERY meal/snack and the other 1/2 mid-meal/snack    Wt Readings from Last 3 Encounters:   11/23/18 76.3 kg (168 lb 3.4 oz)   08/23/18 75.2 kg (165 lb 12.6 oz)   08/23/18 77.1 kg (170 lb)       Body mass index is 24.14 kg/(m^2).         National CF Foundation Recommendations for BMI in CF Adults: Women: at least 22 Men: at least 23        Controlling Blood Sugars Helps Prevent Lung Infections & Improves Nutrition  Test blood sugar:     In the morning before eating (goal is )     2 hours after a meal (goal is less than 150)     When pre-meal glucose is greater than 150 add correction     At bedtime (if less than 100 eat a snack with 15 grams of carbohydrates  Last A1C Results:   Hemoglobin A1C   Date Value Ref Range Status   11/03/2016 5.9 4.3 - 6.0 % Final         If diabetic, measure A1C every 6 months. Goal: Under 7%    Staying Healthy    Research:  If you are interested in learning about research opportunities or have questions, please contact the CF Research Team at [phone] or [email or web address].      CF Foundation:  Compass is a personalized resource service to help you with the insurance, financial, legal and other issues you are facing.  It's free, confidential and available to anyone with CF.  Ask your  for more information or contact Compass directly at [phone]-IJXXBTG ([phone])  or [email or web address], or learn more at [email or web address].                             Follow-ups after your visit        Follow-up notes from your care team     Return in about 12 weeks (around 2/15/2019).      Future tests that were ordered for you today     Open Future Orders        Priority Expected Expires Ordered    Basic metabolic panel Routine 2/15/2019 3/15/2019 11/23/2018    Lipid Profile Routine 2/15/2019 3/15/2019 11/23/2018    Albumin level Routine 2/15/2019 3/15/2019 11/23/2018    Alkaline phosphatase Routine 2/15/2019 3/15/2019 11/23/2018    AST Routine 2/15/2019 3/15/2019 11/23/2018    Iron Routine 2/15/2019 3/15/2019 11/23/2018    GGT Routine 2/15/2019 3/15/2019 11/23/2018    Hemoglobin A1c Routine 2/15/2019 3/15/2019 11/23/2018    IgG Routine 2/15/2019 3/15/2019 11/23/2018    IgE Routine 2/15/2019 3/15/2019 11/23/2018    INR Routine 2/15/2019 3/15/2019 11/23/2018    Magnesium Routine 2/15/2019 3/15/2019 11/23/2018    Phosphorus Routine 2/15/2019 3/15/2019 11/23/2018    Testosterone total  Routine 2/15/2019 3/15/2019 11/23/2018    Protein total Routine 2/15/2019 3/15/2019 11/23/2018    TSH with free T4 reflex Routine 2/15/2019 3/15/2019 11/23/2018    Vitamin A Routine 2/15/2019 3/15/2019 11/23/2018    Vitamin E Routine 2/15/2019 3/15/2019 11/23/2018    Vitamin D Deficiency Routine 2/15/2019 3/15/2019 11/23/2018    CBC with platelets differential Routine 2/15/2019 3/15/2019 11/23/2018    Erythrocyte sedimentation rate auto Routine 2/15/2019 3/15/2019 11/23/2018    Routine UA with microscopic Routine 2/15/2019 3/15/2019 11/23/2018    Albumin Random Urine Quantitative with Creat Ratio Routine 2/15/2019 3/15/2019 11/23/2018    Nocardia culture Routine 2/15/2019 3/15/2019 11/23/2018    Fungus Culture, non-blood Routine 2/15/2019 3/15/2019 11/23/2018    Cystic Fibrosis Culture Aerob Bacterial Routine 2/15/2019 3/15/2019 11/23/2018    Spirometry, Breathing Capacity Routine 2/15/2019 3/15/2019 11/23/2018     "X-ray Chest 2 vws* Routine 2/15/2019 3/15/2019 11/23/2018    ALT Routine 2/15/2019 3/15/2019 11/23/2018            Who to contact     If you have questions or need follow up information about today's clinic visit or your schedule please contact [facility] directly at [phone].  Normal or non-critical lab and imaging results will be communicated to you by GodTubehart, letter or phone within 4 business days after the clinic has received the results. If you do not hear from us within 7 days, please contact the clinic through [email or web address] or phone. If you have a critical or abnormal lab result, we will notify you by phone as soon as possible.  Submit refill requests through [email or web address] or call your pharmacy and they will forward the refill request to us. Please allow 3 business days for your refill to be completed.          Additional Information About Your Visit        [facility] Information     [email or web address] gives you secure access to your electronic health record. If you see a primary care provider, you can also send messages to your care team and make appointments. If you have questions, please call your primary care clinic.  If you do not have a primary care provider, please call [phone] and they will assist you.        Care EveryWhere ID     This is your Care EveryWhere ID. This could be used by other organizations to access your [facility] medical records  [id number]        Your Vitals Were     Pulse Respirations Height Pulse Oximetry BMI (Body Mass Index)       67 16 1.778 m (5' 10\") 97% 24.14 kg/m2        Blood Pressure from Last 3 Encounters:   11/23/18 125/74   08/23/18 112/55   08/23/18 122/71    Weight from Last 3 Encounters:   11/23/18 76.3 kg (168 lb 3.4 oz)   08/23/18 75.2 kg (165 lb 12.6 oz)   08/23/18 77.1 kg (170 lb)              We Performed the Following     AFB Culture Non Blood     AFB Stain Non Blood          Today's Medication Changes          These changes are accurate as of " 11/23/18 12:57 PM.  If you have any questions, ask your nurse or doctor.               Stop taking these medicines if you haven't already. Please contact your care team if you have questions.     tretinoin 0.05 % cream   Commonly known as:  RETIN-A   Stopped by:  Zana Lilly MD                    Primary Care Provider Office Phone # Fax #    Susan Li [phone]       FAMILY [facility]  [address] [facility] [address]        Equal Access to Services     [facility] : Hadii duarte ku hadasho Soomaali, waaxda luqadaha, qaybta kaalmada adeegyada, waxay prashanthin haysauravn kat souza . So [facility] [phone].    ATENCIÓN: Si habla español, tiene a davidson disposición servicios gratuitos de asistencia lingüística. Llame al [phone].    We comply with applicable federal civil rights laws and [place] laws. We do not discriminate on the basis of race, color, national origin, age, disability, sex, sexual orientation, or gender identity.            Thank you!     Thank you for choosing [facility]  for your care. Our goal is always to provide you with excellent care. Hearing back from our patients is one way we can continue to improve our services. Please take a few minutes to complete the written survey that you may receive in the mail after your visit with us. Thank you!             Your Updated Medication List - Protect others around you: Learn how to safely use, store and throw away your medicines at www.disposemymeds.org.          This list is accurate as of 11/23/18 12:57 PM.  Always use your most recent med list.                   Brand Name Dispense Instructions for use Diagnosis    ACCUTANE PO      Take 40 mg by mouth 3 times daily        * albuterol 108 (90 Base) MCG/ACT inhaler    PROAIR HFA    1 Inhaler    Inhale 2 puffs into the lungs every 6 hours as needed for shortness of breath / dyspnea or wheezing    CF (cystic fibrosis) (H)       *  albuterol (2.5 MG/3ML) 0.083% neb solution    PROVENTIL    270 mL    Take 1 vial (2.5 mg) by nebulization 3 times daily    Cystic fibrosis with pulmonary manifestations (H)       amylase-lipase-protease 73281 units Cpep    CREON 36    720 capsule    Take 5 capsules (180,000 Units) by mouth 3 times daily (with meals) And 3 with snacks    Cystic fibrosis exacerbation (H)       azelastine 0.1 % nasal spray    ASTELIN    1 Bottle    Use 1-2 sprays both nostrils twice daily as needed    Cystic fibrosis with pulmonary exacerbation (H)       azithromycin 500 MG tablet    ZITHROMAX    12 tablet    Take one tablet on Monday, Wednesday and Friday morning    CF (cystic fibrosis) (H)       citalopram 20 MG tablet    celeXA    30 tablet    Take 1 tablet (20 mg) by mouth daily    Depression       dornase alpha 1 MG/ML neb solution    PULMOZYME    150 mL    Inhale 2.5 mg into the lungs 2 times daily    Cystic fibrosis with pulmonary manifestations (H)       fexofenadine 180 MG tablet    ALLEGRA    30 tablet    TAKE 1 TABLET (180 MG) BY MOUTH DAILY    CF (cystic fibrosis) (H)       fluconazole 150 MG tablet    DIFLUCAN    1 tablet    Take 1 tablet (150 mg) by mouth daily as needed        fluticasone 50 MCG/ACT spray    FLONASE    1 Bottle    Spray 1 spray into both nostrils daily    CF (cystic fibrosis) (H), Cystic fibrosis with pulmonary exacerbation (H)       multivitamins CF formula  Caps capsule      Take 1 capsule by mouth 2 times daily HOLD While on Acutane        pantoprazole 20 MG EC tablet    PROTONIX    30 tablet    Take 1 tablet (20 mg) by mouth daily    CF (cystic fibrosis) (H)       sodium chloride inhalant 7 % Nebu neb solution     240 mL    Take 4 mLs by nebulization 2 times daily as needed for wheezing    Cystic fibrosis with pulmonary exacerbation (H)       tobramycin (PF) 300 MG/5ML neb solution    COBY    280 mL    Take 5 mLs (300 mg) by nebulization 2 times daily Cycle 28 days on/off    Cystic fibrosis with  pulmonary manifestations (H)       Vitamin D3 3000 units Tabs     100 tablet    Take 1 tablet by mouth 2 times daily Start Vitamin D with Accutane (when multivitamin is on hold).    Cystic fibrosis (H)       * Notice:  This list has 2 medication(s) that are the same as other medications prescribed for you. Read the directions carefully, and ask your doctor or other care provider to review them with you.

## 2018-11-23 NOTE — PROGRESS NOTES
CF Annual Nutrition Assessment    Reason for Assessment  Assessed during [facility] r/t increased nutrition risk with diagnosis of CF per protocol    Nutrition Significant PMH  Mild Lung Disease   Pancreatic Insufficient   Glucose Intolerance    Social Assessment  Living situation: Living near [facility] in [place] with 3 roommates.   Work/School/Disability: Working at [employer] doing [employer] - reports he is doing lots of lifting at work. 13 hrs per week at this time this semester, plans to decrease hours next semester due to credit load.   Food Insecurity:  None    Anthropometric Assessment  Height: 177.8 cm  IBW based on BMI 22 for females and 23 for males per CF Foundation recs: 73  Today's Weight: 76.3 kg (actual weight)   %IBW: 104  BMI: Body mass index is  Body mass index is 24.14 kg/(m^2).   Current weight is considered: Normal BMI and at CF goal    Physical Activity/Exercise: Limited. Reports walking to class, otherwise plans to increase activity at some point.     Wt Readings from Last 10 Encounters:   18 76.3 kg (168 lb 3.4 oz)   18 75.2 kg (165 lb 12.6 oz)   18 77.1 kg (170 lb)   18 76 kg (167 lb 9.6 oz)   18 76 kg (167 lb 9.6 oz)   04/10/18 76.1 kg (167 lb 12.3 oz)   18 75.7 kg (166 lb 14.2 oz)   18 75.3 kg (166 lb)   18 75.4 kg (166 lb 3.6 oz)   17 74.6 kg (164 lb 7.4 oz)       MALNUTRITION  % Intake:  No decreased intake noted  % Weight Loss:  None noted  Subcutaneous Fat Loss:  None observed  Muscle Loss:  None observed  Handgrip Strength:  Not Applicable  Fluid Accumulation/Edema:  None noted  Malnutrition Diagnosis: Patient does not meet two of the above criteria necessary for diagnosing malnutrition in the context of CF.     Pancreatic Enzymes  Brand:  Creon [id number]  Dosin-6 with meals, 2-3 with snacks  Are you taking enzymes as recommended:Yes     High dose providing 2359 units lipase/kg/meal which is within the recommended  range per CF Foundation to inhibit fibrosing colonopathy  Estimated Daily Amount (if meal dose is >2500 units lipase/kg/meal): 20-25 caps per day = 1629-62172 units lipase/kg/day     Signs of Malabsorption:  No  Enzyme Program:  None    Diet History and Assessment  Diet Preferences/Allergies.Intolerances: Following a high calorie/protein diet. KNFA.   Appetite Stimulant Rx:  No  Intake Recall/Comments:  Demario is eating very well and has a great appetite. Denies declines in PO. Eating mostly lunch and dinner. Eats lunch in the school cafe (sandwiches, pastas) otherwise will cook meat + eggs. Will eat dinner at home of similar cuisine.     Calcium: Drinking 1 gallon every 3 days. Eating yogurt and cheese.   Salt: Adding salt to foods. Also eating salty fast food.   Hydration: Adequate per patient report.     Supplements: [place] instant breakfast on occasion (typically on days working) buying this at local [employer].     Tube Feeding: None    Estimated Energy and Protein Needs  Estimation based on weight maintenance with Mild lung disease and pancreatic insufficient.     BEE: 1845 kcal/day   2754-4737 kcals/day = ~35-45 kcal/kg (BEE x 1.5-1.75%)   91- 114 g protein/day = 1.2-1.5 g/kg    Laboratory Assessment    Vitamin A   Date Value Ref Range Status   05/25/2018 0.52 0.30 - 1.20 mg/L Final     Vitamin D Deficiency screening   Date Value Ref Range Status   03/15/2018 28 20 - 75 ug/L Final     Comment:     Season, race, dietary intake, and treatment affect the concentration of   25-hydroxy-Vitamin D. Values may decrease during winter months and increase   during summer months. Values 20-29 ug/L may indicate Vitamin D insufficiency   and values <20 ug/L may indicate Vitamin D deficiency.  Vitamin D determination is routinely performed by an immunoassay specific for   25 hydroxyvitamin D3.  If an individual is on vitamin D2 (ergocalciferol)   supplementation, please specify 25 OH vitamin D2 and D3 level determination by    LCMSMS test VITD23.       Vitamin E   Date Value Ref Range Status   03/15/2018 7.4 5.5 - 18.0 mg/L Final     Comment:     (Note)  Test developed and characteristics determined by [employer]. See Compliance Statement B: [email or web address]       Iron   Date Value Ref Range Status   05/02/2013 41 35 - 180 ug/dL Final     Cholesterol   Date Value Ref Range Status   05/25/2018 100 <200 mg/dL Final     Triglycerides   Date Value Ref Range Status   05/25/2018 200 (H) <150 mg/dL Final     Comment:     Borderline high:  150-199 mg/dl  High:             200-499 mg/dl  Very high:       >499 mg/dl       HDL Cholesterol   Date Value Ref Range Status   05/25/2018 34 (L) >39 mg/dL Final     LDL Cholesterol Calculated   Date Value Ref Range Status   05/25/2018 26 <100 mg/dL Final     Comment:     Desirable:       <100 mg/dl     VLDL-Cholesterol   Date Value Ref Range Status   05/02/2013 12 0 - 30 mg/dL Final     Cholesterol/HDL Ratio   Date Value Ref Range Status   05/02/2013 2.5 0.0 - 5.0 Final       Date: 3/15/18  Total 25-Hydroxyvitamin D: low end normal   Vitamin A: WNL  Vitamin E: WNL  Iron: N/A  Ferritin: WNL, however last checked 2016  OGTT: Impaired   Lipid Panel: Slightly low HDL, elevated TGs    Current Vitamin/Mineral Prescription R/T deficiency/malabsorption: Multivitamin, taking mens One a Day + Vitamin D.   Comments: Demario states that his CF vitamin has been discontinued due to starting Accutane this spring. He reports that he is consistently taking his OTC vitamin and supplemental Vitamin D. Will monitor ability to resume CF vitamin.     FOLLOW-UP FROM RECENT VISITS  Weight trends - overall stable, no weight loss  Vitamins - changed off CF vitamin for Accutane. DEXA and vitamins otherwise relatively WNL    NUTRITION DIAGNOSIS  Impaired nutrient utilization r/t CF related diabetes, pancreatic insufficiency and CF hypermetabolism AEB requires PERT, impaired glucose tolerance and high kcal/pro diet to maintain  nutrition and health status  INTERVENTIONS/RECOMMENDATIONS  Reviewed present nutritional status and goals with Demario and his partner Oriana. Encouraged ongoing adequate oral intakes.   Discussed continuing one a day vitamin + supplemental Vitamin D at this time; will recheck annuals this spring and assess need/ability to resume CF vitamin.     Education/Training: Per protocol   Patient Understanding: Good  Expected Compliance: Good  Follow-Up Plans: Per protocol     GOALS:  1. Po intakes ./= 75% assessed nutrition needs.   2. Maintain BMI >/= 23 kg/m2.   3. WNL vitamin levels.      FOLLOW-UP/MONITORING:  Visit patient within 6 month(s)    Time Spent In Face-to-Face Patient Interactions: 15 min      Eboni Dent RD, SERENA, [facility]  Pediatric Cystic Fibrosis & Pulmonary Dietitian  [place] [facility]  Pager #[phone]  Phone #[phone]

## 2018-11-23 NOTE — LETTER
11/23/2018       RE: Demario Abbasi  [address]  [place] [address]     Dear Colleague,    Thank you for referring your patient, Demario Abbasi, to the [facility] L[facility]G SCIENCE [facility] at [facility]. Please see a copy of my visit note below.    CF Annual Nutrition Assessment    Reason for Assessment  Assessed during [facility] r/t increased nutrition risk with diagnosis of CF per protocol    Nutrition Significant PMH  Mild Lung Disease   Pancreatic Insufficient   Glucose Intolerance    Social Assessment  Living situation: Living near [facility] in [facility] with 3 roommates.   Work/School/Disability: Working at [employer] doing [employer] - reports he is doing lots of lifting at work. 13 hrs per week at this time this semester, plans to decrease hours next semester due to credit load.   Food Insecurity:  None    Anthropometric Assessment  Height: 177.8 cm  IBW based on BMI 22 for females and 23 for males per CF Foundation recs: 73  Today's Weight: 76.3 kg (actual weight)   %IBW: 104  BMI: Body mass index is  Body mass index is 24.14 kg/(m^2).   Current weight is considered: Normal BMI and at CF goal    Physical Activity/Exercise: Limited. Reports walking to class, otherwise plans to increase activity at some point.     Wt Readings from Last 10 Encounters:   11/23/18 76.3 kg (168 lb 3.4 oz)   08/23/18 75.2 kg (165 lb 12.6 oz)   08/23/18 77.1 kg (170 lb)   05/25/18 76 kg (167 lb 9.6 oz)   05/25/18 76 kg (167 lb 9.6 oz)   04/10/18 76.1 kg (167 lb 12.3 oz)   03/17/18 75.7 kg (166 lb 14.2 oz)   02/02/18 75.3 kg (166 lb)   01/04/18 75.4 kg (166 lb 3.6 oz)   07/20/17 74.6 kg (164 lb 7.4 oz)       MALNUTRITION  % Intake:  No decreased intake noted  % Weight Loss:  None noted  Subcutaneous Fat Loss:  None observed  Muscle Loss:  None observed  Handgrip Strength:  Not Applicable  Fluid Accumulation/Edema:  None noted  Malnutrition Diagnosis: Patient  does not meet two of the above criteria necessary for diagnosing malnutrition in the context of CF.     Pancreatic Enzymes  Brand:  Creon [id number]  Dosin-6 with meals, 2-3 with snacks  Are you taking enzymes as recommended:Yes     High dose providing 2359 units lipase/kg/meal which is within the recommended range per CF Foundation to inhibit fibrosing colonopathy  Estimated Daily Amount (if meal dose is >2500 units lipase/kg/meal): 20-25 caps per day = 4229-90519 units lipase/kg/day     Signs of Malabsorption:  No  Enzyme Program:  None    Diet History and Assessment  Diet Preferences/Allergies.Intolerances: Following a high calorie/protein diet. KNFA.   Appetite Stimulant Rx:  No  Intake Recall/Comments:  Demario is eating very well and has a great appetite. Denies declines in PO. Eating mostly lunch and dinner. Eats lunch in the school cafe (sandwiches, pastas) otherwise will cook meat + eggs. Will eat dinner at home of similar cuisine.     Calcium: Drinking 1 gallon every 3 days. Eating yogurt and cheese.   Salt: Adding salt to foods. Also eating salty fast food.   Hydration: Adequate per patient report.     Supplements: [place] instant breakfast on occasion (typically on days working) buying this at local [employer].     Tube Feeding: None    Estimated Energy and Protein Needs  Estimation based on weight maintenance with Mild lung disease and pancreatic insufficient.     BEE: 1845 kcal/day   7805-9909 kcals/day = ~35-45 kcal/kg (BEE x 1.5-1.75%)   91- 114 g protein/day = 1.2-1.5 g/kg    Laboratory Assessment    Vitamin A   Date Value Ref Range Status   2018 0.52 0.30 - 1.20 mg/L Final     Vitamin D Deficiency screening   Date Value Ref Range Status   03/15/2018 28 20 - 75 ug/L Final     Comment:     Season, race, dietary intake, and treatment affect the concentration of   25-hydroxy-Vitamin D. Values may decrease during winter months and increase   during summer months. Values 20-29 ug/L may indicate Vitamin  D insufficiency   and values <20 ug/L may indicate Vitamin D deficiency.  Vitamin D determination is routinely performed by an immunoassay specific for   25 hydroxyvitamin D3.  If an individual is on vitamin D2 (ergocalciferol)   supplementation, please specify 25 OH vitamin D2 and D3 level determination by   LCMSMS test [id number].       Vitamin E   Date Value Ref Range Status   03/15/2018 7.4 5.5 - 18.0 mg/L Final     Comment:     (Note)  Test developed and characteristics determined by [facility]. See Compliance Statement B: deviantART/[employer]       Iron   Date Value Ref Range Status   05/02/2013 41 35 - 180 ug/dL Final     Cholesterol   Date Value Ref Range Status   05/25/2018 100 <200 mg/dL Final     Triglycerides   Date Value Ref Range Status   05/25/2018 200 (H) <150 mg/dL Final     Comment:     Borderline high:  150-199 mg/dl  High:             200-499 mg/dl  Very high:       >499 mg/dl       HDL Cholesterol   Date Value Ref Range Status   05/25/2018 34 (L) >39 mg/dL Final     LDL Cholesterol Calculated   Date Value Ref Range Status   05/25/2018 26 <100 mg/dL Final     Comment:     Desirable:       <100 mg/dl     VLDL-Cholesterol   Date Value Ref Range Status   05/02/2013 12 0 - 30 mg/dL Final     Cholesterol/HDL Ratio   Date Value Ref Range Status   05/02/2013 2.5 0.0 - 5.0 Final       Date: 3/15/18  Total 25-Hydroxyvitamin D: low end normal   Vitamin A: WNL  Vitamin E: WNL  Iron: N/A  Ferritin: WNL, however last checked 2016  OGTT: Impaired   Lipid Panel: Slightly low HDL, elevated TGs    Current Vitamin/Mineral Prescription R/T deficiency/malabsorption: Multivitamin, taking mens One a Day + Vitamin D.   Comments: Demario states that his CF vitamin has been discontinued due to starting Accutane this spring. He reports that he is consistently taking his OTC vitamin and supplemental Vitamin D. Will monitor ability to resume CF vitamin.     FOLLOW-UP FROM RECENT VISITS  Weight trends - overall stable, no weight  loss  Vitamins - changed off CF vitamin for Accutane. DEXA and vitamins otherwise relatively WNL    NUTRITION DIAGNOSIS  Impaired nutrient utilization r/t CF related diabetes, pancreatic insufficiency and CF hypermetabolism AEB requires PERT, impaired glucose tolerance and high kcal/pro diet to maintain nutrition and health status  INTERVENTIONS/RECOMMENDATIONS  Reviewed present nutritional status and goals with Demario and his partner Oriana. Encouraged ongoing adequate oral intakes.   Discussed continuing one a day vitamin + supplemental Vitamin D at this time; will recheck annuals this spring and assess need/ability to resume CF vitamin.     Education/Training: Per protocol   Patient Understanding: Good  Expected Compliance: Good  Follow-Up Plans: Per protocol     GOALS:  1. Po intakes ./= 75% assessed nutrition needs.   2. Maintain BMI >/= 23 kg/m2.   3. WNL vitamin levels.      FOLLOW-UP/MONITORING:  Visit patient within 6 month(s)    Time Spent In Face-to-Face Patient Interactions: 15 min      SERENA Lyn Mai, RD, [facility]  Pediatric Cystic Fibrosis & Pulmonary Dietitian  [facility]  Pager #[phone]  Phone #[phone]      Reason for Visit  Demario Abbasi is a 23 year old year old male who is being seen for RECHECK (Cystic Fibrosis )  Assessment and plan:   Demario Abbasi is a 23 year old male with cystic fibrosis, pancreatic insufficiency, depression, acne and impaired glucose tolerance who is s/p RUL resection for recurrent exacerbations in 2009. He transitioned to the adult CF program earlier this year.     Pulmonary: The patient reports very good exercise tolerance.  He is oxygenating well.  PFTs reveal a moderate restrictive ventilatory defect related to his previous lobectomy.  Lung function is otherwise stable.  He does not appear to be having an exacerbation at this time.  He will continue his current airway clearance therapy.  He will continue chronic azithromycin and  every other month tobramycin nebs.  I have encouraged him to increase his aerobic activity to augment his airway clearance and possibly help with the fatigue (see below).    The patient is interested and the [facility] vest due to portability.  I will ask our respiratory therapist to get in touch with him regarding this option.    Pancreatic insufficiency: The patient denies symptoms of malabsorption.  His weight is stable in an adequate range.  He will continue his current vitamins and enzymes.    Glucose intolerance: The patient indicated that he is interested in participating in Dr. Kowalski's study.  I have emailed  Dr. Kowalski to contact the  patient regarding eligibility.    Acne: Continue Accutane.  While on Accutane he will hold his MVW.  He will continue his increased vitamin D supplementation.  Once he has completed his Accutane he will return to his baseline vitamin supplementation.    Depression: The patient reports he has been on antidepressants, mostly citalopram since seventh grade.  We discussed the possibility of stopping it in the future.  Based on his current stresses related to work and school, this does not appear to be the appropriate time but could be considered in the future.  It is unclear whether his fatigue (see below) might be a manifestation of depression or a side effect of the medication.    Reflux: Adequately controlled with current pantoprazole.    Fatigue/lack of energy: Etiology of fatigue is unclear.  I reviewed his annual studies from last spring and there is no explanation.  I will check a hemoglobin, TSH and a testosterone today.  Full annual studies will be completed with his next visit.    Healthcare maintenance: The patient is due for annual studies with glucose tolerance testing but no DEXA at next visit.  He did receive an influenza vaccine locally.    Labs today.  Follow-up in 3 months with annual studies including glucose tolerance testing but no DEXA.  In addition to my visit,  "the patient was evaluated by the dietitian today.    Zana Lilly   CF HPI  The patient was seen and examined by Zana Lilly   Demario Abbasi is a 23 year old male with cystic fibrosis, pancreatic insufficiency, depression, acne and impaired glucose tolerance who is s/p RUL resection for recurrent exacerbations in 2009.     Since his last visit, he reports that he has been busy with school and work but otherwise generally well.  He did have a \"cold\" which resolved without intervention.  Breathing is comfortable at rest and with all usual activity.  He reports his cough is at baseline productive of sputum ranging from clear to green to brown, no recent change in the color or volume.  He denies hemoptysis.  He denies chest pain.  He is doing Vest ThAIRapy 2-3 times daily with standard settings.  He denies any fever, chills or night sweats.  He does not do any regular exercise but is quite active at work mostly with lifting.    He reports intermittent  decrease in energy recently.  He cannot describe it more specifically.  He does sleep 6-8 hours per night without difficulty.    Review of systems:  Appetite is good.  No ear pain, sore throat, sinus pain or rhinorrhea.  No nausea, vomiting, diarrhea or abdominal pain.  Denies anxiety or depression although his significant other did note some anxiety symptoms over the summer when he ran out of his citalopram which they attributed to \"withdrawal symptoms\".    The patient received his influenza vaccine through a local clinic in September.      A complete ROS was otherwise negative except as noted in the HPI.    Current Outpatient Prescriptions   Medication     albuterol (2.5 MG/3ML) 0.083% neb solution     albuterol (ALBUTEROL) 108 (90 BASE) MCG/ACT Inhaler     amylase-lipase-protease (CREON 36) 06142 units CPEP     azelastine (ASTELIN) 0.1 % spray     azithromycin (ZITHROMAX) 500 MG tablet     Cholecalciferol (VITAMIN D3) 3000 units TABS     " "citalopram (CELEXA) 20 MG tablet     dornase alpha (PULMOZYME) 1 MG/ML neb solution     fexofenadine (ALLEGRA) 180 MG tablet     fluconazole (DIFLUCAN) 150 MG tablet     fluticasone (FLONASE) 50 MCG/ACT spray     ISOtretinoin (ACCUTANE PO)     Multiple Vitamins-Minerals (MVW COMPLETE FORMULATION ) CAPS     pantoprazole (PROTONIX) 20 MG EC tablet     sodium chloride inhalant 7 % NEBU neb solution     tobramycin, PF, (COBY) 300 MG/5ML neb solution     No current facility-administered medications for this visit.      Allergies   Allergen Reactions     Augmentin      Past Medical History:   Diagnosis Date     CF (cystic fibrosis) (H)      Impaired glucose tolerance      Pancreatic insufficiency      S/P lobectomy of lung 8/4/2015    Right upper lobectomy - 2009       Past Surgical History:   Procedure Laterality Date     HC LAP,INGUINAL HERNIA REPR,INITIAL  2002     LOBECTOMY LUNG Right 2009    Right upper lobe       Social History     Social History     Marital status: Single     Spouse name: N/A     Number of children: N/A     Years of education: N/A     Occupational History           Social History Main Topics     Smoking status: Passive Smoke Exposure - Never Smoker     Smokeless tobacco: Current User     Types: Chew      Comment: dad smokes     Alcohol use Yes      Comment: <2 drinks per week     Drug use: Not on file     Sexual activity: Not on file     Other Topics Concern     Not on file     Social History Narrative    Lives with parents [place] the summer [place] farm and 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 2 dogs.          /74 (BP Location: Right arm, Patient Position: Chair, Cuff Size: Adult Regular)  Pulse 67  Resp 16  Ht 1.778 m (5' 10\")  Wt 76.3 kg (168 lb 3.4 oz)  SpO2 97%  BMI 24.14 kg/m2  Body mass index is 24.14 kg/(m^2).  Exam:   GENERAL APPEARANCE: Well developed, well nourished, alert, and in no apparent distress.  EYES: PERRL, EOMI  HENT: " Nasal mucosa with edema and no hyperemia. No nasal polyps.  EARS: Canals clear, TMs normal  MOUTH: Oral mucosa is moist, without any lesions, no tonsillar enlargement, no oropharyngeal exudate.  NECK: supple, no masses, no thyromegaly.  LYMPHATICS: No significant axillary, cervical, or supraclavicular nodes.  RESP: normal percussion, good air flow throughout.  No crackles. No rhonchi. No wheezes.  CV: Normal S1, S2, regular rhythm, normal rate. No murmur.  No rub. No gallop. No LE edema.   ABDOMEN:  Bowel sounds normal, soft, nontender, no HSM or masses.   MS: extremities normal. No clubbing. No cyanosis.  SKIN: no rash on limited exam  NEURO: Mentation intact, speech normal, normal strength and tone, normal gait and stance  PSYCH: mentation appears normal. and affect normal/bright  Results:  Recent Results (from the past 168 hour(s))   General PFT Lab (Please always keep checked)    Collection Time: 11/23/18  7:53 AM   Result Value Ref Range    FVC-Pred 5.59 L    FVC-Pre 3.70 L    FVC-%Pred-Pre 66 %    FEV1-Pre 2.96 L    FEV1-%Pred-Pre 62 %    FEV1FVC-Pred 84 %    FEV1FVC-Pre 80 %    FEFMax-Pred 10.19 L/sec    FEFMax-Pre 8.52 L/sec    FEFMax-%Pred-Pre 83 %    FEF2575-Pred 5.05 L/sec    FEF2575-Pre 2.80 L/sec    GUD2641-%Pred-Pre 55 %    ExpTime-Pre 6.93 sec    FIFMax-Pre 3.62 L/sec    FEV1FEV6-Pred 84 %    FEV1FEV6-Pre 80 %     Results as noted above.    PFT Interpretation:  Moderate restrictive ventilatory defect.  Increased from previous.  Similar to recent best.  Valid Maneuver    CF Exacerbation  Absent     Again, thank you for allowing me to participate in the care of your patient.      Sincerely,    Zana Lilly MD

## 2018-11-23 NOTE — PROGRESS NOTES
Reason for Visit  Demario Abbasi is a 23 year old year old male who is being seen for RECHECK (Cystic Fibrosis )  Assessment and plan:   Demario Abbasi is a 23 year old male with cystic fibrosis, pancreatic insufficiency, depression, acne and impaired glucose tolerance who is s/p RUL resection for recurrent exacerbations in 2009. He transitioned to the adult CF program earlier this year.     Pulmonary: The patient reports very good exercise tolerance.  He is oxygenating well.  PFTs reveal a moderate restrictive ventilatory defect related to his previous lobectomy.  Lung function is otherwise stable.  He does not appear to be having an exacerbation at this time.  He will continue his current airway clearance therapy.  He will continue chronic azithromycin and every other month tobramycin nebs.  I have encouraged him to increase his aerobic activity to augment his airway clearance and possibly help with the fatigue (see below).    The patient is interested and the [facility] vest due to portability.  I will ask our respiratory therapist to get in touch with him regarding this option.    Pancreatic insufficiency: The patient denies symptoms of malabsorption.  His weight is stable in an adequate range.  He will continue his current vitamins and enzymes.    Glucose intolerance: The patient indicated that he is interested in participating in Dr. Kowalski's study.  I have emailed  Dr. Kowalski to contact the  patient regarding eligibility.    Acne: Continue Accutane.  While on Accutane he will hold his MVW.  He will continue his increased vitamin D supplementation.  Once he has completed his Accutane he will return to his baseline vitamin supplementation.    Depression: The patient reports he has been on antidepressants, mostly citalopram since seventh grade.  We discussed the possibility of stopping it in the future.  Based on his current stresses related to work and school, this does not appear to be the appropriate  "time but could be considered in the future.  It is unclear whether his fatigue (see below) might be a manifestation of depression or a side effect of the medication.    Reflux: Adequately controlled with current pantoprazole.    Fatigue/lack of energy: Etiology of fatigue is unclear.  I reviewed his annual studies from last spring and there is no explanation.  I will check a hemoglobin, TSH and a testosterone today.  Full annual studies will be completed with his next visit.    Healthcare maintenance: The patient is due for annual studies with glucose tolerance testing but no DEXA at next visit.  He did receive an influenza vaccine locally.    Labs today.  Follow-up in 3 months with annual studies including glucose tolerance testing but no DEXA.  In addition to my visit, the patient was evaluated by the dietitian today.    Zana Lilly   CF HPI  The patient was seen and examined by Zana Lilly   Demario Abbasi is a 23 year old male with cystic fibrosis, pancreatic insufficiency, depression, acne and impaired glucose tolerance who is s/p RUL resection for recurrent exacerbations in 2009.     Since his last visit, he reports that he has been busy with school and work but otherwise generally well.  He did have a \"cold\" which resolved without intervention.  Breathing is comfortable at rest and with all usual activity.  He reports his cough is at baseline productive of sputum ranging from clear to green to brown, no recent change in the color or volume.  He denies hemoptysis.  He denies chest pain.  He is doing Vest ThAIRapy 2-3 times daily with standard settings.  He denies any fever, chills or night sweats.  He does not do any regular exercise but is quite active at work mostly with lifting.    He reports intermittent  decrease in energy recently.  He cannot describe it more specifically.  He does sleep 6-8 hours per night without difficulty.    Review of systems:  Appetite is good.  No ear " "pain, sore throat, sinus pain or rhinorrhea.  No nausea, vomiting, diarrhea or abdominal pain.  Denies anxiety or depression although his significant other did note some anxiety symptoms over the summer when he ran out of his citalopram which they attributed to \"withdrawal symptoms\".    The patient received his influenza vaccine through a local clinic in September.      A complete ROS was otherwise negative except as noted in the HPI.    Current Outpatient Prescriptions   Medication     albuterol (2.5 MG/3ML) 0.083% neb solution     albuterol (ALBUTEROL) 108 (90 BASE) MCG/ACT Inhaler     amylase-lipase-protease (CREON 36) 57348 units CPEP     azelastine (ASTELIN) 0.1 % spray     azithromycin (ZITHROMAX) 500 MG tablet     Cholecalciferol (VITAMIN D3) 3000 units TABS     citalopram (CELEXA) 20 MG tablet     dornase alpha (PULMOZYME) 1 MG/ML neb solution     fexofenadine (ALLEGRA) 180 MG tablet     fluconazole (DIFLUCAN) 150 MG tablet     fluticasone (FLONASE) 50 MCG/ACT spray     ISOtretinoin (ACCUTANE PO)     Multiple Vitamins-Minerals (MVW COMPLETE FORMULATION ) CAPS     pantoprazole (PROTONIX) 20 MG EC tablet     sodium chloride inhalant 7 % NEBU neb solution     tobramycin, PF, (COBY) 300 MG/5ML neb solution     No current facility-administered medications for this visit.      Allergies   Allergen Reactions     Augmentin      Past Medical History:   Diagnosis Date     CF (cystic fibrosis) (H)      Impaired glucose tolerance      Pancreatic insufficiency      S/P lobectomy of lung 8/4/2015    Right upper lobectomy - 2009       Past Surgical History:   Procedure Laterality Date     HC LAP,INGUINAL HERNIA REPR,INITIAL  2002     LOBECTOMY LUNG Right 2009    Right upper lobe       Social History     Social History     Marital status: Single     Spouse name: N/A     Number of children: N/A     Years of education: N/A     Occupational History           Social History Main Topics     Smoking status: " "Passive Smoke Exposure - Never Smoker     Smokeless tobacco: Current User     Types: Chew      Comment: dad smokes     Alcohol use Yes      Comment: <2 drinks per week     Drug use: Not on file     Sexual activity: Not on file     Other Topics Concern     Not on file     Social History Narrative    Lives with parents during the summer on family farm and 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 2 dogs.          /74 (BP Location: Right arm, Patient Position: Chair, Cuff Size: Adult Regular)  Pulse 67  Resp 16  Ht 1.778 m (5' 10\")  Wt 76.3 kg (168 lb 3.4 oz)  SpO2 97%  BMI 24.14 kg/m2  Body mass index is 24.14 kg/(m^2).  Exam:   GENERAL APPEARANCE: Well developed, well nourished, alert, and in no apparent distress.  EYES: PERRL, EOMI  HENT: Nasal mucosa with edema and no hyperemia. No nasal polyps.  EARS: Canals clear, TMs normal  MOUTH: Oral mucosa is moist, without any lesions, no tonsillar enlargement, no oropharyngeal exudate.  NECK: supple, no masses, no thyromegaly.  LYMPHATICS: No significant axillary, cervical, or supraclavicular nodes.  RESP: normal percussion, good air flow throughout.  No crackles. No rhonchi. No wheezes.  CV: Normal S1, S2, regular rhythm, normal rate. No murmur.  No rub. No gallop. No LE edema.   ABDOMEN:  Bowel sounds normal, soft, nontender, no HSM or masses.   MS: extremities normal. No clubbing. No cyanosis.  SKIN: no rash on limited exam  NEURO: Mentation intact, speech normal, normal strength and tone, normal gait and stance  PSYCH: mentation appears normal. and affect normal/bright  Results:  Recent Results (from the past 168 hour(s))   General PFT Lab (Please always keep checked)    Collection Time: 11/23/18  7:53 AM   Result Value Ref Range    FVC-Pred 5.59 L    FVC-Pre 3.70 L    FVC-%Pred-Pre 66 %    FEV1-Pre 2.96 L    FEV1-%Pred-Pre 62 %    FEV1FVC-Pred 84 %    FEV1FVC-Pre 80 %    FEFMax-Pred 10.19 L/sec    FEFMax-Pre 8.52 L/sec    " FEFMax-%Pred-Pre 83 %    FEF2575-Pred 5.05 L/sec    FEF2575-Pre 2.80 L/sec    [id number]-%Pred-Pre 55 %    ExpTime-Pre 6.93 sec    FIFMax-Pre 3.62 L/sec    FEV1FEV6-Pred 84 %    FEV1FEV6-Pre 80 %                                   Results as noted above.    PFT Interpretation:  Moderate restrictive ventilatory defect.  Increased from previous.  Similar to recent best.  Valid Maneuver                 CF Exacerbation  Absent

## 2018-11-23 NOTE — PATIENT INSTRUCTIONS
Cystic Fibrosis Self-Care Plan    RECOMMENDATIONS:   Margaret will call you about the monarch  I will ask Dr. Kowalski to call you about the diabetes study  Increase exercise (20-30 minutes breathing hard every other day.)  Otherwise continue current medication, nebs and vest therapy.           CF Nurse line:          Kevin Rodríguez   [phone]            CF Resp Therapist: Margaret Werner            [phone]   CF Dietitians:           Sherie Aparicio            [phone]                       Saba Gill                       [phone]   CF Diabetes Nurse: Ashley Cleveland             [phone]    CF Social Workers:  Prerna Paniagua             [phone]                Velma Kolb            [phone]  CF Pharmacist:        Ashley Kirby                              [phone]  [email or web address]         MRN: [id number]   Clinic Date: November 23, 2018   Patient: Demario Abbasi     Annual Studies:   IGG   Date Value Ref Range Status   03/15/2018 1310 695 - 1620 mg/dL Final     Insulin   Date Value Ref Range Status   03/16/2018 Canceled, Test credited 3 - 25 mU/L Final     Comment:     Unsatisfactory specimen - hemolyzed  CALLED TO URU6 RN JAEL ALEXANDRA ON 03/16 AT 1300 BY YW       There are no preventive care reminders to display for this patient.      Pulmonary Function Tests  FEV1: amount of air you can blow out in 1 second  FVC: total amount of air you can take in and blow out    Your Goals:         PFT Latest Ref Rng & Units 11/23/2018   FVC L 3.70   FEV1 L 2.96   FVC% % 66   FEV1% % 62          Airway Clearance: The Most Important Way to Keep Your Lungs Healthy  Vest Settings:    [facility] Frequencies: 8, 9, 10 Pressure 10 Then, Frequencies 18, 19, 20 Pressure 6      RespirTech: Quick Start with Pressure of     Do each frequency for 5 minutes; Deflate vest after each frequency & cough 3 times before beginning the next setting.    Vest and Neb Therapy should be done 2-3  times/day.    Good Nutrition Can Improve Lung Function and Overall Health     Take ALL of your vitamins with food     Take 1/2 of your enzymes before EVERY meal/snack and the other 1/2 mid-meal/snack    Wt Readings from Last 3 Encounters:   11/23/18 76.3 kg (168 lb 3.4 oz)   08/23/18 75.2 kg (165 lb 12.6 oz)   08/23/18 77.1 kg (170 lb)       Body mass index is 24.14 kg/(m^2).         National CF Foundation Recommendations for BMI in CF Adults: Women: at least 22 Men: at least 23        Controlling Blood Sugars Helps Prevent Lung Infections & Improves Nutrition  Test blood sugar:     In the morning before eating (goal is )     2 hours after a meal (goal is less than 150)     When pre-meal glucose is greater than 150 add correction     At bedtime (if less than 100 eat a snack with 15 grams of carbohydrates  Last A1C Results:   Hemoglobin A1C   Date Value Ref Range Status   11/03/2016 5.9 4.3 - 6.0 % Final         If diabetic, measure A1C every 6 months. Goal: Under 7%    Staying Healthy    Research:  If you are interested in learning about research opportunities or have questions, please contact the CF Research Team at [phone] or [email or web address].      CF Foundation:  Compass is a personalized resource service to help you with the insurance, financial, legal and other issues you are facing.  It's free, confidential and available to anyone with CF.  Ask your  for more information or contact Compass directly at 030-COMPASS ([phone]) or compass@cff.org, or learn more at cff.org/compass.

## 2018-11-24 LAB — TESTOST SERPL-MCNC: 243 NG/DL (ref 240–950)

## 2018-11-26 LAB
ACID FAST STN SPEC QL: NORMAL
SPECIMEN SOURCE: NORMAL

## 2018-12-10 DIAGNOSIS — E84.9 CYSTIC FIBROSIS (H): ICD-10-CM

## 2018-12-12 RX ORDER — CHOLECALCIFEROL (VITAMIN D3) 50 MCG
TABLET ORAL
Qty: 200 TABLET | Refills: 3 | Status: SHIPPED | OUTPATIENT
Start: 2018-12-12 | End: 2019-03-14

## 2019-01-02 DIAGNOSIS — E84.9 CF (CYSTIC FIBROSIS) (H): ICD-10-CM

## 2019-01-03 RX ORDER — PANTOPRAZOLE SODIUM 20 MG/1
20 TABLET, DELAYED RELEASE ORAL DAILY
Qty: 30 TABLET | Refills: 3 | Status: SHIPPED | OUTPATIENT
Start: 2019-01-03 | End: 2019-05-21

## 2019-01-23 NOTE — PROGRESS NOTES
Please palmira as discussed   Reason for Visit:    Demario Abbasi is a 23 year old male who is being seen for RECHECK (Cystic fibrosis)        Assesment and Plan: Demario Abbasi is a 23 year old male with cystic fibrosis, pancreatic insufficiency, depression, acne and impaired glucose tolerance who is s/p RUL resection for exacerbations in 2009.     1. Pulmonary: He appears to be doing well from a pulmonary perspective. Exercise is well tolerated. He is oxygenating well at 96% SpO2. PFTs are improved slightly from May and remain very stable. He does not appear to be experiencing a pulmonary exacerbation at this time. He does have a history of growing achromobacter and MSSA. He will continue on current Pulmozyme, albuterol, tobramycin nebs, and chronic azithromycin. He will continue current vest therapy x2-3 per day.     2. CF related pancreatic insufficiency: He does not report any symptoms consistent with malabsorption. His weight is adequate and similar to baseline. He will continue with Creon with meals and snacks.     3. Glucose intolerance: He does have h/o impaired OGTT. He also reports occasional symptomatic hypoglycemia when skipping meals. I discussed Dr. Kowalski's study on insulin use in patients like him. Her team will reach out to him to discuss his interest in the study. If he elects not to participate in this study, I will refer him to Dr. Cross for further evaluation.    4. Acne Vulgaris: Continue Accutane. Hold MVW and begin Vit D supplementation while on Accutane. Vitamin A wnl in May.    5. Depression: Well controlled at this time. Continue current citalopram.     6. GERD: Appears to be well controlled with pantoprazole. No changes.     7. 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     8. Health Maintenance: He will be due for annual studies in March.     Follow-up in 3 months with PFTs and sputum culture.      CF HPI:    The patient was seen and examined by Zana  Omar Lilly MD    Demario Abbasi is a 23 year old male with cystic fibrosis, pancreatic insufficiency, depression, acne and impaired glucose tolerance who is s/p RUL resection for recurrent exacerbations in 2009. He transitioned to the adult CF program earlier this year.     Today, the patient is feeling well. He reports no recent acute illnesses. Breathing is comfortable at rest. Walking between classes is his primary exercise and is well tolerated. Baseline cough productive of clear/white/brown/green sputum. No hemoptysis. No CP. No fever, chills, or night sweats.    He is doing vest therapy 30 minutes x2-3 /day at [place] standard frequencies and pressures.       Review of Systems:  CONST: Appetite is good.  ENT: No sinus/ear pain, sore throat, or rhinorrhea.   GI: No nausea, vomiting, or loose stools. No abdominal pain.  ENDO: He does report occasional episodes of symptomatic hypoglycemia associated with skipping meals.   SOC: Starting his 4th year at [employer] studying [employer]. Anticipated having two full years of school remaining.  DERM: Rash on foot well controlled with fluconazole PRN.     A complete ROS was otherwise negative except as noted in the HPI.      Current Outpatient Prescriptions   Medication     adapalene (DIFFERIN) 0.1 % gel     albuterol (2.5 MG/3ML) 0.083% neb solution     albuterol (ALBUTEROL) 108 (90 BASE) MCG/ACT Inhaler     amylase-lipase-protease (CREON 36) 90433 units CPEP     azelastine (ASTELIN) 0.1 % spray     azithromycin (ZITHROMAX) 500 MG tablet     Cholecalciferol (VITAMIN D3) 3000 units TABS     citalopram (CELEXA) 20 MG tablet     clindamycin (CLEOCIN T) 1 % lotion     dornase alpha (PULMOZYME) 1 MG/ML neb solution     fexofenadine (ALLEGRA) 180 MG tablet     fluconazole (DIFLUCAN) 150 MG tablet     fluticasone (FLONASE) 50 MCG/ACT spray     LANsoprazole (PREVACID) 15 MG CR capsule     multivitamin CF formula (MVW COMPLETE FORMULATION ) softgel cap     pantoprazole  "(PROTONIX) 20 MG EC tablet     sodium chloride inhalant 7 % NEBU neb solution     tobramycin, PF, (COBY) 300 MG/5ML neb solution     tretinoin (RETIN-A) 0.05 % cream     No current facility-administered medications for this visit.          Allergies   Allergen Reactions     Augmentin          Past Medical History:   Diagnosis Date     CF (cystic fibrosis) (H)      Impaired glucose tolerance      Pancreatic insufficiency      S/P lobectomy of lung 8/4/2015    Right upper lobectomy - 2009         Past Surgical History:   Procedure Laterality Date     HC LAP,INGUINAL HERNIA REPR,INITIAL  2002     LOBECTOMY LUNG Right 2009    Right upper lobe         Social History     Social History     Marital status: Single     Spouse name: N/A     Number of children: N/A     Years of education: N/A     Occupational History           Social History Main Topics     Smoking status: Passive Smoke Exposure - Never Smoker     Smokeless tobacco: Never Used      Comment: dad smokes     Alcohol use Yes      Comment: <2 drinks per week     Drug use: Not on file     Sexual activity: Not on file     Other Topics Concern     Not on file     Social History Narrative    Lives with parents during the summer [place] family farm and 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 2 dogs.          /55  Pulse 65  Resp 17  Ht 1.778 m (5' 10\")  Wt 75.2 kg (165 lb 12.6 oz)  SpO2 96%  BMI 23.79 kg/m2    Body mass index is 23.79 kg/(m^2).      Exam:   GENERAL APPEARANCE: Well developed, well nourished, alert, and in no apparent distress.    EYES: PERRL, EOMI    HENT: Nasal mucosa with no edema and no hyperemia. No nasal polyps.    EARS: Canals clear, TMs normal    MOUTH: Oral mucosa is moist, without any lesions, no tonsillar enlargement, no oropharyngeal exudate.    NECK: supple, no masses, no thyromegaly.    LYMPHATICS: No significant axillary, cervical, or supraclavicular nodes.    RESP: normal percussion, good air " flow throughout.  No crackles. No rhonchi. No wheezes.    CV: Normal S1, S2, regular rhythm, normal rate. No murmur.  No rub. No gallop. No LE edema.     ABDOMEN:  Bowel sounds normal, soft, nontender, no HSM or masses.     MS: extremities normal. No clubbing. No cyanosis.    SKIN: no rash on limited exam    NEURO: Mentation intact, speech normal, normal strength and tone, normal gait and stance    PSYCH: mentation appears normal. and affect normal/bright.      Results:  Recent Results (from the past 168 hour(s))   Semen Analysis - Azoospermia (VINI)    Collection Time: 08/16/18 11:25 AM   Result Value Ref Range    Collection Method Masturbation     Collection Site VINI     Specimen Type Semen     Lab Receipt Time 11:27 AM     Time of Analysis 11:45 AM     Analysis Temp - Centigrade 22 centigrade    Abstinence days 3 2 - 7 days    Liquefied Yes yes/no    Viscous Yes yes/no    Agglutination No yes/no    pH 6.4 (A) 7.2 pH    Volume 0.2 (A) 1.5 ml    Concentration 0 (A) 15 million/ml    Total Number 0 (A) 39 million    Progressive motility 0 (A) 32 %    Non-progressive motility 0 %    Immotile 0 %    Total Progressive Motile 0 (A) 15.6 million    Round Cells <0.1 2 million/ml    WBC  %    Immature Sperm  %    Cell Fragments  %    Consent to Release to Partner Yes    General PFT Lab (Please always keep checked)    Collection Time: 08/23/18 10:21 AM   Result Value Ref Range    FVC-Pred 5.59 L    FVC-Pre 3.52 L    FVC-%Pred-Pre 63 %    FEV1-Pre 2.84 L    FEV1-%Pred-Pre 60 %    FEV1FVC-Pred 84 %    FEV1FVC-Pre 81 %    FEFMax-Pred 10.18 L/sec    FEFMax-Pre 8.40 L/sec    FEFMax-%Pred-Pre 82 %    FEF2575-Pred 5.06 L/sec    FEF2575-Pre 2.69 L/sec    ADV6315-%Pred-Pre 53 %    ExpTime-Pre 7.69 sec    FIFMax-Pre 3.79 L/sec    FEV1FEV6-Pred 84 %    FEV1FEV6-Pre 81 %                    Results as noted above.    PFT Interpretation:  Severe restrictive ventilatory defect.  Unchanged from previous.   Similar to recent best.  Valid  Maneuver    CF Exacerbation:   Absent      Scribe Disclosure:   I, Bernardo Garcia, am serving as a scribe; to document services personally performed by Zana Lilly MD based on data collection and the provider's statements to me.     Provider Disclosure:  I agree with above History, Review of Systems, Physical exam and Plan.  I have reviewed the content of the documentation and have edited it as needed. I have personally performed the services documented here and the documentation accurately represents those services and the decisions I have made.      Electronically signed by:  Zana Lilly

## 2019-01-30 DIAGNOSIS — E84.9 CF (CYSTIC FIBROSIS) (H): ICD-10-CM

## 2019-01-30 DIAGNOSIS — E84.0 CYSTIC FIBROSIS WITH PULMONARY EXACERBATION (H): ICD-10-CM

## 2019-01-30 RX ORDER — FLUTICASONE PROPIONATE 50 MCG
SPRAY, SUSPENSION (ML) NASAL
Qty: 16 G | Refills: 3 | Status: SHIPPED | OUTPATIENT
Start: 2019-01-30 | End: 2019-10-14

## 2019-02-14 ENCOUNTER — CARE COORDINATION (OUTPATIENT)
Dept: PULMONOLOGY | Facility: CLINIC | Age: 24
End: 2019-02-14

## 2019-02-14 DIAGNOSIS — E84.0 CYSTIC FIBROSIS WITH PULMONARY MANIFESTATIONS (H): Primary | ICD-10-CM

## 2019-02-14 NOTE — PROGRESS NOTES
"[facility]  February 14, 2019    Susan Li    CF Provider: Zana Lilly MD    Caller: Mother, Mirna    Clinical information:  Demario Abbasi was having an \"episode again last night\". Mother reports that he was having chest pain, belly pain more on the right side then left and couldn't catch his breath. No hemoptysis. Did two nebs back to back with some relief. Taking some Ibuprofen for the pain. Stooling normally. Sputum green to brownish in color. Vesting twice a day. Does not feel that he can do more because of his class schedule. Mother reports that he is also prone to anxiety attacks. Has been on the same dose of Celexa for years.    Plan:   Discussed with Dr Lilly:  Bactrim DS, one TID x3 weeks.  Doxycycline 100 mg BID x3 weeks.  Keep appt as scheduled next week.  Call back with any new or worsening symptoms/concerns.    Caller verbalized understanding of plan and agrees with advice given.     "

## 2019-02-15 RX ORDER — DOXYCYCLINE HYCLATE 100 MG
100 TABLET ORAL 2 TIMES DAILY
Qty: 42 TABLET | Refills: 0 | Status: SHIPPED | OUTPATIENT
Start: 2019-02-15 | End: 2019-03-14

## 2019-02-15 RX ORDER — SULFAMETHOXAZOLE/TRIMETHOPRIM 800-160 MG
1 TABLET ORAL 3 TIMES DAILY
Qty: 63 TABLET | Refills: 0 | Status: SHIPPED | OUTPATIENT
Start: 2019-02-15 | End: 2019-03-14

## 2019-02-22 ENCOUNTER — OFFICE VISIT (OUTPATIENT)
Dept: PULMONOLOGY | Facility: CLINIC | Age: 24
End: 2019-02-22
Attending: INTERNAL MEDICINE
Payer: COMMERCIAL

## 2019-02-22 ENCOUNTER — ANCILLARY PROCEDURE (OUTPATIENT)
Dept: GENERAL RADIOLOGY | Facility: CLINIC | Age: 24
End: 2019-02-22
Payer: COMMERCIAL

## 2019-02-22 ENCOUNTER — INFUSION THERAPY VISIT (OUTPATIENT)
Dept: INFUSION THERAPY | Facility: CLINIC | Age: 24
End: 2019-02-22
Attending: INTERNAL MEDICINE
Payer: COMMERCIAL

## 2019-02-22 ENCOUNTER — OFFICE VISIT (OUTPATIENT)
Dept: PHARMACY | Facility: CLINIC | Age: 24
End: 2019-02-22
Payer: COMMERCIAL

## 2019-02-22 VITALS
HEIGHT: 70 IN | SYSTOLIC BLOOD PRESSURE: 107 MMHG | OXYGEN SATURATION: 96 % | DIASTOLIC BLOOD PRESSURE: 68 MMHG | HEART RATE: 68 BPM | TEMPERATURE: 98.1 F | WEIGHT: 175.8 LBS | BODY MASS INDEX: 25.17 KG/M2

## 2019-02-22 VITALS
DIASTOLIC BLOOD PRESSURE: 68 MMHG | BODY MASS INDEX: 25.22 KG/M2 | HEART RATE: 68 BPM | WEIGHT: 175.8 LBS | TEMPERATURE: 98.1 F | SYSTOLIC BLOOD PRESSURE: 107 MMHG | OXYGEN SATURATION: 96 %

## 2019-02-22 DIAGNOSIS — K86.89 PANCREATIC INSUFFICIENCY: ICD-10-CM

## 2019-02-22 DIAGNOSIS — E84.9 CYSTIC FIBROSIS (H): Primary | ICD-10-CM

## 2019-02-22 DIAGNOSIS — K86.89 PANCREATIC INSUFFICIENCY: Primary | ICD-10-CM

## 2019-02-22 DIAGNOSIS — E84.9 CYSTIC FIBROSIS (H): ICD-10-CM

## 2019-02-22 DIAGNOSIS — L70.0 ACNE VULGARIS: ICD-10-CM

## 2019-02-22 DIAGNOSIS — R53.83 FATIGUE, UNSPECIFIED TYPE: ICD-10-CM

## 2019-02-22 DIAGNOSIS — R73.02 IMPAIRED GLUCOSE TOLERANCE: ICD-10-CM

## 2019-02-22 DIAGNOSIS — F33.0 MAJOR DEPRESSIVE DISORDER, RECURRENT EPISODE, MILD (H): ICD-10-CM

## 2019-02-22 LAB
ALBUMIN SERPL-MCNC: 3.3 G/DL (ref 3.4–5)
ALBUMIN UR-MCNC: NEGATIVE MG/DL
ALP SERPL-CCNC: 135 U/L (ref 40–150)
ALT SERPL W P-5'-P-CCNC: 25 U/L (ref 0–70)
ANION GAP SERPL CALCULATED.3IONS-SCNC: 8 MMOL/L (ref 3–14)
APPEARANCE UR: CLEAR
AST SERPL W P-5'-P-CCNC: 24 U/L (ref 0–45)
BASOPHILS # BLD AUTO: 0.1 10E9/L (ref 0–0.2)
BASOPHILS NFR BLD AUTO: 0.8 %
BILIRUB UR QL STRIP: NEGATIVE
BUN SERPL-MCNC: 16 MG/DL (ref 7–30)
CALCIUM SERPL-MCNC: 8.3 MG/DL (ref 8.5–10.1)
CHLORIDE SERPL-SCNC: 107 MMOL/L (ref 94–109)
CHOLEST SERPL-MCNC: 94 MG/DL
CO2 SERPL-SCNC: 22 MMOL/L (ref 20–32)
COLOR UR AUTO: YELLOW
CREAT SERPL-MCNC: 1.05 MG/DL (ref 0.66–1.25)
CREAT UR-MCNC: 152 MG/DL
DEPRECATED CALCIDIOL+CALCIFEROL SERPL-MC: 29 UG/L (ref 20–75)
DIFFERENTIAL METHOD BLD: ABNORMAL
EOSINOPHIL # BLD AUTO: 0.4 10E9/L (ref 0–0.7)
EOSINOPHIL NFR BLD AUTO: 3.9 %
ERYTHROCYTE [DISTWIDTH] IN BLOOD BY AUTOMATED COUNT: 12.7 % (ref 10–15)
ERYTHROCYTE [SEDIMENTATION RATE] IN BLOOD BY WESTERGREN METHOD: 8 MM/H (ref 0–15)
GFR SERPL CREATININE-BSD FRML MDRD: >90 ML/MIN/{1.73_M2}
GGT SERPL-CCNC: 20 U/L (ref 0–75)
GLUCOSE SERPL-MCNC: 105 MG/DL (ref 70–99)
GLUCOSE SERPL-MCNC: 109 MG/DL (ref 70–99)
GLUCOSE SERPL-MCNC: 176 MG/DL (ref 70–99)
GLUCOSE SERPL-MCNC: 205 MG/DL (ref 70–99)
GLUCOSE SERPL-MCNC: 253 MG/DL (ref 70–99)
GLUCOSE SERPL-MCNC: 75 MG/DL (ref 70–99)
GLUCOSE UR STRIP-MCNC: 150 MG/DL
HBA1C MFR BLD: 5.9 % (ref 0–5.6)
HCT VFR BLD AUTO: 39.8 % (ref 40–53)
HDLC SERPL-MCNC: 29 MG/DL
HGB BLD-MCNC: 14 G/DL (ref 13.3–17.7)
HGB UR QL STRIP: NEGATIVE
IGG SERPL-MCNC: 1380 MG/DL (ref 695–1620)
IMM GRANULOCYTES # BLD: 0 10E9/L (ref 0–0.4)
IMM GRANULOCYTES NFR BLD: 0.3 %
INR PPP: 1.18 (ref 0.86–1.14)
INSULIN SERPL-ACNC: 23.4 MU/L (ref 3–25)
INSULIN SERPL-ACNC: 52.7 MU/L (ref 3–25)
INSULIN SERPL-ACNC: 53.7 MU/L (ref 3–25)
INSULIN SERPL-ACNC: 8.5 MU/L (ref 3–25)
INSULIN SERPL-ACNC: NORMAL MU/L (ref 3–25)
IRON SERPL-MCNC: 29 UG/DL (ref 35–180)
KETONES UR STRIP-MCNC: NEGATIVE MG/DL
LDLC SERPL CALC-MCNC: 40 MG/DL
LEUKOCYTE ESTERASE UR QL STRIP: NEGATIVE
LYMPHOCYTES # BLD AUTO: 2.2 10E9/L (ref 0.8–5.3)
LYMPHOCYTES NFR BLD AUTO: 22.7 %
MAGNESIUM SERPL-MCNC: 2 MG/DL (ref 1.6–2.3)
MCH RBC QN AUTO: 30 PG (ref 26.5–33)
MCHC RBC AUTO-ENTMCNC: 35.2 G/DL (ref 31.5–36.5)
MCV RBC AUTO: 85 FL (ref 78–100)
MICROALBUMIN UR-MCNC: <5 MG/L
MICROALBUMIN/CREAT UR: NORMAL MG/G CR (ref 0–17)
MONOCYTES # BLD AUTO: 0.7 10E9/L (ref 0–1.3)
MONOCYTES NFR BLD AUTO: 7.1 %
NEUTROPHILS # BLD AUTO: 6.2 10E9/L (ref 1.6–8.3)
NEUTROPHILS NFR BLD AUTO: 65.2 %
NITRATE UR QL: NEGATIVE
NONHDLC SERPL-MCNC: 66 MG/DL
NRBC # BLD AUTO: 0 10*3/UL
NRBC BLD AUTO-RTO: 0 /100
PH UR STRIP: 5 PH (ref 5–7)
PHOSPHATE SERPL-MCNC: 3.6 MG/DL (ref 2.5–4.5)
PLATELET # BLD AUTO: 235 10E9/L (ref 150–450)
POTASSIUM SERPL-SCNC: 3.8 MMOL/L (ref 3.4–5.3)
PROT SERPL-MCNC: 7.2 G/DL (ref 6.8–8.8)
RBC # BLD AUTO: 4.67 10E12/L (ref 4.4–5.9)
RBC #/AREA URNS AUTO: 1 /HPF (ref 0–2)
SODIUM SERPL-SCNC: 138 MMOL/L (ref 133–144)
SOURCE: ABNORMAL
SP GR UR STRIP: 1.02 (ref 1–1.03)
TRIGL SERPL-MCNC: 130 MG/DL
TSH SERPL DL<=0.005 MIU/L-ACNC: 1.93 MU/L (ref 0.4–4)
UROBILINOGEN UR STRIP-MCNC: 0 MG/DL (ref 0–2)
WBC # BLD AUTO: 9.6 10E9/L (ref 4–11)
WBC #/AREA URNS AUTO: <1 /HPF (ref 0–5)

## 2019-02-22 PROCEDURE — 25000125 ZZHC RX 250: Mod: ZF | Performed by: INTERNAL MEDICINE

## 2019-02-22 PROCEDURE — 87070 CULTURE OTHR SPECIMN AEROBIC: CPT | Performed by: INTERNAL MEDICINE

## 2019-02-22 PROCEDURE — 83735 ASSAY OF MAGNESIUM: CPT | Performed by: INTERNAL MEDICINE

## 2019-02-22 PROCEDURE — 84460 ALANINE AMINO (ALT) (SGPT): CPT | Performed by: INTERNAL MEDICINE

## 2019-02-22 PROCEDURE — 82947 ASSAY GLUCOSE BLOOD QUANT: CPT | Performed by: INTERNAL MEDICINE

## 2019-02-22 PROCEDURE — 99605 MTMS BY PHARM NP 15 MIN: CPT | Performed by: PHARMACIST

## 2019-02-22 PROCEDURE — 83525 ASSAY OF INSULIN: CPT | Performed by: INTERNAL MEDICINE

## 2019-02-22 PROCEDURE — 84450 TRANSFERASE (AST) (SGOT): CPT | Performed by: INTERNAL MEDICINE

## 2019-02-22 PROCEDURE — 87186 SC STD MICRODIL/AGAR DIL: CPT | Performed by: INTERNAL MEDICINE

## 2019-02-22 PROCEDURE — 82977 ASSAY OF GGT: CPT | Performed by: INTERNAL MEDICINE

## 2019-02-22 PROCEDURE — 84446 ASSAY OF VITAMIN E: CPT | Performed by: INTERNAL MEDICINE

## 2019-02-22 PROCEDURE — 82784 ASSAY IGA/IGD/IGG/IGM EACH: CPT | Performed by: INTERNAL MEDICINE

## 2019-02-22 PROCEDURE — 82306 VITAMIN D 25 HYDROXY: CPT | Performed by: INTERNAL MEDICINE

## 2019-02-22 PROCEDURE — 80069 RENAL FUNCTION PANEL: CPT | Performed by: INTERNAL MEDICINE

## 2019-02-22 PROCEDURE — 85025 COMPLETE CBC W/AUTO DIFF WBC: CPT | Performed by: INTERNAL MEDICINE

## 2019-02-22 PROCEDURE — G0463 HOSPITAL OUTPT CLINIC VISIT: HCPCS | Mod: ZF

## 2019-02-22 PROCEDURE — 80061 LIPID PANEL: CPT | Performed by: INTERNAL MEDICINE

## 2019-02-22 PROCEDURE — 83540 ASSAY OF IRON: CPT | Performed by: INTERNAL MEDICINE

## 2019-02-22 PROCEDURE — 82785 ASSAY OF IGE: CPT | Performed by: INTERNAL MEDICINE

## 2019-02-22 PROCEDURE — 84403 ASSAY OF TOTAL TESTOSTERONE: CPT | Performed by: INTERNAL MEDICINE

## 2019-02-22 PROCEDURE — 84443 ASSAY THYROID STIM HORMONE: CPT | Performed by: INTERNAL MEDICINE

## 2019-02-22 PROCEDURE — 85610 PROTHROMBIN TIME: CPT | Performed by: INTERNAL MEDICINE

## 2019-02-22 PROCEDURE — 82043 UR ALBUMIN QUANTITATIVE: CPT | Performed by: INTERNAL MEDICINE

## 2019-02-22 PROCEDURE — 36415 COLL VENOUS BLD VENIPUNCTURE: CPT

## 2019-02-22 PROCEDURE — 84075 ASSAY ALKALINE PHOSPHATASE: CPT | Performed by: INTERNAL MEDICINE

## 2019-02-22 PROCEDURE — 81001 URINALYSIS AUTO W/SCOPE: CPT | Performed by: INTERNAL MEDICINE

## 2019-02-22 PROCEDURE — 84155 ASSAY OF PROTEIN SERUM: CPT | Performed by: INTERNAL MEDICINE

## 2019-02-22 PROCEDURE — 85652 RBC SED RATE AUTOMATED: CPT | Performed by: INTERNAL MEDICINE

## 2019-02-22 PROCEDURE — 84590 ASSAY OF VITAMIN A: CPT | Performed by: INTERNAL MEDICINE

## 2019-02-22 PROCEDURE — 87077 CULTURE AEROBIC IDENTIFY: CPT | Performed by: INTERNAL MEDICINE

## 2019-02-22 PROCEDURE — 83036 HEMOGLOBIN GLYCOSYLATED A1C: CPT | Performed by: INTERNAL MEDICINE

## 2019-02-22 RX ADMIN — ALCOHOL 75 G: 65 GEL TOPICAL at 08:15

## 2019-02-22 ASSESSMENT — MIFFLIN-ST. JEOR: SCORE: 1803.67

## 2019-02-22 ASSESSMENT — PAIN SCALES - GENERAL: PAINLEVEL: NO PAIN (0)

## 2019-02-22 NOTE — PROGRESS NOTES
Demario Alvarez is here for a glucose tolerance test for a history of Cystic Fibrosis. Orders written by Zana Lilly MD on 02/22/2019. Patient arrived NPO, last eating at 2200 on 02/21/2019.  IV placed in Left AC without difficulty by Dhruv Bowens LPN.  Requested labs drawn. Urine collected. Glucose and Insulin levels drawn at -0- 0810, +30 0845, +60 0915, +90 0945, and +120 1015. Patient started drinking the Glucola at 0815 and completed within 10 minutes. Post blood sugar tested and was 81 mg/dL.  Snack given to patient prior to discharge. PIV discontinued without difficulty.    Patient will follow up, as planned, with his CF team for test results and all future appointments.     /68   Pulse 68   Temp 98.1  F (36.7  C) (Oral)   Wt 79.7 kg (175 lb 12.8 oz)   SpO2 96%   BMI 25.22 kg/m      Administrations This Visit     glucose tolerence test (GLUCOLA) 25% oral liquid 75 g     Admin Date  02/22/2019 Action  Given Dose  75 g Route  Oral Administered By  Dhruv Bowens LPN Kelsie Puffer [facility]

## 2019-02-22 NOTE — PROGRESS NOTES
SUBJECTIVE/OBJECTIVE:                           Demario Abbasi is a 24 year old male coming in for routine clinic visit.  His primary pulmonologist is Dr. Zana Lilly.    He is accompanied by his mother and father today    Allergies/ADRs: Reviewed in Epic  Tobacco: No tobacco use  Alcohol: none  PMH: Reviewed in Epic  CF Genotype: [id number]/621+1g>t    Medication Adherence  Medication adherence flowsheet 2/22/2019   Patient medication administration: Responsible for own medications   Patient estimated adherence level: %   Pharmacist assessment of adherence: Excellent   Patient reported doses missed per week: 0-1   Pharmacy MPR: Not available   Facilitators to medication adherence  Schedule/routine   Patient reported barriers to medication adherence  No issues identified          Medication Access  Medication access flowsheet 2/22/2019   Number of pharmacies used: 1   Pharmacy: (No Data)   Pharmacy comments: [facility]   Enrolled in [facility]? No   Patient reported barriers to accessing medications: No issues reported by patient        CF  Inhaled medications   Bronchodilator: albuterol   Mucolytic: Pulmozyme and hypertonic saline  Antibiotic: tobramycin  Other: Flonase 1 spray in each nostrils daily  Oral medications   Azithromycin: taking  CFTR modulator: Not Indicated   Other: Allegra 180mg daily  Pulmonary symptoms are stable  PFTs are stable  Current FEV1 80  Cultures: sputum cultures grow Staph and Achromobacter  Current exacerbation: no    Depression: Demario continues on citalopram 20mg once daily which is working well for him.  He is followed by his primary care provider for this.    Acne:Demario has been on Accutane 40mg three times daily for acne.  His skin is clear at this point and he has decided to hold this medication and see if his acne returns at this time.  He is followed by a dermatologist locally for this.    Pancreatic Insufficiency/Nutrition: Pancreatic enzyme replacement with  "Creon 00689.  Patient is taking 5 capsules with meals and 2-3 capsules with snacks. Patient is not experiencing sign/symptoms of malabsorption.  Acid Reducer: Protonix (pantoprazole) 20mg once daily  Bowel regimen: none  Weight and BMI are stable  Vitamins include: [id number] 1 cap twice daily (only takes when not on accutane) and vitamin D 6000 units daily.       Lab Results   Component Value Date    VITDT 28 03/15/2018    VITDT 41 11/03/2016         PFTs:    Weight/BMI:  Estimated body mass index is 25.22 kg/m  as calculated from the following:    Height as of an earlier encounter on 2/22/19: 5' 10\" (1.778 m).    Weight as of an earlier encounter on 2/22/19: 175 lb 12.8 oz (79.7 kg).      ASSESSMENT:                             Current medications were reviewed today.     CF: Stable. Patient would benefit from no changes at this time    Depression: Stable.    Acne: Improved. Ok to trial holding Accutane.    Pancreatic Insufficiency/Nutrition: Stable.  Patient would benefit from no changes at this time      PLAN:                            Continue current medications - OK to try holding Accutane and see if your face remains free of acne.     Keep up the great work, enjoy spring break!    I spent 15 minutes with this patient today.      Will follow up in 1 year or sooner if needed.    The patient was provided a summary of these recommendations in the AVS from CF care team visit.    Ansley Santo PharmD  CF Clinic Pharmacist  Phone: [phone]  E-mail: [email or web address]    "

## 2019-02-22 NOTE — PROGRESS NOTES
CF Clinic RT:    I met with Demario today for monarch vest demo. He also reported hose, jacket and 105 vest machine problems today. I have sent the equipment request to [facility] [employer]. I discussed the operation, the potential settings, ordering process, and insurance coverage issues with Demario. Also to use a nebulizer via the [employer] if the monarch order goes through. We also discussed fit. He wore the device for 20 minutes at various settings.  Demario is a very active person. 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 He frequently camps, hunts, and travels. He has intermittently needed access to additional therapy and a portable device to participate in his prescribed airway clearance plan. Demario would like to pursue the monarch vest device. I will continue to support Demario for adequate airway clearance as needed.

## 2019-02-22 NOTE — LETTER
2/22/2019     RE: Demario Abbasi  [address]  [place] [address]     Dear Colleague,    Thank you for referring your patient, Demario Abbasi, to the [facility] LUNG SCIENCE AND HEALTH at [facility]. Please see a copy of my visit note below.    CF Clinic RT:    I met with Demario today for monarch vest demo. He also reported hose, jacket and 105 vest machine problems today. I have sent the equipment request to [facility] [employer]. I discussed the operation, the potential settings, ordering process, and insurance coverage issues with Demario. Also to use a nebulizer via the [employer] if the monarch order goes through. We also discussed fit. He wore the device for 20 minutes at various settings.  Demario is a very active person. 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 He frequently camps, hunts, and travels. He has intermittently needed access to additional therapy and a portable device to participate in his prescribed airway clearance plan. Demario would like to pursue the monarch vest device. I will continue to support Demario for adequate airway clearance as needed.     Reason for Visit  Demario Abbasi is a 24 year old year old male who is being seen for RECHECK (CF)      Assessment and plan:   Demario Abbasi is a 24 year old male with cystic fibrosis, pancreatic insufficiency, depression, acne and impaired glucose tolerance who is s/p RUL resection for recurrent exacerbations in 2009.     Pulmonary: The patient has had 4-6 weeks of increased cough and sputum production with darker sputum, more recently feeling fatigued and increased dyspnea on exertion with night sweats.  Symptoms are most consistent with a CF exacerbation.  Chest x-ray today as part of annual studies reviewed by me with no acute infiltrate and no change from previous.  PFTs are similar to baseline.   Restrictive changes consistent with previous lobectomy.  It appears the patient does not have pneumonia but rather a simple exacerbation.  He appears to be responding well to current oral Bactrim and doxycycline.  He was instructed to complete the 3-week course of antibiotics.  Continue with vest therapy, 3 times daily whenever possible.  He will continue his current nebs and every other month COBY.  Continue azithromycin.  He met with the CF respiratory therapist today for a trial of the [place] vest.  He feels that it is quite effective.  The patient is very active with full-time college, working as a  and camping hunting and traveling.  A portable vest will improve Demario's ability to perform airway clearance on a consistent basis thereby preventing future exacerbations.    Pancreatic insufficiency: The patient reports occasional symptoms of malabsorption, typically with large meals.  I have encouraged the patient to increase his enzyme capsules with large meals.  His weight is increased and in an adequate range.  He will continue his current vitamins and enzymes.  If he stops his Accutane he will reinitiate his MVW    Glucose intolerance: The patient's fasting blood sugar was slightly elevated.  He did note eating a very large meal and drinking sugared soda late last night.  2-hour blood sugar on the glucose tolerance test had returned to the normal range.  Continue annual glucose tolerance testing.      Acne: The patient has stopped his Accutane.  He feels  That his acne has remained well controlled.  If he remains off Accutane, he will reinitiate his MVW vitamin supplementation.      Depression: Well controlled with current citalopram.  Reassess with future visits.  The patient would like to consider a trial off citalopram at some point in the future.    Epigastric pain/chest pain/dyspnea: The patient reports occasional episodes of epigastric pain followed by chest pain and dyspnea.  This is  typically associated with alcohol use.  I suspect that this represents gastroesophageal reflux precipitated by alcohol use with associated bronchospasm.  We have reviewed the  common precipitators of reflux including alcohol, tobacco, caffeine, chocolate and peppermint.  I  have encouraged that he avoid  intake.  If symptoms persist, PPI may be increased to twice daily.    Healthcare maintenance: The patient completed annual studies today.  There were reviewed with the patient, his girlfriend and his parents.  Results are most notable for a low iron level at 29, a mildly reduced hematocrit and a mildly elevated INR.  The patient will reinitiate his Vitron C once he is completed current antibiotics.  INR should improve once he reinitiates the MV W vitamins which does contain vitamin K.  The patient has received his influenza vaccine for this flu season.      The patient will be seen in follow-up in 3 months with PFTs and sputum culture.        45 minutes face to face time with the patient, more than half spent in counseling and coordination of care regarding current exacerbation, reflux and review of annual studies.            CF HPI  The patient was seen and examined by Zana Pandeyle EDGAR Livankoko is a 24 year old male with cystic fibrosis, pancreatic insufficiency, depression, acne and impaired glucose tolerance who is s/p RUL resection for recurrent exacerbations in 2009.   The patient contacted the CF office 1 week ago with general malaise, fatigue, increased cough and sputum production with sputum thicker and darker than baseline.  In retrospect, the patient feels that he has had increased respiratory symptoms for 4-6 weeks.  Bactrim and doxycycline was initiated.  He has noted increased dyspnea on exertion and some increase in his respiratory rate at night for the past few weeks.  He denies fever or chills.  He does note occasional night sweats.  No hemoptysis.  Sputum ranges from  green-brown-clear-white, darker and thicker in the recent past than baseline.  He is doing Vest ThAIRapy twice daily, 3 times daily when possible.  He does note some anterior chest pain intermittently earlier in the week.  Gradual improvement in cough, sputum, fatigue and dyspnea since initiating antibiotics.    The patient reports occasional episodes of epigastric pain sometimes quite severe with associated chest pain and dyspnea, sometimes associated with nausea and vomiting.  Also associated with increased shortness of breath.  Lasting anywhere from a few minutes to a few hours.  This occurs approximately once per month.  Dyspnea is relieved with nebs.  Chest/epigastric pain sometimes improved with drinking cold water.  He finds this occurs most frequently in association with drinking alcohol.    Review of systems:  Appetite decreased with recent illness, improving start since starting antibiotics.  No ear pain, sore throat, sinus pain or rhinorrhea.  GI complaints as noted above.  Occasional loose stools if he eats large fatty meals or misses enzymes.  A complete ROS was otherwise negative except as noted in the HPI.    Current Outpatient Medications   Medication     albuterol (2.5 MG/3ML) 0.083% neb solution     albuterol (ALBUTEROL) 108 (90 BASE) MCG/ACT Inhaler     amylase-lipase-protease (CREON 36) 53911 units CPEP     azithromycin (ZITHROMAX) 500 MG tablet     citalopram (CELEXA) 20 MG tablet     dornase alpha (PULMOZYME) 1 MG/ML neb solution     doxycycline hyclate (VIBRA-TABS) 100 MG tablet     fexofenadine (ALLEGRA) 180 MG tablet     fluconazole (DIFLUCAN) 150 MG tablet     fluticasone (FLONASE) 50 MCG/ACT nasal spray     ISOtretinoin (ACCUTANE PO)     Multiple Vitamins-Minerals (MVW COMPLETE FORMULATION ) CAPS     pantoprazole (PROTONIX) 20 MG EC tablet     sodium chloride inhalant 7 % NEBU neb solution     sulfamethoxazole-trimethoprim (BACTRIM DS/SEPTRA DS) 800-160 MG tablet     tobramycin, PF,  (COBY) 300 MG/5ML neb solution     vitamin D3 (CHOLECALCIFEROL) 2000 units tablet     No current facility-administered medications for this visit.      Allergies   Allergen Reactions     Augmentin      Past Medical History:   Diagnosis Date     CF (cystic fibrosis) (H)      Impaired glucose tolerance      Pancreatic insufficiency      S/P lobectomy of lung 8/4/2015    Right upper lobectomy - 2009       Past Surgical History:   Procedure Laterality Date     HC LAP,INGUINAL HERNIA REPR,INITIAL  2002     LOBECTOMY LUNG Right 2009    Right upper lobe       Social History     Socioeconomic History     Marital status: Single     Spouse name: Not on file     Number of children: Not on file     Years of education: Not on file     Highest education level: Not on file   Occupational History     Occupation:    Social Needs     Financial resource strain: Not on file     Food insecurity:     Worry: Not on file     Inability: Not on file     Transportation needs:     Medical: Not on file     Non-medical: Not on file   Tobacco Use     Smoking status: Passive Smoke Exposure - Never Smoker     Smokeless tobacco: Current User     Types: Chew     Tobacco comment: dad smokes   Substance and Sexual Activity     Alcohol use: Yes     Comment: <2 drinks per week     Drug use: Not on file     Sexual activity: Not on file   Lifestyle     Physical activity:     Days per week: Not on file     Minutes per session: Not on file     Stress: Not on file   Relationships     Social connections:     Talks on phone: Not on file     Gets together: Not on file     Attends Mu-ism service: Not on file     Active member of club or organization: Not on file     Attends meetings of clubs or organizations: Not on file     Relationship status: Not on file     Intimate partner violence:     Fear of current or ex partner: Not on file     Emotionally abused: Not on file     Physically abused: Not on file     Forced sexual activity: Not on file  "  Other Topics Concern     Parent/sibling w/ CABG, MI or angioplasty before 65F 55M? 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 2 dogs.          /68   Pulse 68   Temp 98.1  F (36.7  C) (Oral)   Ht 1.778 m (5' 10\")   Wt 79.7 kg (175 lb 12.8 oz)   SpO2 96%   BMI 25.22 kg/m     Body mass index is 25.22 kg/m .  Exam:   GENERAL APPEARANCE: Well developed, well nourished, alert, and in no apparent distress.  EYES: PERRL, EOMI  HENT: Nasal mucosa with edema and hyperemia. No nasal polyps.  EARS: Canals clear, TMs normal  MOUTH: Oral mucosa is moist, without any lesions, no tonsillar enlargement, no oropharyngeal exudate.  NECK: supple, no masses, no thyromegaly.  LYMPHATICS: No significant axillary, cervical, or supraclavicular nodes.  RESP: normal percussion, good air flow throughout.  No crackles. No rhonchi. No wheezes.  CV: Normal S1, S2, regular rhythm, normal rate. No murmur.  No rub. No gallop. No LE edema.   ABDOMEN:  Bowel sounds normal, soft, nontender, no HSM or masses.   MS: extremities normal. No clubbing. No cyanosis.  SKIN: no rash on limited exam  NEURO: Mentation intact, speech normal, normal strength and tone, normal gait and stance  PSYCH: mentation appears normal. and affect normal/bright  Results:  Recent Results (from the past 168 hour(s))   Glucose in a Series: Draw Time Zero    Collection Time: 02/22/19  8:10 AM   Result Value Ref Range    Glucose 109 (H) 70 - 99 mg/dL   Insulin in a Series: Draw Time Zero    Collection Time: 02/22/19  8:10 AM   Result Value Ref Range    Insulin 8.5 3 - 25 mU/L   Erythrocyte sedimentation rate auto    Collection Time: 02/22/19  8:10 AM   Result Value Ref Range    Sed Rate 8 0 - 15 mm/h   CBC with platelets differential    Collection Time: 02/22/19  8:10 AM   Result Value Ref Range    WBC 9.6 4.0 - 11.0 10e9/L    RBC Count 4.67 4.4 - 5.9 " 10e12/L    Hemoglobin 14.0 13.3 - 17.7 g/dL    Hematocrit 39.8 (L) 40.0 - 53.0 %    MCV 85 78 - 100 fl    MCH 30.0 26.5 - 33.0 pg    MCHC 35.2 31.5 - 36.5 g/dL    RDW 12.7 10.0 - 15.0 %    Platelet Count 235 150 - 450 10e9/L    Diff Method Automated Method     % Neutrophils 65.2 %    % Lymphocytes 22.7 %    % Monocytes 7.1 %    % Eosinophils 3.9 %    % Basophils 0.8 %    % Immature Granulocytes 0.3 %    Nucleated RBCs 0 0 /100    Absolute Neutrophil 6.2 1.6 - 8.3 10e9/L    Absolute Lymphocytes 2.2 0.8 - 5.3 10e9/L    Absolute Monocytes 0.7 0.0 - 1.3 10e9/L    Absolute Eosinophils 0.4 0.0 - 0.7 10e9/L    Absolute Basophils 0.1 0.0 - 0.2 10e9/L    Abs Immature Granulocytes 0.0 0 - 0.4 10e9/L    Absolute Nucleated RBC 0.0    Vitamin D Deficiency    Collection Time: 02/22/19  8:10 AM   Result Value Ref Range    Vitamin D Deficiency screening 29 20 - 75 ug/L   TSH with free T4 reflex    Collection Time: 02/22/19  8:10 AM   Result Value Ref Range    TSH 1.93 0.40 - 4.00 mU/L   Protein total    Collection Time: 02/22/19  8:10 AM   Result Value Ref Range    Protein Total 7.2 6.8 - 8.8 g/dL   Testosterone total     Collection Time: 02/22/19  8:10 AM   Result Value Ref Range    Testosterone Total 158 (L) 240 - 950 ng/dL   Phosphorus    Collection Time: 02/22/19  8:10 AM   Result Value Ref Range    Phosphorus 3.6 2.5 - 4.5 mg/dL   Magnesium    Collection Time: 02/22/19  8:10 AM   Result Value Ref Range    Magnesium 2.0 1.6 - 2.3 mg/dL   INR    Collection Time: 02/22/19  8:10 AM   Result Value Ref Range    INR 1.18 (H) 0.86 - 1.14   IgG    Collection Time: 02/22/19  8:10 AM   Result Value Ref Range    IGG 1,380 695 - 1,620 mg/dL   GGT    Collection Time: 02/22/19  8:10 AM   Result Value Ref Range    GGT 20 0 - 75 U/L   Hemoglobin A1c    Collection Time: 02/22/19  8:10 AM   Result Value Ref Range    Hemoglobin A1C 5.9 (H) 0 - 5.6 %   Iron    Collection Time: 02/22/19  8:10 AM   Result Value Ref Range    Iron 29 (L) 35 - 180  ug/dL   AST    Collection Time: 02/22/19  8:10 AM   Result Value Ref Range    AST 24 0 - 45 U/L   Alkaline phosphatase    Collection Time: 02/22/19  8:10 AM   Result Value Ref Range    Alkaline Phosphatase 135 40 - 150 U/L   Albumin level    Collection Time: 02/22/19  8:10 AM   Result Value Ref Range    Albumin 3.3 (L) 3.4 - 5.0 g/dL   Lipid Profile    Collection Time: 02/22/19  8:10 AM   Result Value Ref Range    Cholesterol 94 <200 mg/dL    Triglycerides 130 <150 mg/dL    HDL Cholesterol 29 (L) >39 mg/dL    LDL Cholesterol Calculated 40 <100 mg/dL    Non HDL Cholesterol 66 <130 mg/dL   Basic metabolic panel    Collection Time: 02/22/19  8:10 AM   Result Value Ref Range    Sodium 138 133 - 144 mmol/L    Potassium 3.8 3.4 - 5.3 mmol/L    Chloride 107 94 - 109 mmol/L    Carbon Dioxide 22 20 - 32 mmol/L    Anion Gap 8 3 - 14 mmol/L    Glucose 105 (H) 70 - 99 mg/dL    Urea Nitrogen 16 7 - 30 mg/dL    Creatinine 1.05 0.66 - 1.25 mg/dL    GFR Estimate >90 >60 mL/min/[1.73_m2]    GFR Estimate If Black >90 >60 mL/min/[1.73_m2]    Calcium 8.3 (L) 8.5 - 10.1 mg/dL   ALT    Collection Time: 02/22/19  8:10 AM   Result Value Ref Range    ALT 25 0 - 70 U/L   Glucose in a Series: Draw +30 minutes    Collection Time: 02/22/19  8:45 AM   Result Value Ref Range    Glucose 205 (H) 70 - 99 mg/dL   Insulin in a Series: Draw +30 minutes    Collection Time: 02/22/19  8:45 AM   Result Value Ref Range    Insulin 23.4 3 - 25 mU/L   Glucose in a Series: Draw +60 minutes    Collection Time: 02/22/19  9:15 AM   Result Value Ref Range    Glucose 253 (H) 70 - 99 mg/dL   Insulin in a Series: Draw +60 minutes    Collection Time: 02/22/19  9:15 AM   Result Value Ref Range    Insulin 52.7 (H) 3 - 25 mU/L   Glucose in a Series: Draw +90 minutes    Collection Time: 02/22/19  9:45 AM   Result Value Ref Range    Glucose 176 (H) 70 - 99 mg/dL   Insulin in a Series: Draw +90 minutes    Collection Time: 02/22/19  9:45 AM   Result Value Ref Range    Insulin  53.7 (H) 3 - 25 mU/L   Glucose in a Series: Draw +120 minutes    Collection Time: 02/22/19 10:15 AM   Result Value Ref Range    Glucose 75 70 - 99 mg/dL   Insulin in a Series: Draw +120 minutes    Collection Time: 02/22/19 10:15 AM   Result Value Ref Range    Insulin Canceled, Test credited 3 - 25 mU/L   Glucose by meter    Collection Time: 02/22/19 10:17 AM   Result Value Ref Range    Glucose 81 70 - 99 mg/dL   Albumin Random Urine Quantitative with Creat Ratio    Collection Time: 02/22/19 10:32 AM   Result Value Ref Range    Creatinine Urine 152 mg/dL    Albumin Urine mg/L <5 mg/L    Albumin Urine mg/g Cr Unable to calculate due to low value 0 - 17 mg/g Cr   Routine UA with microscopic    Collection Time: 02/22/19 10:32 AM   Result Value Ref Range    Color Urine Yellow     Appearance Urine Clear     Glucose Urine 150 (A) NEG^Negative mg/dL    Bilirubin Urine Negative NEG^Negative    Ketones Urine Negative NEG^Negative mg/dL    Specific Gravity Urine 1.023 1.003 - 1.035    Blood Urine Negative NEG^Negative    pH Urine 5.0 5.0 - 7.0 pH    Protein Albumin Urine Negative NEG^Negative mg/dL    Urobilinogen mg/dL 0.0 0.0 - 2.0 mg/dL    Nitrite Urine Negative NEG^Negative    Leukocyte Esterase Urine Negative NEG^Negative    Source Unspecified Urine     WBC Urine <1 0 - 5 /HPF    RBC Urine 1 0 - 2 /HPF   Cystic Fibrosis Culture Aerob Bacterial    Collection Time: 02/22/19 11:36 AM   Result Value Ref Range    Specimen Description Sputum     Culture Micro Moderate growth  Normal elmer       Culture Micro Culture in progress                                  Results as noted above.    PFT Interpretation:  Moderate restrictive ventilatory defect.  Unchanged from previous.   Similar to recent best.  Valid Maneuver    CF Exacerbation  Mild Increased vest/bronchodilator/execise and Oral: non-quinolone      Again, thank you for allowing me to participate in the care of your patient.      Sincerely,    Zana Lilly MD

## 2019-02-22 NOTE — PATIENT INSTRUCTIONS
Cystic Fibrosis Self-Care Plan    RECOMMENDATIONS:   Continue current doxycyline and bactrim until done.  Call the CF office if your symptoms aren't back to normal when you are done with the antibiotics.  Vest therapy 2-3 times daily.  Restart vitron-c once you are done with the antibiotics.  Avoid alcohol and tobacco.  Otherwise continue current medication, nebs and vest therapy.         CF Nurse line:          Kevin Rodríguez   [phone]            CF Resp Therapist: Margaret Werner            [phone]   CF Dietitians:           Sherie Aparicio            [phone]                       Saba Gill                       [phone]   CF Diabetes Nurse: Ashley Cleveland             [phone]    CF Social Workers:  Prerna Paniagua             [phone]                Velma Kolb            [phone]  CF Pharmacist:        Ashley Kirby                              [phone]  [email or web address]         MRN: [id number]   Clinic Date: February 22, 2019   Patient: Demario Abbasi     Annual Studies:   IGG   Date Value Ref Range Status   03/15/2018 1,310 695 - 1,620 mg/dL Final     Insulin   Date Value Ref Range Status   02/22/2019 53.7 (H) 3 - 25 mU/L Final     There are no preventive care reminders to display for this patient.      Pulmonary Function Tests  FEV1: amount of air you can blow out in 1 second  FVC: total amount of air you can take in and blow out    Your Goals:         PFT Latest Ref Rng & Units 11/23/2018   FVC L 3.70   FEV1 L 2.96   FVC% % 66   FEV1% % 62          Airway Clearance: The Most Important Way to Keep Your Lungs Healthy  Vest Settings:    [employer] Frequencies: 8, 9, 10 Pressure 10 Then, Frequencies 18, 19, 20 Pressure 6      [employer]: Quick Start with Pressure of     Do each frequency for 5 minutes; Deflate vest after each frequency & cough 3 times before beginning the next setting.    Vest and Neb Therapy should be done 2-3 times/day.    Good Nutrition Can  Improve Lung Function and Overall Health     Take ALL of your vitamins with food     Take 1/2 of your enzymes before EVERY meal/snack and the other 1/2 mid-meal/snack    Wt Readings from Last 3 Encounters:   02/22/19 79.7 kg (175 lb 12.8 oz)   02/22/19 79.7 kg (175 lb 12.8 oz)   11/23/18 76.3 kg (168 lb 3.4 oz)       Body mass index is 25.22 kg/m .         National CF Foundation Recommendations for BMI in CF Adults: Women: at least 22 Men: at least 23        Controlling Blood Sugars Helps Prevent Lung Infections & Improves Nutrition  Test blood sugar:     In the morning before eating (goal is )     2 hours after a meal (goal is less than 150)     When pre-meal glucose is greater than 150 add correction     At bedtime (if less than 100 eat a snack with 15 grams of carbohydrates  Last A1C Results:   Hemoglobin A1C   Date Value Ref Range Status   02/22/2019 5.9 (H) 0 - 5.6 % Final     Comment:     Normal <5.7% Prediabetes 5.7-6.4%  Diabetes 6.5% or higher - adopted from ADA   consensus guidelines.           If diabetic, measure A1C every 6 months. Goal: Under 7%    Staying Healthy    Research:  If you are interested in learning about research opportunities or have questions, please contact the CF Research Team at [phone] or [email or web address].      CF Foundation:  Compass is a personalized resource service to help you with the insurance, financial, legal and other issues you are facing.  It's free, confidential and available to anyone with CF.  Ask your  for more information or contact Compass directly at [phone]-NGCAKGO ([phone]) or compass@cff.org, or learn more at [email or web address].

## 2019-02-23 LAB — TESTOST SERPL-MCNC: 158 NG/DL (ref 240–950)

## 2019-02-24 NOTE — PROGRESS NOTES
Reason for Visit  Demario Abbasi is a 24 year old year old male who is being seen for RECHECK (CF)      Assessment and plan:   Demario Abbasi is a 24 year old male with cystic fibrosis, pancreatic insufficiency, depression, acne and impaired glucose tolerance who is s/p RUL resection for recurrent exacerbations in 2009.     Pulmonary: The patient has had 4-6 weeks of increased cough and sputum production with darker sputum, more recently feeling fatigued and increased dyspnea on exertion with night sweats.  Symptoms are most consistent with a CF exacerbation.  Chest x-ray today as part of annual studies reviewed by me with no acute infiltrate and no change from previous.  PFTs are similar to baseline.  Restrictive changes consistent with previous lobectomy.  It appears the patient does not have pneumonia but rather a simple exacerbation.  He appears to be responding well to current oral Bactrim and doxycycline.  He was instructed to complete the 3-week course of antibiotics.  Continue with vest therapy, 3 times daily whenever possible.  He will continue his current nebs and every other month COBY.  Continue azithromycin.  He met with the CF respiratory therapist today for a trial of the [place] vest.  He feels that it is quite effective.  The patient is very active with full-time college, working as a  and camping hunting and traveling.  A portable vest will improve Demario's ability to perform airway clearance on a consistent basis thereby preventing future exacerbations.    Pancreatic insufficiency: The patient reports occasional symptoms of malabsorption, typically with large meals.  I have encouraged the patient to increase his enzyme capsules with large meals.  His weight is increased and in an adequate range.  He will continue his current vitamins and enzymes.  If he stops his Accutane he will reinitiate his MVW    Glucose intolerance: The patient's fasting blood sugar was slightly  elevated.  He did note eating a very large meal and drinking sugared soda late last night.  2-hour blood sugar on the glucose tolerance test had returned to the normal range.  Continue annual glucose tolerance testing.      Acne: The patient has stopped his Accutane.  He feels  That his acne has remained well controlled.  If he remains off Accutane, he will reinitiate his MVW vitamin supplementation.      Depression: Well controlled with current citalopram.  Reassess with future visits.  The patient would like to consider a trial off citalopram at some point in the future.    Epigastric pain/chest pain/dyspnea: The patient reports occasional episodes of epigastric pain followed by chest pain and dyspnea.  This is typically associated with alcohol use.  I suspect that this represents gastroesophageal reflux precipitated by alcohol use with associated bronchospasm.  We have reviewed the  common precipitators of reflux including alcohol, tobacco, caffeine, chocolate and peppermint.  I  have encouraged that he avoid  intake.  If symptoms persist, PPI may be increased to twice daily.    Healthcare maintenance: The patient completed annual studies today.  There were reviewed with the patient, his girlfriend and his parents.  Results are most notable for a low iron level at 29, a mildly reduced hematocrit and a mildly elevated INR.  The patient will reinitiate his Vitron C once he is completed current antibiotics.  INR should improve once he reinitiates the MV W vitamins which does contain vitamin K.  The patient has received his influenza vaccine for this flu season.      The patient will be seen in follow-up in 3 months with PFTs and sputum culture.        45 minutes face to face time with the patient, more than half spent in counseling and coordination of care regarding current exacerbation, reflux and review of annual studies.            CF HPI  The patient was seen and examined by Zana HERNÁNDEZ  Elroy is a 24 year old male with cystic fibrosis, pancreatic insufficiency, depression, acne and impaired glucose tolerance who is s/p RUL resection for recurrent exacerbations in 2009.   The patient contacted the CF office 1 week ago with general malaise, fatigue, increased cough and sputum production with sputum thicker and darker than baseline.  In retrospect, the patient feels that he has had increased respiratory symptoms for 4-6 weeks.  Bactrim and doxycycline was initiated.  He has noted increased dyspnea on exertion and some increase in his respiratory rate at night for the past few weeks.  He denies fever or chills.  He does note occasional night sweats.  No hemoptysis.  Sputum ranges from green-brown-clear-white, darker and thicker in the recent past than baseline.  He is doing Vest ThAIRapy twice daily, 3 times daily when possible.  He does note some anterior chest pain intermittently earlier in the week.  Gradual improvement in cough, sputum, fatigue and dyspnea since initiating antibiotics.    The patient reports occasional episodes of epigastric pain sometimes quite severe with associated chest pain and dyspnea, sometimes associated with nausea and vomiting.  Also associated with increased shortness of breath.  Lasting anywhere from a few minutes to a few hours.  This occurs approximately once per month.  Dyspnea is relieved with nebs.  Chest/epigastric pain sometimes improved with drinking cold water.  He finds this occurs most frequently in association with drinking alcohol.    Review of systems:  Appetite decreased with recent illness, improving start since starting antibiotics.  No ear pain, sore throat, sinus pain or rhinorrhea.  GI complaints as noted above.  Occasional loose stools if he eats large fatty meals or misses enzymes.  A complete ROS was otherwise negative except as noted in the HPI.    Current Outpatient Medications   Medication     albuterol (2.5 MG/3ML) 0.083% neb solution      albuterol (ALBUTEROL) 108 (90 BASE) MCG/ACT Inhaler     amylase-lipase-protease (CREON 36) 80459 units CPEP     azithromycin (ZITHROMAX) 500 MG tablet     citalopram (CELEXA) 20 MG tablet     dornase alpha (PULMOZYME) 1 MG/ML neb solution     doxycycline hyclate (VIBRA-TABS) 100 MG tablet     fexofenadine (ALLEGRA) 180 MG tablet     fluconazole (DIFLUCAN) 150 MG tablet     fluticasone (FLONASE) 50 MCG/ACT nasal spray     ISOtretinoin (ACCUTANE PO)     Multiple Vitamins-Minerals (MVW COMPLETE FORMULATION ) CAPS     pantoprazole (PROTONIX) 20 MG EC tablet     sodium chloride inhalant 7 % NEBU neb solution     sulfamethoxazole-trimethoprim (BACTRIM DS/SEPTRA DS) 800-160 MG tablet     tobramycin, PF, (COBY) 300 MG/5ML neb solution     vitamin D3 (CHOLECALCIFEROL) 2000 units tablet     No current facility-administered medications for this visit.      Allergies   Allergen Reactions     Augmentin      Past Medical History:   Diagnosis Date     CF (cystic fibrosis) (H)      Impaired glucose tolerance      Pancreatic insufficiency      S/P lobectomy of lung 8/4/2015    Right upper lobectomy - 2009       Past Surgical History:   Procedure Laterality Date     HC LAP,INGUINAL HERNIA REPR,INITIAL  2002     LOBECTOMY LUNG Right 2009    Right upper lobe       Social History     Socioeconomic History     Marital status: Single     Spouse name: Not on file     Number of children: Not on file     Years of education: Not on file     Highest education level: Not on file   Occupational History     Occupation:    Social Needs     Financial resource strain: Not on file     Food insecurity:     Worry: Not on file     Inability: Not on file     Transportation needs:     Medical: Not on file     Non-medical: Not on file   Tobacco Use     Smoking status: Passive Smoke Exposure - Never Smoker     Smokeless tobacco: Current User     Types: Chew     Tobacco comment: dad smokes   Substance and Sexual Activity     Alcohol  "use: Yes     Comment: <2 drinks per week     Drug use: Not on file     Sexual activity: Not on file   Lifestyle     Physical activity:     Days per week: Not on file     Minutes per session: Not on file     Stress: Not on file   Relationships     Social connections:     Talks on phone: Not on file     Gets together: Not on file     Attends [employer] service: Not on file     Active member of club or organization: Not on file     Attends meetings of clubs or organizations: Not on file     Relationship status: Not on file     Intimate partner violence:     Fear of current or ex partner: Not on file     Emotionally abused: Not on file     Physically abused: Not on file     Forced sexual activity: Not on file   Other Topics Concern     Parent/sibling w/ CABG, MI or angioplasty before 65F 55M? 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 2 dogs.          /68   Pulse 68   Temp 98.1  F (36.7  C) (Oral)   Ht 1.778 m (5' 10\")   Wt 79.7 kg (175 lb 12.8 oz)   SpO2 96%   BMI 25.22 kg/m    Body mass index is 25.22 kg/m .  Exam:   GENERAL APPEARANCE: Well developed, well nourished, alert, and in no apparent distress.  EYES: PERRL, EOMI  HENT: Nasal mucosa with edema and hyperemia. No nasal polyps.  EARS: Canals clear, TMs normal  MOUTH: Oral mucosa is moist, without any lesions, no tonsillar enlargement, no oropharyngeal exudate.  NECK: supple, no masses, no thyromegaly.  LYMPHATICS: No significant axillary, cervical, or supraclavicular nodes.  RESP: normal percussion, good air flow throughout.  No crackles. No rhonchi. No wheezes.  CV: Normal S1, S2, regular rhythm, normal rate. No murmur.  No rub. No gallop. No LE edema.   ABDOMEN:  Bowel sounds normal, soft, nontender, no HSM or masses.   MS: extremities normal. No clubbing. No cyanosis.  SKIN: no rash on limited exam  NEURO: Mentation intact, speech " normal, normal strength and tone, normal gait and stance  PSYCH: mentation appears normal. and affect normal/bright  Results:  Recent Results (from the past 168 hour(s))   Glucose in a Series: Draw Time Zero    Collection Time: 02/22/19  8:10 AM   Result Value Ref Range    Glucose 109 (H) 70 - 99 mg/dL   Insulin in a Series: Draw Time Zero    Collection Time: 02/22/19  8:10 AM   Result Value Ref Range    Insulin 8.5 3 - 25 mU/L   Erythrocyte sedimentation rate auto    Collection Time: 02/22/19  8:10 AM   Result Value Ref Range    Sed Rate 8 0 - 15 mm/h   CBC with platelets differential    Collection Time: 02/22/19  8:10 AM   Result Value Ref Range    WBC 9.6 4.0 - 11.0 10e9/L    RBC Count 4.67 4.4 - 5.9 10e12/L    Hemoglobin 14.0 13.3 - 17.7 g/dL    Hematocrit 39.8 (L) 40.0 - 53.0 %    MCV 85 78 - 100 fl    MCH 30.0 26.5 - 33.0 pg    MCHC 35.2 31.5 - 36.5 g/dL    RDW 12.7 10.0 - 15.0 %    Platelet Count 235 150 - 450 10e9/L    Diff Method Automated Method     % Neutrophils 65.2 %    % Lymphocytes 22.7 %    % Monocytes 7.1 %    % Eosinophils 3.9 %    % Basophils 0.8 %    % Immature Granulocytes 0.3 %    Nucleated RBCs 0 0 /100    Absolute Neutrophil 6.2 1.6 - 8.3 10e9/L    Absolute Lymphocytes 2.2 0.8 - 5.3 10e9/L    Absolute Monocytes 0.7 0.0 - 1.3 10e9/L    Absolute Eosinophils 0.4 0.0 - 0.7 10e9/L    Absolute Basophils 0.1 0.0 - 0.2 10e9/L    Abs Immature Granulocytes 0.0 0 - 0.4 10e9/L    Absolute Nucleated RBC 0.0    Vitamin D Deficiency    Collection Time: 02/22/19  8:10 AM   Result Value Ref Range    Vitamin D Deficiency screening 29 20 - 75 ug/L   TSH with free T4 reflex    Collection Time: 02/22/19  8:10 AM   Result Value Ref Range    TSH 1.93 0.40 - 4.00 mU/L   Protein total    Collection Time: 02/22/19  8:10 AM   Result Value Ref Range    Protein Total 7.2 6.8 - 8.8 g/dL   Testosterone total     Collection Time: 02/22/19  8:10 AM   Result Value Ref Range    Testosterone Total 158 (L) 240 - 950 ng/dL    Phosphorus    Collection Time: 02/22/19  8:10 AM   Result Value Ref Range    Phosphorus 3.6 2.5 - 4.5 mg/dL   Magnesium    Collection Time: 02/22/19  8:10 AM   Result Value Ref Range    Magnesium 2.0 1.6 - 2.3 mg/dL   INR    Collection Time: 02/22/19  8:10 AM   Result Value Ref Range    INR 1.18 (H) 0.86 - 1.14   IgG    Collection Time: 02/22/19  8:10 AM   Result Value Ref Range    IGG 1,380 695 - 1,620 mg/dL   GGT    Collection Time: 02/22/19  8:10 AM   Result Value Ref Range    GGT 20 0 - 75 U/L   Hemoglobin A1c    Collection Time: 02/22/19  8:10 AM   Result Value Ref Range    Hemoglobin A1C 5.9 (H) 0 - 5.6 %   Iron    Collection Time: 02/22/19  8:10 AM   Result Value Ref Range    Iron 29 (L) 35 - 180 ug/dL   AST    Collection Time: 02/22/19  8:10 AM   Result Value Ref Range    AST 24 0 - 45 U/L   Alkaline phosphatase    Collection Time: 02/22/19  8:10 AM   Result Value Ref Range    Alkaline Phosphatase 135 40 - 150 U/L   Albumin level    Collection Time: 02/22/19  8:10 AM   Result Value Ref Range    Albumin 3.3 (L) 3.4 - 5.0 g/dL   Lipid Profile    Collection Time: 02/22/19  8:10 AM   Result Value Ref Range    Cholesterol 94 <200 mg/dL    Triglycerides 130 <150 mg/dL    HDL Cholesterol 29 (L) >39 mg/dL    LDL Cholesterol Calculated 40 <100 mg/dL    Non HDL Cholesterol 66 <130 mg/dL   Basic metabolic panel    Collection Time: 02/22/19  8:10 AM   Result Value Ref Range    Sodium 138 133 - 144 mmol/L    Potassium 3.8 3.4 - 5.3 mmol/L    Chloride 107 94 - 109 mmol/L    Carbon Dioxide 22 20 - 32 mmol/L    Anion Gap 8 3 - 14 mmol/L    Glucose 105 (H) 70 - 99 mg/dL    Urea Nitrogen 16 7 - 30 mg/dL    Creatinine 1.05 0.66 - 1.25 mg/dL    GFR Estimate >90 >60 mL/min/[1.73_m2]    GFR Estimate If Black >90 >60 mL/min/[1.73_m2]    Calcium 8.3 (L) 8.5 - 10.1 mg/dL   ALT    Collection Time: 02/22/19  8:10 AM   Result Value Ref Range    ALT 25 0 - 70 U/L   Glucose in a Series: Draw +30 minutes    Collection Time: 02/22/19   8:45 AM   Result Value Ref Range    Glucose 205 (H) 70 - 99 mg/dL   Insulin in a Series: Draw +30 minutes    Collection Time: 02/22/19  8:45 AM   Result Value Ref Range    Insulin 23.4 3 - 25 mU/L   Glucose in a Series: Draw +60 minutes    Collection Time: 02/22/19  9:15 AM   Result Value Ref Range    Glucose 253 (H) 70 - 99 mg/dL   Insulin in a Series: Draw +60 minutes    Collection Time: 02/22/19  9:15 AM   Result Value Ref Range    Insulin 52.7 (H) 3 - 25 mU/L   Glucose in a Series: Draw +90 minutes    Collection Time: 02/22/19  9:45 AM   Result Value Ref Range    Glucose 176 (H) 70 - 99 mg/dL   Insulin in a Series: Draw +90 minutes    Collection Time: 02/22/19  9:45 AM   Result Value Ref Range    Insulin 53.7 (H) 3 - 25 mU/L   Glucose in a Series: Draw +120 minutes    Collection Time: 02/22/19 10:15 AM   Result Value Ref Range    Glucose 75 70 - 99 mg/dL   Insulin in a Series: Draw +120 minutes    Collection Time: 02/22/19 10:15 AM   Result Value Ref Range    Insulin Canceled, Test credited 3 - 25 mU/L   Glucose by meter    Collection Time: 02/22/19 10:17 AM   Result Value Ref Range    Glucose 81 70 - 99 mg/dL   Albumin Random Urine Quantitative with Creat Ratio    Collection Time: 02/22/19 10:32 AM   Result Value Ref Range    Creatinine Urine 152 mg/dL    Albumin Urine mg/L <5 mg/L    Albumin Urine mg/g Cr Unable to calculate due to low value 0 - 17 mg/g Cr   Routine UA with microscopic    Collection Time: 02/22/19 10:32 AM   Result Value Ref Range    Color Urine Yellow     Appearance Urine Clear     Glucose Urine 150 (A) NEG^Negative mg/dL    Bilirubin Urine Negative NEG^Negative    Ketones Urine Negative NEG^Negative mg/dL    Specific Gravity Urine 1.023 1.003 - 1.035    Blood Urine Negative NEG^Negative    pH Urine 5.0 5.0 - 7.0 pH    Protein Albumin Urine Negative NEG^Negative mg/dL    Urobilinogen mg/dL 0.0 0.0 - 2.0 mg/dL    Nitrite Urine Negative NEG^Negative    Leukocyte Esterase Urine Negative  NEG^Negative    Source Unspecified Urine     WBC Urine <1 0 - 5 /HPF    RBC Urine 1 0 - 2 /HPF   Cystic Fibrosis Culture Aerob Bacterial    Collection Time: 02/22/19 11:36 AM   Result Value Ref Range    Specimen Description Sputum     Culture Micro Moderate growth  Normal elmer       Culture Micro Culture in progress                                  Results as noted above.    PFT Interpretation:  Moderate restrictive ventilatory defect.  Unchanged from previous.   Similar to recent best.  Valid Maneuver               CF Exacerbation  Mild Increased vest/bronchodilator/execise and Oral: non-quinolone

## 2019-02-25 ENCOUNTER — TELEPHONE (OUTPATIENT)
Dept: PULMONOLOGY | Facility: CLINIC | Age: 24
End: 2019-02-25

## 2019-02-25 LAB — IGE SERPL-ACNC: 29 KIU/L (ref 0–114)

## 2019-02-25 NOTE — TELEPHONE ENCOUNTER
PA Initiation    Medication: pantoprazole (PROTONIX) 20 MG EC tablet (PENDING)  Insurance Company: [employer] - Phone [phone] Fax [phone]  Pharmacy Filling the Rx: [facility] [place] - [address]  Filling Pharmacy Phone:    Filling Pharmacy Fax:    Start Date: 2/25/2019

## 2019-02-26 LAB
A-TOCOPHEROL VIT E SERPL-MCNC: 5.6 MG/L (ref 5.5–18)
ANNOTATION COMMENT IMP: NORMAL
BETA+GAMMA TOCOPHEROL SERPL-MCNC: 1.2 MG/L (ref 0–6)
RETINYL PALMITATE SERPL-MCNC: <0.02 MG/L (ref 0–0.1)
VIT A SERPL-MCNC: 0.48 MG/L (ref 0.3–1.2)

## 2019-02-27 LAB
BACTERIA SPEC CULT: ABNORMAL
SPECIMEN SOURCE: ABNORMAL

## 2019-02-28 NOTE — TELEPHONE ENCOUNTER
PRIOR AUTHORIZATION DENIED    Medication: pantoprazole (PROTONIX) 20 MG EC tablet (DENIED)    Denial Date: 2/25/2019    Denial Rational: Plan only allows 120 per 365 days    Appeal Information:

## 2019-03-05 DIAGNOSIS — E84.0 CYSTIC FIBROSIS WITH PULMONARY MANIFESTATIONS (H): ICD-10-CM

## 2019-03-05 RX ORDER — ALBUTEROL SULFATE 0.83 MG/ML
2.5 SOLUTION RESPIRATORY (INHALATION) 3 TIMES DAILY
Qty: 270 ML | Refills: 6 | Status: SHIPPED | OUTPATIENT
Start: 2019-03-05 | End: 2019-04-26

## 2019-03-11 ENCOUNTER — CARE COORDINATION (OUTPATIENT)
Dept: PULMONOLOGY | Facility: CLINIC | Age: 24
End: 2019-03-11

## 2019-03-11 NOTE — PROGRESS NOTES
[facility]  March 11, 2019    Susan Li    CF Provider: Zana Lilly MD    Caller: Patient     Clinical information:  Demario Abbasi called with complaint of not making any progress. Completed a course of Doxycycline and Bactrim on Saturday. Continues to complain of chest congestion and shortness of breath. Had a deep, dry cough on the weekend, but that resolved. Vesting twice a day. Wondering if he needs to be admitted.    Plan:   Discussed with Dr Lilly:  To be seen in clinic this week.  Call back with any new or worsening symptoms/concerns.    Caller verbalized understanding of plan and agrees with advice given.

## 2019-03-12 LAB
EXPTIME-PRE: 6.87 SEC
FEF2575-%PRED-PRE: 52 %
FEF2575-PRE: 2.64 L/SEC
FEF2575-PRED: 5.04 L/SEC
FEFMAX-%PRED-PRE: 78 %
FEFMAX-PRE: 8.01 L/SEC
FEFMAX-PRED: 10.2 L/SEC
FEV1-%PRED-PRE: 59 %
FEV1-PRE: 2.81 L
FEV1FEV6-PRE: 80 %
FEV1FEV6-PRED: 84 %
FEV1FVC-PRE: 80 %
FEV1FVC-PRED: 85 %
FIFMAX-PRE: 4.3 L/SEC
FVC-%PRED-PRE: 62 %
FVC-PRE: 3.5 L
FVC-PRED: 5.59 L

## 2019-03-14 ENCOUNTER — APPOINTMENT (OUTPATIENT)
Dept: GENERAL RADIOLOGY | Facility: CLINIC | Age: 24
End: 2019-03-14
Attending: INTERNAL MEDICINE
Payer: COMMERCIAL

## 2019-03-14 ENCOUNTER — OFFICE VISIT (OUTPATIENT)
Dept: PULMONOLOGY | Facility: CLINIC | Age: 24
End: 2019-03-14
Attending: INTERNAL MEDICINE
Payer: COMMERCIAL

## 2019-03-14 ENCOUNTER — HOSPITAL ENCOUNTER (INPATIENT)
Facility: CLINIC | Age: 24
LOS: 4 days | Discharge: HOME HEALTH | End: 2019-03-18
Attending: INTERNAL MEDICINE | Admitting: INTERNAL MEDICINE
Payer: COMMERCIAL

## 2019-03-14 VITALS
RESPIRATION RATE: 18 BRPM | SYSTOLIC BLOOD PRESSURE: 110 MMHG | DIASTOLIC BLOOD PRESSURE: 57 MMHG | HEART RATE: 68 BPM | HEIGHT: 70 IN | OXYGEN SATURATION: 96 % | WEIGHT: 170.19 LBS | BODY MASS INDEX: 24.37 KG/M2

## 2019-03-14 DIAGNOSIS — E84.0 CYSTIC FIBROSIS WITH PULMONARY EXACERBATION (H): ICD-10-CM

## 2019-03-14 DIAGNOSIS — E84.9 CYSTIC FIBROSIS (H): Primary | ICD-10-CM

## 2019-03-14 DIAGNOSIS — R73.02 IMPAIRED GLUCOSE TOLERANCE: ICD-10-CM

## 2019-03-14 DIAGNOSIS — K86.89 PANCREATIC INSUFFICIENCY: ICD-10-CM

## 2019-03-14 DIAGNOSIS — E84.0 CYSTIC FIBROSIS WITH PULMONARY MANIFESTATIONS (H): ICD-10-CM

## 2019-03-14 DIAGNOSIS — F33.0 MAJOR DEPRESSIVE DISORDER, RECURRENT EPISODE, MILD (H): ICD-10-CM

## 2019-03-14 LAB
ALBUMIN SERPL-MCNC: 3.6 G/DL (ref 3.4–5)
ALP SERPL-CCNC: 137 U/L (ref 40–150)
ALT SERPL W P-5'-P-CCNC: 38 U/L (ref 0–70)
ANION GAP SERPL CALCULATED.3IONS-SCNC: 6 MMOL/L (ref 3–14)
AST SERPL W P-5'-P-CCNC: 22 U/L (ref 0–45)
BASOPHILS # BLD AUTO: 0.1 10E9/L (ref 0–0.2)
BASOPHILS NFR BLD AUTO: 0.6 %
BILIRUB SERPL-MCNC: 0.3 MG/DL (ref 0.2–1.3)
BUN SERPL-MCNC: 14 MG/DL (ref 7–30)
CALCIUM SERPL-MCNC: 8.6 MG/DL (ref 8.5–10.1)
CHLORIDE SERPL-SCNC: 103 MMOL/L (ref 94–109)
CO2 SERPL-SCNC: 29 MMOL/L (ref 20–32)
CREAT SERPL-MCNC: 1.16 MG/DL (ref 0.66–1.25)
DIFFERENTIAL METHOD BLD: ABNORMAL
EOSINOPHIL # BLD AUTO: 0.3 10E9/L (ref 0–0.7)
EOSINOPHIL NFR BLD AUTO: 2.6 %
ERYTHROCYTE [DISTWIDTH] IN BLOOD BY AUTOMATED COUNT: 12.3 % (ref 10–15)
EXPTIME-PRE: 7.01 SEC
FEF2575-%PRED-PRE: 50 %
FEF2575-PRE: 2.54 L/SEC
FEF2575-PRED: 5.04 L/SEC
FEFMAX-%PRED-PRE: 77 %
FEFMAX-PRE: 7.86 L/SEC
FEFMAX-PRED: 10.2 L/SEC
FEV1-%PRED-PRE: 57 %
FEV1-PRE: 2.72 L
FEV1FEV6-PRE: 80 %
FEV1FEV6-PRED: 84 %
FEV1FVC-PRE: 80 %
FEV1FVC-PRED: 85 %
FIFMAX-PRE: 3.7 L/SEC
FVC-%PRED-PRE: 60 %
FVC-PRE: 3.4 L
FVC-PRED: 5.59 L
GFR SERPL CREATININE-BSD FRML MDRD: 89 ML/MIN/{1.73_M2}
GLUCOSE BLDC GLUCOMTR-MCNC: 117 MG/DL (ref 70–99)
GLUCOSE BLDC GLUCOMTR-MCNC: 143 MG/DL (ref 70–99)
GLUCOSE SERPL-MCNC: 97 MG/DL (ref 70–99)
HCT VFR BLD AUTO: 43 % (ref 40–53)
HGB BLD-MCNC: 14.2 G/DL (ref 13.3–17.7)
IMM GRANULOCYTES # BLD: 0.1 10E9/L (ref 0–0.4)
IMM GRANULOCYTES NFR BLD: 0.4 %
INR PPP: 1.1 (ref 0.86–1.14)
LYMPHOCYTES # BLD AUTO: 3 10E9/L (ref 0.8–5.3)
LYMPHOCYTES NFR BLD AUTO: 24 %
MAGNESIUM SERPL-MCNC: 2.2 MG/DL (ref 1.6–2.3)
MCH RBC QN AUTO: 29.5 PG (ref 26.5–33)
MCHC RBC AUTO-ENTMCNC: 33 G/DL (ref 31.5–36.5)
MCV RBC AUTO: 89 FL (ref 78–100)
MONOCYTES # BLD AUTO: 0.6 10E9/L (ref 0–1.3)
MONOCYTES NFR BLD AUTO: 5.1 %
NEUTROPHILS # BLD AUTO: 8.3 10E9/L (ref 1.6–8.3)
NEUTROPHILS NFR BLD AUTO: 67.3 %
NRBC # BLD AUTO: 0 10*3/UL
NRBC BLD AUTO-RTO: 0 /100
PHOSPHATE SERPL-MCNC: 3.7 MG/DL (ref 2.5–4.5)
PLATELET # BLD AUTO: 315 10E9/L (ref 150–450)
POTASSIUM SERPL-SCNC: 4.3 MMOL/L (ref 3.4–5.3)
RBC # BLD AUTO: 4.82 10E12/L (ref 4.4–5.9)
SODIUM SERPL-SCNC: 138 MMOL/L (ref 133–144)
WBC # BLD AUTO: 12.3 10E9/L (ref 4–11)

## 2019-03-14 PROCEDURE — 83735 ASSAY OF MAGNESIUM: CPT | Performed by: INTERNAL MEDICINE

## 2019-03-14 PROCEDURE — 84450 TRANSFERASE (AST) (SGOT): CPT | Performed by: INTERNAL MEDICINE

## 2019-03-14 PROCEDURE — 94640 AIRWAY INHALATION TREATMENT: CPT

## 2019-03-14 PROCEDURE — 25000132 ZZH RX MED GY IP 250 OP 250 PS 637: Performed by: INTERNAL MEDICINE

## 2019-03-14 PROCEDURE — 00000146 ZZHCL STATISTIC GLUCOSE BY METER IP

## 2019-03-14 PROCEDURE — 25800030 ZZH RX IP 258 OP 636: Performed by: INTERNAL MEDICINE

## 2019-03-14 PROCEDURE — 85610 PROTHROMBIN TIME: CPT | Performed by: INTERNAL MEDICINE

## 2019-03-14 PROCEDURE — 87070 CULTURE OTHR SPECIMN AEROBIC: CPT | Performed by: INTERNAL MEDICINE

## 2019-03-14 PROCEDURE — 87186 SC STD MICRODIL/AGAR DIL: CPT | Performed by: INTERNAL MEDICINE

## 2019-03-14 PROCEDURE — 84075 ASSAY ALKALINE PHOSPHATASE: CPT | Performed by: INTERNAL MEDICINE

## 2019-03-14 PROCEDURE — 94669 MECHANICAL CHEST WALL OSCILL: CPT

## 2019-03-14 PROCEDURE — 40000141 ZZH STATISTIC PERIPHERAL IV START W/O US GUIDANCE

## 2019-03-14 PROCEDURE — 82785 ASSAY OF IGE: CPT | Performed by: INTERNAL MEDICINE

## 2019-03-14 PROCEDURE — 12000001 ZZH R&B MED SURG/OB UMMC

## 2019-03-14 PROCEDURE — 82247 BILIRUBIN TOTAL: CPT | Performed by: INTERNAL MEDICINE

## 2019-03-14 PROCEDURE — 25000128 H RX IP 250 OP 636: Performed by: INTERNAL MEDICINE

## 2019-03-14 PROCEDURE — 84460 ALANINE AMINO (ALT) (SGPT): CPT | Performed by: INTERNAL MEDICINE

## 2019-03-14 PROCEDURE — 25000125 ZZHC RX 250: Performed by: INTERNAL MEDICINE

## 2019-03-14 PROCEDURE — 85025 COMPLETE CBC W/AUTO DIFF WBC: CPT | Performed by: INTERNAL MEDICINE

## 2019-03-14 PROCEDURE — 71046 X-RAY EXAM CHEST 2 VIEWS: CPT

## 2019-03-14 PROCEDURE — 84134 ASSAY OF PREALBUMIN: CPT | Performed by: INTERNAL MEDICINE

## 2019-03-14 PROCEDURE — G0463 HOSPITAL OUTPT CLINIC VISIT: HCPCS | Mod: ZF

## 2019-03-14 PROCEDURE — 80069 RENAL FUNCTION PANEL: CPT | Performed by: INTERNAL MEDICINE

## 2019-03-14 PROCEDURE — 87077 CULTURE AEROBIC IDENTIFY: CPT | Performed by: INTERNAL MEDICINE

## 2019-03-14 PROCEDURE — 40000275 ZZH STATISTIC RCP TIME EA 10 MIN

## 2019-03-14 PROCEDURE — 36415 COLL VENOUS BLD VENIPUNCTURE: CPT | Performed by: INTERNAL MEDICINE

## 2019-03-14 RX ORDER — LIDOCAINE 40 MG/G
CREAM TOPICAL
Status: DISCONTINUED | OUTPATIENT
Start: 2019-03-14 | End: 2019-03-18 | Stop reason: HOSPADM

## 2019-03-14 RX ORDER — PEDIATRIC MULTIVIT 61/D3/VIT K 1500-800
1 CAPSULE ORAL 2 TIMES DAILY
COMMUNITY
Start: 2019-03-14 | End: 2019-04-12

## 2019-03-14 RX ORDER — ACETAMINOPHEN 325 MG/1
325-650 TABLET ORAL EVERY 6 HOURS PRN
Status: DISCONTINUED | OUTPATIENT
Start: 2019-03-14 | End: 2019-03-18 | Stop reason: HOSPADM

## 2019-03-14 RX ORDER — ONDANSETRON 4 MG/1
4-8 TABLET, ORALLY DISINTEGRATING ORAL EVERY 8 HOURS PRN
Status: DISCONTINUED | OUTPATIENT
Start: 2019-03-14 | End: 2019-03-18 | Stop reason: HOSPADM

## 2019-03-14 RX ORDER — PANTOPRAZOLE SODIUM 20 MG/1
20 TABLET, DELAYED RELEASE ORAL DAILY
Status: DISCONTINUED | OUTPATIENT
Start: 2019-03-14 | End: 2019-03-18 | Stop reason: HOSPADM

## 2019-03-14 RX ORDER — POLYETHYLENE GLYCOL 3350 17 G/17G
17 POWDER, FOR SOLUTION ORAL DAILY PRN
Status: DISCONTINUED | OUTPATIENT
Start: 2019-03-14 | End: 2019-03-18 | Stop reason: HOSPADM

## 2019-03-14 RX ORDER — FLUTICASONE PROPIONATE 50 MCG
1 SPRAY, SUSPENSION (ML) NASAL DAILY
Status: DISCONTINUED | OUTPATIENT
Start: 2019-03-14 | End: 2019-03-18 | Stop reason: HOSPADM

## 2019-03-14 RX ORDER — ALBUTEROL SULFATE 0.83 MG/ML
2.5 SOLUTION RESPIRATORY (INHALATION)
Status: DISCONTINUED | OUTPATIENT
Start: 2019-03-14 | End: 2019-03-18 | Stop reason: HOSPADM

## 2019-03-14 RX ORDER — CITALOPRAM HYDROBROMIDE 20 MG/1
20 TABLET ORAL DAILY
Status: DISCONTINUED | OUTPATIENT
Start: 2019-03-14 | End: 2019-03-18 | Stop reason: HOSPADM

## 2019-03-14 RX ORDER — SODIUM CHLORIDE FOR INHALATION 7 %
4 VIAL, NEBULIZER (ML) INHALATION
Status: DISCONTINUED | OUTPATIENT
Start: 2019-03-14 | End: 2019-03-18 | Stop reason: HOSPADM

## 2019-03-14 RX ORDER — HEPARIN SODIUM,PORCINE 10 UNIT/ML
2-5 VIAL (ML) INTRAVENOUS
Status: DISCONTINUED | OUTPATIENT
Start: 2019-03-14 | End: 2019-03-18 | Stop reason: HOSPADM

## 2019-03-14 RX ORDER — FEXOFENADINE HCL 180 MG/1
180 TABLET ORAL DAILY
Status: DISCONTINUED | OUTPATIENT
Start: 2019-03-14 | End: 2019-03-18 | Stop reason: HOSPADM

## 2019-03-14 RX ADMIN — PANCRELIPASE 180000 UNITS: 36000; 180000; 114000 CAPSULE, DELAYED RELEASE PELLETS ORAL at 18:00

## 2019-03-14 RX ADMIN — Medication 1 CAPSULE: at 19:15

## 2019-03-14 RX ADMIN — Medication 4 ML: at 21:32

## 2019-03-14 RX ADMIN — FLUTICASONE PROPIONATE 1 SPRAY: 50 SPRAY, METERED NASAL at 19:15

## 2019-03-14 RX ADMIN — MEROPENEM 2 G: 1 INJECTION, POWDER, FOR SOLUTION INTRAVENOUS at 19:15

## 2019-03-14 RX ADMIN — ALBUTEROL SULFATE 2.5 MG: 2.5 SOLUTION RESPIRATORY (INHALATION) at 21:32

## 2019-03-14 RX ADMIN — TOBRAMYCIN SULFATE 480 MG: 40 INJECTION, SOLUTION INTRAMUSCULAR; INTRAVENOUS at 18:13

## 2019-03-14 ASSESSMENT — ACTIVITIES OF DAILY LIVING (ADL)
DRESS: 0-->INDEPENDENT
TOILETING: 0-->INDEPENDENT
AMBULATION: 0-->INDEPENDENT
FALL_HISTORY_WITHIN_LAST_SIX_MONTHS: NO
BATHING: 0-->INDEPENDENT
COGNITION: 0 - NO COGNITION ISSUES REPORTED
RETIRED_COMMUNICATION: 0-->UNDERSTANDS/COMMUNICATES WITHOUT DIFFICULTY
TRANSFERRING: 0-->INDEPENDENT
SWALLOWING: 0-->SWALLOWS FOODS/LIQUIDS WITHOUT DIFFICULTY
ADLS_ACUITY_SCORE: 10
RETIRED_EATING: 0-->INDEPENDENT

## 2019-03-14 ASSESSMENT — PAIN SCALES - GENERAL: PAINLEVEL: NO PAIN (0)

## 2019-03-14 ASSESSMENT — MIFFLIN-ST. JEOR: SCORE: 1778.25

## 2019-03-14 NOTE — LETTER
[facility]  [facility] and [facility] - 3rd Floor    [address] [facility] [address]   Phone: [phone]   Fax: [phone]    2019       RE: Demario Abbasi      To whom it may concern,      Demario Abbasi : 1995 was hospitalized at the [facility] from 2019 to 2019, he was accompanied by his significant other Oriana Hallman.  Following discharge he will be able to return to school on 2019.      Demario will be re-evaluated in clinic on 2019.      Sincerely,          Galileo Ly MD  Div. of Pulmonary & Critical Care Medicine  Thoracic Transplantation Program  Adult Cystic Fibrosis Program   [phone]

## 2019-03-14 NOTE — LETTER
March 14, 2019    To whom it may concern,    Demario Abbasi was seen in the  cystic fibrosis clinic at the [facility] on February 22, 2019 for follow-up of his CF-related lung disease.  He was accompanied by Oriana Hallman, his significant other, who helps him manage his cystic fibrosis lung disease.  Please excuse them from their absence from school on February 22.  Cystic fibrosis is a progressive potentially life-threatening lung disease and it is essential that Demario follow-up regularly in the CF clinic.    If you have questions regarding Demario's absence, please call me at the office of Pulmonary, Allergy, Critical Care and Sleep Medicine at [phone]    Sincerely,        Zana Lilly MD

## 2019-03-14 NOTE — PROGRESS NOTES
Reason for Visit  Demario Abbasi is a 24 year old year old male who is being seen for Cystic Fibrosis (Follow up on Demario and his CF)    Demario Abbasi was admitted to the hospital.  Please see admission H&P for details.  Recent Results (from the past 168 hour(s))   General PFT Lab (Please always keep checked)    Collection Time: 03/14/19 10:01 AM   Result Value Ref Range    FVC-Pred 5.59 L    FVC-Pre 3.40 L    FVC-%Pred-Pre 60 %    FEV1-Pre 2.72 L    FEV1-%Pred-Pre 57 %    FEV1FVC-Pred 85 %    FEV1FVC-Pre 80 %    FEFMax-Pred 10.20 L/sec    FEFMax-Pre 7.86 L/sec    FEFMax-%Pred-Pre 77 %    FEF2575-Pred 5.04 L/sec    FEF2575-Pre 2.54 L/sec    TPZ7071-%Pred-Pre 50 %    ExpTime-Pre 7.01 sec    FIFMax-Pre 3.70 L/sec    FEV1FEV6-Pred 84 %    FEV1FEV6-Pre 80 %                   Results as noted above.    PFT Interpretation:  Moderate restrictive ventilatory defect.  Decreased from previous.  Below recent best.   Valid Maneuver        CF Exacerbation:   Severe  Increased vest/bronchodilator/execise and Hosp admit/home IV (within 2 wks)        Scribe Disclosure:   I, Maria C Glez, am serving as a scribe; to document services personally performed by Zana Lilly MD based on data collection and the provider's statements to me.     Provider Disclosure:  I agree with above History, Review of Systems, Physical Exam and Plan.  I have reviewed the content of the documentation and have edited it as needed. I have personally performed the services documented here and the documentation accurately represents those services and the decisions I have made.      Electronically signed by:  Zana Lilly

## 2019-03-14 NOTE — NURSING NOTE
Chief Complaint   Patient presents with     Cystic Fibrosis     Follow up on Demario and his CF     Jose Luis Ayers CMA at 10:32 AM on 3/14/2019

## 2019-03-14 NOTE — H&P
[facility]  Cystic Fibrosis-Lung Transplant History and Physical  March 14, 2019      Demario Abbasi MRN# [id number]   Age: 24 year old YOB: 1995     Date of Admission:  March 14, 2019         Primary care provider: Susan Li           Assessment and Plan:   Demario Abbasi is a 24 year old male with cystic fibrosis, pancreatic insufficiency, depression, acne and impaired glucose tolerance who is s/p RUL resection for recurrent exacerbations in 2009.  He has been feeling poorly for about 2 months with malaise, fatigue, increased cough and sputum production which is thicker and darker than baseline with some increase and dyspnea on exertion.  He has completed a 3-week course of oral Bactrim and doxycycline based on previous cultures with minimal improvement.  PFTs are down from baseline.  Symptom complex is most consistent with a pulmonary exacerbation although pneumonia does need to be considered.    Pulmonary: The patient presents with persistent fatigue, malaise, increased cough and sputum production and increased dyspnea on exertion.  He had limited improvement with oral Bactrim and doxycycline.  PFTs are persistently below baseline.  Patient's symptom complex is most consistent with a pulmonary exacerbation unresponsive to oral antibiotics.  The possibility of pneumonia needs to be considered and a chest x-ray will be checked.  Based on previous cultures the patient will be treated with IV meropenem and IV tobramycin.   Antibiotics will be adjusted based on updated culture results.  Vest therapy will be increased to 4 times daily with albuterol with every therapy, Pulmozyme twice daily and hypertonic saline twice daily.  Azithromycin will be held due to potential inhibitory interaction with tobramycin.  Progress will be monitored with symptoms and PFTs.  With the patient's history of lobectomy it is especially important that we work to preserve  lung function.  PICC line will be arranged in anticipation of at least a 3-week course of antibiotics.  IgE will be checked to rule out ABPA.    Pancreatic insufficiency: The patient denies symptoms of malabsorption.  His weight is stable in an adequate range.  He will continue his home dose of enzymes and vitamin supplementation.    Acne: The patient was previously on Accutane.  Acne has resolved and Accutane was discontinued.  While on Accutane, MVW vitamins were held due to the potential for excessive vitamin A supplementation.  Now that Accutane has been stopped, MVW's will be reinitiated and the individual vitamin D will be discontinued.    Depression: Adequately controlled with citalopram, continue current dose.    Reflux: The patient has a history of reflux, continue current Protonix.    Glucose intolerance: The patient has a history of glucose intolerance based on glucose tolerance testing.  Blood sugars will be monitored the first couple of days of the hospitalization and if significant hyperglycemia, insulin will be considered.    Plan of care was discussed with the patient, his significant other and his father and they agree to proceed as planned.  Signout completed with the inpatient service.    Zana Lilly           Chief Complaint:     Dyspnea on exertion, cough and sputum.         History of Present Illness:     History obtained from the patient and the medical record.    The patient was seen and examined by me.    Demario Abbasi is a 24 year old male with cystic fibrosis, pancreatic insufficiency, depression, acne and impaired glucose tolerance who is s/p RUL resection for recurrent exacerbations in 2009.   The patient was last seen in clinic on February 22.  He had called into the CF office 1 week prior with malaise, fatigue, cough and increased sputum.  He was started on Bactrim and doxycycline.   Since last visit, he finished his 3-week course of Bactrim and doxycycline. Reports  symptoms improved slightly though lingered once treatment course finished. Patient reports fatigue/low energy, pallor, sunken eyes, chest congestion, dry cough, intermittent wheezing and crackling x4 days.   Today, the patient is still not feeling well. Reports symptoms have slightly improved from four days ago but still well below baseline. Breathing is comfortable at rest.  He reports increased dyspnea on exertion.  The patient reports mild intermittent SOB when he is laying down. Reports sputum production continues green to light brown, darker than baseline. No hemoptysis. No CP though chest tightness. No fever, chills, or night sweats.   He and his girlfriend report intermittent audible crackles and wheezing.     He is doing vest therapy 30 minutes BID, occasionally TID, at [place] standard frequencies and pressures.             Review of Systems:                     Appetite is good.  No ear pain, sore throat, sinus pain or rhinorrhea.  No nausea, vomiting, diarrhea or abdominal pain.    The remainder of a complete review of systems is otherwise negative except as noted in the history of present illness.         Past Medical and Surgical History:     Past Medical History:   Diagnosis Date     CF (cystic fibrosis) (H)      Impaired glucose tolerance      Pancreatic insufficiency      S/P lobectomy of lung 8/4/2015    Right upper lobectomy - 2009     Past Surgical History:   Procedure Laterality Date     HC LAP,INGUINAL HERNIA REPR,INITIAL  2002     LOBECTOMY LUNG Right 2009    Right upper lobe           Family History:     Family History   Problem Relation Age of Onset     Thyroid Disease Mother         hypothyroid     Other - See Comments Mother         multiple family members with biliary disease     Hypertension Maternal Grandmother      Diabetes Maternal Grandfather      Hypertension Paternal Grandmother      Hypothyroidism Paternal Grandmother      Parkinsonism Paternal Grandmother      Coronary Artery Disease  Early Onset Paternal Grandfather 56           Social History:     Social History     Socioeconomic History     Marital status: Single     Spouse name: Not on file     Number of children: Not on file     Years of education: Not on file     Highest education level: Not on file   Occupational History     Occupation:    Social Needs     Financial resource strain: Not on file     Food insecurity:     Worry: Not on file     Inability: Not on file     Transportation needs:     Medical: Not on file     Non-medical: Not on file   Tobacco Use     Smoking status: Passive Smoke Exposure - Never Smoker     Smokeless tobacco: Current User     Types: Chew     Tobacco comment: dad smokes   Substance and Sexual Activity     Alcohol use: Yes     Comment: <2 drinks per week     Drug use: Not on file     Sexual activity: Not on file   Lifestyle     Physical activity:     Days per week: Not on file     Minutes per session: Not on file     Stress: Not on file   Relationships     Social connections:     Talks on phone: Not on file     Gets together: Not on file     Attends Caodaism service: Not on file     Active member of club or organization: Not on file     Attends meetings of clubs or organizations: Not on file     Relationship status: Not on file     Intimate partner violence:     Fear of current or ex partner: Not on file     Emotionally abused: Not on file     Physically abused: Not on file     Forced sexual activity: Not on file   Other Topics Concern     Parent/sibling w/ CABG, MI or angioplasty before 65F 55M? 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 2 dogs.             Allergies and Medications:     Allergies   Allergen Reactions     Augmentin      Admission medication     albuterol (ALBUTEROL) 108 (90 BASE) MCG/ACT Inhaler Inhale 2 puffs into the lungs every 6 hours as needed for shortness of  breath / dyspnea or wheezing       albuterol (PROVENTIL) (2.5 MG/3ML) 0.083% neb solution Take 1 vial (2.5 mg) by nebulization 3 times daily       amylase-lipase-protease (CREON 36) 35958 units CPEP Take 5 capsules (180,000 Units) by mouth 3 times daily (with meals) And 3 with snacks       azithromycin (ZITHROMAX) 500 MG tablet Take one tablet on Monday, Wednesday and Friday morning       citalopram (CELEXA) 20 MG tablet Take 1 tablet (20 mg) by mouth daily       dornase alpha (PULMOZYME) 1 MG/ML neb solution Inhale 2.5 mg into the lungs 2 times daily       fexofenadine (ALLEGRA) 180 MG tablet TAKE 1 TABLET (180 MG) BY MOUTH DAILY       fluconazole (DIFLUCAN) 150 MG tablet Take 1 tablet (150 mg) by mouth daily as needed       fluticasone (FLONASE) 50 MCG/ACT nasal spray INSTILL 1 SPRAY INTO BOTH NOSTRILS DAILY       mvw SOFTGELS  capsule Take 1 capsule by mouth 2 times daily       pantoprazole (PROTONIX) 20 MG EC tablet TAKE 1 TABLET (20 MG) BY MOUTH DAILY       sodium chloride inhalant 7 % NEBU neb solution Take 4 mLs by nebulization 2 times daily as needed for wheezing       tobramycin, PF, (COBY) 300 MG/5ML neb solution Take 5 mLs (300 mg) by nebulization 2 times daily Cycle 28 days on/off           Physical Exam:   Pulse:  [68] 68  Resp:  [18] 18  BP: (110)/(57) 110/57  SpO2:  [96 %] 96 %  No intake or output data in the 24 hours ending 03/14/19 1315    Constitutional:   Awake, alert and in no apparent distress     Eyes:   Nonicteric, DUC     ENT:    oral mucosa moist without lesions  Nasal mucosa edema and hyperemia  Auditory canals are clear, TM's with normal light reflex     Neck:   Supple without axillary, supraclavicular or cervical lymphadenopathy  No masses and thromegaly     Lungs:   Good air flow.  Left mid-lung crackles. No rhonchi.  No wheezes.     Cardiovascular:   Normal S1 and S2.  RRR.  No murmur, gallop or rub.  Radial pulses normal and symmetric     Abdomen:   NABS, soft, nontender,  nondistended.  No HSM.     Musculoskeletal:   No edema. No cyanosis.     Neurologic:   Alert and conversant. Cranial nerves II-XII intact. Strength 5/5 and symmetric.     Skin:   Warm, dry.  No rash on limited exam.           Data:   All laboratory and imaging data reviewed.    CMPNo lab results found in last 7 days.  CBCNo lab results found in last 7 days.  INRNo lab results found in last 7 days.  Arterial Blood GasNo lab results found in last 7 days.  Urine Studies    Recent Labs   Lab Test 02/22/19  1032 11/03/16  1032 08/13/15  1000 07/24/14  0920 05/02/13  1030   URINEPH 5.0 5.5 5.5 6.0 6.5   NITRITE Negative Negative Negative Negative Negative   LEUKEST Negative Negative Negative Negative Negative   WBCU <1 <1 1 0 <1     CMV viral loads  No lab results found.  CMV Quantitative   Date Value Ref Range Status   01/02/2009 <100 <100 Copies/mL Final     Comment:     No CMV DNA detected.     EBV viral loads   No lab results found.  No results found for: EBVDN, EBRES, EBVDN, EBVSP, EBVPC, EBVPCR  Respiratory Virus Testing    RSV Rapid Antigen Result   Date Value Ref Range Status   01/02/2009 Negative NEG Final      Sputum Cultures in the last 3 months:  Specimen Description   Date Value Ref Range Status   02/22/2019 Sputum  Final   11/23/2018 Sputum  Final   11/23/2018 Sputum  Final    Culture Micro   Date Value Ref Range Status   02/22/2019 Moderate growth  Normal elmer    Final   02/22/2019 (A)  Final    Moderate growth  Achromobacter xylosoxidans/denitrificans     02/22/2019 (A)  Final    Moderate growth  Strain 2  Achromobacter xylosoxidans/denitrificans     02/22/2019 (A)  Final    Light growth  Staphylococcus aureus  This isolate is presumed to be clindamycin resistant based on detection of inducible   clindamycin resistance. Erythromycin and clindamycin are resistant, therefore, they are   not recommended for use.          Attestation:

## 2019-03-14 NOTE — PHARMACY-AMINOGLYCOSIDE DOSING SERVICE
Pharmacy Aminoglycoside Initial Note  Date of Service 2019  Patient's  1995  24 year old, male    Weight (Actual):  76.7 kg    Indication: CF exacerbation    Current estimated CrCl = Estimated Creatinine Clearance: 108.4 mL/min (based on SCr of 1.16 mg/dL).    Creatinine for last 3 days  3/14/2019:  5:12 PM Creatinine 1.16 mg/dL     Nephrotoxins and other renal medications (From now, onward)    Start     Dose/Rate Route Frequency Ordered Stop    19 1700  tobramycin (NEBCIN) 480 mg in sodium chloride 0.9 % intermittent infusion      6 mg/kg × 76.7 kg  over 60 Minutes Intravenous EVERY 24 HOURS 19 1646            Contrast Orders - past 72 hours (72h ago, onward)    None          Aminoglycoside Levels - past 2 days  No results found for requested labs within last 48 hours.    Aminoglycosides IV Administrations (past 72 hours)      No aminoglycosides orders with administrations in past 72 hours.                    Plan:  1.  Start Tobramycin 480 mg (6 mg/kg) IV q24h.   2.  Target goals based on cystic fibrosis dosing  3.  Goal peak level: 17-24 mg/L  4.  Goal trough level: <0.5mg/L  5.  Pharmacy will continue to follow and check levels as appropriate in 1-3 Days      Asha Luevano, LluviaD

## 2019-03-14 NOTE — LETTER
3/14/2019       RE: Demario Abbasi  [address]  [place] [address]     Dear Colleague,    Thank you for referring your patient, Demario Abbasi, to the [facility] at [facility]. Please see a copy of my visit note below.    Reason for Visit  Demario Abbasi is a 24 year old year old male who is being seen for Cystic Fibrosis (Follow up on Demario and his CF)    Demario Abbasi was admitted to the hospital.  Please see admission H&P for details.  Recent Results (from the past 168 hour(s))   General PFT Lab (Please always keep checked)    Collection Time: 03/14/19 10:01 AM   Result Value Ref Range    FVC-Pred 5.59 L    FVC-Pre 3.40 L    FVC-%Pred-Pre 60 %    FEV1-Pre 2.72 L    FEV1-%Pred-Pre 57 %    FEV1FVC-Pred 85 %    FEV1FVC-Pre 80 %    FEFMax-Pred 10.20 L/sec    FEFMax-Pre 7.86 L/sec    FEFMax-%Pred-Pre 77 %    FEF2575-Pred 5.04 L/sec    FEF2575-Pre 2.54 L/sec    TWG5378-%Pred-Pre 50 %    ExpTime-Pre 7.01 sec    FIFMax-Pre 3.70 L/sec    FEV1FEV6-Pred 84 %    FEV1FEV6-Pre 80 %       Results as noted above.    PFT Interpretation:  Moderate restrictive ventilatory defect.  Decreased from previous.  Below recent best.   Valid Maneuver    CF Exacerbation:   Severe  Increased vest/bronchodilator/execise and Hosp admit/home IV (within 2 wks)    Scribe Disclosure:   I, Maria C Glez, am serving as a scribe; to document services personally performed by Zana Lilly MD based on data collection and the provider's statements to me.     Provider Disclosure:  I agree with above History, Review of Systems, Physical Exam and Plan.  I have reviewed the content of the documentation and have edited it as needed. I have personally performed the services documented here and the documentation accurately represents those services and the decisions I have made.      Electronically signed by:  Zana Lilly

## 2019-03-15 ENCOUNTER — APPOINTMENT (OUTPATIENT)
Dept: PHYSICAL THERAPY | Facility: CLINIC | Age: 24
End: 2019-03-15
Attending: INTERNAL MEDICINE
Payer: COMMERCIAL

## 2019-03-15 LAB
GLUCOSE BLDC GLUCOMTR-MCNC: 125 MG/DL (ref 70–99)
PREALB SERPL IA-MCNC: 27 MG/DL (ref 15–45)

## 2019-03-15 PROCEDURE — 36569 INSJ PICC 5 YR+ W/O IMAGING: CPT

## 2019-03-15 PROCEDURE — 25000125 ZZHC RX 250: Performed by: INTERNAL MEDICINE

## 2019-03-15 PROCEDURE — 27211417 ZZ H KIT, VPS RHYTHM STYLET

## 2019-03-15 PROCEDURE — 97110 THERAPEUTIC EXERCISES: CPT | Mod: GP

## 2019-03-15 PROCEDURE — 25000132 ZZH RX MED GY IP 250 OP 250 PS 637: Performed by: INTERNAL MEDICINE

## 2019-03-15 PROCEDURE — 00000146 ZZHCL STATISTIC GLUCOSE BY METER IP

## 2019-03-15 PROCEDURE — 94669 MECHANICAL CHEST WALL OSCILL: CPT

## 2019-03-15 PROCEDURE — 12000001 ZZH R&B MED SURG/OB UMMC

## 2019-03-15 PROCEDURE — 25800030 ZZH RX IP 258 OP 636: Performed by: INTERNAL MEDICINE

## 2019-03-15 PROCEDURE — 25000132 ZZH RX MED GY IP 250 OP 250 PS 637: Performed by: NURSE PRACTITIONER

## 2019-03-15 PROCEDURE — 25000128 H RX IP 250 OP 636: Performed by: INTERNAL MEDICINE

## 2019-03-15 PROCEDURE — 94640 AIRWAY INHALATION TREATMENT: CPT | Mod: 76

## 2019-03-15 PROCEDURE — 94640 AIRWAY INHALATION TREATMENT: CPT

## 2019-03-15 PROCEDURE — 97530 THERAPEUTIC ACTIVITIES: CPT | Mod: GP

## 2019-03-15 PROCEDURE — 97161 PT EVAL LOW COMPLEX 20 MIN: CPT | Mod: GP

## 2019-03-15 PROCEDURE — 40000275 ZZH STATISTIC RCP TIME EA 10 MIN

## 2019-03-15 PROCEDURE — C1751 CATH, INF, PER/CENT/MIDLINE: HCPCS

## 2019-03-15 RX ORDER — PEDIATRIC MULTIVIT 61/D3/VIT K 1500-800
1 CAPSULE ORAL 2 TIMES DAILY
Status: DISCONTINUED | OUTPATIENT
Start: 2019-03-15 | End: 2019-03-18 | Stop reason: HOSPADM

## 2019-03-15 RX ORDER — AZITHROMYCIN 250 MG/1
500 TABLET, FILM COATED ORAL
Status: DISCONTINUED | OUTPATIENT
Start: 2019-03-18 | End: 2019-03-15

## 2019-03-15 RX ORDER — SULFAMETHOXAZOLE/TRIMETHOPRIM 800-160 MG
2 TABLET ORAL 2 TIMES DAILY
Status: DISCONTINUED | OUTPATIENT
Start: 2019-03-15 | End: 2019-03-18 | Stop reason: HOSPADM

## 2019-03-15 RX ORDER — AZITHROMYCIN 250 MG/1
500 TABLET, FILM COATED ORAL
Status: DISCONTINUED | OUTPATIENT
Start: 2019-03-15 | End: 2019-03-18 | Stop reason: HOSPADM

## 2019-03-15 RX ADMIN — Medication 1 CAPSULE: at 09:08

## 2019-03-15 RX ADMIN — ALBUTEROL SULFATE 2.5 MG: 2.5 SOLUTION RESPIRATORY (INHALATION) at 13:38

## 2019-03-15 RX ADMIN — ALBUTEROL SULFATE 2.5 MG: 2.5 SOLUTION RESPIRATORY (INHALATION) at 21:23

## 2019-03-15 RX ADMIN — CITALOPRAM HYDROBROMIDE 20 MG: 20 TABLET ORAL at 09:08

## 2019-03-15 RX ADMIN — FLUTICASONE PROPIONATE 1 SPRAY: 50 SPRAY, METERED NASAL at 09:09

## 2019-03-15 RX ADMIN — PANCRELIPASE 180000 UNITS: 36000; 180000; 114000 CAPSULE, DELAYED RELEASE PELLETS ORAL at 19:44

## 2019-03-15 RX ADMIN — MEROPENEM 2 G: 1 INJECTION, POWDER, FOR SOLUTION INTRAVENOUS at 01:01

## 2019-03-15 RX ADMIN — LIDOCAINE HYDROCHLORIDE 5 ML: 10 INJECTION, SOLUTION EPIDURAL; INFILTRATION; INTRACAUDAL; PERINEURAL at 18:52

## 2019-03-15 RX ADMIN — SULFAMETHOXAZOLE AND TRIMETHOPRIM 2 TABLET: 800; 160 TABLET ORAL at 19:50

## 2019-03-15 RX ADMIN — FEXOFENADINE HYDROCHLORIDE 180 MG: 180 TABLET, FILM COATED ORAL at 09:08

## 2019-03-15 RX ADMIN — DORNASE ALFA 2.5 MG: 1 SOLUTION RESPIRATORY (INHALATION) at 09:39

## 2019-03-15 RX ADMIN — PANTOPRAZOLE SODIUM 20 MG: 20 TABLET, DELAYED RELEASE ORAL at 09:08

## 2019-03-15 RX ADMIN — MEROPENEM 2 G: 1 INJECTION, POWDER, FOR SOLUTION INTRAVENOUS at 09:09

## 2019-03-15 RX ADMIN — Medication 1 CAPSULE: at 19:50

## 2019-03-15 RX ADMIN — Medication 4 ML: at 21:23

## 2019-03-15 RX ADMIN — PANCRELIPASE 180000 UNITS: 36000; 180000; 114000 CAPSULE, DELAYED RELEASE PELLETS ORAL at 09:08

## 2019-03-15 RX ADMIN — DORNASE ALFA 2.5 MG: 1 SOLUTION RESPIRATORY (INHALATION) at 18:18

## 2019-03-15 RX ADMIN — ALBUTEROL SULFATE 2.5 MG: 2.5 SOLUTION RESPIRATORY (INHALATION) at 09:39

## 2019-03-15 RX ADMIN — MEROPENEM 2 G: 1 INJECTION, POWDER, FOR SOLUTION INTRAVENOUS at 16:11

## 2019-03-15 RX ADMIN — Medication 4 ML: at 13:38

## 2019-03-15 RX ADMIN — ALBUTEROL SULFATE 2.5 MG: 2.5 SOLUTION RESPIRATORY (INHALATION) at 18:19

## 2019-03-15 RX ADMIN — AZITHROMYCIN 500 MG: 250 TABLET, FILM COATED ORAL at 16:10

## 2019-03-15 ASSESSMENT — ACTIVITIES OF DAILY LIVING (ADL)
ADLS_ACUITY_SCORE: 10

## 2019-03-15 NOTE — PLAN OF CARE
Patient denies SOB, clear/diminished LS, on RA. On Merem via PIV. Plan for PICC placement today. +BS. Voiding not saving. Denies pain, no complaints noted this shift. Continue poc.

## 2019-03-15 NOTE — PROGRESS NOTES
Attempted to place a PICC line on left upper arm basilic vein, having a hard time passing the catheter pass the axilla. Tried the right upper brachial lateral vein but the dilator can't thread through. According to patient, there's always a difficulty of putting a PICC on him. RN informed. MD paged. Might defer to IR.

## 2019-03-15 NOTE — PROGRESS NOTES
Determination of self-administration of aerosol medication for Cystic Fibrosis patients:    1. An order has been written by the physician for patient to self-administer: yes    2. Patient has appropriate motivation level to complete aerosol/vest treatments: yes    3. Self-administration evaluation:     Able to get out of bed on own: yes  Able to add medication to neb cup: yes  Altered mental status: no  Any concerns that may affect the patient's ability to self-administer therapy: no  Learning impairment: no  Developmental disability or delay: no  Understands liter flow needed for treatment: yes  Able to turn flowmeter on and off: yes    4. Knowledge of aerosolized medications:    Understands the order in which to administer prescribed medications: yes  Understands possible side effects: yes    Albuterol 2.5 mg (Bronchodilator), Side Effects: Increase Heart Rate  Pulmozyme/Dnase 2.5 mg (Mucolytic), Side Effects: Increase Heart Rate, Bronchospasm and Cough    5. Medication will be dispensed through pharmacy. The aerosol/vest treatment will be initiated and in progress before any medications will be left. Equipment, supplies, medication, and education will be provided by Cardiopulmonary Services. If non-compliance is suspected, continuation of self-administration will be re-evaluated and may be discontinued.  Follow up with the patient will be done if treatment goals are not met. Vest therapy frequencies are 8, 9, and 10 Hz, at a pressure of 10, and 18, 19, 20 Hz at a pressure of 6. Each frequency is 5 minutes long followed by vest deflation with cough X3. Documentation of length of each therapy will be done in the Electronic Medical Record.    RT Naye on 3/15/2019 at 11:07 AM

## 2019-03-15 NOTE — PLAN OF CARE
Blood pressure 111/53, pulse 54, temperature 97.2  F (36.2  C), temperature source Oral, resp. rate 18, weight 76.7 kg (169 lb 1.6 oz), SpO2 98 %.    Pt admitted from clinic with CF exacerbation.  Neuros: A&Ox4.  Cardiac: Bradycardic in 50s.  Respiratory: Denies SOB and chest pain. O2 sats 98% on RA.   GI/: +BS. Voiding.   Diet: Tolerating right calorie diet. Good PO intake.   Activity: Up and brandi.  LDA: Right PIV at TKO in between abx.  Pain: Denies pain.  Lab:  and 143.  Plan: PICC line placement tomorrow. Continue with poc.

## 2019-03-16 LAB
BASOPHILS # BLD AUTO: 0.1 10E9/L (ref 0–0.2)
BASOPHILS NFR BLD AUTO: 1.2 %
DIFFERENTIAL METHOD BLD: NORMAL
EOSINOPHIL # BLD AUTO: 0.4 10E9/L (ref 0–0.7)
EOSINOPHIL NFR BLD AUTO: 6 %
ERYTHROCYTE [DISTWIDTH] IN BLOOD BY AUTOMATED COUNT: 12.5 % (ref 10–15)
GLUCOSE BLDC GLUCOMTR-MCNC: 102 MG/DL (ref 70–99)
HCT VFR BLD AUTO: 42.4 % (ref 40–53)
HGB BLD-MCNC: 13.9 G/DL (ref 13.3–17.7)
IMM GRANULOCYTES # BLD: 0 10E9/L (ref 0–0.4)
IMM GRANULOCYTES NFR BLD: 0.3 %
LYMPHOCYTES # BLD AUTO: 1.3 10E9/L (ref 0.8–5.3)
LYMPHOCYTES NFR BLD AUTO: 21.6 %
MCH RBC QN AUTO: 29.4 PG (ref 26.5–33)
MCHC RBC AUTO-ENTMCNC: 32.8 G/DL (ref 31.5–36.5)
MCV RBC AUTO: 90 FL (ref 78–100)
MONOCYTES # BLD AUTO: 0.6 10E9/L (ref 0–1.3)
MONOCYTES NFR BLD AUTO: 10.7 %
NEUTROPHILS # BLD AUTO: 3.5 10E9/L (ref 1.6–8.3)
NEUTROPHILS NFR BLD AUTO: 60.2 %
NRBC # BLD AUTO: 0 10*3/UL
NRBC BLD AUTO-RTO: 0 /100
PLATELET # BLD AUTO: 250 10E9/L (ref 150–450)
RBC # BLD AUTO: 4.72 10E12/L (ref 4.4–5.9)
WBC # BLD AUTO: 5.8 10E9/L (ref 4–11)

## 2019-03-16 PROCEDURE — 25000132 ZZH RX MED GY IP 250 OP 250 PS 637: Performed by: INTERNAL MEDICINE

## 2019-03-16 PROCEDURE — 25800030 ZZH RX IP 258 OP 636: Performed by: INTERNAL MEDICINE

## 2019-03-16 PROCEDURE — 40000275 ZZH STATISTIC RCP TIME EA 10 MIN: Performed by: OPTOMETRIST

## 2019-03-16 PROCEDURE — 25000132 ZZH RX MED GY IP 250 OP 250 PS 637: Performed by: NURSE PRACTITIONER

## 2019-03-16 PROCEDURE — 94669 MECHANICAL CHEST WALL OSCILL: CPT | Performed by: OPTOMETRIST

## 2019-03-16 PROCEDURE — 94640 AIRWAY INHALATION TREATMENT: CPT

## 2019-03-16 PROCEDURE — 36415 COLL VENOUS BLD VENIPUNCTURE: CPT | Performed by: NURSE PRACTITIONER

## 2019-03-16 PROCEDURE — 85025 COMPLETE CBC W/AUTO DIFF WBC: CPT | Performed by: NURSE PRACTITIONER

## 2019-03-16 PROCEDURE — 94640 AIRWAY INHALATION TREATMENT: CPT | Mod: 76 | Performed by: OPTOMETRIST

## 2019-03-16 PROCEDURE — 00000146 ZZHCL STATISTIC GLUCOSE BY METER IP

## 2019-03-16 PROCEDURE — 12000001 ZZH R&B MED SURG/OB UMMC

## 2019-03-16 PROCEDURE — 40000275 ZZH STATISTIC RCP TIME EA 10 MIN

## 2019-03-16 PROCEDURE — 25000125 ZZHC RX 250: Performed by: INTERNAL MEDICINE

## 2019-03-16 PROCEDURE — 87633 RESP VIRUS 12-25 TARGETS: CPT | Performed by: NURSE PRACTITIONER

## 2019-03-16 PROCEDURE — 27210533 ZZH VEST CHEST PERCUSSION: Performed by: OPTOMETRIST

## 2019-03-16 PROCEDURE — 94669 MECHANICAL CHEST WALL OSCILL: CPT

## 2019-03-16 PROCEDURE — 25000128 H RX IP 250 OP 636: Performed by: INTERNAL MEDICINE

## 2019-03-16 RX ORDER — LIDOCAINE 40 MG/G
CREAM TOPICAL
Status: DISCONTINUED | OUTPATIENT
Start: 2019-03-16 | End: 2019-03-18 | Stop reason: HOSPADM

## 2019-03-16 RX ORDER — HEPARIN SODIUM,PORCINE 10 UNIT/ML
2-5 VIAL (ML) INTRAVENOUS
Status: COMPLETED | OUTPATIENT
Start: 2019-03-16 | End: 2019-03-18

## 2019-03-16 RX ADMIN — MEROPENEM 2 G: 1 INJECTION, POWDER, FOR SOLUTION INTRAVENOUS at 16:27

## 2019-03-16 RX ADMIN — ALBUTEROL SULFATE 2.5 MG: 2.5 SOLUTION RESPIRATORY (INHALATION) at 13:22

## 2019-03-16 RX ADMIN — PANCRELIPASE 3 CAPSULE: 36000; 180000; 114000 CAPSULE, DELAYED RELEASE PELLETS ORAL at 19:50

## 2019-03-16 RX ADMIN — Medication 1 CAPSULE: at 09:49

## 2019-03-16 RX ADMIN — SULFAMETHOXAZOLE AND TRIMETHOPRIM 2 TABLET: 800; 160 TABLET ORAL at 09:49

## 2019-03-16 RX ADMIN — PANCRELIPASE 216000 UNITS: 36000; 180000; 114000 CAPSULE, DELAYED RELEASE PELLETS ORAL at 09:50

## 2019-03-16 RX ADMIN — ALBUTEROL SULFATE 2.5 MG: 2.5 SOLUTION RESPIRATORY (INHALATION) at 17:24

## 2019-03-16 RX ADMIN — SULFAMETHOXAZOLE AND TRIMETHOPRIM 2 TABLET: 800; 160 TABLET ORAL at 19:50

## 2019-03-16 RX ADMIN — ALBUTEROL SULFATE 2.5 MG: 2.5 SOLUTION RESPIRATORY (INHALATION) at 21:17

## 2019-03-16 RX ADMIN — DORNASE ALFA 2.5 MG: 1 SOLUTION RESPIRATORY (INHALATION) at 17:24

## 2019-03-16 RX ADMIN — Medication 1 CAPSULE: at 19:50

## 2019-03-16 RX ADMIN — MEROPENEM 2 G: 1 INJECTION, POWDER, FOR SOLUTION INTRAVENOUS at 00:29

## 2019-03-16 RX ADMIN — CITALOPRAM HYDROBROMIDE 20 MG: 20 TABLET ORAL at 09:49

## 2019-03-16 RX ADMIN — FLUTICASONE PROPIONATE 1 SPRAY: 50 SPRAY, METERED NASAL at 09:47

## 2019-03-16 RX ADMIN — Medication 4 ML: at 21:17

## 2019-03-16 RX ADMIN — ALBUTEROL SULFATE 2.5 MG: 2.5 SOLUTION RESPIRATORY (INHALATION) at 09:16

## 2019-03-16 RX ADMIN — Medication 4 ML: at 13:22

## 2019-03-16 RX ADMIN — FEXOFENADINE HYDROCHLORIDE 180 MG: 180 TABLET, FILM COATED ORAL at 09:49

## 2019-03-16 RX ADMIN — DORNASE ALFA 2.5 MG: 1 SOLUTION RESPIRATORY (INHALATION) at 09:16

## 2019-03-16 RX ADMIN — PANTOPRAZOLE SODIUM 20 MG: 20 TABLET, DELAYED RELEASE ORAL at 09:49

## 2019-03-16 RX ADMIN — MEROPENEM 2 G: 1 INJECTION, POWDER, FOR SOLUTION INTRAVENOUS at 09:51

## 2019-03-16 ASSESSMENT — ACTIVITIES OF DAILY LIVING (ADL)
ADLS_ACUITY_SCORE: 10

## 2019-03-16 NOTE — CONSULTS
A collaboration between [facility] and [facility]  Experts in minimally invasive, targeted treatments performed using imaging guidance    Interventional Radiology Consult Service Note    Patient Name:  Demario Abbasi   YOB: 1995  Medical Record Number (MRN):  [id number]  Age:  24 year old male  Patient location / Unit:  7-225    -----    PLAN:   PT is a 24M admitted with CF exacerbation needing a PICC for outpatient abx.  Spoke with Pulm NP who felt having patient come for outpatient PICC Monday would not feasible.  Tentative plan for patient to discharge 3/17 although still pending clinical evaluation.     -Tentaive plan for PICC Monday, unless call team is in house for another urgent case today or tomorrow    -Will let NP kimberly know ASAP if we are planning to do this case to allow for early discharge planning      Case discussed with Kimberly Montes, Pulm NP.      [phone] (IR RN control desk)  [phone] ([place] IR call pager)    Rachid Cortez MD  Interventional Radiology PGY5  [phone] (personal pager)    -----        Patient Name:  Demario Abbasi   YOB: 1995  Medical Record Number (MRN):  [id number]  Age:  24 year old male      Requested IR consult: PICC placement for outpatient abx, attempted by PICC RN, hx of difficult PICC    HPI:    Pt is a 24M hx of CF admitted with exacerbation being tx with IV abx.  Team requested PICC for access for abx.  Team may be considering discharge 3/17 but he would need a PICC in place before he can go.     Complete Blood Count:  Lab Results   Component Value Date     03/16/2019       Coagulation:  Lab Results   Component Value Date    INR 1.10 03/14/2019       -----    Associated Imaging:    IR PICC placement 7/2015 reviewed.        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     Priority: Medium     Pancreatic insufficiency 05/25/2018     Priority: Medium     Major depressive disorder, recurrent episode, mild (H) 10/29/2015     Priority: Medium     S/P lobectomy of lung 08/04/2015     Priority: Medium     Right upper lobectomy - 2009       Impaired glucose tolerance 08/15/2013     Priority: Medium     Cystic fibrosis (H) 10/20/2011     Priority: Medium     SWEAT TEST:  Date: 1995          Laboratory: [facility]  Sample #1  239 mg           107 mmol/L Cl  Sample #2  280 mg           104 mmol/L Cl     GENOTYPING:  Date: 1995               Laboratory: [facility]  Genotype: g542x/621+1g>t  Poly T Variant:  [  ]/[  ]    CF Standards of Care    Pulmozyme: On, once daily    Hypertonic Saline: Not on   COBY: On every other month (for Achromobacter, not P. Aeruginosa)    Azithromycin: On      Problem list name updated by automated process. Provider to review           Prescription Medications as of 3/16/2019       Rx Number Disp Refills Start End Last Dispensed Date Next Fill Date Owning Pharmacy    albuterol (ALBUTEROL) 108 (90 BASE) MCG/ACT Inhaler  1 Inhaler 3 7/17/2017    [facility] - [place], [place] - [address]    Sig: Inhale 2 puffs into the lungs every 6 hours as needed for shortness of breath / dyspnea or wheezing    Class: E-Prescribe    Route: Inhalation    albuterol (PROVENTIL) (2.5 MG/3ML) 0.083% neb solution  270 mL 6 3/5/2019    [facility] - [place], [place] - [address]    Sig: Take 1 vial (2.5 mg) by nebulization 3 times daily    Class: E-Prescribe    Route: Nebulization    amylase-lipase-protease (CREON 36) 21466 units  CPEP  720 capsule 11 7/3/2018    [address]    Sig: Take 5 capsules (180,000 Units) by mouth 3 times daily (with meals) And 3 with snacks    Class: E-Prescribe    Route: Oral    azithromycin (ZITHROMAX) 500 MG tablet  12 tablet 12 4/26/2018    [address]    Sig: Take one tablet on Monday, Wednesday and Friday morning    Class: E-Prescribe    citalopram (CELEXA) 20 MG tablet  30 tablet 1 3/2/2017    [address]    Sig: Take 1 tablet (20 mg) by mouth daily    Class: Historical    Route: Oral    dornase alpha (PULMOZYME) 1 MG/ML neb solution  150 mL 12 5/25/2018    [facility] [place] [address]    Sig: Inhale 2.5 mg into the lungs 2 times daily    Class: E-Prescribe    Route: Inhalation    fexofenadine (ALLEGRA) 180 MG tablet  30 tablet 3 11/13/2018    [address]    Sig: TAKE 1 TABLET (180 MG) BY MOUTH DAILY    Class: E-Prescribe    fluconazole (DIFLUCAN) 150 MG tablet  1 tablet 0 8/23/2018    [address]    Sig: Take 1 tablet (150 mg) by mouth daily as needed    Class: Historical    Notes to Pharmacy: As needed for foot rash    Route: Oral    fluticasone (FLONASE) 50 MCG/ACT nasal spray  16 g 3 1/30/2019    [address]    Sig: INSTILL 1 SPRAY INTO BOTH NOSTRILS DAILY    Class: E-Prescribe    mvw SOFTGELS  capsule    3/14/2019    [address]    Sig: Take 1 capsule by mouth 2 times daily    Class: Historical    Notes to Pharmacy: NDC: [id number]    Route: Oral    pantoprazole (PROTONIX) 20 MG EC tablet  30 tablet 3 1/3/2019    [address]    Sig: TAKE 1 TABLET (20 MG) BY MOUTH DAILY    Class: E-Prescribe    Route: Oral    sodium chloride inhalant 7 % NEBU neb solution  240 mL 11  8/23/2018    [facility] - [place], [place] - [address]    Sig: Take 4 mLs by nebulization 2 times daily as needed for wheezing    Class: Historical    Route: Nebulization    tobramycin, PF, (COBY) 300 MG/5ML neb solution  280 mL 3 8/23/2018    [facility] - [place], [place] - [address]    Sig: Take 5 mLs (300 mg) by nebulization 2 times daily Cycle 28 days on/off    Class: E-Prescribe    Route: Nebulization      Hospital Medications as of 3/16/2019       Dose Frequency Start End    acetaminophen (TYLENOL) tablet 325-650 mg 325-650 mg EVERY 6 HOURS PRN 3/14/2019     Sig: Take 1-2 tablets (325-650 mg) by mouth every 6 hours as needed for mild pain or fever    Class: E-Prescribe    Route: Oral    albuterol (PROVENTIL) neb solution 2.5 mg 2.5 mg 4 TIMES DAILY 3/14/2019     Sig: Take 3 mLs (2.5 mg) by nebulization four times daily    Class: E-Prescribe    Route: Nebulization    amylase-lipase-protease (CREON 36) 56459 units per capsule 108,000 Units 3 capsule EVERY 1 HOUR PRN 3/14/2019     Sig: Take 3 capsules (108,000 Units) by mouth every hour as needed (with snacks)    Class: E-Prescribe    Route: Oral    amylase-lipase-protease (CREON 36) 33766 units per capsule 144,000-216,000 Units 4-6 capsule 3 TIMES DAILY WITH MEALS 3/15/2019     Sig: Take 4-6 capsules (144,000-216,000 Units) by mouth 3 times daily (with meals)    Class: E-Prescribe    Route: Oral    azithromycin (ZITHROMAX) tablet 500 mg 500 mg EVERY MONDAY WEDNESDAY AND FRIDAY MORNING 3/15/2019     Sig: Take 2 tablets (500 mg) by mouth Every Mon, Wed, Fri Morning    Class: E-Prescribe    Route: Oral    citalopram (celeXA) tablet 20 mg 20 mg DAILY 3/14/2019     Sig: Take 1 tablet (20 mg) by mouth daily    Class: E-Prescribe    Route: Oral    dornase alpha (PULMOZYME) neb solution 2.5 mg 2.5 mg 2 TIMES DAILY 3/14/2019     Sig: Take 2.5 mg by nebulization two times daily    Class: E-Prescribe    Route: Nebulization     fexofenadine (ALLEGRA) tablet 180 mg 180 mg DAILY 3/14/2019     Sig: Take 1 tablet (180 mg) by mouth daily    Class: E-Prescribe    Route: Oral    fluticasone (FLONASE) 50 MCG/ACT spray 1 spray 1 spray DAILY 3/14/2019     Sig: Spray 1 spray into both nostrils once daily    Class: E-Prescribe    Route: Both Nostrils    heparin lock flush 10 UNIT/ML injection 2-5 mL 2-5 mL ONCE PRN 3/16/2019     Si-5 mLs by Intracatheter route once as needed for line flush (for locking each dormant lumen with line placement)    Class: E-Prescribe    Route: Intracatheter    heparin lock flush 10 UNIT/ML injection 2-5 mL 2-5 mL ONCE PRN 3/14/2019     Si-5 mLs by Intracatheter route once as needed for line flush (for locking each dormant lumen with line placement)    Class: E-Prescribe    Route: Intracatheter    lidocaine (LMX4) cream  ONCE PRN 3/16/2019     Sig: Apply topically once as needed for moderate pain (for local anesthetic during PICC insertion)    Class: E-Prescribe    Route: Topical    lidocaine (LMX4) cream  ONCE PRN 3/14/2019     Sig: Apply topically once as needed for moderate pain (for local anesthetic during PICC insertion)    Class: E-Prescribe    Route: Topical    lidocaine 1 % 0.5-5 mL 0.5-5 mL ONCE PRN 3/16/2019     Si.5-5 mLs by Other route once as needed (mild pain For local anesthetic during PICC insertion.)    Class: E-Prescribe    Route: Other    lidocaine 1 % 0.5-5 mL 0.5-5 mL ONCE PRN 3/14/2019 3/15/2019    Si.5-5 mLs by Other route once as needed (mild pain For local anesthetic during PICC insertion.)    Class: E-Prescribe    Route: Other    meropenem (MERREM) 2 g in sodium chloride 0.9 % 100 mL intermittent infusion 2 g EVERY 8 HOURS 3/14/2019     Sig: Inject 2 g into the vein every 8 hours    Class: E-Prescribe    Route: Intravenous    mvw complete formulation (SOFTGELS) capsule 1 capsule 1 capsule 2 TIMES DAILY 3/15/2019     Sig: Take 1 capsule by mouth 2 times daily    Class: E-Prescribe     Route: Oral    ondansetron (ZOFRAN-ODT) ODT tab 4-8 mg 4-8 mg EVERY 8 HOURS PRN 3/14/2019     Sig: Take 1-2 tablets (4-8 mg) by mouth every 8 hours as needed for nausea    Class: E-Prescribe    Route: Oral    pantoprazole (PROTONIX) EC tablet 20 mg 20 mg DAILY 3/14/2019     Sig: Take 1 tablet (20 mg) by mouth daily    Class: E-Prescribe    Route: Oral    polyethylene glycol (MIRALAX/GLYCOLAX) Packet 17 g 17 g DAILY PRN 3/14/2019     Sig: Take 17 g by mouth daily as needed for constipation (For constipation)    Class: E-Prescribe    Route: Oral    sodium chloride (PF) 0.9% PF flush 5-50 mL 5-50 mL ONCE PRN 3/16/2019     Si-50 mLs by Intracatheter route once as needed for line flush (to flush each lumen with line placement)    Class: E-Prescribe    Route: Intracatheter    sodium chloride (PF) 0.9% PF flush 5-50 mL 5-50 mL ONCE PRN 3/14/2019     Si-50 mLs by Intracatheter route once as needed for line flush (to flush each lumen with line placement)    Class: E-Prescribe    Route: Intracatheter    sodium chloride inhalant 7 % neb solution 4 mL 4 mL 2 TIMES DAILY 3/14/2019     Sig: Take 4 mLs by nebulization two times daily    Class: E-Prescribe    Route: Nebulization    sulfamethoxazole-trimethoprim (BACTRIM DS/SEPTRA DS) 800-160 MG per tablet 2 tablet 2 tablet 2 TIMES DAILY 3/15/2019     Sig: Take 2 tablets by mouth 2 times daily    Class: E-Prescribe    Route: Oral            Allergies   Allergen Reactions     Augmentin          Complete Blood Count:  Lab Results   Component Value Date     2019       Coagulation:  Lab Results   Component Value Date    INR 1.10 2019       Vital Signs:  /56 (BP Location: Right arm)   Pulse 55   Temp 97.9  F (36.6  C) (Oral)   Resp 18   Wt 76.7 kg (169 lb 1.6 oz)   SpO2 96%   BMI 24.26 kg/m

## 2019-03-16 NOTE — PROGRESS NOTES
Called and spoke with primary nurse, Bobby. She is aware of the fact that the PICC nurse was unable to place a PICC yesterday after attempts on each arm. She plans to speak with the care team today and ask for an IR order to place. She is aware that Demario will be removed from the PICC room census.

## 2019-03-16 NOTE — PLAN OF CARE
Shift:   VS: Temp: 98.1  F (36.7  C) Temp src: Oral BP: 114/62 Pulse: 72   Resp: 18 SpO2: 96 % O2 Device: None (Room air)    Pain: Denies pain/nausea  Neuro: A&Ox4, calls appropriately  Cardiac:   WNL  Respiratory: Lungs sound clear. Tolerating RA. Receiving neb and vest tx.  GI/Diet/Appetite: Good oral intake. Denies nausea.  :  Adequate UO  LDA's: PIV SL  Skin: No new deficit noted  Activity: Up ad brandi  Tests/Procedures:   Pertinent Labs/Lab Collection: PICC unable to place in by PICC nurse, team aware.      Plan: Continue with cares and update MD with any changes.

## 2019-03-16 NOTE — PROGRESS NOTES
Pulmonary Medicine  Cystic Fibrosis - Lung Transplant Daily Progress Note   March 16, 2019       Patient: Demario Abbasi  MRN: [id number]  Admission date: 3/14/2019  Hospital Day #2          Assessment and Plan:     Demario Abbasi is a 24 year old male with cystic fibrosis, pancreatic insufficiency, depression, acne and impaired glucose tolerance who is s/p RUL resection for recurrent exacerbations in 2009. Symptoms c/w CF pulmonary exacerbation over the past 2 months associated with mild decline of PFT's and only minimal improvement after 3 week course of PO antibiotics. Admitted from clinic on 3/14 for initiation of IV antibiotics.      CF Pulmonary exacerbation: The patient presents with persistent fatigue, malaise, increased cough and sputum production and increased dyspnea on exertion which has persisted over the past 2 months. FEV1 down by 5% since recent best; baseline~61%. Limited improvement with 3 week course of PO bactrim and doxycycline. CXR upon admission with chronic sequelae of CF, no focal opacities. Mild leukocytosis on admission, since resolved. Persistent increase of cough and sputum production. Improved MIDDLETON and malaise.   - IgE (3/14), pending (to rule out ABPA)  - RVP, pending collection (d/w nursing on 3/16)  - CF sputum culture (3/14) with NGTD, will follow.   - ABX: IV meropenem (3/14). Initially placed on IV tobramycin (3/14), transitioned to Bactrim 2 DS tablets BID (3/15) based on intrinsic sensitivities.     - Vest therapy 4 times daily with albuterol QID, Pulmozyme BID, and 7% HTS BID    - PTA azithromycin 500 mg MWF (resumed 3/15)  - Tentative plan to obtain PFT's on Monday, 3/15 for interval f/u if still inpatient.     Pancreatic insufficiency: Denies symptoms of malabsorption. Body mass index is 24.26 kg/m . His weight is stable in an adequate range.   - PTA enzymes and vitamin supplementation.  - High kcal / high protein diet  - CF RD following    Other stable  issues:    Iron deficiency: Seen on OP studies, iron level 29. Normal Hgb and hematocrit (3/16). Per chart review, the pt previously had been taking Vitron-C; had been held given concurrent administration of antibiotics, most likely related to drug interaction.  - Will continue to hold Vitron-C for now given continued antibiotics, will need to be resumed upon completion.   Reflux: Symptoms stable. Continue PPI daily.   Glucose intolerance: The patient has a history of glucose intolerance based on glucose tolerance testing. Glucose with adequate control since admission.   - Monitoring glucose AC and 2 hours postprandial x 48 hours-->3/16. No indication for insulin at this time.   Depression: Symptoms stable. Continue PTA celexa.    Acne, resolved: The patient was previously on Accutane. Acne has resolved and Accutane was discontinued. While on Accutane, MVW vitamins were held due to the potential for excessive vitamin A supplementation. Now that Accutane has been stopped, MVW's were reinitiated (3/14) and the individual vitamin D was discontinued.     PPX: ambulatory, PPI daily  CODE: Full  Lines: PIV. PICC to be placed in IR (scheduling pending) d/t difficulty obtaining access per vascular access RN.   DISPO: Discharge to home pending improvement of pulmonary symptoms and PICC placement.        Patient discussed with Dr. Lemon.    MERCEDES Avilez CNP  Inpatient Nurse Practitioner  [facility]  Pager [phone]  Weekday coverage [phone], days vary (please see [facility])  Page attending on service 5-6 pm  After 6 pm weekdays and all day weekends: pager [phone]        Subjective & Interval History:     Last 24 hours of care team notes reviewed.    No acute events overnight. Vascular access RN unable to obtain PICC bilaterally d/t difficult stick. Hemodynamically stable, afebrile. Pt endorses decreased MIDDLETON, stable on RA. Slight improvement of malaise. No significant change in cough or sputum production, green  in color. Denies CP. Tolerating vest QID without issue. No acute GI complaints.            Review of Systems:     C: no fever, no chills, no change in weight, no change in appetite  INTEGUMENTARY/SKIN: no rash or obvious new lesions  ENT/MOUTH: no sore throat, no sinus pain, no nasal drainage  RESP: see interval history  CV: no chest pain, no palpitations, no peripheral edema, no orthopnea  GI: no nausea, no vomiting, no change in stools, no reflux symptoms  : no dysuria  MUSCULOSKELETAL: no myalgias, no arthralgias  ENDOCRINE: blood sugars with adequate control  NEURO: no headache, no numbness or tingling  PSYCHIATRIC: mood stable          Medications:     Active Medications:    albuterol  2.5 mg Nebulization 4x daily     amylase-lipase-protease  4-6 capsule Oral TID w/meals     azithromycin  500 mg Oral Q Mon Wed Fri AM     citalopram  20 mg Oral Daily     dornase alpha  2.5 mg Nebulization 2 times daily     fexofenadine  180 mg Oral Daily     fluticasone  1 spray Both Nostrils Daily     meropenem (MERREM) intermittent infusion in NS or D5W  2 g Intravenous Q8H     mvw complete formulation  1 capsule Oral BID     pantoprazole  20 mg Oral Daily     sodium chloride inhalant  4 mL Nebulization 2 times daily     sulfamethoxazole-trimethoprim  2 tablet Oral BID     Active PRN Medications:  acetaminophen, amylase-lipase-protease, heparin lock flush, heparin lock flush, lidocaine 4%, lidocaine 4%, lidocaine (buffered or not buffered), ondansetron, polyethylene glycol, sodium chloride (PF), sodium chloride (PF)         Physical Exam:     Constitutional: Sitting in bed, awake, in no apparent distress.   HEENT: Eyes with pink conjunctivae, anicteric.  Oral mucosa moist without lesions. Neck supple without lymphadenopathy.   PULM: Good air flow bilaterally. No crackles, no rhonchi, no wheezes. Non-labored breathing on RA.  CV: Normal S1 and S2. RRR. No murmur, gallop, or rub. No peripheral edema.   ABD: NABS, soft,  nontender, nondistended.     MSK: Moves all extremities. No apparent muscle wasting.   NEURO: Alert and oriented x 4, conversant.   SKIN: Warm, dry. No rash on limited exam.   PSYCH: Mood stable.     Lines, Drains, and Devices:  Peripheral IV 03/14/19 Right;Anterior Lower forearm (Active)   Site Assessment WDL 3/16/2019 12:30 AM   Line Status Infusing 3/16/2019 12:30 AM   Phlebitis Scale 0-->no symptoms 3/16/2019 12:30 AM   Infiltration Scale 0 3/16/2019 12:30 AM   Infiltration Site Treatment Method  None 3/16/2019 12:30 AM   Extravasation? No 3/16/2019 12:30 AM   Number of days: 2       PICC Single Lumen 03/14/18 Left Basilic (Active)   Number of days: 367          Data:     All vital signs, laboratory and imaging data for the past 24 hours reviewed.      Vital signs:  Temp: 97.9  F (36.6  C) Temp src: Oral BP: 116/56 Pulse: 55   Resp: 18 SpO2: 96 % O2 Device: None (Room air)     Weight: 76.7 kg (169 lb 1.6 oz)    Weight trend:   Vitals:    03/14/19 1621   Weight: 76.7 kg (169 lb 1.6 oz)        I/O:     Intake/Output Summary (Last 24 hours) at 3/16/2019 0946  Last data filed at 3/16/2019 0029  Gross per 24 hour   Intake 300 ml   Output --   Net 300 ml       Labs    CMP:   Recent Labs   Lab 03/14/19  1712      POTASSIUM 4.3   CHLORIDE 103   CO2 29   ANIONGAP 6   GLC 97   BUN 14   CR 1.16   GFRESTIMATED 89   GFRESTBLACK >90   ROCAEL 8.6   MAG 2.2   PHOS 3.7   ALBUMIN 3.6   BILITOTAL 0.3   ALKPHOS 137   AST 22   ALT 38       CBC:   Recent Labs   Lab 03/16/19  0711 03/14/19  1712   WBC 5.8 12.3*   RBC 4.72 4.82   HGB 13.9 14.2   HCT 42.4 43.0   MCV 90 89   MCH 29.4 29.5   MCHC 32.8 33.0   RDW 12.5 12.3    315       INR:   Recent Labs   Lab 03/14/19  1712   INR 1.10       Glucose:   Recent Labs   Lab 03/15/19  2030 03/14/19  2157 03/14/19  1808 03/14/19  1712   GLC  --   --   --  97   * 143* 117*  --        Blood Gas: No lab results found in last 7 days.    Culture Data:   Recent Labs   Lab  03/14/19  1445   CULT Heavy growth  Normal elmer to date    Culture in progress       Virology Data:   Lab Results   Component Value Date    FLUAH1 Negative 03/13/2018    FLUAH3 Negative 03/13/2018    OV0334 Negative 03/13/2018    IFLUB Negative 03/13/2018    RSVA Negative 03/13/2018    RSVB Negative 03/13/2018    PIV1 Negative 03/13/2018    PIV2 Negative 03/13/2018    PIV3 Negative 03/13/2018    HMPV Negative 03/13/2018    HRVS Negative 03/13/2018    ADVBE Negative 03/13/2018    ADVC Negative 03/13/2018       Historical CMV results (last 3 of prior testing):  No results found for: CMVQNT  No results found for: CMVLOG    Urine Studies    Recent Labs   Lab Test 02/22/19  1032   URINEPH 5.0   NITRITE Negative   LEUKEST Negative   WBCU <1

## 2019-03-16 NOTE — PLAN OF CARE
Vital signs:  Temp: 98.3  F (36.8  C) Temp src: Oral BP: 125/66 Pulse: 83   Resp: 18 SpO2: 96 % O2 Device: None (Room air)     Neuro: A&Ox4, calls appropriately.   Respiratory: LS clear, denies SOB, nonproductive cough overnight. Tolerating vest & nebs.   Cardiac: WNL  Pain: Denies pain.   Diet: High Kcal/high protein diet, good appetite, denies nausea.   GI/: Voiding not saving. LBM on 3/15 per pt.   LDA: R PIV infusing TKO after IV Merrem.   Activity: Up ad brandi, independently.   New changes this shift: None, no acute changes overnight.  Plan: Continue POC.

## 2019-03-16 NOTE — PLAN OF CARE
/64 (BP Location: Right arm)   Pulse 59   Temp 96.2  F (35.7  C) (Oral)   Resp 18   Wt 76.7 kg (169 lb 1.6 oz)   SpO2 97%   BMI 24.26 kg/m       Pt is AOx4, pleasant, calls appropriately. LS clear bilateral, denies SOB, infrequent productive cough. Denies pain. Up independently, ADUOP not saving, no BM on this shift. On high Kcal/protein diet.  RSV done, peding result, awaiting PICC insertion from IR. PFT schedule for 3/18/19. Hand off to MAGGIE Marie

## 2019-03-17 LAB
FLUAV H1 2009 PAND RNA SPEC QL NAA+PROBE: NEGATIVE
FLUAV H1 RNA SPEC QL NAA+PROBE: NEGATIVE
FLUAV H3 RNA SPEC QL NAA+PROBE: NEGATIVE
FLUAV RNA SPEC QL NAA+PROBE: NEGATIVE
FLUBV RNA SPEC QL NAA+PROBE: NEGATIVE
HADV DNA SPEC QL NAA+PROBE: NEGATIVE
HADV DNA SPEC QL NAA+PROBE: NEGATIVE
HMPV RNA SPEC QL NAA+PROBE: NEGATIVE
HPIV1 RNA SPEC QL NAA+PROBE: NEGATIVE
HPIV2 RNA SPEC QL NAA+PROBE: NEGATIVE
HPIV3 RNA SPEC QL NAA+PROBE: NEGATIVE
MICROBIOLOGIST REVIEW: NORMAL
RHINOVIRUS RNA SPEC QL NAA+PROBE: NEGATIVE
RSV RNA SPEC QL NAA+PROBE: NEGATIVE
RSV RNA SPEC QL NAA+PROBE: NEGATIVE
SPECIMEN SOURCE: NORMAL

## 2019-03-17 PROCEDURE — 12000001 ZZH R&B MED SURG/OB UMMC

## 2019-03-17 PROCEDURE — 25000132 ZZH RX MED GY IP 250 OP 250 PS 637: Performed by: INTERNAL MEDICINE

## 2019-03-17 PROCEDURE — 94669 MECHANICAL CHEST WALL OSCILL: CPT | Performed by: OPTOMETRIST

## 2019-03-17 PROCEDURE — 25000125 ZZHC RX 250: Performed by: INTERNAL MEDICINE

## 2019-03-17 PROCEDURE — 94640 AIRWAY INHALATION TREATMENT: CPT | Mod: 76 | Performed by: OPTOMETRIST

## 2019-03-17 PROCEDURE — 25000128 H RX IP 250 OP 636: Performed by: INTERNAL MEDICINE

## 2019-03-17 PROCEDURE — 40000141 ZZH STATISTIC PERIPHERAL IV START W/O US GUIDANCE

## 2019-03-17 PROCEDURE — 25000132 ZZH RX MED GY IP 250 OP 250 PS 637: Performed by: NURSE PRACTITIONER

## 2019-03-17 PROCEDURE — 25800030 ZZH RX IP 258 OP 636: Performed by: INTERNAL MEDICINE

## 2019-03-17 PROCEDURE — 94669 MECHANICAL CHEST WALL OSCILL: CPT

## 2019-03-17 PROCEDURE — 40000275 ZZH STATISTIC RCP TIME EA 10 MIN

## 2019-03-17 PROCEDURE — 40000275 ZZH STATISTIC RCP TIME EA 10 MIN: Performed by: OPTOMETRIST

## 2019-03-17 PROCEDURE — 94640 AIRWAY INHALATION TREATMENT: CPT

## 2019-03-17 PROCEDURE — 27210533 ZZH VEST CHEST PERCUSSION: Performed by: OPTOMETRIST

## 2019-03-17 RX ORDER — POLYETHYLENE GLYCOL 3350 17 G/17G
17 POWDER, FOR SOLUTION ORAL DAILY PRN
Qty: 30 PACKET | Refills: 0 | Status: SHIPPED | OUTPATIENT
Start: 2019-03-17 | End: 2019-05-24

## 2019-03-17 RX ORDER — SULFAMETHOXAZOLE/TRIMETHOPRIM 800-160 MG
2 TABLET ORAL 2 TIMES DAILY
Qty: 84 TABLET | Refills: 0 | Status: SHIPPED | OUTPATIENT
Start: 2019-03-17 | End: 2019-05-24

## 2019-03-17 RX ORDER — ACETAMINOPHEN 325 MG/1
325-650 TABLET ORAL EVERY 6 HOURS PRN
COMMUNITY
Start: 2019-03-17 | End: 2020-03-05

## 2019-03-17 RX ADMIN — PANTOPRAZOLE SODIUM 20 MG: 20 TABLET, DELAYED RELEASE ORAL at 08:06

## 2019-03-17 RX ADMIN — Medication 1 CAPSULE: at 08:06

## 2019-03-17 RX ADMIN — FEXOFENADINE HYDROCHLORIDE 180 MG: 180 TABLET, FILM COATED ORAL at 08:06

## 2019-03-17 RX ADMIN — CITALOPRAM HYDROBROMIDE 20 MG: 20 TABLET ORAL at 08:06

## 2019-03-17 RX ADMIN — ALBUTEROL SULFATE 2.5 MG: 2.5 SOLUTION RESPIRATORY (INHALATION) at 13:21

## 2019-03-17 RX ADMIN — FLUTICASONE PROPIONATE 1 SPRAY: 50 SPRAY, METERED NASAL at 08:08

## 2019-03-17 RX ADMIN — ALBUTEROL SULFATE 2.5 MG: 2.5 SOLUTION RESPIRATORY (INHALATION) at 17:22

## 2019-03-17 RX ADMIN — MEROPENEM 2 G: 1 INJECTION, POWDER, FOR SOLUTION INTRAVENOUS at 00:07

## 2019-03-17 RX ADMIN — SULFAMETHOXAZOLE AND TRIMETHOPRIM 2 TABLET: 800; 160 TABLET ORAL at 19:36

## 2019-03-17 RX ADMIN — Medication 4 ML: at 13:21

## 2019-03-17 RX ADMIN — ALBUTEROL SULFATE 2.5 MG: 2.5 SOLUTION RESPIRATORY (INHALATION) at 21:19

## 2019-03-17 RX ADMIN — DORNASE ALFA 2.5 MG: 1 SOLUTION RESPIRATORY (INHALATION) at 09:31

## 2019-03-17 RX ADMIN — Medication 1 CAPSULE: at 19:36

## 2019-03-17 RX ADMIN — DORNASE ALFA 2.5 MG: 1 SOLUTION RESPIRATORY (INHALATION) at 17:22

## 2019-03-17 RX ADMIN — ALBUTEROL SULFATE 2.5 MG: 2.5 SOLUTION RESPIRATORY (INHALATION) at 09:31

## 2019-03-17 RX ADMIN — PANCRELIPASE 144000 UNITS: 36000; 180000; 114000 CAPSULE, DELAYED RELEASE PELLETS ORAL at 11:07

## 2019-03-17 RX ADMIN — PANCRELIPASE 4 CAPSULE: 36000; 180000; 114000 CAPSULE, DELAYED RELEASE PELLETS ORAL at 19:35

## 2019-03-17 RX ADMIN — Medication 4 ML: at 21:19

## 2019-03-17 RX ADMIN — MEROPENEM 2 G: 1 INJECTION, POWDER, FOR SOLUTION INTRAVENOUS at 08:05

## 2019-03-17 RX ADMIN — SULFAMETHOXAZOLE AND TRIMETHOPRIM 2 TABLET: 800; 160 TABLET ORAL at 08:06

## 2019-03-17 RX ADMIN — MEROPENEM 2 G: 1 INJECTION, POWDER, FOR SOLUTION INTRAVENOUS at 16:39

## 2019-03-17 RX ADMIN — PANCRELIPASE 144000 UNITS: 36000; 180000; 114000 CAPSULE, DELAYED RELEASE PELLETS ORAL at 14:12

## 2019-03-17 ASSESSMENT — ACTIVITIES OF DAILY LIVING (ADL)
ADLS_ACUITY_SCORE: 10
ADLS_ACUITY_SCORE: 12
ADLS_ACUITY_SCORE: 10

## 2019-03-17 NOTE — PROGRESS NOTES
Pulmonary Medicine  Cystic Fibrosis - Lung Transplant Daily Progress Note   March 17, 2019       Patient: Demario Abbasi  MRN: [id number]  Admission date: 3/14/2019  Hospital Day #3          Assessment and Plan:     Demario Abbasi is a 24 year old male with cystic fibrosis, pancreatic insufficiency, depression, acne and impaired glucose tolerance who is s/p RUL resection for recurrent exacerbations in 2009. Symptoms c/w CF pulmonary exacerbation over the past 2 months associated with mild decline of PFT's and only minimal improvement after 3 week course of PO antibiotics. Admitted from clinic on 3/14 for initiation of IV antibiotics. Since with symptomatic improvement of pulmonary symptoms, awaiting PICC line placement prior to discharge.      CF Pulmonary exacerbation: The patient presents with persistent fatigue, malaise, increased cough and sputum production and increased dyspnea on exertion which has persisted over the past 2 months. FEV1 down by 5% since recent best; baseline~61%. Limited improvement with 3 week course of PO bactrim and doxycycline. CXR upon admission with chronic sequelae of CF, no focal opacities. Mild leukocytosis on admission, since resolved. Interval improvement of pulmonary symptoms since initiation of IV antibiotics.   - IgE (3/14), pending (to rule out ABPA)  - RVP pending results (3/16)  - CF sputum culture (3/14) with NGTD, will follow.   - ABX: IV meropenem (3/14). Initially placed on IV tobramycin (3/14), transitioned to Bactrim 2 DS tablets BID (3/15) based on intrinsic sensitivities. Plan to continue until seen for f/u in OP CF clinic 2-3 weeks after discharge.   - Vest therapy 4 times daily with albuterol QID, Pulmozyme BID, and 7% HTS BID    - PTA azithromycin 500 mg MWF (resumed 3/15)  - PICC to be placed in IR d/t difficult access per vascular RN (consult ordered 3/16). Per discussion with IR, tentative plan to pursue PICC placement tomorrow AM but definitive  scheduling pending.   - Plan to obtain PFT's on Monday, 3/18 as long as scheduling does not interfere with planned PICC placement.      Pancreatic insufficiency: Denies symptoms of malabsorption. Body mass index is 24.26 kg/m . His weight is stable in an adequate range.   - PTA enzymes and vitamin supplementation.  - High kcal / high protein diet  - CF RD following     Other stable issues:     Iron deficiency: Seen on OP studies, iron level 29. Normal Hgb and hematocrit (3/16). Per chart review, the pt previously had been taking Vitron-C; had been held given concurrent administration of antibiotics, most likely related to drug interaction.  - Will continue to hold Vitron-C for now given continued antibiotics, will need to be resumed upon completion.   Reflux: Symptoms stable. Continue PPI daily.   Glucose intolerance: The patient has a history of glucose intolerance based on glucose tolerance testing. Monitored pre and post prandial glucose x 48 hours upon admission, adequate control; no indication for insulin.    Depression: Symptoms stable, continue PTA celexa.    Acne, resolved: The patient was previously on Accutane. Acne has resolved and Accutane was discontinued. While on Accutane, MVW vitamins were held due to the potential for excessive vitamin A supplementation. Now that Accutane has been stopped, MVW's were reinitiated (3/14) and the individual vitamin D was discontinued.     PPX: ambulatory, PPI daily  CODE: Full  Lines: PIV. Awaiting PICC placement, as above.   DISPO: Tentative plan for discharge to home on Monday, 3/18 pending PICC placement and stable improvement of pulmonary symptoms.      Patient discussed with Dr. Lemon.     MERCEDES Avilez CNP  Inpatient Nurse Practitioner  [facility]  Pager [phone]  Weekday coverage [id number], days vary (please see [facility])  Page attending on service 5-6 pm  After 6 pm weekdays and all day weekends: pager [phone]         Subjective & Interval  History:     Last 24 hours of care team notes reviewed.    No acute events overnight. Denies SOB, stable on RA. Feeling better overall with less malaise. Ambulated in halls x 2 yesterday, endorsing improved activity tolerance. Decreased cough/ sputum production- light green in color, less thick per his report. No pain or acute GI issues. Hopeful for discharge tomorrow.            Review of Systems:     C: no fever, no chills, no change in weight, no change in appetite  INTEGUMENTARY/SKIN: no rash or obvious new lesions  ENT/MOUTH: no sore throat, no sinus pain, no nasal drainage  RESP: see interval history  CV: no chest pain, no palpitations, no peripheral edema, no orthopnea  GI: no nausea, no vomiting, no change in stools, no reflux symptoms  : no dysuria  MUSCULOSKELETAL: no myalgias, no arthralgias  ENDOCRINE: blood sugars with adequate control  NEURO: no headache, no numbness or tingling  PSYCHIATRIC: mood stable          Medications:     Active Medications:    albuterol  2.5 mg Nebulization 4x daily     amylase-lipase-protease  4-6 capsule Oral TID w/meals     azithromycin  500 mg Oral Q Mon Wed Fri AM     citalopram  20 mg Oral Daily     dornase alpha  2.5 mg Nebulization 2 times daily     fexofenadine  180 mg Oral Daily     fluticasone  1 spray Both Nostrils Daily     meropenem (MERREM) intermittent infusion in NS or D5W  2 g Intravenous Q8H     mvw complete formulation  1 capsule Oral BID     pantoprazole  20 mg Oral Daily     sodium chloride inhalant  4 mL Nebulization 2 times daily     sulfamethoxazole-trimethoprim  2 tablet Oral BID     Active PRN Medications:  acetaminophen, amylase-lipase-protease, heparin lock flush, heparin lock flush, lidocaine 4%, lidocaine 4%, lidocaine (buffered or not buffered), ondansetron, polyethylene glycol, sodium chloride (PF), sodium chloride (PF)         Physical Exam:     Constitutional: Sitting in bed, awake, in no apparent distress.   HEENT: Eyes with pink  conjunctivae, anicteric.  Oral mucosa moist without lesions. Neck supple without lymphadenopathy.   PULM: Good air flow bilaterally. No crackles, no rhonchi, no wheezes. Non-labored breathing on RA.  CV: Normal S1 and S2. RRR. No murmur, gallop, or rub. No peripheral edema.   ABD: NABS, soft, nontender, nondistended.     MSK: Moves all extremities. No apparent muscle wasting.   NEURO: Alert and oriented x 4, conversant.   SKIN: Warm, dry. No rash on limited exam.   PSYCH: Mood stable.     Lines, Drains, and Devices:  Peripheral IV 03/14/19 Right;Anterior Lower forearm (Active)   Site Assessment WDL 3/17/2019 12:00 AM   Line Status Infusing 3/17/2019 12:00 AM   Phlebitis Scale 0-->no symptoms 3/17/2019 12:00 AM   Infiltration Scale 0 3/17/2019 12:00 AM   Infiltration Site Treatment Method  None 3/16/2019 12:30 AM   Extravasation? No 3/16/2019 12:30 AM   Number of days: 3       PICC Single Lumen 03/14/18 Left Basilic (Active)   Number of days: 368          Data:     All vital signs, laboratory and imaging data for the past 24 hours reviewed.      Vital signs:  Temp: 97.3  F (36.3  C) Temp src: Oral BP: 105/46 Pulse: 50   Resp: 18 SpO2: 96 % O2 Device: None (Room air)     Weight: 76.7 kg (169 lb 1.6 oz)    Weight trend:   Vitals:    03/14/19 1621   Weight: 76.7 kg (169 lb 1.6 oz)        I/O:     Intake/Output Summary (Last 24 hours) at 3/17/2019 1147  Last data filed at 3/17/2019 0000  Gross per 24 hour   Intake 340 ml   Output --   Net 340 ml       Labs    CMP:   Recent Labs   Lab 03/14/19  1712      POTASSIUM 4.3   CHLORIDE 103   CO2 29   ANIONGAP 6   GLC 97   BUN 14   CR 1.16   GFRESTIMATED 89   GFRESTBLACK >90   ROCAEL 8.6   MAG 2.2   PHOS 3.7   ALBUMIN 3.6   BILITOTAL 0.3   ALKPHOS 137   AST 22   ALT 38       CBC:   Recent Labs   Lab 03/16/19  0711 03/14/19  1712   WBC 5.8 12.3*   RBC 4.72 4.82   HGB 13.9 14.2   HCT 42.4 43.0   MCV 90 89   MCH 29.4 29.5   MCHC 32.8 33.0   RDW 12.5 12.3    315       INR:    Recent Labs   Lab 03/14/19  1712   INR 1.10       Glucose:   Recent Labs   Lab 03/16/19  1801 03/15/19  2030 03/14/19  2157 03/14/19  1808 03/14/19  1712   GLC  --   --   --   --  97   * 125* 143* 117*  --        Blood Gas: No lab results found in last 7 days.    Culture Data:   Recent Labs   Lab 03/14/19  1445   CULT Heavy growth  Normal elmer    Moderate growth  Non lactose fermenting gram negative rods  *  Moderate growth  Staphylococcus aureus  *  Culture in progress       Virology Data:   Lab Results   Component Value Date    FLUAH1 Negative 03/13/2018    FLUAH3 Negative 03/13/2018    [id number] Negative 03/13/2018    IFLUB Negative 03/13/2018    RSVA Negative 03/13/2018    RSVB Negative 03/13/2018    PIV1 Negative 03/13/2018    PIV2 Negative 03/13/2018    PIV3 Negative 03/13/2018    HMPV Negative 03/13/2018    HRVS Negative 03/13/2018    ADVBE Negative 03/13/2018    ADVC Negative 03/13/2018       Historical CMV results (last 3 of prior testing):  No results found for: CMVQNT  No results found for: CMVLOG    Urine Studies    Recent Labs   Lab Test 02/22/19  1032   URINEPH 5.0   NITRITE Negative   LEUKEST Negative   WBCU <1

## 2019-03-17 NOTE — PLAN OF CARE
"Activity: Up ad brandi independently, visitors this evening  Neuro: A&Ox4, able to make needs known, pleasant.  GI/: Voiding not saving, reports \"no problems with the bathroom.\"  Diet: High kcal diet, good tolerance, moderate appetite.  Incisions/Drains: n/a  IV Access: PIV on right infusing IV abx q 8hours.  Labs: BG checks all WNL x48 hours.  Vitals: VSS on RA  Pain: denies  New changes this shift: n/a  Plan: Plan to place PICC under fluoroscopy on Monday, sooner if interventional radiologist is available. Continue POC.    "

## 2019-03-17 NOTE — PLAN OF CARE
Time: 7864-1969  Reason for admission: CF exacerbation   VS: /46 (BP Location: Right arm)   Pulse 50   Temp 97.3  F (36.3  C) (Oral)   Resp 18   Wt 76.7 kg (169 lb 1.6 oz)   SpO2 96%   BMI 24.26 kg/m     Activity: up ad brandi  Neuros: AA Ox2  Cardiac: WDL   Respiratory: denies SOB, tolerating respiratory treatments   GI/: +BS, passing flatus, voiding without saving,   Diet: tolerating diet, good appetite  Skin: no concerns  Lines:  PIV on the right arm is intact and SL   Labs: no concerns   New changes this shift:   Plan: PICC placement possible tomorrow   Will continue to monitor & follow POC.

## 2019-03-17 NOTE — PLAN OF CARE
./64 (BP Location: Right arm)   Pulse 69   Temp 97.1  F (36.2  C) (Oral)   Resp 18   Wt 76.7 kg (169 lb 1.6 oz)   SpO2 97%   BMI 24.26 kg/m       Patient denies pain; reports to be voiding and having BM's; MIV's infusing at TKO via right PIV in between antibiotics; eating and drinking well; reports small amount of green sputum production; up ad brandi; appeared to sleep well with significant other at the bedside

## 2019-03-18 ENCOUNTER — APPOINTMENT (OUTPATIENT)
Dept: INTERVENTIONAL RADIOLOGY/VASCULAR | Facility: CLINIC | Age: 24
End: 2019-03-18
Attending: INTERNAL MEDICINE
Payer: COMMERCIAL

## 2019-03-18 ENCOUNTER — HOME INFUSION (PRE-WILLOW HOME INFUSION) (OUTPATIENT)
Dept: PHARMACY | Facility: CLINIC | Age: 24
End: 2019-03-18

## 2019-03-18 ENCOUNTER — PATIENT OUTREACH (OUTPATIENT)
Dept: CARE COORDINATION | Facility: CLINIC | Age: 24
End: 2019-03-18

## 2019-03-18 VITALS
HEART RATE: 75 BPM | BODY MASS INDEX: 24.08 KG/M2 | TEMPERATURE: 97.3 F | OXYGEN SATURATION: 97 % | SYSTOLIC BLOOD PRESSURE: 112 MMHG | RESPIRATION RATE: 16 BRPM | WEIGHT: 167.8 LBS | DIASTOLIC BLOOD PRESSURE: 65 MMHG

## 2019-03-18 LAB
ANION GAP SERPL CALCULATED.3IONS-SCNC: 11 MMOL/L (ref 3–14)
BUN SERPL-MCNC: 16 MG/DL (ref 7–30)
CALCIUM SERPL-MCNC: 8.9 MG/DL (ref 8.5–10.1)
CHLORIDE SERPL-SCNC: 106 MMOL/L (ref 94–109)
CO2 SERPL-SCNC: 20 MMOL/L (ref 20–32)
CREAT SERPL-MCNC: 1.23 MG/DL (ref 0.66–1.25)
GFR SERPL CREATININE-BSD FRML MDRD: 83 ML/MIN/{1.73_M2}
GLUCOSE SERPL-MCNC: 110 MG/DL (ref 70–99)
IGE SERPL-ACNC: 30 KIU/L (ref 0–114)
MAGNESIUM SERPL-MCNC: 2.2 MG/DL (ref 1.6–2.3)
PHOSPHATE SERPL-MCNC: 3.4 MG/DL (ref 2.5–4.5)
POTASSIUM SERPL-SCNC: 4.3 MMOL/L (ref 3.4–5.3)
SODIUM SERPL-SCNC: 136 MMOL/L (ref 133–144)

## 2019-03-18 PROCEDURE — 36415 COLL VENOUS BLD VENIPUNCTURE: CPT | Performed by: INTERNAL MEDICINE

## 2019-03-18 PROCEDURE — 80048 BASIC METABOLIC PNL TOTAL CA: CPT | Performed by: INTERNAL MEDICINE

## 2019-03-18 PROCEDURE — 25000128 H RX IP 250 OP 636: Performed by: INTERNAL MEDICINE

## 2019-03-18 PROCEDURE — 84100 ASSAY OF PHOSPHORUS: CPT | Performed by: INTERNAL MEDICINE

## 2019-03-18 PROCEDURE — 27210732 ZZH ACCESSORY CR1

## 2019-03-18 PROCEDURE — 25000125 ZZHC RX 250

## 2019-03-18 PROCEDURE — 83735 ASSAY OF MAGNESIUM: CPT | Performed by: INTERNAL MEDICINE

## 2019-03-18 PROCEDURE — 94669 MECHANICAL CHEST WALL OSCILL: CPT

## 2019-03-18 PROCEDURE — C1751 CATH, INF, PER/CENT/MIDLINE: HCPCS

## 2019-03-18 PROCEDURE — 36573 INSJ PICC RS&I 5 YR+: CPT

## 2019-03-18 PROCEDURE — 02HV33Z INSERTION OF INFUSION DEVICE INTO SUPERIOR VENA CAVA, PERCUTANEOUS APPROACH: ICD-10-PCS | Performed by: RADIOLOGY

## 2019-03-18 PROCEDURE — 40000275 ZZH STATISTIC RCP TIME EA 10 MIN

## 2019-03-18 PROCEDURE — 25000125 ZZHC RX 250: Performed by: INTERNAL MEDICINE

## 2019-03-18 PROCEDURE — 27210908 ZZH NEEDLE CR4

## 2019-03-18 PROCEDURE — 27210533 ZZH VEST CHEST PERCUSSION

## 2019-03-18 PROCEDURE — 25000132 ZZH RX MED GY IP 250 OP 250 PS 637: Performed by: INTERNAL MEDICINE

## 2019-03-18 PROCEDURE — 25000128 H RX IP 250 OP 636: Performed by: NURSE PRACTITIONER

## 2019-03-18 PROCEDURE — 27210885 ZZH ACCESSORY CR4

## 2019-03-18 PROCEDURE — 25800030 ZZH RX IP 258 OP 636: Performed by: INTERNAL MEDICINE

## 2019-03-18 PROCEDURE — 27210903 ZZH KIT CR5

## 2019-03-18 PROCEDURE — 94640 AIRWAY INHALATION TREATMENT: CPT | Mod: 76

## 2019-03-18 PROCEDURE — 25000128 H RX IP 250 OP 636

## 2019-03-18 PROCEDURE — 94640 AIRWAY INHALATION TREATMENT: CPT

## 2019-03-18 PROCEDURE — 25000132 ZZH RX MED GY IP 250 OP 250 PS 637: Performed by: NURSE PRACTITIONER

## 2019-03-18 RX ORDER — LIDOCAINE HYDROCHLORIDE 10 MG/ML
1-30 INJECTION, SOLUTION EPIDURAL; INFILTRATION; INTRACAUDAL; PERINEURAL
Status: COMPLETED | OUTPATIENT
Start: 2019-03-18 | End: 2019-03-18

## 2019-03-18 RX ORDER — HEPARIN SODIUM,PORCINE 10 UNIT/ML
5 VIAL (ML) INTRAVENOUS
Status: COMPLETED | OUTPATIENT
Start: 2019-03-18 | End: 2019-03-18

## 2019-03-18 RX ADMIN — SULFAMETHOXAZOLE AND TRIMETHOPRIM 2 TABLET: 800; 160 TABLET ORAL at 09:18

## 2019-03-18 RX ADMIN — AZITHROMYCIN 500 MG: 250 TABLET, FILM COATED ORAL at 09:18

## 2019-03-18 RX ADMIN — ALBUTEROL SULFATE 2.5 MG: 2.5 SOLUTION RESPIRATORY (INHALATION) at 12:55

## 2019-03-18 RX ADMIN — PANTOPRAZOLE SODIUM 20 MG: 20 TABLET, DELAYED RELEASE ORAL at 09:18

## 2019-03-18 RX ADMIN — Medication 5 ML: at 13:33

## 2019-03-18 RX ADMIN — CITALOPRAM HYDROBROMIDE 20 MG: 20 TABLET ORAL at 09:18

## 2019-03-18 RX ADMIN — FLUTICASONE PROPIONATE 1 SPRAY: 50 SPRAY, METERED NASAL at 09:18

## 2019-03-18 RX ADMIN — LIDOCAINE HYDROCHLORIDE 5 ML: 10 INJECTION, SOLUTION EPIDURAL; INFILTRATION; INTRACAUDAL; PERINEURAL at 09:07

## 2019-03-18 RX ADMIN — FEXOFENADINE HYDROCHLORIDE 180 MG: 180 TABLET, FILM COATED ORAL at 09:18

## 2019-03-18 RX ADMIN — ALBUTEROL SULFATE 2.5 MG: 2.5 SOLUTION RESPIRATORY (INHALATION) at 09:23

## 2019-03-18 RX ADMIN — Medication 4 ML: at 12:56

## 2019-03-18 RX ADMIN — MEROPENEM 2 G: 1 INJECTION, POWDER, FOR SOLUTION INTRAVENOUS at 09:12

## 2019-03-18 RX ADMIN — Medication 1 CAPSULE: at 09:18

## 2019-03-18 RX ADMIN — DORNASE ALFA 2.5 MG: 1 SOLUTION RESPIRATORY (INHALATION) at 09:23

## 2019-03-18 RX ADMIN — MEROPENEM 2 G: 1 INJECTION, POWDER, FOR SOLUTION INTRAVENOUS at 00:02

## 2019-03-18 RX ADMIN — Medication 2 ML: at 08:59

## 2019-03-18 ASSESSMENT — ACTIVITIES OF DAILY LIVING (ADL)
ADLS_ACUITY_SCORE: 10

## 2019-03-18 NOTE — DISCHARGE SUMMARY
Pulmonary Medicine  Cystic Fibrosis - Lung Transplant Team  Discharge Summary  2019       Patient: Demario Abbasi  MRN: [id number]  : 1995 (age 24 year old)  Admission date: 3/14/2019  Discharge date: 2019    Discharge Diagnosis(es):   - CF pulmonary exacerbation  - Pancreatic insufficiency  - Iron deficiency   - GERD  - Glucose intolerance  - Depression  - Acne, resolved    Primary Care Provider: Susan Li    [facility] Course:     Demario Abbasi is a 24 year old male with cystic fibrosis, pancreatic insufficiency, depression, acne and impaired glucose tolerance who is s/p RUL resection for recurrent exacerbations in . Symptoms c/w CF pulmonary exacerbation over the past 2 months associated with mild decline of PFT's and only minimal improvement after 3 week course of PO antibiotics. Admitted from clinic on 3/14 for initiation of IV antibiotics. Since with pulmonary symptomatic improvement. Medically stable and ready for discharge to home with ongoing IV / PO antibiotics.        CF Pulmonary exacerbation: The patient presents with persistent fatigue, malaise, increased cough and sputum production and increased dyspnea on exertion which has persisted over the past 2 months. FEV1 down by 5% since recent best; baseline~61%. Limited improvement with 3 week course of PO bactrim and doxycycline. CXR upon admission with chronic sequelae of CF, no focal opacities. RVP negative. Mild leukocytosis on admission, since resolved. Interval improvement of pulmonary symptoms since initiation of IV antibiotics.   - IgE 3/14, pending (to rule out ABPA). Continue to follow outpatient.   - CF sputum culture 3/14 growing Staph aureus, NLF GNR (personally reviewed 3/18). Continue to follow outpatient.   - Continue current antibiotics: intermittent IV meropenem (3/14-present). Initially placed on IV tobramycin (3/14-3/15), transitioned to Bactrim 2 DS tablets BID (3/15-present) based  on intrinsic sensitivities. Plan to continue until seen for f/u in OP CF clinic ~ 3 weeks after discharge.   - Vest therapy QID with albuterol QID, Pulmozyme BID, and 7% HTS BID    - PTA azithromycin 500 mg MWF (resumed 3/15)  - PICC line placed 3/18. May be removed once IV antibiotics have been discontinued.      Pancreatic insufficiency: Denies symptoms of malabsorption. Body mass index is 24.26 kg/m . His weight is stable in an adequate range.   - PTA enzymes and vitamin supplementation.  - High kcal / high protein diet     Other stable issues:     Iron deficiency: Seen on OP studies, iron level 29. Normal Hgb and hematocrit (3/16). Per chart review, the pt previously had been taking Vitron-C; had been held given concurrent administration of antibiotics, most likely related to drug interaction.  - Will continue to hold Vitron-C for now given continued antibiotics, will need to be resumed upon completion.     GERD: Symptoms stable. Continue PPI daily.     Glucose intolerance: The patient has a history of glucose intolerance based on glucose tolerance testing. Monitored pre and post prandial glucose x 48 hours upon admission, adequate control; no indication for insulin.      Depression: Symptoms stable, continue PTA celexa.      Acne, resolved: The patient was previously on Accutane. Acne has resolved and Accutane was discontinued. While on Accutane, MVW vitamins were held due to the potential for excessive vitamin A supplementation. Now that Accutane has been stopped, MVW's were reinitiated (3/14) and the individual vitamin D was discontinued.       Patient discussed with Dr. Ly.    MERCEDES Barkley, CNP  Inpatient Nurse Practitioner  Pulmonary [facility]  Pager [phone]  Weekday coverage 7a-5p, days vary (please see [facility])    Approximately 35 minutes spent on discharge planning.     Procedures Performed:     PICC line placement 3/18    History of Present Illness on Day of Discharge:     No acute events overnight.  Remains afebrile, hemodynamically stable. Continues to saturate well on RA. Endorsing decrease cough, sputum production overall; although not quite to baseline. Increase exercise tolerance. Tolerating vest therapies QID.     Review of Systems on Day of Discharge:     C: no fever, no chills, no change in weight, no change in appetite  INTEGUMENTARY/SKIN: no rash or obvious new lesions  ENT/MOUTH: no sore throat, no sinus pain, no nasal drainage  RESP: see interval history  CV: no chest pain, no palpitations, no peripheral edema, no orthopnea  GI: no nausea, no vomiting, no change in stools, no reflux symptoms  : no dysuria  MUSCULOSKELETAL: no myalgias, no arthralgias  ENDOCRINE: blood sugars with adequate control  NEURO: no headache, no numbness or tingling  PSYCHIATRIC: mood stable    Medications:     Current Discharge Medication List      START taking these medications    Details   acetaminophen (TYLENOL) 325 MG tablet Take 1-2 tablets (325-650 mg) by mouth every 6 hours as needed for mild pain or fever    Associated Diagnoses: Cystic fibrosis (H)      meropenem 2 g Inject 2 g into the vein every 8 hours Continue until seen for f/u in OP CF clinic.    Associated Diagnoses: Cystic fibrosis (H)      polyethylene glycol (MIRALAX/GLYCOLAX) packet Take 17 g by mouth daily as needed for constipation (For constipation)  Qty: 30 packet, Refills: 0    Associated Diagnoses: Cystic fibrosis (H)      sulfamethoxazole-trimethoprim (BACTRIM DS/SEPTRA DS) 800-160 MG tablet Take 2 tablets by mouth 2 times daily Continue until seen for f/u in OP CF clinic.  Qty: 84 tablet, Refills: 0    Associated Diagnoses: Cystic fibrosis with pulmonary exacerbation (H)         CONTINUE these medications which have CHANGED    Details   !! amylase-lipase-protease (CREON 36) 24899 units CPEP Take 3 capsules (108,000 Units) by mouth every hour as needed (with snacks)    Associated Diagnoses: Cystic fibrosis (H)      !! amylase-lipase-protease  (CREON 36) 65844 units CPEP Take 4-6 capsules (144,000-216,000 Units) by mouth 3 times daily (with meals)    Associated Diagnoses: Cystic fibrosis (H)       !! - Potential duplicate medications found. Please discuss with provider.      CONTINUE these medications which have NOT CHANGED    Details   albuterol (ALBUTEROL) 108 (90 BASE) MCG/ACT Inhaler Inhale 2 puffs into the lungs every 6 hours as needed for shortness of breath / dyspnea or wheezing  Qty: 1 Inhaler, Refills: 3    Associated Diagnoses: CF (cystic fibrosis) (H)      albuterol (PROVENTIL) (2.5 MG/3ML) 0.083% neb solution Take 1 vial (2.5 mg) by nebulization 3 times daily  Qty: 270 mL, Refills: 6    Associated Diagnoses: Cystic fibrosis with pulmonary manifestations (H)      azithromycin (ZITHROMAX) 500 MG tablet Take one tablet on Monday, Wednesday and Friday morning  Qty: 12 tablet, Refills: 12    Associated Diagnoses: CF (cystic fibrosis) (H)      citalopram (CELEXA) 20 MG tablet Take 1 tablet (20 mg) by mouth daily  Qty: 30 tablet, Refills: 1    Associated Diagnoses: Depression      dornase alpha (PULMOZYME) 1 MG/ML neb solution Inhale 2.5 mg into the lungs 2 times daily  Qty: 150 mL, Refills: 12    Associated Diagnoses: Cystic fibrosis with pulmonary manifestations (H)      fexofenadine (ALLEGRA) 180 MG tablet TAKE 1 TABLET (180 MG) BY MOUTH DAILY  Qty: 30 tablet, Refills: 3    Associated Diagnoses: CF (cystic fibrosis) (H)      fluconazole (DIFLUCAN) 150 MG tablet Take 1 tablet (150 mg) by mouth daily as needed  Qty: 1 tablet, Refills: 0    Comments: As needed for foot rash      fluticasone (FLONASE) 50 MCG/ACT nasal spray INSTILL 1 SPRAY INTO BOTH NOSTRILS DAILY  Qty: 16 g, Refills: 3    Associated Diagnoses: CF (cystic fibrosis) (H); Cystic fibrosis with pulmonary exacerbation (H)      mvw SOFTGELS  capsule Take 1 capsule by mouth 2 times daily    Comments: NDC: [address]-12      pantoprazole (PROTONIX) 20 MG EC tablet TAKE 1 TABLET (20 MG)  BY MOUTH DAILY  Qty: 30 tablet, Refills: 3    Associated Diagnoses: CF (cystic fibrosis) (H)      sodium chloride inhalant 7 % NEBU neb solution Take 4 mLs by nebulization 2 times daily as needed for wheezing  Qty: 240 mL, Refills: 11    Associated Diagnoses: Cystic fibrosis with pulmonary exacerbation (H)      tobramycin, PF, (COBY) 300 MG/5ML neb solution Take 5 mLs (300 mg) by nebulization 2 times daily Cycle 28 days on/off  Qty: 280 mL, Refills: 3    Associated Diagnoses: Cystic fibrosis with pulmonary manifestations (H)             Follow-Up:     See AVS for more specific details    Discharge to: home  Condition at discharge: good  Diet: high Kcal/ high protein  Activity: as tolerated  Appointments: Pulm clinic ~ 3 weeks    Physical Exam:     Constitutional: Awake, alert, in no apparent distress.   HEENT: Eyes with pink conjunctivae, no scleral jaundice. Oral mucosa moist without lesions.  Neck supple without lymphadenopathy.   PULM: Good air flow bilaterally. No crackles, no rhonchi, no wheezes.   CV: Normal S1 and S2. RRR. No murmur, gallop, or rub. No peripheral edema. Peripheral pulses intact.   ABD: NABS, soft, nontender, nondistended. No guarding.   MSK: Moves all extremities. No apparent muscle wasting.   NEURO: Alert and oriented x 4, conversant.   SKIN: Warm, dry. No pruritus. No rash on limited exam.   PSYCH: Mood stable, judgment and insight appropriate.    Lines, Drains, and Devices:  PICC Single Lumen 03/18/19 Left Basilic (Active)   Line Status Infusing;Blood return noted 3/18/2019 10:00 AM   Extremity Circumference (cm) 27 cm 3/14/2019 12:00 AM   Dressing Intervention Chlorhexidine patch;Securing device 3/14/2019 12:00 AM   Number of days: 0       Data:     All vital signs, laboratory and imaging data for the past 24 hours reviewed.      Vital signs:  Temp: 97.3  F (36.3  C) Temp src: Oral BP: 112/65 Pulse: 75   Resp: 16 SpO2: 97 % O2 Device: None (Room air)     Weight: 76.1 kg (167 lb 12.8  oz)    Weight trend:   Vitals:    03/14/19 1621 03/17/19 1507   Weight: 76.7 kg (169 lb 1.6 oz) 76.1 kg (167 lb 12.8 oz)        I/O:     Intake/Output Summary (Last 24 hours) at 3/18/2019 1027  Last data filed at 3/18/2019 0000  Gross per 24 hour   Intake 800 ml   Output --   Net 800 ml       Labs    CMP:   Recent Labs   Lab 03/18/19  0647 03/14/19  1712    138   POTASSIUM 4.3 4.3   CHLORIDE 106 103   CO2 20 29   ANIONGAP 11 6   * 97   BUN 16 14   CR 1.23 1.16   GFRESTIMATED 83 89   GFRESTBLACK >90 >90   ROCAEL 8.9 8.6   MAG 2.2 2.2   PHOS 3.4 3.7   ALBUMIN  --  3.6   BILITOTAL  --  0.3   ALKPHOS  --  137   AST  --  22   ALT  --  38     CBC:   Recent Labs   Lab 03/16/19  0711 03/14/19  1712   WBC 5.8 12.3*   RBC 4.72 4.82   HGB 13.9 14.2   HCT 42.4 43.0   MCV 90 89   MCH 29.4 29.5   MCHC 32.8 33.0   RDW 12.5 12.3    315     INR:   Recent Labs   Lab 03/14/19  1712   INR 1.10     Glucose:   Recent Labs   Lab 03/18/19  0647 03/16/19  1801 03/15/19  2030 03/14/19  2157 03/14/19  1808 03/14/19  1712   *  --   --   --   --  97   BGM  --  102* 125* 143* 117*  --      Blood Gas: No lab results found in last 7 days.  Culture Data   Recent Labs   Lab 03/14/19  1445   CULT Heavy growth  Normal elmer    Moderate growth  Non lactose fermenting gram negative rods  Susceptibility testing in progress  *  Moderate growth  Staphylococcus aureus  Susceptibility testing in progress  *     Virology Data:   Lab Results   Component Value Date    FLUAH1 Negative 03/16/2019    FLUAH3 Negative 03/16/2019    XA1685 Negative 03/16/2019    IFLUB Negative 03/16/2019    RSVA Negative 03/16/2019    RSVB Negative 03/16/2019    PIV1 Negative 03/16/2019    PIV2 Negative 03/16/2019    PIV3 Negative 03/16/2019    HMPV Negative 03/16/2019    HRVS Negative 03/16/2019    ADVBE Negative 03/16/2019    ADVC Negative 03/16/2019    ADVC Negative 03/13/2018     Historical CMV results (last 3 of prior testing):  No results found for:  CMVQNT  No results found for: CMVLOG  Urine Studies    Recent Labs   Lab Test 02/22/19  1032   URINEPH 5.0   NITRITE Negative   LEUKEST Negative   WBCU <1     Most Recent [place] Pulmonary Function Testing (FVC/FEV1 only)  FVC-Pre   Date Value Ref Range Status   03/14/2019 3.40 L    02/22/2019 3.50 L    11/23/2018 3.70 L    08/23/2018 3.52 L      FVC-%Pred-Pre   Date Value Ref Range Status   03/14/2019 60 %    02/22/2019 62 %    11/23/2018 66 %    08/23/2018 63 %      FEV1-Pre   Date Value Ref Range Status   03/14/2019 2.72 L    02/22/2019 2.81 L    11/23/2018 2.96 L    08/23/2018 2.84 L      FEV1-%Pred-Pre   Date Value Ref Range Status   03/14/2019 57 %    02/22/2019 59 %    11/23/2018 62 %    08/23/2018 60 %

## 2019-03-18 NOTE — DISCHARGE INSTRUCTIONS
[facility] Discharge Instructions      Major Tests and Procedures    Imaging: Chest Xray     Consults: n/a    Procedure: PICC Line Placement    Good Nutrition Can Improve Lung Function and Overall Health    Take ALL of your vitamins with food     Take 1/2 of your enzymes before EVERY meal/snack and the other 1/2 mid-meal/snack    Diet    High Calorie/ High Protein    Annual Studies  Annual studies are important to monitor your lung, bone, and nutrition health.    Generally annual studies are done when you are well.  You and your provider will determine when it is appropriate to schedule your annual studies.      Activity  Activity as tolerated and instructed by physical therapy    Airway Clearance: The Most Important Way to Keep Your Lungs Healthy  Vest 3 X per day  [facility] [place], 9, 10 @ pressure of 10 for 5 minutes each                           18, 19, 20 @ pressure of 6 for 5 minutes each  Deflate vest after each 5 minutes and cough 3 times    Pulmonary Function Tests    FEV1: amount of air you can blow out in 1 second     FVC: total amount of air you can take in and blow out    Admission ()  FEV1  2.72L or 57% and  FVC 3.40L or 60%    Home Care  IV antibiotics per [facility].  Continue antibiotics until you are re-evaluated in the clinic      Home Antibiotic Schedule:    Meropenem 2g every 8 hours  : next dose - 3:30pm, 10:30pm  Starting : 6 am, 2 pm, 10 pm      Labs   Per [facility] pharmacy protocol. Fax results to [phone].    Symptoms to call your physician about    Chest Pain    Changes in sputum    Blood in sputum    Increased shortness of breath    Nausea/Vomiting    Diarrhea/Constipation    Rash    Joint Pain    Temperature higher than 100.5    Who to call  Monday - Friday from 8-3:30 call the CF Office @ [phone]  After hours call [phone] and have the  page the on-call pulmonologist.   Respiratory Therapist, Margaret Werner @ [phone]    Social Work: Velma Kolb @ [phone] // Prerna Dickner @ [phone]  Dietitian: Please call the CF office at [phone] and ask them to contact the  Dietician.   Diabetes Nurse: [phone]  KJ: [phone]    Cora () has been notified of your recent hospital discharge.  If your appointment is not already scheduled by the time of your discharge, she will contact you when she finds a follow up appointment slot.  If you have any questions for her regarding your follow up appointment, please feel free to call her: [phone].

## 2019-03-18 NOTE — PROGRESS NOTES
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 , but minimal intentional exercise   Functional Level Prior   Ambulation 0-->independent   Transferring 0-->independent   Toileting 0-->independent   Bathing 0-->independent   Communication 0-->understands/communicates without difficulty   Swallowing 0-->swallows foods/liquids without difficulty   Cognition 0 - no cognition issues reported   Prior Functional Level Comment Pt I with all ADLs and mobility   General Information   Onset of Illness/Injury or Date of Surgery - Date 03/14/19   Referring Physician Owen Lemon MD   Patient/Family Goals Statement feel better and get back home   Pertinent History of Current Problem (include personal factors and/or comorbidities that impact the POC) 24 year old male with cystic fibrosis, pancreatic insufficiency, depression, acne and impaired glucose tolerance who is s/p RUL resection for recurrent exacerbations in 2009.  He has been feeling poorly for about 2 months with malaise, fatigue, increased cough and sputum production which is thicker and darker than baseline with some increase and dyspnea on exertion.  Completed a 3-week course of oral Bactrim and doxycycline based on previous cultures with minimal improvement.  PFTs are down from baseline. Admitted with CF exacerbation and initiation of IV antibiotics.   Weight-Bearing Status - LLE full weight-bearing   Weight-Bearing Status - RLE full weight-bearing   Cognitive Status Examination   Orientation orientation to person, place and  "time   Level of Consciousness alert   Follows Commands and Answers Questions 100% of the time   Personal Safety and Judgment intact   Memory intact   Posture    Posture Protracted shoulders   Range of Motion (ROM)   ROM Comment B LE WFL, End range shoulder motion mildly decreased and pt demonstrates compensation with spinal ext   Bed Mobility   Bed Mobility Comments independent   Transfer Skills   Transfer Comments independent   Gait   Gait Comments mod I with mildly decreased activity tolrance   General Therapy Interventions   Planned Therapy Interventions strengthening;stretching;risk factor education;home program guidelines;progressive activity/exercise   Clinical Impression   Criteria for Skilled Therapeutic Intervention yes, treatment indicated   PT Diagnosis impaired airway clearance   Influenced by the following impairments decreased ROM, activity tolerance, impaired posture   Functional limitations due to impairments decreased I with airway clearance and I exercise    Clinical Presentation Stable/Uncomplicated   Clinical Decision Making (Complexity) Low complexity   Therapy Frequency` 3 times/week   Predicted Duration of Therapy Intervention (days/wks) 3/23   Anticipated Discharge Disposition Home   Risk & Benefits of therapy have been explained Yes   Patient, Family & other staff in agreement with plan of care Yes   [facility] LEAPIN Digital Keys-Truly TM \"6 Clicks\"   2016, Trustees of [facility], under license to [employer].  All rights reserved.   6 Clicks Short Forms Basic Mobility Inpatient Short Form   [facility] AM-PAC  \"6 Clicks\" V.2 Basic Mobility Inpatient Short Form   1. Turning from your back to your side while in a flat bed without using bedrails? 4 - None   2. Moving from lying on your back to sitting on the side of a flat bed without using bedrails? 4 - None   3. Moving to and from a bed to a chair (including a wheelchair)? 4 - None   4. Standing up from a chair using your arms (e.g., " wheelchair, or bedside chair)? 4 - None   5. To walk in hospital room? 4 - None   6. Climbing 3-5 steps with a railing? 4 - None   Basic Mobility Raw Score (Score out of 24.Lower scores equate to lower levels of function) 24   Total Evaluation Time   Total Evaluation Time (Minutes) 8

## 2019-03-18 NOTE — PROGRESS NOTES
Patient Name: Demario Abbasi  Medical Record Number: [id number]  Today's Date: 3/18/2019    Procedure: Peripherally inserted central catheter placement  Proceduralist: Dr. Cortez and Dr. Vallecillo    Procedure start time: 0845  Puncture time: 0846  Procedure end time: 0900    Report given to: Oriana SERRANO    Other Notes: Pt arrived to IR room 4 from . Consent reviewed. Pt denies any questions or concerns regarding procedure. Pt positioned suppine and monitored per protocol. Pt tolerated procedure without any noted complications. Pt transferred back to .

## 2019-03-18 NOTE — PROCEDURES
Interventional Radiology Brief Post Procedure Note    Procedure: IR PICC PLACEMENT > 5 YRS OF AGE    Proceduralist: Rachid Cortez MD and Dean Vallecillo MD    Assistant: None    Time Out: Prior to the start of the procedure and with procedural staff participation, I verbally confirmed the patient s identity using two indicators, relevant allergies, that the procedure was appropriate and matched the consent or emergent situation, and that the correct equipment/implants were available. Immediately prior to starting the procedure I conducted the Time Out with the procedural staff and re-confirmed the patient s name, procedure, and site/side. (The Joint Commission universal protocol was followed.)  Yes    Medications   Medication Event Details Admin User Admin Time   heparin lock flush 10 UNIT/ML injection 5 mL Medication Given by Other Clinician Dose: 2 mL; Route: Intravenous Dose, Ruel RN 3/18/2019  8:59 AM       Sedation: None. Local Anesthestic used    Findings: L basilic SL PICC placement into basilic vein with tip at CA junction    Estimated Blood Loss: Minimal    Fluoroscopy Time:  minute(s)    SPECIMENS: None    Complications: 1. None     Condition: Stable    Plan:   -To inpatient unit to resume cares per primary team  -Line ready for immediate use.     Comments: See dictated procedure note for full details.    Rachid Cortez MD

## 2019-03-18 NOTE — PLAN OF CARE
Discharge Planner PT   Patient plan for discharge: home with family  Current status: PT: Pt tolerated ambulating with some speed variances mildly following HIIT to increased pulm function and activity tolerance. Pt also very interested in improving posture after educated on effects on breathing mechanics and pulm function. Pt completed postural education exercises x 12-15 reps and provided handout and TB for room/home use.  Barriers to return to prior living situation: medical and pulm status  Recommendations for discharge: home as per pulm team  Rationale for recommendations: based on current functional status anticipate pt will do well at home       Entered by: Nurys Galvan 03/18/2019 12:11 AM

## 2019-03-18 NOTE — PLAN OF CARE
./65 (BP Location: Left arm)   Pulse 62   Temp 97.3  F (36.3  C) (Oral)   Resp 16   Wt 76.1 kg (167 lb 12.8 oz)   SpO2 96%   BMI 24.08 kg/m       Patient denies pain; right PIV at TKO in between antibiotic -plan to go to IR for PICC placement today; reports to be voiding and having BM's; lungs clear with small amount of green sputum; up ad brandi; appeared to sleep well with girlfriend at the bedside

## 2019-03-18 NOTE — PROGRESS NOTES
Care Coordinator Progress Note    Admission Date/Time:  3/14/2019  Attending MD:  Owen Lemon MD    Data  Chart reviewed, discussed with interdisciplinary team.   Patient was admitted for:    Cystic fibrosis (H)  Major depressive disorder, recurrent episode, mild (H)  Cystic fibrosis with pulmonary exacerbation (H)  Cystic fibrosis with pulmonary manifestations (H).    Concerns with insurance coverage for discharge needs: None.  Current Living Situation: Patient lives with family/roommates  Support System: Supportive and Involved  Services Involved: no services involved prior to admission  Transportation at Discharge: Family or friend will provide  Transportation to Medical Appointments:   - Not applicable  Barriers to Discharge: no barriers identified    Coordination of Care and Referrals: Provided patient/family with options for Home Infusion.        Assessment  Patient is a 24 year old male with past medical history of cystic fibrosis admitted with a pulmonary exacerbation.  Per MD team patient will need IV antibiotics at time of discharge.  Per Tammy George, CF RN coordinator, patient has used [facility](P: [phone], F: [phone]) in the past and would like a referral made to [facility] for IV abx needs at discharge.  Referral made and orders placed.  Patients mom is a nurse and does his PICC dressing changes, this was added to the orders.  Patient will discharge to home today after medication and supplies are delivered to the hospital.  Patients family to provide transportation to home.              Plan  Anticipated Discharge Date:  3/18/2019  Anticipated Discharge Plan:  Home with [facility] for IV abx    Urszula Britton RNCC  [phone]

## 2019-03-18 NOTE — PLAN OF CARE
/65 (BP Location: Right arm)   Pulse 75   Temp 97.3  F (36.3  C) (Oral)   Resp 16   Wt 76.1 kg (167 lb 12.8 oz)   SpO2 97%   BMI 24.08 kg/m     Patient denies pain or SOB.  Lungs clear.  Reviewed discharge instructions and medications with patient.  Patient will be going home with PICC for IV antibiotics.

## 2019-03-18 NOTE — PLAN OF CARE
Activity: Up ad brandi independently    Neuro: A&Ox4, conversational.    GI/: Voiding not saving, reports regular BMs.    Diet: High kcal, good tolerance, moderate appetite.    Incisions/Drains: none    IV Access: PIV on right saline locked between IV meropenem.    Labs: Respiratory virus panel resulted; all negative. Droplet isolation discontinued.    Vitals: Vitally stable on room air.    Pain: denies    New changes this shift: Droplet isolation discontinued.    Plan: Plan for PICC placement tomorrow under fluoroscopy. Subsequent probable discharge.

## 2019-03-19 ENCOUNTER — HOME INFUSION (PRE-WILLOW HOME INFUSION) (OUTPATIENT)
Dept: PHARMACY | Facility: CLINIC | Age: 24
End: 2019-03-19

## 2019-03-19 LAB
BACTERIA SPEC CULT: ABNORMAL
SPECIMEN SOURCE: ABNORMAL

## 2019-03-19 NOTE — PROGRESS NOTES
This is a recent snapshot of the patient's [facility] medical record.  For current drug dose and complete information and questions, call [phone] or In Basket pool, [facility] home infusion ([id number])  CSN Number:  [id number]

## 2019-03-20 ENCOUNTER — TELEPHONE (OUTPATIENT)
Dept: MEDSURG UNIT | Facility: CLINIC | Age: 24
End: 2019-03-20

## 2019-03-20 ENCOUNTER — HOME INFUSION (PRE-WILLOW HOME INFUSION) (OUTPATIENT)
Dept: PHARMACY | Facility: CLINIC | Age: 24
End: 2019-03-20

## 2019-03-20 NOTE — PROGRESS NOTES
This is a recent snapshot of the patient's [facility] medical record.  For current drug dose and complete information and questions, call [phone] or In Basket pool, [facility] home infusion ([id number])  CSN Number:  [id number]

## 2019-03-20 NOTE — TELEPHONE ENCOUNTER
Cystic Fibrosis Follow Up Phone Call      Chief complaint:  ID#[id number] Clinic Care Coordination - Post Hospital   Discharge Date: 3/18 .    I spoke with Demario HERNÁNDEZ Elroy to review any questions or concerns following discharge.  he denies any new questions or concerns.  Reviewed discharge instructions, airway clearance regimen, and antibiotics, Demario Abbasi verbalized understanding of discharge plan. No further tests/treatments needed at this time. Notified patient of scheduled follow up appointment on 4/8.  Demario Abbasi stated he has all resources needed at this time.     Antibiotics at Discharge:  IV Antibiotics:Meropenem  Oral Antibiotics:  Bactrim    Patient is tolerating the above antibiotics without concern.    Education Provided to Patient:  he was educated to call the CF Office @ [phone] or after hours call [phone] and have the  page the on-call pulmonologist if the following symptoms: chest pain, changes in sputum, blood in sputum, increased shortness of breath, nausea/vomiting, diarrhea/constipation, rash, joint pain or temperature > 100.5.

## 2019-03-21 NOTE — PROGRESS NOTES
This is a recent snapshot of the patient's [facility] medical record.  For current drug dose and complete information and questions, call [phone] or In Basket pool, [facility] home infusion ([id number])  CSN Number:  [id number]

## 2019-03-25 ENCOUNTER — HOME INFUSION (PRE-WILLOW HOME INFUSION) (OUTPATIENT)
Dept: PHARMACY | Facility: CLINIC | Age: 24
End: 2019-03-25

## 2019-03-26 NOTE — PROGRESS NOTES
This is a recent snapshot of the patient's [facility] medical record.  For current drug dose and complete information and questions, call [phone] or In Basket pool, fv home infusion ([id number])  CSN Number:  [id number]

## 2019-04-07 NOTE — PROGRESS NOTES
[facility]  April 8, 2019         Assessment and Plan:   Demario Abbasi is a 24 year old male with cystic fibrosis, pancreatic insufficiency, depression, acne and impaired glucose tolerance who is s/p RUL resection for recurrent exacerbations in 2009 admitted 3/14-3/18 for severe CF exacerbation and discharged on IV meropenem and oral bactrim.      1. Severe CF exacerbation: feels back to baseline with intermittent productive cough, but no dyspnea and resolved fatigue. Sating 100% on room air; vesting BID. PFTs improved to his baseline as well. Previous cultures + for MSSA and Achromobacter. Exacerbation has resolved.  - Stop IV meropenem and pull PICC line  - Complete course of Bactrim  - Continue nebs and vest therapy  - Chronic azithromycin and resume ngozi nebs every other month  - Consider addition of inhaler such as Advair during the humid/hot months when Demario uses his albuterol inhaler frequently    2. Pancreatic insufficiency: denies symptoms of malabsorption other than some expected loose stools on antibiotics.   - Continue enzymes and vitamin supplementation     3. Iron deficiency: Per chart review, patient previously had been taking Vitron-C; held given concurrent administration of antibiotics.  - Resume Vitron C     4. GERD: no current complaints.    - Continue PPI daily     5. Glucose intolerance: monitored pre and post prandial glucose x 48 hours upon admission, adequate control; no indication for insulin.  - Annual OGTT     6. Depression: notes his mood is stable.   - Continue celexa     RTC: as scheduled with routine cultures and spirometry  Annual studies due: February 2020    Lisa Lofton PA-C  Pulmonary, Allergy, Critical Care and Sleep Medicine        Interval History:   Feeling a lot better since discharge, less fatiugue and breathing has improved. Cough is baseline with intermittent productive. Mucous is brownish-green. No streaks  or blood. No congestion, tightness or shortness of breath. Vesting BID. No sinus congestion, no allergy issues yet. No dizziness, no nausea anymore, no abdominal pain. Some loose stools with the antibiotics.          Review of Systems:   Please see HPI. Otherwise, complete 10 point ROS negative.           Past Medical and Surgical History:     Past Medical History:   Diagnosis Date     CF (cystic fibrosis) (H)      Impaired glucose tolerance      Pancreatic insufficiency      S/P lobectomy of lung 8/4/2015    Right upper lobectomy - 2009     Past Surgical History:   Procedure Laterality Date     HC LAP,INGUINAL HERNIA REPR,INITIAL  2002     IR PICC PLACEMENT > 5 YRS OF AGE  3/18/2019     LOBECTOMY LUNG Right 2009    Right upper lobe           Family History:     Family History   Problem Relation Age of Onset     Thyroid Disease Mother         hypothyroid     Other - See Comments Mother         multiple family members with biliary disease     Hypertension Maternal Grandmother      Diabetes Maternal Grandfather      Hypertension Paternal Grandmother      Hypothyroidism Paternal Grandmother      Parkinsonism Paternal Grandmother      Coronary Artery Disease Early Onset Paternal Grandfather 56            Social History:     Social History     Socioeconomic History     Marital status: Single     Spouse name: Not on file     Number of children: Not on file     Years of education: Not on file     Highest education level: Not on file   Occupational History     Occupation:    Social Needs     Financial resource strain: Not on file     Food insecurity:     Worry: Not on file     Inability: Not on file     Transportation needs:     Medical: Not on file     Non-medical: Not on file   Tobacco Use     Smoking status: Passive Smoke Exposure - Never Smoker     Smokeless tobacco: Current User     Types: Chew     Tobacco comment: dad smokes   Substance and Sexual Activity     Alcohol use: Yes     Comment: <2 drinks per  week     Drug use: Not on file     Sexual activity: Not on file   Lifestyle     Physical activity:     Days per week: Not on file     Minutes per session: Not on file     Stress: Not on file   Relationships     Social connections:     Talks on phone: Not on file     Gets together: Not on file     Attends Anabaptism service: Not on file     Active member of club or organization: Not on file     Attends meetings of clubs or organizations: Not on file     Relationship status: Not on file     Intimate partner violence:     Fear of current or ex partner: Not on file     Emotionally abused: Not on file     Physically abused: Not on file     Forced sexual activity: Not on file   Other Topics Concern     Parent/sibling w/ CABG, MI or angioplasty before 65F 55M? 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 2 dogs.             Medications:     Current Outpatient Medications   Medication     ferrous fumarate 65 mg, [place]. FE,-Vitamin C 125 mg (VITRON C)  MG TABS tablet     acetaminophen (TYLENOL) 325 MG tablet     albuterol (ALBUTEROL) 108 (90 BASE) MCG/ACT Inhaler     albuterol (PROVENTIL) (2.5 MG/3ML) 0.083% neb solution     amylase-lipase-protease (CREON 36) 44216 units CPEP     amylase-lipase-protease (CREON 36) 55913 units CPEP     azithromycin (ZITHROMAX) 500 MG tablet     citalopram (CELEXA) 20 MG tablet     dornase alpha (PULMOZYME) 1 MG/ML neb solution     fexofenadine (ALLEGRA) 180 MG tablet     fluconazole (DIFLUCAN) 150 MG tablet     fluticasone (FLONASE) 50 MCG/ACT nasal spray     mvw SOFTGELS  capsule     pantoprazole (PROTONIX) 20 MG EC tablet     polyethylene glycol (MIRALAX/GLYCOLAX) packet     sodium chloride inhalant 7 % NEBU neb solution     sulfamethoxazole-trimethoprim (BACTRIM DS/SEPTRA DS) 800-160 MG tablet     tobramycin, PF, (COBY) 300 MG/5ML neb solution     No current  "facility-administered medications for this visit.             Physical Exam:   /74   Pulse 72   Ht 1.753 m (5' 9\")   Wt 76.7 kg (169 lb 1.5 oz)   SpO2 100%   BMI 24.97 kg/m      GENERAL: alert, NAD  HEENT: NCAT, EOMI, anicteric sclera, canals and TMs clear; no oral mucosal edema or erythema  Neck: no cervical or supraclavicular adenopathy  Respiratory: good air flow, no crackles, rhonchi or wheezing  CV: RRR, S1S2, no murmurs noted  Abdomen: normoactive BS, soft and non tender   Lymph: no edema, + digital clubbing  Neuro: AAO X 3, CN 2-12 grossly intact  Psychiatric: normal affect, good eye contact  Skin: no rash, jaundice or lesions on limited exam         Data:   All laboratory and imaging data reviewed.      Cystic Fibrosis Culture  Specimen Description   Date Value Ref Range Status   03/14/2019 Sputum  Final   02/22/2019 Sputum  Final   11/23/2018 Sputum  Final   11/23/2018 Sputum  Final    Culture Micro   Date Value Ref Range Status   03/14/2019 Heavy growth  Normal elmer    Final   03/14/2019 (A)  Final    Moderate growth  Achromobacter xylosoxidans/denitrificans     03/14/2019 (A)  Final    Moderate growth  Staphylococcus aureus  This isolate is presumed to be clindamycin resistant based on detection of inducible   clindamycin resistance. Erythromycin and clindamycin are resistant, therefore, they are   not recommended for use.          PFT interpretation:  Maneuver: valid and meets ATS guidelines  Decreased FEV1 and FVC with normal ratio  Compared to prior: FEV1 of 2.90 is 180 ml above prior  The decrease in FVC may represent restrictive physiology.  Lung volumes would be necessary to determine.        "

## 2019-04-08 ENCOUNTER — OFFICE VISIT (OUTPATIENT)
Dept: PULMONOLOGY | Facility: CLINIC | Age: 24
End: 2019-04-08
Attending: PHYSICIAN ASSISTANT
Payer: COMMERCIAL

## 2019-04-08 ENCOUNTER — HOME INFUSION (PRE-WILLOW HOME INFUSION) (OUTPATIENT)
Dept: PHARMACY | Facility: CLINIC | Age: 24
End: 2019-04-08

## 2019-04-08 VITALS
BODY MASS INDEX: 25.04 KG/M2 | OXYGEN SATURATION: 100 % | DIASTOLIC BLOOD PRESSURE: 74 MMHG | WEIGHT: 169.09 LBS | HEIGHT: 69 IN | HEART RATE: 72 BPM | SYSTOLIC BLOOD PRESSURE: 116 MMHG

## 2019-04-08 DIAGNOSIS — E84.9 CYSTIC FIBROSIS (H): Primary | ICD-10-CM

## 2019-04-08 DIAGNOSIS — E84.9 CYSTIC FIBROSIS (H): ICD-10-CM

## 2019-04-08 PROCEDURE — 87186 SC STD MICRODIL/AGAR DIL: CPT | Performed by: INTERNAL MEDICINE

## 2019-04-08 PROCEDURE — 87070 CULTURE OTHR SPECIMN AEROBIC: CPT | Performed by: INTERNAL MEDICINE

## 2019-04-08 PROCEDURE — G0463 HOSPITAL OUTPT CLINIC VISIT: HCPCS | Mod: 25,ZF

## 2019-04-08 PROCEDURE — 87077 CULTURE AEROBIC IDENTIFY: CPT | Performed by: INTERNAL MEDICINE

## 2019-04-08 PROCEDURE — 87106 FUNGI IDENTIFICATION YEAST: CPT | Performed by: INTERNAL MEDICINE

## 2019-04-08 ASSESSMENT — MIFFLIN-ST. JEOR: SCORE: 1757.38

## 2019-04-08 ASSESSMENT — PAIN SCALES - GENERAL: PAINLEVEL: NO PAIN (0)

## 2019-04-08 NOTE — PATIENT INSTRUCTIONS
Cystic Fibrosis Self-Care Plan       Patient: Demario Abbasi   MRN: [id number]   Clinic Date: April 8, 2019     RECOMMENDATIONS:  1. Continue nebulizers and vest therapy.   2. Complete course of Bactrim.  3. Resume ngozi nebs and Vitron C tablet.     Annual Studies:   IGG   Date Value Ref Range Status   02/22/2019 1,380 695 - 1,620 mg/dL Final     Insulin   Date Value Ref Range Status   02/22/2019 Canceled, Test credited 3 - 25 mU/L Final     Comment:     Unsatisfactory specimen - hemolyzed  NOTIFIED OZ PHILLIPS MD AT 1450 ON 2/22/19 BY JV       There are no preventive care reminders to display for this patient.    Pulmonary Function Tests  FEV1: amount of air you can blow out in 1 second  FVC: total amount of air you can take in and blow out    Your Goals:         PFT Latest Ref Rng & Units 3/14/2019   FVC L 3.40   FEV1 L 2.72   FVC% % 60   FEV1% % 57          Airway Clearance: The Most Important Way to Keep Your Lungs Healthy  Vest Settings:    Hill-[place] Frequencies: 8, 9, 10 Pressure 10 Then, Frequencies 18, 19, 20 Pressure 6      RespirTech: Quick Start with Pressure of     Do each frequency for 5 minutes; Deflate vest after each frequency & cough 3 times before beginning the next setting.    Vest and Neb Therapy should be done 2-3 times/day.    Good Nutrition Can Improve Lung Function and Overall Health     Take ALL of your vitamins with food     Take 1/2 of your enzymes before EVERY meal/snack and the other 1/2 mid-meal/snack    Wt Readings from Last 3 Encounters:   03/17/19 76.1 kg (167 lb 12.8 oz)   03/14/19 77.2 kg (170 lb 3.1 oz)   02/22/19 79.7 kg (175 lb 12.8 oz)       There is no height or weight on file to calculate BMI.         National CF Foundation Recommendations for BMI in CF Adults: Women: at least 22 Men: at least 23        Controlling Blood Sugars Helps Prevent Lung Infections & Improves Nutrition  Test blood sugar:     In the morning before eating (goal is )     2 hours after  a meal (goal is less than 150)     When pre-meal glucose is greater than 150 add correction     At bedtime (if less than 100 eat a snack with 15 grams of carbohydrates  Last A1C Results:   Hemoglobin A1C   Date Value Ref Range Status   02/22/2019 5.9 (H) 0 - 5.6 % Final     Comment:     Normal <5.7% Prediabetes 5.7-6.4%  Diabetes 6.5% or higher - adopted from ADA   consensus guidelines.           If diabetic, measure A1C every 6 months. Goal is under 7%.    Staying Healthy    Research: If you are interested in learning about research opportunities or have questions, please contact Esemr Tang at [phone] or [email or web address].       Foundation: Compass is a personalized resource service to help you with the insurance, financial, legal and other issues you are facing.  It's free, confidential and available to anyone with CF.  Ask your  for more information or contact Compass directly at [facility] ([phone]) or compass@cff.org, or learn more at cff.org/compass.       CF Nurse Line:  Ann Rodríguez and Tatiana: [phone]   Margaret Werner, RT: [phone]     Saba Gill and Sherie Aparicio , Dieticians: [phone]     Ashley Cleveland, Diabetes Nurse: [phone]    Prerna Woodall: [phone] or Velma Kolb [phone], Social Workers   [email or web address]

## 2019-04-08 NOTE — NURSING NOTE
Demario HERNÁNDEZ Elroy comes into clinic today at the request of Lisa Lofton Ordering Provider for follow up.     Pt PICC line removed. Site Clean,dry and intact. No evidence of infection. PICC length  48cm. No bleeding after removal. Site cleaned, covered with sterile gauze and secured with Coban. Pt advised to leave dressing on for 24 hours and call CF office with any questions or concerns.      This service provided today was under the supervising provider of the day Lisa Lofton, who was available if needed.    Tatiana Jimenez RN

## 2019-04-08 NOTE — LETTER
4/8/2019       RE: Demario Abbasi  [address]  [place] [address]     Dear Colleague,    Thank you for referring your patient, Demario Abbasi, to the [facility] at [facility]. Please see a copy of my visit note below.    [facility]  April 8, 2019         Assessment and Plan:   Demario Abbasi is a 24 year old male with cystic fibrosis, pancreatic insufficiency, depression, acne and impaired glucose tolerance who is s/p RUL resection for recurrent exacerbations in 2009 admitted 3/14-3/18 for severe CF exacerbation and discharged on IV meropenem and oral bactrim.      1. Severe CF exacerbation: feels back to baseline with intermittent productive cough, but no dyspnea and resolved fatigue. Sating 100% on room air; vesting BID. PFTs improved to his baseline as well. Previous cultures + for MSSA and Achromobacter. Exacerbation has resolved.  - Stop IV meropenem and pull PICC line  - Complete course of Bactrim  - Continue nebs and vest therapy  - Chronic azithromycin and resume ngozi nebs every other month  - Consider addition of inhaler such as Advair during the humid/hot months when Demario uses his albuterol inhaler frequently    2. Pancreatic insufficiency: denies symptoms of malabsorption other than some expected loose stools on antibiotics.   - Continue enzymes and vitamin supplementation     3. Iron deficiency: Per chart review, patient previously had been taking Vitron-C; held given concurrent administration of antibiotics.  - Resume Vitron C     4. GERD: no current complaints.    - Continue PPI daily     5. Glucose intolerance: monitored pre and post prandial glucose x 48 hours upon admission, adequate control; no indication for insulin.  - Annual OGTT     6. Depression: notes his mood is stable.   - Continue celexa     RTC: as scheduled with routine cultures and  spirometry  Annual studies due: February 2020    Lisa Lofton PA-C  Pulmonary, Allergy, Critical Care and Sleep Medicine        Interval History:   Feeling a lot better since discharge, less fatiugue and breathing has improved. Cough is baseline with intermittent productive. Mucous is brownish-green. No streaks or blood. No congestion, tightness or shortness of breath. Vesting BID. No sinus congestion, no allergy issues yet. No dizziness, no nausea anymore, no abdominal pain. Some loose stools with the antibiotics.          Review of Systems:   Please see HPI. Otherwise, complete 10 point ROS negative.           Past Medical and Surgical History:     Past Medical History:   Diagnosis Date     CF (cystic fibrosis) (H)      Impaired glucose tolerance      Pancreatic insufficiency      S/P lobectomy of lung 8/4/2015    Right upper lobectomy - 2009     Past Surgical History:   Procedure Laterality Date     HC LAP,INGUINAL HERNIA REPR,INITIAL  2002     IR PICC PLACEMENT > 5 YRS OF AGE  3/18/2019     LOBECTOMY LUNG Right 2009    Right upper lobe           Family History:     Family History   Problem Relation Age of Onset     Thyroid Disease Mother         hypothyroid     Other - See Comments Mother         multiple family members with biliary disease     Hypertension Maternal Grandmother      Diabetes Maternal Grandfather      Hypertension Paternal Grandmother      Hypothyroidism Paternal Grandmother      Parkinsonism Paternal Grandmother      Coronary Artery Disease Early Onset Paternal Grandfather 56            Social History:     Social History     Socioeconomic History     Marital status: Single     Spouse name: Not on file     Number of children: Not on file     Years of education: Not on file     Highest education level: Not on file   Occupational History     Occupation:    Social Needs     Financial resource strain: Not on file     Food insecurity:     Worry: Not on file     Inability: Not on file      Transportation needs:     Medical: Not on file     Non-medical: Not on file   Tobacco Use     Smoking status: Passive Smoke Exposure - Never Smoker     Smokeless tobacco: Current User     Types: Chew     Tobacco comment: dad smokes   Substance and Sexual Activity     Alcohol use: Yes     Comment: <2 drinks per week     Drug use: Not on file     Sexual activity: Not on file   Lifestyle     Physical activity:     Days per week: Not on file     Minutes per session: Not on file     Stress: Not on file   Relationships     Social connections:     Talks on phone: Not on file     Gets together: Not on file     Attends Alevism service: Not on file     Active member of club or organization: Not on file     Attends meetings of clubs or organizations: Not on file     Relationship status: Not on file     Intimate partner violence:     Fear of current or ex partner: Not on file     Emotionally abused: Not on file     Physically abused: Not on file     Forced sexual activity: Not on file   Other Topics Concern     Parent/sibling w/ CABG, MI or angioplasty before 65F 55M? 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 2 dogs.             Medications:     Current Outpatient Medications   Medication     ferrous fumarate 65 mg, [place]. FE,-Vitamin C 125 mg (VITRON C)  MG TABS tablet     acetaminophen (TYLENOL) 325 MG tablet     albuterol (ALBUTEROL) 108 (90 BASE) MCG/ACT Inhaler     albuterol (PROVENTIL) (2.5 MG/3ML) 0.083% neb solution     amylase-lipase-protease (CREON 36) 33661 units CPEP     amylase-lipase-protease (CREON 36) 68728 units CPEP     azithromycin (ZITHROMAX) 500 MG tablet     citalopram (CELEXA) 20 MG tablet     dornase alpha (PULMOZYME) 1 MG/ML neb solution     fexofenadine (ALLEGRA) 180 MG tablet     fluconazole (DIFLUCAN) 150 MG tablet     fluticasone (FLONASE) 50 MCG/ACT nasal spray     mvw  "SOFTGELS  capsule     pantoprazole (PROTONIX) 20 MG EC tablet     polyethylene glycol (MIRALAX/GLYCOLAX) packet     sodium chloride inhalant 7 % NEBU neb solution     sulfamethoxazole-trimethoprim (BACTRIM DS/SEPTRA DS) 800-160 MG tablet     tobramycin, PF, (COBY) 300 MG/5ML neb solution     No current facility-administered medications for this visit.             Physical Exam:   /74   Pulse 72   Ht 1.753 m (5' 9\")   Wt 76.7 kg (169 lb 1.5 oz)   SpO2 100%   BMI 24.97 kg/m       GENERAL: alert, NAD  HEENT: NCAT, EOMI, anicteric sclera, canals and TMs clear; no oral mucosal edema or erythema  Neck: no cervical or supraclavicular adenopathy  Respiratory: good air flow, no crackles, rhonchi or wheezing  CV: RRR, S1S2, no murmurs noted  Abdomen: normoactive BS, soft and non tender   Lymph: no edema, + digital clubbing  Neuro: AAO X 3, CN 2-12 grossly intact  Psychiatric: normal affect, good eye contact  Skin: no rash, jaundice or lesions on limited exam         Data:   All laboratory and imaging data reviewed.      Cystic Fibrosis Culture  Specimen Description   Date Value Ref Range Status   03/14/2019 Sputum  Final   02/22/2019 Sputum  Final   11/23/2018 Sputum  Final   11/23/2018 Sputum  Final    Culture Micro   Date Value Ref Range Status   03/14/2019 Heavy growth  Normal elmer    Final   03/14/2019 (A)  Final    Moderate growth  Achromobacter xylosoxidans/denitrificans     03/14/2019 (A)  Final    Moderate growth  Staphylococcus aureus  This isolate is presumed to be clindamycin resistant based on detection of inducible   clindamycin resistance. Erythromycin and clindamycin are resistant, therefore, they are   not recommended for use.          PFT interpretation:  Maneuver: valid and meets ATS guidelines  Decreased FEV1 and FVC with normal ratio  Compared to prior: FEV1 of 2.90 is 180 ml above prior  The decrease in FVC may represent restrictive physiology.  Lung volumes would be necessary to " determine.          Nutrition Note    Reason for Visit:  Vitamin Labs    Pt had updated vitamin studies done 2/22/19, results significant for borderline low vitamin D and vitamin E.     Interventions/Recommendations:  Pt is no longer on Accutane which was why his CF multivitamin was discontinued last year (d/t possible overdose of retinol).  Recommended pt discontinue his basic multivitamin + vitamin D supplement in favor of restarting MVW Complete .  This will provide increased doses of D, E and will promote increased replenishment of low levels.      Pt states he has a stock at home - does not need a prescription for this at this point. He will call or MyChart our team in the future if/when he would like an active prescription for this.       Saba Gill MS, RD, LD (pager [phone])  Cystic Fibrosis/Lung Transplant Dietitian      -Available Mon-Thurs 8 AM-4:30 PM. On Fridays please contact pager [phone] (Sherie Aparicio RD) and on weekends/holidays contact coverage dietitian at pager [phone] (inpatient use only).       Again, thank you for allowing me to participate in the care of your patient.      Sincerely,    Lisa Lofton PA-C

## 2019-04-08 NOTE — NURSING NOTE
Chief Complaint   Patient presents with     Follow Up      hospital fu     Vitals were taken and medications were reconciled.     DEJUAN Perez

## 2019-04-09 LAB
EXPTIME-PRE: 8.18 SEC
FEF2575-%PRED-PRE: 49 %
FEF2575-PRE: 2.48 L/SEC
FEF2575-PRED: 5.04 L/SEC
FEFMAX-%PRED-PRE: 84 %
FEFMAX-PRE: 8.6 L/SEC
FEFMAX-PRED: 10.2 L/SEC
FEV1-%PRED-PRE: 61 %
FEV1-PRE: 2.9 L
FEV1FEV6-PRE: 78 %
FEV1FEV6-PRED: 84 %
FEV1FVC-PRE: 78 %
FEV1FVC-PRED: 85 %
FIFMAX-PRE: 4.56 L/SEC
FVC-%PRED-PRE: 66 %
FVC-PRE: 3.72 L
FVC-PRED: 5.59 L

## 2019-04-10 NOTE — PROGRESS NOTES
This is a recent snapshot of the patient's [facility] medical record.  For current drug dose and complete information and questions, call [phone] or In Basket pool, [facility] home infusion ([id number])  CSN Number:  [id number]

## 2019-04-12 DIAGNOSIS — K86.89 PANCREATIC INSUFFICIENCY: ICD-10-CM

## 2019-04-12 DIAGNOSIS — E84.9 CYSTIC FIBROSIS (H): Primary | ICD-10-CM

## 2019-04-12 RX ORDER — PEDIATRIC MULTIVIT 61/D3/VIT K 1500-800
1 CAPSULE ORAL 2 TIMES DAILY
OUTPATIENT
Start: 2019-04-12

## 2019-04-12 RX ORDER — PEDIATRIC MULTIVIT 61/D3/VIT K 1500-800
1 CAPSULE ORAL 2 TIMES DAILY
Qty: 60 CAPSULE | Refills: 3 | Status: SHIPPED | OUTPATIENT
Start: 2019-04-12 | End: 2019-09-23

## 2019-04-13 LAB
BACTERIA SPEC CULT: ABNORMAL
SPECIMEN SOURCE: ABNORMAL

## 2019-04-26 DIAGNOSIS — E84.0 CYSTIC FIBROSIS WITH PULMONARY MANIFESTATIONS (H): ICD-10-CM

## 2019-04-26 RX ORDER — ALBUTEROL SULFATE 0.83 MG/ML
2.5 SOLUTION RESPIRATORY (INHALATION) 3 TIMES DAILY
Qty: 270 ML | Refills: 6 | Status: SHIPPED | OUTPATIENT
Start: 2019-04-26 | End: 2020-03-05

## 2019-05-09 DIAGNOSIS — E84.9 CF (CYSTIC FIBROSIS) (H): ICD-10-CM

## 2019-05-09 RX ORDER — FEXOFENADINE HCL 180 MG/1
TABLET ORAL
Qty: 30 TABLET | Refills: 3 | Status: SHIPPED | OUTPATIENT
Start: 2019-05-09 | End: 2019-09-21

## 2019-05-21 DIAGNOSIS — E84.9 CF (CYSTIC FIBROSIS) (H): ICD-10-CM

## 2019-05-22 RX ORDER — PANTOPRAZOLE SODIUM 20 MG/1
20 TABLET, DELAYED RELEASE ORAL DAILY
Qty: 30 TABLET | Refills: 3 | Status: SHIPPED | OUTPATIENT
Start: 2019-05-22 | End: 2019-10-14

## 2019-05-24 ENCOUNTER — OFFICE VISIT (OUTPATIENT)
Dept: PULMONOLOGY | Facility: CLINIC | Age: 24
End: 2019-05-24
Attending: INTERNAL MEDICINE
Payer: COMMERCIAL

## 2019-05-24 VITALS
TEMPERATURE: 97.7 F | HEART RATE: 60 BPM | BODY MASS INDEX: 25.14 KG/M2 | OXYGEN SATURATION: 98 % | DIASTOLIC BLOOD PRESSURE: 70 MMHG | WEIGHT: 169.75 LBS | SYSTOLIC BLOOD PRESSURE: 114 MMHG | HEIGHT: 69 IN | RESPIRATION RATE: 17 BRPM

## 2019-05-24 DIAGNOSIS — E84.9 CYSTIC FIBROSIS (H): Primary | ICD-10-CM

## 2019-05-24 DIAGNOSIS — R73.02 IMPAIRED GLUCOSE TOLERANCE: Primary | ICD-10-CM

## 2019-05-24 DIAGNOSIS — K86.89 PANCREATIC INSUFFICIENCY: ICD-10-CM

## 2019-05-24 DIAGNOSIS — Z90.2 S/P LOBECTOMY OF LUNG: ICD-10-CM

## 2019-05-24 DIAGNOSIS — E84.9 CF (CYSTIC FIBROSIS) (H): ICD-10-CM

## 2019-05-24 LAB
EXPTIME-PRE: 8.16 SEC
FEF2575-%PRED-PRE: 53 %
FEF2575-PRE: 2.7 L/SEC
FEF2575-PRED: 5.04 L/SEC
FEFMAX-%PRED-PRE: 83 %
FEFMAX-PRE: 8.48 L/SEC
FEFMAX-PRED: 10.2 L/SEC
FEV1-%PRED-PRE: 60 %
FEV1-PRE: 2.86 L
FEV1FEV6-PRE: 80 %
FEV1FEV6-PRED: 84 %
FEV1FVC-PRE: 80 %
FEV1FVC-PRED: 85 %
FIFMAX-PRE: 3.83 L/SEC
FVC-%PRED-PRE: 63 %
FVC-PRE: 3.57 L
FVC-PRED: 5.59 L

## 2019-05-24 PROCEDURE — G0463 HOSPITAL OUTPT CLINIC VISIT: HCPCS | Mod: ZF

## 2019-05-24 PROCEDURE — 87186 SC STD MICRODIL/AGAR DIL: CPT | Performed by: INTERNAL MEDICINE

## 2019-05-24 PROCEDURE — 87077 CULTURE AEROBIC IDENTIFY: CPT | Performed by: INTERNAL MEDICINE

## 2019-05-24 PROCEDURE — 87070 CULTURE OTHR SPECIMN AEROBIC: CPT | Performed by: INTERNAL MEDICINE

## 2019-05-24 RX ORDER — AZITHROMYCIN 500 MG/1
TABLET, FILM COATED ORAL
Qty: 14 TABLET | Refills: 12 | Status: SHIPPED | OUTPATIENT
Start: 2019-05-24 | End: 2020-03-05

## 2019-05-24 ASSESSMENT — PAIN SCALES - GENERAL: PAINLEVEL: NO PAIN (0)

## 2019-05-24 ASSESSMENT — MIFFLIN-ST. JEOR: SCORE: 1760.38

## 2019-05-24 NOTE — NURSING NOTE
Chief Complaint   Patient presents with     RECHECK     CF    Medications reviewed and vital signs taken.   Lizy Mireles, ASMITA

## 2019-05-24 NOTE — LETTER
5/24/2019       RE: Demario Abbasi  [address]  [place] [address]     Dear Colleague,    Thank you for referring your patient, Demario Abbasi, to the [facility] LUNG SCIENCE AND HEALTH at [facility]. Please see a copy of my visit note below.    Reason for Visit  Demario Abbasi is a 24 year old year old male who is being seen for RECHECK (CF)      Assessment and plan:   Demario Abbasi is a 24 year old male with cystic fibrosis, pancreatic insufficiency, depression, acne and impaired glucose tolerance who is s/p RUL resection for recurrent exacerbations in 2009.  The patient was hospitalized March 14-18 for a pulmonary exacerbation and discharged on IV antibiotics.  IV antibiotics were discontinued on 4/8/19.    Pulmonary: Patient appears to be doing well from pulmonary standpoint.  He has very good exercise tolerance.  He is oxygenating well.  PFTs are similar to his recent baseline.  He does not appear to be having an exacerbation at this time.  He will continue his current airway clearance therapy with COBY every other month and chronic azithromycin.    Pancreatic insufficiency: The patient denies symptoms of malabsorption.  His weight is gradually increasing and in an excellent range.  Vitamin levels were in an adequate range with annual studies in February.  He will continue his current  pancreatic enzymes and supplemental vitamins.    Iron deficiency: Hemoglobin was in in the normal range during his recent hospitalization.  Continue iron replacement.    Glucose intolerance: The patient did not have any significant hyperglycemia during his recent hospitalization.  Glucose tolerance testing will be repeated with annual studies in February.    Depression/anxiety: Adequately controlled with current Celexa.  We discussed a trial without Celexa once he completes school.    Reflux: Continue daily pantoprazole.    Healthcare maintenance: Annual  studies will be due in February.    I will see the patient in follow-up in 3 months with PFTs and sputum culture.        CF HPI  The patient was seen and examined by Zana Lilly   Patient has been well since his last clinic visit.  Breathing is comfortable at rest.  Reports dyspnea only with heavy exertion, unchanged from baseline.  He walks a lot for his work but otherwise does not do any regular exercise.  He reports cough is at baseline sputum color and volume are at baseline.  He denies hemoptysis.  He denies chest pain.  No recent fever chills or night sweats.  He is doing vest therapy twice daily for 30 minutes.    Review of Systems:  Appetite is good.  No ear pain, sinus pain, sore throat or rhinorrhea.  No nausea, vomiting, diarrhea or abdominal pain.  Depression and anxiety well controlled with current medication.  A complete ROS was otherwise negative except as noted in the HPI.    Current Outpatient Medications   Medication     acetaminophen (TYLENOL) 325 MG tablet     albuterol (ALBUTEROL) 108 (90 BASE) MCG/ACT Inhaler     albuterol (PROVENTIL) (2.5 MG/3ML) 0.083% neb solution     amylase-lipase-protease (CREON 36) 18018 units CPEP     azithromycin (ZITHROMAX) 500 MG tablet     citalopram (CELEXA) 20 MG tablet     dornase alpha (PULMOZYME) 1 MG/ML neb solution     ferrous fumarate 65 mg, [place]. FE,-Vitamin C 125 mg (VITRON C)  MG TABS tablet     fexofenadine (ALLEGRA) 180 MG tablet     fluconazole (DIFLUCAN) 150 MG tablet     fluticasone (FLONASE) 50 MCG/ACT nasal spray     mvw SOFTGELS  capsule     pantoprazole (PROTONIX) 20 MG EC tablet     sodium chloride inhalant 7 % NEBU neb solution     tobramycin, PF, (COBY) 300 MG/5ML neb solution     amylase-lipase-protease (CREON 36) 99824 units CPEP     No current facility-administered medications for this visit.      Allergies   Allergen Reactions     Augmentin      Past Medical History:   Diagnosis Date     CF (cystic fibrosis) (H)       Impaired glucose tolerance      Pancreatic insufficiency      S/P lobectomy of lung 8/4/2015    Right upper lobectomy - 2009       Past Surgical History:   Procedure Laterality Date     HC LAP,INGUINAL HERNIA REPR,INITIAL  2002     IR PICC PLACEMENT > 5 YRS OF AGE  3/18/2019     LOBECTOMY LUNG Right 2009    Right upper lobe       Social History     Socioeconomic History     Marital status: Single     Spouse name: Not on file     Number of children: Not on file     Years of education: Not on file     Highest education level: Not on file   Occupational History     Occupation:    Social Needs     Financial resource strain: Not on file     Food insecurity:     Worry: Not on file     Inability: Not on file     Transportation needs:     Medical: Not on file     Non-medical: Not on file   Tobacco Use     Smoking status: Passive Smoke Exposure - Never Smoker     Smokeless tobacco: Current User     Types: Chew     Tobacco comment: dad smokes   Substance and Sexual Activity     Alcohol use: Yes     Comment: <2 drinks per week     Drug use: Not on file     Sexual activity: Not on file   Lifestyle     Physical activity:     Days per week: Not on file     Minutes per session: Not on file     Stress: Not on file   Relationships     Social connections:     Talks on phone: Not on file     Gets together: Not on file     Attends Restorationist service: Not on file     Active member of club or organization: Not on file     Attends meetings of clubs or organizations: Not on file     Relationship status: Not on file     Intimate partner violence:     Fear of current or ex partner: Not on file     Emotionally abused: Not on file     Physically abused: Not on file     Forced sexual activity: Not on file   Other Topics Concern     Parent/sibling w/ CABG, MI or angioplasty before 65F 55M? 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 2 dogs.          /70   Pulse 60   Temp 97.7  F (36.5  C) (Oral)   Resp 17   Ht 1.753 m (5' 9\")   Wt 77 kg (169 lb 12.1 oz)   SpO2 98%   BMI 25.07 kg/m     Body mass index is 25.07 kg/m .  Exam:   GENERAL APPEARANCE: Well developed, well nourished, alert, and in no apparent distress.  EYES: PERRL, EOMI  HENT: Nasal mucosa with edema and no hyperemia. No nasal polyps.  EARS: Canals clear, TMs normal  MOUTH: Oral mucosa is moist, without any lesions, no tonsillar enlargement, no oropharyngeal exudate.  NECK: supple, no masses, no thyromegaly.  LYMPHATICS: No significant axillary, cervical, or supraclavicular nodes.  RESP: normal percussion, good air flow throughout.  Right basilar crackles. No rhonchi. No wheezes.  CV: Normal S1, S2, regular rhythm, normal rate. No murmur.  No rub. No gallop. No LE edema.   ABDOMEN:  Bowel sounds normal, soft, nontender, no HSM or masses.   MS: extremities normal. No clubbing. No cyanosis.  SKIN: no rash on limited exam  NEURO: Mentation intact, speech normal, normal strength and tone, normal gait and stance  PSYCH: mentation appears normal. and affect normal/bright  Results:  Recent Results (from the past 168 hour(s))   General PFT Lab (Please always keep checked)    Collection Time: 05/24/19  8:31 AM   Result Value Ref Range    FVC-Pred 5.59 L    FVC-Pre 3.57 L    FVC-%Pred-Pre 63 %    FEV1-Pre 2.86 L    FEV1-%Pred-Pre 60 %    FEV1FVC-Pred 85 %    FEV1FVC-Pre 80 %    FEFMax-Pred 10.20 L/sec    FEFMax-Pre 8.48 L/sec    FEFMax-%Pred-Pre 83 %    FEF2575-Pred 5.04 L/sec    FEF2575-Pre 2.70 L/sec    SSO1198-%Pred-Pre 53 %    ExpTime-Pre 8.16 sec    FIFMax-Pre 3.83 L/sec    FEV1FEV6-Pred 84 %    FEV1FEV6-Pre 80 %                           Results as noted above.    PFT Interpretation:  Moderate restrictive ventilatory defect.  Unchanged from previous.   Similar to recent best.  Valid Maneuver       CF Exacerbation  Absent        Again, thank you for allowing " me to participate in the care of your patient.      Sincerely,    Zana Lilly MD

## 2019-05-24 NOTE — PROGRESS NOTES
Reason for Visit  Demario Abbasi is a 24 year old year old male who is being seen for RECHECK (CF)      Assessment and plan:   Demario Abbasi is a 24 year old male with cystic fibrosis, pancreatic insufficiency, depression, acne and impaired glucose tolerance who is s/p RUL resection for recurrent exacerbations in 2009.  The patient was hospitalized March 14-18 for a pulmonary exacerbation and discharged on IV antibiotics.  IV antibiotics were discontinued on 4/8/19.    Pulmonary: Patient appears to be doing well from pulmonary standpoint.  He has very good exercise tolerance.  He is oxygenating well.  PFTs are similar to his recent baseline.  He does not appear to be having an exacerbation at this time.  He will continue his current airway clearance therapy with COBY every other month and chronic azithromycin.    Pancreatic insufficiency: The patient denies symptoms of malabsorption.  His weight is gradually increasing and in an excellent range.  Vitamin levels were in an adequate range with annual studies in February.  He will continue his current  pancreatic enzymes and supplemental vitamins.    Iron deficiency: Hemoglobin was in in the normal range during his recent hospitalization.  Continue iron replacement.    Glucose intolerance: The patient did not have any significant hyperglycemia during his recent hospitalization.  Glucose tolerance testing will be repeated with annual studies in February.    Depression/anxiety: Adequately controlled with current Celexa.  We discussed a trial without Celexa once he completes school.    Reflux: Continue daily pantoprazole.    Healthcare maintenance: Annual studies will be due in February.    I will see the patient in follow-up in 3 months with PFTs and sputum culture.        CF HPI  The patient was seen and examined by Zana Lilly   Patient has been well since his last clinic visit.  Breathing is comfortable at rest.  Reports dyspnea only with heavy  exertion, unchanged from baseline.  He walks a lot for his work but otherwise does not do any regular exercise.  He reports cough is at baseline sputum color and volume are at baseline.  He denies hemoptysis.  He denies chest pain.  No recent fever chills or night sweats.  He is doing vest therapy twice daily for 30 minutes.    Review of Systems:  Appetite is good.  No ear pain, sinus pain, sore throat or rhinorrhea.  No nausea, vomiting, diarrhea or abdominal pain.  Depression and anxiety well controlled with current medication.  A complete ROS was otherwise negative except as noted in the HPI.    Current Outpatient Medications   Medication     acetaminophen (TYLENOL) 325 MG tablet     albuterol (ALBUTEROL) 108 (90 BASE) MCG/ACT Inhaler     albuterol (PROVENTIL) (2.5 MG/3ML) 0.083% neb solution     amylase-lipase-protease (CREON 36) 77889 units CPEP     azithromycin (ZITHROMAX) 500 MG tablet     citalopram (CELEXA) 20 MG tablet     dornase alpha (PULMOZYME) 1 MG/ML neb solution     ferrous fumarate 65 mg, [place]. FE,-Vitamin C 125 mg (VITRON C)  MG TABS tablet     fexofenadine (ALLEGRA) 180 MG tablet     fluconazole (DIFLUCAN) 150 MG tablet     fluticasone (FLONASE) 50 MCG/ACT nasal spray     mvw SOFTGELS  capsule     pantoprazole (PROTONIX) 20 MG EC tablet     sodium chloride inhalant 7 % NEBU neb solution     tobramycin, PF, (COBY) 300 MG/5ML neb solution     amylase-lipase-protease (CREON 36) 74746 units CPEP     No current facility-administered medications for this visit.      Allergies   Allergen Reactions     Augmentin      Past Medical History:   Diagnosis Date     CF (cystic fibrosis) (H)      Impaired glucose tolerance      Pancreatic insufficiency      S/P lobectomy of lung 8/4/2015    Right upper lobectomy - 2009       Past Surgical History:   Procedure Laterality Date     HC LAP,INGUINAL HERNIA REPR,INITIAL  2002     IR PICC PLACEMENT > 5 YRS OF AGE  3/18/2019     LOBECTOMY LUNG Right 2009     "Right upper lobe       Social History     Socioeconomic History     Marital status: Single     Spouse name: Not on file     Number of children: Not on file     Years of education: Not on file     Highest education level: Not on file   Occupational History     Occupation:    Social Needs     Financial resource strain: Not on file     Food insecurity:     Worry: Not on file     Inability: Not on file     Transportation needs:     Medical: Not on file     Non-medical: Not on file   Tobacco Use     Smoking status: Passive Smoke Exposure - Never Smoker     Smokeless tobacco: Current User     Types: Chew     Tobacco comment: dad smokes   Substance and Sexual Activity     Alcohol use: Yes     Comment: <2 drinks per week     Drug use: Not on file     Sexual activity: Not on file   Lifestyle     Physical activity:     Days per week: Not on file     Minutes per session: Not on file     Stress: Not on file   Relationships     Social connections:     Talks on phone: Not on file     Gets together: Not on file     Attends Yazidi service: Not on file     Active member of club or organization: Not on file     Attends meetings of clubs or organizations: Not on file     Relationship status: Not on file     Intimate partner violence:     Fear of current or ex partner: Not on file     Emotionally abused: Not on file     Physically abused: Not on file     Forced sexual activity: Not on file   Other Topics Concern     Parent/sibling w/ CABG, MI or angioplasty before 65F 55M? 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 2 dogs.          /70   Pulse 60   Temp 97.7  F (36.5  C) (Oral)   Resp 17   Ht 1.753 m (5' 9\")   Wt 77 kg (169 lb 12.1 oz)   SpO2 98%   BMI 25.07 kg/m    Body mass index is 25.07 kg/m .  Exam:   GENERAL APPEARANCE: Well developed, well nourished, alert, and in no apparent " distress.  EYES: PERRL, EOMI  HENT: Nasal mucosa with edema and no hyperemia. No nasal polyps.  EARS: Canals clear, TMs normal  MOUTH: Oral mucosa is moist, without any lesions, no tonsillar enlargement, no oropharyngeal exudate.  NECK: supple, no masses, no thyromegaly.  LYMPHATICS: No significant axillary, cervical, or supraclavicular nodes.  RESP: normal percussion, good air flow throughout.  Right basilar crackles. No rhonchi. No wheezes.  CV: Normal S1, S2, regular rhythm, normal rate. No murmur.  No rub. No gallop. No LE edema.   ABDOMEN:  Bowel sounds normal, soft, nontender, no HSM or masses.   MS: extremities normal. No clubbing. No cyanosis.  SKIN: no rash on limited exam  NEURO: Mentation intact, speech normal, normal strength and tone, normal gait and stance  PSYCH: mentation appears normal. and affect normal/bright  Results:  Recent Results (from the past 168 hour(s))   General PFT Lab (Please always keep checked)    Collection Time: 05/24/19  8:31 AM   Result Value Ref Range    FVC-Pred 5.59 L    FVC-Pre 3.57 L    FVC-%Pred-Pre 63 %    FEV1-Pre 2.86 L    FEV1-%Pred-Pre 60 %    FEV1FVC-Pred 85 %    FEV1FVC-Pre 80 %    FEFMax-Pred 10.20 L/sec    FEFMax-Pre 8.48 L/sec    FEFMax-%Pred-Pre 83 %    FEF2575-Pred 5.04 L/sec    FEF2575-Pre 2.70 L/sec    ZVA9832-%Pred-Pre 53 %    ExpTime-Pre 8.16 sec    FIFMax-Pre 3.83 L/sec    FEV1FEV6-Pred 84 %    FEV1FEV6-Pre 80 %                           Results as noted above.    PFT Interpretation:  Moderate restrictive ventilatory defect.  Unchanged from previous.   Similar to recent best.  Valid Maneuver       CF Exacerbation  Absent

## 2019-05-24 NOTE — PATIENT INSTRUCTIONS
Cystic Fibrosis Self-Care Plan    RECOMMENDATIONS:   Continue current medication, nebs and vest therapy.           [facility] Nurse line:  Anne, John    [phone]     [facility] Fax Number:      [phone]         Cystic fibrosis Respiratory Therapist:   Margaret Werner              [phone]   Cystic fibrosis Dietitians:              Sherie Aparicio              [phone]                            Saba Gill                        [phone]   Cystic fibrosis Diabetes Nurse:    Ashley Cleveland   [phone]    Cystic fibrosis Social Workers:     Prerna Paniagua               [phone]                     Courtney Fenton               [phone]  Cystic fibrosis Pharmacist:           Ashley Kirby                               [phone]         Ansley Santo   [phone]  Cystic Fibrosis Genetic Counselor:   Yuliana Ghotra    [phone]    [facility] website:  [email or web address]         MRN: [id number]   Clinic Date: May 24, 2019   Patient: Demario Abbasi     Annual Studies:   IGG   Date Value Ref Range Status   02/22/2019 1,380 695 - 1,620 mg/dL Final     Insulin   Date Value Ref Range Status   02/22/2019 Canceled, Test credited 3 - 25 mU/L Final     Comment:     Unsatisfactory specimen - hemolyzed  NOTIFIED OZ PHILLIPS MD AT 1450 ON 2/22/19 BY JV       There are no preventive care reminders to display for this patient.    Pulmonary Function Tests  FEV1: amount of air you can blow out in 1 second  FVC: total amount of air you can take in and blow out    Your Goals:         PFT Latest Ref Rng & Units 5/24/2019   FVC L 3.57   FEV1 L 2.86   FVC% % 63   FEV1% % 60          Airway Clearance: The Most Important Way to Keep Your Lungs Healthy  Vest Settings:    Hill-Rom Frequencies: 8, 9, 10 Pressure 10 Then, Frequencies 18, 19, 20 Pressure 6      RespirTech: Quick Start with Pressure of     Do each frequency for 5  minutes; Deflate vest after each frequency & cough 3 times before beginning the next setting.    Vest and Neb Therapy should be done 2 times/day.    Good Nutrition Can Improve Lung Function and Overall Health     Take ALL of your vitamins with food     Take 1/2 of your enzymes before EVERY meal/snack and the other 1/2 mid-meal/snack    Wt Readings from Last 3 Encounters:   05/24/19 77 kg (169 lb 12.1 oz)   04/08/19 76.7 kg (169 lb 1.5 oz)   03/17/19 76.1 kg (167 lb 12.8 oz)       Body mass index is 25.07 kg/m .         National CF Foundation Recommendations for BMI in CF Adults: Women: at least 22 Men: at least 23        Controlling Blood Sugars Helps Prevent Lung Infections & Improves Nutrition  Test blood sugar:     In the morning before eating (goal is )     2 hours after a meal (goal is less than 150)     When pre-meal glucose is greater than 150 add correction     At bedtime (if less than 100 eat a snack with 15 grams of carbohydrates  Last A1C Results:   Hemoglobin A1C   Date Value Ref Range Status   02/22/2019 5.9 (H) 0 - 5.6 % Final     Comment:     Normal <5.7% Prediabetes 5.7-6.4%  Diabetes 6.5% or higher - adopted from ADA   consensus guidelines.           If diabetic, measure A1C every 6 months. Goal: Under 7%    Staying Healthy    Research:  If you are interested in learning about research opportunities or have questions, please contact the CF Research Team at [phone] or [email or web address].      CF Foundation:  Compass is a personalized resource service to help you with the insurance, financial, legal and other issues you are facing.  It's free, confidential and available to anyone with CF.  Ask your  for more information or contact Compass directly at 462-COMPASS ([phone]) or compass@[email or web address], or learn more at [email or web address].org/compass.

## 2019-05-29 LAB
BACTERIA SPEC CULT: ABNORMAL
Lab: ABNORMAL
SPECIMEN SOURCE: ABNORMAL

## 2019-06-19 DIAGNOSIS — E84.0 CYSTIC FIBROSIS WITH PULMONARY MANIFESTATIONS (H): ICD-10-CM

## 2019-07-22 ENCOUNTER — TELEPHONE (OUTPATIENT)
Dept: PULMONOLOGY | Facility: CLINIC | Age: 24
End: 2019-07-22

## 2019-07-22 NOTE — TELEPHONE ENCOUNTER
PA Initiation    Medication: tobramycin 300mg- pa initiated   Insurance Company: [employer] - Phone [phone] Fax [phone]  Pharmacy Filling the Rx: [facility] - [place], [place] - [facility] [address]  Filling Pharmacy Phone: [phone]  Filling Pharmacy Fax: [phone]  Start Date: 7/22/2019

## 2019-07-23 DIAGNOSIS — E84.0 CYSTIC FIBROSIS WITH PULMONARY MANIFESTATIONS (H): Primary | ICD-10-CM

## 2019-07-23 RX ORDER — TOBRAMYCIN INHALATION 300 MG/4ML
300 SOLUTION RESPIRATORY (INHALATION) 2 TIMES DAILY
Qty: 224 ML | Refills: 5 | Status: ON HOLD | OUTPATIENT
Start: 2019-07-23 | End: 2020-01-02

## 2019-07-23 NOTE — TELEPHONE ENCOUNTER
PRIOR AUTHORIZATION DENIED    Medication: tobramycin 300mg- pa DENIED    Denial Date: 7/22/2019    Denial Rational:     Appeal Information: Will send message to RN to change to Anirudh.

## 2019-07-26 ENCOUNTER — TELEPHONE (OUTPATIENT)
Dept: PULMONOLOGY | Facility: CLINIC | Age: 24
End: 2019-07-26

## 2019-07-26 NOTE — TELEPHONE ENCOUNTER
PA Initiation    Medication: pantoprazole (PROTONIX) 20 MG EC tablet (PENDING)  Insurance Company: [employer] - Phone [phone] Fax [phone]  Pharmacy Filling the Rx: [facility] [place] - [address]  Filling Pharmacy Phone:    Filling Pharmacy Fax:    Start Date: 7/26/2019

## 2019-07-29 NOTE — TELEPHONE ENCOUNTER
Prior Authorization Approval    Authorization Effective Date: 4/26/2019  Authorization Expiration Date: 7/26/2020  Medication: pantoprazole (PROTONIX) 20 MG EC tablet (APPROVED)  Approved Dose/Quantity: 30 PER 30 DAYS  Reference #:     Insurance Company: [employer] - Phone [phone] Fax [phone]  Expected CoPay:       CoPay Card Available:      Foundation Assistance Needed:    Which Pharmacy is filling the prescription (Not needed for infusion/clinic administered): [facility] - [place] - [address] [place] [address]  Pharmacy Notified: No  Patient Notified: No

## 2019-08-23 ENCOUNTER — OFFICE VISIT (OUTPATIENT)
Dept: PULMONOLOGY | Facility: CLINIC | Age: 24
End: 2019-08-23
Attending: INTERNAL MEDICINE
Payer: COMMERCIAL

## 2019-08-23 ENCOUNTER — ALLIED HEALTH/NURSE VISIT (OUTPATIENT)
Dept: CARE COORDINATION | Facility: CLINIC | Age: 24
End: 2019-08-23

## 2019-08-23 ENCOUNTER — OFFICE VISIT (OUTPATIENT)
Dept: PHARMACY | Facility: CLINIC | Age: 24
End: 2019-08-23
Payer: COMMERCIAL

## 2019-08-23 ENCOUNTER — OFFICE VISIT (OUTPATIENT)
Dept: OPHTHALMOLOGY | Facility: CLINIC | Age: 24
End: 2019-08-23
Attending: OPHTHALMOLOGY
Payer: COMMERCIAL

## 2019-08-23 VITALS
OXYGEN SATURATION: 96 % | HEIGHT: 69 IN | RESPIRATION RATE: 18 BRPM | TEMPERATURE: 97.4 F | WEIGHT: 169.09 LBS | SYSTOLIC BLOOD PRESSURE: 114 MMHG | DIASTOLIC BLOOD PRESSURE: 71 MMHG | BODY MASS INDEX: 25.04 KG/M2 | HEART RATE: 70 BPM

## 2019-08-23 DIAGNOSIS — T15.11XA FOREIGN BODY OF RIGHT CONJUNCTIVAL SAC, INITIAL ENCOUNTER: ICD-10-CM

## 2019-08-23 DIAGNOSIS — Z90.2 S/P LOBECTOMY OF LUNG: ICD-10-CM

## 2019-08-23 DIAGNOSIS — K86.89 PANCREATIC INSUFFICIENCY: ICD-10-CM

## 2019-08-23 DIAGNOSIS — E84.9 CF (CYSTIC FIBROSIS) (H): ICD-10-CM

## 2019-08-23 DIAGNOSIS — E84.9 CYSTIC FIBROSIS (H): Primary | ICD-10-CM

## 2019-08-23 DIAGNOSIS — Z13.9 RISK AND FUNCTIONAL ASSESSMENT: Primary | ICD-10-CM

## 2019-08-23 DIAGNOSIS — E84.0 CYSTIC FIBROSIS WITH PULMONARY EXACERBATION (H): ICD-10-CM

## 2019-08-23 DIAGNOSIS — R73.02 IMPAIRED GLUCOSE TOLERANCE: ICD-10-CM

## 2019-08-23 DIAGNOSIS — H15.121 NODULAR EPISCLERITIS OF RIGHT EYE: ICD-10-CM

## 2019-08-23 DIAGNOSIS — T15.91XA EYE FOREIGN BODY, RIGHT, INITIAL ENCOUNTER: ICD-10-CM

## 2019-08-23 PROBLEM — H15.129 NODULAR EPISCLERITIS: Status: ACTIVE | Noted: 2019-08-23

## 2019-08-23 LAB
EXPTIME-PRE: 7.49 SEC
FEF2575-%PRED-PRE: 52 %
FEF2575-PRE: 2.65 L/SEC
FEF2575-PRED: 5.04 L/SEC
FEFMAX-%PRED-PRE: 85 %
FEFMAX-PRE: 8.72 L/SEC
FEFMAX-PRED: 10.2 L/SEC
FEV1-%PRED-PRE: 60 %
FEV1-PRE: 2.85 L
FEV1FEV6-PRE: 80 %
FEV1FEV6-PRED: 84 %
FEV1FVC-PRE: 80 %
FEV1FVC-PRED: 85 %
FIFMAX-PRE: 3.95 L/SEC
FVC-%PRED-PRE: 63 %
FVC-PRE: 3.57 L
FVC-PRED: 5.59 L

## 2019-08-23 PROCEDURE — 87070 CULTURE OTHR SPECIMN AEROBIC: CPT | Performed by: INTERNAL MEDICINE

## 2019-08-23 PROCEDURE — 87077 CULTURE AEROBIC IDENTIFY: CPT | Performed by: INTERNAL MEDICINE

## 2019-08-23 PROCEDURE — G0463 HOSPITAL OUTPT CLINIC VISIT: HCPCS | Mod: ZF,27,25

## 2019-08-23 PROCEDURE — 99207 ZZC NO CHARGE LOS: CPT | Performed by: PHARMACIST

## 2019-08-23 PROCEDURE — G0463 HOSPITAL OUTPT CLINIC VISIT: HCPCS | Mod: ZF,25

## 2019-08-23 PROCEDURE — 87186 SC STD MICRODIL/AGAR DIL: CPT | Performed by: INTERNAL MEDICINE

## 2019-08-23 PROCEDURE — 97803 MED NUTRITION INDIV SUBSEQ: CPT | Mod: ZF | Performed by: DIETITIAN, REGISTERED

## 2019-08-23 RX ORDER — ALBUTEROL SULFATE 90 UG/1
2 AEROSOL, METERED RESPIRATORY (INHALATION) EVERY 6 HOURS PRN
Qty: 2 INHALER | Refills: 6 | Status: SHIPPED | OUTPATIENT
Start: 2019-08-23 | End: 2020-03-05

## 2019-08-23 RX ORDER — SODIUM CHLORIDE FOR INHALATION 7 %
4 VIAL, NEBULIZER (ML) INHALATION 2 TIMES DAILY PRN
Qty: 240 ML | Refills: 11 | Status: SHIPPED | OUTPATIENT
Start: 2019-08-23 | End: 2020-03-05

## 2019-08-23 RX ORDER — FLUOROMETHOLONE 0.1 %
1 SUSPENSION, DROPS(FINAL DOSAGE FORM)(ML) OPHTHALMIC (EYE) 4 TIMES DAILY
Qty: 1 BOTTLE | Refills: 1 | Status: SHIPPED | OUTPATIENT
Start: 2019-08-23 | End: 2019-11-08

## 2019-08-23 ASSESSMENT — MIFFLIN-ST. JEOR: SCORE: 1757.63

## 2019-08-23 ASSESSMENT — ANXIETY QUESTIONNAIRES
5. BEING SO RESTLESS THAT IT IS HARD TO SIT STILL: NOT AT ALL
GAD7 TOTAL SCORE: 2
3. WORRYING TOO MUCH ABOUT DIFFERENT THINGS: NOT AT ALL
2. NOT BEING ABLE TO STOP OR CONTROL WORRYING: NOT AT ALL
7. FEELING AFRAID AS IF SOMETHING AWFUL MIGHT HAPPEN: NOT AT ALL
6. BECOMING EASILY ANNOYED OR IRRITABLE: SEVERAL DAYS
1. FEELING NERVOUS, ANXIOUS, OR ON EDGE: SEVERAL DAYS
IF YOU CHECKED OFF ANY PROBLEMS ON THIS QUESTIONNAIRE, HOW DIFFICULT HAVE THESE PROBLEMS MADE IT FOR YOU TO DO YOUR WORK, TAKE CARE OF THINGS AT HOME, OR GET ALONG WITH OTHER PEOPLE: NOT DIFFICULT AT ALL

## 2019-08-23 ASSESSMENT — VISUAL ACUITY
OD_SC: 20/25
OS_SC+: +2
OS_SC: 20/40
OS_PH_SC: 20/25
METHOD: SNELLEN - LINEAR
OS_PH_SC+: -2
OD_SC+: -2

## 2019-08-23 ASSESSMENT — CONF VISUAL FIELD
METHOD: COUNTING FINGERS
OD_NORMAL: 1
OS_NORMAL: 1

## 2019-08-23 ASSESSMENT — PATIENT HEALTH QUESTIONNAIRE - PHQ9
5. POOR APPETITE OR OVEREATING: NOT AT ALL
SUM OF ALL RESPONSES TO PHQ QUESTIONS 1-9: 1

## 2019-08-23 ASSESSMENT — SLIT LAMP EXAM - LIDS
COMMENTS: NORMAL
COMMENTS: NORMAL

## 2019-08-23 ASSESSMENT — PAIN SCALES - GENERAL: PAINLEVEL: NO PAIN (0)

## 2019-08-23 ASSESSMENT — TONOMETRY
OD_IOP_MMHG: 15
OS_IOP_MMHG: 16
IOP_METHOD: ICARE

## 2019-08-23 ASSESSMENT — EXTERNAL EXAM - LEFT EYE: OS_EXAM: NORMAL

## 2019-08-23 ASSESSMENT — EXTERNAL EXAM - RIGHT EYE: OD_EXAM: NORMAL

## 2019-08-23 NOTE — PROGRESS NOTES
Assessment & Plan       Demario Abbasi is a 24 year old male with the following diagnoses:   1. Foreign body of right conjunctival sac, initial encounter - Right Eye    2. Nodular episcleritis of right eye - Right Eye       Removed FB conj UL OD with aid of slit lamp and cotton swab  .   Cont TX 4 x per day 1 week then Taper treatment  1 drop 3 x per day 4 days, 1 drop 2 X per day 4 days, 1 drop once per day 1 week    [facility]       Patient disposition:   Return if symptoms worsen or fail to improve.          Complete documentation of historical and exam elements from today's encounter can be found in the full encounter summary report (not reduplicated in this progress note). I personally obtained the chief complaint(s) and history of present illness.  I confirmed and edited as necessary the review of systems, past medical/surgical history, family history, social history, and examination findings as documented by others; and I examined the patient myself. I personally reviewed the relevant tests, images, and reports as documented above. I formulated and edited as necessary the assessment and plan and discussed the findings and management plan with the patient and family.  Dr. Mak Harkins

## 2019-08-23 NOTE — PATIENT INSTRUCTIONS
Cont TX 4 x per day 1 week then Taper treatment  1 drop 3 x per day 4 days, 1 drop 2 X per day 4 days, 1 drop once per day 1 week    Recheck if needed at [facility]

## 2019-08-23 NOTE — NURSING NOTE
Chief Complaint   Patient presents with     RECHECK     Cystic Fibrosis 3 month follow up      Gem Welch CMA

## 2019-08-23 NOTE — PATIENT INSTRUCTIONS
Cystic Fibrosis Self-Care Plan    RECOMMENDATIONS:   Eye evaluation today.  Continue current medication, nebs and vest therapy.           [facility] Nurse line:  AnneJannie benavides Tatiana    [phone]     [facility] Fax Number:      [phone]         Cystic fibrosis Respiratory Therapist:   Margaret Werner              [phone]   Cystic fibrosis Dietitians:              Sherie Aparicio              [phone]                            Saba Gill                        [phone]   Cystic fibrosis Diabetes Nurse:    Ashley Cleveland   [phone]    Cystic fibrosis Social Workers:     Prerna Paniagua               [phone]                     Courtney Fenton               [phone]  Cystic fibrosis Pharmacist:           Ashley Kirby                               [phone]         Ansley Santo   [phone]  Cystic Fibrosis Genetic Counselor:   Yuliana Ghotra    [phone]    [facility] website:  [email or web address]         MRN: [id number]   Clinic Date: August 23, 2019   Patient: Demario Abbasi     Annual Studies:   IGG   Date Value Ref Range Status   02/22/2019 1,380 695 - 1,620 mg/dL Final     Insulin   Date Value Ref Range Status   02/22/2019 Canceled, Test credited 3 - 25 mU/L Final     Comment:     Unsatisfactory specimen - hemolyzed  NOTIFIED OZ PHILLIPS MD AT 1450 ON 2/22/19 BY JV       There are no preventive care reminders to display for this patient.    Pulmonary Function Tests  FEV1: amount of air you can blow out in 1 second  FVC: total amount of air you can take in and blow out    Your Goals:         PFT Latest Ref Rng & Units 8/23/2019   FVC L 3.57   FEV1 L 2.85   FVC% % 63   FEV1% % 60          Airway Clearance: The Most Important Way to Keep Your Lungs Healthy  Vest Settings:    Hill-Rom Frequencies: 8, 9, 10 Pressure 10 Then, Frequencies 18, 19, 20 Pressure 6      RespirTech: Quick Start with Pressure of      Do each frequency for 5 minutes; Deflate vest after each frequency & cough 3 times before beginning the next setting.    Vest and Neb Therapy should be done 2 times/day.    Good Nutrition Can Improve Lung Function and Overall Health     Take ALL of your vitamins with food     Take 1/2 of your enzymes before EVERY meal/snack and the other 1/2 mid-meal/snack    Wt Readings from Last 3 Encounters:   08/23/19 76.7 kg (169 lb 1.5 oz)   05/24/19 77 kg (169 lb 12.1 oz)   04/08/19 76.7 kg (169 lb 1.5 oz)       Body mass index is 24.96 kg/m .         National CF Foundation Recommendations for BMI in CF Adults: Women: at least 22 Men: at least 23        Controlling Blood Sugars Helps Prevent Lung Infections & Improves Nutrition  Test blood sugar:     In the morning before eating (goal is )     2 hours after a meal (goal is less than 150)     When pre-meal glucose is greater than 150 add correction     At bedtime (if less than 100 eat a snack with 15 grams of carbohydrates  Last A1C Results:   Hemoglobin A1C   Date Value Ref Range Status   02/22/2019 5.9 (H) 0 - 5.6 % Final     Comment:     Normal <5.7% Prediabetes 5.7-6.4%  Diabetes 6.5% or higher - adopted from ADA   consensus guidelines.           If diabetic, measure A1C every 6 months. Goal: Under 7%    Staying Healthy    Research:  If you are interested in learning about research opportunities or have questions, please contact the CF Research Team at [phone] or [email or web address].      CF Foundation:  Compass is a personalized resource service to help you with the insurance, financial, legal and other issues you are facing.  It's free, confidential and available to anyone with CF.  Ask your  for more information or contact Compass directly at 226-COMPASS ([phone]) or compass@[email or web address], or learn more at [email or web address].[email or web address].

## 2019-08-23 NOTE — PROGRESS NOTES
Adult Cystic Fibrosis Program  Annual Psychosocial Assessment    Presenting Information:  Demario Abbasi is a 24-year-old male with cystic fibrosis, presenting in CF clinic for a routine appointment was with Dr. Lilly today.  Met with Demario for an initial annual psychosocial assessment.  His mom (Mirna), dad (Andrew) and girlfriend (Oriana) were present with him in clinic today.         Living situation:  Demario continues to live with 3 friends in a rented apartment when at school ([place], [place]) at [employer] and typically goes home on the weekends and in the summer to live with family in [place].  [place] about 2 hours from [place].  His parents own their home in [place] where he grew up.  His sister (Tammy) and her son (4 y.o Salomón) live in this home as well.  There are 2 dogs at his parents home (a black lab named Rebeca and a miniature pincher named Glen) and no pets at the apartment at school.  He denies any concerns about his living situation.       Family Constellation:  Demario was raised by his biological parents: Mirna and Andrew who live in [place], [place].  He has two older sisters: Tammy who has a 4 y.o son Salomón and Evi who lives outside the home and has a 2 y.o son named Bharat.  His mom is a nurse and his dad is a farmer of soybeans and corn.  He is the only person in his family with CF.  Demario has never been  and does not have children, he is in a significant relationship with his girlfriend Oriana.      Social Support:  Demario reports very good social support.  He gets along well with family members and draws additional support from his girlfriend, friends and yin community.  He is close with both of his parents, but especially his dad.  He and his family have some involvement with the CF community through fundraising.       Adjustment to Illness:  Demario reports that he was diagnosed with CF in infancy (3 months), he reports that shortly after being brought home from the hospital he  "was not thriving, gaining weight or growing.  His parents brought him to [facility] for evaluation and they transferred him to the [facility] where a sweat test was performed and the diagnosis of CF was made.  He reports some complications while growing up, being hospitalized about once per year for CF-related issues which he did not like.  He admits that growing up with CF was hard at times, he struggled with doing his therapies and treatments at times and not having the freedom that other kids had.  He felt that he was still able to have a wonderful childhood/upbringing, participating in sports and being very active in social events.  He reports that his parents were very good and helping take care of him and manage CF and states: \"they were on top of it\".   He has been vesting since he was very young, he was unsure of the exact age.      Demario describes his current health status as \"pretty good\" with no significant updates to report in the past year other than being admitted to [facility] this past spring due to CF exacerbation and to initiate IV abx which he was able to recover from .  Clinically, he has moderate lung disease and pancreatic insufficiency.  He typically does 2 vest treatments per day and takes enzymes with meals and snacks, when asked to rate his adherence with CF cares he reports a 6 out of 10.  He indicates that he often forgets to take his enzymes with him when he goes out to eat, Oriana reports that she tries to help remind him to take his enzymes.  We did brainstorm some ways to increase adherence with enzymes but overall he feels that he does pretty well with this.  He denies any health problems that interfere with activities of daily living.     Demario reports that he is pretty open about his CF diagnosis and has no reservations about telling people.         Health Care Directive:  Demario has not previously received Health Care Directive education.  This SW provided/reviewed education, including " "concept/purpose of health care directive, default health care agents and how to complete a directive.   He is comfortable with default health care agent(s) (his parents) in absence of a directive.  He is not currently interested in completing a health care directive.     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      Oriana is also currently a student at [employer], studying human development and is also expected to graduate spring of 2020.  She plans to go on to graduate school for mental health counseling following undergad.        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    Oriana is employed as a  provider at a  center.    Finances:  Demario receives income from wages and has financial support from his parents.  He has student loans which are financing his college education.  He denies having financial concerns at this time.      Insurance:  Demario is insured through Medicaid: [employer] Medical Assistance.  He denies any insurance coverage concerns at this time.    Mental Health/Coping:  Demario reports a history of depression, the chart review also includes a history of mild anxiety in the past.  Demario reports that his current/recent mood has been \"pretty good\".  He continues to take the antidepressant (serotonin specific reuptake inhibitor): Celexa, he was originally prescribed this medication a couple of years ago and feels that it is beneficial in managing his mood.  In the past he recalls going through a tough period of time in regards to his CF, describing it as \"a stage of understanding\", not wanting to comply with treatments and Celexa did help him get through this difficult time.  He has not sought therapy in the past and does " not feel this is something he needs currently.  He zelda with stress by talking to someone such as his dad or Oriana.  He identifies yin as important part of his life, he was raised Yarsanism and =is involved with a Scientologist community.    Demario completed the following screens today:  PHQ-9: Score: 1, which indicates minimal symptoms of depression and described as not difficult in daily functioning.  EMY-7: Score: 2, which indicates minimal symptoms of anxiety and described as not difficult in daily functioning.       Demario agreed with the scores and interpretation of the screens above, he does feel his busy schedule between managing school, working and CF can be a lot at times but also feels that it's something he can handle.  He is looking forward to graduating.  He denies any suicidal ideation or symptoms not addressed on these screens.        Chemical Health:  Demario does use chewing tobacco, he reports reduced/limited use, typically one or none per day.  He denies the use of other tobacco products, denies the use of marijuana or other drugs.  He reports the use of alcohol, typically on the weekends and reports non-excessive use, estimating about 6 drinks (usually beer) per week split between 2-3 occassions. He denies any current/past psychosocial impairment caused by alcohol use.        Leisure Activities/Interests:   Demario enjoys hunting, fishing, golf, hockey and football.  He is looking forward to going to [employer] game this evening.        Intervention:  -Psychosocial Assessment  -Mental health screening  -Supportive counseling    Assessment:  Demario appeared to be open in open responses and was in good spirits. Risk factors/vulnerabilities include busy schedule between college, work and managing CF but seems to be managing well.  Recent transition from peds to adults (denies current concerns) and lives a distance away. Strengths include: appears responsible and reports adherence.  Open to receiving care and education.  Reports good support system with no concerns reported in the area of financial or insurance. He does have a history of depression and currently feels it is well managed with Celexa.  Demario seems to be psychosocially stable overall, with access to relevant resources and supports.  No concerns expressed/noted.  Recommend f/u SW support as noted in plan below.      Plan:  Continue to follow for regular clinic consult during transition period from peds to adults.  Re-consult for any psychosocial issues that may arise.  Complete psychosocial assessment annually.      JONELLE Hoskins, [facility]  Adult Cystic Fibrosis   Ph: [phone]  Pager: [phone]

## 2019-08-23 NOTE — PROGRESS NOTES
CF Annual Nutrition Assessment    Reason for Assessment  Assessed during [facility] r/t increased nutrition risk with diagnosis of CF per protocol    Nutrition Significant PMH  Mild Lung Disease   Pancreatic Insufficient   Glucose Intolerance    Social Assessment  Living situation: Living near [facility] in [facility] with 3 roommates.   Work/School/Disability: student at [employer]  Food Insecurity:  None    Anthropometric Assessment  Height: 175.3 cm  IBW based on BMI 22 for females and 23 for males per CF Foundation recs: 71 kg / 156#  Today's Weight: 76.7 kg (actual weight)   %IBW: 108%    Body mass index is 24.96 kg/m .   Current weight is considered: Normal BMI and at CF goal.    Weight: trending upward    Wt Readings from Last 10 Encounters:   19 78.8 kg (173 lb 11.6 oz)   19 76.7 kg (169 lb 1.5 oz)   19 77 kg (169 lb 12.1 oz)   19 76.7 kg (169 lb 1.5 oz)   19 76.1 kg (167 lb 12.8 oz)   19 77.2 kg (170 lb 3.1 oz)   19 79.7 kg (175 lb 12.8 oz)   19 79.7 kg (175 lb 12.8 oz)   18 76.3 kg (168 lb 3.4 oz)   18 75.2 kg (165 lb 12.6 oz)     Physical Activity/Exercise: not addressed this visit    MALNUTRITION  % Intake:  No decreased intake noted  % Weight Loss:  None noted  Subcutaneous Fat Loss:  None observed  Muscle Loss:  None observed  Handgrip Strength:  Not Applicable  Fluid Accumulation/Edema:  None noted  Malnutrition Diagnosis: Patient does not meet two of the above criteria necessary for diagnosing malnutrition in the context of CF.     Pancreatic Enzymes  Brand:  Creon [id number]  Dosin with meals, 2-3 with snacks = 2340 units lipase/kg/meal  Estimated Daily Amount: 20-25 caps per day= 11,690 units lipase/kg/day     Are you taking enzymes as recommended:Yes   Signs of Malabsorption:  No  Enzyme Program:  None    Diet History and Assessment  Diet Preferences/Allergies/Intolerances: Following a high calorie/protein diet. KNFA.   Appetite Stimulant  Rx:  No  Intake Recall/Comments:  Demario is eating very well and has a great appetite. Denies declines in PO. Eating mostly lunch and dinner. Eats lunch in the school cafe (sandwiches, pastas) otherwise will cook meat + eggs. Will eat dinner at home of similar cuisine.     Calcium: Drinking 1 gallon every 3 days. Eating yogurt and cheese.   Salt: Adding salt to foods. Also eating salty fast food.   Hydration: Adequate per patient report.     Supplements: [place] instant breakfast on occasion (typically on days working) buying this at local [employer].     Tube Feeding: None    Estimated Energy and Protein Needs  Estimation based on weight maintenance with Mild lung disease and pancreatic insufficient.     BEE: 1845 kcal/day   4995-6490 kcals/day = ~35-45 kcal/kg (BEE x 1.5-1.75%)   91- 114 g protein/day = 1.2-1.5 g/kg    Laboratory Assessment    Vitamin A   Date Value Ref Range Status   02/22/2019 0.48 0.30 - 1.20 mg/L Final     Vitamin D Deficiency screening   Date Value Ref Range Status   02/22/2019 29 20 - 75 ug/L Final     Comment:     Season, race, dietary intake, and treatment affect the concentration of   25-hydroxy-Vitamin D. Values may decrease during winter months and increase   during summer months. Values 20-29 ug/L may indicate Vitamin D insufficiency   and values <20 ug/L may indicate Vitamin D deficiency.  Vitamin D determination is routinely performed by an immunoassay specific for   25 hydroxyvitamin D3.  If an individual is on vitamin D2 (ergocalciferol)   supplementation, please specify 25 OH vitamin D2 and D3 level determination by   LCMSMS test VITD23.       Vitamin E   Date Value Ref Range Status   02/22/2019 5.6 5.5 - 18.0 mg/L Final     Comment:     (Note)  Test developed and characteristics determined by [facility]. See Compliance Statement B: [email or web address]       Iron   Date Value Ref Range Status   02/22/2019 29 (L) 35 - 180 ug/dL Final     Cholesterol   Date Value Ref Range Status    02/22/2019 94 <200 mg/dL Final     Triglycerides   Date Value Ref Range Status   02/22/2019 130 <150 mg/dL Final     HDL Cholesterol   Date Value Ref Range Status   02/22/2019 29 (L) >39 mg/dL Final     LDL Cholesterol Calculated   Date Value Ref Range Status   02/22/2019 40 <100 mg/dL Final     Comment:     Desirable:       <100 mg/dl     VLDL-Cholesterol   Date Value Ref Range Status   05/02/2013 12 0 - 30 mg/dL Final     Cholesterol/HDL Ratio   Date Value Ref Range Status   05/02/2013 2.5 0.0 - 5.0 Final     DEXA- 2018, lowest z-score -0.8    Current Vitamin/Mineral Prescription: MVW Complete D3,000  Comments: Recommended to restart CF MVI April 2019     FOLLOW-UP FROM RECENT VISITS  Annual studies addressed by RD 4/8/19    NUTRITION DIAGNOSIS  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 Encouraged ongoing good PO with adequate protein/calories/salt/hydration.   Due for annual study labs February  - will make adjustments to regimen as needed.     Patient Understanding: Good  Expected Compliance: Good  Follow-Up Plans: Per protocol     GOALS:  1. Po intakes >/= 75% assessed nutrition needs  2. Maintain BMI >/= 23 kg/m2   3. WNL vitamin levels     FOLLOW-UP/MONITORING:  Visit patient within 12 month(s)    Time Spent In Face-to-Face Patient Interactions: 15 minutes    Sherie Aparicio RD, LD  Cystic Fibrosis/Lung Transplant Dietitian  Pager [phone]  On weekends/holidays contact coverage RD at [phone] (inpatient use only)

## 2019-08-23 NOTE — PROGRESS NOTES
Reason for Visit  Demario Abbasi is a 24 year old year old male who is being seen for RECHECK (Cystic Fibrosis 3 month follow up )      Assessment and plan:   Demario Abbasi is a 24 year old male with cystic fibrosis, pancreatic insufficiency, depression, acne and impaired glucose tolerance who is s/p RUL resection for recurrent exacerbations in 2009.  The patient was hospitalized March 14-18 for a pulmonary exacerbation and discharged on IV antibiotics.    Pulmonary: Patient appears to be doing well from a pulmonary standpoint.  He has excellent exercise tolerance.  He is oxygenating well.  PFTs show a moderate restrictive ventilatory defect which is largely a function of his previous lobectomy.  PFTs are similar to his baseline.  He does not appear to be having an exacerbation at this time.  He will continue his current airway clearance therapy, azithromycin and every other month COBY.    URI: Patient reports a head cold.  Is unclear if this is a viral infection or seasonal allergies.  He will be monitored symptomatically and if there is a progression in symptoms such as chest involvement or sinus pain, antibiotics will be considered.    Pancreatic insufficiency: The patient denies symptoms of malabsorption.  His weight is stable and in an adequate range.  He will continue his current vitamins and enzymes.      Eye injury: The patient was struck in the eye with a tree branch yesterday while working on his deer stand.  The eye exam is concerning for a small foreign body.  Urgent referral to ophthalmology will be arranged.    Iron deficiency: The patient will continue his iron replacement.  Hemoglobin will be rechecked with annual studies in February.    Glucose intolerance: Continue annual glucose tolerance testing    Depression/anxiety: Adequately controlled with currently Celexa.    Reflux: Adequately controlled with pantoprazole.    Healthcare maintenance The patient is up-to-date with annual studies.   They will be due again in February    Patient will be seen in follow-up in 3 months with PFTs and sputum culture.  Urgent ophthalmology referral was also arranged to assess for possible foreign body in the right eye.        CF HPI  The patient was seen and examined by Zana Lilly MD   Demario Abbasi is a 24 year old male with cystic fibrosis, pancreatic insufficiency, depression, acne and impaired glucose tolerance who is s/p RUL resection for recurrent exacerbations in 2009.  The patient was hospitalized March 14-18 for a pulmonary exacerbation and discharged on IV antibiotics.  The patient reports a slight head cold currently with postnasal drip.  He denies sinus pain, ear pain, sore throat or rhinorrhea.  No recent fever, chills or night sweats.  He has been spending quite a bit of time outdoors and also notes that the ragweed in his area is very active and symptoms may represent allergies.  Breathing is comfortable at rest and with all activity.  He is active at work and also walks, bikes and hikes regularly. He reports his cough is at baseline.  Sputum ranges from white to green to brown but no recent change in the color or volume.  He denies chest pain.  He denies hemoptysis.  He is doing vest therapy twice daily for 30 minutes.    Review of systems:  Appetite is excellent.  The patient reports being struck in the eye with a tree branch yesterday while working on his deer stand.  He denies eye pain or visual changes but does note that the eye is red.  No nausea, vomiting, diarrhea or abdominal pain.  Anxiety/depression adequately controlled with current medication.  A complete ROS was otherwise negative except as noted in the HPI.    Current Outpatient Medications   Medication     acetaminophen (TYLENOL) 325 MG tablet     albuterol (PROAIR HFA) 108 (90 Base) MCG/ACT inhaler     albuterol (PROVENTIL) (2.5 MG/3ML) 0.083% neb solution     amylase-lipase-protease (CREON 36) 10943 units CPEP      amylase-lipase-protease (CREON 36) 35068 units CPEP     azithromycin (ZITHROMAX) 500 MG tablet     citalopram (CELEXA) 20 MG tablet     ferrous fumarate 65 mg, [place]. FE,-Vitamin C 125 mg (VITRON C)  MG TABS tablet     fexofenadine (ALLEGRA) 180 MG tablet     fluconazole (DIFLUCAN) 150 MG tablet     fluticasone (FLONASE) 50 MCG/ACT nasal spray     mvw SOFTGELS  capsule     pantoprazole (PROTONIX) 20 MG EC tablet     PULMOZYME 1 MG/ML neb solution     sodium chloride inhalant 7 % NEBU neb solution     tobramycin (BETHKIS) 300 MG/4ML nebulizer solution     No current facility-administered medications for this visit.      Allergies   Allergen Reactions     Augmentin      Past Medical History:   Diagnosis Date     CF (cystic fibrosis) (H)     Genotype: [id number]/621+1g>t     Impaired glucose tolerance      Pancreatic insufficiency      S/P lobectomy of lung 8/4/2015    Right upper lobectomy - 2009       Past Surgical History:   Procedure Laterality Date     HC LAP,INGUINAL HERNIA REPR,INITIAL  2002     IR PICC PLACEMENT > 5 YRS OF AGE  3/18/2019     LOBECTOMY LUNG Right 2009    Right upper lobe       Social History     Socioeconomic History     Marital status: Single     Spouse name: Not on file     Number of children: Not on file     Years of education: Not on file     Highest education level: Not on file   Occupational History     Occupation:    Social Needs     Financial resource strain: Not on file     Food insecurity:     Worry: Not on file     Inability: Not on file     Transportation needs:     Medical: Not on file     Non-medical: Not on file   Tobacco Use     Smoking status: Passive Smoke Exposure - Never Smoker     Smokeless tobacco: Current User     Types: Chew     Tobacco comment: dad smokes   Substance and Sexual Activity     Alcohol use: Yes     Comment: <2 drinks per week     Drug use: Not on file     Sexual activity: Not on file   Lifestyle     Physical activity:     Days per week:  "Not on file     Minutes per session: Not on file     Stress: Not on file   Relationships     Social connections:     Talks on phone: Not on file     Gets together: Not on file     Attends Spiritism service: Not on file     Active member of club or organization: Not on file     Attends meetings of clubs or organizations: Not on file     Relationship status: Not on file     Intimate partner violence:     Fear of current or ex partner: Not on file     Emotionally abused: Not on file     Physically abused: Not on file     Forced sexual activity: Not on file   Other Topics Concern     Parent/sibling w/ CABG, MI or angioplasty before 65F 55M? 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 2 dogs.          /71 (BP Location: Right arm, Patient Position: Chair, Cuff Size: Adult Regular)   Pulse 70   Temp 97.4  F (36.3  C) (Oral)   Resp 18   Ht 1.753 m (5' 9.02\")   Wt 76.7 kg (169 lb 1.5 oz)   SpO2 96%   BMI 24.96 kg/m    Body mass index is 24.96 kg/m .  Exam:   GENERAL APPEARANCE: Well developed, well nourished, alert, and in no apparent distress.  EYES: PERRL, EOMI. Right eye conjunctival hyperemia with punctate raised area at medial portion of iris  HENT: Nasal mucosa with edema and no hyperemia. No nasal polyps.  EARS: Canals clear, TMs normal  MOUTH: Oral mucosa is moist, without any lesions, no tonsillar enlargement, no oropharyngeal exudate.  NECK: supple, no masses, no thyromegaly.  LYMPHATICS: No significant axillary, cervical, or supraclavicular nodes.  RESP: normal percussion, good air flow throughout.  No crackles. No rhonchi. No wheezes.  CV: Normal S1, S2, regular rhythm, normal rate. No murmur.  No rub. No gallop. No LE edema.   ABDOMEN:  Bowel sounds normal, soft, nontender, no HSM or masses.   MS: extremities normal. No clubbing. No cyanosis.  SKIN: no rash on limited exam  NEURO: " Mentation intact, speech normal, normal strength and tone, normal gait and stance  PSYCH: mentation appears normal. and affect normal/bright  Results:  Recent Results (from the past 168 hour(s))   General PFT Lab (Please always keep checked)    Collection Time: 08/23/19  9:36 AM   Result Value Ref Range    FVC-Pred 5.59 L    FVC-Pre 3.57 L    FVC-%Pred-Pre 63 %    FEV1-Pre 2.85 L    FEV1-%Pred-Pre 60 %    FEV1FVC-Pred 85 %    FEV1FVC-Pre 80 %    FEFMax-Pred 10.20 L/sec    FEFMax-Pre 8.72 L/sec    FEFMax-%Pred-Pre 85 %    FEF2575-Pred 5.04 L/sec    FEF2575-Pre 2.65 L/sec    BIF9771-%Pred-Pre 52 %    ExpTime-Pre 7.49 sec    FIFMax-Pre 3.95 L/sec    FEV1FEV6-Pred 84 %    FEV1FEV6-Pre 80 %                   Results as noted above.    PFT Interpretation:  Moderate restrictive ventilatory defect.  Unchanged from previous.   Similar to recent best.  Valid Maneuver             CF Exacerbation  Absent

## 2019-08-23 NOTE — PROGRESS NOTES
Respiratory Therapist Note:    Vest    Brand: Hill-Rom - traditional Hill Rom: Frequencies 8, 9, 10 at pressure 10 then frequencies 18, 19, 20 at pressure 6. and Frequencies: 13-16, intensity 8-10 and Hill-Rom - [place]      Cough Pause: Cough Pause; Yes   Vest Garment Size: Adult Medium   Last Fitting Date: due as needed   Frequency of therapy: 14 times per week. Uses [employer] for travel and camping (several times per month)   Concerns: none, great job    Exercise (purposeful and aerobic for >20 minutes each session): NO.   Does this qualify as additional airway clearance: No    Alternative Airway Clearance: Percussor (broken not using for several years)      Nebulized Medications   Bronchodilators: Albuterol   Mucolytic: Pulmozyme, & 7 % Hypertonic saline (only with illness)   Antibiotics: COBY (bethkis)   Additional Inhaled Medications: MDI   Spacer Use: no, needs spacer, I ordered carrie vortex through [facility] at [facility].     Review Cleaning: Yes. Soap - this is incorrect you must sterilize! Patient has  in apartment. Please use  on sanitize or highest heat setting to sterilize cups. Cleaning form given.    Education and Transition Information   Correct order of inhaled medications: Yes   Mechanism of Action of inhaled medications: Yes   Frequency of inhaled medications: Yes   Dosage of inhaled medications: Yes   Other: reviewed need to clean cups and use a spacer with MDI.    Home Care:   Nebulizer Cups (Brand/Type): Carrie and disposable.    Nebulizer Compressor    Year Purchased: vios pro- working well uknown year.     [facility], Phone: [phone], Fax: [phone]   Nebulizer Supply Company:     [facility], Phone: [phone], Fax: [phone]        Plan of Care and Goals for next visit: Clean your neb cups! Use MDI w/ spacer for shortness of breath when unable to do extra therapies. You are doing an awesome job working hard at airway clearance, keep being  awesome!

## 2019-08-23 NOTE — PROGRESS NOTES
Clinical Pharmacy Consult:                                                    Demario Abbasi is a 24 year old male coming in for a clinical pharmacist consult.  He was referred to me from Dr. Lilly.     Reason for Consult: Medication reconciliation and renewals    Discussion: Met with Demario and his parents today - medication reconciliation performed.  Demario has had no changes to his medications since last visit, he denies any additional over the counter medications.  He is using MVW D3,000 for his vitamin and has stopped the additional vitamin D.  Today he needs refills on his albuterol inhaler - he has two inhalers but they are both .  Will send to [facility].  He is also in need of refills on hypertonic saline as well.     Plan:  1. Keep up the good work - stop by [facility] to  your albuterol inhalers.  If insurance limits you to one inhaler just fill one today then order a refill in around 3 weeks to have two inhalers on hand.   2. Enjoy the [employer] game!      Ansley Santo PharmD  CF Clinic Pharmacist  Phone: [phone]  E-mail: [email or web address]

## 2019-08-24 ASSESSMENT — ANXIETY QUESTIONNAIRES: GAD7 TOTAL SCORE: 2

## 2019-08-29 LAB
BACTERIA SPEC CULT: ABNORMAL
SPECIMEN SOURCE: ABNORMAL

## 2019-09-21 DIAGNOSIS — E84.9 CYSTIC FIBROSIS EXACERBATION (H): ICD-10-CM

## 2019-09-21 DIAGNOSIS — E84.9 CF (CYSTIC FIBROSIS) (H): ICD-10-CM

## 2019-09-23 DIAGNOSIS — K86.89 PANCREATIC INSUFFICIENCY: ICD-10-CM

## 2019-09-23 DIAGNOSIS — E84.9 CYSTIC FIBROSIS (H): ICD-10-CM

## 2019-09-23 RX ORDER — PEDIATRIC MULTIVIT 61/D3/VIT K 1500-800
CAPSULE ORAL
Qty: 60 CAPSULE | Refills: 3 | Status: SHIPPED | OUTPATIENT
Start: 2019-09-23 | End: 2020-03-05

## 2019-09-23 RX ORDER — PANCRELIPASE 36000; 180000; 114000 [USP'U]/1; [USP'U]/1; [USP'U]/1
CAPSULE, DELAYED RELEASE PELLETS ORAL
Qty: 720 EACH | Refills: 11 | Status: SHIPPED | OUTPATIENT
Start: 2019-09-23 | End: 2020-03-05

## 2019-09-23 RX ORDER — FEXOFENADINE HCL 180 MG/1
TABLET ORAL
Qty: 30 TABLET | Refills: 3 | Status: SHIPPED | OUTPATIENT
Start: 2019-09-23 | End: 2020-02-12

## 2019-10-04 ENCOUNTER — TELEPHONE (OUTPATIENT)
Dept: PULMONOLOGY | Facility: CLINIC | Age: 24
End: 2019-10-04

## 2019-10-04 ENCOUNTER — HEALTH MAINTENANCE LETTER (OUTPATIENT)
Age: 24
End: 2019-10-04

## 2019-10-04 DIAGNOSIS — E84.0 CYSTIC FIBROSIS WITH PULMONARY MANIFESTATIONS (H): Primary | ICD-10-CM

## 2019-10-04 RX ORDER — DOXYCYCLINE 100 MG/1
100 CAPSULE ORAL 2 TIMES DAILY
Qty: 42 CAPSULE | Refills: 0 | Status: SHIPPED | OUTPATIENT
Start: 2019-10-04 | End: 2019-11-08

## 2019-10-04 RX ORDER — SULFAMETHOXAZOLE/TRIMETHOPRIM 800-160 MG
1 TABLET ORAL 3 TIMES DAILY
Qty: 63 TABLET | Refills: 0 | Status: SHIPPED | OUTPATIENT
Start: 2019-10-04 | End: 2019-11-08

## 2019-10-04 NOTE — TELEPHONE ENCOUNTER
[facility]  October 4, 2019    Ssuan Li    Cystic fibrosis Provider: Zana Lilly MD    Caller: Patient     Clinical information:  Demario Abbasi called with complaint of dealing with chest congestion/cold symptoms for 1 1 /2 weeks. No fevers. Has a runny nose. Vesting twice a day. Started COBY cycle yesterday.    Plan:   Discussed with Dr Lilly:  May have a three week course of Doxycycline and Bactrim.  Increase vesting to 3xday.  Call back with any new or worsening symptoms/concerns.    Caller verbalized understanding of plan and agrees with advice given.

## 2019-10-14 DIAGNOSIS — E84.0 CYSTIC FIBROSIS WITH PULMONARY EXACERBATION (H): ICD-10-CM

## 2019-10-14 DIAGNOSIS — E84.9 CF (CYSTIC FIBROSIS) (H): ICD-10-CM

## 2019-10-14 RX ORDER — FLUTICASONE PROPIONATE 50 MCG
SPRAY, SUSPENSION (ML) NASAL
Qty: 16 ML | Refills: 3 | Status: SHIPPED | OUTPATIENT
Start: 2019-10-14 | End: 2020-03-05

## 2019-10-14 RX ORDER — PANTOPRAZOLE SODIUM 20 MG/1
20 TABLET, DELAYED RELEASE ORAL DAILY
Qty: 30 TABLET | Refills: 3 | Status: SHIPPED | OUTPATIENT
Start: 2019-10-14 | End: 2020-03-05

## 2019-10-27 ENCOUNTER — TELEPHONE (OUTPATIENT)
Dept: PULMONOLOGY | Facility: CLINIC | Age: 24
End: 2019-10-27

## 2019-10-27 NOTE — TELEPHONE ENCOUNTER
Interventional Pulmonology Telephone Encounter:  I received a call from Demario Abbasi on 10/27/2019 at 12:52 AM. On the tail end of outpatient PO antibiotics. Has CF and follows with Dr. Lilly. Informed me that he coughed up 1 teaspoon of bright red blood this evening, and scant/intermittent hemoptysis earlier in the day. Breathing is otherwise at baseline, energy is reasonable and he does not feel that he is having an exacerbation or requiring inpatient admission. Not using hypertonics but is using vest therapy. I asked him to hold off on percussive therapy on Sunday and I would send this message to Dr. Lilly for review. He was asked to call me back if volume/frequency of hemoptysis increasing, or to go to the ED if becoming more voluminous. He has never required embolization in the past but has had numerous prior episodes of intermittent hemoptysis historically.        Thang Rodriguez MD, 10/27/2019, 12:52 AM  Department of Pulmonary, Allergy, Critical Care & Sleep Medicine   Pager: [phone]

## 2019-10-30 ENCOUNTER — TELEPHONE (OUTPATIENT)
Dept: PULMONOLOGY | Facility: CLINIC | Age: 24
End: 2019-10-30

## 2019-10-30 DIAGNOSIS — E84.9 CF (CYSTIC FIBROSIS) (H): Primary | ICD-10-CM

## 2019-10-30 RX ORDER — PHYTONADIONE 5 MG/1
5 TABLET ORAL DAILY
Qty: 5 TABLET | Refills: 0 | Status: SHIPPED | OUTPATIENT
Start: 2019-10-30 | End: 2019-11-08

## 2019-10-30 RX ORDER — DOXYCYCLINE 100 MG/1
100 CAPSULE ORAL 2 TIMES DAILY
Qty: 42 CAPSULE | Refills: 0 | Status: SHIPPED | OUTPATIENT
Start: 2019-10-30 | End: 2019-12-26

## 2019-10-30 RX ORDER — SULFAMETHOXAZOLE/TRIMETHOPRIM 800-160 MG
2 TABLET ORAL 2 TIMES DAILY
Qty: 84 TABLET | Refills: 0 | Status: SHIPPED | OUTPATIENT
Start: 2019-10-30 | End: 2019-12-26

## 2019-10-30 NOTE — TELEPHONE ENCOUNTER
[facility]  October 30, 2019    Susan Li    Cystic fibrosis Provider: Zana Lilly MD    Caller: Patient     Clinical information:  Demario Abbasi called to report that he coughed up about a teaspoon of blood on Saturday. Having occasional streaks of blood in his sputum. Feeling a little more short of breath. Recently completed a course of Bactrim and Doxycycline. Vesting twice a day.    Plan:   Discussed with Dr Lilly:  Repeat course of Bactrim and Doxycycline x3 weeks.  Vitamin K 5 mg daily x5 days.  Increase therapy to 3xday.  Call back with any new or worsening symptoms/concerns.    Caller verbalized understanding of plan and agrees with advice given.

## 2019-11-08 ENCOUNTER — OFFICE VISIT (OUTPATIENT)
Dept: PULMONOLOGY | Facility: CLINIC | Age: 24
End: 2019-11-08
Attending: INTERNAL MEDICINE
Payer: COMMERCIAL

## 2019-11-08 VITALS
TEMPERATURE: 97.8 F | HEIGHT: 69 IN | DIASTOLIC BLOOD PRESSURE: 69 MMHG | RESPIRATION RATE: 16 BRPM | BODY MASS INDEX: 25.73 KG/M2 | HEART RATE: 90 BPM | OXYGEN SATURATION: 98 % | SYSTOLIC BLOOD PRESSURE: 114 MMHG | WEIGHT: 173.72 LBS

## 2019-11-08 DIAGNOSIS — Z90.2 S/P LOBECTOMY OF LUNG: ICD-10-CM

## 2019-11-08 DIAGNOSIS — E84.9 CYSTIC FIBROSIS (H): Primary | ICD-10-CM

## 2019-11-08 DIAGNOSIS — E84.0 CYSTIC FIBROSIS WITH PULMONARY MANIFESTATIONS (H): Primary | ICD-10-CM

## 2019-11-08 DIAGNOSIS — R73.02 IMPAIRED GLUCOSE TOLERANCE: ICD-10-CM

## 2019-11-08 DIAGNOSIS — K86.89 PANCREATIC INSUFFICIENCY: ICD-10-CM

## 2019-11-08 LAB
EXPTIME-PRE: 7.77 SEC
FEF2575-%PRED-PRE: 49 %
FEF2575-PRE: 2.48 L/SEC
FEF2575-PRED: 5.04 L/SEC
FEFMAX-%PRED-PRE: 75 %
FEFMAX-PRE: 7.73 L/SEC
FEFMAX-PRED: 10.2 L/SEC
FEV1-%PRED-PRE: 57 %
FEV1-PRE: 2.69 L
FEV1FEV6-PRE: 79 %
FEV1FEV6-PRED: 84 %
FEV1FVC-PRE: 79 %
FEV1FVC-PRED: 85 %
FIFMAX-PRE: 4.3 L/SEC
FVC-%PRED-PRE: 60 %
FVC-PRE: 3.39 L
FVC-PRED: 5.59 L

## 2019-11-08 PROCEDURE — 87077 CULTURE AEROBIC IDENTIFY: CPT | Performed by: INTERNAL MEDICINE

## 2019-11-08 PROCEDURE — 87186 SC STD MICRODIL/AGAR DIL: CPT | Performed by: INTERNAL MEDICINE

## 2019-11-08 PROCEDURE — 87070 CULTURE OTHR SPECIMN AEROBIC: CPT | Performed by: INTERNAL MEDICINE

## 2019-11-08 ASSESSMENT — PAIN SCALES - GENERAL: PAINLEVEL: NO PAIN (0)

## 2019-11-08 ASSESSMENT — MIFFLIN-ST. JEOR: SCORE: 1778.38

## 2019-11-08 NOTE — LETTER
11/8/2019       RE: Demario Abbasi  [address]  [place] [address]     Dear Colleague,    Thank you for referring your patient, Demario Abbasi, to the [facility]G [facility] at [facility]. Please see a copy of my visit note below.    Reason for Visit  Demario Abbasi is a 24 year old year old male who is being seen for No chief complaint on file.      Assessment and plan:   Demario Abbasi is a 24 year old male with cystic fibrosis, pancreatic insufficiency, depression and impaired glucose tolerance who is s/p RUL resection for recurrent exacerbations in 2009.    Pulmonary: The patient required antibiotics in early October for a mild pulmonary exacerbation.  Antibiotics were reinitiated in late October after an episode of hemoptysis.  This likely represented another mild pulmonary exacerbation but may have been precipitated by prolonged exposure to cold air.  Respiratory symptoms are nearly back to baseline.  PFTs are slightly below his recent best.  It appears that he has a resolving exacerbation.  He will continue his current Bactrim and doxycycline until he completes the course.  He was encouraged to do 3 vest therapies per day whenever possible.  He will otherwise continue his current airway clearance therapy, azithromycin and every other month COBY.    Pancreatic insufficiency: Patient denies symptoms of malabsorption.  Weight is stable and in an adequate range.  He will continue with his current vitamins and enzymes.    Depression: Adequately controlled with current Celexa.    Iron deficiency: Continue iron replacement.  Hemoglobin will be rechecked with annual studies early next year.    Glucose intolerance: Continue annual glucose tolerance testing.    Reflux: Adequately controlled with current pantoprazole.    Healthcare maintenance: The patient received an influenza vaccine in mid October.  Annual studies will be due early  next year.    CFTR Modulation: The patient is unfortunately not a candidate for any of the currently available modulators.  We did discuss ongoing research for nonsense mutations.    I will see the patient in follow-up early next year with annual studies.    CF HPI  The patient was seen and examined by Zana Lilly MD   Demario Abbasi is a 24 year old male with cystic fibrosis, pancreatic insufficiency, depression and impaired glucose tolerance who is s/p RUL resection for recurrent exacerbations in 2009.  In early October, the patient was treated with a 3-week course of doxycycline and Bactrim as well as increased vest therapy after calling the CF office with increased chest congestion and rhinorrhea.  Symptoms resolved.  In late October he called again with an episode of hemoptysis.  This occurred after spending the day out in the cold.  In retrospect he might of had some blood streaks in his sputum for a couple of days before.  Doxycycline and Bactrim were noted.  The patient's received a 5-day course of vitamin K and vest therapy was increased.  Today, breathing is comfortable at rest.  He reports dyspnea only with heavy exertion, near baseline.  Cough frequency is at baseline.  Sputum is green-brown, baseline color and volume.  He denies hemoptysis since the episode in late October.  No chest pain.  He is doing vest therapy 2-3 times daily.  No recent fever, chills or night sweats.    Review of systems:  Appetite is good  No ear pain, sore throat, sinus pain or rhinorrhea.  No nausea, vomiting, diarrhea or abdominal pain.  Depression adequately controlled with current medication.  A complete ROS was otherwise negative except as noted in the HPI.    Current Outpatient Medications   Medication     acetaminophen (TYLENOL) 325 MG tablet     albuterol (PROAIR HFA) 108 (90 Base) MCG/ACT inhaler     albuterol (PROVENTIL) (2.5 MG/3ML) 0.083% neb solution     azithromycin (ZITHROMAX) 500 MG tablet      citalopram (CELEXA) 20 MG tablet     CREON 65624 units CPEP per EC capsule     ferrous fumarate 65 mg, [place]. FE,-Vitamin C 125 mg (VITRON C)  MG TABS tablet     fexofenadine (ALLEGRA) 180 MG tablet     fluticasone (FLONASE) 50 MCG/ACT nasal spray     mvw complete formulation (SOFTGELS ) capsule     pantoprazole (PROTONIX) 20 MG EC tablet     PULMOZYME 1 MG/ML neb solution     sodium chloride inhalant 7 % NEBU neb solution     tobramycin (BETHKIS) 300 MG/4ML nebulizer solution     doxycycline hyclate (VIBRAMYCIN) 100 MG capsule     fluconazole (DIFLUCAN) 150 MG tablet     sulfamethoxazole-trimethoprim (BACTRIM DS/SEPTRA DS) 800-160 MG tablet     No current facility-administered medications for this visit.      Allergies   Allergen Reactions     Augmentin      Past Medical History:   Diagnosis Date     CF (cystic fibrosis) (H)     Genotype: g542x/621+1g>t     Impaired glucose tolerance      Pancreatic insufficiency      S/P lobectomy of lung 8/4/2015    Right upper lobectomy - 2009       Past Surgical History:   Procedure Laterality Date     HC LAP,INGUINAL HERNIA REPR,INITIAL  2002     IR PICC PLACEMENT > 5 YRS OF AGE  3/18/2019     LOBECTOMY LUNG Right 2009    Right upper lobe       Social History     Socioeconomic History     Marital status: Single     Spouse name: Not on file     Number of children: Not on file     Years of education: Not on file     Highest education level: Not on file   Occupational History     Occupation:    Social Needs     Financial resource strain: Not on file     Food insecurity:     Worry: Not on file     Inability: Not on file     Transportation needs:     Medical: Not on file     Non-medical: Not on file   Tobacco Use     Smoking status: Passive Smoke Exposure - Never Smoker     Smokeless tobacco: Current User     Types: Chew     Tobacco comment: dad smokes   Substance and Sexual Activity     Alcohol use: Yes     Comment: <2 drinks per week     Drug use: Not on  "file     Sexual activity: Not on file   Lifestyle     Physical activity:     Days per week: Not on file     Minutes per session: Not on file     Stress: Not on file   Relationships     Social connections:     Talks on phone: Not on file     Gets together: Not on file     Attends Buddhism service: Not on file     Active member of club or organization: Not on file     Attends meetings of clubs or organizations: Not on file     Relationship status: Not on file     Intimate partner violence:     Fear of current or ex partner: Not on file     Emotionally abused: Not on file     Physically abused: Not on file     Forced sexual activity: Not on file   Other Topics Concern     Parent/sibling w/ CABG, MI or angioplasty before 65F 55M? 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 2 dogs.          /69   Pulse 90   Temp 97.8  F (36.6  C) (Oral)   Resp 16   Ht 1.753 m (5' 9\")   Wt 78.8 kg (173 lb 11.6 oz)   SpO2 98%   BMI 25.65 kg/m     Body mass index is 25.65 kg/m .  Exam:   GENERAL APPEARANCE: Well developed, well nourished, alert, and in no apparent distress.  EYES: PERRL, EOMI  HENT: Nasal mucosa with edema and hyperemia. No nasal polyps.  EARS: Canals clear, TMs normal  MOUTH: Oral mucosa is moist, without any lesions, no tonsillar enlargement, no oropharyngeal exudate.  NECK: supple, no masses, no thyromegaly.  LYMPHATICS: No significant axillary, cervical, or supraclavicular nodes.  RESP: normal percussion, good air flow throughout.  Right basilar crackles. No rhonchi. No wheezes.  CV: Normal S1, S2, regular rhythm, normal rate. No murmur.  No rub. No gallop. No LE edema.   ABDOMEN:  Bowel sounds normal, soft, nontender, no HSM or masses.   MS: extremities normal. No cyanosis.  SKIN: no rash on limited exam  NEURO: Mentation intact, speech normal, normal strength and tone, normal " gait and stance  PSYCH: mentation appears normal. and affect normal/bright  Results:  Recent Results (from the past 168 hour(s))   General PFT Lab (Please always keep checked)    Collection Time: 11/08/19  9:48 AM   Result Value Ref Range    FVC-Pred 5.59 L    FVC-Pre 3.39 L    FVC-%Pred-Pre 60 %    FEV1-Pre 2.69 L    FEV1-%Pred-Pre 57 %    FEV1FVC-Pred 85 %    FEV1FVC-Pre 79 %    FEFMax-Pred 10.20 L/sec    FEFMax-Pre 7.73 L/sec    FEFMax-%Pred-Pre 75 %    FEF2575-Pred 5.04 L/sec    FEF2575-Pre 2.48 L/sec    HZN4348-%Pred-Pre 49 %    ExpTime-Pre 7.77 sec    FIFMax-Pre 4.30 L/sec    FEV1FEV6-Pred 84 %    FEV1FEV6-Pre 79 %                         Results as noted above.    PFT Interpretation:  Moderate restrictive ventilatory defect.  Decreased from previous.  Below recent best.   Valid Maneuver     CF Exacerbation  Mild Increased vest/bronchodilator/execise and Oral: non-quinolone            Again, thank you for allowing me to participate in the care of your patient.      Sincerely,    Zana Lilly MD

## 2019-11-08 NOTE — PATIENT INSTRUCTIONS
Cystic Fibrosis Self-Care Plan    RECOMMENDATIONS:   Your CF Mutations are: g542x/621+1g>t  Finish current Doxycycline and Bactrim  Vest therapy 3 times daily  Otherwise continue current medication, nebs and vest therapy.          [facility] Nurse line:  John Rodríguez    [phone]     [facility] [place] Fax Number:      [phone]         Cystic fibrosis Respiratory Therapist:   Margaret Werner              [phone]   Cystic fibrosis Dietitians:              Sherie Aparicio              [phone]                            Saba Gill                        [phone]   Cystic fibrosis Diabetes Nurse:    Ashley Cleveland   [phone]    Cystic fibrosis Social Workers:     Prerna Paniagua               [phone]                     Courtney Fenton               [phone]  Cystic fibrosis Pharmacist:           Ashley Kirby                               [phone]         Ansley Santo   [phone]  Cystic Fibrosis Genetic Counselor:   Yuliana Ghotra    [phone]    [facility] website:  [email or web address]         MRN: [id number]   Clinic Date: November 8, 2019   Patient: Demario Abbasi     Annual Studies:   IGG   Date Value Ref Range Status   02/22/2019 1,380 695 - 1,620 mg/dL Final     Insulin   Date Value Ref Range Status   02/22/2019 Canceled, Test credited 3 - 25 mU/L Final     Comment:     Unsatisfactory specimen - hemolyzed  NOTIFIED OZ PHILLIPS MD AT 1450 ON 2/22/19 BY JV       There are no preventive care reminders to display for this patient.    Pulmonary Function Tests  FEV1: amount of air you can blow out in 1 second  FVC: total amount of air you can take in and blow out    Your Goals:         PFT Latest Ref Rng & Units 11/8/2019   FVC L 3.39   FEV1 L 2.69   FVC% % 60   FEV1% % 57          Airway Clearance: The Most Important Way to Keep Your Lungs Healthy  Vest Settings:    Hill-Rom Frequencies: 8, 9, 10 Pressure 10  Then, Frequencies 18, 19, 20 Pressure 6      RespirTech: Quick Start with Pressure of     Do each frequency for 5 minutes; Deflate vest after each frequency & cough 3 times before beginning the next setting.    Vest and Neb Therapy should be done 3 times/day.    Good Nutrition Can Improve Lung Function and Overall Health     Take ALL of your vitamins with food     Take 1/2 of your enzymes before EVERY meal/snack and the other 1/2 mid-meal/snack    Wt Readings from Last 3 Encounters:   11/08/19 78.8 kg (173 lb 11.6 oz)   08/23/19 76.7 kg (169 lb 1.5 oz)   05/24/19 77 kg (169 lb 12.1 oz)       Body mass index is 25.65 kg/m .         National CF Foundation Recommendations for BMI in CF Adults: Women: at least 22 Men: at least 23        Controlling Blood Sugars Helps Prevent Lung Infections & Improves Nutrition  Test blood sugar:     In the morning before eating (goal is )     2 hours after a meal (goal is less than 150)     When pre-meal glucose is greater than 150 add correction     At bedtime (if less than 100 eat a snack with 15 grams of carbohydrates  Last A1C Results:   Hemoglobin A1C   Date Value Ref Range Status   02/22/2019 5.9 (H) 0 - 5.6 % Final     Comment:     Normal <5.7% Prediabetes 5.7-6.4%  Diabetes 6.5% or higher - adopted from ADA   consensus guidelines.           If diabetic, measure A1C every 6 months. Goal: Under 7%    Staying Healthy    Research:  If you are interested in learning about research opportunities or have questions, please contact the CF Research Team at [phone] or [email or web address].      CF Foundation:  Compass is a personalized resource service to help you with the insurance, financial, legal and other issues you are facing.  It's free, confidential and available to anyone with CF.  Ask your  for more information or contact Compass directly at 799-UGBQLLD ([phone]) or compass@cff.org, or learn more at cff.[email or web address].

## 2019-11-08 NOTE — PROGRESS NOTES
Reason for Visit  Demario Abbasi is a 24 year old year old male who is being seen for No chief complaint on file.      Assessment and plan:   Demario Abbasi is a 24 year old male with cystic fibrosis, pancreatic insufficiency, depression and impaired glucose tolerance who is s/p RUL resection for recurrent exacerbations in 2009.    Pulmonary: The patient required antibiotics in early October for a mild pulmonary exacerbation.  Antibiotics were reinitiated in late October after an episode of hemoptysis.  This likely represented another mild pulmonary exacerbation but may have been precipitated by prolonged exposure to cold air.  Respiratory symptoms are nearly back to baseline.  PFTs are slightly below his recent best.  It appears that he has a resolving exacerbation.  He will continue his current Bactrim and doxycycline until he completes the course.  He was encouraged to do 3 vest therapies per day whenever possible.  He will otherwise continue his current airway clearance therapy, azithromycin and every other month COBY.    Pancreatic insufficiency: Patient denies symptoms of malabsorption.  Weight is stable and in an adequate range.  He will continue with his current vitamins and enzymes.    Depression: Adequately controlled with current Celexa.    Iron deficiency: Continue iron replacement.  Hemoglobin will be rechecked with annual studies early next year.    Glucose intolerance: Continue annual glucose tolerance testing.    Reflux: Adequately controlled with current pantoprazole.    Healthcare maintenance: The patient received an influenza vaccine in mid October.  Annual studies will be due early next year.    CFTR Modulation: The patient is unfortunately not a candidate for any of the currently available modulators.  We did discuss ongoing research for nonsense mutations.    I will see the patient in follow-up early next year with annual studies.    CF HPI  The patient was seen and examined by Zana  Omar Lilly MD   Demario Abbasi is a 24 year old male with cystic fibrosis, pancreatic insufficiency, depression and impaired glucose tolerance who is s/p RUL resection for recurrent exacerbations in 2009.  In early October, the patient was treated with a 3-week course of doxycycline and Bactrim as well as increased vest therapy after calling the CF office with increased chest congestion and rhinorrhea.  Symptoms resolved.  In late October he called again with an episode of hemoptysis.  This occurred after spending the day out in the cold.  In retrospect he might of had some blood streaks in his sputum for a couple of days before.  Doxycycline and Bactrim were noted.  The patient's received a 5-day course of vitamin K and vest therapy was increased.  Today, breathing is comfortable at rest.  He reports dyspnea only with heavy exertion, near baseline.  Cough frequency is at baseline.  Sputum is green-brown, baseline color and volume.  He denies hemoptysis since the episode in late October.  No chest pain.  He is doing vest therapy 2-3 times daily.  No recent fever, chills or night sweats.    Review of systems:  Appetite is good  No ear pain, sore throat, sinus pain or rhinorrhea.  No nausea, vomiting, diarrhea or abdominal pain.  Depression adequately controlled with current medication.  A complete ROS was otherwise negative except as noted in the HPI.    Current Outpatient Medications   Medication     acetaminophen (TYLENOL) 325 MG tablet     albuterol (PROAIR HFA) 108 (90 Base) MCG/ACT inhaler     albuterol (PROVENTIL) (2.5 MG/3ML) 0.083% neb solution     azithromycin (ZITHROMAX) 500 MG tablet     citalopram (CELEXA) 20 MG tablet     CREON 22430 units CPEP per EC capsule     ferrous fumarate 65 mg, [place]. FE,-Vitamin C 125 mg (VITRON C)  MG TABS tablet     fexofenadine (ALLEGRA) 180 MG tablet     fluticasone (FLONASE) 50 MCG/ACT nasal spray     mvw complete formulation (SOFTGELS ) capsule      pantoprazole (PROTONIX) 20 MG EC tablet     PULMOZYME 1 MG/ML neb solution     sodium chloride inhalant 7 % NEBU neb solution     tobramycin (BETHKIS) 300 MG/4ML nebulizer solution     doxycycline hyclate (VIBRAMYCIN) 100 MG capsule     fluconazole (DIFLUCAN) 150 MG tablet     sulfamethoxazole-trimethoprim (BACTRIM DS/SEPTRA DS) 800-160 MG tablet     No current facility-administered medications for this visit.      Allergies   Allergen Reactions     Augmentin      Past Medical History:   Diagnosis Date     CF (cystic fibrosis) (H)     Genotype: [id number]/621+1g>t     Impaired glucose tolerance      Pancreatic insufficiency      S/P lobectomy of lung 8/4/2015    Right upper lobectomy - 2009       Past Surgical History:   Procedure Laterality Date     HC LAP,INGUINAL HERNIA REPR,INITIAL  2002     IR PICC PLACEMENT > 5 YRS OF AGE  3/18/2019     LOBECTOMY LUNG Right 2009    Right upper lobe       Social History     Socioeconomic History     Marital status: Single     Spouse name: Not on file     Number of children: Not on file     Years of education: Not on file     Highest education level: Not on file   Occupational History     Occupation:    Social Needs     Financial resource strain: Not on file     Food insecurity:     Worry: Not on file     Inability: Not on file     Transportation needs:     Medical: Not on file     Non-medical: Not on file   Tobacco Use     Smoking status: Passive Smoke Exposure - Never Smoker     Smokeless tobacco: Current User     Types: Chew     Tobacco comment: dad smokes   Substance and Sexual Activity     Alcohol use: Yes     Comment: <2 drinks per week     Drug use: Not on file     Sexual activity: Not on file   Lifestyle     Physical activity:     Days per week: Not on file     Minutes per session: Not on file     Stress: Not on file   Relationships     Social connections:     Talks on phone: Not on file     Gets together: Not on file     Attends Evangelical service: Not on file  "    Active member of club or organization: Not on file     Attends meetings of clubs or organizations: Not on file     Relationship status: Not on file     Intimate partner violence:     Fear of current or ex partner: Not on file     Emotionally abused: Not on file     Physically abused: Not on file     Forced sexual activity: Not on file   Other Topics Concern     Parent/sibling w/ CABG, MI or angioplasty before 65F 55M? 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 2 dogs.          /69   Pulse 90   Temp 97.8  F (36.6  C) (Oral)   Resp 16   Ht 1.753 m (5' 9\")   Wt 78.8 kg (173 lb 11.6 oz)   SpO2 98%   BMI 25.65 kg/m    Body mass index is 25.65 kg/m .  Exam:   GENERAL APPEARANCE: Well developed, well nourished, alert, and in no apparent distress.  EYES: PERRL, EOMI  HENT: Nasal mucosa with edema and hyperemia. No nasal polyps.  EARS: Canals clear, TMs normal  MOUTH: Oral mucosa is moist, without any lesions, no tonsillar enlargement, no oropharyngeal exudate.  NECK: supple, no masses, no thyromegaly.  LYMPHATICS: No significant axillary, cervical, or supraclavicular nodes.  RESP: normal percussion, good air flow throughout.  Right basilar crackles. No rhonchi. No wheezes.  CV: Normal S1, S2, regular rhythm, normal rate. No murmur.  No rub. No gallop. No LE edema.   ABDOMEN:  Bowel sounds normal, soft, nontender, no HSM or masses.   MS: extremities normal. No cyanosis.  SKIN: no rash on limited exam  NEURO: Mentation intact, speech normal, normal strength and tone, normal gait and stance  PSYCH: mentation appears normal. and affect normal/bright  Results:  Recent Results (from the past 168 hour(s))   General PFT Lab (Please always keep checked)    Collection Time: 11/08/19  9:48 AM   Result Value Ref Range    FVC-Pred 5.59 L    FVC-Pre 3.39 L    FVC-%Pred-Pre 60 %    FEV1-Pre 2.69 " L    FEV1-%Pred-Pre 57 %    FEV1FVC-Pred 85 %    FEV1FVC-Pre 79 %    FEFMax-Pred 10.20 L/sec    FEFMax-Pre 7.73 L/sec    FEFMax-%Pred-Pre 75 %    FEF2575-Pred 5.04 L/sec    FEF2575-Pre 2.48 L/sec    RBT1999-%Pred-Pre 49 %    ExpTime-Pre 7.77 sec    FIFMax-Pre 4.30 L/sec    FEV1FEV6-Pred 84 %    FEV1FEV6-Pre 79 %                         Results as noted above.    PFT Interpretation:  Moderate restrictive ventilatory defect.  Decreased from previous.  Below recent best.   Valid Maneuver     CF Exacerbation  Mild Increased vest/bronchodilator/execise and Oral: non-quinolone

## 2019-11-13 LAB
BACTERIA SPEC CULT: ABNORMAL
SPECIMEN SOURCE: ABNORMAL

## 2019-12-23 ENCOUNTER — TELEPHONE (OUTPATIENT)
Dept: PULMONOLOGY | Facility: CLINIC | Age: 24
End: 2019-12-23

## 2019-12-23 NOTE — TELEPHONE ENCOUNTER
[facility]  December 23, 2019    Susan Li    Cystic fibrosis Provider: Zana Lilly MD    Caller: Patient     Clinical information:  Demario Abbasi called with complaint of not feeling better. Completed a course of Bactrim and Doxycycline about two weeks ago. While on oral antibiotics his symptoms had resolved, however, they seemed to return right away. Increased cough. Had episode of shortness of breath the other night that was relieved with doing therapy. Vesting 2-3 xday. Has not restarted COBY yet as shipment has not come in. Reports that he has a busy spring coming up and if he needs IVs to treat his symptoms, prefers to be treated now.    Plan:   To be evaluated in clinic on Thursday, December 26th.  In the meantime, increase his vest therapy.  Call back with any new or worsening symptoms/concerns.    Caller verbalized understanding of plan and agrees with advice given.

## 2019-12-26 ENCOUNTER — OFFICE VISIT (OUTPATIENT)
Dept: PULMONOLOGY | Facility: CLINIC | Age: 24
End: 2019-12-26
Attending: INTERNAL MEDICINE
Payer: COMMERCIAL

## 2019-12-26 VITALS
SYSTOLIC BLOOD PRESSURE: 116 MMHG | BODY MASS INDEX: 25.62 KG/M2 | DIASTOLIC BLOOD PRESSURE: 69 MMHG | HEIGHT: 69 IN | HEART RATE: 72 BPM | OXYGEN SATURATION: 97 % | WEIGHT: 173 LBS

## 2019-12-26 DIAGNOSIS — E84.9 CYSTIC FIBROSIS EXACERBATION (H): Primary | ICD-10-CM

## 2019-12-26 DIAGNOSIS — E84.9 CF (CYSTIC FIBROSIS) (H): Primary | ICD-10-CM

## 2019-12-26 LAB
EXPTIME-PRE: 6.88 SEC
FEF2575-%PRED-PRE: 50 %
FEF2575-PRE: 2.55 L/SEC
FEF2575-PRED: 5.04 L/SEC
FEFMAX-%PRED-PRE: 80 %
FEFMAX-PRE: 8.25 L/SEC
FEFMAX-PRED: 10.2 L/SEC
FEV1-%PRED-PRE: 58 %
FEV1-PRE: 2.78 L
FEV1FEV6-PRE: 80 %
FEV1FEV6-PRED: 84 %
FEV1FVC-PRE: 79 %
FEV1FVC-PRED: 85 %
FIFMAX-PRE: 3.53 L/SEC
FVC-%PRED-PRE: 62 %
FVC-PRE: 3.5 L
FVC-PRED: 5.59 L

## 2019-12-26 ASSESSMENT — MIFFLIN-ST. JEOR: SCORE: 1775.1

## 2019-12-26 ASSESSMENT — PAIN SCALES - GENERAL: PAINLEVEL: NO PAIN (0)

## 2019-12-26 NOTE — PROGRESS NOTES
Reason for Visit  Demario Abbasi is a 24 year old year old male who is being seen for Follow Up (CF )      Assessment and plan:   Demario Abbasi is a 24 year old male with cystic fibrosis, pancreatic insufficiency, depression, acne and impaired glucose tolerance who is s/p RUL resection for recurrent exacerbations in 2009.  The patient was hospitalized March 14-18 for a pulmonary exacerbation but has required multiple courses of oral antibiotics since that time and more recently receiving only limited relief from oral antibiotics.    Pulmonary: The patient has required 2 courses of oral antibiotics in the last couple of months with limited relief.  Currently he reports increased shortness of breath, increased dyspnea on exertion, marked increase in cough with darker sputum which is decreased in volume but ongoing chest congestion suggesting current airway clearance has not been effective.  He is oxygenating well.  PFTs are similar to his last visit but below his recent best.  It appears the patient has a persistent subacute pulmonary exacerbation which has not responded to oral antibiotics.  He generally grows MSSA and Achromobacter.  The Achromobacter is usually resistant to all oral antibiotics.  Due to family commitments, hospital admission will be arranged for early next week.  In view of his sensitivity pattern, it appears that imipenem or meropenem will be the best option for his Achromobacter, probably combined with Bactrim for intrinsic sensitivity.  He has had multiple PICC lines and difficulty with PICC placement so he will likely require IR for PICC placement.  Vest therapy should be increased to 4 times daily.    Pancreatic insufficiency: The patient denies symptoms of malabsorption.  His weight is adequate and stable.  He will continue his current pancreatic enzymes and vitamins.    Depression: Adequately controlled with current Celexa.    Iron deficiency anemia: Continue iron replacement.   Check hemoglobin and iron studies with annual studies early next year.    Glucose intolerance: Continue annual glucose tolerance testing.    Reflux: Well-controlled with current PPI.    Healthcare maintenance: The patient received his influenza vaccine for this season.  Annual studies will be due early next year.    CFTR Modulation: Unfortunately, the patient is not a candidate for any of the available modulators.    Admission scheduled for 12/30 with IR for PICC line placement.  Discussed briefly with the inpatient CF transplant attending.    CF HPI  The patient was seen and examined by Zana Lilly MD   Demario Abbasi is a 24 year old male with cystic fibrosis, pancreatic insufficiency, depression, acne and impaired glucose tolerance who is s/p RUL resection for recurrent exacerbations in 2009.  The patient was hospitalized March 14-18 for a pulmonary exacerbation.  In the past couple of months, the patient has required 2 courses of oral antibiotics for pulmonary exacerbations.  He would have limited relief with each course and rapid recurrence of symptoms soon after antibiotics were stopped.  The patient reports mild intermittent dyspnea at rest, increased with activity.  Exercise tolerance is below baseline.  He reports marked increase in cough.  Sputum volume is decreased.  Sputum is brown with blood streaks, much darker than baseline.  He also reports feeling congested, unable to clear his sputum for the past month.  He denies chest pain.  He does report increased fatigue.  He does vest therapy 2-3 times daily for 30 minutes.  No recent fever chills or night sweats.  He did note 1 episode of increased shortness of breath overnight with associated sweats and chills that was relieved with vest therapy.  He has not had any such episodes since.    Review of systems:  Appetite is good  No ear pain, sore throat, sinus pain or rhinorrhea  No nausea, vomiting, diarrhea or abdominal pain  A complete ROS  was otherwise negative except as noted in the HPI.    Current Outpatient Medications   Medication     acetaminophen (TYLENOL) 325 MG tablet     albuterol (PROAIR HFA) 108 (90 Base) MCG/ACT inhaler     albuterol (PROVENTIL) (2.5 MG/3ML) 0.083% neb solution     azithromycin (ZITHROMAX) 500 MG tablet     citalopram (CELEXA) 20 MG tablet     CREON 89645 units CPEP per EC capsule     ferrous fumarate 65 mg, [place]. FE,-Vitamin C 125 mg (VITRON C)  MG TABS tablet     fexofenadine (ALLEGRA) 180 MG tablet     fluconazole (DIFLUCAN) 150 MG tablet     fluticasone (FLONASE) 50 MCG/ACT nasal spray     mvw complete formulation (SOFTGELS ) capsule     pantoprazole (PROTONIX) 20 MG EC tablet     PULMOZYME 1 MG/ML neb solution     sodium chloride inhalant 7 % NEBU neb solution     tobramycin (BETHKIS) 300 MG/4ML nebulizer solution     No current facility-administered medications for this visit.      Allergies   Allergen Reactions     Augmentin      Past Medical History:   Diagnosis Date     CF (cystic fibrosis) (H)     Genotype: [id number]/621+1g>t     Impaired glucose tolerance      Pancreatic insufficiency      S/P lobectomy of lung 8/4/2015    Right upper lobectomy - 2009       Past Surgical History:   Procedure Laterality Date     HC LAP,INGUINAL HERNIA REPR,INITIAL  2002     IR PICC PLACEMENT > 5 YRS OF AGE  3/18/2019     LOBECTOMY LUNG Right 2009    Right upper lobe       Social History     Socioeconomic History     Marital status: Single     Spouse name: Not on file     Number of children: Not on file     Years of education: Not on file     Highest education level: Not on file   Occupational History     Occupation:    Social Needs     Financial resource strain: Not on file     Food insecurity:     Worry: Not on file     Inability: Not on file     Transportation needs:     Medical: Not on file     Non-medical: Not on file   Tobacco Use     Smoking status: Passive Smoke Exposure - Never Smoker     Smokeless  "tobacco: Current User     Types: Chew     Tobacco comment: dad smokes   Substance and Sexual Activity     Alcohol use: Yes     Comment: <2 drinks per week     Drug use: Not on file     Sexual activity: Not on file   Lifestyle     Physical activity:     Days per week: Not on file     Minutes per session: Not on file     Stress: Not on file   Relationships     Social connections:     Talks on phone: Not on file     Gets together: Not on file     Attends Faith service: Not on file     Active member of club or organization: Not on file     Attends meetings of clubs or organizations: Not on file     Relationship status: Not on file     Intimate partner violence:     Fear of current or ex partner: Not on file     Emotionally abused: Not on file     Physically abused: Not on file     Forced sexual activity: Not on file   Other Topics Concern     Parent/sibling w/ CABG, MI or angioplasty before 65F 55M? 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 2 dogs.          /69 (BP Location: Left arm, Patient Position: Chair, Cuff Size: Adult Regular)   Pulse 72   Ht 1.753 m (5' 9\")   Wt 78.5 kg (173 lb)   SpO2 97%   BMI 25.55 kg/m    Body mass index is 25.55 kg/m .  Exam:   GENERAL APPEARANCE: Well developed, well nourished, alert, and in no apparent distress.  EYES: PERRL, EOMI  HENT: Nasal mucosa with edema and no hyperemia. No nasal polyps.  EARS: Canals clear, TMs normal  MOUTH: Oral mucosa is moist, without any lesions, no tonsillar enlargement, no oropharyngeal exudate.  NECK: supple, no masses, no thyromegaly.  LYMPHATICS: No significant axillary, cervical, or supraclavicular nodes.  RESP: normal percussion, Mildly diminished air flow on the right.  No crackles. No rhonchi. No wheezes.  CV: Normal S1, S2, regular rhythm, normal rate. No murmur.  No rub. No gallop. No LE edema.   ABDOMEN: "  Bowel sounds normal, soft, nontender, no HSM or masses.   MS: extremities normal. (+) clubbing. No cyanosis.  SKIN: no rash on limited exam  NEURO: Mentation intact, speech normal, normal strength and tone, normal gait and stance  PSYCH: mentation appears normal. and affect normal/bright  Results:  Recent Results (from the past 168 hour(s))   General PFT Lab (Please always keep checked)    Collection Time: 12/26/19  2:30 PM   Result Value Ref Range    FVC-Pred 5.59 L    FVC-Pre 3.50 L    FVC-%Pred-Pre 62 %    FEV1-Pre 2.78 L    FEV1-%Pred-Pre 58 %    FEV1FVC-Pred 85 %    FEV1FVC-Pre 79 %    FEFMax-Pred 10.20 L/sec    FEFMax-Pre 8.25 L/sec    FEFMax-%Pred-Pre 80 %    FEF2575-Pred 5.04 L/sec    FEF2575-Pre 2.55 L/sec    CMH9628-%Pred-Pre 50 %    ExpTime-Pre 6.88 sec    FIFMax-Pre 3.53 L/sec    FEV1FEV6-Pred 84 %    FEV1FEV6-Pre 80 %                   Results as noted above.    PFT Interpretation:  Moderately severe restrictive ventilatory defect.  Unchanged from previous.   Below recent best.   Valid Maneuver             CF Exacerbation  Moderate  Increased vest/bronchodilator/execise and Hosp admit/home IV (within 2 wks)

## 2019-12-26 NOTE — LETTER
12/26/2019       RE: Demario Abbasi  [address]  [place] [address]     Dear Colleague,    Thank you for referring your patient, Demario Abbasi, to the [facility] at [facility]. Please see a copy of my visit note below.    Reason for Visit  Demario Abbasi is a 24 year old year old male who is being seen for Follow Up (CF )      Assessment and plan:   Demario Abbasi is a 24 year old male with cystic fibrosis, pancreatic insufficiency, depression, acne and impaired glucose tolerance who is s/p RUL resection for recurrent exacerbations in 2009.  The patient was hospitalized March 14-18 for a pulmonary exacerbation but has required multiple courses of oral antibiotics since that time and more recently receiving only limited relief from oral antibiotics.    Pulmonary: The patient has required 2 courses of oral antibiotics in the last couple of months with limited relief.  Currently he reports increased shortness of breath, increased dyspnea on exertion, marked increase in cough with darker sputum which is decreased in volume but ongoing chest congestion suggesting current airway clearance has not been effective.  He is oxygenating well.  PFTs are similar to his last visit but below his recent best.  It appears the patient has a persistent subacute pulmonary exacerbation which has not responded to oral antibiotics.  He generally grows MSSA and Achromobacter.  The Achromobacter is usually resistant to all oral antibiotics.  Due to family commitments, hospital admission will be arranged for early next week.  In view of his sensitivity pattern, it appears that imipenem or meropenem will be the best option for his Achromobacter, probably combined with Bactrim for intrinsic sensitivity.  He has had multiple PICC lines and difficulty with PICC placement so he will likely require IR for PICC placement.  Vest therapy should be  increased to 4 times daily.    Pancreatic insufficiency: The patient denies symptoms of malabsorption.  His weight is adequate and stable.  He will continue his current pancreatic enzymes and vitamins.    Depression: Adequately controlled with current Celexa.    Iron deficiency anemia: Continue iron replacement.  Check hemoglobin and iron studies with annual studies early next year.    Glucose intolerance: Continue annual glucose tolerance testing.    Reflux: Well-controlled with current PPI.    Healthcare maintenance: The patient received his influenza vaccine for this season.  Annual studies will be due early next year.    CFTR Modulation: Unfortunately, the patient is not a candidate for any of the available modulators.    Admission scheduled for 12/30 with IR for PICC line placement.  Discussed briefly with the inpatient CF transplant attending.    CF HPI  The patient was seen and examined by Zana Lilly MD   Demario Abbasi is a 24 year old male with cystic fibrosis, pancreatic insufficiency, depression, acne and impaired glucose tolerance who is s/p RUL resection for recurrent exacerbations in 2009.  The patient was hospitalized March 14-18 for a pulmonary exacerbation.  In the past couple of months, the patient has required 2 courses of oral antibiotics for pulmonary exacerbations.  He would have limited relief with each course and rapid recurrence of symptoms soon after antibiotics were stopped.  The patient reports mild intermittent dyspnea at rest, increased with activity.  Exercise tolerance is below baseline.  He reports marked increase in cough.  Sputum volume is decreased.  Sputum is brown with blood streaks, much darker than baseline.  He also reports feeling congested, unable to clear his sputum for the past month.  He denies chest pain.  He does report increased fatigue.  He does vest therapy 2-3 times daily for 30 minutes.  No recent fever chills or night sweats.  He did note 1  episode of increased shortness of breath overnight with associated sweats and chills that was relieved with vest therapy.  He has not had any such episodes since.    Review of systems:  Appetite is good  No ear pain, sore throat, sinus pain or rhinorrhea  No nausea, vomiting, diarrhea or abdominal pain  A complete ROS was otherwise negative except as noted in the HPI.    Current Outpatient Medications   Medication     acetaminophen (TYLENOL) 325 MG tablet     albuterol (PROAIR HFA) 108 (90 Base) MCG/ACT inhaler     albuterol (PROVENTIL) (2.5 MG/3ML) 0.083% neb solution     azithromycin (ZITHROMAX) 500 MG tablet     citalopram (CELEXA) 20 MG tablet     CREON 50296 units CPEP per EC capsule     ferrous fumarate 65 mg, Habematolel. FE,-Vitamin C 125 mg (VITRON C)  MG TABS tablet     fexofenadine (ALLEGRA) 180 MG tablet     fluconazole (DIFLUCAN) 150 MG tablet     fluticasone (FLONASE) 50 MCG/ACT nasal spray     mvw complete formulation (SOFTGELS ) capsule     pantoprazole (PROTONIX) 20 MG EC tablet     PULMOZYME 1 MG/ML neb solution     sodium chloride inhalant 7 % NEBU neb solution     tobramycin (BETHKIS) 300 MG/4ML nebulizer solution     No current facility-administered medications for this visit.      Allergies   Allergen Reactions     Augmentin      Past Medical History:   Diagnosis Date     CF (cystic fibrosis) (H)     Genotype: [id number]/621+1g>t     Impaired glucose tolerance      Pancreatic insufficiency      S/P lobectomy of lung 8/4/2015    Right upper lobectomy - 2009       Past Surgical History:   Procedure Laterality Date     HC LAP,INGUINAL HERNIA REPR,INITIAL  2002     IR PICC PLACEMENT > 5 YRS OF AGE  3/18/2019     LOBECTOMY LUNG Right 2009    Right upper lobe       Social History     Socioeconomic History     Marital status: Single     Spouse name: Not on file     Number of children: Not on file     Years of education: Not on file     Highest education level: Not on file   Occupational History      "Occupation:    Social Needs     Financial resource strain: Not on file     Food insecurity:     Worry: Not on file     Inability: Not on file     Transportation needs:     Medical: Not on file     Non-medical: Not on file   Tobacco Use     Smoking status: Passive Smoke Exposure - Never Smoker     Smokeless tobacco: Current User     Types: Chew     Tobacco comment: dad smokes   Substance and Sexual Activity     Alcohol use: Yes     Comment: <2 drinks per week     Drug use: Not on file     Sexual activity: Not on file   Lifestyle     Physical activity:     Days per week: Not on file     Minutes per session: Not on file     Stress: Not on file   Relationships     Social connections:     Talks on phone: Not on file     Gets together: Not on file     Attends Mandaen service: Not on file     Active member of club or organization: Not on file     Attends meetings of clubs or organizations: Not on file     Relationship status: Not on file     Intimate partner violence:     Fear of current or ex partner: Not on file     Emotionally abused: Not on file     Physically abused: Not on file     Forced sexual activity: Not on file   Other Topics Concern     Parent/sibling w/ CABG, MI or angioplasty before 65F 55M? 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 2 dogs.          /69 (BP Location: Left arm, Patient Position: Chair, Cuff Size: Adult Regular)   Pulse 72   Ht 1.753 m (5' 9\")   Wt 78.5 kg (173 lb)   SpO2 97%   BMI 25.55 kg/m     Body mass index is 25.55 kg/m .  Exam:   GENERAL APPEARANCE: Well developed, well nourished, alert, and in no apparent distress.  EYES: PERRL, EOMI  HENT: Nasal mucosa with edema and no hyperemia. No nasal polyps.  EARS: Canals clear, TMs normal  MOUTH: Oral mucosa is moist, without any lesions, no tonsillar enlargement, no oropharyngeal " exudate.  NECK: supple, no masses, no thyromegaly.  LYMPHATICS: No significant axillary, cervical, or supraclavicular nodes.  RESP: normal percussion, Mildly diminished air flow on the right.  No crackles. No rhonchi. No wheezes.  CV: Normal S1, S2, regular rhythm, normal rate. No murmur.  No rub. No gallop. No LE edema.   ABDOMEN:  Bowel sounds normal, soft, nontender, no HSM or masses.   MS: extremities normal. (+) clubbing. No cyanosis.  SKIN: no rash on limited exam  NEURO: Mentation intact, speech normal, normal strength and tone, normal gait and stance  PSYCH: mentation appears normal. and affect normal/bright  Results:  Recent Results (from the past 168 hour(s))   General PFT Lab (Please always keep checked)    Collection Time: 12/26/19  2:30 PM   Result Value Ref Range    FVC-Pred 5.59 L    FVC-Pre 3.50 L    FVC-%Pred-Pre 62 %    FEV1-Pre 2.78 L    FEV1-%Pred-Pre 58 %    FEV1FVC-Pred 85 %    FEV1FVC-Pre 79 %    FEFMax-Pred 10.20 L/sec    FEFMax-Pre 8.25 L/sec    FEFMax-%Pred-Pre 80 %    FEF2575-Pred 5.04 L/sec    FEF2575-Pre 2.55 L/sec    CSB9101-%Pred-Pre 50 %    ExpTime-Pre 6.88 sec    FIFMax-Pre 3.53 L/sec    FEV1FEV6-Pred 84 %    FEV1FEV6-Pre 80 %                   Results as noted above.    PFT Interpretation:  Moderately severe restrictive ventilatory defect.  Unchanged from previous.   Below recent best.   Valid Maneuver             CF Exacerbation  Moderate  Increased vest/bronchodilator/execise and Hosp admit/home IV (within 2 wks)          Again, thank you for allowing me to participate in the care of your patient.      Sincerely,    Zana Lilly MD

## 2019-12-26 NOTE — NURSING NOTE
Chief Complaint   Patient presents with     Follow Up     CF      Vitals were taken and medications were reconciled.     Khadra Mckoy RMA  3:12 PM

## 2019-12-30 ENCOUNTER — APPOINTMENT (OUTPATIENT)
Dept: INTERVENTIONAL RADIOLOGY/VASCULAR | Facility: CLINIC | Age: 24
End: 2019-12-30
Attending: NURSE PRACTITIONER
Payer: COMMERCIAL

## 2019-12-30 ENCOUNTER — HOSPITAL ENCOUNTER (INPATIENT)
Facility: CLINIC | Age: 24
LOS: 3 days | Discharge: HOME | End: 2020-01-02
Attending: INTERNAL MEDICINE | Admitting: INTERNAL MEDICINE
Payer: COMMERCIAL

## 2019-12-30 ENCOUNTER — APPOINTMENT (OUTPATIENT)
Dept: GENERAL RADIOLOGY | Facility: CLINIC | Age: 24
End: 2019-12-30
Attending: STUDENT IN AN ORGANIZED HEALTH CARE EDUCATION/TRAINING PROGRAM
Payer: COMMERCIAL

## 2019-12-30 DIAGNOSIS — E84.9 CYSTIC FIBROSIS (H): Primary | ICD-10-CM

## 2019-12-30 LAB
ALBUMIN SERPL-MCNC: 3.6 G/DL (ref 3.4–5)
ALP SERPL-CCNC: 124 U/L (ref 40–150)
ALT SERPL W P-5'-P-CCNC: 29 U/L (ref 0–70)
ANION GAP SERPL CALCULATED.3IONS-SCNC: 3 MMOL/L (ref 3–14)
AST SERPL W P-5'-P-CCNC: 14 U/L (ref 0–45)
BASOPHILS # BLD AUTO: 0.1 10E9/L (ref 0–0.2)
BASOPHILS NFR BLD AUTO: 1 %
BILIRUB SERPL-MCNC: 0.4 MG/DL (ref 0.2–1.3)
BUN SERPL-MCNC: 10 MG/DL (ref 7–30)
CALCIUM SERPL-MCNC: 8.6 MG/DL (ref 8.5–10.1)
CHLORIDE SERPL-SCNC: 110 MMOL/L (ref 94–109)
CO2 SERPL-SCNC: 28 MMOL/L (ref 20–32)
CREAT SERPL-MCNC: 0.98 MG/DL (ref 0.66–1.25)
CRP SERPL-MCNC: 13 MG/L (ref 0–8)
DIFFERENTIAL METHOD BLD: ABNORMAL
EOSINOPHIL # BLD AUTO: 0.3 10E9/L (ref 0–0.7)
EOSINOPHIL NFR BLD AUTO: 2.7 %
ERYTHROCYTE [DISTWIDTH] IN BLOOD BY AUTOMATED COUNT: 12.9 % (ref 10–15)
ERYTHROCYTE [SEDIMENTATION RATE] IN BLOOD BY WESTERGREN METHOD: 7 MM/H (ref 0–15)
GFR SERPL CREATININE-BSD FRML MDRD: >90 ML/MIN/{1.73_M2}
GLUCOSE BLDC GLUCOMTR-MCNC: 114 MG/DL (ref 70–99)
GLUCOSE BLDC GLUCOMTR-MCNC: 166 MG/DL (ref 70–99)
GLUCOSE BLDC GLUCOMTR-MCNC: 91 MG/DL (ref 70–99)
GLUCOSE SERPL-MCNC: 119 MG/DL (ref 70–99)
HCT VFR BLD AUTO: 44.2 % (ref 40–53)
HGB BLD-MCNC: 14.6 G/DL (ref 13.3–17.7)
IMM GRANULOCYTES # BLD: 0 10E9/L (ref 0–0.4)
IMM GRANULOCYTES NFR BLD: 0.3 %
INR PPP: 1.07 (ref 0.86–1.14)
LYMPHOCYTES # BLD AUTO: 2.4 10E9/L (ref 0.8–5.3)
LYMPHOCYTES NFR BLD AUTO: 21.1 %
MAGNESIUM SERPL-MCNC: 1.9 MG/DL (ref 1.6–2.3)
MCH RBC QN AUTO: 29.1 PG (ref 26.5–33)
MCHC RBC AUTO-ENTMCNC: 33 G/DL (ref 31.5–36.5)
MCV RBC AUTO: 88 FL (ref 78–100)
MONOCYTES # BLD AUTO: 0.8 10E9/L (ref 0–1.3)
MONOCYTES NFR BLD AUTO: 6.7 %
NEUTROPHILS # BLD AUTO: 7.6 10E9/L (ref 1.6–8.3)
NEUTROPHILS NFR BLD AUTO: 68.2 %
NRBC # BLD AUTO: 0 10*3/UL
NRBC BLD AUTO-RTO: 0 /100
PHOSPHATE SERPL-MCNC: 2.7 MG/DL (ref 2.5–4.5)
PLATELET # BLD AUTO: 262 10E9/L (ref 150–450)
POTASSIUM SERPL-SCNC: 4 MMOL/L (ref 3.4–5.3)
RBC # BLD AUTO: 5.02 10E12/L (ref 4.4–5.9)
RSV AG SPEC QL: NEGATIVE
SODIUM SERPL-SCNC: 140 MMOL/L (ref 133–144)
SPECIMEN SOURCE: NORMAL
WBC # BLD AUTO: 11.2 10E9/L (ref 4–11)

## 2019-12-30 PROCEDURE — 12000001 ZZH R&B MED SURG/OB UMMC

## 2019-12-30 PROCEDURE — 83735 ASSAY OF MAGNESIUM: CPT | Performed by: STUDENT IN AN ORGANIZED HEALTH CARE EDUCATION/TRAINING PROGRAM

## 2019-12-30 PROCEDURE — 87633 RESP VIRUS 12-25 TARGETS: CPT | Performed by: STUDENT IN AN ORGANIZED HEALTH CARE EDUCATION/TRAINING PROGRAM

## 2019-12-30 PROCEDURE — 27210887 ZZH ACCESSORY CR6

## 2019-12-30 PROCEDURE — 36415 COLL VENOUS BLD VENIPUNCTURE: CPT | Performed by: STUDENT IN AN ORGANIZED HEALTH CARE EDUCATION/TRAINING PROGRAM

## 2019-12-30 PROCEDURE — 84450 TRANSFERASE (AST) (SGOT): CPT | Performed by: STUDENT IN AN ORGANIZED HEALTH CARE EDUCATION/TRAINING PROGRAM

## 2019-12-30 PROCEDURE — 00000146 ZZHCL STATISTIC GLUCOSE BY METER IP

## 2019-12-30 PROCEDURE — 87102 FUNGUS ISOLATION CULTURE: CPT | Performed by: STUDENT IN AN ORGANIZED HEALTH CARE EDUCATION/TRAINING PROGRAM

## 2019-12-30 PROCEDURE — 99207 ZZC CDG-HISTORY COMP: MEETS EXP. PROBLEM FOCUSED-DOWN CODED LACK OF ROS: CPT | Performed by: INTERNAL MEDICINE

## 2019-12-30 PROCEDURE — 84075 ASSAY ALKALINE PHOSPHATASE: CPT | Performed by: STUDENT IN AN ORGANIZED HEALTH CARE EDUCATION/TRAINING PROGRAM

## 2019-12-30 PROCEDURE — 36573 INSJ PICC RS&I 5 YR+: CPT

## 2019-12-30 PROCEDURE — C1751 CATH, INF, PER/CENT/MIDLINE: HCPCS

## 2019-12-30 PROCEDURE — 87807 RSV ASSAY W/OPTIC: CPT | Performed by: STUDENT IN AN ORGANIZED HEALTH CARE EDUCATION/TRAINING PROGRAM

## 2019-12-30 PROCEDURE — 85652 RBC SED RATE AUTOMATED: CPT | Performed by: STUDENT IN AN ORGANIZED HEALTH CARE EDUCATION/TRAINING PROGRAM

## 2019-12-30 PROCEDURE — 84134 ASSAY OF PREALBUMIN: CPT | Performed by: STUDENT IN AN ORGANIZED HEALTH CARE EDUCATION/TRAINING PROGRAM

## 2019-12-30 PROCEDURE — 94640 AIRWAY INHALATION TREATMENT: CPT

## 2019-12-30 PROCEDURE — 25000125 ZZHC RX 250: Performed by: STUDENT IN AN ORGANIZED HEALTH CARE EDUCATION/TRAINING PROGRAM

## 2019-12-30 PROCEDURE — 27210908 ZZH NEEDLE CR4

## 2019-12-30 PROCEDURE — 87106 FUNGI IDENTIFICATION YEAST: CPT | Performed by: STUDENT IN AN ORGANIZED HEALTH CARE EDUCATION/TRAINING PROGRAM

## 2019-12-30 PROCEDURE — 25000128 H RX IP 250 OP 636: Performed by: STUDENT IN AN ORGANIZED HEALTH CARE EDUCATION/TRAINING PROGRAM

## 2019-12-30 PROCEDURE — 25000132 ZZH RX MED GY IP 250 OP 250 PS 637: Performed by: STUDENT IN AN ORGANIZED HEALTH CARE EDUCATION/TRAINING PROGRAM

## 2019-12-30 PROCEDURE — 82247 BILIRUBIN TOTAL: CPT | Performed by: STUDENT IN AN ORGANIZED HEALTH CARE EDUCATION/TRAINING PROGRAM

## 2019-12-30 PROCEDURE — 94669 MECHANICAL CHEST WALL OSCILL: CPT

## 2019-12-30 PROCEDURE — 80069 RENAL FUNCTION PANEL: CPT | Performed by: STUDENT IN AN ORGANIZED HEALTH CARE EDUCATION/TRAINING PROGRAM

## 2019-12-30 PROCEDURE — 99207 ZZC NON-BILLABLE SERV PER CHARTING: CPT | Performed by: INTERNAL MEDICINE

## 2019-12-30 PROCEDURE — 87581 M.PNEUMON DNA AMP PROBE: CPT | Performed by: STUDENT IN AN ORGANIZED HEALTH CARE EDUCATION/TRAINING PROGRAM

## 2019-12-30 PROCEDURE — 85025 COMPLETE CBC W/AUTO DIFF WBC: CPT | Performed by: STUDENT IN AN ORGANIZED HEALTH CARE EDUCATION/TRAINING PROGRAM

## 2019-12-30 PROCEDURE — 87147 CULTURE TYPE IMMUNOLOGIC: CPT | Performed by: STUDENT IN AN ORGANIZED HEALTH CARE EDUCATION/TRAINING PROGRAM

## 2019-12-30 PROCEDURE — 85610 PROTHROMBIN TIME: CPT | Performed by: STUDENT IN AN ORGANIZED HEALTH CARE EDUCATION/TRAINING PROGRAM

## 2019-12-30 PROCEDURE — 87186 SC STD MICRODIL/AGAR DIL: CPT | Performed by: STUDENT IN AN ORGANIZED HEALTH CARE EDUCATION/TRAINING PROGRAM

## 2019-12-30 PROCEDURE — 02HV33Z INSERTION OF INFUSION DEVICE INTO SUPERIOR VENA CAVA, PERCUTANEOUS APPROACH: ICD-10-PCS | Performed by: RADIOLOGY

## 2019-12-30 PROCEDURE — 87070 CULTURE OTHR SPECIMN AEROBIC: CPT | Performed by: STUDENT IN AN ORGANIZED HEALTH CARE EDUCATION/TRAINING PROGRAM

## 2019-12-30 PROCEDURE — 25800030 ZZH RX IP 258 OP 636: Performed by: STUDENT IN AN ORGANIZED HEALTH CARE EDUCATION/TRAINING PROGRAM

## 2019-12-30 PROCEDURE — 27210886 ZZH ACCESSORY CR5

## 2019-12-30 PROCEDURE — 87486 CHLMYD PNEUM DNA AMP PROBE: CPT | Performed by: STUDENT IN AN ORGANIZED HEALTH CARE EDUCATION/TRAINING PROGRAM

## 2019-12-30 PROCEDURE — 25500064 ZZH RX 255 OP 636: Performed by: RADIOLOGY

## 2019-12-30 PROCEDURE — 25000125 ZZHC RX 250

## 2019-12-30 PROCEDURE — 71046 X-RAY EXAM CHEST 2 VIEWS: CPT

## 2019-12-30 PROCEDURE — 27210732 ZZH ACCESSORY CR1

## 2019-12-30 PROCEDURE — 40000275 ZZH STATISTIC RCP TIME EA 10 MIN

## 2019-12-30 PROCEDURE — 86140 C-REACTIVE PROTEIN: CPT | Performed by: STUDENT IN AN ORGANIZED HEALTH CARE EDUCATION/TRAINING PROGRAM

## 2019-12-30 PROCEDURE — 87077 CULTURE AEROBIC IDENTIFY: CPT | Performed by: STUDENT IN AN ORGANIZED HEALTH CARE EDUCATION/TRAINING PROGRAM

## 2019-12-30 PROCEDURE — 94640 AIRWAY INHALATION TREATMENT: CPT | Mod: 76

## 2019-12-30 PROCEDURE — 99221 1ST HOSP IP/OBS SF/LOW 40: CPT | Mod: AI | Performed by: INTERNAL MEDICINE

## 2019-12-30 PROCEDURE — 84460 ALANINE AMINO (ALT) (SGPT): CPT | Performed by: STUDENT IN AN ORGANIZED HEALTH CARE EDUCATION/TRAINING PROGRAM

## 2019-12-30 RX ORDER — PEDIATRIC MULTIVIT 61/D3/VIT K 1500-800
1 CAPSULE ORAL 2 TIMES DAILY
Status: DISCONTINUED | OUTPATIENT
Start: 2019-12-30 | End: 2020-01-02 | Stop reason: HOSPADM

## 2019-12-30 RX ORDER — ALBUTEROL SULFATE 0.83 MG/ML
2.5 SOLUTION RESPIRATORY (INHALATION) 3 TIMES DAILY
Status: DISCONTINUED | OUTPATIENT
Start: 2019-12-30 | End: 2019-12-30

## 2019-12-30 RX ORDER — FEXOFENADINE HCL 180 MG/1
180 TABLET ORAL DAILY
Status: DISCONTINUED | OUTPATIENT
Start: 2019-12-30 | End: 2020-01-02 | Stop reason: HOSPADM

## 2019-12-30 RX ORDER — HEPARIN SODIUM,PORCINE 10 UNIT/ML
5-10 VIAL (ML) INTRAVENOUS
Status: DISCONTINUED | OUTPATIENT
Start: 2019-12-30 | End: 2020-01-02 | Stop reason: HOSPADM

## 2019-12-30 RX ORDER — PANTOPRAZOLE SODIUM 20 MG/1
20 TABLET, DELAYED RELEASE ORAL DAILY
Status: DISCONTINUED | OUTPATIENT
Start: 2019-12-30 | End: 2020-01-02 | Stop reason: HOSPADM

## 2019-12-30 RX ORDER — FLUTICASONE PROPIONATE 50 MCG
1 SPRAY, SUSPENSION (ML) NASAL DAILY
Status: DISCONTINUED | OUTPATIENT
Start: 2019-12-30 | End: 2020-01-02 | Stop reason: HOSPADM

## 2019-12-30 RX ORDER — ALBUTEROL SULFATE 0.83 MG/ML
2.5 SOLUTION RESPIRATORY (INHALATION)
Status: DISCONTINUED | OUTPATIENT
Start: 2019-12-30 | End: 2020-01-02 | Stop reason: HOSPADM

## 2019-12-30 RX ORDER — SULFAMETHOXAZOLE/TRIMETHOPRIM 800-160 MG
1 TABLET ORAL 2 TIMES DAILY
Status: DISCONTINUED | OUTPATIENT
Start: 2019-12-30 | End: 2019-12-30

## 2019-12-30 RX ORDER — SULFAMETHOXAZOLE/TRIMETHOPRIM 800-160 MG
2 TABLET ORAL 2 TIMES DAILY
Status: DISCONTINUED | OUTPATIENT
Start: 2019-12-30 | End: 2020-01-02

## 2019-12-30 RX ORDER — ACETAMINOPHEN 325 MG/1
325-650 TABLET ORAL EVERY 6 HOURS PRN
Status: DISCONTINUED | OUTPATIENT
Start: 2019-12-30 | End: 2020-01-02 | Stop reason: HOSPADM

## 2019-12-30 RX ORDER — IODIXANOL 320 MG/ML
50 INJECTION, SOLUTION INTRAVASCULAR ONCE
Status: COMPLETED | OUTPATIENT
Start: 2019-12-30 | End: 2019-12-30

## 2019-12-30 RX ORDER — MEROPENEM 1 G/1
1 INJECTION, POWDER, FOR SOLUTION INTRAVENOUS EVERY 8 HOURS
Status: DISCONTINUED | OUTPATIENT
Start: 2019-12-30 | End: 2019-12-30

## 2019-12-30 RX ORDER — AZITHROMYCIN 500 MG/1
500 TABLET, FILM COATED ORAL
Status: DISCONTINUED | OUTPATIENT
Start: 2020-01-01 | End: 2020-01-02 | Stop reason: HOSPADM

## 2019-12-30 RX ORDER — ALBUTEROL SULFATE 90 UG/1
2 AEROSOL, METERED RESPIRATORY (INHALATION) EVERY 6 HOURS PRN
Status: DISCONTINUED | OUTPATIENT
Start: 2019-12-30 | End: 2020-01-02 | Stop reason: HOSPADM

## 2019-12-30 RX ORDER — SODIUM CHLORIDE FOR INHALATION 7 %
4 VIAL, NEBULIZER (ML) INHALATION 2 TIMES DAILY
Status: DISCONTINUED | OUTPATIENT
Start: 2019-12-30 | End: 2020-01-02 | Stop reason: HOSPADM

## 2019-12-30 RX ORDER — CITALOPRAM HYDROBROMIDE 20 MG/1
20 TABLET ORAL DAILY
Status: DISCONTINUED | OUTPATIENT
Start: 2019-12-30 | End: 2020-01-02 | Stop reason: HOSPADM

## 2019-12-30 RX ORDER — LIDOCAINE 40 MG/G
CREAM TOPICAL
Status: DISCONTINUED | OUTPATIENT
Start: 2019-12-30 | End: 2020-01-02 | Stop reason: HOSPADM

## 2019-12-30 RX ORDER — HEPARIN SODIUM,PORCINE 10 UNIT/ML
5 VIAL (ML) INTRAVENOUS
Status: COMPLETED | OUTPATIENT
Start: 2019-12-30 | End: 2019-12-30

## 2019-12-30 RX ORDER — LIDOCAINE HYDROCHLORIDE 10 MG/ML
1-30 INJECTION, SOLUTION EPIDURAL; INFILTRATION; INTRACAUDAL; PERINEURAL
Status: COMPLETED | OUTPATIENT
Start: 2019-12-30 | End: 2019-12-30

## 2019-12-30 RX ADMIN — SULFAMETHOXAZOLE AND TRIMETHOPRIM 2 TABLET: 800; 160 TABLET ORAL at 19:39

## 2019-12-30 RX ADMIN — LIDOCAINE HYDROCHLORIDE 3 ML: 10 INJECTION, SOLUTION EPIDURAL; INFILTRATION; INTRACAUDAL; PERINEURAL at 14:11

## 2019-12-30 RX ADMIN — PANCRELIPASE 5 CAPSULE: 36000; 180000; 114000 CAPSULE, DELAYED RELEASE PELLETS ORAL at 19:08

## 2019-12-30 RX ADMIN — IODIXANOL 5 ML: 320 INJECTION, SOLUTION INTRAVASCULAR at 14:17

## 2019-12-30 RX ADMIN — Medication 1 CAPSULE: at 19:39

## 2019-12-30 RX ADMIN — MEROPENEM 2 G: 1 INJECTION, POWDER, FOR SOLUTION INTRAVENOUS at 16:23

## 2019-12-30 RX ADMIN — Medication 2 ML: at 14:13

## 2019-12-30 RX ADMIN — ALBUTEROL SULFATE 2.5 MG: 2.5 SOLUTION RESPIRATORY (INHALATION) at 12:39

## 2019-12-30 RX ADMIN — ALBUTEROL SULFATE 2.5 MG: 2.5 SOLUTION RESPIRATORY (INHALATION) at 16:42

## 2019-12-30 RX ADMIN — Medication 4 ML: at 20:37

## 2019-12-30 RX ADMIN — DORNASE ALFA 2.5 MG: 1 SOLUTION RESPIRATORY (INHALATION) at 16:42

## 2019-12-30 RX ADMIN — ALBUTEROL SULFATE 2.5 MG: 2.5 SOLUTION RESPIRATORY (INHALATION) at 20:34

## 2019-12-30 ASSESSMENT — ACTIVITIES OF DAILY LIVING (ADL)
TOILETING: 0-->INDEPENDENT
TRANSFERRING: 0-->INDEPENDENT
FALL_HISTORY_WITHIN_LAST_SIX_MONTHS: NO
COGNITION: 0 - NO COGNITION ISSUES REPORTED
RETIRED_COMMUNICATION: 0-->UNDERSTANDS/COMMUNICATES WITHOUT DIFFICULTY
AMBULATION: 0-->INDEPENDENT
BATHING: 0-->INDEPENDENT
ADLS_ACUITY_SCORE: 10
DRESS: 0-->INDEPENDENT
SWALLOWING: 0-->SWALLOWS FOODS/LIQUIDS WITHOUT DIFFICULTY
ADLS_ACUITY_SCORE: 10
ADLS_ACUITY_SCORE: 10
RETIRED_EATING: 0-->INDEPENDENT

## 2019-12-30 ASSESSMENT — MIFFLIN-ST. JEOR
SCORE: 1769.21
SCORE: 1777.82

## 2019-12-30 NOTE — PROGRESS NOTES
[facility]  Transfer Triage Note    Date of call: 12/30/19  Time of call: 10:12 AM    Reason for Transfer:Patient has established care here  Diagnosis: Planned admission for CF Exacerbation     Outside Records: Available  Additional records requested to be faxed to [phone].    Stability of Patient: Patient is vitally stable, with no critical labs, and will likely remain stable throughout the transfer process    Expected Time of Arrival for Transfer: 0-8 hours    Recommendations for Management and Stabilization: Not needed    Additional Comments   Demario Abbasi is a 24 year old male with a history of Cystic Fibrosis, pancreatic insufficiency, depression, and impaired glucose tolerance, s/p RUL resection for recurrent exacerbations 2009, last hospitalization in 03/2019. Planned amdission for IV antibiotics per Dr. Lilly. Last seen by Dr. Lilly in clinic 12/26, see this note for course details. Has had ongoing increased SOB, [place], cough with sputum for past couple of months that has not responded to PO antibiotics. Typically grows MSSA and Achromobacter. Will come in for IV antibiotics, requires PICC placement. Has had multiple PICC lines in the past with difficulty with placement, will likely require IR PICC placement. Per Dr. Lilly, recommends: either IV Jeferson or IV Imipenem + Bactrim, IR PICC placement. Anticipate short duration of stay, pt has managed IV antibiotics as an outpatient multiple times before.     Mirna Hernandez MD

## 2019-12-30 NOTE — CONSULTS
Pulmonary Medicine  Cystic Fibrosis - Lung Transplant Team  Initial Consultation - 2019       Patient: Demario Abbasi  MRN: [id number]  : 1995 (age 24 year old)  Admission date: 2019  Consulting provider: Elizabeth Fuentes   Reason for consultation: CF Exacerbation  Primary Care Provider: Susan Li    Assessment & Plan:     24 year old male with a history of CF, Panc Insufficiency, MDD, Impaired Glucose Tolerance, s/p RUL Resection for recurrent exacerbations in . The patient was admitted on 2019 for CF exacerbation unresponsive to outpatient PO Abx.    CF pulmonary exacerbation: H/o Achromobacter & MSSA on cultures.  - CF sputum culture pending  - RVP pending, droplet precautions pending results  - ABX: Meropenem, Bactrim  - Vesting QID with nebs: Albuterol QID, Pulmozyme BID, HTS BID  - PFTs Tuesday  - PICC placement today  - Cont PTA Azithro  - Not on Elvin for this month    Exocrine pancreatic insufficiency:  Body mass index is 25.64 kg/m .  - High kcal / high protein diet  - PTA enzymes and vitamins  - CF RD following    Depression: Adequately controlled with current Celexa.     Iron deficiency anemia: Continue iron replacement.     Glucose intolerance: Continue annual glucose tolerance testing.     Reflux: Well-controlled with current PPI.     We appreciate the excellent care provided by the Elizabeth Fuentes team. Recommendations communicated via in person rounding and this note. Will continue to follow along closely, please do not hesitate to call with any questions or concerns.    Seen & discussed with Dr. Jayson Nath - [facility] Fellow  Pgr [phone]     Chief Complaint:     CF Exacerbation    History of Present Illness:     History obtained from patient / chart.    24 year old male with cystic fibrosis, pancreatic insufficiency, depression, acne and impaired glucose tolerance who is s/p RUL resection for recurrent exacerbations in .  Was hospitalized -  for a pulmonary exacerbation but has required multiple courses of oral antibiotics since that time and more recently receiving only limited relief from oral antibiotics. PFTs are not back to baseline.    Review of Systems:     C: no fever, no chills, no change in weight, no change in appetite  INTEGUMENTARY/SKIN: no rash or obvious new lesions  ENT/MOUTH: no sore throat, no sinus pain, no nasal congestion or drainage  RESP: see interval history  CV: no chest pain, no palpitations, no peripheral edema, no orthopnea  GI: no nausea, no vomiting, no change in stools, no reflux symptoms  : no dysuria  MUSCULOSKELETAL: no myalgias, no arthralgias  ENDOCRINE: blood sugars with adequate control  NEURO: no headache, no numbness or tingling  PSYCHIATRIC: mood stable    Medical and Surgical History:     Past Medical History:   Diagnosis Date     CF (cystic fibrosis) (H)     Genotype: [id number]/621+1g>t     Impaired glucose tolerance      Pancreatic insufficiency      S/P lobectomy of lung 8/4/2015    Right upper lobectomy - 2009     Past Surgical History:   Procedure Laterality Date     HC LAP,INGUINAL HERNIA REPR,INITIAL  2002     IR PICC PLACEMENT > 5 YRS OF AGE  3/18/2019     IR PICC PLACEMENT > 5 YRS OF AGE  12/30/2019     LOBECTOMY LUNG Right 2009    Right upper lobe     Social and Family History:     Social History     Socioeconomic History     Marital status: Single     Spouse name: Not on file     Number of children: Not on file     Years of education: Not on file     Highest education level: Not on file   Occupational History     Occupation:    Social Needs     Financial resource strain: Not on file     Food insecurity:     Worry: Not on file     Inability: Not on file     Transportation needs:     Medical: Not on file     Non-medical: Not on file   Tobacco Use     Smoking status: Passive Smoke Exposure - Never Smoker     Smokeless tobacco: Current User     Types: Chew     Tobacco comment: dad smokes    Substance and Sexual Activity     Alcohol use: Yes     Comment: <2 drinks per week     Drug use: Not on file     Sexual activity: Not on file   Lifestyle     Physical activity:     Days per week: Not on file     Minutes per session: Not on file     Stress: Not on file   Relationships     Social connections:     Talks on phone: Not on file     Gets together: Not on file     Attends Buddhism service: Not on file     Active member of club or organization: Not on file     Attends meetings of clubs or organizations: Not on file     Relationship status: Not on file     Intimate partner violence:     Fear of current or ex partner: Not on file     Emotionally abused: Not on file     Physically abused: Not on file     Forced sexual activity: Not on file   Other Topics Concern     Parent/sibling w/ CABG, MI or angioplasty before 65F 55M? 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 2 dogs.  Has girlfriend (Oriana).     Family History   Problem Relation Age of Onset     Thyroid Disease Mother         hypothyroid     Other - See Comments Mother         multiple family members with biliary disease     Hypertension Maternal Grandmother      Diabetes Maternal Grandfather      Hypertension Paternal Grandmother      Hypothyroidism Paternal Grandmother      Parkinsonism Paternal Grandmother      Coronary Artery Disease Early Onset Paternal Grandfather 56     Allergies and Home Medications:     Allergies   Allergen Reactions     Augmentin      Medications Prior to Admission   Medication Sig Dispense Refill Last Dose     acetaminophen (TYLENOL) 325 MG tablet Take 1-2 tablets (325-650 mg) by mouth every 6 hours as needed for mild pain or fever   Taking     albuterol (PROAIR HFA) 108 (90 Base) MCG/ACT inhaler Inhale 2 puffs into the lungs every 6 hours as needed for  shortness of breath / dyspnea or wheezing 2 Inhaler 6 Taking     albuterol (PROVENTIL) (2.5 MG/3ML) 0.083% neb solution Take 1 vial (2.5 mg) by nebulization 3 times daily 270 mL 6 Taking     azithromycin (ZITHROMAX) 500 MG tablet Take one tablet on Monday, Wednesday and Friday morning 14 tablet 12 Taking     citalopram (CELEXA) 20 MG tablet Take 1 tablet (20 mg) by mouth daily 30 tablet 1 Taking     CREON 46093 units CPEP per EC capsule TAKE 5 CAPSULES (180,000 UNITS) BY MOUTH 3 TIMES DAILY (WITH MEALS) AND 3 WITH SNACKS 720 each 11 Taking     ferrous fumarate 65 mg, [place]. FE,-Vitamin C 125 mg (VITRON C)  MG TABS tablet Take 1 tablet by mouth daily   Taking     fexofenadine (ALLEGRA) 180 MG tablet TAKE 1 TABLET (180 MG) BY MOUTH DAILY 30 tablet 3 Taking     fluconazole (DIFLUCAN) 150 MG tablet Take 1 tablet (150 mg) by mouth daily as needed 1 tablet 0 Not Taking     fluticasone (FLONASE) 50 MCG/ACT nasal spray INSTILL 1 SPRAY INTO BOTH NOSTRILS DAILY 16 mL 3 Taking     mvw complete formulation (SOFTGELS ) capsule TAKE ONE CAPSULE BY MOUTH TWICE A DAY 60 capsule 3 Taking     pantoprazole (PROTONIX) 20 MG EC tablet TAKE 1 TABLET (20 MG) BY MOUTH DAILY 30 tablet 3 Taking     PULMOZYME 1 MG/ML neb solution INHALE 2.5MG INTO THE LUNGS TWO TIMES A  mL 12 Taking     sodium chloride inhalant 7 % NEBU neb solution Take 4 mLs by nebulization 2 times daily as needed for wheezing 240 mL 11 Taking     tobramycin (BETHKIS) 300 MG/4ML nebulizer solution Take 4 mLs (300 mg) by nebulization 2 times daily 28 days on, 28 days off 224 mL 5 Taking     Current Scheduled Meds    albuterol  2.5 mg Nebulization 4x daily     amylase-lipase-protease  5 capsule Oral TID w/meals     [START ON 1/1/2020] azithromycin  500 mg Oral Q Mon Wed Fri AM     citalopram  20 mg Oral Daily     dornase alpha  2.5 mg Inhalation BID     ferrous fumarate 65 mg ([place]. FE)-Vitamin C 125 mg  1 tablet Oral Daily     fexofenadine  180 mg Oral Daily  "    fluticasone  1 spray Both Nostrils Daily     meropenem  2 g Intravenous Q8H     mvw complete formulation  1 capsule Oral BID     pantoprazole  20 mg Oral Daily     sodium chloride inhalant  4 mL Nebulization BID     sulfamethoxazole-trimethoprim  2 tablet Oral BID      Current PRN Meds  acetaminophen, albuterol     Physical Exam:     Vital signs:  Temp: 97.2  F (36.2  C) Temp src: Oral BP: 118/47 Pulse: 56 Heart Rate: 65 Resp: 16 SpO2: 97 % O2 Device: None (Room air)   Height: 175.3 cm (5' 9\") Weight: 78.7 kg (173 lb 9.6 oz)   Constitutional: walking around room, in no apparent distress.   HEENT: Eyes with pink conjunctivae, anicteric. Oral mucosa moist without lesions. Neck supple without lymphadenopathy.   PULM: Good air flow bilaterally. No crackles, no rhonchi, no wheezes. Non-labored breathing on RA.  CV: Normal S1 and S2. RRR. No murmur, gallop, or rub. No peripheral edema.   ABD: NABS, soft, nontender, nondistended.    MSK: Moves all extremities. No apparent muscle wasting.   NEURO: Alert and oriented, conversant.   SKIN: Warm, dry. No rash on limited exam.   PSYCH: Mood stable.     Data:     LABS    CMP:   Recent Labs   Lab 12/30/19  1229      POTASSIUM 4.0   CHLORIDE 110*   CO2 28   ANIONGAP 3   *   BUN 10   CR 0.98   GFRESTIMATED >90   GFRESTBLACK >90   ROCAEL 8.6   MAG 1.9   PHOS 2.7   ALBUMIN 3.6   BILITOTAL 0.4   ALKPHOS 124   AST 14   ALT 29     CBC:   Recent Labs   Lab 12/30/19  1229   WBC 11.2*   RBC 5.02   HGB 14.6   HCT 44.2   MCV 88   MCH 29.1   MCHC 33.0   RDW 12.9          INR:   Recent Labs   Lab 12/30/19  1229   INR 1.07       Glucose:   Recent Labs   Lab 12/30/19  1229   *       Blood Gas: No lab results found in last 7 days.    Culture Data No results for input(s): CULT in the last 168 hours.    Virology Data:   Lab Results   Component Value Date    FLUAH1 Negative 03/16/2019    FLUAH3 Negative 03/16/2019    BB2875 Negative 03/16/2019    IFLUB Negative 03/16/2019 "    RSVA Negative 03/16/2019    RSVB Negative 03/16/2019    PIV1 Negative 03/16/2019    PIV2 Negative 03/16/2019    PIV3 Negative 03/16/2019    HMPV Negative 03/16/2019    HRVS Negative 03/16/2019    ADVBE Negative 03/16/2019    ADVC Negative 03/16/2019    ADVC Negative 03/13/2018       Most Recent [place] Pulmonary Function Testing (FVC/FEV1 only)  FVC-Pre   Date Value Ref Range Status   12/26/2019 3.50 L    11/08/2019 3.39 L    08/23/2019 3.57 L    05/24/2019 3.57 L      FVC-%Pred-Pre   Date Value Ref Range Status   12/26/2019 62 %    11/08/2019 60 %    08/23/2019 63 %    05/24/2019 63 %      FEV1-Pre   Date Value Ref Range Status   12/26/2019 2.78 L    11/08/2019 2.69 L    08/23/2019 2.85 L    05/24/2019 2.86 L      FEV1-%Pred-Pre   Date Value Ref Range Status   12/26/2019 58 %    11/08/2019 57 %    08/23/2019 60 %    05/24/2019 60 %        IMAGING    Recent Results (from the past 48 hour(s))   IR PICC Placement > 5 Yrs of Age    Narrative    Procedures 12/30/2019:   1. Ultrasound guidance for venous access  2. Fluoroscopic guided left upper extremity PICC line    Clinical indication: 24 year old male with cystic fibrosis  exacerbation.  Vascular access requested for intravenous antibiotics.       Comparison studies: 3/18/2019    Interventional radiologists: Tahir Alvarado MD (IR staff)    Fellow: Sarai Hammond MD    Procedure performed by Dr. Hammond under my supervision. I, Dr. Tahir Alvarado, was present for the entire procedure.    Consent: verbal and written informed consent obtained prior to  procedure.     Procedure details: Patient placed in the supine position, with the  left arm abducted and externally rotated. Preprocedure ultrasound  demonstrated patent left basilic vein. The left upper extremity was  prepped and draped in standard sterile fashion. Timeout performed. 1%  lidocaine used for local anesthesia. Using ultrasound guidance, a  21-gauge micropuncture needle was advanced into the  left basilic vein.  An image of the needle in the vein archived. Wire advanced and needle  removed. An incision was made using an 11 blade scalpel. The short  peel-away sheath was advanced over the wire. The intravascular length  was measured using the wire. Catheter was cut. The inner dilator was  removed. The 5 Welsh PICC line was advanced through the peel-away  sheath, with the wire back loaded.  Resistance was encountered within  the left axillary vein. Therefore, a venogram was performed  demonstrating patent left basilic and axillary veins. No central  venous stenosis noted. The sheath and PICC were removed over the wire,  followed by placement of long peel-away sheath. The 018 glidewire was  advanced into the IVC. The inner dilator was removed. The PICC was  advanced into the superior atriocaval junction. Peel-away sheath and  wire were removed. Both lumens were flushed with saline and locked  with 20 units of heparin.  Secure-a-cath placed, as well as sterile  dressing.  No immediate complication.      Medications:  1% lidocaine for local anesthesia.     Monitoring: The patient was placed on continuous monitoring. Vital  signs were monitored by nursing staff under my supervision. The  patient remained stable throughout the procedure.    Sedation time: Not applicable    Fluoroscopy time: 3.5 minutes    Complications: None.      Impression    IMPRESSION:     Placement of 5 Welsh dual-lumen PICC line via left basilic vein, tip  within the superior atriocaval junction.    PLAN:    Line is available for immediate use.    I have personally reviewed the examination and initial interpretation  and I agree with the findings.    LISY PENN   XR Chest 2 Views    Narrative    XR CHEST 2 VW12/30/2019 2:41 PM    INDICATION: cystic fibrosis, not improving with oral abx    COMPARISON:  3/14/2019    FINDINGS: PA and lateral views of the chest. Left arm PICC projects  with tip near the cavoatrial junction. Diffuse  changes from cystic  fibrosis with bronchiectatic changes and diffuse nodular opacities.  Slight obscuration of the right heart border with more consolidative  opacities within the right middle lobe. Also possible increased  opacities in the retrocardiac region. Upper abdomen is unremarkable.  No acute osseous abnormalities. Trachea is midline.      Impression    IMPRESSION: Chronic changes from cystic fibrosis with increased  opacities in the lingula and right middle lobe when compared with  prior. Findings may represent an infectious etiology.    I have personally reviewed the examination and initial interpretation  and I agree with the findings.    FARIDA VEGA MD

## 2019-12-30 NOTE — H&P
[facility], [place]    History and Physical - Elizabeth 4 Service        Date of Admission:  12/30/2019    Assessment & Plan   Demario Abbasi is a 24 year old male with cystic fibrosis s/p RUL resection for recurrent exacerbations in 2009, pancreatic insufficiency, depression, acne, and impaired glucose tolerance who comes to the hospital from clinic because of ongoing shortness of breath, dyspnea on exertion, and sputum production, concerning for CF exacerbation that is not getting better with PO antibiotics.     # Cystic fibrosis   # Achromobacter positive sputum culture   Patient follows with Dr. Lilly. The patient was seen by Dr. Lilly 11/26/2019 at which time the patient said that he was still having shortness of breath with activity, decreased sputum clearing, and feelings of chest congestion. He has a history of sputum production that grows MSSA and Achromobacter. The Achromobacter is usually resistant to all oral antibiotics. He was previously on PO antibiotics but did not improve. Therefore, his pulmonologist recommended that he come to the hospital for PICC placement and initiation of IV antibiotics. ESR WNL. CRP mildly elevated. Very slight leukocytosis.   - IR consult for PICC placement   - CF pulmonology consulted   - Start Meropenem 2 g q8 hours  - Start TMP-SMX 2 tablets BID   - CF sputum culture in process   - Fungal sputum culture in process   - Nocardia sputum culture in process   - RVP in process   - ESR, CRP, BMP, CBC  - Vesting therapy QID  - Continue PTA albuterol neb  - Continue PTA azithromycin M,W, F  - Continue PTA pulmozyme neb   - Continue PTA allegra  - Continue PTA flonase   - Holding PTA tobramycin nebs   - PFTs prior to discharge     # Pancreatic insufficiency   - PTA creon 180,000 units with meals     # Depression  - PTA citalopram     # JADE  Hgb of 14.6.   - Can resume PTA iron supplementation     # GERD  - PTA PPI       Diet: High Kcal/High  Protein Diet, ADULT  Room Service    Fluids: None  DVT Prophylaxis: Ambulate every shift  Rocha Catheter: not present  Code Status: Full Code      Disposition Plan   Expected discharge: Tomorrow, recommended to prior living arrangement once antibiotic plan established.  Entered: Brie Anderson MD 12/30/2019, 6:32 PM       The patient's care was discussed with the Attending Physician, Dr. Suero.    Brie Anderson MD  [address], [place]  Pager: [phone]  Please see sticky note for cross cover information  ______________________________________________________________________    Chief Complaint   Shortness of breath    History is obtained from the patient    History of Present Illness   Demario Abbasi is a 24 year old male with cystic fibrosis s/p RUL resection for recurrent exacerbations in 2009, pancreatic insufficiency, depression, acne, and impaired glucose tolerance who comes to the hospital from clinic because of ongoing shortness of breath, dyspnea on exertion, and sputum production. He states that for several months, he has been having mores shortness of breath doing activities that normally wouldn't cause him difficulty, such as carrying groceries and holding his breath in the shower. He also has less sputum production, but still feels like he has chest congestion. He has been on oral antibiotics to help with these symptoms, but there has been no change. He saw his pulmonologist a week ago, who recommended that Mr. Abbasi come to the hospital for PICC placement and initiation of IV antibiotics.    On ROS, patient denies any fevers, chills, nausea, vomiting, diarrhea, abdominal pain, or chest pain. He is able to walk down the hallway without any MSK problems. No lightheadedness or dizziness.     Review of Systems    See ROS above.     Past Medical History    I have reviewed this patient's medical history and updated it with pertinent  information if needed.   Past Medical History:   Diagnosis Date     CF (cystic fibrosis) (H)     Genotype: [id number]/621+1g>t     Impaired glucose tolerance      Pancreatic insufficiency      S/P lobectomy of lung 8/4/2015    Right upper lobectomy - 2009        Past Surgical History   I have reviewed this patient's surgical history and updated it with pertinent information if needed.  Past Surgical History:   Procedure Laterality Date     HC LAP,INGUINAL HERNIA REPR,INITIAL  2002     IR PICC PLACEMENT > 5 YRS OF AGE  3/18/2019     IR PICC PLACEMENT > 5 YRS OF AGE  12/30/2019     LOBECTOMY LUNG Right 2009    Right upper lobe        Social History   I have reviewed this patient's social history and updated it with pertinent information if needed. Demario Abbasi  reports that he is a non-smoker but has been exposed to tobacco smoke. His smokeless tobacco use includes chew. He reports current alcohol use.    Family History   I have reviewed this patient's family history and updated it with pertinent information if needed.   Family History   Problem Relation Age of Onset     Thyroid Disease Mother         hypothyroid     Other - See Comments Mother         multiple family members with biliary disease     Hypertension Maternal Grandmother      Diabetes Maternal Grandfather      Hypertension Paternal Grandmother      Hypothyroidism Paternal Grandmother      Parkinsonism Paternal Grandmother      Coronary Artery Disease Early Onset Paternal Grandfather 56       Prior to Admission Medications   Prior to Admission Medications   Prescriptions Last Dose Informant Patient Reported? Taking?   CREON 88533 units CPEP per EC capsule   No No   Sig: TAKE 5 CAPSULES (180,000 UNITS) BY MOUTH 3 TIMES DAILY (WITH MEALS) AND 3 WITH SNACKS   PULMOZYME 1 MG/ML neb solution   No No   Sig: INHALE 2.5MG INTO THE LUNGS TWO TIMES A DAY   acetaminophen (TYLENOL) 325 MG tablet   Yes No   Sig: Take 1-2 tablets (325-650 mg) by mouth every 6 hours as  needed for mild pain or fever   albuterol (PROAIR HFA) 108 (90 Base) MCG/ACT inhaler   No No   Sig: Inhale 2 puffs into the lungs every 6 hours as needed for shortness of breath / dyspnea or wheezing   albuterol (PROVENTIL) (2.5 MG/3ML) 0.083% neb solution   No No   Sig: Take 1 vial (2.5 mg) by nebulization 3 times daily   azithromycin (ZITHROMAX) 500 MG tablet   No No   Sig: Take one tablet on Monday, Wednesday and Friday morning   citalopram (CELEXA) 20 MG tablet   Yes No   Sig: Take 1 tablet (20 mg) by mouth daily   ferrous fumarate 65 mg, [place]. FE,-Vitamin C 125 mg (VITRON C)  MG TABS tablet   Yes No   Sig: Take 1 tablet by mouth daily   fexofenadine (ALLEGRA) 180 MG tablet   No No   Sig: TAKE 1 TABLET (180 MG) BY MOUTH DAILY   fluconazole (DIFLUCAN) 150 MG tablet   Yes No   Sig: Take 1 tablet (150 mg) by mouth daily as needed   fluticasone (FLONASE) 50 MCG/ACT nasal spray   No No   Sig: INSTILL 1 SPRAY INTO BOTH NOSTRILS DAILY   mvw complete formulation (SOFTGELS ) capsule   No No   Sig: TAKE ONE CAPSULE BY MOUTH TWICE A DAY   pantoprazole (PROTONIX) 20 MG EC tablet   No No   Sig: TAKE 1 TABLET (20 MG) BY MOUTH DAILY   sodium chloride inhalant 7 % NEBU neb solution   No No   Sig: Take 4 mLs by nebulization 2 times daily as needed for wheezing   tobramycin (BETHKIS) 300 MG/4ML nebulizer solution   No No   Sig: Take 4 mLs (300 mg) by nebulization 2 times daily 28 days on, 28 days off      Facility-Administered Medications: None     Allergies   Allergies   Allergen Reactions     Augmentin        Physical Exam   Vital Signs: Temp: 97.2  F (36.2  C) Temp src: Oral BP: 118/47 Pulse: 56 Heart Rate: 65 Resp: 16 SpO2: 97 % O2 Device: None (Room air)    Weight: 171 lbs 11.2 oz    Physical Exam  Constitutional:       General: He is not in acute distress.     Appearance: He is not toxic-appearing.   HENT:      Head: Normocephalic and atraumatic.      Nose: No congestion or rhinorrhea.      Mouth/Throat:       Pharynx: No oropharyngeal exudate or posterior oropharyngeal erythema.   Eyes:      General: No scleral icterus.     Extraocular Movements: Extraocular movements intact.   Neck:      Musculoskeletal: Normal range of motion. No neck rigidity.   Cardiovascular:      Rate and Rhythm: Normal rate and regular rhythm.   Pulmonary:      Effort: Pulmonary effort is normal.      Breath sounds: No wheezing.      Comments: Decreased breath sounds bilaterally   Abdominal:      General: Abdomen is flat. There is no distension.      Palpations: Abdomen is soft.   Musculoskeletal:         General: No swelling or tenderness.      Right lower leg: No edema.      Left lower leg: No edema.   Skin:     Coloration: Skin is not jaundiced.      Findings: No erythema.   Neurological:      General: No focal deficit present.      Mental Status: He is alert and oriented to person, place, and time.   Psychiatric:         Mood and Affect: Mood normal.         Behavior: Behavior normal.         Data   Data reviewed today: I reviewed all medications, new labs and imaging results over the last 24 hours.    Recent Labs   Lab 12/30/19  1229   WBC 11.2*   HGB 14.6   MCV 88      INR 1.07      POTASSIUM 4.0   CHLORIDE 110*   CO2 28   BUN 10   CR 0.98   ANIONGAP 3   ROCAEL 8.6   *   ALBUMIN 3.6   BILITOTAL 0.4   ALKPHOS 124   ALT 29   AST 14     Recent Results (from the past 24 hour(s))   IR PICC Placement > 5 Yrs of Age    Narrative    Procedures 12/30/2019:   1. Ultrasound guidance for venous access  2. Fluoroscopic guided left upper extremity PICC line    Clinical indication: 24 year old male with cystic fibrosis  exacerbation.  Vascular access requested for intravenous antibiotics.       Comparison studies: 3/18/2019    Interventional radiologists: Tahir Alvarado MD (IR staff)    Fellow: Sarai Hammond MD    Procedure performed by Dr. Hammond under my supervision. I, Dr. Tahir Alvarado, was present for the entire  procedure.    Consent: verbal and written informed consent obtained prior to  procedure.     Procedure details: Patient placed in the supine position, with the  left arm abducted and externally rotated. Preprocedure ultrasound  demonstrated patent left basilic vein. The left upper extremity was  prepped and draped in standard sterile fashion. Timeout performed. 1%  lidocaine used for local anesthesia. Using ultrasound guidance, a  21-gauge micropuncture needle was advanced into the left basilic vein.  An image of the needle in the vein archived. Wire advanced and needle  removed. An incision was made using an 11 blade scalpel. The short  peel-away sheath was advanced over the wire. The intravascular length  was measured using the wire. Catheter was cut. The inner dilator was  removed. The 5 Georgian PICC line was advanced through the peel-away  sheath, with the wire back loaded.  Resistance was encountered within  the left axillary vein. Therefore, a venogram was performed  demonstrating patent left basilic and axillary veins. No central  venous stenosis noted. The sheath and PICC were removed over the wire,  followed by placement of long peel-away sheath. The 018 glidewire was  advanced into the IVC. The inner dilator was removed. The PICC was  advanced into the superior atriocaval junction. Peel-away sheath and  wire were removed. Both lumens were flushed with saline and locked  with 20 units of heparin.  Secure-a-cath placed, as well as sterile  dressing.  No immediate complication.      Medications:  1% lidocaine for local anesthesia.     Monitoring: The patient was placed on continuous monitoring. Vital  signs were monitored by nursing staff under my supervision. The  patient remained stable throughout the procedure.    Sedation time: Not applicable    Fluoroscopy time: 3.5 minutes    Complications: None.      Impression    IMPRESSION:     Placement of 5 Georgian dual-lumen PICC line via left basilic vein,  tip  within the superior atriocaval junction.    PLAN:    Line is available for immediate use.    I have personally reviewed the examination and initial interpretation  and I agree with the findings.    LISY PENN   XR Chest 2 Views    Narrative    XR CHEST 2 VW12/30/2019 2:41 PM    INDICATION: cystic fibrosis, not improving with oral abx    COMPARISON:  3/14/2019    FINDINGS: PA and lateral views of the chest. Left arm PICC projects  with tip near the cavoatrial junction. Diffuse changes from cystic  fibrosis with bronchiectatic changes and diffuse nodular opacities.  Slight obscuration of the right heart border with more consolidative  opacities within the right middle lobe. Also possible increased  opacities in the retrocardiac region. Upper abdomen is unremarkable.  No acute osseous abnormalities. Trachea is midline.      Impression    IMPRESSION: Chronic changes from cystic fibrosis with increased  opacities in the lingula and right middle lobe when compared with  prior. Findings may represent an infectious etiology.    I have personally reviewed the examination and initial interpretation  and I agree with the findings.    FARIDA VEGA MD

## 2019-12-30 NOTE — PLAN OF CARE
Reason for admission: CF exacerbation for PICC placement.  Neuros: Alert and oriented x4.  Cardiac: WNL.  Respiratory: LS diminished. Productive cough with tan/yellow sputum. Denies SOB and chest pain. O2 sat mid 90s on RA.  GI/: +BS, flatus. Voiding spontaneously.  Diet: high calorie diet.   Activity: up and brandi.  LDA: Left DL PICC placed in IR. Ok for immediate use per report.   Pain: denies pain.  Lab/Tests: CXR complete. RVP swab done and sputum sent for CFcultures.  Labs: BG checks and 2 hr after meals.  with labs and 166 prior to dinner. CRP 13.0. WBC 11.2.  Plan: IV abx. PFTs Tuesday. Possible discharge tomorrow.

## 2019-12-30 NOTE — LETTER
Transition Communication Hand-off for Care Transitions to Next Level of Care Provider    Name: Demario Abbasi  : 1995  MRN #: [id number]  Primary Care Provider: Susan Li     Primary Clinic: [facility]  [address] [place] [id number]     Reason for Hospitalization:  cystic fibrosis  Cystic fibrosis (H)  Admit Date/Time: 2019 11:10 AM  Discharge Date:   Payor Source: Payor: BLUE PLUS / Plan: BLUE PLUS ADVANTAGE MA / Product Type: HMO /     Discharge Plan:  Home with IV kulwinder ([facility]). Mom does dressing change. Will need a follow up appointment in 2-3 weeks. Goes back to school on . Will be back in the [facility] , if unable to be seen that day, patient does not have classes on Thursday and would like a Thursday appt.      Discharge Needs Assessment:  Needs      Most Recent Value   Equipment Currently Used at Home  none          Follow-up plan:  No future appointments.    Any outstanding tests or procedures:        Referrals     Future Labs/Procedures    Home infusion referral     Comments:    Your provider has referred you to: [facility]: [facility] [phone]   [email or web address]    Local Address (if different from home address): N/A    Anticipated Length of Therapy: Per MD Order  IV antibiotics  IV meropenem 2g every 8 hours    Home Infusion Pharmacist to adjust therapy based on labs and clinical assessments: Yes    Labs:  May draw labs from Venous Catheter: Yes  Home Infusion Pharmacist to order labs based on therapy type and clinical assessments: Yes  Call/Fax Lab Results to:  Office [phone]    Agency Staff to assess nursing needs for Infusion Therapy.    Access Device Management:  IV Access Type: PICC  Flush with Heparin and Normal Saline IVP PRN and routine site care (per agency protocol) to maintain access device? Yes    Ok for patient's mother to do PICC line dressing change.            Key  Recommendations:      Tammy George RN    AVS/Discharge Summary is the source of truth; this is a helpful guide for improved communication of patient story

## 2019-12-30 NOTE — CONSULTS
Patient is on IR schedule 12/30/2019 for a DL PICC line placement.   No NPO required.  Consent will be done prior to procedure.     Please contact the IR charge RN at [phone] for estimated time of procedure.     Case discussed with Dr. Vallecillo from IR and page out to Dr. Anderson. This is a 24 year old male with a history of Cystic Fibrosis, pancreatic insufficiency, depression, and impaired glucose tolerance, s/p RUL resection for recurrent exacerbations 2009, last hospitalization in 03/2019. Planned admission for IV antibiotics per Dr. Lilly. IR has placed previous PICC lines and has been asked for access again.    Linda Chou DNP, APRN  Interventional Radiology  Pager: [phone]

## 2019-12-30 NOTE — BRIEF OP NOTE
[facility], [place]    Brief Operative Note    Pre-operative diagnosis: Cystic fibrosis exacerbation  Post-operative diagnosis Same as pre-operative diagnosis    Procedure: LUE PICC line placement  Staff IR: Tahir Alvarado MD  Fellow: Sarai Hammond MD  Anesthesia: 1% lidocaine  Estimated blood loss: Less than 10 ml  Drains: None  Specimens: none  Findings:   5 fr dual lumen picc line, tip placed within superior atriocaval junction. 45 cm intravascular length.  Complications: None.  Implants: as above

## 2019-12-30 NOTE — IR NOTE
Patient Name: Demario Abbasi  Medical Record Number: [id number]  Today's Date: 12/30/2019    Procedure: peripherally inserted central catheter   Proceduralist: Dr OLU Alvarado MD, Dr TIFFANY Hammond MD    Sedation Notes: none - lidocaine used at site     Procedure start time: 1340  Puncture time: 1341  Procedure end time: 1415  Out of room time: 1426    Report given to: Preeti SERRANO 2A  : None    Other Notes: Pt arrived to IR room 4 from . Consent reviewed, pt confirmed. Pt denies any questions or concerns regarding procedure. Pt positioned supine and monitored per protocol. Lumens locked with heparin as ordered. Site cleansed and dressed per protocol. Pt tolerated procedure without any noted complications. Pt transferred back to  via x ray.

## 2019-12-31 ENCOUNTER — HOME INFUSION (PRE-WILLOW HOME INFUSION) (OUTPATIENT)
Dept: PHARMACY | Facility: CLINIC | Age: 24
End: 2019-12-31

## 2019-12-31 ENCOUNTER — APPOINTMENT (OUTPATIENT)
Dept: PHYSICAL THERAPY | Facility: CLINIC | Age: 24
End: 2019-12-31
Attending: STUDENT IN AN ORGANIZED HEALTH CARE EDUCATION/TRAINING PROGRAM
Payer: COMMERCIAL

## 2019-12-31 LAB
C PNEUM DNA SPEC QL NAA+PROBE: NOT DETECTED
ERYTHROCYTE [DISTWIDTH] IN BLOOD BY AUTOMATED COUNT: 12.9 % (ref 10–15)
FLUAV H1 2009 PAND RNA SPEC QL NAA+PROBE: NOT DETECTED
FLUAV H1 RNA SPEC QL NAA+PROBE: NOT DETECTED
FLUAV H3 RNA SPEC QL NAA+PROBE: NOT DETECTED
FLUAV RNA SPEC QL NAA+PROBE: NOT DETECTED
FLUBV RNA SPEC QL NAA+PROBE: NOT DETECTED
HADV DNA SPEC QL NAA+PROBE: NOT DETECTED
HCOV PNL SPEC NAA+PROBE: NOT DETECTED
HCT VFR BLD AUTO: 44.9 % (ref 40–53)
HGB BLD-MCNC: 14.6 G/DL (ref 13.3–17.7)
HMPV RNA SPEC QL NAA+PROBE: NOT DETECTED
HPIV1 RNA SPEC QL NAA+PROBE: NOT DETECTED
HPIV2 RNA SPEC QL NAA+PROBE: NOT DETECTED
HPIV3 RNA SPEC QL NAA+PROBE: NOT DETECTED
HPIV4 RNA SPEC QL NAA+PROBE: NOT DETECTED
M PNEUMO DNA SPEC QL NAA+PROBE: NOT DETECTED
MCH RBC QN AUTO: 28.6 PG (ref 26.5–33)
MCHC RBC AUTO-ENTMCNC: 32.5 G/DL (ref 31.5–36.5)
MCV RBC AUTO: 88 FL (ref 78–100)
MICROBIOLOGIST REVIEW: NORMAL
PLATELET # BLD AUTO: 239 10E9/L (ref 150–450)
PREALB SERPL IA-MCNC: 20 MG/DL (ref 15–45)
RBC # BLD AUTO: 5.11 10E12/L (ref 4.4–5.9)
RSV RNA SPEC QL NAA+PROBE: NOT DETECTED
RSV RNA SPEC QL NAA+PROBE: NOT DETECTED
RV+EV RNA SPEC QL NAA+PROBE: NOT DETECTED
WBC # BLD AUTO: 6.7 10E9/L (ref 4–11)

## 2019-12-31 PROCEDURE — 94669 MECHANICAL CHEST WALL OSCILL: CPT

## 2019-12-31 PROCEDURE — 25000125 ZZHC RX 250: Performed by: STUDENT IN AN ORGANIZED HEALTH CARE EDUCATION/TRAINING PROGRAM

## 2019-12-31 PROCEDURE — 40000275 ZZH STATISTIC RCP TIME EA 10 MIN

## 2019-12-31 PROCEDURE — 94640 AIRWAY INHALATION TREATMENT: CPT

## 2019-12-31 PROCEDURE — 99232 SBSQ HOSP IP/OBS MODERATE 35: CPT | Mod: GC | Performed by: HOSPITALIST

## 2019-12-31 PROCEDURE — 25000128 H RX IP 250 OP 636: Performed by: STUDENT IN AN ORGANIZED HEALTH CARE EDUCATION/TRAINING PROGRAM

## 2019-12-31 PROCEDURE — 94640 AIRWAY INHALATION TREATMENT: CPT | Mod: 76

## 2019-12-31 PROCEDURE — 85027 COMPLETE CBC AUTOMATED: CPT | Performed by: STUDENT IN AN ORGANIZED HEALTH CARE EDUCATION/TRAINING PROGRAM

## 2019-12-31 PROCEDURE — 97161 PT EVAL LOW COMPLEX 20 MIN: CPT | Mod: GP

## 2019-12-31 PROCEDURE — 25000132 ZZH RX MED GY IP 250 OP 250 PS 637: Performed by: STUDENT IN AN ORGANIZED HEALTH CARE EDUCATION/TRAINING PROGRAM

## 2019-12-31 PROCEDURE — 40000802 ZZH SITE CHECK

## 2019-12-31 PROCEDURE — 25000125 ZZHC RX 250

## 2019-12-31 PROCEDURE — 36592 COLLECT BLOOD FROM PICC: CPT | Performed by: STUDENT IN AN ORGANIZED HEALTH CARE EDUCATION/TRAINING PROGRAM

## 2019-12-31 PROCEDURE — 25800030 ZZH RX IP 258 OP 636: Performed by: STUDENT IN AN ORGANIZED HEALTH CARE EDUCATION/TRAINING PROGRAM

## 2019-12-31 PROCEDURE — 97110 THERAPEUTIC EXERCISES: CPT | Mod: GP

## 2019-12-31 PROCEDURE — 12000001 ZZH R&B MED SURG/OB UMMC

## 2019-12-31 RX ADMIN — CITALOPRAM HYDROBROMIDE 20 MG: 20 TABLET ORAL at 09:24

## 2019-12-31 RX ADMIN — Medication 5 ML: at 20:06

## 2019-12-31 RX ADMIN — SULFAMETHOXAZOLE AND TRIMETHOPRIM 2 TABLET: 800; 160 TABLET ORAL at 20:06

## 2019-12-31 RX ADMIN — ALBUTEROL SULFATE 2.5 MG: 2.5 SOLUTION RESPIRATORY (INHALATION) at 08:43

## 2019-12-31 RX ADMIN — FLUTICASONE PROPIONATE 1 SPRAY: 50 SPRAY, METERED NASAL at 09:25

## 2019-12-31 RX ADMIN — PANCRELIPASE 4 CAPSULE: 36000; 180000; 114000 CAPSULE, DELAYED RELEASE PELLETS ORAL at 18:28

## 2019-12-31 RX ADMIN — Medication 5 ML: at 14:57

## 2019-12-31 RX ADMIN — Medication 4 ML: at 12:53

## 2019-12-31 RX ADMIN — DORNASE ALFA 2.5 MG: 1 SOLUTION RESPIRATORY (INHALATION) at 16:36

## 2019-12-31 RX ADMIN — ALBUTEROL SULFATE 2.5 MG: 2.5 SOLUTION RESPIRATORY (INHALATION) at 20:31

## 2019-12-31 RX ADMIN — Medication 4 ML: at 20:31

## 2019-12-31 RX ADMIN — Medication 1 CAPSULE: at 09:23

## 2019-12-31 RX ADMIN — SULFAMETHOXAZOLE AND TRIMETHOPRIM 2 TABLET: 800; 160 TABLET ORAL at 09:23

## 2019-12-31 RX ADMIN — DORNASE ALFA 2.5 MG: 1 SOLUTION RESPIRATORY (INHALATION) at 08:43

## 2019-12-31 RX ADMIN — PANTOPRAZOLE SODIUM 20 MG: 20 TABLET, DELAYED RELEASE ORAL at 09:24

## 2019-12-31 RX ADMIN — FEXOFENADINE HYDROCHLORIDE 180 MG: 180 TABLET, FILM COATED ORAL at 09:23

## 2019-12-31 RX ADMIN — MEROPENEM 2 G: 1 INJECTION, POWDER, FOR SOLUTION INTRAVENOUS at 09:20

## 2019-12-31 RX ADMIN — ALBUTEROL SULFATE 2.5 MG: 2.5 SOLUTION RESPIRATORY (INHALATION) at 16:36

## 2019-12-31 RX ADMIN — MEROPENEM 2 G: 1 INJECTION, POWDER, FOR SOLUTION INTRAVENOUS at 16:51

## 2019-12-31 RX ADMIN — Medication 1 TABLET: at 09:24

## 2019-12-31 RX ADMIN — PANCRELIPASE 4 CAPSULE: 36000; 180000; 114000 CAPSULE, DELAYED RELEASE PELLETS ORAL at 12:30

## 2019-12-31 RX ADMIN — ALBUTEROL SULFATE 2.5 MG: 2.5 SOLUTION RESPIRATORY (INHALATION) at 12:53

## 2019-12-31 RX ADMIN — MEROPENEM 2 G: 1 INJECTION, POWDER, FOR SOLUTION INTRAVENOUS at 00:22

## 2019-12-31 RX ADMIN — Medication 1 CAPSULE: at 20:06

## 2019-12-31 ASSESSMENT — ACTIVITIES OF DAILY LIVING (ADL)
ADLS_ACUITY_SCORE: 10

## 2019-12-31 ASSESSMENT — MIFFLIN-ST. JEOR: SCORE: 1767.85

## 2019-12-31 NOTE — PROGRESS NOTES
12/31/19 1500   Quick Adds   Type of Visit Initial PT Evaluation   Living Environment   Lives With parent(s)   Living Arrangements house   Home Accessibility stairs to enter home;no concerns   Transportation Anticipated car, drives self   Self-Care   Usual Activity Tolerance good   Current Activity Tolerance moderate   Regular Exercise Yes   Activity/Exercise Type walking   Exercise Amount/Frequency daily   Equipment Currently Used at Home none   Functional Level Prior   Ambulation 0-->independent   Transferring 0-->independent   Toileting 0-->independent   Bathing 0-->independent   Communication 0-->understands/communicates without difficulty   Swallowing 0-->swallows foods/liquids without difficulty   Cognition 0 - no cognition issues reported   Fall history within last six months no   Which of the above functional risks had a recent onset or change? none   Prior Functional Level Comment Patient is independent in all ADLs and mobility   General Information   Onset of Illness/Injury or Date of Surgery - Date 12/30/19   Referring Physician Zana Lilly MD   Patient/Family Goals Statement to go home.    Pertinent History of Current Problem (include personal factors and/or comorbidities that impact the POC) cystic fibrosis s/p RUL resection for recurrent exacerbations in 2009, pancreatic insufficiency, depression, acne, and impaired glucose tolerance   Precautions/Limitations no known precautions/limitations   General Observations Patient is moving well and reports he feels pretty good.    Cognitive Status Examination   Orientation orientation to person, place and time   Level of Consciousness alert   Follows Commands and Answers Questions 100% of the time   Personal Safety and Judgment intact   Memory intact   Pain Assessment   Patient Currently in Pain No   Integumentary/Edema   Integumentary/Edema no deficits were identifed   Posture    Posture Not impaired   Range of Motion (ROM)   ROM Comment grossly  "WFL   Strength   Strength Comments Grossly WFL   Bed Mobility   Bed Mobility Comments Independent   Transfer Skills   Transfer Comments Independent   Gait   Gait Comments Independent   General Therapy Interventions   Planned Therapy Interventions home program guidelines;progressive activity/exercise   Clinical Impression   Criteria for Skilled Therapeutic Intervention yes, treatment indicated   PT Diagnosis impaired functional activity tolerance   Influenced by the following impairments impaired pulmonary function   Functional limitations due to impairments impaired activity tolerance   Clinical Presentation Stable/Uncomplicated   Clinical Presentation Rationale clinical judgement   Clinical Decision Making (Complexity) Low complexity   Therapy Frequency 2x/week   Predicted Duration of Therapy Intervention (days/wks) 1 week   Anticipated Discharge Disposition Home   Risk & Benefits of therapy have been explained Yes   Patient, Family & other staff in agreement with plan of care Yes   [facility] [employer] TM \"6 Clicks\"   2016, Trustees of [facility], under license to [employer].  All rights reserved.   6 Clicks Short Forms Basic Mobility Inpatient Short Form   [facility]  \"6 Clicks\" V.2 Basic Mobility Inpatient Short Form   1. Turning from your back to your side while in a flat bed without using bedrails? 4 - None   2. Moving from lying on your back to sitting on the side of a flat bed without using bedrails? 4 - None   3. Moving to and from a bed to a chair (including a wheelchair)? 4 - None   4. Standing up from a chair using your arms (e.g., wheelchair, or bedside chair)? 4 - None   5. To walk in hospital room? 4 - None   6. Climbing 3-5 steps with a railing? 4 - None   Basic Mobility Raw Score (Score out of 24.Lower scores equate to lower levels of function) 24   Total Evaluation Time   Total Evaluation Time (Minutes) 10     "

## 2019-12-31 NOTE — PROGRESS NOTES
Care Coordinator Progress Note    Admission Date/Time:  2019  Attending MD:  Paulo Hoover*    Data  Chart reviewed, discussed with interdisciplinary team.   Patient was admitted for: Data Unavailable.    Concerns with insurance coverage for discharge needs: None.  Current Living Situation: Patient roommates.  Support System: Supportive  Services Involved: Home Infusion  Transportation at Discharge: Family or friend will provide  Barriers to Discharge: medical clearance    Coordination of Care and Referrals: Provided patient/family with options for Home Infusion.        Assessment  Demario Abbasi is a 24 year old with prior medical history of Cystic Fibrosis, admitted with CF exacerbation. Per medical team patient will require IV antibiotics upon discharge. Met with patient to offer choice, Demario would like to use [facility] (ph: [phone], fax: [phone]) for IV antibiotics as he has in the past. Referral called and [facility] Liaison, updated. PICC line in place. No other needs identified at this time, writer will continue to follow.     Plan  Anticipated Discharge Date:    Anticipated Discharge Plan:  Home with IV antibiotics    Tammy George, RN, BSN  Adult Inpatient CF Nurse Clinician  Ph: [phone]  [phone]

## 2019-12-31 NOTE — PROGRESS NOTES
[facility], [place]    Progress Note - Marjen 4 Service        Date of Admission:  12/30/2019    Assessment & Plan   Demario Abbasi is a 24 year old male with cystic fibrosis s/p RUL resection for recurrent exacerbations in 2009, pancreatic insufficiency, depression, acne, and impaired glucose tolerance who comes to the hospital from clinic because of ongoing shortness of breath, dyspnea on exertion, and sputum production, concerning for CF exacerbation that is not getting better with PO antibiotics.     Changes for today:   - Continue IV antibiotics   - Respiratory viral panel negative      # Cystic fibrosis   # Achromobacter positive sputum culture   Patient follows with Dr. Lilly. The patient was seen by Dr. Lilly 11/26/2019 at which time the patient said that he was still having shortness of breath with activity, decreased sputum clearing, and feelings of chest congestion. He has a history of sputum production that grows MSSA and Achromobacter. The Achromobacter is usually resistant to all oral antibiotics. He was previously on PO antibiotics but did not improve. Therefore, his pulmonologist recommended that he come to the hospital for PICC placement and initiation of IV antibiotics. ESR WNL. CRP mildly elevated. Very slight leukocytosis. PICC placed on 12/30/20919. IV abx started later that day. RVP negative.   - CF pulmonology consulted   - Start Meropenem 2 g q8 hours  - Start TMP-SMX 2 tablets BID   - CF sputum culture in process   - Fungal sputum culture in process   - Nocardia sputum culture in process   - Vesting therapy QID  - Continue PTA albuterol neb  - Continue PTA azithromycin M,W, F  - Continue PTA pulmozyme neb   - Continue PTA allegra  - Continue PTA flonase   - Holding PTA tobramycin nebs   - PFTs prior to discharge      # Pancreatic insufficiency   - PTA creon 180,000 units with meals      # Depression  - PTA citalopram      # JADE  Hgb of 14.6.   - Can  resume PTA iron supplementation      # GERD  - PTA PPI       Diet: High Kcal/High Protein Diet, ADULT  Room Service    Fluids: None  Lines: PICC  DVT Prophylaxis: Ambulate every shift  Rocha Catheter: not present  Code Status: Full Code      Disposition Plan   Expected discharge: 2 - 3 days, recommended to prior living arrangement once vesting therapy performed, IV abx started .  Entered: Brie Anderson MD 12/31/2019, 5:13 PM       The patient's care was discussed with the Attending Physician, Dr. Hoover.    Brie Anderson MD  [place] [id number] Service  [facility] [place]  Pager: [phone]  Please see sticky note for cross cover information  _____________________________________________________________________    Interval History   Nursing and provider notes reviewed. No acute events overnight. Patient had PICC placed yesterday and started on IV abx. He denied feeling any different this AM. Still having shortness of breath and weak cough. No fevers, chills, nausea, vomiting, or abdominal pain.     Data reviewed today: I reviewed all medications, new labs and imaging results over the last 24 hours.     Physical Exam   Vital Signs: Temp: 96.9  F (36.1  C) Temp src: Oral BP: 112/51 Pulse: 62   Resp: 16 SpO2: 97 % O2 Device: None (Room air)    Weight: 171 lbs 6.4 oz  Physical Exam  Constitutional:       General: He is not in acute distress.     Appearance: He is not toxic-appearing.   HENT:      Head: Normocephalic and atraumatic.      Right Ear: There is no impacted cerumen.      Left Ear: There is no impacted cerumen.      Nose: No congestion or rhinorrhea.      Mouth/Throat:      Pharynx: Oropharynx is clear. No oropharyngeal exudate.   Eyes:      General: No scleral icterus.     Extraocular Movements: Extraocular movements intact.      Pupils: Pupils are equal, round, and reactive to light.   Neck:      Musculoskeletal: No neck rigidity or muscular tenderness.    Cardiovascular:      Rate and Rhythm: Normal rate and regular rhythm.      Pulses: Normal pulses.   Pulmonary:      Effort: Pulmonary effort is normal.      Comments: Decreased breath sounds on the right   Abdominal:      General: Abdomen is flat. There is no distension.      Palpations: Abdomen is soft.   Musculoskeletal:         General: No swelling or tenderness.   Skin:     Coloration: Skin is not jaundiced or pale.   Neurological:      General: No focal deficit present.      Mental Status: He is alert and oriented to person, place, and time.   Psychiatric:         Mood and Affect: Mood normal.         Behavior: Behavior normal.         Data   Recent Labs   Lab 12/31/19  0741 12/30/19  1229   WBC 6.7 11.2*   HGB 14.6 14.6   MCV 88 88    262   INR  --  1.07   NA  --  140   POTASSIUM  --  4.0   CHLORIDE  --  110*   CO2  --  28   BUN  --  10   CR  --  0.98   ANIONGAP  --  3   ROCAEL  --  8.6   GLC  --  119*   ALBUMIN  --  3.6   BILITOTAL  --  0.4   ALKPHOS  --  124   ALT  --  29   AST  --  14     No results found for this or any previous visit (from the past 24 hour(s)).

## 2019-12-31 NOTE — PLAN OF CARE
Lungs clear after RT tx. Tolerating diet and activity. No dyspnea noted. Denies pain. Ate lunch, no breakfast and is ordering out for supper. Merrem  at 8 hour intervals, run over 3 hours.

## 2019-12-31 NOTE — PROGRESS NOTES
Therapy: IV ABX  Insurance: [facility] coverage    In reference to admission date 12/30/19 [facility] to check IV ABX coverage    Please contact Intake with any questions, [phone] or In Basket pool, [facility] ([phone]).

## 2019-12-31 NOTE — PLAN OF CARE
7A  Discharge Planner PT   Patient plan for discharge: home   Current status: ambulating independently; on room air with O2 sats at 97% before and after ambulation   Barriers to return to prior living situation: none  Recommendations for discharge: home  Rationale for recommendations: Education provided for HIIT program while here in hospital and benefits of participating. PT will be initiated to augment airway clearance and improve conditioning.       Entered by: Modesta Dawson 12/31/2019 4:03 PM

## 2019-12-31 NOTE — PLAN OF CARE
"/67 (BP Location: Right arm)   Pulse 70   Temp 97.7  F (36.5  C) (Oral)   Resp 16   Ht 1.753 m (5' 9\")   Wt 77.9 kg (171 lb 11.2 oz)   SpO2 94%   BMI 25.36 kg/m      VSS. Afebrile. Pt denies shortness of breath or chest pain. LS diminished w/ pt reporting brown/ green productive sputum. Satting 94% on RA. Pt voiding spontaneously, not saving. Last BM 12/30. L DL PICC infusing Merrem per orders w/ small amt of bleeding noted around site. No pain or erythema noted, will continue to monitor. Pt on oral bactrim. BGs 91 + 114 during shift. Good appetite, tolerating High anupam, High protein. Plan for PFTs today. Continue plan of care   "

## 2020-01-01 LAB
GLUCOSE BLDC GLUCOMTR-MCNC: 89 MG/DL (ref 70–99)
GLUCOSE BLDC GLUCOMTR-MCNC: 98 MG/DL (ref 70–99)

## 2020-01-01 PROCEDURE — 25000128 H RX IP 250 OP 636: Performed by: STUDENT IN AN ORGANIZED HEALTH CARE EDUCATION/TRAINING PROGRAM

## 2020-01-01 PROCEDURE — 25000132 ZZH RX MED GY IP 250 OP 250 PS 637: Performed by: STUDENT IN AN ORGANIZED HEALTH CARE EDUCATION/TRAINING PROGRAM

## 2020-01-01 PROCEDURE — 99207 ZZC CDG-CUT & PASTE-POTENTIAL IMPACT ON LEVEL: CPT | Performed by: HOSPITALIST

## 2020-01-01 PROCEDURE — 25000125 ZZHC RX 250: Performed by: STUDENT IN AN ORGANIZED HEALTH CARE EDUCATION/TRAINING PROGRAM

## 2020-01-01 PROCEDURE — 12000001 ZZH R&B MED SURG/OB UMMC

## 2020-01-01 PROCEDURE — 25800030 ZZH RX IP 258 OP 636: Performed by: STUDENT IN AN ORGANIZED HEALTH CARE EDUCATION/TRAINING PROGRAM

## 2020-01-01 PROCEDURE — 99232 SBSQ HOSP IP/OBS MODERATE 35: CPT | Mod: GC | Performed by: HOSPITALIST

## 2020-01-01 PROCEDURE — 94669 MECHANICAL CHEST WALL OSCILL: CPT

## 2020-01-01 PROCEDURE — 25000128 H RX IP 250 OP 636: Performed by: HOSPITALIST

## 2020-01-01 PROCEDURE — 40000275 ZZH STATISTIC RCP TIME EA 10 MIN

## 2020-01-01 PROCEDURE — 94640 AIRWAY INHALATION TREATMENT: CPT

## 2020-01-01 PROCEDURE — 25000125 ZZHC RX 250

## 2020-01-01 PROCEDURE — 00000146 ZZHCL STATISTIC GLUCOSE BY METER IP

## 2020-01-01 PROCEDURE — 94640 AIRWAY INHALATION TREATMENT: CPT | Mod: 76

## 2020-01-01 RX ORDER — DEXTROSE MONOHYDRATE 25 G/50ML
25-50 INJECTION, SOLUTION INTRAVENOUS
Status: DISCONTINUED | OUTPATIENT
Start: 2020-01-01 | End: 2020-01-02 | Stop reason: HOSPADM

## 2020-01-01 RX ORDER — HEPARIN SODIUM,PORCINE 10 UNIT/ML
5 VIAL (ML) INTRAVENOUS EVERY 24 HOURS
Status: DISCONTINUED | OUTPATIENT
Start: 2020-01-01 | End: 2020-01-02 | Stop reason: HOSPADM

## 2020-01-01 RX ORDER — HEPARIN SODIUM,PORCINE 10 UNIT/ML
5-10 VIAL (ML) INTRAVENOUS
Status: DISCONTINUED | OUTPATIENT
Start: 2020-01-01 | End: 2020-01-02 | Stop reason: HOSPADM

## 2020-01-01 RX ORDER — NICOTINE POLACRILEX 4 MG
15-30 LOZENGE BUCCAL
Status: DISCONTINUED | OUTPATIENT
Start: 2020-01-01 | End: 2020-01-02 | Stop reason: HOSPADM

## 2020-01-01 RX ADMIN — ALBUTEROL SULFATE 2.5 MG: 2.5 SOLUTION RESPIRATORY (INHALATION) at 12:35

## 2020-01-01 RX ADMIN — Medication 4 ML: at 12:35

## 2020-01-01 RX ADMIN — Medication 4 ML: at 20:41

## 2020-01-01 RX ADMIN — Medication 1 CAPSULE: at 20:41

## 2020-01-01 RX ADMIN — SULFAMETHOXAZOLE AND TRIMETHOPRIM 2 TABLET: 800; 160 TABLET ORAL at 08:44

## 2020-01-01 RX ADMIN — MEROPENEM 2 G: 1 INJECTION, POWDER, FOR SOLUTION INTRAVENOUS at 17:49

## 2020-01-01 RX ADMIN — Medication 1 TABLET: at 08:44

## 2020-01-01 RX ADMIN — PANCRELIPASE 5 CAPSULE: 36000; 180000; 114000 CAPSULE, DELAYED RELEASE PELLETS ORAL at 12:00

## 2020-01-01 RX ADMIN — FEXOFENADINE HYDROCHLORIDE 180 MG: 180 TABLET, FILM COATED ORAL at 08:43

## 2020-01-01 RX ADMIN — DORNASE ALFA 2.5 MG: 1 SOLUTION RESPIRATORY (INHALATION) at 08:29

## 2020-01-01 RX ADMIN — MEROPENEM 2 G: 1 INJECTION, POWDER, FOR SOLUTION INTRAVENOUS at 08:45

## 2020-01-01 RX ADMIN — DORNASE ALFA 2.5 MG: 1 SOLUTION RESPIRATORY (INHALATION) at 16:57

## 2020-01-01 RX ADMIN — PANCRELIPASE 5 CAPSULE: 36000; 180000; 114000 CAPSULE, DELAYED RELEASE PELLETS ORAL at 18:00

## 2020-01-01 RX ADMIN — ALBUTEROL SULFATE 2.5 MG: 2.5 SOLUTION RESPIRATORY (INHALATION) at 20:40

## 2020-01-01 RX ADMIN — FLUTICASONE PROPIONATE 1 SPRAY: 50 SPRAY, METERED NASAL at 08:46

## 2020-01-01 RX ADMIN — MEROPENEM 2 G: 1 INJECTION, POWDER, FOR SOLUTION INTRAVENOUS at 00:48

## 2020-01-01 RX ADMIN — Medication 1 CAPSULE: at 08:44

## 2020-01-01 RX ADMIN — ALBUTEROL SULFATE 2.5 MG: 2.5 SOLUTION RESPIRATORY (INHALATION) at 16:57

## 2020-01-01 RX ADMIN — ALBUTEROL SULFATE 2.5 MG: 2.5 SOLUTION RESPIRATORY (INHALATION) at 08:29

## 2020-01-01 RX ADMIN — Medication 5 ML: at 12:27

## 2020-01-01 RX ADMIN — PANTOPRAZOLE SODIUM 20 MG: 20 TABLET, DELAYED RELEASE ORAL at 08:44

## 2020-01-01 RX ADMIN — AZITHROMYCIN 500 MG: 500 TABLET, FILM COATED ORAL at 08:44

## 2020-01-01 RX ADMIN — CITALOPRAM HYDROBROMIDE 20 MG: 20 TABLET ORAL at 08:43

## 2020-01-01 RX ADMIN — SULFAMETHOXAZOLE AND TRIMETHOPRIM 2 TABLET: 800; 160 TABLET ORAL at 20:41

## 2020-01-01 ASSESSMENT — ACTIVITIES OF DAILY LIVING (ADL)
ADLS_ACUITY_SCORE: 10

## 2020-01-01 NOTE — PLAN OF CARE
Pt A&Ox4, able to make needs known. PICC running TKO and ABX as ordered. Pt denies pain & N/V this shift. Vitally stable. Slept comfortably throughout shift, significant other at bedside

## 2020-01-01 NOTE — PLAN OF CARE
Lungs clear. Continues to have productive cough with small-moderate amounts of greenish/brown sputum. No breakfast but ate lunch. Bg=98 and 89. Merrem infused over 3 hours. Significant other at bedside and very supportive. Patient declined Enoxaparin with the understanding that he would take 3 long walks today.

## 2020-01-01 NOTE — PROGRESS NOTES
[facility], [place]    Progress Note - Marjen 4 Service        Date of Admission:  12/30/2019    Assessment & Plan   Demario Abbasi is a 24 year old male with cystic fibrosis s/p RUL resection for recurrent exacerbations in 2009, pancreatic insufficiency, depression, acne, and impaired glucose tolerance who comes to the hospital from clinic because of ongoing shortness of breath, dyspnea on exertion, and sputum production, concerning for CF exacerbation that is not getting better with PO antibiotics.     Changes for today:   - No major changes      # Cystic fibrosis   # Achromobacter positive sputum culture   Patient follows with Dr. Lilly. The patient was seen by Dr. Lilly 11/26/2019 at which time the patient said that he was still having shortness of breath with activity, decreased sputum clearing, and feelings of chest congestion. He has a history of sputum production that grows MSSA and Achromobacter. The Achromobacter is usually resistant to all oral antibiotics. He was previously on PO antibiotics but did not improve. Therefore, his pulmonologist recommended that he come to the hospital for PICC placement and initiation of IV antibiotics. ESR WNL. CRP mildly elevated. Very slight leukocytosis. PICC placed on 12/30/20919. IV abx started later that day. RVP negative.   - CF pulmonology consulted   - Start Meropenem 2 g q8 hours  - Start TMP-SMX 2 tablets BID   - CF sputum culture in process   - Fungal sputum culture in process   - Nocardia sputum culture in process   - Vesting therapy QID  - Continue PTA albuterol neb  - Continue PTA azithromycin M,W, F  - Continue PTA pulmozyme neb   - Continue PTA allegra  - Continue PTA flonase   - Holding PTA tobramycin nebs   - PFTs prior to discharge (1/2/2020)     # Pancreatic insufficiency   - PTA creon 180,000 units with meals      # Depression  - PTA citalopram      # JADE  Hgb of 14.6.   - Can resume PTA iron supplementation       # GERD  - PTA PPI     Diet: High Kcal/High Protein Diet, ADULT  Room Service    Fluids: None  Lines: PICC  DVT Prophylaxis: Ambulate every shift  Rocha Catheter: not present  Code Status: Full Code      Disposition Plan   Expected discharge: 2 - 3 days, recommended to prior living arrangement once vesting therapy performed, IV abx started .  Entered: Brie Anderson MD 01/01/2020, 4:38 PM       The patient's care was discussed with the Attending Physician, Dr. Hoover.    Brie Anderson MD  [place] [address] Service  [facility]  Pager: [phone]  Please see sticky note for cross cover information  _____________________________________________________________________    Interval History   Nursing and provider notes reviewed. No acute events overnight. Patient denies any fevers, chills, nausea, or vomiting. Still having some shortness of breath.    Data reviewed today: I reviewed all medications, new labs and imaging results over the last 24 hours.     Physical Exam   Vital Signs: Temp: 97.5  F (36.4  C) Temp src: Oral BP: 116/55   Heart Rate: 70 Resp: 18 SpO2: 96 % O2 Device: None (Room air)    Weight: 171 lbs 6.4 oz  Physical Exam  Constitutional:       General: He is not in acute distress.     Appearance: He is not toxic-appearing.   HENT:      Head: Normocephalic and atraumatic.      Nose: No congestion or rhinorrhea.      Mouth/Throat:      Pharynx: Oropharynx is clear. No oropharyngeal exudate.   Eyes:      General: No scleral icterus.     Extraocular Movements: Extraocular movements intact.      Pupils: Pupils are equal, round, and reactive to light.   Neck:      Musculoskeletal: No neck rigidity or muscular tenderness.   Cardiovascular:      Rate and Rhythm: Normal rate and regular rhythm.      Pulses: Normal pulses.   Pulmonary:      Effort: Pulmonary effort is normal.      Comments: Decreased breath sounds on the right   Abdominal:      General: Abdomen is  flat. There is no distension.      Palpations: Abdomen is soft.   Musculoskeletal:         General: No swelling or tenderness.   Skin:     Coloration: Skin is not jaundiced or pale.   Neurological:      General: No focal deficit present.      Mental Status: He is alert and oriented to person, place, and time.   Psychiatric:         Mood and Affect: Mood normal.         Behavior: Behavior normal.         Data   Recent Labs   Lab 12/31/19  0741 12/30/19  1229   WBC 6.7 11.2*   HGB 14.6 14.6   MCV 88 88    262   INR  --  1.07   NA  --  140   POTASSIUM  --  4.0   CHLORIDE  --  110*   CO2  --  28   BUN  --  10   CR  --  0.98   ANIONGAP  --  3   ROCAEL  --  8.6   GLC  --  119*   ALBUMIN  --  3.6   BILITOTAL  --  0.4   ALKPHOS  --  124   ALT  --  29   AST  --  14     No results found for this or any previous visit (from the past 24 hour(s)).

## 2020-01-01 NOTE — PROGRESS NOTES
Determination of self-administration of aerosol medication for Cystic Fibrosis patients:    1. An order has been written by the physician for patient to self-administer: yes    2. Patient has appropriate motivation level to complete aerosol/vest treatments: yes    3. Self-administration evaluation:     Able to get out of bed on own: yes  Able to add medication to neb cup: yes  Altered mental status: no  Any concerns that may affect the patient's ability to self-administer therapy: no  Learning impairment: no  Developmental disability or delay: no  Understands liter flow needed for treatment: yes  Able to turn flowmeter on and off: yes    4. Knowledge of aerosolized medications:    Understands the order in which to administer prescribed medications: yes  Understands possible side effects: yes    Albuterol 2.5 mg (Bronchodilator), Side Effects: Increase Heart Rate  Pulmozyme/Dnase 2.5 mg (Mucolytic), Side Effects: Hemoptysis  Hypertonic Saline 7% (Expectorant), Side Effects: Bronchospasm    5. Medication will be dispensed through pharmacy. The aerosol/vest treatment will be initiated and in progress before any medications will be left. Equipment, supplies, medication, and education will be provided by Cardiopulmonary Services. If non-compliance is suspected, continuation of self-administration will be re-evaluated and may be discontinued.  Follow up with the patient will be done if treatment goals are not met. Vest therapy frequencies are 8, 9, and 10 Hz, at a pressure of 10, and 18, 19, 20 Hz at a pressure of 6. Each frequency is 5 minutes long followed by vest deflation with cough X3. Documentation of length of each therapy will be done in the Electronic Medical Record.    Margaret Werner RT on 12/31/2019 at 7:59 PM

## 2020-01-01 NOTE — PLAN OF CARE
[id number]: VSS. Afebrile.  at bedtime. No complaints. Denies N/V or pain. Up ad brandi. Showered. CF vest treatment completed this evening. Significant other at bedside. Continue POC.

## 2020-01-02 ENCOUNTER — HOME INFUSION (PRE-WILLOW HOME INFUSION) (OUTPATIENT)
Dept: PHARMACY | Facility: CLINIC | Age: 25
End: 2020-01-02

## 2020-01-02 ENCOUNTER — PATIENT OUTREACH (OUTPATIENT)
Dept: CARE COORDINATION | Facility: CLINIC | Age: 25
End: 2020-01-02

## 2020-01-02 VITALS
HEART RATE: 70 BPM | TEMPERATURE: 98.1 F | WEIGHT: 171.4 LBS | DIASTOLIC BLOOD PRESSURE: 48 MMHG | SYSTOLIC BLOOD PRESSURE: 128 MMHG | HEIGHT: 69 IN | BODY MASS INDEX: 25.39 KG/M2 | RESPIRATION RATE: 16 BRPM | OXYGEN SATURATION: 98 %

## 2020-01-02 DIAGNOSIS — E84.9 CYSTIC FIBROSIS (H): Primary | ICD-10-CM

## 2020-01-02 LAB
ANION GAP SERPL CALCULATED.3IONS-SCNC: 4 MMOL/L (ref 3–14)
BUN SERPL-MCNC: 14 MG/DL (ref 7–30)
CALCIUM SERPL-MCNC: 9.2 MG/DL (ref 8.5–10.1)
CHLORIDE SERPL-SCNC: 105 MMOL/L (ref 94–109)
CO2 SERPL-SCNC: 26 MMOL/L (ref 20–32)
CREAT SERPL-MCNC: 1.26 MG/DL (ref 0.66–1.25)
EXPTIME-PRE: 8.35 SEC
FEF2575-%PRED-PRE: 51 %
FEF2575-PRE: 2.59 L/SEC
FEF2575-PRED: 5.04 L/SEC
FEFMAX-%PRED-PRE: 85 %
FEFMAX-PRE: 8.71 L/SEC
FEFMAX-PRED: 10.2 L/SEC
FEV1-%PRED-PRE: 62 %
FEV1-PRE: 2.93 L
FEV1FEV6-PRE: 79 %
FEV1FEV6-PRED: 84 %
FEV1FVC-PRE: 78 %
FEV1FVC-PRED: 85 %
FIFMAX-PRE: 3.77 L/SEC
FVC-%PRED-PRE: 67 %
FVC-PRE: 3.78 L
FVC-PRED: 5.59 L
GFR SERPL CREATININE-BSD FRML MDRD: 80 ML/MIN/{1.73_M2}
GLUCOSE SERPL-MCNC: 104 MG/DL (ref 70–99)
MAGNESIUM SERPL-MCNC: 2.2 MG/DL (ref 1.6–2.3)
PHOSPHATE SERPL-MCNC: 3 MG/DL (ref 2.5–4.5)
POTASSIUM SERPL-SCNC: 4.4 MMOL/L (ref 3.4–5.3)
SODIUM SERPL-SCNC: 134 MMOL/L (ref 133–144)

## 2020-01-02 PROCEDURE — 94375 RESPIRATORY FLOW VOLUME LOOP: CPT | Mod: ZF

## 2020-01-02 PROCEDURE — 25000125 ZZHC RX 250

## 2020-01-02 PROCEDURE — 99239 HOSP IP/OBS DSCHRG MGMT >30: CPT | Mod: GC | Performed by: HOSPITALIST

## 2020-01-02 PROCEDURE — 80048 BASIC METABOLIC PNL TOTAL CA: CPT | Performed by: STUDENT IN AN ORGANIZED HEALTH CARE EDUCATION/TRAINING PROGRAM

## 2020-01-02 PROCEDURE — 25000125 ZZHC RX 250: Performed by: STUDENT IN AN ORGANIZED HEALTH CARE EDUCATION/TRAINING PROGRAM

## 2020-01-02 PROCEDURE — 83735 ASSAY OF MAGNESIUM: CPT | Performed by: STUDENT IN AN ORGANIZED HEALTH CARE EDUCATION/TRAINING PROGRAM

## 2020-01-02 PROCEDURE — 40000275 ZZH STATISTIC RCP TIME EA 10 MIN

## 2020-01-02 PROCEDURE — 36415 COLL VENOUS BLD VENIPUNCTURE: CPT | Performed by: STUDENT IN AN ORGANIZED HEALTH CARE EDUCATION/TRAINING PROGRAM

## 2020-01-02 PROCEDURE — 25000128 H RX IP 250 OP 636: Performed by: STUDENT IN AN ORGANIZED HEALTH CARE EDUCATION/TRAINING PROGRAM

## 2020-01-02 PROCEDURE — 25800030 ZZH RX IP 258 OP 636: Performed by: STUDENT IN AN ORGANIZED HEALTH CARE EDUCATION/TRAINING PROGRAM

## 2020-01-02 PROCEDURE — 25000132 ZZH RX MED GY IP 250 OP 250 PS 637: Performed by: STUDENT IN AN ORGANIZED HEALTH CARE EDUCATION/TRAINING PROGRAM

## 2020-01-02 PROCEDURE — 84100 ASSAY OF PHOSPHORUS: CPT | Performed by: STUDENT IN AN ORGANIZED HEALTH CARE EDUCATION/TRAINING PROGRAM

## 2020-01-02 PROCEDURE — 94669 MECHANICAL CHEST WALL OSCILL: CPT

## 2020-01-02 PROCEDURE — 94640 AIRWAY INHALATION TREATMENT: CPT

## 2020-01-02 RX ORDER — TOBRAMYCIN INHALATION 300 MG/4ML
300 SOLUTION RESPIRATORY (INHALATION) 2 TIMES DAILY
COMMUNITY
Start: 2020-01-02 | End: 2020-03-05

## 2020-01-02 RX ORDER — TOBRAMYCIN INHALATION 300 MG/4ML
300 SOLUTION RESPIRATORY (INHALATION) 2 TIMES DAILY
Status: ON HOLD | COMMUNITY
End: 2020-01-02

## 2020-01-02 RX ADMIN — CITALOPRAM HYDROBROMIDE 20 MG: 20 TABLET ORAL at 09:32

## 2020-01-02 RX ADMIN — FEXOFENADINE HYDROCHLORIDE 180 MG: 180 TABLET, FILM COATED ORAL at 09:32

## 2020-01-02 RX ADMIN — Medication 1 TABLET: at 09:31

## 2020-01-02 RX ADMIN — PANCRELIPASE 5 CAPSULE: 36000; 180000; 114000 CAPSULE, DELAYED RELEASE PELLETS ORAL at 09:35

## 2020-01-02 RX ADMIN — DORNASE ALFA 2.5 MG: 1 SOLUTION RESPIRATORY (INHALATION) at 08:32

## 2020-01-02 RX ADMIN — FLUTICASONE PROPIONATE 1 SPRAY: 50 SPRAY, METERED NASAL at 09:49

## 2020-01-02 RX ADMIN — SULFAMETHOXAZOLE AND TRIMETHOPRIM 2 TABLET: 800; 160 TABLET ORAL at 09:32

## 2020-01-02 RX ADMIN — Medication 5 ML: at 13:12

## 2020-01-02 RX ADMIN — Medication 1 CAPSULE: at 09:32

## 2020-01-02 RX ADMIN — MEROPENEM 2 G: 1 INJECTION, POWDER, FOR SOLUTION INTRAVENOUS at 09:49

## 2020-01-02 RX ADMIN — PANTOPRAZOLE SODIUM 20 MG: 20 TABLET, DELAYED RELEASE ORAL at 09:32

## 2020-01-02 RX ADMIN — ALBUTEROL SULFATE 2.5 MG: 2.5 SOLUTION RESPIRATORY (INHALATION) at 08:32

## 2020-01-02 RX ADMIN — MEROPENEM 2 G: 1 INJECTION, POWDER, FOR SOLUTION INTRAVENOUS at 00:41

## 2020-01-02 ASSESSMENT — ACTIVITIES OF DAILY LIVING (ADL)
ADLS_ACUITY_SCORE: 10

## 2020-01-02 NOTE — PLAN OF CARE
VSS on RA spouse remains at bedside. 2x L PICC for IV kulwinder over 3 hrs. LS notably clear. PFT's tomorrow 1/2 potential for discharge thereafter in lung fxn is adequately improved. [facility] will follow. Proceed w/ POC

## 2020-01-02 NOTE — PLAN OF CARE
Pt A&Ox4, able to make needs known. DL  PICC, purple lumen SL, gray lumen heparin locked. Pt denies pain & N/V this shift. Vitally stable. Slept comfortably throughout shift, significant other at bedside   PFTs today, possible discharge.

## 2020-01-02 NOTE — PROGRESS NOTES
Pulmonary Medicine  Cystic Fibrosis - Lung Transplant Team  Progress Note  2020       Patient: Demario Abbasi  MRN: [id number]  : 1995 (age 24 year old)  Admission date: 2019    Assessment & Plan:     24 year old male with a history of CF, Panc Insufficiency, MDD, Impaired Glucose Tolerance, s/p RUL Resection for recurrent exacerbations in . The patient was admitted on 2019 for CF exacerbation unresponsive to outpatient PO Abx.     CF pulmonary exacerbation: Last seen in clinic on . Endorsing increase SOB, [place] and production cough for the past couple of months. Unresponsive to outpatient oral antibiotics. CXR upon admission demonstrating diffuse opacities concerning for infectious etiology. RVP negative. H/o Achromobacter & MSSA on cultures.  - CF sputum culture  grew Achromobacter x 2 strains, MSSA. Sensitivities personally reviewed .  - Recommend stopping Bactrim due to resistance on most recent sputum culture.   - Discharge on IV meropenem every 8 hr. Continue until seen in Pulm clinic ~ 3 weeks.    - Vesting QID with nebs: Albuterol QID, Pulmozyme BID, HTS BID  - Cont PTA Azithro  - Not on Elvin for this month  - PFTs today, demonstrating lung function improvement with FEV 1 up to 62%, which is the patient's best since . Personally reviewed.      Exocrine pancreatic insufficiency: Denies symptoms of malabsorption. Body mass index is 25.64 kg/m . Good appetite.   - High kcal / high protein diet  - PTA enzymes and vitamins  - CF RD following     Depression: Adequately controlled with current Celexa.     Iron deficiency anemia: Continue iron replacement.     Glucose intolerance: Continue annual glucose tolerance testing.     Reflux: Well-controlled with current PPI.    We appreciate the excellent care provided by the [place] [address] team. Recommendations communicated via telephone and this note. Will continue to follow along closely, please do not hesitate to  "call with any questions or concerns.    Patient discussed with Dr. Tyler.    Cara Thornton, APRN, CNP  Inpatient Nurse Practitioner  Pulmonary CF/Transplant  Pager [phone]     Subjective & Interval History:     Doing well this morning. Endorsing overall decrease in cough and sputum production. Denies SOB. Tolerating vest therapies. Looking forward to discharging later today.     Review of Systems:     C: no fever, no chills, no change in weight, no change in appetite  INTEGUMENTARY/SKIN: no rash or obvious new lesions  ENT/MOUTH: no sore throat, no sinus pain, no nasal congestion or drainage  RESP: see interval history  CV: no chest pain, no palpitations, no peripheral edema, no orthopnea  GI: no nausea, no vomiting, no change in stools, no reflux symptoms  : no dysuria  MUSCULOSKELETAL: no myalgias, no arthralgias  ENDOCRINE: blood sugars with adequate control  NEURO: no headache, no numbness or tingling  PSYCHIATRIC: mood stable    Physical Exam:     Vital signs:  Temp: 97  F (36.1  C) Temp src: Oral BP: 119/56   Heart Rate: 62 Resp: 16 SpO2: 95 % O2 Device: None (Room air)   Height: 175.3 cm (5' 9\") Weight: 77.7 kg (171 lb 6.4 oz)  I/O:     Intake/Output Summary (Last 24 hours) at 1/2/2020 1323  Last data filed at 1/1/2020 1800  Gross per 24 hour   Intake 100 ml   Output --   Net 100 ml     Constitutional: Awake, alert, in no apparent distress.   HEENT: Eyes with pink conjunctivae, anicteric. Oral mucosa moist without lesions. Neck supple without lymphadenopathy.   PULM: Good air flow bilaterally. Few scattered crackles bilaterally. Non-labored breathing on RA.  CV: Normal S1 and S2. RRR. No murmur, gallop, or rub. No peripheral edema.   ABD: NABS, soft, nontender, nondistended.    MSK: Moves all extremities. No apparent muscle wasting.   NEURO: Alert and oriented, conversant.   SKIN: Warm, dry. No rash on limited exam.   PSYCH: Mood stable.    Lines, Drains, and Devices:  PICC Double Lumen 12/30/19 Left Basilic " (Active)   Site Assessment WDL 1/2/2020  9:00 AM   External Cath Length (cm) 3 cm 12/27/2019 12:00 AM   Dressing Intervention Chlorhexidine patch;Transparent 12/30/2019  4:23 PM   Dressing Change Due 01/06/20 12/31/2019 10:00 AM   Lumen A - Color PURPLE 1/2/2020  9:00 AM   Lumen A - Status infusing 1/2/2020  9:00 AM   Lumen A - Cap Change Due 01/03/20 1/1/2020  5:00 PM   Lumen B - Color GRAY 1/2/2020  9:00 AM   Lumen B - Status heparin locked 1/2/2020  9:00 AM   Lumen B - Cap Change Due 01/03/20 1/1/2020  5:00 PM   Extravasation? No 1/1/2020  5:00 PM   Line Necessity Yes, meets criteria 1/2/2020  1:00 AM   Number of days: 3     Data:     LABS    CMP:   Recent Labs   Lab 01/02/20  0810 12/30/19  1229    140   POTASSIUM 4.4 4.0   CHLORIDE 105 110*   CO2 26 28   ANIONGAP 4 3   * 119*   BUN 14 10   CR 1.26* 0.98   GFRESTIMATED 80 >90   GFRESTBLACK >90 >90   ROCAEL 9.2 8.6   MAG 2.2 1.9   PHOS 3.0 2.7   ALBUMIN  --  3.6   BILITOTAL  --  0.4   ALKPHOS  --  124   AST  --  14   ALT  --  29     CBC:   Recent Labs   Lab 12/31/19  0741 12/30/19  1229   WBC 6.7 11.2*   RBC 5.11 5.02   HGB 14.6 14.6   HCT 44.9 44.2   MCV 88 88   MCH 28.6 29.1   MCHC 32.5 33.0   RDW 12.9 12.9    262       INR:   Recent Labs   Lab 12/30/19  1229   INR 1.07       Glucose:   Recent Labs   Lab 01/02/20  0810 01/01/20  1254 01/01/20  0828 12/31/19  2229 12/31/19  1802 12/31/19  1258 12/30/19  2244  12/30/19  1229   *  --   --   --   --   --   --   --  119*   BGM  --  89 98 100* 130* 141* 114*   < >  --     < > = values in this interval not displayed.       Blood Gas: No lab results found in last 7 days.    Culture Data   Recent Labs   Lab 12/30/19  1322   CULT Moderate growth  Normal elmer    Moderate growth  Staphylococcus aureus  *  Moderate growth  Achromobacter xylosoxidans/denitrificans  Susceptibility testing in progress  *  Moderate growth  Strain 2  Achromobacter xylosoxidans/denitrificans  Susceptibility testing in  progress  *  Culture in progress  Presumptive identification:  Candida albicans / dubliniensis  isolated  Candida albicans and Candida dubliniensis are not routinely speciated  *  No growth after 2 days       Virology Data:   Lab Results   Component Value Date    FLUAH1 Not Detected 12/30/2019    FLUAH3 Not Detected 12/30/2019    GP2994 Not Detected 12/30/2019    IFLUB Not Detected 12/30/2019    RSVA Not Detected 12/30/2019    RSVB Not Detected 12/30/2019    PIV1 Not Detected 12/30/2019    PIV2 Not Detected 12/30/2019    PIV3 Not Detected 12/30/2019    HMPV Not Detected 12/30/2019    HRVS Negative 03/16/2019    ADVBE Negative 03/16/2019    ADVC Negative 03/16/2019    ADVC Negative 03/13/2018       Historical CMV results (last 3 of prior testing):  No results found for: CMVQNT  No results found for: CMVLOG    Urine Studies    Recent Labs   Lab Test 02/22/19  1032 11/03/16  1032   URINEPH 5.0 5.5   NITRITE Negative Negative   LEUKEST Negative Negative   WBCU <1 <1       Most Recent [place] Pulmonary Function Testing (FVC/FEV1 only)  FVC-Pre   Date Value Ref Range Status   01/02/2020 3.78 L    12/26/2019 3.50 L    11/08/2019 3.39 L    08/23/2019 3.57 L      FVC-%Pred-Pre   Date Value Ref Range Status   01/02/2020 67 %    12/26/2019 62 %    11/08/2019 60 %    08/23/2019 63 %      FEV1-Pre   Date Value Ref Range Status   01/02/2020 2.93 L    12/26/2019 2.78 L    11/08/2019 2.69 L    08/23/2019 2.85 L      FEV1-%Pred-Pre   Date Value Ref Range Status   01/02/2020 62 %    12/26/2019 58 %    11/08/2019 57 %    08/23/2019 60 %

## 2020-01-02 NOTE — DISCHARGE INSTRUCTIONS
[facility] Discharge Instructions      Major Tests and Procedures    Imaging: Chest Xray     Consults: Pulmonology, Physical Therapy, Interventional Radiology    Procedure: Left Double Lumen PICC Placement, Pulmonary Function Tests    Sputum Culture Results:  All cultures:  Recent Labs   Lab 19  1322   CULT Moderate growth  Normal elmer    Moderate growth  Staphylococcus aureus  *  Moderate growth  Achromobacter xylosoxidans/denitrificans  Susceptibility testing in progress  *  Moderate growth  Strain 2  Achromobacter xylosoxidans/denitrificans  Susceptibility testing in progress  *  Culture in progress  Presumptive identification:  Candida albicans / dubliniensis  isolated  Candida albicans and Candida dubliniensis are not routinely speciated  *  No growth after 2 days       Good Nutrition Can Improve Lung Function and Overall Health    Take ALL of your vitamins with food     Take 1/2 of your enzymes before EVERY meal/snack and the other 1/2 mid-meal/snack    Nutrition    National CF Foundation Recommendations for BMI in CF Adults: Women: at least 22 Men: at least 23   Your BMI: Body mass index is 25.31 kg/m .  Your weight:   Vitals:    19 1120 19 1732 19 1630   Weight: 78.7 kg (173 lb 9.6 oz) 77.9 kg (171 lb 11.2 oz) 77.7 kg (171 lb 6.4 oz)         High Calorie/ High Protein    Annual Studies  Annual studies are important to monitor your lung, bone, and nutrition health.    Generally annual studies are done when you are well.  You and your provider will determine when it is appropriate to schedule your annual studies.      Activity  Activity as tolerated and instructed by physical therapy    Airway Clearance: The Most Important Way to Keep Your Lungs Healthy  Vest 3 X per day  [facility] [place], 9, 10 @ pressure of 10 for 5 minutes each                           18, 19, 20 @ pressure of 6 for 5 minutes each  Deflate vest after each 5 minutes and cough 3  times    Pulmonary Function Tests    FEV1: amount of air you can blow out in 1 second     FVC: total amount of air you can take in and blow out    Admission (12/26)  FEV1  2.78L or 58% and  FVC 3.50L or 62%  Discharge (01/02)  FEV1  2.93L or 62% and  FVC 3.78L or 67%    Home Care  IV antibiotics per [facility].  Continue antibiotics until you are re-evaluated in the clinic.      Home Antibiotic Schedule:    IV Meropenem 2g every 8 hours  1/2 @ 4 pm, 10:30 pm  Starting 1/3 @ 6 am, 2 pm, 10 pm    Oral Augmentin TWICE daily      Labs  Per [facility] pharmacy CF protocol.  Fax results to the CF office @ [phone]    Symptoms to call your physician about    Chest Pain    Changes in sputum    Blood in sputum    Increased shortness of breath    Nausea/Vomiting    Diarrhea/Constipation    Rash    Joint Pain    Temperature higher than 100.5    Who to call  Monday - Friday from 8-3:30 call the CF Office @ [phone]  After hours call [phone] and have the  page the on-call pulmonologist.   Respiratory Therapist, Margaret Werner @ [phone]   Social Work: Courtney Fenton @ [phone] // Prerna Paniagua @ [phone]  Dietitian: Please call the CF office at [phone] and ask them to contact the CF Dietician.   Diabetes Nurse: [phone]  KJ: [phone]    Cora () has been notified of your recent hospital discharge.  If your appointment is not already scheduled by the time of your discharge, she will contact you when she finds a follow up appointment slot.  If you have any questions for her regarding your follow up appointment, please feel free to call her: [phone].

## 2020-01-02 NOTE — PROGRESS NOTES
Home Infusion  Demario is discharging today and is going home on IV meropenem q 8 hrs.  He was on service with [facility] in April '18 and self administered kulwinder at that time.  He is requesting his kulwinder be done as reconstitute and add to the hp ball for administration as that is how he did it last time.    Met with Demario at bedside to discuss discharge plans and assess needs.   Confirmed we will be sending vials and hp balls for reconstituting per his preference and offered review/teach.  Demario stated he remembers how to administer, was able to describe the process and does not need a review.  Extension tubing added to 1 lumen of PICC (blue) line which flushed well and had good blood return.  Dormant lumen will be flushed daily by his mom or other CG.  Selivery of med and supplies will be to patient's hospital room this afternoon for him to take home.  Attempting to deliver by 4p so he can use a home dose for his 4p dose.   Demario verbalized understanding of all information given and feels comfortable going home and managing his own abx therapy.    [facility] Liaison  [phone]

## 2020-01-02 NOTE — PROGRESS NOTES
Care Coordinator - Discharge Planning    Admission Date/Time:  2019  Attending MD:  Paulo Hoover*     Data  Date of initial CC assessment:    Chart reviewed, discussed with interdisciplinary team.   Patient was admitted for:   1. Cystic fibrosis (H)         Assessment   Full assessment completed in previous note.  Per medical team patient is medically ready for discharge home today wit IV antibiotics. ZEKE Ramirez liaison, updated. Patient will receive delivery of drug here at hospital prior to discharge home. CF  assisting in scheduling follow up appointment. No other needs identified at this time.     Coordination of Care and Referrals: Provided patient/family with options for Home Infusion.      Plan  Anticipated Discharge Date:  Today,   Anticipated Discharge Plan:  Home with IV antibiotics    CTS Handoff completed:  YES      Tammy George RN, BSN  Adult Inpatient CF Nurse Clinician  Ph: [phone]  [phone]

## 2020-01-02 NOTE — DISCHARGE SUMMARY
[facility], [place]  Discharge Summary - Medicine & Pediatrics       Date of Admission:  12/30/2019  Date of Discharge:  1/2/2020  5:15 PM  Discharging Provider: Brie Anderson  Discharge Service: Elizabeth Fuentes    Discharge Diagnoses   Cystic fibrosis pulmonary exacerbation     Follow-ups Needed After Discharge   Follow-up Appointments     Adult [facility] [place] Follow-up and recommended labs and tests      Follow up with your pulmonary team in 1 week. They will call you with the   follow-up appointment    Appointments on [place] and/or [facility] (with [facility] or [facility]   provider or service). Call [phone] if you haven't heard regarding   these appointments within 7 days of discharge.             Unresulted Labs Ordered in the Past 30 Days of this Admission     Date and Time Order Name Status Description    12/30/2019 1126 Nocardia culture Preliminary     12/30/2019 1126 Fungus Culture, non-blood Preliminary     12/30/2019 1126 Cystic Fibrosis Culture Aerob Bacterial Preliminary       These results will be followed up by his pulmonologist     Discharge Disposition   Discharged to home  Condition at discharge: Good    Hospital Course   Demario Abbasi is a 24 year old male with cystic fibrosis s/p RUL resection for recurrent exacerbations in 2009, pancreatic insufficiency, depression, acne, and impaired glucose tolerance who came to the hospital from clinic because of ongoing shortness of breath, dyspnea on exertion, and sputum production, concerning for CF exacerbation that was not getting better with PO antibiotics.     # Cystic fibrosis   # Achromobacter positive sputum culture   Patient follows with Dr. Lilly. The patient was seen by Dr. Lilly 11/26/2019 at which time the patient said that he was still having shortness of breath with activity, decreased sputum clearing, and feelings of chest congestion. He has a history of sputum production that grows MSSA and Achromobacter. The  Achromobacter is usually resistant to all oral antibiotics. He was previously on PO antibiotics but did not improve. Therefore, his pulmonologist recommended that he come to the hospital for PICC placement and initiation of IV antibiotics. ESR WNL. CRP mildly elevated. Very slight leukocytosis. PICC placed on 12/30/20919. IV meropenem and PO bactrim started later that day. RVP negative. Sputum culture from admission positive for Achromobacter xylosoxidans/denitrificans and staph aureus. He was discharged with IV meropenem 2 grams q8 hours. The cystic fibrosis pulmonologists were consulted during this admission and aided with management. He had PFTs that showed improvement in FEV1 up to 62% (his best since 2015).  - Patient was discharged with 3 weeks of IV meropenem 2 grams q8 hours  - Follow up with pulmonology in 3 weeks     Other medical issues:   # Pancreatic insufficiency: PTA creon   # Depression: PTA citalopram   # JADE: PTA iron supplementation    # GERD: PTA PPI    Consultations This Hospital Stay   PHYSICAL THERAPY ADULT IP CONSULT  PULMONARY CF/TRANSPLANT ADULT IP CONSULT  INTERVENTIONAL RADIOLOGY ADULT/PEDS IP CONSULT    Code Status   Full Code       The patient was discussed with Dr. Sneha Anderson MD  [place] [address] Service  [facility] [place]  Pager: [phone]  ______________________________________________________________________    Physical Exam   Vital Signs: Temp: 98.1  F (36.7  C) Temp src: Oral BP: 128/48 Pulse: 70 Heart Rate: 62 Resp: 16 SpO2: 98 % O2 Device: None (Room air)    Weight: 171 lbs 6.4 oz  Physical Exam  Constitutional:       General: He is not in acute distress.     Appearance: Normal appearance. He is not toxic-appearing.   HENT:      Head: Normocephalic and atraumatic.      Nose: No congestion or rhinorrhea.      Mouth/Throat:      Mouth: Mucous membranes are moist.      Pharynx: No oropharyngeal exudate.   Eyes:      General: No  scleral icterus.     Pupils: Pupils are equal, round, and reactive to light.   Neck:      Musculoskeletal: Normal range of motion. No neck rigidity.   Cardiovascular:      Rate and Rhythm: Normal rate and regular rhythm.   Pulmonary:      Effort: Pulmonary effort is normal. No respiratory distress.      Breath sounds: Normal breath sounds.   Abdominal:      General: There is no distension.      Palpations: Abdomen is soft.   Musculoskeletal:         General: No swelling or tenderness.   Skin:     Coloration: Skin is not jaundiced.      Findings: No erythema.      Comments: Faint rash on the back    Neurological:      General: No focal deficit present.      Mental Status: He is alert and oriented to person, place, and time.   Psychiatric:         Mood and Affect: Mood normal.         Behavior: Behavior normal.           Primary Care Physician   Susan Li    Discharge Orders      Home infusion referral      Reason for your hospital stay    CF Exacerbation     Activity    Your activity upon discharge: activity as tolerated     Adult [facility] Follow-up and recommended labs and tests    Follow up with your pulmonary team in 1 week. They will call you with the follow-up appointment    Appointments on [place] and/or [facility] (with [facility] or [facility] provider or service). Call [phone] if you haven't heard regarding these appointments within 7 days of discharge.     Diet    Follow this diet upon discharge: Orders Placed This Encounter      Room Service      High Kcal/High Protein Diet, ADULT       Significant Results and Procedures   Most Recent 3 CBC's:  Recent Labs   Lab Test 12/31/19  0741 12/30/19  1229 03/16/19  0711   WBC 6.7 11.2* 5.8   HGB 14.6 14.6 13.9   MCV 88 88 90    262 250     Most Recent 3 BMP's:  Recent Labs   Lab Test 01/02/20  0810 12/30/19  1229 03/18/19  0647    140 136   POTASSIUM 4.4 4.0 4.3   CHLORIDE 105 110* 106   CO2 26 28 20   BUN 14 10 16   CR 1.26* 0.98 1.23    ANIONGAP 4 3 11   ROCAEL 9.2 8.6 8.9   * 119* 110*     Most Recent 2 LFT's:  Recent Labs   Lab Test 12/30/19  1229 03/14/19  1712   AST 14 22   ALT 29 38   ALKPHOS 124 137   BILITOTAL 0.4 0.3   ,   Results for orders placed or performed during the hospital encounter of 12/30/19   XR Chest 2 Views    Narrative    XR CHEST 2 VW12/30/2019 2:41 PM    INDICATION: cystic fibrosis, not improving with oral abx    COMPARISON:  3/14/2019    FINDINGS: PA and lateral views of the chest. Left arm PICC projects  with tip near the cavoatrial junction. Diffuse changes from cystic  fibrosis with bronchiectatic changes and diffuse nodular opacities.  Slight obscuration of the right heart border with more consolidative  opacities within the right middle lobe. Also possible increased  opacities in the retrocardiac region. Upper abdomen is unremarkable.  No acute osseous abnormalities. Trachea is midline.      Impression    IMPRESSION: Chronic changes from cystic fibrosis with increased  opacities in the lingula and right middle lobe when compared with  prior. Findings may represent an infectious etiology.    I have personally reviewed the examination and initial interpretation  and I agree with the findings.    FARIDA VEGA MD   IR PICC Placement > 5 Yrs of Age    Narrative    Procedures 12/30/2019:   1. Ultrasound guidance for venous access  2. Fluoroscopic guided left upper extremity PICC line    Clinical indication: 24 year old male with cystic fibrosis  exacerbation.  Vascular access requested for intravenous antibiotics.       Comparison studies: 3/18/2019    Interventional radiologists: Tahir Alvarado MD (IR staff)    Fellow: Sarai Hammond MD    Procedure performed by Dr. Hammond under my supervision. I, Dr. Tahir Alvarado, was present for the entire procedure.    Consent: verbal and written informed consent obtained prior to  procedure.     Procedure details: Patient placed in the supine position, with  the  left arm abducted and externally rotated. Preprocedure ultrasound  demonstrated patent left basilic vein. The left upper extremity was  prepped and draped in standard sterile fashion. Timeout performed. 1%  lidocaine used for local anesthesia. Using ultrasound guidance, a  21-gauge micropuncture needle was advanced into the left basilic vein.  An image of the needle in the vein archived. Wire advanced and needle  removed. An incision was made using an 11 blade scalpel. The short  peel-away sheath was advanced over the wire. The intravascular length  was measured using the wire. Catheter was cut. The inner dilator was  removed. The 5 Chadian PICC line was advanced through the peel-away  sheath, with the wire back loaded.  Resistance was encountered within  the left axillary vein. Therefore, a venogram was performed  demonstrating patent left basilic and axillary veins. No central  venous stenosis noted. The sheath and PICC were removed over the wire,  followed by placement of long peel-away sheath. The 018 glidewire was  advanced into the IVC. The inner dilator was removed. The PICC was  advanced into the superior atriocaval junction. Peel-away sheath and  wire were removed. Both lumens were flushed with saline and locked  with 20 units of heparin.  Secure-a-cath placed, as well as sterile  dressing.  No immediate complication.      Medications:  1% lidocaine for local anesthesia.     Monitoring: The patient was placed on continuous monitoring. Vital  signs were monitored by nursing staff under my supervision. The  patient remained stable throughout the procedure.    Sedation time: Not applicable    Fluoroscopy time: 3.5 minutes    Complications: None.      Impression    IMPRESSION:     Placement of 5 Chadian dual-lumen PICC line via left basilic vein, tip  within the superior atriocaval junction.    PLAN:    Line is available for immediate use.    I have personally reviewed the examination and initial  interpretation  and I agree with the findings.    LISY PENN       Discharge Medications   Discharge Medication List as of 1/2/2020 12:39 PM      START taking these medications    Details   meropenem 2 g Inject 2 g into the vein every 8 hours for 27 days, Disp-440 g, R-0, Local Print         CONTINUE these medications which have CHANGED    Details   tobramycin (BETHKIS) 300 MG/4ML nebulizer solution Take 4 mLs (300 mg) by nebulization 2 times daily, HistoricalTake 4 mLs (300 mg) by nebulization 2 times daily 28 days on, 28 days off, Disp-224         CONTINUE these medications which have NOT CHANGED    Details   acetaminophen (TYLENOL) 325 MG tablet Take 1-2 tablets (325-650 mg) by mouth every 6 hours as needed for mild pain or fever, Historical      albuterol (PROAIR HFA) 108 (90 Base) MCG/ACT inhaler Inhale 2 puffs into the lungs every 6 hours as needed for shortness of breath / dyspnea or wheezing, Disp-2 Inhaler, R-6, E-PrescribePharmacy may dispense brand covered by insurance (Proair, or proventil or ventolin or generic albuterol inhaler)      albuterol (PROVENTIL) (2.5 MG/3ML) 0.083% neb solution Take 1 vial (2.5 mg) by nebulization 3 times daily, Disp-270 mL, R-6, E-Prescribe      azithromycin (ZITHROMAX) 500 MG tablet Take one tablet on Monday, Wednesday and Friday morning, Disp-14 tablet, R-12, E-PrescribeThe patient needs to take this medication 3 times per week chronically.  Please dispense 14 tablets for each 1 month supply.      citalopram (CELEXA) 20 MG tablet Take 1 tablet (20 mg) by mouth daily, Disp-30 tablet, R-1, Historical      CREON 24741 units CPEP per EC capsule TAKE 5 CAPSULES (180,000 UNITS) BY MOUTH 3 TIMES DAILY (WITH MEALS) AND 3 WITH SNACKS, Disp-720 each, R-11, E-Prescribe      ferrous fumarate 65 mg, [place]. FE,-Vitamin C 125 mg (VITRON C)  MG TABS tablet Take 1 tablet by mouth daily, Historical      fexofenadine (ALLEGRA) 180 MG tablet TAKE 1 TABLET (180 MG) BY MOUTH DAILY,  Disp-30 tablet, R-3, E-Prescribe      fluconazole (DIFLUCAN) 150 MG tablet Take 1 tablet (150 mg) by mouth daily as needed, Disp-1 tablet, R-0, HistoricalAs needed for foot rash      fluticasone (FLONASE) 50 MCG/ACT nasal spray INSTILL 1 SPRAY INTO BOTH NOSTRILS DAILY, Disp-16 mL, R-3, E-Prescribe      mvw complete formulation (SOFTGELS ) capsule TAKE ONE CAPSULE BY MOUTH TWICE A DAY, Disp-60 capsule, R-3, E-Prescribe      pantoprazole (PROTONIX) 20 MG EC tablet TAKE 1 TABLET (20 MG) BY MOUTH DAILY, Disp-30 tablet, R-3, E-Prescribe      PULMOZYME 1 MG/ML neb solution INHALE 2.5MG INTO THE LUNGS TWO TIMES A DAY, Disp-150 mL, R-12, E-Prescribe      sodium chloride inhalant 7 % NEBU neb solution Take 4 mLs by nebulization 2 times daily as needed for wheezing, Disp-240 mL, R-11, E-Prescribe         STOP taking these medications       amoxicillin-clavulanate (AUGMENTIN) 875-125 MG tablet Comments:   Reason for Stopping:             Allergies   Allergies   Allergen Reactions     Augmentin

## 2020-01-02 NOTE — PROGRESS NOTES
D- Stable for discharge  I- Reviewed discharge instructions and copied signed in chart, medications, PICC line cares, Heparin locked PICC  A- Stable for discharge. Has safe ride for discharge home.  P- Waiting on IV abx to be delivered to bedside.

## 2020-01-03 NOTE — PROGRESS NOTES
This is a recent snapshot of the patient's [facility] medical record.  For current drug dose and complete information and questions, call [phone] or In Basket pool, [facility]v home infusion ([id number])  CSN Number:  [id number]

## 2020-01-03 NOTE — PLAN OF CARE
Pt discharged today, PT lucia completed but unable to progress treatment further due to discharge.    Physical Therapy Discharge Summary    Reason for therapy discharge:    Discharged to home.    Progress towards therapy goal(s). [facility] for goal details.  Goals not met.  Barriers to achieving goals:   discharge from facility.    Therapy recommendation(s):    No further therapy is recommended.

## 2020-01-04 LAB
BACTERIA SPEC CULT: ABNORMAL
SPECIMEN SOURCE: ABNORMAL

## 2020-01-16 ENCOUNTER — OFFICE VISIT (OUTPATIENT)
Dept: PULMONOLOGY | Facility: CLINIC | Age: 25
End: 2020-01-16
Attending: INTERNAL MEDICINE
Payer: COMMERCIAL

## 2020-01-16 VITALS
HEART RATE: 78 BPM | SYSTOLIC BLOOD PRESSURE: 117 MMHG | DIASTOLIC BLOOD PRESSURE: 65 MMHG | WEIGHT: 175.93 LBS | HEIGHT: 70 IN | OXYGEN SATURATION: 96 % | BODY MASS INDEX: 25.19 KG/M2

## 2020-01-16 DIAGNOSIS — E84.9 CYSTIC FIBROSIS (H): Primary | ICD-10-CM

## 2020-01-16 DIAGNOSIS — K86.89 PANCREATIC INSUFFICIENCY: ICD-10-CM

## 2020-01-16 LAB
EXPTIME-PRE: 8.71 SEC
FEF2575-%PRED-PRE: 49 %
FEF2575-PRE: 2.47 L/SEC
FEF2575-PRED: 5.04 L/SEC
FEFMAX-%PRED-PRE: 81 %
FEFMAX-PRE: 8.26 L/SEC
FEFMAX-PRED: 10.2 L/SEC
FEV1-%PRED-PRE: 60 %
FEV1-PRE: 2.85 L
FEV1FEV6-PRE: 79 %
FEV1FEV6-PRED: 84 %
FEV1FVC-PRE: 78 %
FEV1FVC-PRED: 85 %
FIFMAX-PRE: 3.88 L/SEC
FVC-%PRED-PRE: 64 %
FVC-PRE: 3.63 L
FVC-PRED: 5.59 L

## 2020-01-16 PROCEDURE — 87077 CULTURE AEROBIC IDENTIFY: CPT | Performed by: INTERNAL MEDICINE

## 2020-01-16 PROCEDURE — 87186 SC STD MICRODIL/AGAR DIL: CPT | Performed by: INTERNAL MEDICINE

## 2020-01-16 PROCEDURE — G0463 HOSPITAL OUTPT CLINIC VISIT: HCPCS | Mod: 25,ZF

## 2020-01-16 PROCEDURE — 87070 CULTURE OTHR SPECIMN AEROBIC: CPT | Performed by: INTERNAL MEDICINE

## 2020-01-16 ASSESSMENT — MIFFLIN-ST. JEOR: SCORE: 1808.63

## 2020-01-16 ASSESSMENT — PAIN SCALES - GENERAL: PAINLEVEL: NO PAIN (0)

## 2020-01-16 NOTE — NURSING NOTE
Chief Complaint   Patient presents with     Follow Up     cystic fibrosis follow up     Vitals were taken and medications were reconciled.     DEJUAN Perez

## 2020-01-16 NOTE — LETTER
1/16/2020       RE: Demario Abbasi  [address]  [place] [address]     Dear Colleague,    Thank you for referring your patient, Demario Abbasi, to the [facility] at [facility]. Please see a copy of my visit note below.    Reason for Visit  Demario Abbasi is a 24 year old year old male who is being seen for Follow Up (cystic fibrosis follow up)      Assessment and plan:   Demario Abbasi is a 24 year old male with cystic fibrosis, pancreatic insufficiency, depression, acne and impaired glucose tolerance who is s/p RUL resection for recurrent exacerbations in 2009.  He was hospitalized December 30 - January 2 for a CF pulmonary exacerbation, treated with meropenem and oral Bactrim while hospitalized and discharged on IV meropenem.  He presents today for follow-up.      Pulmonary: The patient reports improved exercise tolerance, decreased cough and decreased sputum production, nearing baseline.  PFTs are decreased from day of hospital discharge although he reports that PFTs in the hospital were done shortly after airway clearance and today's PFTs were done about 6 hours after his last airway clearance.  It appears that his exacerbation is improving but may not be entirely resolved.  He has approximately 1 more week of IV meropenem at home.  He will complete the remaining weeks of antibiotics and then his PICC line will be discontinued.  He will continue his current airway clearance therapy.  Pancreatic insufficiency: The patient denies symptoms of malabsorption.  His weight is gradually increasing and in an excellent range.  He will continue his current vitamins and enzymes.  Vitamin levels will be checked with annual studies with his next visit.    CFTR Modulation: The patient is not a candidate for any of the currently available CFTR Modulators.    Clinical trials: The patient may be a candidate for an anti-inflammatory  trial.  I have asked the research coordinators to contact him with details.    Glucose intolerance: Glucose tolerance testing will be checked with annual studies with the next visit.  Depression: Adequately controlled with current Celexa.  Iron deficiency anemia: Iron studies and hemoglobin will be checked with annual studies with his next visit.  Reflux: Adequately controlled with current Protonix.  SIMON: Creatinine was increased during the patient's hospitalization.  This will be rechecked with annual studies.  Healthcare maintenance: The patient has received an influenza vaccine for this flu season.  Annual studies will be completed in 2 months with his follow-up visit.  Follow-up in 2 months with full annual studies.         CF HPI  The patient was seen and examined by Zana Lilly MD   Demario Abbasi is a 24 year old male with cystic fibrosis, pancreatic insufficiency, depression, acne and impaired glucose tolerance who is s/p RUL resection for recurrent exacerbations in 2009.  He was hospitalized December 30 - January 2 for a CF pulmonary exacerbation, treated with meropenem and oral Bactrim while hospitalized and discharged on IV meropenem.  He presents today for follow-up.      Breathing is comfortable at rest.  Exercise tolerance is near baseline.  Reports an occasional cough productive of sputum which is lighter and decreased in volume from the time of hospitalization.  He denies hemoptysis.  He denies chest pain.  No recent fever or chills.  He does report some mild night sweats which she reports has occurred previously while on the IV antibiotics.  He is doing vest therapy twice daily for 30 minutes.    Review of systems:  Appetite is good  No ear pain, sore throat, sinus pain or rhinorrhea  No nausea, vomiting, diarrhea or abdominal pain.    No rashes.  The remainder of a complete review of systems is otherwise negative except as noted in the history of present illness.      Current  Outpatient Medications   Medication     acetaminophen (TYLENOL) 325 MG tablet     albuterol (PROAIR HFA) 108 (90 Base) MCG/ACT inhaler     albuterol (PROVENTIL) (2.5 MG/3ML) 0.083% neb solution     azithromycin (ZITHROMAX) 500 MG tablet     citalopram (CELEXA) 20 MG tablet     CREON 73033 units CPEP per EC capsule     ferrous fumarate 65 mg, [place]. FE,-Vitamin C 125 mg (VITRON C)  MG TABS tablet     fexofenadine (ALLEGRA) 180 MG tablet     fluconazole (DIFLUCAN) 150 MG tablet     fluticasone (FLONASE) 50 MCG/ACT nasal spray     meropenem 2 g     mvw complete formulation (SOFTGELS ) capsule     pantoprazole (PROTONIX) 20 MG EC tablet     PULMOZYME 1 MG/ML neb solution     sodium chloride inhalant 7 % NEBU neb solution     tobramycin (BETHKIS) 300 MG/4ML nebulizer solution     No current facility-administered medications for this visit.      Allergies   Allergen Reactions     Augmentin      Past Medical History:   Diagnosis Date     CF (cystic fibrosis) (H)     Genotype: [id number]/621+1g>t     Impaired glucose tolerance      Pancreatic insufficiency      S/P lobectomy of lung 8/4/2015    Right upper lobectomy - 2009       Past Surgical History:   Procedure Laterality Date     HC LAP,INGUINAL HERNIA REPR,INITIAL  2002     IR PICC PLACEMENT > 5 YRS OF AGE  3/18/2019     IR PICC PLACEMENT > 5 YRS OF AGE  12/30/2019     LOBECTOMY LUNG Right 2009    Right upper lobe       Social History     Socioeconomic History     Marital status: Single     Spouse name: Not on file     Number of children: Not on file     Years of education: Not on file     Highest education level: Not on file   Occupational History     Occupation:    Social Needs     Financial resource strain: Not on file     Food insecurity:     Worry: Not on file     Inability: Not on file     Transportation needs:     Medical: Not on file     Non-medical: Not on file   Tobacco Use     Smoking status: Passive Smoke Exposure - Never Smoker      "Smokeless tobacco: Current User     Types: Chew     Tobacco comment: dad smokes   Substance and Sexual Activity     Alcohol use: Yes     Comment: <2 drinks per week     Drug use: Not on file     Sexual activity: Not on file   Lifestyle     Physical activity:     Days per week: Not on file     Minutes per session: Not on file     Stress: Not on file   Relationships     Social connections:     Talks on phone: Not on file     Gets together: Not on file     Attends Confucianist service: Not on file     Active member of club or organization: Not on file     Attends meetings of clubs or organizations: Not on file     Relationship status: Not on file     Intimate partner violence:     Fear of current or ex partner: Not on file     Emotionally abused: Not on file     Physically abused: Not on file     Forced sexual activity: Not on file   Other Topics Concern     Parent/sibling w/ CABG, MI or angioplasty before 65F 55M? 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 2 dogs.  Has girlfriend (Oriana).         /65   Pulse 78   Ht 1.785 m (5' 10.28\")   Wt 79.8 kg (175 lb 14.8 oz)   SpO2 96%   BMI 25.04 kg/m     Body mass index is 25.04 kg/m .  Exam:   GENERAL APPEARANCE: Well developed, well nourished, alert, and in no apparent distress.  EYES: PERRL, EOMI  HENT: Nasal mucosa with edema and hyperemia. No nasal polyps.  EARS: Canals clear, TMs normal  MOUTH: Oral mucosa is moist, without any lesions, no tonsillar enlargement, no oropharyngeal exudate.  NECK: supple, no masses, no thyromegaly.  LYMPHATICS: No significant axillary, cervical, or supraclavicular nodes.  RESP: normal percussion, good air flow throughout.  No crackles. No rhonchi. No wheezes.  CV: Normal S1, S2, regular rhythm, normal rate. No murmur.  No rub. No gallop. No LE edema.   ABDOMEN:  " Bowel sounds normal, soft, nontender, no HSM or masses.   MS: extremities normal. No clubbing. No cyanosis.  SKIN: no rash on limited exam  NEURO: Mentation intact, speech normal, normal strength and tone, normal gait and stance  PSYCH: mentation appears normal. and affect normal/bright  Results:  Recent Results (from the past 168 hour(s))   General PFT Lab (Please always keep checked)    Collection Time: 01/16/20  2:57 PM   Result Value Ref Range    FVC-Pred 5.59 L    FVC-Pre 3.63 L    FVC-%Pred-Pre 64 %    FEV1-Pre 2.85 L    FEV1-%Pred-Pre 60 %    FEV1FVC-Pred 85 %    FEV1FVC-Pre 78 %    FEFMax-Pred 10.20 L/sec    FEFMax-Pre 8.26 L/sec    FEFMax-%Pred-Pre 81 %    FEF2575-Pred 5.04 L/sec    FEF2575-Pre 2.47 L/sec    XTO6536-%Pred-Pre 49 %    ExpTime-Pre 8.71 sec    FIFMax-Pre 3.88 L/sec    FEV1FEV6-Pred 84 %    FEV1FEV6-Pre 79 %                   Results as noted above.    PFT Interpretation:  Moderate restrictive ventilatory defect.  Likely obstructive component as well based on the mid flow.  Decreased from previous.  Below recent best, similar to recent baseline.  Valid Maneuver       CF Exacerbation  Moderate  Hosp admit/home IV (within 2 wks) (ongoing)      Again, thank you for allowing me to participate in the care of your patient.      Sincerely,    Zana Lilly MD

## 2020-01-16 NOTE — PATIENT INSTRUCTIONS
Cystic Fibrosis Self-Care Plan    RECOMMENDATIONS:   Finish current supply of IV antibiotics and then stop.  We will help arrange PICC line removal.  Otherwise continue current medication, nebs and vest therapy.           [facility] Nurse line:  Navdeep RodríguezJuarez    [phone]     [facility] Fax Number:      [phone]         Cystic fibrosis Respiratory Therapist:   Margaret Werner              [phone]   Cystic fibrosis Dietitians:              Sherie Aparicio              [phone]                            Saba Gill                        [phone]   Cystic fibrosis Diabetes Nurse:    Ashley Cleveland   [phone]    Cystic fibrosis Social Workers:     Prerna Paniagua               [phone]                     Courtney Fenton               [phone]  Cystic fibrosis Pharmacist:           Ashley Kirby                               [phone]         Ansley Santo   [phone]  Cystic Fibrosis Genetic Counselor:   Yuliana Ghotra    [phone]    [facility] website:  [email or web address]         MRN: [id number]   Clinic Date: January 16, 2020   Patient: Demario Abbasi     Annual Studies:   IGG   Date Value Ref Range Status   02/22/2019 1,380 695 - 1,620 mg/dL Final     Insulin   Date Value Ref Range Status   02/22/2019 Canceled, Test credited 3 - 25 mU/L Final     Comment:     Unsatisfactory specimen - hemolyzed  NOTIFIED OZ PHILLIPS MD AT 1450 ON 2/22/19 BY JV       There are no preventive care reminders to display for this patient.    Pulmonary Function Tests  FEV1: amount of air you can blow out in 1 second  FVC: total amount of air you can take in and blow out    Your Goals:         PFT Latest Ref Rng & Units 1/16/2020   FVC L 3.63   FEV1 L 2.85   FVC% % 64   FEV1% % 60          Airway Clearance: The Most Important Way to Keep Your Lungs Healthy  Vest Settings:    [place] Frequencies: 8, 9, 10 Pressure 10 Then,  Frequencies 18, 19, 20 Pressure 6      RespirTech: Quick Start with Pressure of     Do each frequency for 5 minutes; Deflate vest after each frequency & cough 3 times before beginning the next setting.    Vest and Neb Therapy should be done 2-3 times/day.    Good Nutrition Can Improve Lung Function and Overall Health     Take ALL of your vitamins with food     Take 1/2 of your enzymes before EVERY meal/snack and the other 1/2 mid-meal/snack    Wt Readings from Last 3 Encounters:   01/16/20 79.8 kg (175 lb 14.8 oz)   12/31/19 77.7 kg (171 lb 6.4 oz)   12/26/19 78.5 kg (173 lb)       Body mass index is 25.04 kg/m .         National CF Foundation Recommendations for BMI in CF Adults: Women: at least 22 Men: at least 23        Controlling Blood Sugars Helps Prevent Lung Infections & Improves Nutrition  Test blood sugar:     In the morning before eating (goal is )     2 hours after a meal (goal is less than 150)     When pre-meal glucose is greater than 150 add correction     At bedtime (if less than 100 eat a snack with 15 grams of carbohydrates  Last A1C Results:   Hemoglobin A1C   Date Value Ref Range Status   02/22/2019 5.9 (H) 0 - 5.6 % Final     Comment:     Normal <5.7% Prediabetes 5.7-6.4%  Diabetes 6.5% or higher - adopted from ADA   consensus guidelines.           If diabetic, measure A1C every 6 months. Goal: Under 7%    Staying Healthy    Research:  If you are interested in learning about research opportunities or have questions, please contact the CF Research Team at [phone] or [email or web address].      CF Foundation:  Compass is a personalized resource service to help you with the insurance, financial, legal and other issues you are facing.  It's free, confidential and available to anyone with CF.  Ask your  for more information or contact Compass directly at 361-YVGBPFJ ([phone]) or compass@cff.org, or learn more at cff.org/compass.

## 2020-01-16 NOTE — PROGRESS NOTES
Reason for Visit  Demario Abbasi is a 24 year old year old male who is being seen for Follow Up (cystic fibrosis follow up)      Assessment and plan:   Demario Abbasi is a 24 year old male with cystic fibrosis, pancreatic insufficiency, depression, acne and impaired glucose tolerance who is s/p RUL resection for recurrent exacerbations in 2009.  He was hospitalized December 30 - January 2 for a CF pulmonary exacerbation, treated with meropenem and oral Bactrim while hospitalized and discharged on IV meropenem.  He presents today for follow-up.      Pulmonary: The patient reports improved exercise tolerance, decreased cough and decreased sputum production, nearing baseline.  PFTs are decreased from day of hospital discharge although he reports that PFTs in the hospital were done shortly after airway clearance and today's PFTs were done about 6 hours after his last airway clearance.  It appears that his exacerbation is improving but may not be entirely resolved.  He has approximately 1 more week of IV meropenem at home.  He will complete the remaining weeks of antibiotics and then his PICC line will be discontinued.  He will continue his current airway clearance therapy.  Pancreatic insufficiency: The patient denies symptoms of malabsorption.  His weight is gradually increasing and in an excellent range.  He will continue his current vitamins and enzymes.  Vitamin levels will be checked with annual studies with his next visit.    CFTR Modulation: The patient is not a candidate for any of the currently available CFTR Modulators.    Clinical trials: The patient may be a candidate for an anti-inflammatory trial.  I have asked the research coordinators to contact him with details.    Glucose intolerance: Glucose tolerance testing will be checked with annual studies with the next visit.  Depression: Adequately controlled with current Celexa.  Iron deficiency anemia: Iron studies and hemoglobin will be checked  with annual studies with his next visit.  Reflux: Adequately controlled with current Protonix.  SIMON: Creatinine was increased during the patient's hospitalization.  This will be rechecked with annual studies.  Healthcare maintenance: The patient has received an influenza vaccine for this flu season.  Annual studies will be completed in 2 months with his follow-up visit.  Follow-up in 2 months with full annual studies.         CF HPI  The patient was seen and examined by Zana Lilly MD   Demario Abbasi is a 24 year old male with cystic fibrosis, pancreatic insufficiency, depression, acne and impaired glucose tolerance who is s/p RUL resection for recurrent exacerbations in 2009.  He was hospitalized December 30 - January 2 for a CF pulmonary exacerbation, treated with meropenem and oral Bactrim while hospitalized and discharged on IV meropenem.  He presents today for follow-up.      Breathing is comfortable at rest.  Exercise tolerance is near baseline.  Reports an occasional cough productive of sputum which is lighter and decreased in volume from the time of hospitalization.  He denies hemoptysis.  He denies chest pain.  No recent fever or chills.  He does report some mild night sweats which she reports has occurred previously while on the IV antibiotics.  He is doing vest therapy twice daily for 30 minutes.    Review of systems:  Appetite is good  No ear pain, sore throat, sinus pain or rhinorrhea  No nausea, vomiting, diarrhea or abdominal pain.    No rashes.  The remainder of a complete review of systems is otherwise negative except as noted in the history of present illness.      Current Outpatient Medications   Medication     acetaminophen (TYLENOL) 325 MG tablet     albuterol (PROAIR HFA) 108 (90 Base) MCG/ACT inhaler     albuterol (PROVENTIL) (2.5 MG/3ML) 0.083% neb solution     azithromycin (ZITHROMAX) 500 MG tablet     citalopram (CELEXA) 20 MG tablet     CREON 01249 units CPEP per EC  capsule     ferrous fumarate 65 mg, [place]. FE,-Vitamin C 125 mg (VITRON C)  MG TABS tablet     fexofenadine (ALLEGRA) 180 MG tablet     fluconazole (DIFLUCAN) 150 MG tablet     fluticasone (FLONASE) 50 MCG/ACT nasal spray     meropenem 2 g     mvw complete formulation (SOFTGELS ) capsule     pantoprazole (PROTONIX) 20 MG EC tablet     PULMOZYME 1 MG/ML neb solution     sodium chloride inhalant 7 % NEBU neb solution     tobramycin (BETHKIS) 300 MG/4ML nebulizer solution     No current facility-administered medications for this visit.      Allergies   Allergen Reactions     Augmentin      Past Medical History:   Diagnosis Date     CF (cystic fibrosis) (H)     Genotype: g542x/621+1g>t     Impaired glucose tolerance      Pancreatic insufficiency      S/P lobectomy of lung 8/4/2015    Right upper lobectomy - 2009       Past Surgical History:   Procedure Laterality Date     HC LAP,INGUINAL HERNIA REPR,INITIAL  2002     IR PICC PLACEMENT > 5 YRS OF AGE  3/18/2019     IR PICC PLACEMENT > 5 YRS OF AGE  12/30/2019     LOBECTOMY LUNG Right 2009    Right upper lobe       Social History     Socioeconomic History     Marital status: Single     Spouse name: Not on file     Number of children: Not on file     Years of education: Not on file     Highest education level: Not on file   Occupational History     Occupation:    Social Needs     Financial resource strain: Not on file     Food insecurity:     Worry: Not on file     Inability: Not on file     Transportation needs:     Medical: Not on file     Non-medical: Not on file   Tobacco Use     Smoking status: Passive Smoke Exposure - Never Smoker     Smokeless tobacco: Current User     Types: Chew     Tobacco comment: dad smokes   Substance and Sexual Activity     Alcohol use: Yes     Comment: <2 drinks per week     Drug use: Not on file     Sexual activity: Not on file   Lifestyle     Physical activity:     Days per week: Not on file     Minutes per  "session: Not on file     Stress: Not on file   Relationships     Social connections:     Talks on phone: Not on file     Gets together: Not on file     Attends Sikhism service: Not on file     Active member of club or organization: Not on file     Attends meetings of clubs or organizations: Not on file     Relationship status: Not on file     Intimate partner violence:     Fear of current or ex partner: Not on file     Emotionally abused: Not on file     Physically abused: Not on file     Forced sexual activity: Not on file   Other Topics Concern     Parent/sibling w/ CABG, MI or angioplasty before 65F 55M? 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 2 dogs.  Has girlfriend (Oriana).         /65   Pulse 78   Ht 1.785 m (5' 10.28\")   Wt 79.8 kg (175 lb 14.8 oz)   SpO2 96%   BMI 25.04 kg/m    Body mass index is 25.04 kg/m .  Exam:   GENERAL APPEARANCE: Well developed, well nourished, alert, and in no apparent distress.  EYES: PERRL, EOMI  HENT: Nasal mucosa with edema and hyperemia. No nasal polyps.  EARS: Canals clear, TMs normal  MOUTH: Oral mucosa is moist, without any lesions, no tonsillar enlargement, no oropharyngeal exudate.  NECK: supple, no masses, no thyromegaly.  LYMPHATICS: No significant axillary, cervical, or supraclavicular nodes.  RESP: normal percussion, good air flow throughout.  No crackles. No rhonchi. No wheezes.  CV: Normal S1, S2, regular rhythm, normal rate. No murmur.  No rub. No gallop. No LE edema.   ABDOMEN:  Bowel sounds normal, soft, nontender, no HSM or masses.   MS: extremities normal. No clubbing. No cyanosis.  SKIN: no rash on limited exam  NEURO: Mentation intact, speech normal, normal strength and tone, normal gait and stance  PSYCH: mentation appears normal. and affect normal/bright  Results:  Recent " Results (from the past 168 hour(s))   General PFT Lab (Please always keep checked)    Collection Time: 01/16/20  2:57 PM   Result Value Ref Range    FVC-Pred 5.59 L    FVC-Pre 3.63 L    FVC-%Pred-Pre 64 %    FEV1-Pre 2.85 L    FEV1-%Pred-Pre 60 %    FEV1FVC-Pred 85 %    FEV1FVC-Pre 78 %    FEFMax-Pred 10.20 L/sec    FEFMax-Pre 8.26 L/sec    FEFMax-%Pred-Pre 81 %    FEF2575-Pred 5.04 L/sec    FEF2575-Pre 2.47 L/sec    LDZ0303-%Pred-Pre 49 %    ExpTime-Pre 8.71 sec    FIFMax-Pre 3.88 L/sec    FEV1FEV6-Pred 84 %    FEV1FEV6-Pre 79 %                   Results as noted above.    PFT Interpretation:  Moderate restrictive ventilatory defect.  Likely obstructive component as well based on the mid flow.  Decreased from previous.  Below recent best, similar to recent baseline.  Valid Maneuver             CF Exacerbation  Moderate  Hosp admit/home IV (within 2 wks) (ongoing)

## 2020-01-17 ENCOUNTER — HOME INFUSION (PRE-WILLOW HOME INFUSION) (OUTPATIENT)
Dept: PHARMACY | Facility: CLINIC | Age: 25
End: 2020-01-17

## 2020-01-20 NOTE — PROGRESS NOTES
This is a recent snapshot of the patient's [facility] medical record.  For current drug dose and complete information and questions, call [phone] or In Basket pool, [facility] home infusion ([id number])  CSN Number:  [id number]

## 2020-01-21 LAB
BACTERIA SPEC CULT: ABNORMAL
SPECIMEN SOURCE: ABNORMAL

## 2020-01-22 ENCOUNTER — HOME INFUSION (PRE-WILLOW HOME INFUSION) (OUTPATIENT)
Dept: PHARMACY | Facility: CLINIC | Age: 25
End: 2020-01-22

## 2020-01-27 LAB
BACTERIA SPEC CULT: NORMAL
FUNGUS SPEC CULT: ABNORMAL
FUNGUS SPEC CULT: ABNORMAL
SPECIMEN SOURCE: ABNORMAL
SPECIMEN SOURCE: NORMAL

## 2020-01-28 NOTE — PROGRESS NOTES
This is a recent snapshot of the patient's [facility] medical record.  For current drug dose and complete information and questions, call [phone] or In Basket pool, [facility]v home infusion ([id number])  CSN Number:  [id number]

## 2020-02-11 DIAGNOSIS — E84.9 CF (CYSTIC FIBROSIS) (H): ICD-10-CM

## 2020-02-12 RX ORDER — FEXOFENADINE HCL 180 MG/1
TABLET ORAL
Qty: 30 TABLET | Refills: 3 | Status: SHIPPED | OUTPATIENT
Start: 2020-02-12 | End: 2020-06-26

## 2020-02-28 DIAGNOSIS — E84.9 CF (CYSTIC FIBROSIS) (H): Primary | ICD-10-CM

## 2020-03-05 ENCOUNTER — OFFICE VISIT (OUTPATIENT)
Dept: PULMONOLOGY | Facility: CLINIC | Age: 25
End: 2020-03-05
Attending: PHYSICIAN ASSISTANT
Payer: COMMERCIAL

## 2020-03-05 ENCOUNTER — ANCILLARY PROCEDURE (OUTPATIENT)
Dept: BONE DENSITY | Facility: CLINIC | Age: 25
End: 2020-03-05
Payer: COMMERCIAL

## 2020-03-05 ENCOUNTER — ANCILLARY PROCEDURE (OUTPATIENT)
Dept: GENERAL RADIOLOGY | Facility: CLINIC | Age: 25
End: 2020-03-05
Payer: COMMERCIAL

## 2020-03-05 ENCOUNTER — HOSPITAL ENCOUNTER (OUTPATIENT)
Dept: PHYSICAL THERAPY | Facility: CLINIC | Age: 25
Setting detail: THERAPIES SERIES
End: 2020-03-05
Attending: PHYSICIAN ASSISTANT
Payer: COMMERCIAL

## 2020-03-05 ENCOUNTER — OFFICE VISIT (OUTPATIENT)
Dept: PHARMACY | Facility: CLINIC | Age: 25
End: 2020-03-05
Payer: COMMERCIAL

## 2020-03-05 VITALS
DIASTOLIC BLOOD PRESSURE: 72 MMHG | SYSTOLIC BLOOD PRESSURE: 116 MMHG | OXYGEN SATURATION: 94 % | WEIGHT: 182.76 LBS | HEART RATE: 68 BPM | BODY MASS INDEX: 26.16 KG/M2 | RESPIRATION RATE: 18 BRPM | HEIGHT: 70 IN

## 2020-03-05 DIAGNOSIS — E84.0 CYSTIC FIBROSIS WITH PULMONARY MANIFESTATIONS (H): ICD-10-CM

## 2020-03-05 DIAGNOSIS — E84.9 CF (CYSTIC FIBROSIS) (H): Primary | ICD-10-CM

## 2020-03-05 DIAGNOSIS — E84.9 CYSTIC FIBROSIS (H): ICD-10-CM

## 2020-03-05 DIAGNOSIS — E84.9 CYSTIC FIBROSIS EXACERBATION (H): ICD-10-CM

## 2020-03-05 DIAGNOSIS — E84.9 CYSTIC FIBROSIS (H): Primary | ICD-10-CM

## 2020-03-05 DIAGNOSIS — K86.89 PANCREATIC INSUFFICIENCY: ICD-10-CM

## 2020-03-05 DIAGNOSIS — F33.0 MAJOR DEPRESSIVE DISORDER, RECURRENT EPISODE, MILD (H): ICD-10-CM

## 2020-03-05 DIAGNOSIS — R73.02 IMPAIRED GLUCOSE TOLERANCE: ICD-10-CM

## 2020-03-05 DIAGNOSIS — E84.9 CF (CYSTIC FIBROSIS) (H): ICD-10-CM

## 2020-03-05 DIAGNOSIS — E84.0 CYSTIC FIBROSIS WITH PULMONARY EXACERBATION (H): ICD-10-CM

## 2020-03-05 LAB
EXPTIME-PRE: 8.21 SEC
FEF2575-%PRED-PRE: 53 %
FEF2575-PRE: 2.68 L/SEC
FEF2575-PRED: 5 L/SEC
FEFMAX-%PRED-PRE: 71 %
FEFMAX-PRE: 7.35 L/SEC
FEFMAX-PRED: 10.22 L/SEC
FEV1-%PRED-PRE: 61 %
FEV1-PRE: 2.88 L
FEV1FEV6-PRE: 80 %
FEV1FEV6-PRED: 84 %
FEV1FVC-PRE: 80 %
FEV1FVC-PRED: 85 %
FIFMAX-PRE: 4.43 L/SEC
FVC-%PRED-PRE: 63 %
FVC-PRE: 3.57 L
FVC-PRED: 5.6 L

## 2020-03-05 PROCEDURE — 97161 PT EVAL LOW COMPLEX 20 MIN: CPT | Mod: GP

## 2020-03-05 PROCEDURE — 99605 MTMS BY PHARM NP 15 MIN: CPT | Performed by: PHARMACIST

## 2020-03-05 PROCEDURE — 87186 SC STD MICRODIL/AGAR DIL: CPT | Performed by: INTERNAL MEDICINE

## 2020-03-05 PROCEDURE — 87070 CULTURE OTHR SPECIMN AEROBIC: CPT | Performed by: INTERNAL MEDICINE

## 2020-03-05 PROCEDURE — G0463 HOSPITAL OUTPT CLINIC VISIT: HCPCS | Mod: ZF

## 2020-03-05 PROCEDURE — 87077 CULTURE AEROBIC IDENTIFY: CPT | Performed by: INTERNAL MEDICINE

## 2020-03-05 RX ORDER — ALBUTEROL SULFATE 90 UG/1
2 AEROSOL, METERED RESPIRATORY (INHALATION) EVERY 6 HOURS PRN
Qty: 2 INHALER | Refills: 11 | Status: SHIPPED | OUTPATIENT
Start: 2020-03-05 | End: 2023-04-13

## 2020-03-05 RX ORDER — FLUTICASONE PROPIONATE 50 MCG
SPRAY, SUSPENSION (ML) NASAL
Qty: 16 ML | Refills: 11 | Status: SHIPPED | OUTPATIENT
Start: 2020-03-05 | End: 2020-10-19

## 2020-03-05 RX ORDER — TOBRAMYCIN INHALATION 300 MG/4ML
300 SOLUTION RESPIRATORY (INHALATION) 2 TIMES DAILY
Qty: 224 ML | Refills: 5 | Status: SHIPPED | OUTPATIENT
Start: 2020-03-05 | End: 2020-06-26

## 2020-03-05 RX ORDER — SODIUM CHLORIDE FOR INHALATION 7 %
4 VIAL, NEBULIZER (ML) INHALATION 2 TIMES DAILY PRN
Qty: 240 ML | Refills: 11 | Status: SHIPPED | OUTPATIENT
Start: 2020-03-05 | End: 2023-04-13

## 2020-03-05 RX ORDER — PEDIATRIC MULTIVIT 61/D3/VIT K 1500-800
CAPSULE ORAL
Qty: 60 CAPSULE | Refills: 11 | Status: SHIPPED | OUTPATIENT
Start: 2020-03-05 | End: 2021-03-10

## 2020-03-05 RX ORDER — AZITHROMYCIN 500 MG/1
TABLET, FILM COATED ORAL
Qty: 12 TABLET | Refills: 11 | Status: SHIPPED | OUTPATIENT
Start: 2020-03-05 | End: 2021-03-30

## 2020-03-05 RX ORDER — PANTOPRAZOLE SODIUM 20 MG/1
20 TABLET, DELAYED RELEASE ORAL DAILY
Qty: 30 TABLET | Refills: 11 | Status: SHIPPED | OUTPATIENT
Start: 2020-03-05 | End: 2021-03-30

## 2020-03-05 RX ORDER — ALBUTEROL SULFATE 0.83 MG/ML
2.5 SOLUTION RESPIRATORY (INHALATION) 3 TIMES DAILY
Qty: 270 ML | Refills: 11 | Status: SHIPPED | OUTPATIENT
Start: 2020-03-05 | End: 2021-03-15

## 2020-03-05 ASSESSMENT — MIFFLIN-ST. JEOR: SCORE: 1830.25

## 2020-03-05 ASSESSMENT — PAIN SCALES - GENERAL: PAINLEVEL: NO PAIN (0)

## 2020-03-05 NOTE — PROGRESS NOTES
[facility]  [facility]  March 5, 2020         Assessment and Plan:   Demario Abbasi is a 25 year old male with cystic fibrosis, pancreatic insufficiency, depression, acne and impaired glucose tolerance who is s/p RUL resection for recurrent exacerbations in 2009 who is seen for routine follow up. He is leaving for spring break on Sunday and made this appointment to talk about precautions while traveling in the context of coronavirus.     1. Cystic fibrosis: feeling well and at baseline. Has been exercising twice/week for one hour/time with a mix of cardio and weights. Denies new dyspnea, no hemoptysis or streaking. Sating 94% on room air; vesting BID. PFTs today improved slightly, at his baseline. Previous cultures + for MSSA and Achromobacter. No evidence of exacerbation.  - Continue nebs and vesting BID  - Chronic azithromycin and ngozi nebs every other month    2. Pancreatic insufficiency: no s/s of malabsorption. BMI is > CFF goal.   - Continue enzymes and vitamins     3. CFTR modulation: not currently a candidate for modulating therapy.     4. Glucose intolerance: doesn't check BS.  - Has OGTT scheduled for next f/u in June    5. Depression: feels his mood is stable. Is a little anxious today because he has a phone interview with Virgilio Sanchez this afternoon. Following with a PCP.   - Continue Celexa    6. Iron deficiency anemia: hg was stable at 14.6 during labs in December.  - Annual labs at next visit    7. Acid reflux: no currently complaints.  - Continue pantoprazole    8. SIMON: last Cr of 1.26 on 1/2/20.   - Recheck Cr at next visit    RTC: in 3 months with routine cultures and spirometry  Annual studies due: June 2020 (CXR and DEXA today)  Influenza vaccine: completed    Lisa Lofton PA-C  Pulmonary, Allergy, Critical Care and Sleep Medicine        Interval History:   In college, graduating this spring. Trying to exercise at least twice/week, runs about  a mile and 45 minutes of lifting. Does have shortnes of breath with running, improved overall. Cough is baseline, intermittently productive. No hemoptysis. No congestion or tightness. Vesting BID. No nausea, bloating or gas, having 2-3 stools, not fatty or floating.          Review of Systems:   Please see HPI. Otherwise, complete 10 point ROS negative.           Past Medical and Surgical History:     Past Medical History:   Diagnosis Date     CF (cystic fibrosis) (H)     Genotype: g542x/621+1g>t     Impaired glucose tolerance      Pancreatic insufficiency      S/P lobectomy of lung 8/4/2015    Right upper lobectomy - 2009     Past Surgical History:   Procedure Laterality Date     HC LAP,INGUINAL HERNIA REPR,INITIAL  2002     IR PICC PLACEMENT > 5 YRS OF AGE  3/18/2019     IR PICC PLACEMENT > 5 YRS OF AGE  12/30/2019     LOBECTOMY LUNG Right 2009    Right upper lobe           Family History:     Family History   Problem Relation Age of Onset     Thyroid Disease Mother         hypothyroid     Other - See Comments Mother         multiple family members with biliary disease     Hypertension Maternal Grandmother      Diabetes Maternal Grandfather      Hypertension Paternal Grandmother      Hypothyroidism Paternal Grandmother      Parkinsonism Paternal Grandmother      Coronary Artery Disease Early Onset Paternal Grandfather 56            Social History:     Social History     Socioeconomic History     Marital status: Single     Spouse name: Not on file     Number of children: Not on file     Years of education: Not on file     Highest education level: Not on file   Occupational History     Occupation:    Social Needs     Financial resource strain: Not on file     Food insecurity:     Worry: Not on file     Inability: Not on file     Transportation needs:     Medical: Not on file     Non-medical: Not on file   Tobacco Use     Smoking status: Passive Smoke Exposure - Never Smoker     Smokeless tobacco:  Current User     Types: Chew     Tobacco comment: dad smokes   Substance and Sexual Activity     Alcohol use: Yes     Comment: <2 drinks per week     Drug use: Not on file     Sexual activity: Not on file   Lifestyle     Physical activity:     Days per week: Not on file     Minutes per session: Not on file     Stress: Not on file   Relationships     Social connections:     Talks on phone: Not on file     Gets together: Not on file     Attends Holiness service: Not on file     Active member of club or organization: Not on file     Attends meetings of clubs or organizations: Not on file     Relationship status: Not on file     Intimate partner violence:     Fear of current or ex partner: Not on file     Emotionally abused: Not on file     Physically abused: Not on file     Forced sexual activity: Not on file   Other Topics Concern     Parent/sibling w/ CABG, MI or angioplasty before 65F 55M? 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 2 dogs.  Has girlfriend (Oriana).            Medications:     Current Outpatient Medications   Medication     albuterol (PROAIR HFA) 108 (90 Base) MCG/ACT inhaler     albuterol (PROVENTIL) (2.5 MG/3ML) 0.083% neb solution     azithromycin (ZITHROMAX) 500 MG tablet     citalopram (CELEXA) 20 MG tablet     CREON 62784 units CPEP per EC capsule     ferrous fumarate 65 mg, [place]. FE,-Vitamin C 125 mg (VITRON C)  MG TABS tablet     fexofenadine (ALLEGRA) 180 MG tablet     fluticasone (FLONASE) 50 MCG/ACT nasal spray     mvw complete formulation (SOFTGELS ) capsule     pantoprazole (PROTONIX) 20 MG EC tablet     PULMOZYME 1 MG/ML neb solution     sodium chloride inhalant 7 % NEBU neb solution     tobramycin (BETHKIS) 300 MG/4ML nebulizer solution     fluconazole (DIFLUCAN) 150 MG tablet     No current  "facility-administered medications for this visit.             Physical Exam:   /72   Pulse 68   Resp 18   Ht 1.778 m (5' 10\")   Wt 82.9 kg (182 lb 12.2 oz)   SpO2 94%   BMI 26.22 kg/m      GENERAL: alert, NAD  HEENT: NCAT, EOMI, anicteric sclera, no nasal edema or erythema, mild amount of drainage on the left; canals and TMs clear; no oral mucosal edema or erythema  Neck: no cervical or supraclavicular adenopathy  Respiratory: good air flow, no crackles, rhonchi or wheezing  CV: RRR, S1S2, no murmurs noted  Abdomen: normoactive BS, soft and non tender   Lymph: no edema, + digital clubbing  Neuro: AAO X 3, CN 2-12 grossly intact, 5/5 bilateral  strength and dorsiflexion  Psychiatric: normal affect, good eye contact  Skin: no rash, jaundice or lesions on limited exam         Data:   All laboratory and imaging data reviewed.      Cystic Fibrosis Culture  Specimen Description   Date Value Ref Range Status   01/16/2020 Sputum  Final   12/30/2019 Sputum  Final   12/30/2019 Sputum  Final   12/30/2019 Sputum  Final    Culture Micro   Date Value Ref Range Status   01/16/2020 Heavy growth  Normal elmer    Final   01/16/2020 (A)  Final    Light growth  Achromobacter xylosoxidans/denitrificans     01/16/2020 Light growth  Staphylococcus aureus   (A)  Final        PFT interpretation:  Maneuver: valid and meets ATS guidelines  Normal ratio with decreased FEV1 and FVC  Compared to prior: FEV1 of 2.88 is 30 ml above prior  The decrease in FVC may represent restrictive physiology. Lung volumes would be necessary to determine.        "

## 2020-03-05 NOTE — PROGRESS NOTES
SUBJECTIVE/OBJECTIVE:                           Demario Abbasi is a 25 year old male coming in for routine clinic visit and annual MTM visit.      Allergies/ADRs: Reviewed in Epic  Tobacco: No tobacco use  Alcohol: 1-3 beverages / week  PMH: Reviewed in Epic  CF Genotype: g542x/621+1g>t    Medication Adherence  Medication adherence flowsheet 3/5/2020   Patient medication administration: Responsible for own medications   Patient estimated adherence level: 89-76%   Pharmacist assessment of adherence: Good   Patient reported doses missed per week: 0-1   Pharmacy MPR: MPR   Pharmacy MPR: (No Data)   Pharmacy MPR comments: 0.48 for Bethkis and 0.86 for Pulmozyme   Facilitators to medication adherence  Schedule/routine   Patient reported barriers to medication adherence  No issues identified   Demario fills specialty medications (Pulmozyme, Bethkis and MVW) at [facility] and other medications at [facility].  He orders all his medications himself and denies any adherence challenges.     Medication Access  Medication access flowsheet 3/5/2020   Number of pharmacies used: 2   Pharmacy: [facility]   Pharmacy comments: -   Enrolled in [facility]? Yes   Patient reported barriers to accessing medications: No issues reported by patient      CF  Inhaled medications   Bronchodilator: albuterol   Mucolytic: Pulmozyme and hypertonic saline  Antibiotic: Bethkis  Other: Flonase   Oral medications   Azithromycin: taking  CFTR modulator: Not Indicated   Other: fexofenadine  Pulmonary symptoms are stable  PFTs are stable  Current FEV1 61%  Cultures: sputum cultures grow Staph and Achromobacter  Current exacerbation: no    Pancreatic Insufficiency/Nutrition: Pancreatic enzyme replacement with Creon 17416 units.  Patient is taking 5 capsules with meals and 3 capsules with snacks. Patient is not experiencing sign/symptoms of malabsorption.  Acid Reducer: Protonix (pantoprazole) 20 mg daily  Weight and BMI are  "stable  Vitamins include: [id number] 1 capsule twice daily and Vitron-C 1 daily    Lab Results   Component Value Date    VITDT 29 02/22/2019    VITDT 28 03/15/2018     Depression:  Current medications include: Citalopram 20mg once daily. Patient reports his mood is stable and he is tolerating the medication well.  This is managed by his local PCP.  PHQ-9 SCORE 1/14/2016 8/24/2018 8/23/2019   PHQ-9 Total Score 0 1 1       PFTs:    Weight/BMI:  Estimated body mass index is 26.22 kg/m  as calculated from the following:    Height as of an earlier encounter on 3/5/20: 1.778 m (5' 10\").    Weight as of an earlier encounter on 3/5/20: 82.9 kg (182 lb 12.2 oz).      ASSESSMENT:                             Current medications were reviewed today.     CF: Stable.     Pancreatic Insufficiency/Nutrition: Stable.      Depression: Stable.     PLAN:                            Continue current medications and nebulizers and keep up the good work!    Prescriptions renewed today for 1 year per CPA with Lisa Lofton.    Have a great trip to [place]!    I spent 15 minutes with this patient today.      Will follow up in 1 year or sooner if needed.    The patient was provided a summary of these recommendations in the AVS from CF care team visit.    Ashley Kirby PharmD  CF Medication Therapy Management Pharmacist  [facility]  [phone]                    "

## 2020-03-05 NOTE — LETTER
3/5/2020       RE: Demario Abbasi  [address]  [place] [address]     Dear Colleague,    Thank you for referring your patient, Demario Abbasi, to the [facility] at [facility]. Please see a copy of my visit note below.    [facility]  March 5, 2020         Assessment and Plan:   Demario Abbasi is a 25 year old male with cystic fibrosis, pancreatic insufficiency, depression, acne and impaired glucose tolerance who is s/p RUL resection for recurrent exacerbations in 2009 who is seen for post discharge follow up. He was admitted 12/30-1/2 for severe CF exacerbation, discharged oral Bactrim and IV meropenem.      1. Severe CF exacerbation:  The patient reports improved exercise tolerance, decreased cough and decreased sputum production, nearing baseline.  PFTs are decreased from day of hospital discharge although he reports that PFTs in the hospital were done shortly after airway clearance and today's PFTs were done about 6 hours after his last airway clearance.    - Continue nebs and vesting  - Chronic azithromycin    2. Pancreatic insufficiency:  no s/s of malabsorption.   - Continue enzymes and vitamins    Vitamin levels will be checked with annual studies with his next visit.     3. CFTR modulation: not currently a candidate for modulating therapy.     4. Glucose intolerance: Glucose tolerance testing will be checked with annual studies with the next visit.    5. Depression: A  - Continue Celexa    6. Iron deficiency anemia: Iron studies and hemoglobin will be checked with annual studies with his next visit.    7. Reflux: Adequately controlled with current Protonix.    8. SIMON: Creatinine was increased during the patient's hospitalization.  This will be rechecked with annual studies.    RTC: in 3 months with routine cultures and spirometry  Annual studies due: March  2020  Preventative care:  Influenza vaccine:     Lisa Lofton PA-C  Pulmonary, Allergy, Critical Care and Sleep Medicine        Interval History:            Review of Systems:   Please see HPI. Otherwise, complete 10 point ROS negative.           Past Medical and Surgical History:     Past Medical History:   Diagnosis Date     CF (cystic fibrosis) (H)     Genotype: g542x/621+1g>t     Impaired glucose tolerance      Pancreatic insufficiency      S/P lobectomy of lung 8/4/2015    Right upper lobectomy - 2009     Past Surgical History:   Procedure Laterality Date     HC LAP,INGUINAL HERNIA REPR,INITIAL  2002     IR PICC PLACEMENT > 5 YRS OF AGE  3/18/2019     IR PICC PLACEMENT > 5 YRS OF AGE  12/30/2019     LOBECTOMY LUNG Right 2009    Right upper lobe           Family History:     Family History   Problem Relation Age of Onset     Thyroid Disease Mother         hypothyroid     Other - See Comments Mother         multiple family members with biliary disease     Hypertension Maternal Grandmother      Diabetes Maternal Grandfather      Hypertension Paternal Grandmother      Hypothyroidism Paternal Grandmother      Parkinsonism Paternal Grandmother      Coronary Artery Disease Early Onset Paternal Grandfather 56            Social History:     Social History     Socioeconomic History     Marital status: Single     Spouse name: Not on file     Number of children: Not on file     Years of education: Not on file     Highest education level: Not on file   Occupational History     Occupation:    Social Needs     Financial resource strain: Not on file     Food insecurity:     Worry: Not on file     Inability: Not on file     Transportation needs:     Medical: Not on file     Non-medical: Not on file   Tobacco Use     Smoking status: Passive Smoke Exposure - Never Smoker     Smokeless tobacco: Current User     Types: Chew     Tobacco comment: dad smokes   Substance and Sexual Activity     Alcohol use: Yes      Comment: <2 drinks per week     Drug use: Not on file     Sexual activity: Not on file   Lifestyle     Physical activity:     Days per week: Not on file     Minutes per session: Not on file     Stress: Not on file   Relationships     Social connections:     Talks on phone: Not on file     Gets together: Not on file     Attends Druze service: Not on file     Active member of club or organization: Not on file     Attends meetings of clubs or organizations: Not on file     Relationship status: Not on file     Intimate partner violence:     Fear of current or ex partner: Not on file     Emotionally abused: Not on file     Physically abused: Not on file     Forced sexual activity: Not on file   Other Topics Concern     Parent/sibling w/ CABG, MI or angioplasty before 65F 55M? 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 2 dogs.  Has girlfriend (Oriana).            Medications:     Current Outpatient Medications   Medication     acetaminophen (TYLENOL) 325 MG tablet     albuterol (PROAIR HFA) 108 (90 Base) MCG/ACT inhaler     albuterol (PROVENTIL) (2.5 MG/3ML) 0.083% neb solution     azithromycin (ZITHROMAX) 500 MG tablet     citalopram (CELEXA) 20 MG tablet     CREON 18799 units CPEP per EC capsule     ferrous fumarate 65 mg, [place]. FE,-Vitamin C 125 mg (VITRON C)  MG TABS tablet     fexofenadine (ALLEGRA) 180 MG tablet     fluconazole (DIFLUCAN) 150 MG tablet     fluticasone (FLONASE) 50 MCG/ACT nasal spray     mvw complete formulation (SOFTGELS ) capsule     pantoprazole (PROTONIX) 20 MG EC tablet     PULMOZYME 1 MG/ML neb solution     sodium chloride inhalant 7 % NEBU neb solution     tobramycin (BETHKIS) 300 MG/4ML nebulizer solution     No current facility-administered medications for this visit.             Physical Exam:   There were no  vitals taken for this visit.    GENERAL: alert, NAD  HEENT: NCAT, EOMI, anicteric sclera, no nasal edema or erythema; canals and TMs clear; no oral mucosal edema or erythema  Neck: no cervical or supraclavicular adenopathy  Respiratory: good air flow, no crackles, rhonchi or wheezing  CV: RRR, S1S2, no murmurs noted  Abdomen: normoactive BS, soft and non tender without organomegaly  Lymph: no edema, + digital clubbing  Neuro: AAO X 3, CN 2-12 grossly intact, 5/5 bilateral  strength and dorsiflexion  Psychiatric: normal affect, good eye contact  Skin: no rash, jaundice or lesions on limited exam         Data:   All laboratory and imaging data reviewed.      Cystic Fibrosis Culture  Specimen Description   Date Value Ref Range Status   01/16/2020 Sputum  Final   12/30/2019 Sputum  Final   12/30/2019 Sputum  Final   12/30/2019 Sputum  Final    Culture Micro   Date Value Ref Range Status   01/16/2020 Heavy growth  Normal elmer    Final   01/16/2020 (A)  Final    Light growth  Achromobacter xylosoxidans/denitrificans     01/16/2020 Light growth  Staphylococcus aureus   (A)  Final        PFT interpretation:  Maneuver: valid and meets ATS guidelines  Normal ratio with decreased FEV1 and FVC  Compared to prior: FEV1 of 2.88 is 30 ml above prior  The decrease in FVC may represent restrictive physiology. Lung volumes would be necessary to determine.          [facility]  March 5, 2020         Assessment and Plan:   Demario Abbasi is a 25 year old male with cystic fibrosis, pancreatic insufficiency, depression, acne and impaired glucose tolerance who is s/p RUL resection for recurrent exacerbations in 2009 who is seen for routine follow up. He is leaving for spring break on Sunday and made this appointment to talk about precautions while traveling in the context of coronavirus.     1. Cystic fibrosis: feeling well and at baseline. Has been exercising twice/week  for one hour/time with a mix of cardio and weights. Denies new dyspnea, no hemoptysis or streaking. Sating 94% on room air; vesting BID. PFTs today improved slightly, at his baseline. Previous cultures + for MSSA and Achromobacter. No evidence of exacerbation.  - Continue nebs and vesting BID  - Chronic azithromycin and ngozi nebs every other month    2. Pancreatic insufficiency:  no s/s of malabsorption. BMI is > CFF goal.   - Continue enzymes and vitamins     3. CFTR modulation: not currently a candidate for modulating therapy.     4. Glucose intolerance: doesn't check BS.  - Has OGTT scheduled for next f/u in June 5. Depression: feels his mood is stable. Is a little anxious today because he has a phone interview with Virgilio Sanchez this afternoon. Following with a PCP.   - Continue Celexa    6. Iron deficiency anemia:  hg was stable at 14.6 during labs in December.  - Annual labs at next visit    7. Acid reflux: no currently complaints.  - Continue pantoprazole    8. SIMON:  last Cr of 1.26 on 1/2/20.   - Recheck Cr at next visit    RTC: in 3 months with routine cultures and spirometry  Annual studies due: June 2020 (CXR and DEXA today)  Influenza vaccine: completed    Lisa Lofton PA-C  Pulmonary, Allergy, Critical Care and Sleep Medicine        Interval History:   In college, graduating this spring. Trying to exercise at least twice/week, runs about a mile and 45 minutes of lifting. Does have shortnes of breath with running, improved overall. Cough is baseline, intermittently productive. No hemoptysis. No congestion or tightness. Vesting BID. No nausea, bloating or gas, having 2-3 stools, not fatty or floating.          Review of Systems:   Please see HPI. Otherwise, complete 10 point ROS negative.           Past Medical and Surgical History:     Past Medical History:   Diagnosis Date     CF (cystic fibrosis) (H)     Genotype: g542x/621+1g>t     Impaired glucose tolerance      Pancreatic insufficiency      S/P  lobectomy of lung 8/4/2015    Right upper lobectomy - 2009     Past Surgical History:   Procedure Laterality Date     HC LAP,INGUINAL HERNIA REPR,INITIAL  2002     IR PICC PLACEMENT > 5 YRS OF AGE  3/18/2019     IR PICC PLACEMENT > 5 YRS OF AGE  12/30/2019     LOBECTOMY LUNG Right 2009    Right upper lobe           Family History:     Family History   Problem Relation Age of Onset     Thyroid Disease Mother         hypothyroid     Other - See Comments Mother         multiple family members with biliary disease     Hypertension Maternal Grandmother      Diabetes Maternal Grandfather      Hypertension Paternal Grandmother      Hypothyroidism Paternal Grandmother      Parkinsonism Paternal Grandmother      Coronary Artery Disease Early Onset Paternal Grandfather 56            Social History:     Social History     Socioeconomic History     Marital status: Single     Spouse name: Not on file     Number of children: Not on file     Years of education: Not on file     Highest education level: Not on file   Occupational History     Occupation:    Social Needs     Financial resource strain: Not on file     Food insecurity:     Worry: Not on file     Inability: Not on file     Transportation needs:     Medical: Not on file     Non-medical: Not on file   Tobacco Use     Smoking status: Passive Smoke Exposure - Never Smoker     Smokeless tobacco: Current User     Types: Chew     Tobacco comment: dad smokes   Substance and Sexual Activity     Alcohol use: Yes     Comment: <2 drinks per week     Drug use: Not on file     Sexual activity: Not on file   Lifestyle     Physical activity:     Days per week: Not on file     Minutes per session: Not on file     Stress: Not on file   Relationships     Social connections:     Talks on phone: Not on file     Gets together: Not on file     Attends Caodaism service: Not on file     Active member of club or organization: Not on file     Attends meetings of clubs or  "organizations: Not on file     Relationship status: Not on file     Intimate partner violence:     Fear of current or ex partner: Not on file     Emotionally abused: Not on file     Physically abused: Not on file     Forced sexual activity: Not on file   Other Topics Concern     Parent/sibling w/ CABG, MI or angioplasty before 65F 55M? 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 2 dogs.  Has girlfriend (Oriana).            Medications:     Current Outpatient Medications   Medication     albuterol (PROAIR HFA) 108 (90 Base) MCG/ACT inhaler     albuterol (PROVENTIL) (2.5 MG/3ML) 0.083% neb solution     azithromycin (ZITHROMAX) 500 MG tablet     citalopram (CELEXA) 20 MG tablet     CREON 34715 units CPEP per EC capsule     ferrous fumarate 65 mg, [place]. FE,-Vitamin C 125 mg (VITRON C)  MG TABS tablet     fexofenadine (ALLEGRA) 180 MG tablet     fluticasone (FLONASE) 50 MCG/ACT nasal spray     mvw complete formulation (SOFTGELS ) capsule     pantoprazole (PROTONIX) 20 MG EC tablet     PULMOZYME 1 MG/ML neb solution     sodium chloride inhalant 7 % NEBU neb solution     tobramycin (BETHKIS) 300 MG/4ML nebulizer solution     fluconazole (DIFLUCAN) 150 MG tablet     No current facility-administered medications for this visit.             Physical Exam:   /72   Pulse 68   Resp 18   Ht 1.778 m (5' 10\")   Wt 82.9 kg (182 lb 12.2 oz)   SpO2 94%   BMI 26.22 kg/m       GENERAL: alert, NAD  HEENT: NCAT, EOMI, anicteric sclera, no nasal edema or erythema, mild amount of drainage on the left; canals and TMs clear; no oral mucosal edema or erythema  Neck: no cervical or supraclavicular adenopathy  Respiratory: good air flow, no crackles, rhonchi or wheezing  CV: RRR, S1S2, no murmurs noted  Abdomen: normoactive BS, soft and non tender   Lymph: no " edema, + digital clubbing  Neuro: AAO X 3, CN 2-12 grossly intact, 5/5 bilateral  strength and dorsiflexion  Psychiatric: normal affect, good eye contact  Skin: no rash, jaundice or lesions on limited exam         Data:   All laboratory and imaging data reviewed.      Cystic Fibrosis Culture  Specimen Description   Date Value Ref Range Status   01/16/2020 Sputum  Final   12/30/2019 Sputum  Final   12/30/2019 Sputum  Final   12/30/2019 Sputum  Final    Culture Micro   Date Value Ref Range Status   01/16/2020 Heavy growth  Normal elmer    Final   01/16/2020 (A)  Final    Light growth  Achromobacter xylosoxidans/denitrificans     01/16/2020 Light growth  Staphylococcus aureus   (A)  Final        PFT interpretation:  Maneuver: valid and meets ATS guidelines  Normal ratio with decreased FEV1 and FVC  Compared to prior: FEV1 of 2.88 is 30 ml above prior  The decrease in FVC may represent restrictive physiology. Lung volumes would be necessary to determine.          Again, thank you for allowing me to participate in the care of your patient.      Sincerely,    Lisa Lofton PA-C

## 2020-03-05 NOTE — NURSING NOTE
Chief Complaint   Patient presents with     RECHECK     Cystic Fibrosis     Medications reviewed and vital signs taken.   Lizy Mireles, ASMITA

## 2020-03-05 NOTE — PATIENT INSTRUCTIONS
Cystic Fibrosis Self-Care Plan       Patient: Demario Abbasi   MRN: [id number]   Clinic Date: March 5, 2020     RECOMMENDATIONS:  1. Continue nebulizers and vest therapy.   2. Good hand hygiene and wipes before, during and after the plane. You cannot wash your hands enough and don't touch your face!  3. Increase exercise to 3 days/week or add another 30 minutes of cardio/weights.   4. Have fun in [place]!    Annual Studies:   IGG   Date Value Ref Range Status   02/22/2019 1,380 695 - 1,620 mg/dL Final     Insulin   Date Value Ref Range Status   02/22/2019 Canceled, Test credited 3 - 25 mU/L Final     Comment:     Unsatisfactory specimen - hemolyzed  NOTIFIED OZ PHILLIPS MD AT 1450 ON 2/22/19 BY JV       There are no preventive care reminders to display for this patient.    Pulmonary Function Tests  FEV1: amount of air you can blow out in 1 second  FVC: total amount of air you can take in and blow out    Your Goals:         PFT Latest Ref Rng & Units 3/5/2020   FVC L 3.57   FEV1 L 2.88   FVC% % 63   FEV1% % 61          Airway Clearance: The Most Important Way to Keep Your Lungs Healthy  Vest Settings:    [place] Frequencies: 8, 9, 10 Pressure 10 Then, Frequencies 18, 19, 20 Pressure 6      RespirTech: Quick Start with Pressure of     Do each frequency for 5 minutes; Deflate vest after each frequency & cough 3 times before beginning the next setting.    Vest and Neb Therapy should be done 2-3 times/day.    Good Nutrition Can Improve Lung Function and Overall Health     Take ALL of your vitamins with food     Take 1/2 of your enzymes before EVERY meal/snack and the other 1/2 mid-meal/snack    Wt Readings from Last 3 Encounters:   03/05/20 82.9 kg (182 lb 12.2 oz)   01/16/20 79.8 kg (175 lb 14.8 oz)   12/31/19 77.7 kg (171 lb 6.4 oz)       Body mass index is 26.22 kg/m .         National CF Foundation Recommendations for BMI in CF Adults: Women: at least 22 Men: at least 23        Controlling Blood Sugars  Helps Prevent Lung Infections & Improves Nutrition  Test blood sugar:     In the morning before eating (goal is )     2 hours after a meal (goal is less than 150)     When pre-meal glucose is greater than 150 add correction     At bedtime (if less than 100 eat a snack with 15 grams of carbohydrates  Last A1C Results:   Hemoglobin A1C   Date Value Ref Range Status   02/22/2019 5.9 (H) 0 - 5.6 % Final     Comment:     Normal <5.7% Prediabetes 5.7-6.4%  Diabetes 6.5% or higher - adopted from ADA   consensus guidelines.           If diabetic, measure A1C every 6 months. Goal is under 7%.    Staying Healthy    Research: If you are interested in learning about research opportunities or have questions, please contact Esmer Tang at [phone] or [email or web address].      CF Foundation: Compass is a personalized resource service to help you with the insurance, financial, legal and other issues you are facing.  It's free, confidential and available to anyone with CF.  Ask your  for more information or contact Compass directly at [facility] ([phone]) or compass@cff.org, or learn more at cff.org/compass.       CF Nurse Line:  Ann Rodríguez and Tatiana: [phone]   Margaret Werner, RT: [phone]     Saba Gill and Sherie Aparicio , Dieticians: [phone]     Ashley Cleveland, Diabetes Nurse: [phone]    Prerna Woodall: [phone] or Courtney Lomas at [phone], Social Workers   [email or web address]

## 2020-03-10 LAB
BACTERIA SPEC CULT: ABNORMAL
SPECIMEN SOURCE: ABNORMAL

## 2020-04-16 ENCOUNTER — TELEPHONE (OUTPATIENT)
Dept: PULMONOLOGY | Facility: CLINIC | Age: 25
End: 2020-04-16

## 2020-04-16 NOTE — TELEPHONE ENCOUNTER
[facility] [place] [facility]  April 16, 2020    Susan Li    Cystic fibrosis Provider: Zana Lilly MD    Caller: Patient     Clinical information:  Demario Abbasi called with complaint of two episodes of hemoptysis last evening ~1 tbs each. No further recurrence. No other symptoms. No fever. No increase in cough. Currently on inhaled COBY and chronic Zithromax. Vesting twice a day.    Plan:   Discussed with Dr Lilly:  Hold pulmozyme thru weekend, then restart if no further episodes of hemoptysis.  Call back with any new or worsening symptoms/concerns.    Caller verbalized understanding of plan and agrees with advice given.

## 2020-04-20 DIAGNOSIS — E56.1 VITAMIN K DEFICIENCY: ICD-10-CM

## 2020-04-20 DIAGNOSIS — E84.9 CF (CYSTIC FIBROSIS) (H): Primary | ICD-10-CM

## 2020-04-20 RX ORDER — DOXYCYCLINE HYCLATE 100 MG
100 TABLET ORAL 2 TIMES DAILY
Qty: 42 TABLET | Refills: 0 | Status: SHIPPED | OUTPATIENT
Start: 2020-04-20 | End: 2020-06-26

## 2020-04-20 RX ORDER — PHYTONADIONE 5 MG/1
5 TABLET ORAL DAILY
Qty: 30 TABLET | Refills: 1 | Status: SHIPPED | OUTPATIENT
Start: 2020-04-20 | End: 2020-06-26

## 2020-04-20 RX ORDER — LEVOFLOXACIN 750 MG/1
750 TABLET, FILM COATED ORAL DAILY
Qty: 21 TABLET | Refills: 0 | Status: SHIPPED | OUTPATIENT
Start: 2020-04-20 | End: 2020-06-26

## 2020-04-20 RX ORDER — PHYTONADIONE 5 MG/1
5 TABLET ORAL DAILY
Qty: 30 TABLET | Refills: 1 | Status: SHIPPED | OUTPATIENT
Start: 2020-04-20 | End: 2020-04-20

## 2020-04-20 NOTE — TELEPHONE ENCOUNTER
Call from Demario:    Saturday evening while sleeping and laying on his right side, coughed up ~ 1 tsp to 1 tbs blood mixed with mucus. Repeat episode on Sunday while sitting around. Has been holding his pulmozyme as instructed.     PLAN:  Discussed with Dr Lilly:  Levaquin 750 mg daily x3 weeks. Hold Zithromax while on Levaquin.  Doxycycline 100 mg BID x3 weeks.  Vitamin K 5 mg daily.

## 2020-04-22 ENCOUNTER — TELEPHONE (OUTPATIENT)
Dept: PULMONOLOGY | Facility: CLINIC | Age: 25
End: 2020-04-22

## 2020-04-22 NOTE — TELEPHONE ENCOUNTER
PA Initiation    Medication: AZITHROMYCIN-PENDING  Insurance Company: [employer] [place] - Phone [phone] Fax [phone]  Pharmacy Filling the Rx: [facility] [place] - [id number]  Filling Pharmacy Phone:    Filling Pharmacy Fax:    Start Date: 4/22/2020

## 2020-04-23 NOTE — TELEPHONE ENCOUNTER
Prior Authorization Approval    Authorization Effective Date: 4/23/2020  Authorization Expiration Date: 4/23/2021  Medication: AZITHROMYCIN-APPROVED  Approved Dose/Quantity: 30 per 30  Reference #:     Insurance Company: [facility] - Phone [phone] Fax [phone]  Expected CoPay:       CoPay Card Available:      Foundation Assistance Needed:    Which Pharmacy is filling the prescription (Not needed for infusion/clinic administered): [facility] [place] - [address] [place] [address]  Pharmacy Notified:   Patient Notified:

## 2020-06-26 ENCOUNTER — VIRTUAL VISIT (OUTPATIENT)
Dept: PULMONOLOGY | Facility: CLINIC | Age: 25
End: 2020-06-26
Attending: FAMILY MEDICINE
Payer: COMMERCIAL

## 2020-06-26 DIAGNOSIS — J30.2 SEASONAL ALLERGIES: Primary | ICD-10-CM

## 2020-06-26 DIAGNOSIS — K86.89 PANCREATIC INSUFFICIENCY: ICD-10-CM

## 2020-06-26 DIAGNOSIS — R73.02 IMPAIRED GLUCOSE TOLERANCE: ICD-10-CM

## 2020-06-26 DIAGNOSIS — E84.9 CF (CYSTIC FIBROSIS) (H): ICD-10-CM

## 2020-06-26 DIAGNOSIS — Z90.2 S/P LOBECTOMY OF LUNG: ICD-10-CM

## 2020-06-26 RX ORDER — PHYTONADIONE 5 MG/1
5 TABLET ORAL DAILY
Qty: 30 TABLET | Refills: 1 | COMMUNITY
Start: 2020-06-26 | End: 2020-09-10

## 2020-06-26 RX ORDER — TOBRAMYCIN INHALATION 300 MG/4ML
300 SOLUTION RESPIRATORY (INHALATION) 2 TIMES DAILY
Qty: 224 ML | Refills: 5 | COMMUNITY
Start: 2020-06-26 | End: 2021-03-10

## 2020-06-26 RX ORDER — OLOPATADINE HYDROCHLORIDE 1 MG/ML
1 SOLUTION/ DROPS OPHTHALMIC 2 TIMES DAILY
Qty: 5 ML | Refills: 11 | Status: SHIPPED | OUTPATIENT
Start: 2020-06-26 | End: 2021-07-22

## 2020-06-26 RX ORDER — CETIRIZINE HYDROCHLORIDE 10 MG/1
10 TABLET ORAL DAILY
Qty: 30 TABLET | Refills: 11 | Status: SHIPPED | OUTPATIENT
Start: 2020-06-26 | End: 2021-07-23

## 2020-06-26 RX ORDER — AZELASTINE 1 MG/ML
1 SPRAY, METERED NASAL 2 TIMES DAILY
Qty: 30 ML | Refills: 11 | Status: SHIPPED | OUTPATIENT
Start: 2020-06-26 | End: 2022-06-16

## 2020-06-26 NOTE — PATIENT INSTRUCTIONS
Cystic Fibrosis Self-Care Plan    RECOMMENDATIONS:   For your allergies:  -Pataday eyedrops: 1 drop in each eye twice daily during allergy season.  -Astelin nasal spray: 1 spray in each nostril twice daily during allergy season.  -Continue Flonase.  -Switch from Allegra to Zyrtec 10 mg daily.    If your cough gets worse or does not get better with the change in allergy treatment, call the CF office.  Otherwise continue current medication, nebs and vest therapy.  Follow-up in 3 months with annual studies.        [facility] Nurse line:  Mino Rodríguez    [phone]     [facility] Fax Number:      [phone]         Cystic Fibrosis Respiratory Therapists:   Margaret Werner              [phone]          May Rao   [phone]  Cystic Fibrosis Dietitians:              Sherie Aparicio              [phone]                            Saba Gill                        [phone]   Cystic Fibrosis Diabetes Nurse:    Ashley Cleveland   [phone]    Cystic Fibrosis Social Workers:     Prerna Paniagua               [phone]                     Courtney Fenton               [phone]  Cystic Fibrosis Pharmacists:           Ashley Kirby                               [phone]         Ansley Santo   [phone]  Cystic Fibrosis Genetic Counselor:   Yuliana Ghotra    [phone]    [facility] website:  [email or web address]         MRN: [id number]   Clinic Date: June 26, 2020   Patient: Demario Abbasi     Annual Studies:   IGG   Date Value Ref Range Status   02/22/2019 1,380 695 - 1,620 mg/dL Final     Insulin   Date Value Ref Range Status   02/22/2019 Canceled, Test credited 3 - 25 mU/L Final     Comment:     Unsatisfactory specimen - hemolyzed  NOTIFIED OZ PHILLIPS MD AT 1450 ON 2/22/19 BY JV       There are no preventive care reminders to display for this patient.    Pulmonary Function Tests  FEV1: amount of air you can blow  out in 1 second  FVC: total amount of air you can take in and blow out    Your Goals:         PFT Latest Ref Rng & Units 3/5/2020   FVC L 3.57   FEV1 L 2.88   FVC% % 63   FEV1% % 61          Airway Clearance: The Most Important Way to Keep Your Lungs Healthy  Vest Settings:    [place] Frequencies: 8, 9, 10 Pressure 10 Then, Frequencies 18, 19, 20 Pressure 6      RespirTech: Quick Start with Pressure of     Do each frequency for 5 minutes; Deflate vest after each frequency & cough 3 times before beginning the next setting.    Vest and Neb Therapy should be done 2 times/day.    Good Nutrition Can Improve Lung Function and Overall Health     Take ALL of your vitamins with food     Take 1/2 of your enzymes before EVERY meal/snack and the other 1/2 mid-meal/snack    Wt Readings from Last 3 Encounters:   03/05/20 82.9 kg (182 lb 12.2 oz)   01/16/20 79.8 kg (175 lb 14.8 oz)   12/31/19 77.7 kg (171 lb 6.4 oz)       There is no height or weight on file to calculate BMI.         National CF Foundation Recommendations for BMI in CF Adults: Women: at least 22 Men: at least 23        Controlling Blood Sugars Helps Prevent Lung Infections & Improves Nutrition  Test blood sugar:     In the morning before eating (goal is )     2 hours after a meal (goal is less than 150)     When pre-meal glucose is greater than 150 add correction     At bedtime (if less than 100 eat a snack with 15 grams of carbohydrates  Last A1C Results:   Hemoglobin A1C   Date Value Ref Range Status   02/22/2019 5.9 (H) 0 - 5.6 % Final     Comment:     Normal <5.7% Prediabetes 5.7-6.4%  Diabetes 6.5% or higher - adopted from ADA   consensus guidelines.           If diabetic, measure A1C every 6 months. Goal: Under 7%    Staying Healthy    Research:  If you are interested in learning about research opportunities or have questions, please contact the CF Research Team at 019-800-2925 or [email or web address].      CF Foundation:  Compass is a personalized  resource service to help you with the insurance, financial, legal and other issues you are facing.  It's free, confidential and available to anyone with CF.  Ask your  for more information or contact Compass directly at 658-GKXQHMU ([phone]) or compass@cff.org, or learn more at cff.org/compass.

## 2020-06-27 NOTE — PROGRESS NOTES
"Demario Abbasi is a 25 year old male who is being evaluated via a billable telephone visit.      The patient has been notified of following:     \"This telephone visit will be conducted via a call between you and your physician/provider. We have found that certain health care needs can be provided without the need for a physical exam.  This service lets us provide the care you need with a short phone conversation.  If a prescription is necessary we can send it directly to your pharmacy.  If lab work is needed we can place an order for that and you can then stop by our lab to have the test done at a later time.    Telephone visits are billed at different rates depending on your insurance coverage. During this emergency period, for some insurers they may be billed the same as an in-person visit.  Please reach out to your insurance provider with any questions.    If during the course of the call the physician/provider feels a telephone visit is not appropriate, you will not be charged for this service.\"    Patient has given verbal consent for Telephone visit?  Yes    What phone number would you like to be contacted at? [phone]    How would you like to obtain your AVS? [facility]    Phone call duration: 24 minutes    Zana Lilly MD    Reason for Visit  Demario Abbasi is a 25 year old year old male who is being seen for RECHECK (CF annuals)      Assessment and plan:   Demario Abbasi is a 25 year old male with cystic fibrosis, pancreatic insufficiency, depression, acne and impaired glucose tolerance who is s/p RUL resection for recurrent exacerbations in 2009.   He last required IV antibiotics in January 2020 for a pulmonary exacerbation.    Pulmonary: The patient reports excellent exercise tolerance.  He does note an increase in his cough and sputum production but feels this is related to seasonal allergies.  No PFTs or oxygen saturation are available with today's telephone visit.  He does not " appear to be having an exacerbation.  He will continue his twice daily vest therapy with albuterol and Pulmozyme with every therapy COBY nebs a month on a month off and chronic azithromycin.  He also uses hypertonic saline for periods of increased chest congestion.  If there is no improvement in his cough and sputum production with vest treatment of his allergies (see below) then a trial of antibiotics will be considered.    Seasonal allergies: Patient reports increased seasonal allergy symptoms with increased nasal congestion rhinorrhea and watery eyes.  He will switch from Zyrtec to Allegra.  A trial of Astelin nasal spray and Pataday eyedrops will be initiated and you will continue with his current nasal steroids.  If symptoms persist he will contact the CF office and a brief trial of prednisone will be considered.  Prescriptions for Allegra, Astelin nasal spray and Pataday eyedrops were sent to his local pharmacist    Pancreatic insufficiency: Patient denies symptoms of malabsorption.  Continue his current vitamins and enzymes.  Vitamin levels will be checked with annual studies with his next visit.    CFTR Modulation: The patient is not a candidate for any of the currently available modulators based on his genotype.    Glucose intolerance: Glucose tolerance testing will be included in annual studies with his next visit.    Depression: The patient is interested and stopping his citalopram in the near future.  Once he is more settled in his new job this will be considered, likely at his next clinic visit.    Iron deficiency anemia: Continue iron replacement.  Recheck hemoglobin with follow-up visit.    Reflux: Well-controlled with current pantoprazole.    SIMON: Check creatinine with annual studies with next visit.    Follow-up in 3 months with annual studies including glucose tolerance testing.  The patient did have a DEXA scan and a chest x-ray in March so these will not be repeated.  In addition to my visit, the  patient will be contacted by the CF dietitian.    CF HPI  The patient was seen and examined by Zana Lilly MD   Demario Abbasi is a 25 year old male with cystic fibrosis, pancreatic insufficiency, depression, acne and impaired glucose tolerance who is s/p RUL resection for recurrent exacerbations in 2009.   He last required IV antibiotics in January 2020 for a pulmonary exacerbation.  In mid April the patient had an episode of hemoptysis bringing up a couple teaspoons of blood.  This recurred once.  Patient's Pulmozyme was held and levofloxacin was initiated with resolution.  The patient reinitiated Pulmozyme once he completed his course of levofloxacin.  He has been well since that time.  Breathing is comfortable at rest and with all activity.  He recently started a job as a sales  and gets a lot of exercise through work.  He denies chest pain.  No recent fever, chills or night sweats.  He has noted increased cough with increased sputum production which is baseline in color but increased in volume.  He attributes this to increased seasonal allergies and has noted a corresponding increase in sinus drainage and nasal congestion along with rhinorrhea and watery eyes but no sinus pain, sore throat or ear pain.  He is doing vest therapy twice daily for 30 minutes.    As noted, the patient recently graduated college and has started full-time work as a sales     Review of systems:  Appetite is good.  ENT complaints as noted above  Pulmonary complaints as noted above  No nausea, vomiting, diarrhea or abdominal pain.  Denies depression.  Would like to consider stopping citalopram in the future.  A complete ROS was otherwise negative except as noted in the HPI.    Current Outpatient Medications   Medication     albuterol (PROAIR HFA) 108 (90 Base) MCG/ACT inhaler     albuterol (PROVENTIL) (2.5 MG/3ML) 0.083% neb solution     amylase-lipase-protease (CREON) 79924 units CPEP     azelastine  (ASTELIN) 0.1 % nasal spray     azithromycin (ZITHROMAX) 500 MG tablet     cetirizine (ZYRTEC) 10 MG tablet     citalopram (CELEXA) 20 MG tablet     dornase alpha (PULMOZYME) 1 MG/ML neb solution     ferrous fumarate 65 mg, [place]. FE,-Vitamin C 125 mg (VITRON C)  MG TABS tablet     fluconazole (DIFLUCAN) 150 MG tablet     fluticasone (FLONASE) 50 MCG/ACT nasal spray     mvw complete formulation (SOFTGELS ) capsule     olopatadine (PATANOL) 0.1 % ophthalmic solution     pantoprazole (PROTONIX) 20 MG EC tablet     phytonadione (MEPHYTON) 5 MG tablet     sodium chloride inhalant 7 % NEBU neb solution     tobramycin (BETHKIS) 300 MG/4ML nebulizer solution     No current facility-administered medications for this visit.      Allergies   Allergen Reactions     Augmentin      Past Medical History:   Diagnosis Date     CF (cystic fibrosis) (H)     Genotype: [id number]/621+1g>t     Impaired glucose tolerance      Pancreatic insufficiency      S/P lobectomy of lung 8/4/2015    Right upper lobectomy - 2009       Past Surgical History:   Procedure Laterality Date     HC LAP,INGUINAL HERNIA REPR,INITIAL  2002     IR PICC PLACEMENT > 5 YRS OF AGE  3/18/2019     IR PICC PLACEMENT > 5 YRS OF AGE  12/30/2019     LOBECTOMY LUNG Right 2009    Right upper lobe       Social History     Socioeconomic History     Marital status: Single     Spouse name: Not on file     Number of children: Not on file     Years of education: Not on file     Highest education level: Not on file   Occupational History     Occupation: Sale    Social Needs     Financial resource strain: Not on file     Food insecurity     Worry: Not on file     Inability: Not on file     Transportation needs     Medical: Not on file     Non-medical: Not on file   Tobacco Use     Smoking status: Passive Smoke Exposure - Never Smoker     Smokeless tobacco: Current User     Types: Chew     Tobacco comment: dad smokes   Substance and Sexual Activity     Alcohol use:  Yes     Comment: <2 drinks per week     Drug use: Not on file     Sexual activity: Not on file   Lifestyle     Physical activity     Days per week: Not on file     Minutes per session: Not on file     Stress: Not on file   Relationships     Social connections     Talks on phone: Not on file     Gets together: Not on file     Attends Restorationism service: Not on file     Active member of club or organization: Not on file     Attends meetings of clubs or organizations: Not on file     Relationship status: Not on file     Intimate partner violence     Fear of current or ex partner: Not on file     Emotionally abused: Not on file     Physically abused: Not on file     Forced sexual activity: Not on file   Other Topics Concern     Parent/sibling w/ CABG, MI or angioplasty before 65F 55M? Not Asked   Social History Narrative    12/27/2019  -Graduated college in 2020.  Working full time as a sales .  [place] work[place]  Has girlfriend (Oriana).       Results:  No results found for this or any previous visit (from the past 168 hour(s)).                No PFTs due to safety concerns during the COVID-19 outbreak.    CF Exacerbation  Absent

## 2020-07-01 ENCOUNTER — TELEPHONE (OUTPATIENT)
Dept: NUTRITION | Facility: CLINIC | Age: 25
End: 2020-07-01

## 2020-07-01 NOTE — PROGRESS NOTES
Nutrition Note    Pt due for nutrition annual assessment. Completed virtual visit with pulmonary provider 6/26. Attempted to contact pt, left voicemail x 2 and sent flipClass message.    Plan to complete nutrition visit at upcoming clinic visits as able.     Sherie Aparicio RD, LD  Cystic Fibrosis/Lung Transplant Dietitian  Pager [phone]

## 2020-07-22 ENCOUNTER — TELEPHONE (OUTPATIENT)
Dept: PULMONOLOGY | Facility: CLINIC | Age: 25
End: 2020-07-22

## 2020-07-22 DIAGNOSIS — E84.0 CYSTIC FIBROSIS WITH PULMONARY MANIFESTATIONS (H): Primary | ICD-10-CM

## 2020-07-22 RX ORDER — DOXYCYCLINE HYCLATE 100 MG
100 TABLET ORAL 2 TIMES DAILY
Qty: 42 TABLET | Refills: 0 | Status: SHIPPED | OUTPATIENT
Start: 2020-07-22 | End: 2020-08-12

## 2020-07-22 RX ORDER — LEVOFLOXACIN 750 MG/1
750 TABLET, FILM COATED ORAL DAILY
Qty: 21 TABLET | Refills: 0 | Status: SHIPPED | OUTPATIENT
Start: 2020-07-22 | End: 2020-08-12

## 2020-07-22 NOTE — TELEPHONE ENCOUNTER
[facility]  July 22, 2020    Susan Li    Cystic fibrosis Provider: Milagros Briscoe MD    Caller: Patient     Clinical information:  Demario Abbasi called with complaint of cough not having improved since his virtual visit the end of June. Did change antihistamines and allergies seem to be better per his report. Describes cough as productive, sometimes thick. Sputum is generally green in color. No fevers. Reports that a coworker's mother has tested positive for COVID-19. He has had no contact with said coworker. 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 Vests twice a day. Off COBY. Scheduled to restart cycle of COBY the second week of August.    Plan:   Discussed with Dr Briscoe:  Levaquin 750 mg daily x3 weeks. Hold Zithromax while on Levaquin.  Doxycycline 100 mg BID x3 weeks.  Call back with any new or worsening symptoms/concerns.    Caller verbalized understanding of plan and agrees with advice given.

## 2020-09-10 ENCOUNTER — OFFICE VISIT (OUTPATIENT)
Dept: PULMONOLOGY | Facility: CLINIC | Age: 25
End: 2020-09-10
Attending: FAMILY MEDICINE
Payer: COMMERCIAL

## 2020-09-10 VITALS
HEIGHT: 70 IN | HEART RATE: 70 BPM | BODY MASS INDEX: 26.16 KG/M2 | WEIGHT: 182.76 LBS | RESPIRATION RATE: 17 BRPM | OXYGEN SATURATION: 96 % | SYSTOLIC BLOOD PRESSURE: 129 MMHG | DIASTOLIC BLOOD PRESSURE: 78 MMHG

## 2020-09-10 DIAGNOSIS — K86.89 PANCREATIC INSUFFICIENCY: ICD-10-CM

## 2020-09-10 DIAGNOSIS — E84.0 CYSTIC FIBROSIS WITH PULMONARY EXACERBATION (H): ICD-10-CM

## 2020-09-10 DIAGNOSIS — R73.02 IMPAIRED GLUCOSE TOLERANCE: ICD-10-CM

## 2020-09-10 DIAGNOSIS — E84.0 CYSTIC FIBROSIS WITH PULMONARY MANIFESTATIONS (H): Primary | ICD-10-CM

## 2020-09-10 DIAGNOSIS — E84.0 CYSTIC FIBROSIS WITH PULMONARY EXACERBATION (H): Primary | ICD-10-CM

## 2020-09-10 LAB
ALBUMIN SERPL-MCNC: 3.9 G/DL (ref 3.4–5)
ALBUMIN UR-MCNC: NEGATIVE MG/DL
ALP SERPL-CCNC: 163 U/L (ref 40–150)
ALT SERPL W P-5'-P-CCNC: 36 U/L (ref 0–70)
ANION GAP SERPL CALCULATED.3IONS-SCNC: 6 MMOL/L (ref 3–14)
APPEARANCE UR: CLEAR
AST SERPL W P-5'-P-CCNC: 23 U/L (ref 0–45)
BASOPHILS # BLD AUTO: 0.1 10E9/L (ref 0–0.2)
BASOPHILS NFR BLD AUTO: 1 %
BILIRUB UR QL STRIP: NEGATIVE
BUN SERPL-MCNC: 11 MG/DL (ref 7–30)
CALCIUM SERPL-MCNC: 9.4 MG/DL (ref 8.5–10.1)
CHLORIDE SERPL-SCNC: 104 MMOL/L (ref 94–109)
CHOLEST SERPL-MCNC: 101 MG/DL
CO2 SERPL-SCNC: 28 MMOL/L (ref 20–32)
COLOR UR AUTO: YELLOW
CREAT SERPL-MCNC: 1.02 MG/DL (ref 0.66–1.25)
CREAT UR-MCNC: 73 MG/DL
DIFFERENTIAL METHOD BLD: NORMAL
EOSINOPHIL # BLD AUTO: 0.3 10E9/L (ref 0–0.7)
EOSINOPHIL NFR BLD AUTO: 2.8 %
ERYTHROCYTE [DISTWIDTH] IN BLOOD BY AUTOMATED COUNT: 12.7 % (ref 10–15)
ERYTHROCYTE [SEDIMENTATION RATE] IN BLOOD BY WESTERGREN METHOD: 7 MM/H (ref 0–15)
EXPTIME-PRE: 7.22 SEC
FEF2575-%PRED-PRE: 47 %
FEF2575-PRE: 2.36 L/SEC
FEF2575-PRED: 5 L/SEC
FEFMAX-%PRED-PRE: 76 %
FEFMAX-PRE: 7.79 L/SEC
FEFMAX-PRED: 10.22 L/SEC
FEV1-%PRED-PRE: 56 %
FEV1-PRE: 2.65 L
FEV1FEV6-PRE: 79 %
FEV1FEV6-PRED: 84 %
FEV1FVC-PRE: 79 %
FEV1FVC-PRED: 85 %
FIFMAX-PRE: 4.13 L/SEC
FVC-%PRED-PRE: 60 %
FVC-PRE: 3.37 L
FVC-PRED: 5.6 L
GFR SERPL CREATININE-BSD FRML MDRD: >90 ML/MIN/{1.73_M2}
GGT SERPL-CCNC: 26 U/L (ref 0–75)
GLUCOSE SERPL-MCNC: 113 MG/DL (ref 70–99)
GLUCOSE UR STRIP-MCNC: NEGATIVE MG/DL
HBA1C MFR BLD: 5.7 % (ref 0–5.6)
HCT VFR BLD AUTO: 47.4 % (ref 40–53)
HDLC SERPL-MCNC: 43 MG/DL
HGB BLD-MCNC: 15.8 G/DL (ref 13.3–17.7)
HGB UR QL STRIP: NEGATIVE
IMM GRANULOCYTES # BLD: 0 10E9/L (ref 0–0.4)
IMM GRANULOCYTES NFR BLD: 0.2 %
INR PPP: 1.11 (ref 0.86–1.14)
IRON SERPL-MCNC: 57 UG/DL (ref 35–180)
KETONES UR STRIP-MCNC: NEGATIVE MG/DL
LDLC SERPL CALC-MCNC: 27 MG/DL
LEUKOCYTE ESTERASE UR QL STRIP: NEGATIVE
LYMPHOCYTES # BLD AUTO: 1.8 10E9/L (ref 0.8–5.3)
LYMPHOCYTES NFR BLD AUTO: 17.8 %
MAGNESIUM SERPL-MCNC: 2.2 MG/DL (ref 1.6–2.3)
MCH RBC QN AUTO: 29 PG (ref 26.5–33)
MCHC RBC AUTO-ENTMCNC: 33.3 G/DL (ref 31.5–36.5)
MCV RBC AUTO: 87 FL (ref 78–100)
MICROALBUMIN UR-MCNC: <5 MG/L
MICROALBUMIN/CREAT UR: NORMAL MG/G CR (ref 0–17)
MONOCYTES # BLD AUTO: 0.8 10E9/L (ref 0–1.3)
MONOCYTES NFR BLD AUTO: 7.4 %
NEUTROPHILS # BLD AUTO: 7.2 10E9/L (ref 1.6–8.3)
NEUTROPHILS NFR BLD AUTO: 70.8 %
NITRATE UR QL: NEGATIVE
NONHDLC SERPL-MCNC: 58 MG/DL
NRBC # BLD AUTO: 0 10*3/UL
NRBC BLD AUTO-RTO: 0 /100
PH UR STRIP: 7 PH (ref 5–7)
PHOSPHATE SERPL-MCNC: 4 MG/DL (ref 2.5–4.5)
PLATELET # BLD AUTO: 254 10E9/L (ref 150–450)
POTASSIUM SERPL-SCNC: 4.1 MMOL/L (ref 3.4–5.3)
PROT SERPL-MCNC: 8.5 G/DL (ref 6.8–8.8)
RBC # BLD AUTO: 5.45 10E12/L (ref 4.4–5.9)
RBC #/AREA URNS AUTO: <1 /HPF (ref 0–2)
SODIUM SERPL-SCNC: 138 MMOL/L (ref 133–144)
SOURCE: NORMAL
SP GR UR STRIP: 1.01 (ref 1–1.03)
TRIGL SERPL-MCNC: 156 MG/DL
TSH SERPL DL<=0.005 MIU/L-ACNC: 1.42 MU/L (ref 0.4–4)
UROBILINOGEN UR STRIP-MCNC: 0 MG/DL (ref 0–2)
WBC # BLD AUTO: 10.1 10E9/L (ref 4–11)
WBC #/AREA URNS AUTO: <1 /HPF (ref 0–5)

## 2020-09-10 PROCEDURE — 84590 ASSAY OF VITAMIN A: CPT | Performed by: INTERNAL MEDICINE

## 2020-09-10 PROCEDURE — 84403 ASSAY OF TOTAL TESTOSTERONE: CPT | Performed by: INTERNAL MEDICINE

## 2020-09-10 PROCEDURE — 82043 UR ALBUMIN QUANTITATIVE: CPT | Performed by: INTERNAL MEDICINE

## 2020-09-10 PROCEDURE — 84460 ALANINE AMINO (ALT) (SGPT): CPT | Performed by: INTERNAL MEDICINE

## 2020-09-10 PROCEDURE — 83540 ASSAY OF IRON: CPT | Performed by: INTERNAL MEDICINE

## 2020-09-10 PROCEDURE — 84155 ASSAY OF PROTEIN SERUM: CPT | Performed by: INTERNAL MEDICINE

## 2020-09-10 PROCEDURE — 83735 ASSAY OF MAGNESIUM: CPT | Performed by: INTERNAL MEDICINE

## 2020-09-10 PROCEDURE — 87206 SMEAR FLUORESCENT/ACID STAI: CPT | Performed by: INTERNAL MEDICINE

## 2020-09-10 PROCEDURE — 87116 MYCOBACTERIA CULTURE: CPT | Performed by: INTERNAL MEDICINE

## 2020-09-10 PROCEDURE — 97803 MED NUTRITION INDIV SUBSEQ: CPT | Mod: ZF | Performed by: DIETITIAN, REGISTERED

## 2020-09-10 PROCEDURE — 85610 PROTHROMBIN TIME: CPT | Performed by: INTERNAL MEDICINE

## 2020-09-10 PROCEDURE — 84446 ASSAY OF VITAMIN E: CPT | Performed by: INTERNAL MEDICINE

## 2020-09-10 PROCEDURE — 87186 SC STD MICRODIL/AGAR DIL: CPT | Performed by: INTERNAL MEDICINE

## 2020-09-10 PROCEDURE — 82785 ASSAY OF IGE: CPT | Performed by: INTERNAL MEDICINE

## 2020-09-10 PROCEDURE — 36415 COLL VENOUS BLD VENIPUNCTURE: CPT | Performed by: INTERNAL MEDICINE

## 2020-09-10 PROCEDURE — 84075 ASSAY ALKALINE PHOSPHATASE: CPT | Performed by: INTERNAL MEDICINE

## 2020-09-10 PROCEDURE — 87102 FUNGUS ISOLATION CULTURE: CPT | Performed by: INTERNAL MEDICINE

## 2020-09-10 PROCEDURE — G0463 HOSPITAL OUTPT CLINIC VISIT: HCPCS | Mod: 25,ZF

## 2020-09-10 PROCEDURE — 80069 RENAL FUNCTION PANEL: CPT | Performed by: INTERNAL MEDICINE

## 2020-09-10 PROCEDURE — 82306 VITAMIN D 25 HYDROXY: CPT | Performed by: INTERNAL MEDICINE

## 2020-09-10 PROCEDURE — 82977 ASSAY OF GGT: CPT | Performed by: INTERNAL MEDICINE

## 2020-09-10 PROCEDURE — 81001 URINALYSIS AUTO W/SCOPE: CPT | Performed by: INTERNAL MEDICINE

## 2020-09-10 PROCEDURE — 87015 SPECIMEN INFECT AGNT CONCNTJ: CPT | Performed by: INTERNAL MEDICINE

## 2020-09-10 PROCEDURE — 87070 CULTURE OTHR SPECIMN AEROBIC: CPT | Performed by: INTERNAL MEDICINE

## 2020-09-10 PROCEDURE — 80061 LIPID PANEL: CPT | Performed by: INTERNAL MEDICINE

## 2020-09-10 PROCEDURE — 83036 HEMOGLOBIN GLYCOSYLATED A1C: CPT | Performed by: INTERNAL MEDICINE

## 2020-09-10 PROCEDURE — 84450 TRANSFERASE (AST) (SGOT): CPT | Performed by: INTERNAL MEDICINE

## 2020-09-10 PROCEDURE — 84443 ASSAY THYROID STIM HORMONE: CPT | Performed by: INTERNAL MEDICINE

## 2020-09-10 PROCEDURE — 82784 ASSAY IGA/IGD/IGG/IGM EACH: CPT | Performed by: INTERNAL MEDICINE

## 2020-09-10 PROCEDURE — 87102 FUNGUS ISOLATION CULTURE: CPT | Mod: 91 | Performed by: INTERNAL MEDICINE

## 2020-09-10 PROCEDURE — 87077 CULTURE AEROBIC IDENTIFY: CPT | Performed by: INTERNAL MEDICINE

## 2020-09-10 PROCEDURE — 85652 RBC SED RATE AUTOMATED: CPT | Performed by: INTERNAL MEDICINE

## 2020-09-10 PROCEDURE — 85025 COMPLETE CBC W/AUTO DIFF WBC: CPT | Performed by: INTERNAL MEDICINE

## 2020-09-10 RX ORDER — LEVOFLOXACIN 750 MG/1
750 TABLET, FILM COATED ORAL DAILY
Qty: 21 TABLET | Refills: 0 | Status: SHIPPED | OUTPATIENT
Start: 2020-09-10 | End: 2020-10-01

## 2020-09-10 RX ORDER — DOXYCYCLINE HYCLATE 100 MG
100 TABLET ORAL 2 TIMES DAILY
Qty: 42 TABLET | Refills: 0 | Status: SHIPPED | OUTPATIENT
Start: 2020-09-10 | End: 2020-10-01

## 2020-09-10 ASSESSMENT — PAIN SCALES - GENERAL: PAINLEVEL: NO PAIN (0)

## 2020-09-10 ASSESSMENT — MIFFLIN-ST. JEOR: SCORE: 1830.25

## 2020-09-10 NOTE — PROGRESS NOTES
"  Demario Abbasi is a 25 year old male who is being evaluated via a billable telephone visit.      The patient has been notified of following:     \"This telephone visit will be conducted via a call between you and your physician/provider. We have found that certain health care needs can be provided without the need for a physical exam.  This service lets us provide the care you need with a short phone conversation.  If a prescription is necessary we can send it directly to your pharmacy.  If lab work is needed we can place an order for that and you can then stop by our lab to have the test done at a later time.    Telephone visits are billed at different rates depending on your insurance coverage. During this emergency period, for some insurers they may be billed the same as an in-person visit.  Please reach out to your insurance provider with any questions.    If during the course of the call the physician/provider feels a telephone visit is not appropriate, you will not be charged for this service.\"    Patient has given verbal consent for Telephone visit?  Yes     What phone number would you like to be contacted at? [phone]    How would you like to obtain your AVS? See Provider note     Phone call duration: 8 minutes    CF Annual Nutrition Assessment     Reason for Assessment  Assessed during [facility] r/t increased nutrition risk with diagnosis of CF per protocol     Nutrition Significant PMH  Mild Lung Disease   Pancreatic Insufficient   Glucose Intolerance     Social Assessment  Living situation: Living alone at parents cabin in [place]  Work/School/Disability: Working full time   Food Insecurity:  None     Anthropometric Assessment  Height: 175.3 cm  IBW based on BMI 22 for females and 23 for males per CF Foundation recs: 71 kg / 156#  Today's Weight: 82.9 kg (actual weight)   %IBW: 116%    Body mass index is 26.2 kg/m .   Current weight is considered: BMI considered overweight status     Weight: " trending upward     Physical Activity/Exercise: not currently physically active. Patient states that work will  and he will be more physically active then.      MALNUTRITION  % Intake:  No decreased intake noted  % Weight Loss:  None noted  Subcutaneous Fat Loss:  None observed  Muscle Loss:  None observed  Handgrip Strength:  Not Applicable  Fluid Accumulation/Edema:  None noted  Malnutrition Diagnosis: Patient does not meet two of the above criteria necessary for diagnosing malnutrition in the context of CF.      Pancreatic Enzymes  Brand:  Creon [id number]  Dosin with meals, 2-3 with snacks = 2171 units lipase/kg/meal  Estimated Daily Amount: 20-25 caps per day= 8685-18918 units lipase/kg/day      Are you taking enzymes as recommended:Yes   Signs of Malabsorption:  No  Enzyme Program:  None     Diet History and Assessment  Diet Preferences/Allergies/Intolerances: Following a high calorie/protein diet. KNFA.   Appetite Stimulant Rx:  No  Intake Recall/Comments:  Demario is eating very well and has a great appetite. Denies declines in PO. Eating mostly lunch and dinner. Reports making meals at home typically meat and potatoes.      Calcium: Drinking 1 gallon every 3 days. Eating yogurt and cheese.   Salt: Adding salt to foods. Also eating salty fast food.   Hydration: Adequate per patient report.      Supplements: None      Tube Feeding: None     Estimated Energy and Protein Needs  Estimation based on weight maintenance with Mild lung disease and pancreatic insufficient.     BEE: 1845 kcal/day   3449-1049 kcals/day = ~35-45 kcal/kg (BEE x 1.5-1.75%)   91- 114 g protein/day = 1.2-1.5 g/kg     Laboratory Assessment           Vitamin A   Date Value Ref Range Status   2019 0.48 0.30 - 1.20 mg/L Final              Vitamin D Deficiency screening   Date Value Ref Range Status   2019 29 20 - 75 ug/L Final       Comment:       Season, race, dietary intake, and treatment affect the concentration of    25-hydroxy-Vitamin D. Values may decrease during winter months and increase   during summer months. Values 20-29 ug/L may indicate Vitamin D insufficiency   and values <20 ug/L may indicate Vitamin D deficiency.  Vitamin D determination is routinely performed by an immunoassay specific for   25 hydroxyvitamin D3.  If an individual is on vitamin D2 (ergocalciferol)   supplementation, please specify 25 OH vitamin D2 and D3 level determination by   LCMSMS test VITD23.                 Vitamin E   Date Value Ref Range Status   02/22/2019 5.6 5.5 - 18.0 mg/L Final       Comment:       (Note)  Test developed and characteristics determined by Anne Fogarty. See Compliance Statement B: [email or web address]               Iron   Date Value Ref Range Status   02/22/2019 29 (L) 35 - 180 ug/dL Final            Cholesterol   Date Value Ref Range Status   02/22/2019 94 <200 mg/dL Final            Triglycerides   Date Value Ref Range Status   02/22/2019 130 <150 mg/dL Final            HDL Cholesterol   Date Value Ref Range Status   02/22/2019 29 (L) >39 mg/dL Final              LDL Cholesterol Calculated   Date Value Ref Range Status   02/22/2019 40 <100 mg/dL Final       Comment:       Desirable:       <100 mg/dl            VLDL-Cholesterol   Date Value Ref Range Status   05/02/2013 12 0 - 30 mg/dL Final            Cholesterol/HDL Ratio   Date Value Ref Range Status   05/02/2013 2.5 0.0 - 5.0 Final      DEXA- 3/5/20      Current Vitamin/Mineral Prescription: MVW Complete D3,000 1 softgel BID + Vitron C 65 mg Ferrous Fumarate.   Comments: States he is taking vits daily      FOLLOW-UP FROM RECENT VISITS     NUTRITION DIAGNOSIS  Impaired nutrient utilization r/t CF related diabetes, pancreatic insufficiency and CF hypermetabolism AEB requires PERT, impaired glucose tolerance and high kcal/pro diet to maintain nutrition and health status  INTERVENTIONS/RECOMMENDATIONS  Reviewed current nutrition status including weight trends and  adequacy of oral intake with Demario.  Encouraged ongoing good PO with adequate protein/calories/salt/hydration. Discussed weight maintenance, gradual weight loss vs ongoing weight gain.   Encouraged increased activity and healthy eating.       Patient Understanding: Good  Expected Compliance: Good  Follow-Up Plans: Per protocol      GOALS:  1. Po intakes >/= 75% assessed nutrition needs  2. Maintain BMI >/= 23 kg/m2   3. WNL vitamin levels     FOLLOW-UP/MONITORING:  Visit patient within 12 month(s)     Time Spent In Face-to-Face Patient Interactions: 8 minutes     Eboni Dent RD, SERENA, [facility]  Pediatric Cystic Fibrosis & Pulmonary Dietitian  [facility]  Pager #[phone]  Phone #[phone]

## 2020-09-10 NOTE — PROGRESS NOTES
Reason for Visit  Demario Abbasi is a 25 year old year old male who is being seen for RECHECK (Cystic Fibrosis )      Assessment and plan:   Demario Abbasi is a 25 year old male with cystic fibrosis, pancreatic insufficiency, depression, acne and impaired glucose tolerance who is s/p RUL resection for recurrent exacerbations in 2009.   He last required IV antibiotics in January 2020 for a pulmonary exacerbation.    Pulmonary: The patient reports increased dyspnea on exertion and increased sputum production.  He had similar symptoms in July which improved but did not entirely resolve with a course of oral antibiotics.  Based on previous cultures, the patient will be treated with levofloxacin and doxycycline.  He was instructed to hold his azithromycin while on levofloxacin.  He was encouraged to increase vest therapy to 3 times daily whenever his work schedule will allow.  He will continue Pulmozyme twice daily and add hypertonic saline with conservative therapy.  He will continue COBY every other month.  If symptoms do not resolve with the above-noted oral antibiotics, a course of IV antibiotics will be initiated.  Since his oxygenation and exercise tolerance are adequate, this will likely be initiated as an outpatient unless he has symptomatic progression. The patient was instructed to follow home spirometry for an objective assessment of progress.  RT will review proper home spirometry technique.    Environmental allergies: Patient reports symptomatic improvement with addition of Pataday, Astelin and change from Allegra to Zyrtec.  He also will continue nasal steroids.    Pancreatic insufficiency: The patient denies symptoms of malabsorption. Weight is stable in an adequate range.  Continue current pancreatic enzyme replacement and supplemental vitamins.    Reflux: Adequately controlled with current pantoprazole.    Depression: Well-controlled with current citalopram. Will consider a trial without  antidepressant in the future.    SIMON: Patient had elevated creatinine in January.  It has returned to its previous baseline.    Healthcare maintenance: Annual studies were reviewed with the patient.  Hemoglobin A1c mildly elevated but improved from previous.  No glucose tolerance test with this year's annual study to limit potential COVID exposure.  Alkaline phosphatase mildly elevated.  Transaminases are within normal limits.  Continue monitoring.  Triglycerides mildly elevated but total cholesterol, HDL and LDL within normal limits, no intervention required.  Vitamin levels are within normal limits.Hgb WNL and iron level adequate.  Consider stopping supplemental iron with next visit.       Follow up in 3 months with PFTs and sputum culture or in the interim if no improvement with oral antibiotics.    Zana Lilly MD             CF HPI  The patient was seen and examined by Zana Lilly MD   Demario Abbasi is a 25 year old male with cystic fibrosis, pancreatic insufficiency, depression, acne and impaired glucose tolerance who is s/p RUL resection for recurrent exacerbations in 2009.   He last required IV antibiotics in January 2020 for a pulmonary exacerbation.  Since his last visit, he received a course of levofloxacin and doxycycline starting on July 22 for increased cough and thick mucus.  Symptoms improved although did not entirely resolved.  Currently, breathing is comfortable at rest.  He reports dyspnea with moderate to heavy exertion slightly worse than baseline.  Cough is at baseline but sputum production remains above baseline.  No hemoptysis.  No chest pain.  No fever, chills or night sweats.  He is doing vest therapy twice daily for 30 minutes.    Review of systems:  Energy level is decreased  Appetite is good  No ear pain, sore throat, sinus pain or rhinorrhea.  Allergies are better controlled with addition of Pataday and Astelin and change from Allegra to Zyrtec.  Depression  adequately controlled with current medication.  A complete ROS was otherwise negative except as noted in the HPI.    Current Outpatient Medications   Medication     albuterol (PROAIR HFA) 108 (90 Base) MCG/ACT inhaler     albuterol (PROVENTIL) (2.5 MG/3ML) 0.083% neb solution     amylase-lipase-protease (CREON) 83711 units CPEP     azelastine (ASTELIN) 0.1 % nasal spray     azithromycin (ZITHROMAX) 500 MG tablet     cetirizine (ZYRTEC) 10 MG tablet     citalopram (CELEXA) 20 MG tablet     dornase alpha (PULMOZYME) 1 MG/ML neb solution     doxycycline hyclate (VIBRA-TABS) 100 MG tablet     ferrous fumarate 65 mg, [place]. FE,-Vitamin C 125 mg (VITRON C)  MG TABS tablet     fluconazole (DIFLUCAN) 150 MG tablet     fluticasone (FLONASE) 50 MCG/ACT nasal spray     levofloxacin (LEVAQUIN) 750 MG tablet     mvw complete formulation (SOFTGELS ) capsule     olopatadine (PATANOL) 0.1 % ophthalmic solution     pantoprazole (PROTONIX) 20 MG EC tablet     sodium chloride inhalant 7 % NEBU neb solution     tobramycin (BETHKIS) 300 MG/4ML nebulizer solution     No current facility-administered medications for this visit.      Allergies   Allergen Reactions     Augmentin      Past Medical History:   Diagnosis Date     CF (cystic fibrosis) (H)     Genotype: g542x/621+1g>t     Impaired glucose tolerance      Pancreatic insufficiency      S/P lobectomy of lung 8/4/2015    Right upper lobectomy - 2009       Past Surgical History:   Procedure Laterality Date     HC LAP,INGUINAL HERNIA REPR,INITIAL  2002     IR PICC PLACEMENT > 5 YRS OF AGE  3/18/2019     IR PICC PLACEMENT > 5 YRS OF AGE  12/30/2019     LOBECTOMY LUNG Right 2009    Right upper lobe       Social History     Socioeconomic History     Marital status: Single     Spouse name: Not on file     Number of children: Not on file     Years of education: Not on file     Highest education level: Not on file   Occupational History     Occupation: Pict   Social Needs  "    Financial resource strain: Not on file     Food insecurity     Worry: Not on file     Inability: Not on file     Transportation needs     Medical: Not on file     Non-medical: Not on file   Tobacco Use     Smoking status: Passive Smoke Exposure - Never Smoker     Smokeless tobacco: Current User     Types: Chew     Tobacco comment: dad smokes   Substance and Sexual Activity     Alcohol use: Yes     Comment: <2 drinks per week     Drug use: Not on file     Sexual activity: Not on file   Lifestyle     Physical activity     Days per week: Not on file     Minutes per session: Not on file     Stress: Not on file   Relationships     Social connections     Talks on phone: Not on file     Gets together: Not on file     Attends Orthodoxy service: Not on file     Active member of club or organization: Not on file     Attends meetings of clubs or organizations: Not on file     Relationship status: Not on file     Intimate partner violence     Fear of current or ex partner: Not on file     Emotionally abused: Not on file     Physically abused: Not on file     Forced sexual activity: Not on file   Other Topics Concern     Parent/sibling w/ CABG, MI or angioplasty before 65F 55M? Not Asked   Social History Narrative    12/27/2019  -Graduated college in 2020.  Working full time as a sales .  [place] ([place] to work[place]  Has girlfriend (Oriana).         /78   Pulse 70   Resp 17   Ht 1.778 m (5' 10\")   Wt 82.9 kg (182 lb 12.2 oz)   SpO2 96%   BMI 26.22 kg/m    Body mass index is 26.22 kg/m .  Exam:   GENERAL APPEARANCE: Well developed, well nourished, alert, and in no apparent distress.  EYES: PERRL, EOMI  HENT: Nasal mucosa with edema and no hyperemia. No nasal polyps.  EARS: Canals clear, TMs normal  MOUTH: Oral mucosa is moist, without any lesions, no tonsillar enlargement, no oropharyngeal exudate.  NECK: supple, no masses, no thyromegaly.  LYMPHATICS: No significant axillary, cervical, or " supraclavicular nodes.  RESP: normal percussion, good air flow throughout.  No crackles. No rhonchi. No wheezes.  CV: Normal S1, S2, regular rhythm, normal rate. No murmur.  No rub. No gallop. No LE edema.   ABDOMEN:  Bowel sounds normal, soft, nontender, no HSM or masses.   MS: extremities normal. No clubbing. No cyanosis.  SKIN: no rash on limited exam  NEURO: Mentation intact, speech normal, normal strength and tone, normal gait and stance  PSYCH: mentation appears normal. and affect normal/bright  Results:  Recent Results (from the past 168 hour(s))   General PFT Lab (Please always keep checked)    Collection Time: 09/10/20 10:02 AM   Result Value Ref Range    FVC-Pred 5.60 L    FVC-Pre 3.37 L    FVC-%Pred-Pre 60 %    FEV1-Pre 2.65 L    FEV1-%Pred-Pre 56 %    FEV1FVC-Pred 85 %    FEV1FVC-Pre 79 %    FEFMax-Pred 10.22 L/sec    FEFMax-Pre 7.79 L/sec    FEFMax-%Pred-Pre 76 %    FEF2575-Pred 5.00 L/sec    FEF2575-Pre 2.36 L/sec    LQN9491-%Pred-Pre 47 %    ExpTime-Pre 7.22 sec    FIFMax-Pre 4.13 L/sec    FEV1FEV6-Pred 84 %    FEV1FEV6-Pre 79 %                   Results as noted above.    PFT Interpretation:  Moderate restrictive ventilatory defect.  Decreased from previous.  Below recent best.   Valid Maneuver             CF Exacerbation  Mild Increased vest/bronchodilator/execise, Oral: non-quinolone and Oral quinolone

## 2020-09-10 NOTE — PATIENT INSTRUCTIONS
Cystic Fibrosis Self-Care Plan    RECOMMENDATIONS:   -Doxycycline 100mg twice daily for 3 weeks.  Take with food to avoid nausea.  Be careful with sun exposure.   -Levofloxacin 750mg daily for 3 weeks.  Stop azithromycin while on levofloxacin and start again when you are done with the levofloxacin.  -Separate iron from antibiotics, taken antibiotics in the morning and evening and the iron in the middle of the day.  -Increase vest therapy to 3 times daily when you can. Use hypertonic saline with the third therapy  -Otherwise continue current medication, nebs and vest therapy.   - Call the CF office if you are not feeling better with the oral antibiotics  - Check home spirometry (PFTS) every 2-3 days for the next couple of weeks.            [facility] Nurse line:  Ann Rodríguez KJ and [facility]ini [phone]     [facility] Fax Number:      [phone]         Cystic Fibrosis Respiratory Therapists:   Margaret Werner              [phone]          May Rao   [phone]  Cystic Fibrosis Dietitians:              Sherie Aparicio              [phone]                            Saba Gill                        [phone]   Cystic Fibrosis Diabetes Nurse:    Ashley Cleveland   [phone]    Cystic Fibrosis Social Workers:     Prerna Paniagua               [phone]                     Courtney Fenton               [phone]  Cystic Fibrosis Pharmacists:           Ashley Kirby                               [phone]         Ansley Santo   [phone]  Cystic Fibrosis Genetic Counselor:   Yuliana Ghotra    [phone]    [facility] website:  [email or web address]         MRN: [id number]   Clinic Date: September 10, 2020   Patient: Demario HERNÁNDEZ Elroy     Annual Studies:   IGG   Date Value Ref Range Status   02/22/2019 1,380 695 - 1,620 mg/dL Final     Insulin   Date Value Ref Range Status   02/22/2019 Canceled, Test credited 3 - 25 mU/L Final      Comment:     Unsatisfactory specimen - hemolyzed  NOTIFIED OZ PHILLIPS MD AT 1450 ON 2/22/19 BY JV       There are no preventive care reminders to display for this patient.    Pulmonary Function Tests  FEV1: amount of air you can blow out in 1 second  FVC: total amount of air you can take in and blow out    Your Goals:         PFT Latest Ref Rng & Units 9/10/2020   FVC L 3.37   FEV1 L 2.65   FVC% % 60   FEV1% % 56          Airway Clearance: The Most Important Way to Keep Your Lungs Healthy  Vest Settings:    Hill-Rom Frequencies: 8, 9, 10 Pressure 10 Then, Frequencies 18, 19, 20 Pressure 6      RespirTech: Quick Start with Pressure of     Do each frequency for 5 minutes; Deflate vest after each frequency & cough 3 times before beginning the next setting.    Vest and Neb Therapy should be done 2-3 times/day.    Good Nutrition Can Improve Lung Function and Overall Health     Take ALL of your vitamins with food     Take 1/2 of your enzymes before EVERY meal/snack and the other 1/2 mid-meal/snack    Wt Readings from Last 3 Encounters:   09/10/20 82.9 kg (182 lb 12.2 oz)   03/05/20 82.9 kg (182 lb 12.2 oz)   01/16/20 79.8 kg (175 lb 14.8 oz)       Body mass index is 26.22 kg/m .         National CF Foundation Recommendations for BMI in CF Adults: Women: at least 22 Men: at least 23        Controlling Blood Sugars Helps Prevent Lung Infections & Improves Nutrition  Test blood sugar:     In the morning before eating (goal is )     2 hours after a meal (goal is less than 150)     When pre-meal glucose is greater than 150 add correction     At bedtime (if less than 100 eat a snack with 15 grams of carbohydrates  Last A1C Results:   Hemoglobin A1C   Date Value Ref Range Status   02/22/2019 5.9 (H) 0 - 5.6 % Final     Comment:     Normal <5.7% Prediabetes 5.7-6.4%  Diabetes 6.5% or higher - adopted from ADA   consensus guidelines.           If diabetic, measure A1C every 6 months. Goal: Under 7%    Staying  Healthy    Research:  If you are interested in learning about research opportunities or have questions, please contact the CF Research Team at [phone] or [email or web address].      CF Foundation:  Compass is a personalized resource service to help you with the insurance, financial, legal and other issues you are facing.  It's free, confidential and available to anyone with CF.  Ask your  for more information or contact Compass directly at 397-DBYCHQB ([phone]) or compass@cff.org, or learn more at cff.org/compass.

## 2020-09-11 LAB
DEPRECATED CALCIDIOL+CALCIFEROL SERPL-MC: 37 UG/L (ref 20–75)
IGG SERPL-MCNC: 1739 MG/DL (ref 610–1616)

## 2020-09-12 LAB — TESTOST SERPL-MCNC: 365 NG/DL (ref 240–950)

## 2020-09-13 LAB
A-TOCOPHEROL VIT E SERPL-MCNC: 8.3 MG/L (ref 5.5–18)
ACID FAST STN SPEC QL: NORMAL
ANNOTATION COMMENT IMP: NORMAL
BETA+GAMMA TOCOPHEROL SERPL-MCNC: 0.6 MG/L (ref 0–6)
RETINYL PALMITATE SERPL-MCNC: 0.03 MG/L (ref 0–0.1)
SPECIMEN SOURCE: NORMAL
VIT A SERPL-MCNC: 0.61 MG/L (ref 0.3–1.2)

## 2020-09-14 LAB — IGE SERPL-ACNC: 19 KIU/L (ref 0–114)

## 2020-09-15 LAB
BACTERIA SPEC CULT: ABNORMAL
SPECIMEN SOURCE: ABNORMAL

## 2020-10-05 ENCOUNTER — HOME INFUSION (PRE-WILLOW HOME INFUSION) (OUTPATIENT)
Dept: PHARMACY | Facility: CLINIC | Age: 25
End: 2020-10-05

## 2020-10-05 ENCOUNTER — TELEPHONE (OUTPATIENT)
Dept: PULMONOLOGY | Facility: CLINIC | Age: 25
End: 2020-10-05

## 2020-10-05 DIAGNOSIS — E84.0 CYSTIC FIBROSIS WITH PULMONARY EXACERBATION (H): Primary | ICD-10-CM

## 2020-10-05 DIAGNOSIS — Z11.59 ENCOUNTER FOR SCREENING FOR OTHER VIRAL DISEASES: Primary | ICD-10-CM

## 2020-10-05 RX ORDER — DOXYCYCLINE 100 MG/1
100 CAPSULE ORAL 2 TIMES DAILY
Qty: 42 CAPSULE | Refills: 0 | Status: SHIPPED | OUTPATIENT
Start: 2020-10-05 | End: 2021-02-11

## 2020-10-05 NOTE — TELEPHONE ENCOUNTER
[facility]  October 5, 2020    Susan Li    Cystic fibrosis Provider: Zana Lilly MD    Caller: Patient     Clinical information:  Demario Abbasi called with complaint of not feeling better after a course of oral antibiotics. Still low energy and poor appetite. Feels like it might be time for a round of IV antibiotics.    Plan:   Discussed with Dr. Lilly, agrees with placing PICC to initiate IV antibiotics (Meropenem 2g q8hr along with PO Doxycycline 100mg BID)  Order placed for IR PICC Placement   Patient given IR Scheduling number and instructed to call ([phone]) to schedule  Patient will plan on getting COIVD test done at local clinic and bring results with him for PICC Placement  Referral called to [facility] to check for coverage, will update I team when PICC scheduled and will arrange for patient to  supplies at [facility] office after PICC Placement.      Call back with any new or worsening symptoms/concerns.    Caller verbalized understanding of plan and agrees with advice given.

## 2020-10-08 ENCOUNTER — HOME INFUSION (PRE-WILLOW HOME INFUSION) (OUTPATIENT)
Dept: PHARMACY | Facility: CLINIC | Age: 25
End: 2020-10-08

## 2020-10-08 LAB
BACTERIA SPEC CULT: NORMAL
FUNGUS SPEC CULT: NORMAL
SPECIMEN SOURCE: NORMAL
SPECIMEN SOURCE: NORMAL

## 2020-10-09 NOTE — PROGRESS NOTES
This is a recent snapshot of the patient's [facility] medical record.  For current drug dose and complete information and questions, call [phone] or In Basket pool, fv home infusion ([id number])  CSN Number:  [id number]

## 2020-10-12 ENCOUNTER — ANCILLARY PROCEDURE (OUTPATIENT)
Dept: RADIOLOGY | Facility: AMBULATORY SURGERY CENTER | Age: 25
End: 2020-10-12
Attending: INTERNAL MEDICINE
Payer: COMMERCIAL

## 2020-10-12 ENCOUNTER — HOME INFUSION (PRE-WILLOW HOME INFUSION) (OUTPATIENT)
Dept: PHARMACY | Facility: CLINIC | Age: 25
End: 2020-10-12

## 2020-10-12 ENCOUNTER — HOSPITAL ENCOUNTER (OUTPATIENT)
Facility: AMBULATORY SURGERY CENTER | Age: 25
Discharge: HOME | End: 2020-10-12
Attending: RADIOLOGY | Admitting: RADIOLOGY
Payer: COMMERCIAL

## 2020-10-12 VITALS
HEIGHT: 70 IN | WEIGHT: 170 LBS | SYSTOLIC BLOOD PRESSURE: 121 MMHG | DIASTOLIC BLOOD PRESSURE: 68 MMHG | OXYGEN SATURATION: 97 % | BODY MASS INDEX: 24.34 KG/M2 | HEART RATE: 72 BPM | TEMPERATURE: 97.6 F | RESPIRATION RATE: 16 BRPM

## 2020-10-12 DIAGNOSIS — E84.0 CYSTIC FIBROSIS WITH PULMONARY EXACERBATION (H): ICD-10-CM

## 2020-10-12 PROCEDURE — 36573 INSJ PICC RS&I 5 YR+: CPT

## 2020-10-12 PROCEDURE — 36573 INSJ PICC RS&I 5 YR+: CPT | Performed by: RADIOLOGY

## 2020-10-12 DEVICE — CATH VA PICC BIOFLO 4FRX55CM 45-812: Type: IMPLANTABLE DEVICE | Site: ARM | Status: FUNCTIONAL

## 2020-10-12 ASSESSMENT — MIFFLIN-ST. JEOR: SCORE: 1772.36

## 2020-10-12 NOTE — DISCHARGE INSTRUCTIONS
A collaboration between [facility] and [facility]  Experts in minimally invasive, targeted treatments performed using imaging guidance    Venous Access Device,  Port Catheter or Tunneled or Non-Tunneled Central Line Placement    Today you had a procedure today to install a venous access device; either a tunneled central vein catheter or a subcutaneous port catheter.    After you go home:  - Drink plenty of fluids.  Generally 6-8 (8 ounce) glasses a day is recommended.  - Resume your regular diet unless otherwise ordered by a medical provider.  - Keep any applied tape/gauze dressings clean and dry.  Change tape/gauze dressings if they get wet or soiled.  - You may shower the following day after procedure, however cover and protect from moisture any tape/gauze dressings.  You may let water hit and run over dried skin glue, but do not scrub.  Pat the area dry after showering.  - Port placement incisions are closed with absorbable suture, meaning they do not need to be removed at a later date, and a topical skin adhesive (skin glue).  This glue will wear off in 7-14 days.  Do not remove before this time.  If 14 days have passed and residual glue is present, you may gently remove it.  - Do not apply gels, lotions, or ointments to the glue site for the first 10 days as this may cause the glue to prematurely soften and fail.  - Do not perform strenuous activities or lift greater than 10 pounds for the next three days.  - If there is bleeding or oozing from the procedure site, apply firm pressure to the area for 5-10 minutes.  If the bleeding continues seek medical advice at the numbers below.  - Mild procedure site discomfort can be treated with an ice pack and over-the-counter pain relievers.        For 24 hours after any sedation used:  - Relax and take it easy.  No strenuous activities.  - Do not drive or operate machines at home or at work.  - No alcohol  consumption.  - Do not make any important or legal decisions.    Call our Interventional Radiology (IR) service if:  - If you start bleeding from the procedure site.  If you do start to bleed from the site, lie down and hold some pressure on the site.  Our radiology provider can help you decide if you need to return to the hospital.  - If you have new or worsening pain related to the procedure.  - If you have concerning swelling at the procedure site.  - If you develop persistent nausea or vomiting.  - If you develop hives or a rash or any unexplained itching.  - If you have a fever of greater than 100.5  F and chills in the first 5 days after procedure.  - Any other concerns related to your procedure.      [facility]  Interventional Radiology (IR)  [address]  [address] [place] [address]    Contact Number:  [phone]  (IR control desk)  - Monday - Friday 8:00 am - 4:30 pm    After hours for urgent concerns:  [phone]  - After 4:30 pm Monday - Friday, Weekends and Holidays.   - Ask for Interventional Radiology on-call.  Someone is available 24 hours a day.  - [facility] toll free number:  [phone]

## 2020-10-12 NOTE — H&P
Interventional Radiology Pre-Procedure H&P Assessment     Reason for procedure: IV antibiotics for CF exacerbation, cough    History: CF    Past Medical History:   Diagnosis Date     CF (cystic fibrosis) (H)     Genotype: g542x/621+1g>t     Impaired glucose tolerance      Pancreatic insufficiency      S/P lobectomy of lung 8/4/2015    Right upper lobectomy - 2009     Past Surgical History:   Procedure Laterality Date     HC LAP,INGUINAL HERNIA REPR,INITIAL  2002     IR PICC PLACEMENT > 5 YRS OF AGE  3/18/2019     IR PICC PLACEMENT > 5 YRS OF AGE  12/30/2019     LOBECTOMY LUNG Right 2009    Right upper lobe       Current Outpatient Medications:      albuterol (PROAIR HFA) 108 (90 Base) MCG/ACT inhaler, Inhale 2 puffs into the lungs every 6 hours as needed for shortness of breath / dyspnea or wheezing, Disp: 2 Inhaler, Rfl: 11     albuterol (PROVENTIL) (2.5 MG/3ML) 0.083% neb solution, Take 1 vial (2.5 mg) by nebulization 3 times daily, Disp: 270 mL, Rfl: 11     amylase-lipase-protease (CREON) 28513 units CPEP, TAKE 5 CAPSULES (180,000 UNITS) BY MOUTH 3 TIMES DAILY (WITH MEALS) AND 3 WITH SNACKS, Disp: 720 each, Rfl: 11     azelastine (ASTELIN) 0.1 % nasal spray, Spray 1 spray into both nostrils 2 times daily, Disp: 30 mL, Rfl: 11     azithromycin (ZITHROMAX) 500 MG tablet, Take one tablet on Monday, Wednesday and Friday morning, Disp: 12 tablet, Rfl: 11     cetirizine (ZYRTEC) 10 MG tablet, Take 1 tablet (10 mg) by mouth daily, Disp: 30 tablet, Rfl: 11     citalopram (CELEXA) 20 MG tablet, Take 1 tablet (20 mg) by mouth daily, Disp: 30 tablet, Rfl: 1     dornase alpha (PULMOZYME) 1 MG/ML neb solution, INHALE 2.5MG INTO THE LUNGS TWO TIMES A DAY, Disp: 150 mL, Rfl: 11     doxycycline hyclate (VIBRAMYCIN) 100 MG capsule, Take 1 capsule (100 mg) by mouth 2 times daily Please start medication when you start IV meropenem., Disp: 42 capsule, Rfl: 0     ferrous fumarate 65 mg, [place]. FE,-Vitamin C 125 mg (VITRON C)  MG  "TABS tablet, Take 1 tablet by mouth daily, Disp: 30 tablet, Rfl: 11     fluconazole (DIFLUCAN) 150 MG tablet, Take 1 tablet (150 mg) by mouth daily as needed, Disp: 1 tablet, Rfl: 0     fluticasone (FLONASE) 50 MCG/ACT nasal spray, INSTILL 1 SPRAY INTO BOTH NOSTRILS DAILY, Disp: 16 mL, Rfl: 11     mvw complete formulation (SOFTGELS ) capsule, TAKE ONE CAPSULE BY MOUTH TWICE A DAY, Disp: 60 capsule, Rfl: 11     olopatadine (PATANOL) 0.1 % ophthalmic solution, Place 1 drop into both eyes 2 times daily, Disp: 5 mL, Rfl: 11     pantoprazole (PROTONIX) 20 MG EC tablet, Take 1 tablet (20 mg) by mouth daily, Disp: 30 tablet, Rfl: 11     sodium chloride inhalant 7 % NEBU neb solution, Take 4 mLs by nebulization 2 times daily as needed for wheezing, Disp: 240 mL, Rfl: 11     tobramycin (BETHKIS) 300 MG/4ML nebulizer solution, Take 4 mLs (300 mg) by nebulization 2 times daily 28 days and 28 days off, Disp: 224 mL, Rfl: 5    Allergies   Allergen Reactions     Augmentin        Review of systems:  General: negative for fever, chills  Resp: COUGH  CV: negative for chest pain  GI: negative for nausea, vomiting, abdominal pain, constipation and diarrhea  : negative  Musculoskeletal: negative  Neurologic: negative  Endocrine: negative    Exam:  /68   Pulse 72   Temp 97.6  F (36.4  C) (Temporal)   Resp 16   Ht 1.778 m (5' 10\")   Wt 77.1 kg (170 lb)   SpO2 97%   BMI 24.39 kg/m    Mallampati: Grade 1:  Soft palate, uvula, tonsillar pillars, and posterior pharyngeal wall visible  Lungs: Lungs Clear with good breath sounds bilaterally  Heart: Normal heart sounds and rate    Pillo Pereira PA-C  Interventional Radiology  [phone]      "

## 2020-10-12 NOTE — PROCEDURES
Interventional Radiology Brief Post Procedure Note    Procedure: IR PICC PLACEMENT    Proceduralist: Felicia Butler MD    Assistant: Malachi Pereira PA-C    Time Out: Prior to the start of the procedure and with procedural staff participation, I verbally confirmed the patient s identity using two indicators, relevant allergies, that the procedure was appropriate and matched the consent or emergent situation, and that the correct equipment/implants were available. Immediately prior to starting the procedure I conducted the Time Out with the procedural staff and re-confirmed the patient s name, procedure, and site/side. (The Joint Commission universal protocol was followed.)  Yes    Sedation: None. Local Anesthestic used    Findings: Completed image-guided placement of 4 Fr 51 cm valved single lumen PICC via left basilic vein with catheter tip in the high right atrium. Aspirates and flushes freely, saline locked and ready for immediate use. Difficulty getting PICC past left axillary vein stenosis.    Estimated Blood Loss: Minimal    Fluoroscopy Time: 4.4 minute(s)    Specimens: None    Complications: 1. None     Condition: Stable    Plan: Ready for immediate use. Follow-up per primary team. Future PICC placement should be done at [facility].    Comments: See dictated procedure note for full details.    Pillo Pereira PA-C  Interventional Radiology  [phone]

## 2020-10-13 NOTE — PROGRESS NOTES
This is a recent snapshot of the patient's [facility] medical record.  For current drug dose and complete information and questions, call [phone] or In Basket pool, fv home infusion ([id number])  CSN Number:  [id number]

## 2020-10-14 NOTE — PROGRESS NOTES
This is a recent snapshot of the patient's [facility] medical record.  For current drug dose and complete information and questions, call [phone] or In Basket pool, fv home infusion ([id number])  CSN Number:  [id number]

## 2020-10-16 DIAGNOSIS — E84.9 CF (CYSTIC FIBROSIS) (H): ICD-10-CM

## 2020-10-16 DIAGNOSIS — E84.0 CYSTIC FIBROSIS WITH PULMONARY EXACERBATION (H): ICD-10-CM

## 2020-10-19 RX ORDER — FLUTICASONE PROPIONATE 50 MCG
SPRAY, SUSPENSION (ML) NASAL
Qty: 16 ML | Refills: 11 | Status: SHIPPED | OUTPATIENT
Start: 2020-10-19 | End: 2021-10-21

## 2020-10-21 ENCOUNTER — HOME INFUSION (PRE-WILLOW HOME INFUSION) (OUTPATIENT)
Dept: PHARMACY | Facility: CLINIC | Age: 25
End: 2020-10-21

## 2020-10-22 NOTE — PROGRESS NOTES
This is a recent snapshot of the patient's [facility] medical record.  For current drug dose and complete information and questions, call [phone] or In [facility] pool, fv home infusion ([id number])  CSN Number:  [id number]

## 2020-10-27 ENCOUNTER — HOME INFUSION (PRE-WILLOW HOME INFUSION) (OUTPATIENT)
Dept: PHARMACY | Facility: CLINIC | Age: 25
End: 2020-10-27

## 2020-10-27 ENCOUNTER — TELEPHONE (OUTPATIENT)
Dept: PULMONOLOGY | Facility: CLINIC | Age: 25
End: 2020-10-27

## 2020-10-27 NOTE — TELEPHONE ENCOUNTER
Patient called to inform CF office that he is feeling much better as he is ending the course of his IV abx. He states he is feeling better, sputum is not thick any more, more energy, better appetite. He is due to leave for a hunting trip on Friday so he will be finishing IV abx and getting PICC pulled locally on Thursday (10/29). Writer relayed to [facility] staff that patient will not need any further shipments of Jeferson or supplies and we will be stopping IVs as of 10/29.

## 2020-10-29 ENCOUNTER — HOME INFUSION (PRE-WILLOW HOME INFUSION) (OUTPATIENT)
Dept: PHARMACY | Facility: CLINIC | Age: 25
End: 2020-10-29

## 2020-10-29 ENCOUNTER — TELEPHONE (OUTPATIENT)
Dept: PULMONOLOGY | Facility: CLINIC | Age: 25
End: 2020-10-29

## 2020-10-29 NOTE — TELEPHONE ENCOUNTER
[facility]  October 29, 2020    Susan Li    Cystic fibrosis Provider: Zana Lilly MD    Caller: Patient     Clinical information:  Demario Abbasi called to notify office that patient's mother recently tested positive for COVID-19. Patient is currently on a course of IV abx and was due to end today. Patient feeling well, back to baseline, no new symptoms.       Plan:   Discussed with Dr. Lilly  Continue IV antibiotics, [facility] given continuation orders.  Patient states he is still planning on going on scheduled hunting trip, returning home Saturday 11/7.  Patient will call CF office if he develops any new symptoms.  Tentative plan to have patient get tested for COVID-19 when he returns from hunting trip.      Caller verbalized understanding of plan and agrees with advice given.

## 2020-10-29 NOTE — PROGRESS NOTES
This is a recent snapshot of the patient's [facility] medical record.  For current drug dose and complete information and questions, call [phone] or In [facility] pool, fv home infusion ([id number])  CSN Number:  [id number]

## 2020-10-30 NOTE — PROGRESS NOTES
This is a recent snapshot of the patient's [facility] medical record.  For current drug dose and complete information and questions, call [phone] or In [facility] pool, fv home infusion ([id number])  CSN Number:  [id number]

## 2020-11-07 LAB
MYCOBACTERIUM SPEC CULT: NORMAL
MYCOBACTERIUM SPEC CULT: NORMAL
SPECIMEN SOURCE: NORMAL

## 2020-11-09 ENCOUNTER — HOME INFUSION (PRE-WILLOW HOME INFUSION) (OUTPATIENT)
Dept: PHARMACY | Facility: CLINIC | Age: 25
End: 2020-11-09

## 2020-11-10 ENCOUNTER — HOME INFUSION (PRE-WILLOW HOME INFUSION) (OUTPATIENT)
Dept: PHARMACY | Facility: CLINIC | Age: 25
End: 2020-11-10

## 2020-11-10 NOTE — PROGRESS NOTES
This is a recent snapshot of the patient's [facility] medical record.  For current drug dose and complete information and questions, call [phone] or In [facility] pool, fv home infusion ([id number])  CSN Number:  [id number]

## 2020-11-11 ENCOUNTER — HOME INFUSION (PRE-WILLOW HOME INFUSION) (OUTPATIENT)
Dept: PHARMACY | Facility: CLINIC | Age: 25
End: 2020-11-11

## 2020-11-11 ENCOUNTER — TELEPHONE (OUTPATIENT)
Dept: PULMONOLOGY | Facility: CLINIC | Age: 25
End: 2020-11-11

## 2020-11-11 NOTE — TELEPHONE ENCOUNTER
Contacted patient to touch base regarding symptoms. Patient continued on IV antibiotics while on hunting trip, no new symptoms after exposure to mom who tested positive for covid. Is feeling well, back to baseline. Had picc removed on Sunday. No follow up appointment scheduled at this time, patient will look at calendar and call Cora to schedule.

## 2020-11-11 NOTE — PROGRESS NOTES
This is a recent snapshot of the patient's [facility] medical record.  For current drug dose and complete information and questions, call [phone] or In Basket pool, fv home infusion ([id number])  CSN Number:  [id number]

## 2020-11-13 NOTE — PROGRESS NOTES
This is a recent snapshot of the patient's [facility] medical record.  For current drug dose and complete information and questions, call [phone] or In Basket pool, fv home infusion ([id number])  CSN Number:  [id number]

## 2020-11-14 ENCOUNTER — HEALTH MAINTENANCE LETTER (OUTPATIENT)
Age: 25
End: 2020-11-14

## 2020-11-20 ENCOUNTER — TELEPHONE (OUTPATIENT)
Dept: PULMONOLOGY | Facility: CLINIC | Age: 25
End: 2020-11-20

## 2020-11-20 NOTE — TELEPHONE ENCOUNTER
Patient's mother (Mirna) left a voicemail. Reports they need a prior authorization completed for patient's PICC line removal 20 as it was out of [facility]'s network when removed at [place], [place] as patient is a student at [employer]. Requested information be faxed to Attn: felice [phone] and telephone number is [phone].    Called Felice ([facility] rep) requesting what form or paperwork needs to be completed to avoid collections after today ($703 reported by mother for PICC removal). Felice was unable to report what PA form was needed and checked with her colleague. Felice requested Dr. Lilly complete a letter with the following to start: patient's name, , statement of referral to have PICC line removed on 20, patient is a student. Confirmed fax number to send.    RN faxed signed letter as requested and faxed Attn: Felice. RN called Mirna and left  informing it was sent with information requested by Felice and to call with any questions or concerns.    Gemini Gibbs RN

## 2020-12-23 ENCOUNTER — TELEPHONE (OUTPATIENT)
Dept: PULMONOLOGY | Facility: CLINIC | Age: 25
End: 2020-12-23

## 2020-12-23 NOTE — TELEPHONE ENCOUNTER
Left voicemail for patient to contact call center (number provided) or Cora (CF Coordinator) to schedule his follow up visit with Dr. Lilly in addition to a PFT after last being seen in September 2020.

## 2020-12-24 ENCOUNTER — TELEPHONE (OUTPATIENT)
Dept: PULMONOLOGY | Facility: CLINIC | Age: 25
End: 2020-12-24

## 2020-12-24 NOTE — TELEPHONE ENCOUNTER
Spoke with patient about scheduling his appointment with Dr. Lilly after last being seen in Sept 2020. Due to availability, Dr. Lilly is first available in February and patient was okay with this. Discussed that he also needs to have a PFT performed prior and able to schedule patient for 2/11 for testing and in-person appt. Informed him if anything should change, patient would receive a call. Requesting hard copy of itinerary sent to home and mailing address was verified.

## 2020-12-26 DIAGNOSIS — E84.9 CYSTIC FIBROSIS EXACERBATION (H): ICD-10-CM

## 2020-12-28 RX ORDER — PANCRELIPASE 36000; 180000; 114000 [USP'U]/1; [USP'U]/1; [USP'U]/1
CAPSULE, DELAYED RELEASE PELLETS ORAL
Qty: 720 EACH | Refills: 11 | Status: SHIPPED | OUTPATIENT
Start: 2020-12-28 | End: 2021-02-11

## 2020-12-31 ENCOUNTER — TELEPHONE (OUTPATIENT)
Dept: PULMONOLOGY | Facility: CLINIC | Age: 25
End: 2020-12-31

## 2020-12-31 DIAGNOSIS — E84.9 CF (CYSTIC FIBROSIS) (H): ICD-10-CM

## 2021-01-07 ENCOUNTER — TELEPHONE (OUTPATIENT)
Dept: CARE COORDINATION | Facility: CLINIC | Age: 26
End: 2021-01-07

## 2021-01-07 NOTE — TELEPHONE ENCOUNTER
Adult Cystic Fibrosis Program  Social Work Phone/E-mail Communication    Data:   [employer]LMIS notified that Demario has an outstanding bill from a PICC line removal ~1 year ago.  Spoke with [employer] MA and they were not able to give writer any info without Demario's permission.    Called [employer] back with Demario on the phone to discuss outstanding bill.  The claim was sent to the local [employer] in [place] where he had the PICC line removed.  This was denied.  [facility] is recommending that Demario send the bill to [facility] in [place].  Since Demario went out of state for care with a [place] Medicaid plan, he is at higher risk of claims getting denied.  He was going to school in [place] at the time and is now living full-time in [place] so doesn't anticipate this will happen again.          Intervention:   -Coordination with insurance company to address outstanding bill  -Insurance/financial counseling      Plan:   Demario will send copy of bill and letter from CF provider to [facility] for review.         The following e-mail was sent to Demario per his request:     The address to send the bill to is:    [employer]  [address] [address]  [place] [address]    If you need any further assistance from us, please let us know.      Thanks!    Prerna Paniagua, CF   [phone]

## 2021-02-11 ENCOUNTER — OFFICE VISIT (OUTPATIENT)
Dept: PULMONOLOGY | Facility: CLINIC | Age: 26
End: 2021-02-11
Attending: INTERNAL MEDICINE
Payer: COMMERCIAL

## 2021-02-11 VITALS
SYSTOLIC BLOOD PRESSURE: 123 MMHG | DIASTOLIC BLOOD PRESSURE: 73 MMHG | OXYGEN SATURATION: 95 % | TEMPERATURE: 98.5 F | HEART RATE: 78 BPM

## 2021-02-11 DIAGNOSIS — E84.9 CYSTIC FIBROSIS EXACERBATION (H): ICD-10-CM

## 2021-02-11 DIAGNOSIS — E84.9 CYSTIC FIBROSIS (H): Primary | ICD-10-CM

## 2021-02-11 DIAGNOSIS — K86.89 PANCREATIC INSUFFICIENCY: ICD-10-CM

## 2021-02-11 DIAGNOSIS — E84.9 CF (CYSTIC FIBROSIS) (H): Primary | ICD-10-CM

## 2021-02-11 LAB
EXPTIME-PRE: 7.28 SEC
FEF2575-%PRED-PRE: 46 %
FEF2575-PRE: 2.29 L/SEC
FEF2575-PRED: 4.94 L/SEC
FEFMAX-%PRED-PRE: 77 %
FEFMAX-PRE: 7.98 L/SEC
FEFMAX-PRED: 10.24 L/SEC
FEV1-%PRED-PRE: 58 %
FEV1-PRE: 2.75 L
FEV1FEV6-PRE: 78 %
FEV1FEV6-PRED: 84 %
FEV1FVC-PRE: 78 %
FEV1FVC-PRED: 84 %
FIFMAX-PRE: 4.3 L/SEC
FVC-%PRED-PRE: 63 %
FVC-PRE: 3.54 L
FVC-PRED: 5.6 L

## 2021-02-11 PROCEDURE — 99214 OFFICE O/P EST MOD 30 MIN: CPT | Mod: 25 | Performed by: INTERNAL MEDICINE

## 2021-02-11 PROCEDURE — G0463 HOSPITAL OUTPT CLINIC VISIT: HCPCS

## 2021-02-11 PROCEDURE — 87070 CULTURE OTHR SPECIMN AEROBIC: CPT | Performed by: INTERNAL MEDICINE

## 2021-02-11 PROCEDURE — 87077 CULTURE AEROBIC IDENTIFY: CPT | Mod: 91 | Performed by: INTERNAL MEDICINE

## 2021-02-11 PROCEDURE — 87186 SC STD MICRODIL/AGAR DIL: CPT | Mod: 91 | Performed by: INTERNAL MEDICINE

## 2021-02-11 PROCEDURE — 94375 RESPIRATORY FLOW VOLUME LOOP: CPT | Performed by: INTERNAL MEDICINE

## 2021-02-11 NOTE — NURSING NOTE
Chief Complaint   Patient presents with     Follow Up     12wk f/u     Vitals were taken and medications were reconciled.     DEJUAN Perez

## 2021-02-11 NOTE — PATIENT INSTRUCTIONS
Cystic Fibrosis Self-Care Plan    RECOMMENDATIONS:   Get a COVID vaccine whenever you can (Moderna or pfizer).  Call us if your cold goes to your chest.  Stop iron pills.  Otherwise continue current medication, nebs and vest therapy.           [facility] Nurse line:  Nica Juarez Gemini [phone]     [facility] Fax Number:      [phone]         Cystic Fibrosis Respiratory Therapists:   Margaret Werner              [phone]          May Rao   [phone]  Cystic Fibrosis Dietitians:              Sherie Aparicio              [phone]                            Saba Gill                        [phone]   Cystic Fibrosis Diabetes Nurse:    Ashley Cleveland   [phone]    Cystic Fibrosis Social Workers:     Prerna Paniagua               [phone]                     Courtney Fenton               [phone]  Cystic Fibrosis Pharmacists:           Ashley Kirby                               [phone]         Ansley Santo   [phone]  Cystic Fibrosis Genetic Counselor:   Yuliana Ghotra    [phone]    [facility] website:  [email or web address]    The following are the [place] Department of Health and CDC Winter Holidays Recommendations with the [facility] fibrosis [place] modifications to the the risk activities.     https://www.cdc.gov/coronavirus/2019-ncov/daily-life-coping/holidays/winter.html    https://www.health.[facility].[place]/diseases/coronavirus/holidays.html    Celebrate the winter holidays safely this year and help stop the spread of COVID-19 by choosing lower-risk activities.    Lower-risk activities  A small dinner with the people who live with you. See CDC: Food and Coronavirus Disease 2019 (COVID-19).  A virtual dinner and sharing recipes with friends and family.  Shopping online, rather than in person.  Preparing traditional family recipes for family and neighbors, especially those at higher risk of severe  illness from COVID-19, and deliver them in a way that doesn't involve contact with others.    Avoid Medium-risk activities  A small outdoor dinner with family and friends who live in your community. See CDC: Food and Coronavirus Disease 2019 (COVID-19). Lower your risk by following CDC recommendations for hosting gatherings or cook-outs.  Small outdoor sports events, if you follow safety tips above.    Definitely avoid higher-risk activities  Avoid these activities to help stop the spread of COVID-19.    Shopping in crowded stores.  Attending or participating in crowded races or parades.  Attending large indoor gatherings with people who do not live with you.         MRN: [id number]   Clinic Date: February 11, 2021   Patient: Demario Abbasi     Annual Studies:   IGG   Date Value Ref Range Status   09/10/2020 1,739 (H) 610 - 1,616 mg/dL Final     Insulin   Date Value Ref Range Status   02/22/2019 Canceled, Test credited 3 - 25 mU/L Final     Comment:     Unsatisfactory specimen - hemolyzed  NOTIFIED OZ PHILLIPS MD AT 1450 ON 2/22/19 BY JV       There are no preventive care reminders to display for this patient.    Pulmonary Function Tests  FEV1: amount of air you can blow out in 1 second  FVC: total amount of air you can take in and blow out    Your Goals:         PFT Latest Ref Rng & Units 2/11/2021   FVC L 3.54   FEV1 L 2.75   FVC% % 63   FEV1% % 58          Airway Clearance: The Most Important Way to Keep Your Lungs Healthy  Vest Settings:    Hill-Rom Frequencies: 8, 9, 10 Pressure 10 Then, Frequencies 18, 19, 20 Pressure 6      RespirTech: Quick Start with Pressure of     Do each frequency for 5 minutes; Deflate vest after each frequency & cough 3 times before beginning the next setting.    Vest and Neb Therapy should be done 2 times/day.    Good Nutrition Can Improve Lung Function and Overall Health     Take ALL of your vitamins with food     Take 1/2 of your enzymes before EVERY meal/snack and the  other 1/2 mid-meal/snack    Wt Readings from Last 3 Encounters:   10/12/20 77.1 kg (170 lb)   09/10/20 82.9 kg (182 lb 12.2 oz)   03/05/20 82.9 kg (182 lb 12.2 oz)       There is no height or weight on file to calculate BMI.         National CF Foundation Recommendations for BMI in CF Adults: Women: at least 22 Men: at least 23        Controlling Blood Sugars Helps Prevent Lung Infections & Improves Nutrition  Test blood sugar:     In the morning before eating (goal is )     2 hours after a meal (goal is less than 150)     When pre-meal glucose is greater than 150 add correction     At bedtime (if less than 100 eat a snack with 15 grams of carbohydrates  Last A1C Results:   Hemoglobin A1C   Date Value Ref Range Status   09/10/2020 5.7 (H) 0 - 5.6 % Final     Comment:     Normal <5.7% Prediabetes 5.7-6.4%  Diabetes 6.5% or higher - adopted from ADA   consensus guidelines.           If diabetic, measure A1C every 6 months. Goal: Under 7%    Staying Healthy    Research:  If you are interested in learning about research opportunities or have questions, please contact the CF Research Team at [phone] or [email or web address].      CF Foundation:  Compass is a personalized resource service to help you with the insurance, financial, legal and other issues you are facing.  It's free, confidential and available to anyone with CF.  Ask your  for more information or contact Compass directly at 580-COMPASS ([phone]) or compass@cff.org, or learn more at cff.org/compass.

## 2021-02-11 NOTE — LETTER
2/11/2021         RE: Demario Abbasi  [address]  [place] [address]        Dear Colleague,    Thank you for referring your patient, Demario Abbasi, to the [facility] LUNG SCIENCE AND [facility]. Please see a copy of my visit note below.    Reason for Visit  Demario Abbasi is a 26 year old year old male who is being seen for Follow Up (12wk f/u)      Assessment and plan:   Demario Abbasi is a 26 year old male with cystic fibrosis, pancreatic insufficiency, depression, acne and impaired glucose tolerance who is s/p RUL resection for recurrent exacerbations in 2009.   He last required IV antibiotics in January 2020 for a pulmonary exacerbation.    Pulmonary/CF: The patient reports excellent exercise tolerance.  Oxygenation is adequate.  PFTs have improved from his last visit although not quite to the level of his previous best.  His previous exacerbation appears to have resolved.  He does not appear to have an exacerbation at this time but with his current viral URI (see below) he is at risk for recurrence.  He was instructed to call the CF office if he has any increase in cough and sputum production, dyspnea or other change in his respiratory symptoms.  For now he will continue with his current twice daily vest therapy with albuterol, Pulmozyme and hypertonic saline.  Continue Bethkis every other month.  Continue azithromycin.    Maintenance   Modulator: Not eligible  Mutation:  [id number]/621+1g>t  AW Clearance: Vest  Bronchodilators:  Albuterol  Mucolytics: Pulmozyme, Hypertonic saline  Antibiotics Inh:  Bethkis  Antibiotics Oral: Azithromycin  Other:  Colonization Hx: Staph aureus (MSSA), Achromobacter xylosoxidans/denitrificans   Annual Studies: Due September 2021  Contraindications to standard of care:    CFTR Modulation: The patient is not a candidate for any of the currently available modulators.  Even with the new modulators approved for Trikafta,  after review with the CF pharmacist, the patient is still not eligible.    URI: Patient reports symptoms of a viral URI.  At this time antibiotics are not indicated.  If the patient develops symptoms of acute bacterial sinusitis or a pulmonary exacerbation was instructed to contact the CF office at which time antibiotics will be initiated.    CF related sinusitis: Although the patient has increased nasal symptoms, he does not appear to have acute sinusitis at this time.  Continue monitoring.    Pancreatic insufficiency: Patient denies symptoms of malabsorption.  His weight is down slightly from his last visit but remains in an adequate range.  He will continue his current vitamins and pancreatic enzyme replacement.    Iron deficiency anemia: Hemoglobin has normalized.  Supplemental iron will be discontinued.  Hemoglobin will be monitored annually.    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    Environmental allergies: Continue Zyrtec and Pataday, Astelin and Flonase as needed.    Reflux: No symptoms of reflux with current pantoprazole.    Depression: Adequately controlled with citalopram.  The patient has inquired in the past about stopping citalopram but this will be addressed when the patient's psychosocial concerns are stabilized.    Healthcare maintenance: Patient has received his influenza vaccine for this flu season.  He was encouraged to receive the Covid vaccine when available.    Follow-up in 3 months with PFTs and sputum culture.    Zana Lilly MD     CF HPI  The patient was seen and examined by Zana Lilly MD   Demario Abbasi is a 26 year old male with cystic fibrosis, pancreatic insufficiency, depression, acne and impaired glucose tolerance who is s/p RUL resection for recurrent exacerbations in 2009.   He last required IV antibiotics in January 2020 for a pulmonary exacerbation.  At his last visit, the patient presents for a  "pulmonary exacerbation.  He was initially treated with oral antibiotics but symptoms persisted so IV meropenem and oral doxycycline were initiated on 10/5.  They were extended through 11/11 due to a Covid exposure.  The patient did not develop Covid.  IV antibiotics were discontinued on 11/11 and the patient's symptoms were back to baseline.    The patient reports a recent \"head cold\" with rhinorrhea clear, occasionally green.  No sore throat.  He does note some sinus pressure.  No pain.  No ear pain.  No fever, chills or night sweats.  Breathing is comfortable at rest and with all activity.  Cough is at baseline.  Sputum is baseline color and volume, typically green-clear.  No hemoptysis.  No chest pain.  Patient is doing vest therapy twice daily with [employer].    Since his last visit, patient lost his job.  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    Review of systems:  Appetite is good  No nausea, vomiting, diarrhea or abdominal pain.  Depression/anxiety controlled with current medication.  A complete ROS was otherwise negative except as noted in the HPI.    Current Outpatient Medications   Medication     albuterol (PROAIR HFA) 108 (90 Base) MCG/ACT inhaler     albuterol (PROVENTIL) (2.5 MG/3ML) 0.083% neb solution     amylase-lipase-protease (CREON) 29006-387990-144322 units CPEP     azelastine (ASTELIN) 0.1 % nasal spray     azithromycin (ZITHROMAX) 500 MG tablet     cetirizine (ZYRTEC) 10 MG tablet     citalopram (CELEXA) 20 MG tablet     dornase alpha (PULMOZYME) 1 MG/ML neb solution     ferrous fumarate 65 mg, [place]. FE,-Vitamin C 125 mg (VITRON C)  MG TABS tablet     fluconazole (DIFLUCAN) 150 MG tablet     fluticasone (FLONASE) 50 MCG/ACT nasal spray     mvw complete formulation (SOFTGELS ) capsule     olopatadine (PATANOL) 0.1 % ophthalmic solution     pantoprazole (PROTONIX) 20 MG EC tablet     sodium chloride inhalant 7 % NEBU neb solution     " tobramycin (BETHKIS) 300 MG/4ML nebulizer solution     No current facility-administered medications for this visit.      Allergies   Allergen Reactions     Augmentin      Past Medical History:   Diagnosis Date     CF (cystic fibrosis) (H)     Genotype: [id number]/621+1g>t     Impaired glucose tolerance      Pancreatic insufficiency      S/P lobectomy of lung 8/4/2015    Right upper lobectomy - 2009       Past Surgical History:   Procedure Laterality Date     HC LAP,INGUINAL HERNIA REPR,INITIAL  2002     INSERT PICC LINE Left 10/12/2020    Procedure: INSERTION, PICC @1100;  Surgeon: Felicia Branch MD;  Location: [facility] OR     IR PICC PLACEMENT > 5 YRS OF AGE  3/18/2019     IR PICC PLACEMENT > 5 YRS OF AGE  12/30/2019     IR PICC PLACEMENT > 5 YRS OF AGE  10/12/2020     LOBECTOMY LUNG Right 2009    Right upper lobe       Social History     Socioeconomic History     Marital status: Single     Spouse name: Not on file     Number of children: Not on file     Years of education: Not on file     Highest education level: Not on file   Occupational History     Occupation: [employer]   Social Needs     Financial resource strain: Not on file     Food insecurity     Worry: Not on file     Inability: Not on file     Transportation needs     Medical: Not on file     Non-medical: Not on file   Tobacco Use     Smoking status: Passive Smoke Exposure - Never Smoker     Smokeless tobacco: Current User     Types: Chew     Tobacco comment: dad smokes   Substance and Sexual Activity     Alcohol use: Yes     Comment: <2 drinks per week     Drug use: Not on file     Sexual activity: Not on file   Lifestyle     Physical activity     Days per week: Not on file     Minutes per session: Not on file     Stress: Not on file   Relationships     Social connections     Talks on phone: Not on file     Gets together: Not on file     Attends [employer] service: Not on file     Active member of club or organization: Not on file     Attends meetings of  clubs or organizations: Not on file     Relationship status: Not on file     Intimate partner violence     Fear of current or ex partner: Not on file     Emotionally abused: Not on file     Physically abused: Not on file     Forced sexual activity: Not on file   Other Topics Concern     Parent/sibling w/ CABG, MI or angioplasty before 65F 55M? Not Asked   Social History Narrative    12/27/2019  -Graduated college in 2020.  Working full time as a sales .  [place] work[place]  Has girlfriend (Oriana).         /73   Pulse 78   Temp 98.5  F (36.9  C) (Oral)   SpO2 95%   There is no height or weight on file to calculate BMI.  Exam:   GENERAL APPEARANCE: Well developed, well nourished, alert, and in no apparent distress.  EYES: PERRL, EOMI  HENT: Nasal mucosa with edema and hyperemia. No nasal polyps.  EARS: Canals clear, TMs normal  MOUTH: Oral mucosa is moist, without any lesions, no tonsillar enlargement, no oropharyngeal exudate.  NECK: supple, no masses, no thyromegaly.  LYMPHATICS: No significant axillary, cervical, or supraclavicular nodes.  RESP: normal percussion, good air flow throughout.  No crackles. No rhonchi. No wheezes.  CV: Normal S1, S2, regular rhythm, normal rate. No murmur.  No rub. No gallop. No LE edema.   ABDOMEN:  Bowel sounds normal, soft, nontender, no HSM or masses.   MS: extremities normal. No clubbing. No cyanosis.  SKIN: no rash on limited exam  NEURO: Mentation intact, speech normal, normal strength and tone, normal gait and stance  PSYCH: mentation appears normal. and affect normal/bright  Results:  Recent Results (from the past 168 hour(s))   General PFT Lab (Please always keep checked)    Collection Time: 02/11/21  3:08 PM   Result Value Ref Range    FVC-Pred 5.60 L    FVC-Pre 3.54 L    FVC-%Pred-Pre 63 %    FEV1-Pre 2.75 L    FEV1-%Pred-Pre 58 %    FEV1FVC-Pred 84 %    FEV1FVC-Pre 78 %    FEFMax-Pred 10.24 L/sec    FEFMax-Pre 7.98 L/sec     FEFMax-%Pred-Pre 77 %    FEF2575-Pred 4.94 L/sec    FEF2575-Pre 2.29 L/sec    ZMH9535-%Pred-Pre 46 %    ExpTime-Pre 7.28 sec    FIFMax-Pre 4.30 L/sec    FEV1FEV6-Pred 84 %    FEV1FEV6-Pre 78 %                   Results as noted above.    PFT Interpretation:  Moderately severe restrictive ventilatory defect.  Increased from previous.  Below recent best.   Valid Maneuver             CF Exacerbation  Absent            Respiratory Therapist Note:         Vest                Brand: Hill-Rom - traditional Hill Rom: Frequencies 8, 9, 10 at pressure 10 then frequencies 18, 19, 20 at pressure 6. and Hill-Rom - [place] Frequencies: 13-15, intensity 8-10                Cough Pause: Cough Pause; Yes                Vest Garment Size: Adult Medium                Last Fitting Date: Due, went over correct fit                Frequency of therapy: 14 times per week                Concerns: None         Exercise (purposeful and aerobic for >20 minutes each session): NO.                Does this qualify as additional airway clearance: No         Alternative Airway Clearance:          Nebulized Medications                Bronchodilators: Albuterol                Mucolytic: Pulmozyme                Antibiotics: COBY                Additional Inhaled Medications:                Spacer Use:          Review Cleaning: Yes. Top rack of .         Education and Transition Information                Correct order of inhaled medications: Yes                Mechanism of Action of inhaled medications: Yes                Frequency of inhaled medications: Yes                Dosage of inhaled medications: Yes                Other:          Home Care:                Nebulizer Cups (Brand/Type): Patient is using disposable nebulizer cups.                 Nebulizer Compressor                            Year Purchased: Works                            [facility], Phone: [phone], Fax: [phone]                Nebulizer Supply  Company:                            [facility], Phone: [phone], Fax: [phone]         Oxygen:     Pulmonary Rehab                Site:                 Date Completed:          Plan of Care and Goals for next visit: Reminded patient that Pulmozyme can't be mixed with other meds and must be done in a separate nebulizer cup. Patient has a home spirometer and was encouraged to get it up and running and send us a screen shot of the results so we can help troubleshoot any issues.       Again, thank you for allowing me to participate in the care of your patient.        Sincerely,        Zana Lilly MD

## 2021-02-11 NOTE — PROGRESS NOTES
Reason for Visit  Demario Abbasi is a 26 year old year old male who is being seen for Follow Up (12wk f/u)      Assessment and plan:   Demario Abbasi is a 26 year old male with cystic fibrosis, pancreatic insufficiency, depression, acne and impaired glucose tolerance who is s/p RUL resection for recurrent exacerbations in 2009.   He last required IV antibiotics in January 2020 for a pulmonary exacerbation.    Pulmonary/CF: The patient reports excellent exercise tolerance.  Oxygenation is adequate.  PFTs have improved from his last visit although not quite to the level of his previous best.  His previous exacerbation appears to have resolved.  He does not appear to have an exacerbation at this time but with his current viral URI (see below) he is at risk for recurrence.  He was instructed to call the CF office if he has any increase in cough and sputum production, dyspnea or other change in his respiratory symptoms.  For now he will continue with his current twice daily vest therapy with albuterol, Pulmozyme and hypertonic saline.  Continue Bethkis every other month.  Continue azithromycin.    Maintenance   Modulator: Not eligible  Mutation:  g542x/621+1g>t  AW Clearance: Vest  Bronchodilators:  Albuterol  Mucolytics: Pulmozyme, Hypertonic saline  Antibiotics Inh:  Bethkis  Antibiotics Oral: Azithromycin  Other:  Colonization Hx: Staph aureus (MSSA), Achromobacter xylosoxidans/denitrificans   Annual Studies: Due September 2021  Contraindications to standard of care:    CFTR Modulation: The patient is not a candidate for any of the currently available modulators.  Even with the new modulators approved for Trikafta, after review with the CF pharmacist, the patient is still not eligible.    URI: Patient reports symptoms of a viral URI.  At this time antibiotics are not indicated.  If the patient develops symptoms of acute bacterial sinusitis or a pulmonary exacerbation was instructed to contact the CF office  at which time antibiotics will be initiated.    CF related sinusitis: Although the patient has increased nasal symptoms, he does not appear to have acute sinusitis at this time.  Continue monitoring.    Pancreatic insufficiency: Patient denies symptoms of malabsorption.  His weight is down slightly from his last visit but remains in an adequate range.  He will continue his current vitamins and pancreatic enzyme replacement.    Iron deficiency anemia: Hemoglobin has normalized.  Supplemental iron will be discontinued.  Hemoglobin will be monitored annually.    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    Environmental allergies: Continue Zyrtec and Pataday, Astelin and Flonase as needed.    Reflux: No symptoms of reflux with current pantoprazole.    Depression: Adequately controlled with citalopram.  The patient has inquired in the past about stopping citalopram but this will be addressed when the patient's psychosocial concerns are stabilized.    Healthcare maintenance: Patient has received his influenza vaccine for this flu season.  He was encouraged to receive the Covid vaccine when available.    Follow-up in 3 months with PFTs and sputum culture.    Zana Lilly MD     CF HPI  The patient was seen and examined by Zana Lilly MD   Demario Abbasi is a 26 year old male with cystic fibrosis, pancreatic insufficiency, depression, acne and impaired glucose tolerance who is s/p RUL resection for recurrent exacerbations in 2009.   He last required IV antibiotics in January 2020 for a pulmonary exacerbation.  At his last visit, the patient presents for a pulmonary exacerbation.  He was initially treated with oral antibiotics but symptoms persisted so IV meropenem and oral doxycycline were initiated on 10/5.  They were extended through 11/11 due to a Covid exposure.  The patient did not develop Covid.  IV antibiotics were discontinued on 11/11 and the  "patient's symptoms were back to baseline.    The patient reports a recent \"head cold\" with rhinorrhea clear, occasionally green.  No sore throat.  He does note some sinus pressure.  No pain.  No ear pain.  No fever, chills or night sweats.  Breathing is comfortable at rest and with all activity.  Cough is at baseline.  Sputum is baseline color and volume, typically green-clear.  No hemoptysis.  No chest pain.  Patient is doing vest therapy twice daily with [employer].    Since his last visit, patient lost his job.  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    Review of systems:  Appetite is good  No nausea, vomiting, diarrhea or abdominal pain.  Depression/anxiety controlled with current medication.  A complete ROS was otherwise negative except as noted in the HPI.    Current Outpatient Medications   Medication     albuterol (PROAIR HFA) 108 (90 Base) MCG/ACT inhaler     albuterol (PROVENTIL) (2.5 MG/3ML) 0.083% neb solution     amylase-lipase-protease (CREON) 24701-808308-782221 units CPEP     azelastine (ASTELIN) 0.1 % nasal spray     azithromycin (ZITHROMAX) 500 MG tablet     cetirizine (ZYRTEC) 10 MG tablet     citalopram (CELEXA) 20 MG tablet     dornase alpha (PULMOZYME) 1 MG/ML neb solution     ferrous fumarate 65 mg, [place]. FE,-Vitamin C 125 mg (VITRON C)  MG TABS tablet     fluconazole (DIFLUCAN) 150 MG tablet     fluticasone (FLONASE) 50 MCG/ACT nasal spray     mvw complete formulation (SOFTGELS ) capsule     olopatadine (PATANOL) 0.1 % ophthalmic solution     pantoprazole (PROTONIX) 20 MG EC tablet     sodium chloride inhalant 7 % NEBU neb solution     tobramycin (BETHKIS) 300 MG/4ML nebulizer solution     No current facility-administered medications for this visit.      Allergies   Allergen Reactions     Augmentin      Past Medical History:   Diagnosis Date     CF (cystic fibrosis) (H)     Genotype: g542x/621+1g>t     Impaired glucose tolerance      " Pancreatic insufficiency      S/P lobectomy of lung 8/4/2015    Right upper lobectomy - 2009       Past Surgical History:   Procedure Laterality Date     HC LAP,INGUINAL HERNIA REPR,INITIAL  2002     INSERT PICC LINE Left 10/12/2020    Procedure: INSERTION, PICC @1100;  Surgeon: Felicia Branch MD;  Location: [facility] OR     IR PICC PLACEMENT > 5 YRS OF AGE  3/18/2019     IR PICC PLACEMENT > 5 YRS OF AGE  12/30/2019     IR PICC PLACEMENT > 5 YRS OF AGE  10/12/2020     LOBECTOMY LUNG Right 2009    Right upper lobe       Social History     Socioeconomic History     Marital status: Single     Spouse name: Not on file     Number of children: Not on file     Years of education: Not on file     Highest education level: Not on file   Occupational History     Occupation: [employer]   Social Needs     Financial resource strain: Not on file     Food insecurity     Worry: Not on file     Inability: Not on file     Transportation needs     Medical: Not on file     Non-medical: Not on file   Tobacco Use     Smoking status: Passive Smoke Exposure - Never Smoker     Smokeless tobacco: Current User     Types: Chew     Tobacco comment: dad smokes   Substance and Sexual Activity     Alcohol use: Yes     Comment: <2 drinks per week     Drug use: Not on file     Sexual activity: Not on file   Lifestyle     Physical activity     Days per week: Not on file     Minutes per session: Not on file     Stress: Not on file   Relationships     Social connections     Talks on phone: Not on file     Gets together: Not on file     Attends Religion service: Not on file     Active member of club or organization: Not on file     Attends meetings of clubs or organizations: Not on file     Relationship status: Not on file     Intimate partner violence     Fear of current or ex partner: Not on file     Emotionally abused: Not on file     Physically abused: Not on file     Forced sexual activity: Not on file   Other Topics Concern     Parent/sibling w/  CABG, MI or angioplasty before 65F 55M? Not Asked   Social History Narrative    12/27/2019  -Graduated college in 2020.  Working full time as a sales .  [place] work[place]  Has girlfriend (Oriana).         /73   Pulse 78   Temp 98.5  F (36.9  C) (Oral)   SpO2 95%   There is no height or weight on file to calculate BMI.  Exam:   GENERAL APPEARANCE: Well developed, well nourished, alert, and in no apparent distress.  EYES: PERRL, EOMI  HENT: Nasal mucosa with edema and hyperemia. No nasal polyps.  EARS: Canals clear, TMs normal  MOUTH: Oral mucosa is moist, without any lesions, no tonsillar enlargement, no oropharyngeal exudate.  NECK: supple, no masses, no thyromegaly.  LYMPHATICS: No significant axillary, cervical, or supraclavicular nodes.  RESP: normal percussion, good air flow throughout.  No crackles. No rhonchi. No wheezes.  CV: Normal S1, S2, regular rhythm, normal rate. No murmur.  No rub. No gallop. No LE edema.   ABDOMEN:  Bowel sounds normal, soft, nontender, no HSM or masses.   MS: extremities normal. No clubbing. No cyanosis.  SKIN: no rash on limited exam  NEURO: Mentation intact, speech normal, normal strength and tone, normal gait and stance  PSYCH: mentation appears normal. and affect normal/bright  Results:  Recent Results (from the past 168 hour(s))   General PFT Lab (Please always keep checked)    Collection Time: 02/11/21  3:08 PM   Result Value Ref Range    FVC-Pred 5.60 L    FVC-Pre 3.54 L    FVC-%Pred-Pre 63 %    FEV1-Pre 2.75 L    FEV1-%Pred-Pre 58 %    FEV1FVC-Pred 84 %    FEV1FVC-Pre 78 %    FEFMax-Pred 10.24 L/sec    FEFMax-Pre 7.98 L/sec    FEFMax-%Pred-Pre 77 %    FEF2575-Pred 4.94 L/sec    FEF2575-Pre 2.29 L/sec    [id number]-%Pred-Pre 46 %    ExpTime-Pre 7.28 sec    FIFMax-Pre 4.30 L/sec    FEV1FEV6-Pred 84 %    FEV1FEV6-Pre 78 %                   Results as noted above.    PFT Interpretation:  Moderately severe restrictive ventilatory  defect.  Increased from previous.  Below recent best.   Valid Maneuver             CF Exacerbation  Absent

## 2021-02-16 LAB
BACTERIA SPEC CULT: ABNORMAL
SPECIMEN SOURCE: ABNORMAL

## 2021-02-16 NOTE — PROGRESS NOTES
Respiratory Therapist Note:         Vest                Brand: Hill-Rom - traditional Hill Rom: Frequencies 8, 9, 10 at pressure 10 then frequencies 18, 19, 20 at pressure 6. and Hill-Rom - [place] Frequencies: 13-15, intensity 8-10                Cough Pause: Cough Pause; Yes                Vest Garment Size: Adult Medium                Last Fitting Date: Due, went over correct fit                Frequency of therapy: 14 times per week                Concerns: None         Exercise (purposeful and aerobic for >20 minutes each session): NO.                Does this qualify as additional airway clearance: No         Alternative Airway Clearance:          Nebulized Medications                Bronchodilators: Albuterol                Mucolytic: Pulmozyme                Antibiotics: COBY                Additional Inhaled Medications:                Spacer Use:          Review Cleaning: Yes. Top rack of .         Education and Transition Information                Correct order of inhaled medications: Yes                Mechanism of Action of inhaled medications: Yes                Frequency of inhaled medications: Yes                Dosage of inhaled medications: Yes                Other:          Home Care:                Nebulizer Cups (Brand/Type): Patient is using disposable nebulizer cups.                 Nebulizer Compressor                            Year Purchased: Works                            [facility], Phone: [phone], Fax: [phone]                Nebulizer Supply Company:                            [facility], Phone: [phone], Fax: [phone]         Oxygen:     Pulmonary Rehab                Site:                 Date Completed:          Plan of Care and Goals for next visit: Reminded patient that Pulmozyme can't be mixed with other meds and must be done in a separate nebulizer cup. Patient has a home spirometer and was encouraged to get it up and  running and send us a screen shot of the results so we can help troubleshoot any issues.

## 2021-03-10 DIAGNOSIS — E84.9 CYSTIC FIBROSIS (H): ICD-10-CM

## 2021-03-10 DIAGNOSIS — K86.89 PANCREATIC INSUFFICIENCY: ICD-10-CM

## 2021-03-10 DIAGNOSIS — E84.0 CYSTIC FIBROSIS WITH PULMONARY MANIFESTATIONS (H): ICD-10-CM

## 2021-03-10 DIAGNOSIS — E84.9 CF (CYSTIC FIBROSIS) (H): ICD-10-CM

## 2021-03-10 RX ORDER — PEDIATRIC MULTIVIT 61/D3/VIT K 1500-800
CAPSULE ORAL
Qty: 60 CAPSULE | Refills: 11 | Status: SHIPPED | OUTPATIENT
Start: 2021-03-10 | End: 2021-03-11

## 2021-03-10 RX ORDER — TOBRAMYCIN INHALATION 300 MG/4ML
300 SOLUTION RESPIRATORY (INHALATION) 2 TIMES DAILY
Qty: 224 ML | Refills: 5 | Status: SHIPPED | OUTPATIENT
Start: 2021-03-10 | End: 2022-03-21

## 2021-03-11 DIAGNOSIS — E84.9 CYSTIC FIBROSIS (H): ICD-10-CM

## 2021-03-11 DIAGNOSIS — K86.89 PANCREATIC INSUFFICIENCY: ICD-10-CM

## 2021-03-11 RX ORDER — PEDIATRIC MULTIVIT 61/D3/VIT K 1500-800
CAPSULE ORAL
Qty: 60 CAPSULE | Refills: 11 | Status: SHIPPED | OUTPATIENT
Start: 2021-03-11 | End: 2022-03-21

## 2021-03-15 DIAGNOSIS — E84.0 CYSTIC FIBROSIS WITH PULMONARY MANIFESTATIONS (H): ICD-10-CM

## 2021-03-15 RX ORDER — ALBUTEROL SULFATE 0.83 MG/ML
2.5 SOLUTION RESPIRATORY (INHALATION) 3 TIMES DAILY
Qty: 270 ML | Refills: 11 | Status: SHIPPED | OUTPATIENT
Start: 2021-03-15 | End: 2022-04-12

## 2021-03-30 DIAGNOSIS — E84.9 CF (CYSTIC FIBROSIS) (H): ICD-10-CM

## 2021-03-30 RX ORDER — PANTOPRAZOLE SODIUM 20 MG/1
20 TABLET, DELAYED RELEASE ORAL DAILY
Qty: 30 TABLET | Refills: 11 | Status: SHIPPED | OUTPATIENT
Start: 2021-03-30 | End: 2022-04-22

## 2021-03-30 RX ORDER — AZITHROMYCIN 500 MG/1
TABLET, FILM COATED ORAL
Qty: 12 TABLET | Refills: 11 | Status: SHIPPED | OUTPATIENT
Start: 2021-03-30 | End: 2022-04-22

## 2021-06-02 ENCOUNTER — TELEPHONE (OUTPATIENT)
Dept: PULMONOLOGY | Facility: CLINIC | Age: 26
End: 2021-06-02

## 2021-06-02 DIAGNOSIS — E84.9 CYSTIC FIBROSIS (H): Primary | ICD-10-CM

## 2021-06-02 RX ORDER — DOXYCYCLINE HYCLATE 100 MG
100 TABLET ORAL 2 TIMES DAILY
Qty: 42 TABLET | Refills: 0 | Status: SHIPPED | OUTPATIENT
Start: 2021-06-02 | End: 2021-07-22

## 2021-06-02 RX ORDER — LEVOFLOXACIN 750 MG/1
750 TABLET, FILM COATED ORAL DAILY
Qty: 21 TABLET | Refills: 0 | Status: SHIPPED | OUTPATIENT
Start: 2021-06-02 | End: 2021-07-22

## 2021-06-02 RX ORDER — PHYTONADIONE 5 MG/1
5 TABLET ORAL DAILY
Qty: 3 TABLET | Refills: 0 | Status: SHIPPED | OUTPATIENT
Start: 2021-06-02 | End: 2022-09-29

## 2021-06-02 NOTE — TELEPHONE ENCOUNTER
[facility]  June 2, 2021    Susan Li    Cystic fibrosis Provider: Zana Lilly MD    Caller: Patient     Clinical information:  Demario Abbasi called with complaint of Increase cough, increase sputum, (darker/thicker)  for couple weeks. Thought it was initially allergies, but symptoms progress. Coughed up blood last weekend (1tsp dorita blood), and again today (1tsp mixed with sputum). Decreased energy levels.      Plan:   Discussed with Dr. Briscoe (covering for MELANI while out of office)  Vitamin K 5mg x3 days  Doxycycline 100mg BID x 3 weeks  Levaquin 750mg Daily x3 weeks (hold azithromycin)    Cautioned patient on sun exposure while on Doxy, call if he develops joint pain with Levaquin.    Call back with any new or worsening symptoms/concerns.    Caller verbalized understanding of plan and agrees with advice given.

## 2021-07-22 ENCOUNTER — OFFICE VISIT (OUTPATIENT)
Dept: PHARMACY | Facility: CLINIC | Age: 26
End: 2021-07-22
Payer: COMMERCIAL

## 2021-07-22 ENCOUNTER — ALLIED HEALTH/NURSE VISIT (OUTPATIENT)
Dept: CARE COORDINATION | Facility: CLINIC | Age: 26
End: 2021-07-22

## 2021-07-22 ENCOUNTER — OFFICE VISIT (OUTPATIENT)
Dept: PULMONOLOGY | Facility: CLINIC | Age: 26
End: 2021-07-22
Attending: INTERNAL MEDICINE
Payer: COMMERCIAL

## 2021-07-22 VITALS
SYSTOLIC BLOOD PRESSURE: 112 MMHG | HEART RATE: 65 BPM | DIASTOLIC BLOOD PRESSURE: 71 MMHG | OXYGEN SATURATION: 97 % | TEMPERATURE: 97.8 F

## 2021-07-22 DIAGNOSIS — K86.89 PANCREATIC INSUFFICIENCY: ICD-10-CM

## 2021-07-22 DIAGNOSIS — E84.9 CYSTIC FIBROSIS (H): Primary | ICD-10-CM

## 2021-07-22 DIAGNOSIS — E84.9 CF (CYSTIC FIBROSIS) (H): ICD-10-CM

## 2021-07-22 DIAGNOSIS — J30.2 SEASONAL ALLERGIES: ICD-10-CM

## 2021-07-22 DIAGNOSIS — J30.9 ALLERGIC SINUSITIS: ICD-10-CM

## 2021-07-22 DIAGNOSIS — F33.0 MAJOR DEPRESSIVE DISORDER, RECURRENT EPISODE, MILD (H): ICD-10-CM

## 2021-07-22 DIAGNOSIS — R73.02 IMPAIRED GLUCOSE TOLERANCE: ICD-10-CM

## 2021-07-22 DIAGNOSIS — Z90.2 S/P LOBECTOMY OF LUNG: ICD-10-CM

## 2021-07-22 DIAGNOSIS — Z13.9 RISK AND FUNCTIONAL ASSESSMENT: Primary | ICD-10-CM

## 2021-07-22 LAB
EXPTIME-PRE: 7.05 SEC
FEF2575-%PRED-PRE: 47 %
FEF2575-PRE: 2.36 L/SEC
FEF2575-PRED: 4.94 L/SEC
FEFMAX-%PRED-PRE: 73 %
FEFMAX-PRE: 7.52 L/SEC
FEFMAX-PRED: 10.24 L/SEC
FEV1-%PRED-PRE: 57 %
FEV1-PRE: 2.7 L
FEV1FEV6-PRE: 78 %
FEV1FEV6-PRED: 84 %
FEV1FVC-PRE: 78 %
FEV1FVC-PRED: 84 %
FIFMAX-PRE: 3.81 L/SEC
FVC-%PRED-PRE: 61 %
FVC-PRE: 3.45 L
FVC-PRED: 5.6 L

## 2021-07-22 PROCEDURE — G0463 HOSPITAL OUTPT CLINIC VISIT: HCPCS | Mod: 25

## 2021-07-22 PROCEDURE — 97803 MED NUTRITION INDIV SUBSEQ: CPT | Performed by: DIETITIAN, REGISTERED

## 2021-07-22 PROCEDURE — 99605 MTMS BY PHARM NP 15 MIN: CPT | Performed by: PHARMACIST

## 2021-07-22 PROCEDURE — 99214 OFFICE O/P EST MOD 30 MIN: CPT | Performed by: INTERNAL MEDICINE

## 2021-07-22 PROCEDURE — 94375 RESPIRATORY FLOW VOLUME LOOP: CPT | Performed by: INTERNAL MEDICINE

## 2021-07-22 PROCEDURE — 87186 SC STD MICRODIL/AGAR DIL: CPT | Performed by: INTERNAL MEDICINE

## 2021-07-22 RX ORDER — OLOPATADINE HYDROCHLORIDE 1 MG/ML
1 SOLUTION/ DROPS OPHTHALMIC 2 TIMES DAILY
Qty: 5 ML | Refills: 11 | COMMUNITY
Start: 2021-07-22 | End: 2021-08-12

## 2021-07-22 ASSESSMENT — ANXIETY QUESTIONNAIRES
3. WORRYING TOO MUCH ABOUT DIFFERENT THINGS: NOT AT ALL
IF YOU CHECKED OFF ANY PROBLEMS ON THIS QUESTIONNAIRE, HOW DIFFICULT HAVE THESE PROBLEMS MADE IT FOR YOU TO DO YOUR WORK, TAKE CARE OF THINGS AT HOME, OR GET ALONG WITH OTHER PEOPLE: NOT DIFFICULT AT ALL
5. BEING SO RESTLESS THAT IT IS HARD TO SIT STILL: NOT AT ALL
1. FEELING NERVOUS, ANXIOUS, OR ON EDGE: NOT AT ALL
GAD7 TOTAL SCORE: 0
2. NOT BEING ABLE TO STOP OR CONTROL WORRYING: NOT AT ALL
7. FEELING AFRAID AS IF SOMETHING AWFUL MIGHT HAPPEN: NOT AT ALL
6. BECOMING EASILY ANNOYED OR IRRITABLE: NOT AT ALL

## 2021-07-22 ASSESSMENT — PATIENT HEALTH QUESTIONNAIRE - PHQ9
SUM OF ALL RESPONSES TO PHQ QUESTIONS 1-9: 2
5. POOR APPETITE OR OVEREATING: NOT AT ALL

## 2021-07-22 NOTE — PROGRESS NOTES
Medication Therapy Management (MTM) Encounter    ASSESSMENT:                            Medication Adherence/Access: No issues identified    CF: Stable.     Pancreatic Insufficiency/Nutrition: Stable.      Allergies/Sinusitis: Stable    Depression: Stable.    PLAN:                            Continue current medications and keep up the good work! I'm glad your medications are working well and that you have a good routine at home to help you remember to take all your medications.    Follow-up: 1 year or sooner if needed    SUBJECTIVE/OBJECTIVE:                          Demario Abbasi is a 26 year old male seen for an annual visit. He was referred to me from Dr. Lilly. Patient saw provider prior to our visit today.     Reason for visit: annual CF pharmacist visit.    Allergies/ADRs: Reviewed in chart  Tobacco: He reports that he is a non-smoker but has been exposed to tobacco smoke. His smokeless tobacco use includes chew.  Alcohol: 1-3 beverages / week  Past Medical History: Reviewed in chart    Medication Adherence/Access: Demario fills specialty medications at [facility] and other medications at [facility]. Demario manages refills for his non-specialty medications and his parents order the specialty medications. Demario states his insurance coverage is good and he denies any medication access concerns.  Demario reports good medication adherence, due to a consistent routine at home and says that he rarely misses doses.     CF:    Genotype: g542x/621+1g>t  Inhaled medications:   Bronchodilator: albuterol   Mucolytic: Pulmozyme and hypertonic saline   Antibiotic: Bethkis  Oral medications:   Azithromycin: taking   CFTR modulator: Not eligible  Pulmonary symptoms are stable  PFTs are stable  Current FEV1: 57%  Cultures: sputum cultures grow Staph and Achromobacter  Exacerbation status: no exacerbation    Pancreatic Insufficiency/Nutrition: Pancreatic enzyme replacement with Creon 06386 units.  Patient is  "taking 4 capsules with meals and 1 capsules with snacks. Patient is not experiencing sign/symptoms of malabsorption.  Acid reducer:Protonix (pantoprazole) 20 mg daily  Weight and BMI are decreased  Vitamins include: [id number] twice daily    Lab Results   Component Value Date    VITDT 37 09/10/2020    VITDT 29 02/22/2019     Estimated body mass index is 24.39 kg/m  as calculated from the following:    Height as of 10/12/20: 5' 10\" (1.778 m).    Weight as of 10/12/20: 170 lb (77.1 kg).     Allergies/Sinusitis: Well controlled with cetirizine, Astelin nasal spray, Flonase and Patanol eye drops    Depression:  Current medications include: Citalopram 20 mg once daily. Demario reports his mood is stable and he is not experiencing any side effects.   PHQ-9 SCORE 1/14/2016 8/24/2018 8/23/2019   PHQ-9 Total Score 0 1 1     Today's Vitals:   BP Readings from Last 1 Encounters:   07/22/21 112/71     Pulse Readings from Last 1 Encounters:   07/22/21 65     Wt Readings from Last 1 Encounters:   10/12/20 170 lb (77.1 kg)     Ht Readings from Last 1 Encounters:   10/12/20 5' 10\" (1.778 m)     I spent 15 minutes with this patient today. All changes were made via collaborative practice agreement with Dr. Lilly.     The patient was given a summary of these recommendations.     Ashley Kirby PharmD  CF Medication Therapy Management Pharmacist  [facility]  [phone]       Medication Therapy Recommendations  No medication therapy recommendations to display       "

## 2021-07-22 NOTE — PROGRESS NOTES
CF Annual Nutrition Assessment     Reason for Assessment  Assessed during CF clinic visit with Dr. Lilly r/t increased nutrition risk with diagnosis of CF per protocol.      Anthropometric Assessment  Height: 175.3 cm  Weight: 79.3 kg (174 lbs)  Ideal body weight based on BMI 22 for females and 23 for males per CF Foundation recs: 71 kg (156 lbs)   BMI: 24.8 kg/m      Comments: Weight relatively stable within -175 lbs.      Pancreatic Enzymes  Brand:  Creon 36,000  Dosin with meals, 2-3 with snacks = 2170 units lipase/kg/meal  Estimated Daily Intake: 15 caps = 6835 units lipase/kg/day     Signs of Malabsorption: No  Enzyme Program: Not eligible      Nutrition-Related Lab & Vitamin/Mineral Supplements  Annual studies 2020  Vitamin A- 0.61 wnl  Vitamin D- 37 wnl  Vitamin E- 8.3 wnl  Iron- 57 wnl  Lipid Panel-       OGTT- --  DEXA - , lowest z-score -0.6     Current Vitamin/Mineral Supplements: MVW Complete  1 softgel BID  Comments: no longer taking iron due to improvement in level; obtains MVW from [facility]     Diet History and Assessment  Diet Preferences/Allergies/Intolerances: Regular  Intake Recall/Comments: Demario is eating very well and has a great appetite. Finds he is not eating quite as much as when he was in college. Eating mostly lunch and dinner; meals typically consist of meat and potatoes Dad or Demario usually cook supper; enjoys fresh string beans from the garden during summer and grilling steak/burgers.     Calcium: adequate - milk/yogurt/cheese  Salt: adequate - adds to foods + meal choices  Hydration: adequate - Mtn Dew, Gatorade, water, milk  Supplements: no      NUTRITION DIAGNOSIS  Impaired nutrient utilization related to CF pancreatic insufficiency as evidenced by requires pancreatic enzyme replacement therapy and vitamin/mineral supplementation in order to maintain ideal body weight and nutrition status.      INTERVENTIONS/RECOMMENDATIONS   1) Oral Intake/Weight:   BEE:  1845  Calorie Goals- 8673-7479 kcals/day (150-175% BEE)   Protein Goals-  grams/day (1.2-1.5 g/kg)     Reviewed current nutrition status including weight trends and adequacy of oral intake with Demario. Encouraged ongoing good PO with adequate protein/calories/salt/hydration. Discussed goal for weight maintenance and encouraged healthy eating / slowly improving fruit intake.      2) Vitamin/Mineral Supplementation:  Will be due for annual study labs September. Plan to continue with current vitamin supplementation at this time.      GOALS:  1) Maintain BMI >23 kg/m2  2) WNL vitamin levels     FOLLOW-UP/MONITORING:  Visit patient within 12 months for annual nutrition reassessment.     Time Spent In Face-to-Face Patient Interactions: 15 minutes    Sherie Aparicio RD, LD  Cystic Fibrosis/Lung Transplant Dietitian  Pager [phone]

## 2021-07-22 NOTE — PROGRESS NOTES
Reason for Visit  Demario Abbasi is a 26 year old year old male who is being seen for Cystic Fibrosis (follow up)      Assessment and plan:   Demario Abbasi is a 26 year old male with cystic fibrosis, pancreatic insufficiency, depression, acne and impaired glucose tolerance who is s/p RUL resection for recurrent exacerbations in 2009.   He last required IV antibiotics in January 2020 for a pulmonary exacerbation.  Maintenance   Modulator: Not eligible  Mutation:  g542x/621+1g>t  AW Clearance: Vest  Bronchodilators:  Albuterol  Mucolytics: Pulmozyme, Hypertonic saline  Antibiotics Inh:  Bethkis  Antibiotics Oral: Azithromycin  Other:  Colonization Hx: Staph aureus (MSSA), Achromobacter xylosoxidans/denitrificans   Annual Studies: Due September 2021  Contraindications to standard of care:  CF/pulmonary: The patient was treated for a pulmonary exacerbation in early June with oral antibiotics.  Symptoms have improved although not quite to baseline.  Oxygenation is very good.  PFTs are at the low end of his usual range.  He does not appear to be having an ongoing exacerbation at this time.  He has been fairly inactive recently while searching for a new job.  I have encouraged him to exercise 20-30 minutes daily to augment his airway clearance and improve his conditioning.  He will continue his current airway clearance therapy with albuterol Pulmozyme and hypertonic saline and every other month Bethkis.  He will continue azithromycin.  He will contact the CF office if symptoms do not return to baseline.    CFTR Modulation: Based on his genotype, the patient is not a candidate for CF TR modulators.    Pancreatic insufficiency: Patient denies symptoms of malabsorption.  He will continue his current pancreatic enzyme replacement and vitamins.    CF related sinusitis: No symptoms of active sinusitis at this time.    Iron deficiency anemia: Hemoglobin has normalized.  Supplemental iron was stopped at his last visit.   Hemoglobin and iron levels will be checked with his next visit with annual studies.    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  He will meet with our  today to determine if there are opportunities to broaden the search.    Environmental allergies: Adequately controlled with Zyrtec, Pataday, Astelin and Flonase.    Reflux: Adequately controlled with pantoprazole.    Healthcare maintenance: The patient has received his Covid vaccination.  He has received his influenza vaccine for this flu season.    Follow-up in 3 months with annual studies.  This will include a glucose tolerance test but he will not require a bone density scan this year.    In addition to my visit, he will meet with the CF , dietitian and pharmacist.    Annual studies including basic metabolic panel, LFTs, lipid battery, hemoglobin A1c, INR, TSH, total protein, albumin, vitamin A level, vitamin D level, vitamin E level, IgG, IgE, CBC, urinalysis, testosterone level, CF sputum culture, nocardia culture, AFB culture, fungal culture, chest x-ray were ordered with the next visit.  Zana Lilly MD     CF [facility]  The patient was seen and examined by Zana Lilly MD   Demario Abbasi is a 26 year old male with cystic fibrosis, pancreatic insufficiency, depression, acne and impaired glucose tolerance who is s/p RUL resection for recurrent exacerbations in 2009.   He last required IV antibiotics in January 2020 for a pulmonary exacerbation.  In early June the patient developed increased cough with hemoptysis.  He was treated with doxycycline, levofloxacin and vitamin K.  He reports symptoms improved although he has not quite returned to baseline.  Energy level still remains decreased.    Breathing is comfortable at rest and with all activity.  Exercise tolerance similar to baseline.  Intermittent daily cough at baseline.  Sputum varies from green-brown although  overall no change in color or volume from baseline.  No hemoptysis since early June.  No chest pain.  No fever, chills or night sweats.  Vest therapy twice daily.  The patient continues to seek employment.    Review of systems:  Appetite is good  No ear pain, sore throat, sinus pain or rhinorrhea  No palpitations  No nausea, vomiting, diarrhea or abdominal pain.  No myalgias or arthralgias  Denies anxiety or depression    A complete ROS was otherwise negative except as noted in the HPI.    Current Outpatient Medications   Medication     albuterol (PROAIR HFA) 108 (90 Base) MCG/ACT inhaler     albuterol (PROVENTIL) (2.5 MG/3ML) 0.083% neb solution     amylase-lipase-protease (CREON) 99407-098687-056444 units CPEP     azelastine (ASTELIN) 0.1 % nasal spray     azithromycin (ZITHROMAX) 500 MG tablet     cetirizine (ZYRTEC) 10 MG tablet     citalopram (CELEXA) 20 MG tablet     dornase alpha (PULMOZYME) 1 MG/ML neb solution     fluconazole (DIFLUCAN) 150 MG tablet     fluticasone (FLONASE) 50 MCG/ACT nasal spray     mvw complete formulation (SOFTGELS ) capsule     olopatadine (PATANOL) 0.1 % ophthalmic solution     pantoprazole (PROTONIX) 20 MG EC tablet     phytonadione (MEPHYTON) 5 MG tablet     sodium chloride inhalant 7 % NEBU neb solution     tobramycin (BETHKIS) 300 MG/4ML nebulizer solution     No current facility-administered medications for this visit.     Allergies   Allergen Reactions     Augmentin      Past Medical History:   Diagnosis Date     CF (cystic fibrosis) (H)     Genotype: g542x/621+1g>t     Impaired glucose tolerance      Pancreatic insufficiency      S/P lobectomy of lung 8/4/2015    Right upper lobectomy - 2009       Past Surgical History:   Procedure Laterality Date     HC LAP,INGUINAL HERNIA REPR,INITIAL  2002     INSERT PICC LINE Left 10/12/2020    Procedure: INSERTION, PICC @1100;  Surgeon: Felicia Branch MD;  Location: [facility] OR     IR PICC PLACEMENT > 5 YRS OF AGE  3/18/2019     IR  PICC PLACEMENT > 5 YRS OF AGE  12/30/2019     IR PICC PLACEMENT > 5 YRS OF AGE  10/12/2020     LOBECTOMY LUNG Right 2009    Right upper lobe       Social History     Socioeconomic History     Marital status: Single     Spouse name: Not on file     Number of children: Not on file     Years of education: Not on file     Highest education level: Not on file   Occupational History     Occupation: Sale    Tobacco Use     Smoking status: Passive Smoke Exposure - Never Smoker     Smokeless tobacco: Current User     Types: Chew     Tobacco comment: dad smokes   Substance and Sexual Activity     Alcohol use: Yes     Comment: <2 drinks per week     Drug use: Not on file     Sexual activity: Not on file   Other Topics Concern     Parent/sibling w/ CABG, MI or angioplasty before 65F 55M? Not Asked   Social History Narrative    12/27/2019  -Graduated college in 2020.  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  Living [place] ([place] to work[place]  Has girlfriend (Oriana).     Social Determinants of Health     Financial Resource Strain:      Difficulty of Paying Living Expenses:    Food Insecurity:      Worried About Running Out of Food in the Last Year:      Ran Out of Food in the Last Year:    Transportation Needs:      Lack of Transportation (Medical):      Lack of Transportation (Non-Medical):    Physical Activity:      Days of Exercise per Week:      Minutes of Exercise per Session:    Stress:      Feeling of Stress :    Social Connections:      Frequency of Communication with Friends and Family:      Frequency of Social Gatherings with Friends and Family:      Attends Restoration Services:      Active Member of Clubs or Organizations:      Attends Club or Organization Meetings:      Marital Status:    Intimate Partner Violence:      Fear of Current or Ex-Partner:      Emotionally Abused:      Physically Abused:      Sexually Abused:          /71   Pulse 65   Temp 97.8  F (36.6  C) (Oral)   SpO2 97%    There is no height or weight on file to calculate BMI.  Exam:   GENERAL APPEARANCE: Well developed, well nourished, alert, and in no apparent distress.  EYES: PERRL, EOMI  HENT: Nasal mucosa with edema and no hyperemia. No nasal polyps.  EARS: Canals clear, TMs normal  MOUTH: Oral mucosa is moist, without any lesions, no tonsillar enlargement, no oropharyngeal exudate.  NECK: supple, no masses, no thyromegaly.  LYMPHATICS: No significant axillary, cervical, or supraclavicular nodes.  RESP: normal percussion, good air flow throughout.  No crackles. No rhonchi. No wheezes.  CV: Normal S1, S2, regular rhythm, normal rate. No murmur.  No rub. No gallop. No LE edema.   ABDOMEN:  Bowel sounds normal, soft, nontender, no HSM or masses.   MS: extremities normal. No clubbing. No cyanosis.  SKIN: no rash on limited exam  NEURO: Mentation intact, speech normal, normal strength and tone, normal gait and stance  PSYCH: mentation appears normal. and affect normal/bright  Results:  Recent Results (from the past 168 hour(s))   General PFT Lab (Please always keep checked)    Collection Time: 07/22/21  9:30 AM   Result Value Ref Range    FVC-Pred 5.60 L    FVC-Pre 3.45 L    FVC-%Pred-Pre 61 %    FEV1-Pre 2.70 L    FEV1-%Pred-Pre 57 %    FEV1FVC-Pred 84 %    FEV1FVC-Pre 78 %    FEFMax-Pred 10.24 L/sec    FEFMax-Pre 7.52 L/sec    FEFMax-%Pred-Pre 73 %    FEF2575-Pred 4.94 L/sec    FEF2575-Pre 2.36 L/sec    CGW5236-%Pred-Pre 47 %    ExpTime-Pre 7.05 sec    FIFMax-Pre 3.81 L/sec    FEV1FEV6-Pred 84 %    FEV1FEV6-Pre 78 %                   Results as noted above.    PFT Interpretation:  Moderately severe restrictive ventilatory defect.  Unchanged from previous.   Below recent best.   Valid Maneuver             CF Exacerbation  Absent

## 2021-07-22 NOTE — NURSING NOTE
Chief Complaint   Patient presents with     Cystic Fibrosis     follow up     Vitals were taken and medications were reconciled.     DEJUAN Perez

## 2021-07-22 NOTE — PROGRESS NOTES
Adult Cystic Fibrosis Program  Annual Psychosocial Assessment    Presenting Information:  Demario Abbasi is a 26-year-old male with cystic fibrosis, presenting in CF clinic for a routine appointment was with Dr. Lilly today.  Met with Demario for an initial annual psychosocial assessment.  His dad (Andrew) was present with him in clinic today.           Living situation:  Demario is currently living with his parents in [place].  They own the home.  [place].  There are 2 dogs at his parents home (a black lab named Rebeca and a miniature pincher named Glen).  They also have a lake cabin that is 20 mins away from their home in [place] that they go to often all year round.  He denies any concerns about his living situation.      Family Constellation:  Demario was raised by his biological parents: Mirna and Andrew who live in [place], [place].  He has two older sisters: Tammy who has a 6 y.o daughter Luis Angel and lives near [place], [place] and Evi who lives in [place], has 2 sons: a 4 yr old son Bharat and a 10 month old named Misael.  His mom is a nurse and his dad is a farmer of soybeans and corn.  He is the only person in his family with CF.  Demario has never been  and does not have children, he is not currently in a significant relationship.  He shared that he and his long-time girlfriend Oriana did split up this past year because they wanted different things, it was a mutual decision.    Social Support:  Demario reports very good social support.  He gets along well with family members and draws additional support from friends and yin community.  He is close with both of his parents, but especially his dad.  He and his family have some involvement with the CF community through fundraising.       Adjustment to Illness:  Demario reports that he was diagnosed with CF in infancy (3 months), he reports that shortly after being brought home from the hospital he was not thriving, gaining  "weight or growing.  His parents brought him to [place] for evaluation and they transferred him to the [facility] where a sweat test was performed and the diagnosis of CF was made.  He reports some complications while growing up, being hospitalized about once per year for CF-related issues which he did not like.  He admits that growing up with CF was hard at times, he struggled with doing his therapies and treatments at times and not having the freedom that other kids had.  In 2009 he had  RUL resection for recurrent exacerbations.   Despite this, he felt that he was still able to have a wonderful childhood/upbringing, participating in sports and being very active in social events.  He reports that his parents were very good and helping take care of him and manage CF and states: \"they were on top of it\".   He has been vesting since he was very young, he was unsure of the exact age.      Demario describes his current health status as \"pretty good\" with no significant updates to report.  He does have a goal of being more active and participate in more formal exercise.  He is not a candidate for [employer].  He reports that he uses his vest 2-3 times per day consistently, he says that he \"loves\" the vest and describes it as a relaxing massage.  He reports that he does pretty well with taking medications.   Overall he feels that he has been stable.  He denies any health problems that interfere with activities of daily living.     Demario reports that he is pretty open about his CF diagnosis and has no reservations about telling people.         Health Care Directive:  Demario has previously received Health Care Directive education.  This SW provided/reviewed education, including concept/purpose of health care directive, default health care agents and how to complete a directive.   He is comfortable with default health care agent(s) (his parents) in absence of a directive.  He is not currently interested in completing a health care " "directive.     Education:  Demario graduated from [employer] and graduated from [employer] in May 2020 with a degree in Agronomy (plant science).      Employment:  Demario is not currently working.  He was laid off from his job last year and stated that his position was eliminated.  He is currently looking for a job in his field and receiving unemployment.  He is being 'picky' about what jobs he applies for as he wants it to be for what he went to school for, but if he continues to not find something in his field, he could work for his dad in the meantime.    Finances:  Demario receives income from unemployment benefits and has financial support from his parents.   He denies having financial concerns at this time.      Insurance:  Demario is insured through Medicaid: [employer].  He denies any insurance coverage concerns at this time.    Mental Health/Coping:  Demario reports a history of depression, the chart review also includes a history of mild anxiety in the past.  Demario reports that his current/recent mood has been \"pretty good\".  He continues to take the antidepressant (serotonin specific reuptake inhibitor): Celexa and feels this medication is beneficial.  In the past he recalls going through a tough period of time in regards to his CF, describing it as \"a stage of understanding\", not wanting to comply with treatments and Celexa did help him get through this difficult time.  He has not sought therapy in the past and does not feel this is something he needs currently.  He zelda with stress by talking to someone such as his dad.  He identifies yin as important part of his life, he was raised Spiritism and is involved with a Yazidi community.    Demario completed the following screens today:  PHQ-9: Score: 2, which indicates minimal symptoms of depression and described as not difficult in daily functioning.  EMY-7: Score: 0, which indicates minimal symptoms of anxiety and described as not difficult in daily " functioning.       Demario agreed with the scores and interpretation of the screens above.  Overall he feels that he is handling the pandemic and job search well, he fills his days with spending time with his dog, being at his [place] cabin and outdoor activities.    Chemical Health:  Demario does use chewing tobacco, it was difficult for him to quantify but does say that it is not excessive. He denies the use of other tobacco products, denies the use of marijuana or other drugs.  He reports the use of alcohol, he estimates 12 beers per week on average, usually spaced out across the days, not all at once.  He denies any current/past psychosocial impairment caused by alcohol use.        Leisure Activities/Interests:   Demario enjoys hunting, fishing, golf, hockey and football.  He also enjoys being at cabin and outdoor activities there.          Intervention:  -Psychosocial Assessment  -Mental health screening  -Supportive counseling    Assessment:  Demario appeared to be open in open responses and was in good spirits.  He is currently job searching but is coping well with this and enjoying his summer.  He feels he is doing well from both a physical and mental health perspective. He does have a history of depression and currently feels it is well managed with Celexa. He reports good support system with no concerns reported in the area of financial or insurance.   He feels the transition from peds to adults went well with no concerns to report.  Demario seems to be psychosocially stable overall, with access to relevant resources and supports.  No concerns expressed/noted.  Recommend f/u SW support as noted in plan below.      Plan:  Re-consult for any psychosocial issues that may arise.  Complete psychosocial assessment annually.      JONELLE Hoskins, [facility]  Adult Cystic Fibrosis   Ph: [phone]  Pager: [phone]

## 2021-07-22 NOTE — PATIENT INSTRUCTIONS
Cystic Fibrosis Self-Care Plan    RECOMMENDATIONS:   Exercise 20-30 minutes every day or every other day except when air quality is poor.  Otherwise continue current medication, nebs and vest therapy.           [facility] Nurse line:  FAUZIA Juarez luh [place]letary [phone]     [facility] Fax Number:      [phone]         Cystic Fibrosis Respiratory Therapists:   Margaret Werner              [phone]          May Rao   [phone]  Cystic Fibrosis Dietitians:              Sherie Aparicio              [phone]                            Saba Gill                        [phone]   Cystic Fibrosis Diabetes Nurse:    Ashley Cleveland   [phone]    Cystic Fibrosis Social Workers:     Prerna Paniagua               [phone]                     Courtney Fenton               [phone]  Cystic Fibrosis Pharmacists:           Ashley Kirby                               [phone]         Ansley Santo   [phone]  Cystic Fibrosis Genetic Counselor:   Yuliana Ghotra    [phone]    [facility] website:  [email or web address]    COVID VACCINES:    You are eligible for the COVID-19 vaccine. Sign up for your COVID vaccine via isocket. Log in, select the menu bar, select schedule an appointment, and then select COVID-19 Vaccine 1st Dose. You may also schedule by calling this number [phone] however hold times can be long.       OR schedule through the [place] Department [facility] Vaccine Connector at https://vaccineconnector.[place].gov/ or by calling [phone].      The best vaccine is the one that s available to you first.  All COVID-19 vaccines currently available in the [place] (Rodrigo & Rodrigo, Pfizer and Moderna) have been shown to be highly effect at preventing COVID-19.       We re still learning how vaccines will affect the spread of COVID-19. After you ve been fully vaccinated against COVID-19, you should keep taking  precautions in public places like wearing a mask, staying 6 feet apart from others, and avoiding crowds and poorly ventilated spaces until we know more.    People are considered fully vaccinated:  2 weeks after their second dose in a 2-dose series, such as the Pfizer or Moderna vaccines, or  2 weeks after a single-dose vaccine, such as Rodrigo & Rodrigo s Jeremías vaccine    If you ve been fully vaccinated:  You can gather indoors with fully vaccinated people without wearing a mask.  You can gather indoors with unvaccinated people from one other household (for example, visiting with relatives who all live together) without masks, unless any of those people or anyone they live with has an increased risk for severe illness from COVID-19.  If you ve been around someone who has COVID-19, you do not need to stay away from others or get tested unless you have symptoms.  However, if you live in a group setting (like a correctional or prison facility or group home) and are around someone who has COVID-19, you should still stay away from others for 14 days and get tested, even if you don t have symptoms.         MRN: [id number]   Clinic Date: July 22, 2021   Patient: Demario Abbasi     Annual Studies:   IGG   Date Value Ref Range Status   09/10/2020 1,739 (H) 610 - 1,616 mg/dL Final     Insulin   Date Value Ref Range Status   02/22/2019 Canceled, Test credited 3 - 25 mU/L Final     Comment:     Unsatisfactory specimen - hemolyzed  NOTIFIED OZ PHILLIPS MD AT 1450 ON 2/22/19 BY JV       There are no preventive care reminders to display for this patient.    Pulmonary Function Tests  FEV1: amount of air you can blow out in 1 second  FVC: total amount of air you can take in and blow out    Your Goals:         PFT Latest Ref Rng & Units 7/22/2021   FVC L 3.45   FEV1 L 2.70   FVC% % 61   FEV1% % 57          Airway Clearance: The Most Important Way to Keep Your Lungs Healthy  Vest Settings:    Hill-Rom Frequencies: 8, 9, 10  Pressure 10 Then, Frequencies 18, 19, 20 Pressure 6      RespirTech: Quick Start with Pressure of     Do each frequency for 5 minutes; Deflate vest after each frequency & cough 3 times before beginning the next setting.    Vest and Neb Therapy should be done 2-3 times/day.    Good Nutrition Can Improve Lung Function and Overall Health     Take ALL of your vitamins with food     Take 1/2 of your enzymes before EVERY meal/snack and the other 1/2 mid-meal/snack    Wt Readings from Last 3 Encounters:   10/12/20 77.1 kg (170 lb)   09/10/20 82.9 kg (182 lb 12.2 oz)   03/05/20 82.9 kg (182 lb 12.2 oz)       There is no height or weight on file to calculate BMI.         National CF Foundation Recommendations for BMI in CF Adults: Women: at least 22 Men: at least 23        Controlling Blood Sugars Helps Prevent Lung Infections & Improves Nutrition  Test blood sugar:     In the morning before eating (goal is )     2 hours after a meal (goal is less than 150)     When pre-meal glucose is greater than 150 add correction     At bedtime (if less than 100 eat a snack with 15 grams of carbohydrates  Last A1C Results:   Hemoglobin A1C   Date Value Ref Range Status   09/10/2020 5.7 (H) 0 - 5.6 % Final     Comment:     Normal <5.7% Prediabetes 5.7-6.4%  Diabetes 6.5% or higher - adopted from ADA   consensus guidelines.           If diabetic, measure A1C every 6 months. Goal: Under 7%    Staying Healthy    Research:  If you are interested in learning about research opportunities or have questions, please contact the CF Research Team at [phone] or [email or web address].      CF Foundation:  Compass is a personalized resource service to help you with the insurance, financial, legal and other issues you are facing.  It's free, confidential and available to anyone with CF.  Ask your  for more information or contact Compass directly at 808-SVWNWKV ([phone]) or compass@cff.org, or learn more at cff.org/compass.

## 2021-07-22 NOTE — LETTER
7/22/2021         RE: Demario Abbasi  [address]  [place] [address]        Dear Colleague,    Thank you for referring your patient, Demario Abbasi, to the [facility]G [facility] AND [facility]. Please see a copy of my visit note below.    Reason for Visit  Demario Abbasi is a 26 year old year old male who is being seen for Cystic Fibrosis (follow up)      Assessment and plan:   Demario Abbasi is a 26 year old male with cystic fibrosis, pancreatic insufficiency, depression, acne and impaired glucose tolerance who is s/p RUL resection for recurrent exacerbations in 2009.   He last required IV antibiotics in January 2020 for a pulmonary exacerbation.  Maintenance   Modulator: Not eligible  Mutation:  g542x/621+1g>t  AW Clearance: Vest  Bronchodilators:  Albuterol  Mucolytics: Pulmozyme, Hypertonic saline  Antibiotics Inh:  Bethkis  Antibiotics Oral: Azithromycin  Other:  Colonization Hx: Staph aureus (MSSA), Achromobacter xylosoxidans/denitrificans   Annual Studies: Due September 2021  Contraindications to standard of care:  CF/pulmonary: The patient was treated for a pulmonary exacerbation in early June with oral antibiotics.  Symptoms have improved although not quite to baseline.  Oxygenation is very good.  PFTs are at the low end of his usual range.  He does not appear to be having an ongoing exacerbation at this time.  He has been fairly inactive recently while searching for a new job.  I have encouraged him to exercise 20-30 minutes daily to augment his airway clearance and improve his conditioning.  He will continue his current airway clearance therapy with albuterol Pulmozyme and hypertonic saline and every other month Bethkis.  He will continue azithromycin.  He will contact the CF office if symptoms do not return to baseline.    CFTR Modulation: Based on his genotype, the patient is not a candidate for CF TR modulators.    Pancreatic  insufficiency: Patient denies symptoms of malabsorption.  He will continue his current pancreatic enzyme replacement and vitamins.    CF related sinusitis: No symptoms of active sinusitis at this time.    Iron deficiency anemia: Hemoglobin has normalized.  Supplemental iron was stopped at his last visit.  Hemoglobin and iron levels will be checked with his next visit with annual studies.    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  He will meet with our  today to determine if there are opportunities to broaden the search.    Environmental allergies: Adequately controlled with Zyrtec, Pataday, Astelin and Flonase.    Reflux: Adequately controlled with pantoprazole.    Healthcare maintenance: The patient has received his Covid vaccination.  He has received his influenza vaccine for this flu season.    Follow-up in 3 months with annual studies.  This will include a glucose tolerance test but he will not require a bone density scan this year.    In addition to my visit, he will meet with the CF , dietitian and pharmacist.    Annual studies including basic metabolic panel, LFTs, lipid battery, hemoglobin A1c, INR, TSH, total protein, albumin, vitamin A level, vitamin D level, vitamin E level, IgG, IgE, CBC, urinalysis, testosterone level, CF sputum culture, nocardia culture, AFB culture, fungal culture, chest x-ray were ordered with the next visit.  Zana Lilly MD      HPI  The patient was seen and examined by Zana Lilly MD   Demario Abbasi is a 26 year old male with cystic fibrosis, pancreatic insufficiency, depression, acne and impaired glucose tolerance who is s/p RUL resection for recurrent exacerbations in 2009.   He last required IV antibiotics in January 2020 for a pulmonary exacerbation.  In early June the patient developed increased cough with hemoptysis.  He was treated with doxycycline, levofloxacin and  vitamin K.  He reports symptoms improved although he has not quite returned to baseline.  Energy level still remains decreased.    Breathing is comfortable at rest and with all activity.  Exercise tolerance similar to baseline.  Intermittent daily cough at baseline.  Sputum varies from green-brown although overall no change in color or volume from baseline.  No hemoptysis since early June.  No chest pain.  No fever, chills or night sweats.  Vest therapy twice daily.  The patient continues to seek employment.    Review of systems:  Appetite is good  No ear pain, sore throat, sinus pain or rhinorrhea  No palpitations  No nausea, vomiting, diarrhea or abdominal pain.  No myalgias or arthralgias  Denies anxiety or depression    A complete ROS was otherwise negative except as noted in the HPI.    Current Outpatient Medications   Medication     albuterol (PROAIR HFA) 108 (90 Base) MCG/ACT inhaler     albuterol (PROVENTIL) (2.5 MG/3ML) 0.083% neb solution     amylase-lipase-protease (CREON) 08966-603405-417069 units CPEP     azelastine (ASTELIN) 0.1 % nasal spray     azithromycin (ZITHROMAX) 500 MG tablet     cetirizine (ZYRTEC) 10 MG tablet     citalopram (CELEXA) 20 MG tablet     dornase alpha (PULMOZYME) 1 MG/ML neb solution     fluconazole (DIFLUCAN) 150 MG tablet     fluticasone (FLONASE) 50 MCG/ACT nasal spray     mvw complete formulation (SOFTGELS ) capsule     olopatadine (PATANOL) 0.1 % ophthalmic solution     pantoprazole (PROTONIX) 20 MG EC tablet     phytonadione (MEPHYTON) 5 MG tablet     sodium chloride inhalant 7 % NEBU neb solution     tobramycin (BETHKIS) 300 MG/4ML nebulizer solution     No current facility-administered medications for this visit.     Allergies   Allergen Reactions     Augmentin      Past Medical History:   Diagnosis Date     CF (cystic fibrosis) (H)     Genotype: g542x/621+1g>t     Impaired glucose tolerance      Pancreatic insufficiency      S/P lobectomy of lung 8/4/2015    Right  upper lobectomy - 2009       Past Surgical History:   Procedure Laterality Date     HC LAP,INGUINAL HERNIA REPR,INITIAL  2002     INSERT PICC LINE Left 10/12/2020    Procedure: INSERTION, PICC @1100;  Surgeon: Felicia Branch MD;  Location: [facility] OR     IR PICC PLACEMENT > 5 YRS OF AGE  3/18/2019     IR PICC PLACEMENT > 5 YRS OF AGE  12/30/2019     IR PICC PLACEMENT > 5 YRS OF AGE  10/12/2020     LOBECTOMY LUNG Right 2009    Right upper lobe       Social History     Socioeconomic History     Marital status: Single     Spouse name: Not on file     Number of children: Not on file     Years of education: Not on file     Highest education level: Not on file   Occupational History     Occupation: Sale    Tobacco Use     Smoking status: Passive Smoke Exposure - Never Smoker     Smokeless tobacco: Current User     Types: Chew     Tobacco comment: dad smokes   Substance and Sexual Activity     Alcohol use: Yes     Comment: <2 drinks per week     Drug use: Not on file     Sexual activity: Not on file   Other Topics Concern     Parent/sibling w/ CABG, MI or angioplasty before 65F 55M? Not Asked   Social History Narrative    12/27/2019  -Graduated college in 2020.  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  Living [place] ([place]oser to work[place]  Has girlfriend (Oriana).     Social Determinants of Health     Financial Resource Strain:      Difficulty of Paying Living Expenses:    Food Insecurity:      Worried About Running Out of Food in the Last Year:      Ran Out of Food in the Last Year:    Transportation Needs:      Lack of Transportation (Medical):      Lack of Transportation (Non-Medical):    Physical Activity:      Days of Exercise per Week:      Minutes of Exercise per Session:    Stress:      Feeling of Stress :    Social Connections:      Frequency of Communication with Friends and Family:      Frequency of Social Gatherings with Friends and Family:      Attends [employer] Services:      Active Member of  Clubs or Organizations:      Attends Club or Organization Meetings:      Marital Status:    Intimate Partner Violence:      Fear of Current or Ex-Partner:      Emotionally Abused:      Physically Abused:      Sexually Abused:          /71   Pulse 65   Temp 97.8  F (36.6  C) (Oral)   SpO2 97%   There is no height or weight on file to calculate BMI.  Exam:   GENERAL APPEARANCE: Well developed, well nourished, alert, and in no apparent distress.  EYES: PERRL, EOMI  HENT: Nasal mucosa with edema and no hyperemia. No nasal polyps.  EARS: Canals clear, TMs normal  MOUTH: Oral mucosa is moist, without any lesions, no tonsillar enlargement, no oropharyngeal exudate.  NECK: supple, no masses, no thyromegaly.  LYMPHATICS: No significant axillary, cervical, or supraclavicular nodes.  RESP: normal percussion, good air flow throughout.  No crackles. No rhonchi. No wheezes.  CV: Normal S1, S2, regular rhythm, normal rate. No murmur.  No rub. No gallop. No LE edema.   ABDOMEN:  Bowel sounds normal, soft, nontender, no HSM or masses.   MS: extremities normal. No clubbing. No cyanosis.  SKIN: no rash on limited exam  NEURO: Mentation intact, speech normal, normal strength and tone, normal gait and stance  PSYCH: mentation appears normal. and affect normal/bright  Results:  Recent Results (from the past 168 hour(s))   General PFT Lab (Please always keep checked)    Collection Time: 07/22/21  9:30 AM   Result Value Ref Range    FVC-Pred 5.60 L    FVC-Pre 3.45 L    FVC-%Pred-Pre 61 %    FEV1-Pre 2.70 L    FEV1-%Pred-Pre 57 %    FEV1FVC-Pred 84 %    FEV1FVC-Pre 78 %    FEFMax-Pred 10.24 L/sec    FEFMax-Pre 7.52 L/sec    FEFMax-%Pred-Pre 73 %    FEF2575-Pred 4.94 L/sec    FEF2575-Pre 2.36 L/sec    YWE4473-%Pred-Pre 47 %    ExpTime-Pre 7.05 sec    FIFMax-Pre 3.81 L/sec    FEV1FEV6-Pred 84 %    FEV1FEV6-Pre 78 %                   Results as noted above.    PFT Interpretation:  Moderately severe restrictive ventilatory  defect.  Unchanged from previous.   Below recent best.   Valid Maneuver             CF Exacerbation  Absent          CF Annual Nutrition Assessment     Reason for Assessment  Assessed during CF clinic visit with Dr. Lilly r/t increased nutrition risk with diagnosis of CF per protocol.      Anthropometric Assessment  Height: 175.3 cm  Weight: 79.3 kg (174 lbs)  Ideal body weight based on BMI 22 for females and 23 for males per CF Foundation recs: 71 kg (156 lbs)   BMI: 24.8 kg/m      Comments: Weight relatively stable within -175 lbs.      Pancreatic Enzymes  Brand:  Creon 36,000  Dosin with meals, 2-3 with snacks = 2170 units lipase/kg/meal  Estimated Daily Intake: 15 caps = 6835 units lipase/kg/day     Signs of Malabsorption: No  Enzyme Program: Not eligible      Nutrition-Related Lab & Vitamin/Mineral Supplements  Annual studies 2020  Vitamin A- 0.61 wnl  Vitamin D- 37 wnl  Vitamin E- 8.3 wnl  Iron- 57 wnl  Lipid Panel-       OGTT- --  DEXA - , lowest z-score -0.6     Current Vitamin/Mineral Supplements: MVW Complete  1 softgel BID  Comments: no longer taking iron due to improvement in level; obtains MVW from [facility]     Diet History and Assessment  Diet Preferences/Allergies/Intolerances: Regular  Intake Recall/Comments: Demario is eating very well and has a great appetite. Finds he is not eating quite as much as when he was in college. Eating mostly lunch and dinner; meals typically consist of meat and potatoes Dad or Demario usually cook supper; enjoys fresh string beans from the garden during summer and grilling steak/burgers.     Calcium: adequate - milk/yogurt/cheese  Salt: adequate - adds to foods + meal choices  Hydration: adequate - Mtn Dew, Gatorade, water, milk  Supplements: no      NUTRITION DIAGNOSIS  Impaired nutrient utilization related to CF pancreatic insufficiency as evidenced by requires pancreatic enzyme replacement therapy and vitamin/mineral supplementation in order to  maintain ideal body weight and nutrition status.      INTERVENTIONS/RECOMMENDATIONS   1) Oral Intake/Weight:   BEE: 1845  Calorie Goals- 6828-7457 kcals/day (150-175% BEE)   Protein Goals-  grams/day (1.2-1.5 g/kg)     Reviewed current nutrition status including weight trends and adequacy of oral intake with Demario. Encouraged ongoing good PO with adequate protein/calories/salt/hydration. Discussed goal for weight maintenance and encouraged healthy eating / slowly improving fruit intake.      2) Vitamin/Mineral Supplementation:  Will be due for annual study labs September. Plan to continue with current vitamin supplementation at this time.      GOALS:  1) Maintain BMI >23 kg/m2  2) WNL vitamin levels     FOLLOW-UP/MONITORING:  Visit patient within 12 months for annual nutrition reassessment.     Time Spent In Face-to-Face Patient Interactions: 15 minutes    Sherie Aparicio RD, LD  Cystic Fibrosis/Lung Transplant Dietitian  Pager [phone]          Again, thank you for allowing me to participate in the care of your patient.        Sincerely,        Zana Lilly MD

## 2021-07-22 NOTE — PATIENT INSTRUCTIONS
Recommendations from today's MTM visit:                                                         Continue current medications and keep up the good work! I'm glad your medications are working well and that you have a good routine at home to help you remember to take all your medications.    Follow-up: 1 year or sooner if needed    It was great to speak with you today.  I value your experience and would be very thankful for your time with providing feedback on our clinic survey. You may receive a survey via email or text message in the next few days.       My Clinical Pharmacist's contact information:                                                      Please feel free to contact me with any questions or concerns you have.      Ashley Kirby PharmD  CF Medication Therapy Management Pharmacist  [facility]  [phone]

## 2021-07-23 RX ORDER — CETIRIZINE HYDROCHLORIDE 10 MG/1
10 TABLET ORAL DAILY
Qty: 30 TABLET | Refills: 11 | Status: SHIPPED | OUTPATIENT
Start: 2021-07-23 | End: 2022-08-17

## 2021-07-23 ASSESSMENT — ANXIETY QUESTIONNAIRES: GAD7 TOTAL SCORE: 0

## 2021-07-27 LAB
BACTERIA SPT CULT: ABNORMAL

## 2021-08-12 DIAGNOSIS — E84.9 CF (CYSTIC FIBROSIS) (H): ICD-10-CM

## 2021-08-12 DIAGNOSIS — J30.2 SEASONAL ALLERGIES: ICD-10-CM

## 2021-08-12 RX ORDER — OLOPATADINE HYDROCHLORIDE 1 MG/ML
1 SOLUTION/ DROPS OPHTHALMIC 2 TIMES DAILY
Qty: 5 ML | Refills: 1 | Status: SHIPPED | OUTPATIENT
Start: 2021-08-12 | End: 2022-06-16

## 2021-09-12 ENCOUNTER — HEALTH MAINTENANCE LETTER (OUTPATIENT)
Age: 26
End: 2021-09-12

## 2021-10-21 DIAGNOSIS — E84.9 CF (CYSTIC FIBROSIS) (H): ICD-10-CM

## 2021-10-21 DIAGNOSIS — E84.0 CYSTIC FIBROSIS WITH PULMONARY EXACERBATION (H): ICD-10-CM

## 2021-10-21 RX ORDER — FLUTICASONE PROPIONATE 50 MCG
SPRAY, SUSPENSION (ML) NASAL
Qty: 16 G | Refills: 10 | Status: SHIPPED | OUTPATIENT
Start: 2021-10-21 | End: 2022-06-16

## 2021-11-08 ENCOUNTER — TELEPHONE (OUTPATIENT)
Dept: CARE COORDINATION | Facility: CLINIC | Age: 26
End: 2021-11-08
Payer: COMMERCIAL

## 2021-11-29 ENCOUNTER — TELEPHONE (OUTPATIENT)
Dept: PULMONOLOGY | Facility: CLINIC | Age: 26
End: 2021-11-29
Payer: COMMERCIAL

## 2021-11-29 ENCOUNTER — HOME INFUSION (PRE-WILLOW HOME INFUSION) (OUTPATIENT)
Dept: PHARMACY | Facility: CLINIC | Age: 26
End: 2021-11-29
Payer: COMMERCIAL

## 2021-11-29 DIAGNOSIS — Z11.59 ENCOUNTER FOR SCREENING FOR OTHER VIRAL DISEASES: ICD-10-CM

## 2021-11-29 DIAGNOSIS — E84.9 CF (CYSTIC FIBROSIS) (H): Primary | ICD-10-CM

## 2021-11-29 RX ORDER — SULFAMETHOXAZOLE/TRIMETHOPRIM 800-160 MG
2 TABLET ORAL 2 TIMES DAILY
Qty: 28 TABLET | Refills: 0 | Status: SHIPPED | OUTPATIENT
Start: 2021-11-29 | End: 2021-12-06

## 2021-11-29 RX ORDER — DOXYCYCLINE HYCLATE 100 MG
100 TABLET ORAL 2 TIMES DAILY
Qty: 42 TABLET | Refills: 0 | Status: SHIPPED | OUTPATIENT
Start: 2021-11-29 | End: 2021-12-20

## 2021-11-29 NOTE — TELEPHONE ENCOUNTER
[facility]  November 29, 2021    Susan Li    Cystic fibrosis Provider: Zana Lilly MD    Caller: Patient     Clinical information:  Demario Abbasi called with complaint of having a head cold a couple weeks back, testing for covid (negative) but just not getting back to baseline. No fever or chills. Appetite has been ok. Experiencing a lot of fatigue, increase cough, increase in sputum, coughed up blood a couple times. Does not feel oral antibiotics would be sufficient at this point.      Plan:   Discussed with Dr. Lilly  Place PICC - IR ordered placed, patient given phone number to call and schedule ([phone])  Referral called into \[facility]\"", will update \[facility]\"" when patient will be getting PICC placed.    Will start Meropenem 2g every 8 hours after picc placement +Doxycycline 100mg BID    Patient will come to IR for PICC placement and  first supply of meropenem from \[facility]\"" office.    Call back with any new or worsening symptoms/concerns.    Caller verbalized understanding of plan and agrees with advice given.       EDIT TO ADD: patient was unable to get into to get picc placed until 12/6. Will start Bactrim 2DS BID and Doxycycline 100mg BID x1 week until PICC placed. Will the stop Bactrim when Jeferson is started and continue with the Doxy for duration of IV jeferson.

## 2021-12-06 ENCOUNTER — HOSPITAL ENCOUNTER (OUTPATIENT)
Facility: AMBULATORY SURGERY CENTER | Age: 26
Discharge: HOME | End: 2021-12-06
Attending: RADIOLOGY | Admitting: RADIOLOGY
Payer: COMMERCIAL

## 2021-12-06 ENCOUNTER — HOME INFUSION (PRE-WILLOW HOME INFUSION) (OUTPATIENT)
Dept: PHARMACY | Facility: CLINIC | Age: 26
End: 2021-12-06

## 2021-12-06 ENCOUNTER — ANCILLARY PROCEDURE (OUTPATIENT)
Dept: RADIOLOGY | Facility: AMBULATORY SURGERY CENTER | Age: 26
End: 2021-12-06
Attending: INTERNAL MEDICINE
Payer: COMMERCIAL

## 2021-12-06 VITALS
HEART RATE: 65 BPM | HEIGHT: 70 IN | WEIGHT: 170 LBS | OXYGEN SATURATION: 96 % | BODY MASS INDEX: 24.34 KG/M2 | RESPIRATION RATE: 16 BRPM | TEMPERATURE: 97.8 F | DIASTOLIC BLOOD PRESSURE: 60 MMHG | SYSTOLIC BLOOD PRESSURE: 114 MMHG

## 2021-12-06 DIAGNOSIS — E84.9 CF (CYSTIC FIBROSIS) (H): ICD-10-CM

## 2021-12-06 PROCEDURE — 36573 INSJ PICC RS&I 5 YR+: CPT

## 2021-12-06 PROCEDURE — 36573 INSJ PICC RS&I 5 YR+: CPT | Performed by: RADIOLOGY

## 2021-12-06 DEVICE — IMPLANTABLE DEVICE: Type: IMPLANTABLE DEVICE | Site: ARM | Status: FUNCTIONAL

## 2021-12-06 RX ORDER — HEPARIN SODIUM,PORCINE 10 UNIT/ML
VIAL (ML) INTRAVENOUS PRN
Status: DISCONTINUED | OUTPATIENT
Start: 2021-12-06 | End: 2021-12-06 | Stop reason: HOSPADM

## 2021-12-06 ASSESSMENT — MIFFLIN-ST. JEOR: SCORE: 1767.36

## 2021-12-06 NOTE — BRIEF OP NOTE
[facility]    Brief Operative Note    Pre-operative diagnosis: CF (cystic fibrosis) (H) [E84.9]  Post-operative diagnosis Same as pre-operative diagnosis    Procedure: Procedure(s):  INSERTION, PICC  Surgeon: Surgeon(s) and Role:     * Tahir Alvarado MD - Primary  Anesthesia: Local   Estimated Blood Loss: Minimal    Drains: None  Specimens: * No specimens in log *  Findings:   4F x 47 cm SL PICC via left basilic vein, tip in low SVC. Flushed and hep locked with 1:10 heparin and ready for use..  Complications: None.  Implants:   Implant Name Type Inv. Item Serial No.  Lot No. LRB No. Used Action   CATH VA POWERPICC SOLO 2 6FR 55CM TL [id number] - [id number] Catheter CATH VA POWERPICC SOLO 2 6FR 55CM TL [id number]   [employer] [id number] Left 1 Used as a Supply

## 2021-12-06 NOTE — DISCHARGE INSTRUCTIONS
A collaboration between [facility] and [facility]  Experts in minimally invasive, targeted treatments performed using imaging guidance    Venous Access Device,  Port Catheter or Tunneled or Non-Tunneled Central Line Placement    Today you had a procedure today to install a venous access device; either a tunneled central vein catheter or a subcutaneous port catheter.    After you go home:  - Drink plenty of fluids.  Generally 6-8 (8 ounce) glasses a day is recommended.  - Resume your regular diet unless otherwise ordered by a medical provider.  - Keep any applied tape/gauze dressings clean and dry.  Change tape/gauze dressings if they get wet or soiled.  - You may shower the following day after procedure, however cover and protect from moisture any tape/gauze dressings.  You may let water hit and run over dried skin glue, but do not scrub.  Pat the area dry after showering.  - Port placement incisions are closed with absorbable suture, meaning they do not need to be removed at a later date, and a topical skin adhesive (skin glue).  This glue will wear off in 7-14 days.  Do not remove before this time.  If 14 days have passed and residual glue is present, you may gently remove it.  - Do not apply gels, lotions, or ointments to the glue site for the first 10 days as this may cause the glue to prematurely soften and fail.  - Do not perform strenuous activities or lift greater than 10 pounds for the next three days.  - If there is bleeding or oozing from the procedure site, apply firm pressure to the area for 5-10 minutes.  If the bleeding continues seek medical advice at the numbers below.  - Mild procedure site discomfort can be treated with an ice pack and over-the-counter pain relievers.        Call our Interventional Radiology (IR) service if:  - If you start bleeding from the procedure site.  If you do start to bleed from the site, lie down and hold some  pressure on the site.  Our radiology provider can help you decide if you need to return to the hospital.  - If you have new or worsening pain related to the procedure.  - If you have concerning swelling at the procedure site.  - If you develop persistent nausea or vomiting.  - If you develop hives or a rash or any unexplained itching.  - If you have a fever of greater than 100.5  F and chills in the first 5 days after procedure.  - Any other concerns related to your procedure.      [facility]  Interventional Radiology (IR)  [address] [place] [address]    Contact Number:  [phone]  (IR control desk)  - Monday - Friday 8:00 am - 4:30 pm    After hours for urgent concerns:  [phone]  - After 4:30 pm Monday - Friday, Weekends and Holidays.   - Ask for Interventional Radiology on-call.  Someone is available 24 hours a day.  - [facility] toll free number:  [phone]

## 2021-12-07 ENCOUNTER — HOME INFUSION (PRE-WILLOW HOME INFUSION) (OUTPATIENT)
Dept: PHARMACY | Facility: CLINIC | Age: 26
End: 2021-12-07
Payer: COMMERCIAL

## 2021-12-15 RX ORDER — MEROPENEM 1 G/1
2 INJECTION, POWDER, FOR SOLUTION INTRAVENOUS EVERY 8 HOURS
COMMUNITY
Start: 2021-12-06 | End: 2022-01-07

## 2021-12-16 ENCOUNTER — HOME INFUSION (PRE-WILLOW HOME INFUSION) (OUTPATIENT)
Dept: PHARMACY | Facility: CLINIC | Age: 26
End: 2021-12-16

## 2021-12-16 ENCOUNTER — OFFICE VISIT (OUTPATIENT)
Dept: PULMONOLOGY | Facility: CLINIC | Age: 26
End: 2021-12-16
Attending: STUDENT IN AN ORGANIZED HEALTH CARE EDUCATION/TRAINING PROGRAM
Payer: COMMERCIAL

## 2021-12-16 VITALS
HEIGHT: 69 IN | HEART RATE: 73 BPM | BODY MASS INDEX: 26.51 KG/M2 | OXYGEN SATURATION: 94 % | SYSTOLIC BLOOD PRESSURE: 109 MMHG | DIASTOLIC BLOOD PRESSURE: 58 MMHG | WEIGHT: 179 LBS

## 2021-12-16 DIAGNOSIS — E84.0 CYSTIC FIBROSIS WITH PULMONARY EXACERBATION (H): Primary | ICD-10-CM

## 2021-12-16 DIAGNOSIS — E84.9 CYSTIC FIBROSIS (H): Primary | ICD-10-CM

## 2021-12-16 LAB
EXPTIME-PRE: 6.97 SEC
FEF2575-%PRED-PRE: 36 %
FEF2575-PRE: 1.83 L/SEC
FEF2575-PRED: 4.94 L/SEC
FEFMAX-%PRED-PRE: 68 %
FEFMAX-PRE: 7.01 L/SEC
FEFMAX-PRED: 10.24 L/SEC
FEV1-%PRED-PRE: 51 %
FEV1-PRE: 2.41 L
FEV1FEV6-PRE: 75 %
FEV1FEV6-PRED: 84 %
FEV1FVC-PRE: 75 %
FEV1FVC-PRED: 84 %
FIFMAX-PRE: 3.72 L/SEC
FVC-%PRED-PRE: 57 %
FVC-PRE: 3.22 L
FVC-PRED: 5.6 L

## 2021-12-16 PROCEDURE — 94375 RESPIRATORY FLOW VOLUME LOOP: CPT | Performed by: INTERNAL MEDICINE

## 2021-12-16 PROCEDURE — 87206 SMEAR FLUORESCENT/ACID STAI: CPT | Performed by: STUDENT IN AN ORGANIZED HEALTH CARE EDUCATION/TRAINING PROGRAM

## 2021-12-16 PROCEDURE — 99214 OFFICE O/P EST MOD 30 MIN: CPT | Mod: 25 | Performed by: STUDENT IN AN ORGANIZED HEALTH CARE EDUCATION/TRAINING PROGRAM

## 2021-12-16 PROCEDURE — 87186 SC STD MICRODIL/AGAR DIL: CPT | Performed by: STUDENT IN AN ORGANIZED HEALTH CARE EDUCATION/TRAINING PROGRAM

## 2021-12-16 PROCEDURE — 87116 MYCOBACTERIA CULTURE: CPT | Performed by: STUDENT IN AN ORGANIZED HEALTH CARE EDUCATION/TRAINING PROGRAM

## 2021-12-16 ASSESSMENT — PAIN SCALES - GENERAL: PAINLEVEL: NO PAIN (0)

## 2021-12-16 ASSESSMENT — MIFFLIN-ST. JEOR: SCORE: 1792.32

## 2021-12-16 NOTE — LETTER
12/16/2021     RE: Demario Abbasi  [address]  [place] [address]    Dear Colleague,    Thank you for referring your patient, Demario Abbasi, to the [facility] FOR LUNG SCIENCE AND [facility]. Please see a copy of my visit note below.    Reason for Visit  Demario Abbasi is a 26 year old year old male who is being seen for No chief complaint on file.      Assessment and plan:   Demario Abbasi is a 26 year old male with cystic fibrosis, pancreatic insufficiency, depression, acne and impaired glucose tolerance who is s/p RUL resection for recurrent exacerbations in 2009. Currently receiving IV antibiotics for pulmonary exacerbation 12/6.     Maintenance   Modulator: Not eligible  Mutation:  [id number]  AW Clearance: Vest BID to TID  Bronchodilators:  Albuterol  Mucolytics: Pulmozyme, Hypertonic saline  Antibiotics Inh:  Bethkis  Antibiotics Oral: Azithromycin  Other:  Colonization Hx: Staph aureus (MSSA), Achromobacter xylosoxidans/denitrificans   Annual Studies: Due September 2021  Contraindications to standard of care:  CF/pulmonary: Patient with history of a pulmonary exacerbation in June of 2020, though now has recurrence of symptoms beginning end of November. He had  PICC line placed 12/6 and was started on meropenem 2g q8H in addition to doxycycline 100 mg BID for 3 weeks of total abx therapy. Today he reports symptom improvement since starting antibiotics, notably he no longer has hemoptysis and his shortness of breath is improved.   Oxygenation is very good.  PFTs are below his normal range; he will repeat PFTs with his home spirometer today.  Encouraged patient to repeat PFTs with his home spirometer at the end of his antibiotic course so to compare with his home PFT results from today to determine if a longer antibiotic course is warranted.  He will continue his current airway clearance therapy with albuterol Pulmozyme and hypertonic saline and  every other month Anirudh.  He will continue azithromycin.  He will contact the CF office if symptoms do not return to baseline.   - Continue with meropenem 2g IV q8H and doxycycline 100 mg PO BID (12/6-12/27)   -Repeat PFTs with home spirometer today and on 12/27 to assess for improvement   -Continue with current airway clearance regimen: Vest therapy 2-3x daily, azithromycin, ngozi nebs, pulmozyme, albuterol neb and hypertonic saline nebs  CFTR Modulation: Based on his genotype, the patient is not a candidate for CF TR modulators.    Pancreatic insufficiency: Patient denies symptoms of malabsorption.  He will continue his current pancreatic enzyme replacement and vitamins.    CF related sinusitis: No symptoms of active sinusitis at this time.    Iron deficiency anemia: Hemoglobin has normalized.  Supplemental iron was stopped at his last visit.  Hemoglobin and iron levels will be checked with his next visit with annual studies.    Psychosocial: The patient is gainfully employed in agriculture as an      Environmental allergies: Adequately controlled with Zyrtec, Pataday, Astelin and Flonase.    Reflux: Adequately controlled with pantoprazole.    Healthcare maintenance: The patient has received his Covid vaccination.  He will receive his COVID booster and flu vaccine next Thursday, 12/23.    Follow-up in 3 months with annual studies (did not complete today).  This will include a glucose tolerance test but he will not require a bone density scan this year.    In addition to my visit, he will meet with the CF , dietitian and pharmacist.    Annual studies including basic metabolic panel, LFTs, lipid battery, hemoglobin A1c, INR, TSH, total protein, albumin, vitamin A level, vitamin D level, vitamin E level, IgG, IgE, CBC, urinalysis, testosterone level, CF sputum culture, nocardia culture, AFB culture, fungal culture, chest x-ray were ordered with the next visit.    Seen and discussed with   Luis Felipe.    Heriberto Rodriguez MD  Pulm/[facility]    CF HPI  The patient was seen and examined by Heriberto Rodriguez MD  Demario Abbasi is a 26 year old male with cystic fibrosis, pancreatic insufficiency, depression, acne and impaired glucose tolerance who is s/p RUL resection for recurrent exacerbations in 2009.   He last required IV antibiotics in January 2020 for a pulmonary exacerbation.  Patient reports he developed symptoms of productive cough, hemoptysis, shortness of breath and fatigue beginning end of November. He reports calling CF center 11/29 regarding his symptoms, and was prescribed meropenem and doxycycline after his PICC line placement. Patient states since starting meropenem and doxycycline on 12/6, he feels much improved. He reports improved SOB, minimal productive cough and resolution of his hemoptysis. He denies fever, chills, CP, sore throat, abd pain, n/v/d, dysuria, HA, or generalized weakness.   Patient's breathing remains comfortable at rest and with mild activity--he becomes dyspneic with more rigorous activity, but this is also improving with IV antibiotics.  Exercise tolerance similar to baseline.  Intermittent daily cough at baseline.  Sputum varies from green-brown although overall no change in color or volume from baseline.  No hemoptysis since beginning of December. He is vesting three times daily.  The patient is gainfully employed as an     Review of systems:  Appetite is good  No ear pain, sore throat, sinus pain or rhinorrhea  No palpitations  No nausea, vomiting, diarrhea or abdominal pain.  No myalgias or arthralgias  Denies anxiety or depression    A complete ROS was otherwise negative except as noted in the HPI.    Current Outpatient Medications   Medication     albuterol (PROAIR HFA) 108 (90 Base) MCG/ACT inhaler     albuterol (PROVENTIL) (2.5 MG/3ML) 0.083% neb solution     amylase-lipase-protease (CREON) 07606-835787-472375 units CPEP     azelastine (ASTELIN) 0.1 %  nasal spray     azithromycin (ZITHROMAX) 500 MG tablet     cetirizine (ZYRTEC) 10 MG tablet     citalopram (CELEXA) 20 MG tablet     dornase alpha (PULMOZYME) 1 MG/ML neb solution     doxycycline hyclate (VIBRA-TABS) 100 MG tablet     fluconazole (DIFLUCAN) 150 MG tablet     fluticasone (FLONASE) 50 MCG/ACT nasal spray     meropenem (MERREM) 1 g vial     mvw complete formulation (SOFTGELS ) capsule     olopatadine (PATANOL) 0.1 % ophthalmic solution     pantoprazole (PROTONIX) 20 MG EC tablet     phytonadione (MEPHYTON) 5 MG tablet     sodium chloride inhalant 7 % NEBU neb solution     tobramycin (BETHKIS) 300 MG/4ML nebulizer solution     No current facility-administered medications for this visit.     Allergies   Allergen Reactions     Augmentin Diarrhea     Past Medical History:   Diagnosis Date     CF (cystic fibrosis) (H)     Genotype: [id number]/621+1g>t     Impaired glucose tolerance      Pancreatic insufficiency      S/P lobectomy of lung 8/4/2015    Right upper lobectomy - 2009       Past Surgical History:   Procedure Laterality Date     H STATISTIC PICC LINE INSERTION >5YR, FAILED Bilateral 03/15/2019    Stenosis in both arms, unable to thread catheter     HC LAP,INGUINAL HERNIA REPR,INITIAL  2002     INSERT PICC LINE Left 10/12/2020    Procedure: INSERTION, PICC @1100;  Surgeon: Felicia Branch MD;  Location: [facility] OR     INSERT PICC LINE Left 12/6/2021    Procedure: INSERTION, PICC;  Surgeon: Tahir Alvarado MD;  Location: [facility] OR     IR PICC PLACEMENT > 5 YRS OF AGE  03/18/2019     IR PICC PLACEMENT > 5 YRS OF AGE  12/30/2019     IR PICC PLACEMENT > 5 YRS OF AGE  10/12/2020     IR PICC PLACEMENT > 5 YRS OF AGE  12/6/2021     LOBECTOMY LUNG Right 2009    Right upper lobe       Social History     Socioeconomic History     Marital status: Single     Spouse name: Not on file     Number of children: Not on file     Years of education: Not on file     Highest education level: Not on file   Occupational  History     Occupation: Sale    Tobacco Use     Smoking status: Passive Smoke Exposure - Never Smoker     Smokeless tobacco: Current User     Types: Chew     Tobacco comment: dad smokes   Substance and Sexual Activity     Alcohol use: Yes     Comment: <2 drinks per week     Drug use: Not on file     Sexual activity: Not on file   Other Topics Concern     Parent/sibling w/ CABG, MI or angioplasty before 65F 55M? Not Asked   Social History Narrative    12/27/2019  -Graduated college in 2020.  Looking for work as a sales [employer].       Social Determinants of Health     Financial Resource Strain: Not on file   Food Insecurity: Not on file   Transportation Needs: Not on file   Physical Activity: Not on file   Stress: Not on file   Social Connections: Not on file   Intimate Partner Violence: Not on file   Housing Stability: Not on file         There were no vitals taken for this visit.  There is no height or weight on file to calculate BMI.  Exam:   GENERAL APPEARANCE: Well developed, well nourished, alert, and in no apparent distress.  EYES: PERRL, EOMI  HENT: Nasal mucosa with edema and no hyperemia. No nasal polyps.  EARS: Canals clear, TMs normal  MOUTH: Oral mucosa is moist, without any lesions, no tonsillar enlargement, no oropharyngeal exudate.  NECK: supple, no masses, no thyromegaly.  RESP: normal percussion, good air flow throughout. Crackles at the bases bilaterally, L >R..  CV: Normal S1, S2, regular rhythm, normal rate. No murmur.  No rub. No gallop. No LE edema.   ABDOMEN:  Bowel sounds normal, soft, nontender, no HSM or masses.   MS: extremities normal. No clubbing. No cyanosis.  SKIN: no rash on limited exam  NEURO: Mentation intact, speech normal, normal strength and tone, normal gait and stance  PSYCH: mentation appears normal. and affect normal/bright  Results:  Recent Results (from the past 168 hour(s))   General PFT Lab (Please always keep checked)    Collection Time: 12/16/21  2:29 PM    Result Value Ref Range    FVC-Pred 5.60 L    FVC-Pre 3.22 L    FVC-%Pred-Pre 57 %    FEV1-Pre 2.41 L    FEV1-%Pred-Pre 51 %    FEV1FVC-Pred 84 %    FEV1FVC-Pre 75 %    FEFMax-Pred 10.24 L/sec    FEFMax-Pre 7.01 L/sec    FEFMax-%Pred-Pre 68 %    FEF2575-Pred 4.94 L/sec    FEF2575-Pre 1.83 L/sec    [id number]-%Pred-Pre 36 %    ExpTime-Pre 6.97 sec    FIFMax-Pre 3.72 L/sec    FEV1FEV6-Pred 84 %    FEV1FEV6-Pre 75 %       Results as noted above.    PFT Interpretation:  Moderately severe restrictive ventilatory defect.  Decreased from previous and below his previous PFT  Valid Maneuver    CF Exacerbation  Absent        Attestation signed by Milagros Briscoe MD at 12/17/2021  2:54 PM:  Attending statement:    The patient was seen and examined by me with Dr. Rodriguez.  The case was discussed at length.    Vitals, lab results and imaging from today were reviewed.    Patient with cystic fibrosis pulmonary exacerbation.  Clinically improving.  --continue antibiotics and re-evaluate 12/27  --home PFT today and 12/27  --continue vest and nebs  The note reflects our joint assessment and plan.    Milagros Briscoe MD MPH  Recent Results (from the past 168 hour(s))   General PFT Lab (Please always keep checked)    Collection Time: 12/16/21  2:29 PM   Result Value Ref Range    FVC-Pred 5.60 L    FVC-Pre 3.22 L    FVC-%Pred-Pre 57 %    FEV1-Pre 2.41 L    FEV1-%Pred-Pre 51 %    FEV1FVC-Pred 84 %    FEV1FVC-Pre 75 %    FEFMax-Pred 10.24 L/sec    FEFMax-Pre 7.01 L/sec    FEFMax-%Pred-Pre 68 %    FEF2575-Pred 4.94 L/sec    FEF2575-Pre 1.83 L/sec    KNC1774-%Pred-Pre 36 %    ExpTime-Pre 6.97 sec    FIFMax-Pre 3.72 L/sec    FEV1FEV6-Pred 84 %    FEV1FEV6-Pre 75 %   Cystic Fibrosis Culture Aerobic Bacterial    Collection Time: 12/16/21  3:56 PM    Specimen: Expectorate; Sputum   Result Value Ref Range    Culture Culture in progress      PFT: Moderately-severe obstructive lung disease.  When compared to 7/22/2021, the FEV1 and FVC  have decreased. The decrease in FVC may represent air trapping v. Restrictive lung physiology.  Lung volumes would be necessary to confirm.    Again, thank you for allowing me to participate in the care of your patient.      Sincerely,    Heriberto Rodriguez MD

## 2021-12-16 NOTE — NURSING NOTE
RN sent message to Maximiliano Sawyer, Pharmacist at [facility] with plan to continue IV kulwinder through 12/27 when patient will send us home PFT and can re-evaluate at that time if we will continue or discontinue IVs.     Gemini Menjivar RN

## 2021-12-16 NOTE — PROGRESS NOTES
Reason for Visit  Demario Abbasi is a 26 year old year old male who is being seen for No chief complaint on file.      Assessment and plan:   Demario Abbasi is a 26 year old male with cystic fibrosis, pancreatic insufficiency, depression, acne and impaired glucose tolerance who is s/p RUL resection for recurrent exacerbations in 2009. Currently receiving IV antibiotics for pulmonary exacerbation 12/6.     Maintenance   Modulator: Not eligible  Mutation:  [id number]/621+1g>t  AW Clearance: Vest BID to TID  Bronchodilators:  Albuterol  Mucolytics: Pulmozyme, Hypertonic saline  Antibiotics Inh:  Bethkis  Antibiotics Oral: Azithromycin  Other:  Colonization Hx: Staph aureus (MSSA), Achromobacter xylosoxidans/denitrificans   Annual Studies: Due September 2021  Contraindications to standard of care:  CF/pulmonary: Patient with history of a pulmonary exacerbation in June of 2020, though now has recurrence of symptoms beginning end of November. He had  PICC line placed 12/6 and was started on meropenem 2g q8H in addition to doxycycline 100 mg BID for 3 weeks of total abx therapy. Today he reports symptom improvement since starting antibiotics, notably he no longer has hemoptysis and his shortness of breath is improved.   Oxygenation is very good.  PFTs are below his normal range; he will repeat PFTs with his home spirometer today.  Encouraged patient to repeat PFTs with his home spirometer at the end of his antibiotic course so to compare with his home PFT results from today to determine if a longer antibiotic course is warranted.  He will continue his current airway clearance therapy with albuterol Pulmozyme and hypertonic saline and every other month Bethkis.  He will continue azithromycin.  He will contact the CF office if symptoms do not return to baseline.   - Continue with meropenem 2g IV q8H and doxycycline 100 mg PO BID (12/6-12/27)   -Repeat PFTs with home spirometer today and on 12/27 to assess for  improvement   -Continue with current airway clearance regimen: Vest therapy 2-3x daily, azithromycin, ngozi nebs, pulmozyme, albuterol neb and hypertonic saline nebs  CFTR Modulation: Based on his genotype, the patient is not a candidate for CF TR modulators.    Pancreatic insufficiency: Patient denies symptoms of malabsorption.  He will continue his current pancreatic enzyme replacement and vitamins.    CF related sinusitis: No symptoms of active sinusitis at this time.    Iron deficiency anemia: Hemoglobin has normalized.  Supplemental iron was stopped at his last visit.  Hemoglobin and iron levels will be checked with his next visit with annual studies.    Psychosocial: The patient is gainfully employed in agriculture as an      Environmental allergies: Adequately controlled with Zyrtec, Pataday, Astelin and Flonase.    Reflux: Adequately controlled with pantoprazole.    Healthcare maintenance: The patient has received his Covid vaccination.  He will receive his COVID booster and flu vaccine next Thursday, 12/23.    Follow-up in 3 months with annual studies (did not complete today).  This will include a glucose tolerance test but he will not require a bone density scan this year.    In addition to my visit, he will meet with the CF , dietitian and pharmacist.    Annual studies including basic metabolic panel, LFTs, lipid battery, hemoglobin A1c, INR, TSH, total protein, albumin, vitamin A level, vitamin D level, vitamin E level, IgG, IgE, CBC, urinalysis, testosterone level, CF sputum culture, nocardia culture, AFB culture, fungal culture, chest x-ray were ordered with the next visit.    Seen and discussed with Dr. Briscoe.    Heriberto Rodriguez MD  Pulm/[facility]    CF HPI  The patient was seen and examined by Heriberto Rodriguez MD  Demario Abbasi is a 26 year old male with cystic fibrosis, pancreatic insufficiency, depression, acne and impaired glucose tolerance who is s/p RUL resection for  recurrent exacerbations in 2009.   He last required IV antibiotics in January 2020 for a pulmonary exacerbation.  Patient reports he developed symptoms of productive cough, hemoptysis, shortness of breath and fatigue beginning end of November. He reports calling CF center 11/29 regarding his symptoms, and was prescribed meropenem and doxycycline after his PICC line placement. Patient states since starting meropenem and doxycycline on 12/6, he feels much improved. He reports improved SOB, minimal productive cough and resolution of his hemoptysis. He denies fever, chills, CP, sore throat, abd pain, n/v/d, dysuria, HA, or generalized weakness.   Patient's breathing remains comfortable at rest and with mild activity--he becomes dyspneic with more rigorous activity, but this is also improving with IV antibiotics.  Exercise tolerance similar to baseline.  Intermittent daily cough at baseline.  Sputum varies from green-brown although overall no change in color or volume from baseline.  No hemoptysis since beginning of December. He is vesting three times daily.  The patient is gainfully employed as an     Review of systems:  Appetite is good  No ear pain, sore throat, sinus pain or rhinorrhea  No palpitations  No nausea, vomiting, diarrhea or abdominal pain.  No myalgias or arthralgias  Denies anxiety or depression    A complete ROS was otherwise negative except as noted in the HPI.    Current Outpatient Medications   Medication     albuterol (PROAIR HFA) 108 (90 Base) MCG/ACT inhaler     albuterol (PROVENTIL) (2.5 MG/3ML) 0.083% neb solution     amylase-lipase-protease (CREON) 24419-177665-539544 units CPEP     azelastine (ASTELIN) 0.1 % nasal spray     azithromycin (ZITHROMAX) 500 MG tablet     cetirizine (ZYRTEC) 10 MG tablet     citalopram (CELEXA) 20 MG tablet     dornase alpha (PULMOZYME) 1 MG/ML neb solution     doxycycline hyclate (VIBRA-TABS) 100 MG tablet     fluconazole (DIFLUCAN) 150 MG tablet      fluticasone (FLONASE) 50 MCG/ACT nasal spray     meropenem (MERREM) 1 g vial     mvw complete formulation (SOFTGELS ) capsule     olopatadine (PATANOL) 0.1 % ophthalmic solution     pantoprazole (PROTONIX) 20 MG EC tablet     phytonadione (MEPHYTON) 5 MG tablet     sodium chloride inhalant 7 % NEBU neb solution     tobramycin (BETHKIS) 300 MG/4ML nebulizer solution     No current facility-administered medications for this visit.     Allergies   Allergen Reactions     Augmentin Diarrhea     Past Medical History:   Diagnosis Date     CF (cystic fibrosis) (H)     Genotype: [id number]/621+1g>t     Impaired glucose tolerance      Pancreatic insufficiency      S/P lobectomy of lung 8/4/2015    Right upper lobectomy - 2009       Past Surgical History:   Procedure Laterality Date     H STATISTIC PICC LINE INSERTION >5YR, FAILED Bilateral 03/15/2019    Stenosis in both arms, unable to thread catheter     HC LAP,INGUINAL HERNIA REPR,INITIAL  2002     INSERT PICC LINE Left 10/12/2020    Procedure: INSERTION, PICC @1100;  Surgeon: Felicia Branch MD;  Location: [facility] OR     INSERT PICC LINE Left 12/6/2021    Procedure: INSERTION, PICC;  Surgeon: Tahir Alvarado MD;  Location: [facility] OR     IR PICC PLACEMENT > 5 YRS OF AGE  03/18/2019     IR PICC PLACEMENT > 5 YRS OF AGE  12/30/2019     IR PICC PLACEMENT > 5 YRS OF AGE  10/12/2020     IR PICC PLACEMENT > 5 YRS OF AGE  12/6/2021     LOBECTOMY LUNG Right 2009    Right upper lobe       Social History     Socioeconomic History     Marital status: Single     Spouse name: Not on file     Number of children: Not on file     Years of education: Not on file     Highest education level: Not on file   Occupational History     Occupation: [employer]   Tobacco Use     Smoking status: Passive Smoke Exposure - Never Smoker     Smokeless tobacco: Current User     Types: Chew     Tobacco comment: dad smokes   Substance and Sexual Activity     Alcohol use: Yes     Comment: <2 drinks per  week     Drug use: Not on file     Sexual activity: Not on file   Other Topics Concern     Parent/sibling w/ CABG, MI or angioplasty before 65F 55M? Not Asked   Social History Narrative    12/27/2019  -Graduated college in 2020.  Looking for work as a sales .       Social Determinants of Health     Financial Resource Strain: Not on file   Food Insecurity: Not on file   Transportation Needs: Not on file   Physical Activity: Not on file   Stress: Not on file   Social Connections: Not on file   Intimate Partner Violence: Not on file   Housing Stability: Not on file         There were no vitals taken for this visit.  There is no height or weight on file to calculate BMI.  Exam:   GENERAL APPEARANCE: Well developed, well nourished, alert, and in no apparent distress.  EYES: PERRL, EOMI  HENT: Nasal mucosa with edema and no hyperemia. No nasal polyps.  EARS: Canals clear, TMs normal  MOUTH: Oral mucosa is moist, without any lesions, no tonsillar enlargement, no oropharyngeal exudate.  NECK: supple, no masses, no thyromegaly.  RESP: normal percussion, good air flow throughout. Crackles at the bases bilaterally, L >R..  CV: Normal S1, S2, regular rhythm, normal rate. No murmur.  No rub. No gallop. No LE edema.   ABDOMEN:  Bowel sounds normal, soft, nontender, no HSM or masses.   MS: extremities normal. No clubbing. No cyanosis.  SKIN: no rash on limited exam  NEURO: Mentation intact, speech normal, normal strength and tone, normal gait and stance  PSYCH: mentation appears normal. and affect normal/bright  Results:  Recent Results (from the past 168 hour(s))   General PFT Lab (Please always keep checked)    Collection Time: 12/16/21  2:29 PM   Result Value Ref Range    FVC-Pred 5.60 L    FVC-Pre 3.22 L    FVC-%Pred-Pre 57 %    FEV1-Pre 2.41 L    FEV1-%Pred-Pre 51 %    FEV1FVC-Pred 84 %    FEV1FVC-Pre 75 %    FEFMax-Pred 10.24 L/sec    FEFMax-Pre 7.01 L/sec    FEFMax-%Pred-Pre 68 %    FEF2575-Pred 4.94 L/sec     FEF2575-Pre 1.83 L/sec    [id number]-%Pred-Pre 36 %    ExpTime-Pre 6.97 sec    FIFMax-Pre 3.72 L/sec    FEV1FEV6-Pred 84 %    FEV1FEV6-Pre 75 %                 Results as noted above.    PFT Interpretation:  Moderately severe restrictive ventilatory defect.  Decreased from previous and below his previous PFT  Valid Maneuver      CF Exacerbation  Absent

## 2021-12-16 NOTE — PATIENT INSTRUCTIONS
Cystic Fibrosis Self-Care Plan    RECOMMENDATIONS: Continue with IV antibiotic therapy until 12/27, which is your last day of antibiotics  -Continue with vesting 2-3 times daily      YOUR GOAL: Complete antibiotic course  -Stay safe and healthy this winter season      [place] [facility] [place] Nurse line:  Nica Juarez Gemini  [phone]     [facility] [place] Fax Number:      [phone]         Cystic Fibrosis Respiratory Therapists:   Margaret Werner              [phone]          May Rao   [phone]  Cystic Fibrosis Dietitians:              Sherie Aparicio              [phone]                            Saba Gill                        [phone]   Cystic Fibrosis Diabetes Nurse:    Ashley Cleveland   [phone]    Cystic Fibrosis Social Workers:     Prerna Paniagua               [phone]                     Courtney Fenton               [phone]  Cystic Fibrosis Pharmacists:           Ashley Kirby                               [phone]         Angeline Quinones      [phone]   Cystic Fibrosis Genetic Counselor:   Yuliana Ghotra    [phone]    [facility] [place] website:  [email or web address]    COVID VACCINES:    You are eligible for the COVID-19 vaccine. Sign up for your COVID vaccine via [employer]. Log in, select the menu bar, select schedule an appointment, and then select COVID-19 Vaccine 1st Dose. You may also schedule by calling this number [phone] however hold times can be long.       OR schedule through the [place] Department of Health Vaccine Connector at [place]://vaccineconnector.[place].gov/ or by calling [phone].      The best vaccine is the one that s available to you first.  All COVID-19 vaccines currently available in the [place] (Rodrigo & Rodrigo, Pfizer and Moderna) have been shown to be highly effect at preventing COVID-19.       We re still learning how vaccines will affect the spread of COVID-19. After you ve been fully  vaccinated against COVID-19, you should keep taking precautions in public places like wearing a mask, staying 6 feet apart from others, and avoiding crowds and poorly ventilated spaces until we know more.       MRN: [id number]   Clinic Date: December 16, 2021   Patient: Demario Abbasi     Annual Studies:   IGG   Date Value Ref Range Status   09/10/2020 1,739 (H) 610 - 1,616 mg/dL Final     Insulin   Date Value Ref Range Status   02/22/2019 Canceled, Test credited 3 - 25 mU/L Final     Comment:     Unsatisfactory specimen - hemolyzed  NOTIFIED OZ PHILLIPS MD AT 1450 ON 2/22/19 BY JV       There are no preventive care reminders to display for this patient.    Pulmonary Function Tests  FEV1: amount of air you can blow out in 1 second  FVC: total amount of air you can take in and blow out    Your Goals:         PFT Latest Ref Rng & Units 12/16/2021   FVC L 3.22   FEV1 L 2.41   FVC% % 57   FEV1% % 51          Airway Clearance: The Most Important Way to Keep Your Lungs Healthy  Vest Settings:    Hill-Rom Frequencies: 8, 9, 10 Pressure 10 Then, Frequencies 18, 19, 20 Pressure 6      RespirTech: Quick Start with Pressure of     Do each frequency for 5 minutes; Deflate vest after each frequency & cough 3 times before beginning the next setting.    Vest and Neb Therapy should be done 2-3 times/day.    Good Nutrition Can Improve Lung Function and Overall Health     Take ALL of your vitamins with food     Take 1/2 of your enzymes before EVERY meal/snack and the other 1/2 mid-meal/snack    Wt Readings from Last 3 Encounters:   12/16/21 81.2 kg (179 lb)   12/06/21 77.1 kg (170 lb)   10/12/20 77.1 kg (170 lb)       Body mass index is 26.43 kg/m .         National CF Foundation Recommendations for BMI in CF Adults: Women: at least 22 Men: at least 23        Controlling Blood Sugars Helps Prevent Lung Infections & Improves Nutrition  Test blood sugar:     In the morning before eating (goal is )     2 hours after a  meal (goal is less than 150)     When pre-meal glucose is greater than 150 add correction     At bedtime (if less than 100 eat a snack with 15 grams of carbohydrates  Last A1C Results:   Hemoglobin A1C POCT   Date Value Ref Range Status   09/10/2020 5.7 (H) 0 - 5.6 % Final     Comment:     Normal <5.7% Prediabetes 5.7-6.4%  Diabetes 6.5% or higher - adopted from ADA   consensus guidelines.           If diabetic, measure A1C every 6 months. Goal: Under 7%    Staying Healthy    Research:  If you are interested in learning about research opportunities or have questions, please contact the CF Research Team at [phone] or [email or web address].      CF Foundation:  Compass is a personalized resource service to help you with the insurance, financial, legal and other issues you are facing.  It's free, confidential and available to anyone with CF.  Ask your  for more information or contact Compass directly at 769-COMPASS ([phone]) or compass@cff.org, or learn more at cff.org/compass.

## 2021-12-16 NOTE — NURSING NOTE
Chief Complaint   Patient presents with     Cystic Fibrosis     Follow up      Vitals were taken and medications were reconciled.    Khadra Mckoy RMA  3:36 PM

## 2021-12-20 NOTE — PROGRESS NOTES
This is a recent snapshot of the patient's [facility] medical record.  For current drug dose and complete information and questions, call [phone] or In Basket pool, fv home infusion ([id number])  CSN Number:  [id number]

## 2021-12-21 LAB
BACTERIA SPT CULT: ABNORMAL
BACTERIA SPT CULT: ABNORMAL

## 2021-12-23 ENCOUNTER — HOME INFUSION (PRE-WILLOW HOME INFUSION) (OUTPATIENT)
Dept: PHARMACY | Facility: CLINIC | Age: 26
End: 2021-12-23
Payer: COMMERCIAL

## 2021-12-27 ENCOUNTER — TELEPHONE (OUTPATIENT)
Dept: PULMONOLOGY | Facility: CLINIC | Age: 26
End: 2021-12-27
Payer: COMMERCIAL

## 2021-12-27 ENCOUNTER — HOME INFUSION (PRE-WILLOW HOME INFUSION) (OUTPATIENT)
Dept: PHARMACY | Facility: CLINIC | Age: 26
End: 2021-12-27
Payer: COMMERCIAL

## 2021-12-27 NOTE — TELEPHONE ENCOUNTER
Contacted patient to request he send in home PFTs to determine length of therapy for IV abx. Patient can not find home PFT device, will keep looking for it. Otherwise, will plan on continuing IV abx until 1/7 when patient will be in [place]. Will plan on coming to clinic to do PFT to determine end date of IV abx.

## 2022-01-02 ENCOUNTER — HEALTH MAINTENANCE LETTER (OUTPATIENT)
Age: 27
End: 2022-01-02

## 2022-01-03 ENCOUNTER — HOME INFUSION (PRE-WILLOW HOME INFUSION) (OUTPATIENT)
Dept: PHARMACY | Facility: CLINIC | Age: 27
End: 2022-01-03
Payer: COMMERCIAL

## 2022-01-04 ENCOUNTER — TELEPHONE (OUTPATIENT)
Dept: PULMONOLOGY | Facility: CLINIC | Age: 27
End: 2022-01-04
Payer: COMMERCIAL

## 2022-01-04 DIAGNOSIS — E84.9 CYSTIC FIBROSIS (H): Primary | ICD-10-CM

## 2022-01-04 NOTE — TELEPHONE ENCOUNTER
Patient called to check in, has PFTs planned for Friday 1/7 at 2pm. Patient is hoping that he would be able to get his PICC line removed that day if pfts are better. Patient reports feeling much better since starting IV abx on 12/6. Reports a significant decrease in sputum and he didn't realize how short of breath he was prior to starting IV abx. Looking back he realized that he was feeling much more ill than he admitted and now feeling much better.    Plan: Patient will call CF office after done with PFTs, writer will review them with Dr. Lilly, if improved, patient will go to 2nd floor lab to have PICC line removed.

## 2022-01-07 ENCOUNTER — HOME INFUSION (PRE-WILLOW HOME INFUSION) (OUTPATIENT)
Dept: PHARMACY | Facility: CLINIC | Age: 27
End: 2022-01-07

## 2022-01-07 ENCOUNTER — INFUSION THERAPY VISIT (OUTPATIENT)
Dept: INFUSION THERAPY | Facility: CLINIC | Age: 27
End: 2022-01-07
Attending: FAMILY MEDICINE
Payer: COMMERCIAL

## 2022-01-07 ENCOUNTER — TELEPHONE (OUTPATIENT)
Dept: PULMONOLOGY | Facility: CLINIC | Age: 27
End: 2022-01-07

## 2022-01-07 DIAGNOSIS — E84.9 CF (CYSTIC FIBROSIS) (H): Primary | ICD-10-CM

## 2022-01-07 DIAGNOSIS — E84.9 CYSTIC FIBROSIS (H): Primary | ICD-10-CM

## 2022-01-07 PROCEDURE — G0463 HOSPITAL OUTPT CLINIC VISIT: HCPCS

## 2022-01-07 PROCEDURE — 94375 RESPIRATORY FLOW VOLUME LOOP: CPT | Performed by: INTERNAL MEDICINE

## 2022-01-07 NOTE — TELEPHONE ENCOUNTER
PFTs completed in clinic, back to baseline. Patient has been on IV meropenem since 12/6. Patient reports feeling well, all symptoms back to baseline. Will stop IV meropenem at this time and pull PICC line. [facility] notified.

## 2022-01-07 NOTE — PATIENT INSTRUCTIONS
Dear Demario Abbasi    Thank you for choosing [facility] ([facility]) for your procedure.  The following information is a summary of our appointment as well as important reminders.      Care after Central Line Removal     Overview Aftercare Instructions Discharge Care   WHAT YOU NEED TO KNOW:     After your healthcare provider removes your central line device, you will need to prevent infections and other serious problems. Clean and care for your wound as directed. Watch for signs of infection, such as redness or a fever.     DISCHARGE INSTRUCTIONS:   Call the emergency number for your area (148 in [place]) if:   You suddenly have trouble breathing.   Contact your healthcare provider immediately if:   Your site begins to bleed.   You have a fever.   You have severe pain and numbness in your arm or leg that the device was in.     Contact your healthcare provider if:   You have redness and swelling at the catheter site.   The site is draining pus or has a foul odor.   You have questions or concerns about your condition or care.     Manage the catheter site:   Leave the bandage in place for at least 24 hours, or as directed. Change the bandage if it gets wet or dirty.   You may need to remove the first bandage. Clean the area with soap and water, pat dry, and put a clean bandage on as directed.   If you have an incision, keep it dry until it is completely healed.   Follow up with your healthcare provider as directed:   If you have stitches, you may need to return to have them removed. Write down your questions so you remember to ask them during your visits.     We look forward in seeing you on your next appointment here at [facility] ([facility]).  Please don t hesitate to call us at [phone] to reschedule any of your appointments or to speak with one of the [facility] registered nurses.  It was a pleasure taking care of you  today.    Sincerely,    [facility]  [address] [place]  [address]  Phone:  [phone]

## 2022-01-07 NOTE — PROGRESS NOTES
PFTs completed in clinic, back to baseline. Patient has been on IV meropenem since 12/6. Patient reports feeling well, all symptoms back to baseline. Will stop IV meropenem at this time and pull PICC line. [facility] notified.

## 2022-01-07 NOTE — PROGRESS NOTES
Patient presented for PICC removal, orders are in from Dr. Lilly. Using sterile technique I removed the bandage, bio-patch and orange locking device. Removed PICC line and applied bacitracin and sterile gauze then covered with pressure bandage. Patient laid flat for 30 minutes post. Confirmed PICC line was 47 cm, there was not any documentation in flow sheets regarding the PICC line so confirmed length with 12/6/21 imaging note.      Length to the right heart was measured. 4 [place] by 47 cm single-lumen  PICC line was advanced through the peel-away sheath. The tip overlies  the atriocaval junction. Catheter secured with a suture catheter  device and a Tegaderm adhesive dressing. Catheter was flushed and  heparin locked with 4 cc of 1:10 concentration heparin.

## 2022-01-25 LAB
EXPTIME-PRE: 6.57 SEC
FEF2575-%PRED-PRE: 44 %
FEF2575-PRE: 2.21 L/SEC
FEF2575-PRED: 4.94 L/SEC
FEFMAX-%PRED-PRE: 70 %
FEFMAX-PRE: 7.25 L/SEC
FEFMAX-PRED: 10.24 L/SEC
FEV1-%PRED-PRE: 57 %
FEV1-PRE: 2.71 L
FEV1FEV6-PRE: 77 %
FEV1FEV6-PRED: 84 %
FEV1FVC-PRE: 76 %
FEV1FVC-PRED: 84 %
FIFMAX-PRE: 3.87 L/SEC
FVC-%PRED-PRE: 63 %
FVC-PRE: 3.54 L
FVC-PRED: 5.6 L

## 2022-02-03 NOTE — PROGRESS NOTES
This is a recent snapshot of the patient's [facility] medical record.  For current drug dose and complete information and questions, call [phone] or In Basket pool, fv home infusion ([id number])  CSN Number:  [id number]

## 2022-02-03 NOTE — PROGRESS NOTES
This is a recent snapshot of the patient's [facility] medical record.  For current drug dose and complete information and questions, call [phone] or In [facility] pool, fv home infusion ([id number])  CSN Number:  [id number]

## 2022-02-07 ENCOUNTER — TELEPHONE (OUTPATIENT)
Dept: PULMONOLOGY | Facility: CLINIC | Age: 27
End: 2022-02-07
Payer: COMMERCIAL

## 2022-02-07 NOTE — TELEPHONE ENCOUNTER
Was patient tested for COVID: Yes  Reason for testing: Symptoms consistent with COVID-19  Testing date: 2/7/22  Type of test: home nasal swab (rapid, antigen)  Testing result: Positive   Symptomatic: Yes  Date of first symptom: 2/6/22  S/sx: Chills, Fever and Muscle pain, sinus congestion, breathing shorter  Disposition: Self-Quarantine at home  If at Self-quarantine at home - any treatment needed: Home Therapies (increase airway clearance, symptom monitoring, rest/hydration, monoclonal recommended)    Recovery date: TBD    Currently BID Vesting, going to increase to TID/QID. No increase in cough, no increase in sputum at this time. Instructed patient to manage symptoms with tylenol/ibuprofen, get adequate rest/hydration. Will flag chart for +COVID and instructed patient to self-schedule in [facility] for COVID therapies. Instructed patient to call CF Office back if he develops any increase in pulmonary symptoms (increase in cough, increase in sputum, shortness of breath). Also call back when symptoms completely resolve.  Patient verbalized understanding and agreement in plan    Tammy George RN

## 2022-02-08 ENCOUNTER — VIRTUAL VISIT (OUTPATIENT)
Dept: URGENT CARE | Facility: CLINIC | Age: 27
End: 2022-02-08
Payer: COMMERCIAL

## 2022-02-08 DIAGNOSIS — U07.1 INFECTION DUE TO 2019 NOVEL CORONAVIRUS: Primary | ICD-10-CM

## 2022-02-08 PROCEDURE — 99214 OFFICE O/P EST MOD 30 MIN: CPT | Mod: 95 | Performed by: EMERGENCY MEDICINE

## 2022-02-08 NOTE — PROGRESS NOTES
"Video Visit    Start Time 1000  Stop time 1003    Time : 4 minutes    The patient has been notified of following:     \"This telephone visit will be conducted via a call between you and your physician/provider. We have found that certain health care needs can be provided without the need for a physical exam.  This service lets us provide the care you need with a short phone conversation.  If a prescription is necessary we can send it directly to your pharmacy.  If lab work is needed we can place an order for that and you can then stop by our lab to have the test done at a later time.    Telephone visits are billed at different rates depending on your insurance coverage. During this emergency period, for some insurers they may be billed the same as an in-person visit.  Please reach out to your insurance provider with any questions.    If during the course of the call the physician/provider feels a telephone visit is not appropriate, you will not be charged for this service.\"    Patient has given verbal consent for Telephone visit?  Yes    What phone number would you like to be contacted at? [phone]    How would you like to obtain your AVS? MyChart    Subjective   CC: Demario Abbasi  is a 27 year old male who presents via phone visit today for the following health issues:   Chief Complaint   Patient presents with     Infection        COVID-19 Symptom Review  How many days ago did these symptoms start? 3    Are any of the following symptoms significant for you?  New or worsening difficulty breathing? Yes    Please describe what kind of difficulty you are having breathing:Mild dyspnea (able to do ADLs without difficulty, mild shortness of breath with activities such as climbing one or two flights of stairs or walking briskly)    Worsening cough? Yes, it's a dry cough.     Fever or chills? Yes, I felt feverish or had chills.    Headache: no    Sore throat: no    Chest pain: no    Diarrhea: YES    Body aches? " YES    What treatments has patient tried?    Does patient live in a nursing home, group home, or shelter? no  Does patient have a way to get food/medications during quarantined? Yes, I have a friend or family member who can help me. and Yes                  MASSBP Score 2/8/2022   Age Greater than or equal to 65 years 0   BMI greater than or equal to 35 kg/m2 0   Has Diabetes Mellitus 0   Has Chronic Kidney Disease 0   Has Cardiovascular Disease and 55 years or older 0   Has Chronic Respiratory Disease and 55 years or older 3   Has Hypertension and 55 years or older 0   Is Immunocompromised 0   Is Pregnant 0   Member of BIP community (Black/, /, ,  or , or  or [place] Native)  0   MASSBP Score 3   Has the patient had a positive COVID test outside our system?  No   What day did symptoms start?  2/6/2022           Reviewed and updated as needed this visit by Provider                Review of Systems         Objective    Gen: Patient is alert, oriented  Resp: Speaking full sentence, no audible shortness of breath.  No cough of wheeze.              Assessment/Plan:  Patient is a 27-year-old male with a history of cystic fibrosis.  COVID symptoms for 3 days.  Risk score 3.  We will treat with Molnupiravir    Phone call duration:  15 minutes    Paul Crawford MD

## 2022-03-17 ENCOUNTER — OFFICE VISIT (OUTPATIENT)
Dept: PULMONOLOGY | Facility: CLINIC | Age: 27
End: 2022-03-17
Attending: STUDENT IN AN ORGANIZED HEALTH CARE EDUCATION/TRAINING PROGRAM
Payer: COMMERCIAL

## 2022-03-17 ENCOUNTER — ANCILLARY PROCEDURE (OUTPATIENT)
Dept: GENERAL RADIOLOGY | Facility: CLINIC | Age: 27
End: 2022-03-17
Payer: COMMERCIAL

## 2022-03-17 ENCOUNTER — INFUSION THERAPY VISIT (OUTPATIENT)
Dept: INFUSION THERAPY | Facility: CLINIC | Age: 27
End: 2022-03-17
Attending: INTERNAL MEDICINE
Payer: COMMERCIAL

## 2022-03-17 VITALS
WEIGHT: 178.1 LBS | SYSTOLIC BLOOD PRESSURE: 120 MMHG | BODY MASS INDEX: 26.3 KG/M2 | TEMPERATURE: 98.3 F | DIASTOLIC BLOOD PRESSURE: 74 MMHG | OXYGEN SATURATION: 97 % | HEART RATE: 70 BPM

## 2022-03-17 VITALS
WEIGHT: 178.57 LBS | SYSTOLIC BLOOD PRESSURE: 117 MMHG | HEIGHT: 70 IN | DIASTOLIC BLOOD PRESSURE: 65 MMHG | BODY MASS INDEX: 25.56 KG/M2 | OXYGEN SATURATION: 97 % | HEART RATE: 77 BPM

## 2022-03-17 DIAGNOSIS — R73.02 IMPAIRED GLUCOSE TOLERANCE: ICD-10-CM

## 2022-03-17 DIAGNOSIS — E84.9 CYSTIC FIBROSIS (H): Primary | ICD-10-CM

## 2022-03-17 DIAGNOSIS — E84.9 DIABETES MELLITUS DUE TO CYSTIC FIBROSIS (H): ICD-10-CM

## 2022-03-17 DIAGNOSIS — K86.89 PANCREATIC INSUFFICIENCY: ICD-10-CM

## 2022-03-17 DIAGNOSIS — J30.2 SEASONAL ALLERGIES: ICD-10-CM

## 2022-03-17 DIAGNOSIS — E08.9 DIABETES MELLITUS DUE TO CYSTIC FIBROSIS (H): ICD-10-CM

## 2022-03-17 DIAGNOSIS — E84.8 DIABETES MELLITUS RELATED TO CYSTIC FIBROSIS (H): Primary | ICD-10-CM

## 2022-03-17 DIAGNOSIS — Z90.2 S/P LOBECTOMY OF LUNG: ICD-10-CM

## 2022-03-17 DIAGNOSIS — E84.9 CYSTIC FIBROSIS (H): ICD-10-CM

## 2022-03-17 DIAGNOSIS — E84.9 CF (CYSTIC FIBROSIS) (H): ICD-10-CM

## 2022-03-17 DIAGNOSIS — E08.9 DIABETES MELLITUS RELATED TO CYSTIC FIBROSIS (H): Primary | ICD-10-CM

## 2022-03-17 LAB
ALBUMIN SERPL-MCNC: 3.8 G/DL (ref 3.4–5)
ALBUMIN UR-MCNC: NEGATIVE MG/DL
ALP SERPL-CCNC: 117 U/L (ref 40–150)
ALT SERPL W P-5'-P-CCNC: 39 U/L (ref 0–70)
ANION GAP SERPL CALCULATED.3IONS-SCNC: 6 MMOL/L (ref 3–14)
APPEARANCE UR: CLEAR
AST SERPL W P-5'-P-CCNC: 26 U/L (ref 0–45)
BASOPHILS # BLD AUTO: 0.1 10E3/UL (ref 0–0.2)
BASOPHILS NFR BLD AUTO: 2 %
BILIRUB UR QL STRIP: NEGATIVE
BUN SERPL-MCNC: 16 MG/DL (ref 7–30)
CALCIUM SERPL-MCNC: 8.7 MG/DL (ref 8.5–10.1)
CHLORIDE BLD-SCNC: 108 MMOL/L (ref 94–109)
CHOLEST SERPL-MCNC: 96 MG/DL
CO2 SERPL-SCNC: 23 MMOL/L (ref 20–32)
COLOR UR AUTO: YELLOW
CREAT SERPL-MCNC: 0.96 MG/DL (ref 0.66–1.25)
CREAT UR-MCNC: 128 MG/DL
DEPRECATED CALCIDIOL+CALCIFEROL SERPL-MC: 31 UG/L (ref 20–75)
EOSINOPHIL # BLD AUTO: 0.4 10E3/UL (ref 0–0.7)
EOSINOPHIL NFR BLD AUTO: 5 %
ERYTHROCYTE [DISTWIDTH] IN BLOOD BY AUTOMATED COUNT: 12.9 % (ref 10–15)
ERYTHROCYTE [SEDIMENTATION RATE] IN BLOOD BY WESTERGREN METHOD: 7 MM/HR (ref 0–15)
EXPTIME-PRE: 8.32 SEC
FASTING STATUS PATIENT QL REPORTED: YES
FEF2575-%PRED-PRE: 41 %
FEF2575-PRE: 2.02 L/SEC
FEF2575-PRED: 4.88 L/SEC
FEFMAX-%PRED-PRE: 70 %
FEFMAX-PRE: 7.19 L/SEC
FEFMAX-PRED: 10.26 L/SEC
FEV1-%PRED-PRE: 57 %
FEV1-PRE: 2.7 L
FEV1FEV6-PRE: 75 %
FEV1FEV6-PRED: 84 %
FEV1FVC-PRE: 74 %
FEV1FVC-PRED: 84 %
FIFMAX-PRE: 4.17 L/SEC
FVC-%PRED-PRE: 65 %
FVC-PRE: 3.65 L
FVC-PRED: 5.59 L
GFR SERPL CREATININE-BSD FRML MDRD: >90 ML/MIN/1.73M2
GGT SERPL-CCNC: 23 U/L (ref 0–75)
GLUCOSE BLD-MCNC: 122 MG/DL (ref 70–99)
GLUCOSE BLD-MCNC: 123 MG/DL (ref 70–99)
GLUCOSE BLD-MCNC: 181 MG/DL (ref 70–99)
GLUCOSE BLD-MCNC: 231 MG/DL (ref 70–99)
GLUCOSE BLD-MCNC: 299 MG/DL (ref 70–99)
GLUCOSE BLD-MCNC: 302 MG/DL (ref 70–99)
GLUCOSE BLDC GLUCOMTR-MCNC: 173 MG/DL (ref 70–99)
GLUCOSE UR STRIP-MCNC: 250 MG/DL
HBA1C MFR BLD: 5.6 % (ref 0–5.6)
HCT VFR BLD AUTO: 44.3 % (ref 40–53)
HDLC SERPL-MCNC: 38 MG/DL
HGB BLD-MCNC: 15.4 G/DL (ref 13.3–17.7)
HGB UR QL STRIP: NEGATIVE
IGG SERPL-MCNC: 1453 MG/DL (ref 610–1616)
IMM GRANULOCYTES # BLD: 0 10E3/UL
IMM GRANULOCYTES NFR BLD: 0 %
INR PPP: 1.09 (ref 0.85–1.15)
INSULIN SERPL-ACNC: 13.1 MU/L (ref 3–25)
INSULIN SERPL-ACNC: 16.7 MU/L (ref 3–25)
INSULIN SERPL-ACNC: 25.4 MU/L (ref 3–25)
INSULIN SERPL-ACNC: 36 MU/L (ref 3–25)
INSULIN SERPL-ACNC: 5.7 MU/L (ref 3–25)
IRON SERPL-MCNC: 94 UG/DL (ref 35–180)
KETONES UR STRIP-MCNC: NEGATIVE MG/DL
LDLC SERPL CALC-MCNC: 36 MG/DL
LEUKOCYTE ESTERASE UR QL STRIP: NEGATIVE
LYMPHOCYTES # BLD AUTO: 2.4 10E3/UL (ref 0.8–5.3)
LYMPHOCYTES NFR BLD AUTO: 33 %
MAGNESIUM SERPL-MCNC: 2.2 MG/DL (ref 1.6–2.3)
MCH RBC QN AUTO: 29.9 PG (ref 26.5–33)
MCHC RBC AUTO-ENTMCNC: 34.8 G/DL (ref 31.5–36.5)
MCV RBC AUTO: 86 FL (ref 78–100)
MICROALBUMIN UR-MCNC: <5 MG/L
MICROALBUMIN/CREAT UR: NORMAL MG/G{CREAT}
MONOCYTES # BLD AUTO: 0.5 10E3/UL (ref 0–1.3)
MONOCYTES NFR BLD AUTO: 8 %
NEUTROPHILS # BLD AUTO: 3.7 10E3/UL (ref 1.6–8.3)
NEUTROPHILS NFR BLD AUTO: 52 %
NITRATE UR QL: NEGATIVE
NONHDLC SERPL-MCNC: 58 MG/DL
NRBC # BLD AUTO: 0 10E3/UL
NRBC BLD AUTO-RTO: 0 /100
PH UR STRIP: 6 [PH] (ref 5–7)
PHOSPHATE SERPL-MCNC: 2.9 MG/DL (ref 2.5–4.5)
PLATELET # BLD AUTO: 260 10E3/UL (ref 150–450)
POTASSIUM BLD-SCNC: 4.2 MMOL/L (ref 3.4–5.3)
PROT SERPL-MCNC: 7.8 G/DL (ref 6.8–8.8)
RBC # BLD AUTO: 5.15 10E6/UL (ref 4.4–5.9)
RBC URINE: <1 /HPF
SODIUM SERPL-SCNC: 137 MMOL/L (ref 133–144)
SP GR UR STRIP: 1.02 (ref 1–1.03)
TRIGL SERPL-MCNC: 109 MG/DL
TSH SERPL DL<=0.005 MIU/L-ACNC: 1.34 MU/L (ref 0.4–4)
UROBILINOGEN UR STRIP-MCNC: NORMAL MG/DL
WBC # BLD AUTO: 7.1 10E3/UL (ref 4–11)
WBC URINE: <1 /HPF

## 2022-03-17 PROCEDURE — 84100 ASSAY OF PHOSPHORUS: CPT

## 2022-03-17 PROCEDURE — 84075 ASSAY ALKALINE PHOSPHATASE: CPT

## 2022-03-17 PROCEDURE — 99207 PR RESPIRATORY FLOW VOLUME LOOP: CPT | Performed by: INTERNAL MEDICINE

## 2022-03-17 PROCEDURE — 84460 ALANINE AMINO (ALT) (SGPT): CPT

## 2022-03-17 PROCEDURE — 250N000009 HC RX 250: Performed by: INTERNAL MEDICINE

## 2022-03-17 PROCEDURE — 85004 AUTOMATED DIFF WBC COUNT: CPT

## 2022-03-17 PROCEDURE — G0463 HOSPITAL OUTPT CLINIC VISIT: HCPCS | Mod: 25

## 2022-03-17 PROCEDURE — 84446 ASSAY OF VITAMIN E: CPT

## 2022-03-17 PROCEDURE — 84450 TRANSFERASE (AST) (SGOT): CPT

## 2022-03-17 PROCEDURE — 82784 ASSAY IGA/IGD/IGG/IGM EACH: CPT

## 2022-03-17 PROCEDURE — 82785 ASSAY OF IGE: CPT

## 2022-03-17 PROCEDURE — 83540 ASSAY OF IRON: CPT

## 2022-03-17 PROCEDURE — 81001 URINALYSIS AUTO W/SCOPE: CPT

## 2022-03-17 PROCEDURE — 87070 CULTURE OTHR SPECIMN AEROBIC: CPT | Performed by: STUDENT IN AN ORGANIZED HEALTH CARE EDUCATION/TRAINING PROGRAM

## 2022-03-17 PROCEDURE — 84590 ASSAY OF VITAMIN A: CPT

## 2022-03-17 PROCEDURE — 83525 ASSAY OF INSULIN: CPT | Mod: 91 | Performed by: INTERNAL MEDICINE

## 2022-03-17 PROCEDURE — 85652 RBC SED RATE AUTOMATED: CPT

## 2022-03-17 PROCEDURE — 82947 ASSAY GLUCOSE BLOOD QUANT: CPT | Performed by: INTERNAL MEDICINE

## 2022-03-17 PROCEDURE — 82977 ASSAY OF GGT: CPT

## 2022-03-17 PROCEDURE — 71046 X-RAY EXAM CHEST 2 VIEWS: CPT | Mod: GC | Performed by: RADIOLOGY

## 2022-03-17 PROCEDURE — 80061 LIPID PANEL: CPT

## 2022-03-17 PROCEDURE — 82947 ASSAY GLUCOSE BLOOD QUANT: CPT

## 2022-03-17 PROCEDURE — 84403 ASSAY OF TOTAL TESTOSTERONE: CPT

## 2022-03-17 PROCEDURE — 82962 GLUCOSE BLOOD TEST: CPT

## 2022-03-17 PROCEDURE — 84155 ASSAY OF PROTEIN SERUM: CPT

## 2022-03-17 PROCEDURE — 99214 OFFICE O/P EST MOD 30 MIN: CPT | Mod: 25 | Performed by: STUDENT IN AN ORGANIZED HEALTH CARE EDUCATION/TRAINING PROGRAM

## 2022-03-17 PROCEDURE — 83036 HEMOGLOBIN GLYCOSYLATED A1C: CPT

## 2022-03-17 PROCEDURE — 82043 UR ALBUMIN QUANTITATIVE: CPT

## 2022-03-17 PROCEDURE — 83735 ASSAY OF MAGNESIUM: CPT

## 2022-03-17 PROCEDURE — 82306 VITAMIN D 25 HYDROXY: CPT

## 2022-03-17 PROCEDURE — 85610 PROTHROMBIN TIME: CPT

## 2022-03-17 PROCEDURE — 36415 COLL VENOUS BLD VENIPUNCTURE: CPT

## 2022-03-17 PROCEDURE — 84443 ASSAY THYROID STIM HORMONE: CPT

## 2022-03-17 RX ADMIN — ALCOHOL 75 G: 65 GEL TOPICAL at 09:22

## 2022-03-17 ASSESSMENT — PAIN SCALES - GENERAL
PAINLEVEL: NO PAIN (0)
PAINLEVEL: NO PAIN (0)

## 2022-03-17 NOTE — LETTER
3/17/2022         RE: Demario Abbasi  [address]  [place] [address]        Dear Colleague,    Thank you for referring your patient, Demario Abbasi, to the [facility]. Please see a copy of my visit note below.    Demario is here for a glucose tolerance test for a history of Cystic Fibrosis.  Orders written by Dr. Lilly on 03/17/2022.  Demario arrived NPO, last eating at last evening @ 7 pm.  IV placed in left forearm without difficulty by LAVELLE Bowens LPN.  Requested labs drawn.  Glucose and Insulin levels drawn at -0- 915, +30 952, +60 1022, +90 1052, and + 120 1122.  Demario started drinking the Glucola at 922 and completed within 10 minutes.  Post blood sugar tested and was 173.  Snack given to patient prior to discharge.   PIV discontinued without difficulty.    Demaroi will follow up, as planned, with his CF team for test results and all future appointments.       LAVELLE Bowens LPN    Administrations This Visit     glucose tolerence test (GLUCOLA) 25% oral liquid 75 g     Admin Date  03/17/2022 Action  Given Dose  75 g Route  Oral Administered By  Dhruv Bowens LPN                  Again, thank you for allowing me to participate in the care of your patient.      Sincerely,    [facility]

## 2022-03-17 NOTE — PATIENT INSTRUCTIONS
Cystic Fibrosis Self-Care Plan    RECOMMENDATIONS:   Endocrine clinic appointment  Otherwise continue current medication, nebs and vest therapy.             [place] [facility] [place] Nurse line:  Nica Juarez [facility]  [phone]     [place] [facility] [place] Fax Number:      [phone]         Cystic Fibrosis Respiratory Therapists:   Margaret Werner              [phone]          May Rao   [phone]  Cystic Fibrosis Dietitians:              Sherie Aparicio              [phone]                            Saba Gill                        [phone]   Cystic Fibrosis Diabetes Nurse:    Ashley Cleveland   [phone]    Cystic Fibrosis Social Workers:     Prerna Paniagua               [phone]                     Courtney Fenton               [phone]  Cystic Fibrosis Pharmacists:           Ashley Kirby                               [phone]         Angeline Quinones      [phone]   Cystic Fibrosis Genetic Counselor:   Yuliana Ghotra    [phone]    [place] [facility] [place] website:  [email or web address]    COVID VACCINES:    You are eligible for the COVID-19 vaccine. Sign up for your COVID vaccine via [employer]. Log in, select the menu bar, select schedule an appointment, and then select COVID-19 Vaccine 1st Dose. You may also schedule by calling this number [phone] however hold times can be long.       OR schedule through the [place] Department of Health Vaccine Connector at https://vaccineconnector.[place].gov/ or by calling [phone].      The best vaccine is the one that s available to you first.  All COVID-19 vaccines currently available in the [place] (Rodrigo & Rodrigo, Pfizer and Moderna) have been shown to be highly effect at preventing COVID-19.       We re still learning how vaccines will affect the spread of COVID-19. After you ve been fully vaccinated against COVID-19, you should keep taking precautions in public places like wearing a mask, staying 6  feet apart from others, and avoiding crowds and poorly ventilated spaces until we know more.       MRN: [id number]   Clinic Date: March 17, 2022   Patient: Demario Abbasi     Annual Studies:   IGG   Date Value Ref Range Status   09/10/2020 1,739 (H) 610 - 1,616 mg/dL Final     Immunoglobulin G   Date Value Ref Range Status   03/17/2022 1,453 610-1,616 mg/dL Final     Insulin   Date Value Ref Range Status   03/17/2022 16.7 3.0 - 25.0 mU/L Final   02/22/2019 Canceled, Test credited 3 - 25 mU/L Final     Comment:     Unsatisfactory specimen - hemolyzed  NOTIFIED OZ PHILLIPS MD AT 1450 ON 2/22/19 BY JV       There are no preventive care reminders to display for this patient.    Pulmonary Function Tests  FEV1: amount of air you can blow out in 1 second  FVC: total amount of air you can take in and blow out    Your Goals:         PFT Latest Ref Rng & Units 3/17/2022   FVC L 3.65   FEV1 L 2.70   FVC% % 65   FEV1% % 57          Airway Clearance: The Most Important Way to Keep Your Lungs Healthy  Vest Settings:    Hill-Rom Frequencies: 8, 9, 10 Pressure 10 Then, Frequencies 18, 19, 20 Pressure 6      RespirTech: Quick Start with Pressure of     Do each frequency for 5 minutes; Deflate vest after each frequency & cough 3 times before beginning the next setting.    Vest and Neb Therapy should be done 2 times/day.    Good Nutrition Can Improve Lung Function and Overall Health     Take ALL of your vitamins with food     Take 1/2 of your enzymes before EVERY meal/snack and the other 1/2 mid-meal/snack    Wt Readings from Last 3 Encounters:   03/17/22 81 kg (178 lb 9.2 oz)   03/17/22 80.8 kg (178 lb 1.6 oz)   12/16/21 81.2 kg (179 lb)       Body mass index is 25.56 kg/m .         National CF Foundation Recommendations for BMI in CF Adults: Women: at least 22 Men: at least 23        Controlling Blood Sugars Helps Prevent Lung Infections & Improves Nutrition  Test blood sugar:     In the morning before eating (goal is  )     2 hours after a meal (goal is less than 150)     When pre-meal glucose is greater than 150 add correction     At bedtime (if less than 100 eat a snack with 15 grams of carbohydrates  Last A1C Results:   Hemoglobin A1C POCT   Date Value Ref Range Status   09/10/2020 5.7 (H) 0 - 5.6 % Final     Comment:     Normal <5.7% Prediabetes 5.7-6.4%  Diabetes 6.5% or higher - adopted from ADA   consensus guidelines.       Hemoglobin A1C   Date Value Ref Range Status   03/17/2022 5.6 0.0 - 5.6 % Final     Comment:     Normal <5.7%   Prediabetes 5.7-6.4%    Diabetes 6.5% or higher     Note: Adopted from ADA consensus guidelines.         If diabetic, measure A1C every 6 months. Goal: Under 7%    Staying Healthy    Research:  If you are interested in learning about research opportunities or have questions, please contact the CF Research Team at [phone] or [email or web address].      CF Foundation:  Compass is a personalized resource service to help you with the insurance, financial, legal and other issues you are facing.  It's free, confidential and available to anyone with CF.  Ask your  for more information or contact Compass directly at 087-FBLSBND ([phone]) or compass@cff.org, or learn more at cff.org/compass.

## 2022-03-17 NOTE — PROGRESS NOTES
Reason for Visit  Demario Abbasi is a 26 year old year old male who is being seen for Cystic Fibrosis (CF Follow up)    Assessment and plan:   Demario Abbasi is a 26 year old male with cystic fibrosis, pancreatic insufficiency, depression, acne and impaired glucose tolerance who is s/p RUL resection for recurrent exacerbations in 2009 with recent exacerbation in Dec 2021.  Maintenance   Modulator: Not eligible  Mutation:  [id number]/621+1g>t  AW Clearance: Vest BID  Bronchodilators:  Albuterol  Mucolytics: Pulmozyme, Hypertonic saline  Antibiotics Inh:  Bethkis  Antibiotics Oral: Azithromycin  Other:  Colonization Hx: Staph aureus (MSSA), Achromobacter xylosoxidans/denitrificans   Annual Studies: Due March 2022  Contraindications to standard of care:  CF/pulmonary: Patient with history of a pulmonary exacerbation in November-December 2021, requiring PICC line placement of 3 weeks of IV meropenem.  He no longer has any hemoptysis or rust colored sputum and states that his breathing is normal.  PFTs appear to be within his new baseline range compared to his previous 2 studies.   He will continue his current airway clearance therapy with albuterol, Pulmozyme and alternating months for his tobramycin inhaler.  He will continue azithromycin daily.   CFTR Modulation: Based on his genotype, the patient is not a candidate for CF TR modulators.  Pancreatic insufficiency: Patient denies symptoms of malabsorption.  He will continue his current pancreatic enzyme replacement and vitamins.  CF related diabetes: Oral glucose tolerance test 3/17 notable for poor insulin response and likely has CF related diabetes despite his A1c being normal. Will refer him to Dr. Cross of [facility] for further workup and management of CF related diabetes.  CF related sinusitis: No symptoms of active sinusitis at this time.  Iron deficiency anemia: Hemoglobin has normalized. Annual studies notable for normal iron storages.  Psychosocial:  The patient is gainfully employed in agriculture as an      Environmental allergies: Adequately controlled with Zyrtec, Pataday, Astelin and Flonase.  Reflux: Adequately controlled with pantoprazole.  Healthcare maintenance: The patient has received his flu vaccine, Covid vaccination and booster.  Annual studies including basic metabolic panel, LFTs, lipid battery, hemoglobin A1c, INR, TSH, total protein, albumin,  level, IgG, IgE, CBC, urinalysis,  CF sputum culture,  fungal culture were within normal limits.  Chest x-ray , fat-soluble vitamin studies, testosterone levels, AFB culture and nocardia culture remain pending at this time and will be reviewed at next appointment.  Return to clinic in 3 months for repeat PFTs and collection of cystic fibrosis cultures.  We will also review the outstanding labs noted above (fat-soluble vitamin testing, AFB and nocardia culture results).  Seen and discussed with Dr. Lilly.    Heriberto Rodriguez MD  [facility]    CF HPI  Demario Abbasi is a 26 year old male with cystic fibrosis, pancreatic insufficiency, depression, acne and impaired glucose tolerance who is s/p RUL resection for recurrent exacerbations in 2009.   He last required IV antibiotics in December 2021 for a pulmonary exacerbation.  He is here for follow-up today.  Patient reports returning to his baseline after completing his 3-week course of antibiotics in January 2022.  He reports jaquelin COVID-19 in February, and had 3 days of diaphoresis, fever and mild SOB. He states taking molnupiravir with quick resolution of his illness and a return to his baseline towards the end of the week.  Since recovering from his COVID-19 illness, patient reports doing well with his activities.  He continues to vest twice daily, and brings up clear sputum every morning.  Patient states he is taking his albuterol nebs, Pulmozyme, and tobramycin inhalers this month without issue.  Endorses good appetite, and notes  that his stools are normal in frequency and appearance.  No fever, chills, hemoptysis, chest pain, night sweats, unintentional weight loss, or sore throat.  His rhinorrhea is minimal, noting that it is not impacting his day-to-day routine.  He is not boosted for COVID, but plans to schedule his third shot in the near future.    Review of systems:  Appetite is good  No ear pain, sore throat, sinus pain or rhinorrhea  No palpitations  No nausea, vomiting, diarrhea or abdominal pain.  No myalgias or arthralgias  Denies anxiety or depression    A complete ROS was otherwise negative except as noted in the HPI.    Current Outpatient Medications   Medication     albuterol (PROAIR HFA) 108 (90 Base) MCG/ACT inhaler     albuterol (PROVENTIL) (2.5 MG/3ML) 0.083% neb solution     amylase-lipase-protease (CREON) 63795-615906-688717 units CPEP     azelastine (ASTELIN) 0.1 % nasal spray     azithromycin (ZITHROMAX) 500 MG tablet     cetirizine (ZYRTEC) 10 MG tablet     citalopram (CELEXA) 20 MG tablet     dornase alpha (PULMOZYME) 1 MG/ML neb solution     fluconazole (DIFLUCAN) 150 MG tablet     fluticasone (FLONASE) 50 MCG/ACT nasal spray     mvw complete formulation (SOFTGELS ) capsule     olopatadine (PATANOL) 0.1 % ophthalmic solution     pantoprazole (PROTONIX) 20 MG EC tablet     phytonadione (MEPHYTON) 5 MG tablet     sodium chloride inhalant 7 % NEBU neb solution     tobramycin (BETHKIS) 300 MG/4ML nebulizer solution     No current facility-administered medications for this visit.     Allergies   Allergen Reactions     Augmentin Diarrhea     Past Medical History:   Diagnosis Date     CF (cystic fibrosis) (H)     Genotype: [id number]/621+1g>t     Impaired glucose tolerance      Pancreatic insufficiency      S/P lobectomy of lung 8/4/2015    Right upper lobectomy - 2009       Past Surgical History:   Procedure Laterality Date     H STATISTIC PICC LINE INSERTION >5YR, FAILED Bilateral 03/15/2019    Stenosis in both arms,  "unable to thread catheter     HC LAP,INGUINAL HERNIA REPR,INITIAL  2002     INSERT PICC LINE Left 10/12/2020    Procedure: INSERTION, PICC @1100;  Surgeon: Felicia Branch MD;  Location: [facility] OR     INSERT PICC LINE Left 12/6/2021    Procedure: INSERTION, PICC;  Surgeon: Tahir Alvarado MD;  Location: [facility] OR     IR PICC PLACEMENT > 5 YRS OF AGE  03/18/2019     IR PICC PLACEMENT > 5 YRS OF AGE  12/30/2019     IR PICC PLACEMENT > 5 YRS OF AGE  10/12/2020     IR PICC PLACEMENT > 5 YRS OF AGE  12/6/2021     LOBECTOMY LUNG Right 2009    Right upper lobe       Social History     Socioeconomic History     Marital status: Single     Spouse name: Not on file     Number of children: Not on file     Years of education: Not on file     Highest education level: Not on file   Occupational History     Occupation: Sale    Tobacco Use     Smoking status: Passive Smoke Exposure - Never Smoker     Smokeless tobacco: Current User     Types: Chew     Tobacco comment: dad smokes   Substance and Sexual Activity     Alcohol use: Yes     Comment: <2 drinks per week     Drug use: Not on file     Sexual activity: Not on file   Other Topics Concern     Parent/sibling w/ CABG, MI or angioplasty before 65F 55M? Not Asked   Social History Narrative    12/27/2019  -Graduated college in 2020.  Looking for work as a sales [employer].       Social Determinants of Health     Financial Resource Strain: Not on file   Food Insecurity: Not on file   Transportation Needs: Not on file   Physical Activity: Not on file   Stress: Not on file   Social Connections: Not on file   Intimate Partner Violence: Not on file   Housing Stability: Not on file       /65   Pulse 77   Ht 1.78 m (5' 10.08\")   Wt 81 kg (178 lb 9.2 oz)   SpO2 97%   BMI 25.56 kg/m    Body mass index is 25.56 kg/m .  Exam:   GENERAL APPEARANCE: Well developed, well nourished, alert, and in no apparent distress.  EYES: PERRL, EOMI  HENT: Nasal mucosa with edema and no " hyperemia. No nasal polyps.  EARS: Canals clear, TMs normal  MOUTH: Oral mucosa is moist, without any lesions, no tonsillar enlargement, no oropharyngeal exudate.  NECK: supple, no masses, no thyromegaly.  RESP: normal percussion, good air flow throughout. Crackles at the bases bilaterally, L >R..  CV: Normal S1, S2, regular rhythm, normal rate. No murmur.  No rub. No gallop. No LE edema.   ABDOMEN:  Bowel sounds normal, soft, nontender, no HSM or masses.   MS: extremities normal. No clubbing. No cyanosis.  SKIN: no rash on limited exam  NEURO: Mentation intact, speech normal, normal strength and tone, normal gait and stance  PSYCH: mentation appears normal. and affect normal/bright  Results:  Recent Results (from the past 168 hour(s))   ALT    Collection Time: 03/17/22  9:15 AM   Result Value Ref Range    ALT 39 0 - 70 U/L   Basic metabolic panel    Collection Time: 03/17/22  9:15 AM   Result Value Ref Range    Sodium 137 133 - 144 mmol/L    Potassium 4.2 3.4 - 5.3 mmol/L    Chloride 108 94 - 109 mmol/L    Carbon Dioxide (CO2) 23 20 - 32 mmol/L    Anion Gap 6 3 - 14 mmol/L    Urea Nitrogen 16 7 - 30 mg/dL    Creatinine 0.96 0.66 - 1.25 mg/dL    Calcium 8.7 8.5 - 10.1 mg/dL    Glucose 122 (H) 70 - 99 mg/dL    GFR Estimate >90 >60 mL/min/1.73m2   Lipid Profile    Collection Time: 03/17/22  9:15 AM   Result Value Ref Range    Cholesterol 96 <200 mg/dL    Triglycerides 109 <150 mg/dL    Direct Measure HDL 38 (L) >=40 mg/dL    LDL Cholesterol Calculated 36 <=100 mg/dL    Non HDL Cholesterol 58 <130 mg/dL    Patient Fasting > 8hrs? Yes    Albumin level    Collection Time: 03/17/22  9:15 AM   Result Value Ref Range    Albumin 3.8 3.4 - 5.0 g/dL   Alkaline phosphatase    Collection Time: 03/17/22  9:15 AM   Result Value Ref Range    Alkaline Phosphatase 117 40 - 150 U/L   AST    Collection Time: 03/17/22  9:15 AM   Result Value Ref Range    AST 26 0 - 45 U/L   Iron    Collection Time: 03/17/22  9:15 AM   Result Value Ref  Range    Iron 94 35 - 180 ug/dL   GGT    Collection Time: 03/17/22  9:15 AM   Result Value Ref Range    GGT 23 0 - 75 U/L   Hemoglobin A1c    Collection Time: 03/17/22  9:15 AM   Result Value Ref Range    Hemoglobin A1C 5.6 0.0 - 5.6 %   IgG    Collection Time: 03/17/22  9:15 AM   Result Value Ref Range    Immunoglobulin G 1,453 610-1,616 mg/dL   INR    Collection Time: 03/17/22  9:15 AM   Result Value Ref Range    INR 1.09 0.85 - 1.15   Magnesium    Collection Time: 03/17/22  9:15 AM   Result Value Ref Range    Magnesium 2.2 1.6 - 2.3 mg/dL   Phosphorus    Collection Time: 03/17/22  9:15 AM   Result Value Ref Range    Phosphorus 2.9 2.5 - 4.5 mg/dL   Protein total    Collection Time: 03/17/22  9:15 AM   Result Value Ref Range    Protein Total 7.8 6.8 - 8.8 g/dL   TSH with free T4 reflex    Collection Time: 03/17/22  9:15 AM   Result Value Ref Range    TSH 1.34 0.40 - 4.00 mU/L   Erythrocyte sedimentation rate auto    Collection Time: 03/17/22  9:15 AM   Result Value Ref Range    Erythrocyte Sedimentation Rate 7 0 - 15 mm/hr   CBC with platelets and differential    Collection Time: 03/17/22  9:15 AM   Result Value Ref Range    WBC Count 7.1 4.0 - 11.0 10e3/uL    RBC Count 5.15 4.40 - 5.90 10e6/uL    Hemoglobin 15.4 13.3 - 17.7 g/dL    Hematocrit 44.3 40.0 - 53.0 %    MCV 86 78 - 100 fL    MCH 29.9 26.5 - 33.0 pg    MCHC 34.8 31.5 - 36.5 g/dL    RDW 12.9 10.0 - 15.0 %    Platelet Count 260 150 - 450 10e3/uL    % Neutrophils 52 %    % Lymphocytes 33 %    % Monocytes 8 %    % Eosinophils 5 %    % Basophils 2 %    % Immature Granulocytes 0 %    NRBCs per 100 WBC 0 <1 /100    Absolute Neutrophils 3.7 1.6 - 8.3 10e3/uL    Absolute Lymphocytes 2.4 0.8 - 5.3 10e3/uL    Absolute Monocytes 0.5 0.0 - 1.3 10e3/uL    Absolute Eosinophils 0.4 0.0 - 0.7 10e3/uL    Absolute Basophils 0.1 0.0 - 0.2 10e3/uL    Absolute Immature Granulocytes 0.0 <=0.4 10e3/uL    Absolute NRBCs 0.0 10e3/uL   Glucose in a Series: Draw Time Zero     Collection Time: 03/17/22  9:15 AM   Result Value Ref Range    Glucose 123 (H) 70 - 99 mg/dL   Insulin in a Series: Draw Time Zero    Collection Time: 03/17/22  9:15 AM   Result Value Ref Range    Insulin 5.7 3.0 - 25.0 mU/L   Glucose in a Series: Draw +30 minutes    Collection Time: 03/17/22  9:52 AM   Result Value Ref Range    Glucose 231 (H) 70 - 99 mg/dL   Insulin in a Series: Draw +30 minutes    Collection Time: 03/17/22  9:52 AM   Result Value Ref Range    Insulin 13.1 3.0 - 25.0 mU/L   Glucose in a Series: Draw +60 minutes    Collection Time: 03/17/22 10:22 AM   Result Value Ref Range    Glucose 299 (H) 70 - 99 mg/dL   Insulin in a Series: Draw +60 minutes    Collection Time: 03/17/22 10:22 AM   Result Value Ref Range    Insulin 25.4 (H) 3.0 - 25.0 mU/L   Glucose in a Series: Draw +90 minutes    Collection Time: 03/17/22 10:52 AM   Result Value Ref Range    Glucose 302 (H) 70 - 99 mg/dL   Insulin in a Series: Draw +90 minutes    Collection Time: 03/17/22 10:52 AM   Result Value Ref Range    Insulin 36.0 (H) 3.0 - 25.0 mU/L   Glucose in a Series: Draw +120 minutes    Collection Time: 03/17/22 11:22 AM   Result Value Ref Range    Glucose 181 (H) 70 - 99 mg/dL   Glucose by meter    Collection Time: 03/17/22 11:22 AM   Result Value Ref Range    GLUCOSE BY METER POCT 173 (H) 70 - 99 mg/dL   Routine UA with microscopic    Collection Time: 03/17/22 11:27 AM   Result Value Ref Range    Color Urine Yellow Colorless, Straw, Light Yellow, Yellow    Appearance Urine Clear Clear    Glucose Urine 250  (A) Negative mg/dL    Bilirubin Urine Negative Negative    Ketones Urine Negative Negative mg/dL    Specific Gravity Urine 1.025 1.003 - 1.035    Blood Urine Negative Negative    pH Urine 6.0 5.0 - 7.0    Protein Albumin Urine Negative Negative mg/dL    Urobilinogen Urine Normal Normal, 2.0 mg/dL    Nitrite Urine Negative Negative    Leukocyte Esterase Urine Negative Negative    RBC Urine <1 <=2 /HPF    WBC Urine <1 <=5 /HPF    Albumin Random Urine Quantitative with Creat Ratio    Collection Time: 03/17/22 11:27 AM   Result Value Ref Range    Creatinine Urine mg/dL 128 mg/dL    Albumin Urine mg/L <5 mg/L    Albumin Urine mg/g Cr     General PFT Lab (Please always keep checked)    Collection Time: 03/17/22  2:33 PM   Result Value Ref Range    FVC-Pred 5.59 L    FVC-Pre 3.65 L    FVC-%Pred-Pre 65 %    FEV1-Pre 2.70 L    FEV1-%Pred-Pre 57 %    FEV1FVC-Pred 84 %    FEV1FVC-Pre 74 %    FEFMax-Pred 10.26 L/sec    FEFMax-Pre 7.19 L/sec    FEFMax-%Pred-Pre 70 %    FEF2575-Pred 4.88 L/sec    FEF2575-Pre 2.02 L/sec    YQF2386-%Pred-Pre 41 %    ExpTime-Pre 8.32 sec    FIFMax-Pre 4.17 L/sec    FEV1FEV6-Pred 84 %    FEV1FEV6-Pre 75 %              Results as noted above.    PFT Interpretation:  Possible moderately severe restrictive ventilatory defect.  Compared to prior PFTs, within baseline range.  Valid Maneuver      CF Exacerbation  Absent

## 2022-03-17 NOTE — LETTER
3/17/2022         RE: Demario Abbasi  [address]  [place] [address]        Dear Colleague,    Thank you for referring your patient, Demario Abbasi, to the [facility] FOR LUNG SCIENCE AND [facility]. Please see a copy of my visit note below.    Reason for Visit  Demario Abbasi is a 26 year old year old male who is being seen for Cystic Fibrosis (CF Follow up)    Assessment and plan:   Demario Abbasi is a 26 year old male with cystic fibrosis, pancreatic insufficiency, depression, acne and impaired glucose tolerance who is s/p RUL resection for recurrent exacerbations in 2009 with recent exacerbation in Dec 2021.  Maintenance   Modulator: Not eligible  Mutation:  [id number]/621+1g>t  AW Clearance: Vest BID  Bronchodilators:  Albuterol  Mucolytics: Pulmozyme, Hypertonic saline  Antibiotics Inh:  Bethkis  Antibiotics Oral: Azithromycin  Other:  Colonization Hx: Staph aureus (MSSA), Achromobacter xylosoxidans/denitrificans   Annual Studies: Due March 2022  Contraindications to standard of care:  CF/pulmonary: Patient with history of a pulmonary exacerbation in November-December 2021, requiring PICC line placement of 3 weeks of IV meropenem.  He no longer has any hemoptysis or rust colored sputum and states that his breathing is normal.  PFTs appear to be within his new baseline range compared to his previous 2 studies.   He will continue his current airway clearance therapy with albuterol, Pulmozyme and alternating months for his tobramycin inhaler.  He will continue azithromycin daily.   CFTR Modulation: Based on his genotype, the patient is not a candidate for CF TR modulators.  Pancreatic insufficiency: Patient denies symptoms of malabsorption.  He will continue his current pancreatic enzyme replacement and vitamins.  CF related diabetes: Oral glucose tolerance test 3/17 notable for poor insulin response and likely has CF related diabetes despite his A1c being  normal. Will refer him to Dr. Cross of CF Endocrine for further workup and management of CF related diabetes.  CF related sinusitis: No symptoms of active sinusitis at this time.  Iron deficiency anemia: Hemoglobin has normalized. Annual studies notable for normal iron storages.  Psychosocial: The patient is gainfully employed in agriculture as an      Environmental allergies: Adequately controlled with Zyrtec, Pataday, Astelin and Flonase.  Reflux: Adequately controlled with pantoprazole.  Healthcare maintenance: The patient has received his flu vaccine, Covid vaccination and booster.  Annual studies including basic metabolic panel, LFTs, lipid battery, hemoglobin A1c, INR, TSH, total protein, albumin,  level, IgG, IgE, CBC, urinalysis,  CF sputum culture,  fungal culture were within normal limits.  Chest x-ray , fat-soluble vitamin studies, testosterone levels, AFB culture and nocardia culture remain pending at this time and will be reviewed at next appointment.  Return to clinic in 3 months for repeat PFTs and collection of cystic fibrosis cultures.  We will also review the outstanding labs noted above (fat-soluble vitamin testing, AFB and nocardia culture results).  Seen and discussed with Dr. Lilly.    Heriberto Rodriguez MD  Pulm/[facility]    CF HPI  Demario Abbasi is a 26 year old male with cystic fibrosis, pancreatic insufficiency, depression, acne and impaired glucose tolerance who is s/p RUL resection for recurrent exacerbations in 2009.   He last required IV antibiotics in December 2021 for a pulmonary exacerbation.  He is here for follow-up today.  Patient reports returning to his baseline after completing his 3-week course of antibiotics in January 2022.  He reports jaquelin COVID-19 in February, and had 3 days of diaphoresis, fever and mild SOB. He states taking molnupiravir with quick resolution of his illness and a return to his baseline towards the end of the week.  Since recovering  from his COVID-19 illness, patient reports doing well with his activities.  He continues to vest twice daily, and brings up clear sputum every morning.  Patient states he is taking his albuterol nebs, Pulmozyme, and tobramycin inhalers this month without issue.  Endorses good appetite, and notes that his stools are normal in frequency and appearance.  No fever, chills, hemoptysis, chest pain, night sweats, unintentional weight loss, or sore throat.  His rhinorrhea is minimal, noting that it is not impacting his day-to-day routine.  He is not boosted for COVID, but plans to schedule his third shot in the near future.    Review of systems:  Appetite is good  No ear pain, sore throat, sinus pain or rhinorrhea  No palpitations  No nausea, vomiting, diarrhea or abdominal pain.  No myalgias or arthralgias  Denies anxiety or depression    A complete ROS was otherwise negative except as noted in the HPI.    Current Outpatient Medications   Medication     albuterol (PROAIR HFA) 108 (90 Base) MCG/ACT inhaler     albuterol (PROVENTIL) (2.5 MG/3ML) 0.083% neb solution     amylase-lipase-protease (CREON) 27588-069417-176829 units CPEP     azelastine (ASTELIN) 0.1 % nasal spray     azithromycin (ZITHROMAX) 500 MG tablet     cetirizine (ZYRTEC) 10 MG tablet     citalopram (CELEXA) 20 MG tablet     dornase alpha (PULMOZYME) 1 MG/ML neb solution     fluconazole (DIFLUCAN) 150 MG tablet     fluticasone (FLONASE) 50 MCG/ACT nasal spray     mvw complete formulation (SOFTGELS ) capsule     olopatadine (PATANOL) 0.1 % ophthalmic solution     pantoprazole (PROTONIX) 20 MG EC tablet     phytonadione (MEPHYTON) 5 MG tablet     sodium chloride inhalant 7 % NEBU neb solution     tobramycin (BETHKIS) 300 MG/4ML nebulizer solution     No current facility-administered medications for this visit.     Allergies   Allergen Reactions     Augmentin Diarrhea     Past Medical History:   Diagnosis Date     CF (cystic fibrosis) (H)     Genotype:  [id number]/621+1g>t     Impaired glucose tolerance      Pancreatic insufficiency      S/P lobectomy of lung 8/4/2015    Right upper lobectomy - 2009       Past Surgical History:   Procedure Laterality Date     H STATISTIC PICC LINE INSERTION >5YR, FAILED Bilateral 03/15/2019    Stenosis in both arms, unable to thread catheter     HC LAP,INGUINAL HERNIA REPR,INITIAL  2002     INSERT PICC LINE Left 10/12/2020    Procedure: INSERTION, PICC @1100;  Surgeon: Felicia Branch MD;  Location: [facility] OR     INSERT PICC LINE Left 12/6/2021    Procedure: INSERTION, PICC;  Surgeon: Tahir Alvarado MD;  Location: [facility] OR     IR PICC PLACEMENT > 5 YRS OF AGE  03/18/2019     IR PICC PLACEMENT > 5 YRS OF AGE  12/30/2019     IR PICC PLACEMENT > 5 YRS OF AGE  10/12/2020     IR PICC PLACEMENT > 5 YRS OF AGE  12/6/2021     LOBECTOMY LUNG Right 2009    Right upper lobe       Social History     Socioeconomic History     Marital status: Single     Spouse name: Not on file     Number of children: Not on file     Years of education: Not on file     Highest education level: Not on file   Occupational History     Occupation: [employer]   Tobacco Use     Smoking status: Passive Smoke Exposure - Never Smoker     Smokeless tobacco: Current User     Types: Chew     Tobacco comment: dad smokes   Substance and Sexual Activity     Alcohol use: Yes     Comment: <2 drinks per week     Drug use: Not on file     Sexual activity: Not on file   Other Topics Concern     Parent/sibling w/ CABG, MI or angioplasty before 65F 55M? Not Asked   Social History Narrative    12/27/2019  -Graduated college in 2020.  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       Social Determinants of Health     Financial Resource Strain: Not on file   Food Insecurity: Not on file   Transportation Needs: Not on file   Physical Activity: Not on file   Stress: Not on file   Social Connections: Not on file   Intimate Partner Violence: Not on file   Housing Stability: Not on file  "      /65   Pulse 77   Ht 1.78 m (5' 10.08\")   Wt 81 kg (178 lb 9.2 oz)   SpO2 97%   BMI 25.56 kg/m    Body mass index is 25.56 kg/m .  Exam:   GENERAL APPEARANCE: Well developed, well nourished, alert, and in no apparent distress.  EYES: PERRL, EOMI  HENT: Nasal mucosa with edema and no hyperemia. No nasal polyps.  EARS: Canals clear, TMs normal  MOUTH: Oral mucosa is moist, without any lesions, no tonsillar enlargement, no oropharyngeal exudate.  NECK: supple, no masses, no thyromegaly.  RESP: normal percussion, good air flow throughout. Crackles at the bases bilaterally, L >R..  CV: Normal S1, S2, regular rhythm, normal rate. No murmur.  No rub. No gallop. No LE edema.   ABDOMEN:  Bowel sounds normal, soft, nontender, no HSM or masses.   MS: extremities normal. No clubbing. No cyanosis.  SKIN: no rash on limited exam  NEURO: Mentation intact, speech normal, normal strength and tone, normal gait and stance  PSYCH: mentation appears normal. and affect normal/bright  Results:  Recent Results (from the past 168 hour(s))   ALT    Collection Time: 03/17/22  9:15 AM   Result Value Ref Range    ALT 39 0 - 70 U/L   Basic metabolic panel    Collection Time: 03/17/22  9:15 AM   Result Value Ref Range    Sodium 137 133 - 144 mmol/L    Potassium 4.2 3.4 - 5.3 mmol/L    Chloride 108 94 - 109 mmol/L    Carbon Dioxide (CO2) 23 20 - 32 mmol/L    Anion Gap 6 3 - 14 mmol/L    Urea Nitrogen 16 7 - 30 mg/dL    Creatinine 0.96 0.66 - 1.25 mg/dL    Calcium 8.7 8.5 - 10.1 mg/dL    Glucose 122 (H) 70 - 99 mg/dL    GFR Estimate >90 >60 mL/min/1.73m2   Lipid Profile    Collection Time: 03/17/22  9:15 AM   Result Value Ref Range    Cholesterol 96 <200 mg/dL    Triglycerides 109 <150 mg/dL    Direct Measure HDL 38 (L) >=40 mg/dL    LDL Cholesterol Calculated 36 <=100 mg/dL    Non HDL Cholesterol 58 <130 mg/dL    Patient Fasting > 8hrs? Yes    Albumin level    Collection Time: 03/17/22  9:15 AM   Result Value Ref Range    Albumin " 3.8 3.4 - 5.0 g/dL   Alkaline phosphatase    Collection Time: 03/17/22  9:15 AM   Result Value Ref Range    Alkaline Phosphatase 117 40 - 150 U/L   AST    Collection Time: 03/17/22  9:15 AM   Result Value Ref Range    AST 26 0 - 45 U/L   Iron    Collection Time: 03/17/22  9:15 AM   Result Value Ref Range    Iron 94 35 - 180 ug/dL   GGT    Collection Time: 03/17/22  9:15 AM   Result Value Ref Range    GGT 23 0 - 75 U/L   Hemoglobin A1c    Collection Time: 03/17/22  9:15 AM   Result Value Ref Range    Hemoglobin A1C 5.6 0.0 - 5.6 %   IgG    Collection Time: 03/17/22  9:15 AM   Result Value Ref Range    Immunoglobulin G 1,453 610-1,616 mg/dL   INR    Collection Time: 03/17/22  9:15 AM   Result Value Ref Range    INR 1.09 0.85 - 1.15   Magnesium    Collection Time: 03/17/22  9:15 AM   Result Value Ref Range    Magnesium 2.2 1.6 - 2.3 mg/dL   Phosphorus    Collection Time: 03/17/22  9:15 AM   Result Value Ref Range    Phosphorus 2.9 2.5 - 4.5 mg/dL   Protein total    Collection Time: 03/17/22  9:15 AM   Result Value Ref Range    Protein Total 7.8 6.8 - 8.8 g/dL   TSH with free T4 reflex    Collection Time: 03/17/22  9:15 AM   Result Value Ref Range    TSH 1.34 0.40 - 4.00 mU/L   Erythrocyte sedimentation rate auto    Collection Time: 03/17/22  9:15 AM   Result Value Ref Range    Erythrocyte Sedimentation Rate 7 0 - 15 mm/hr   CBC with platelets and differential    Collection Time: 03/17/22  9:15 AM   Result Value Ref Range    WBC Count 7.1 4.0 - 11.0 10e3/uL    RBC Count 5.15 4.40 - 5.90 10e6/uL    Hemoglobin 15.4 13.3 - 17.7 g/dL    Hematocrit 44.3 40.0 - 53.0 %    MCV 86 78 - 100 fL    MCH 29.9 26.5 - 33.0 pg    MCHC 34.8 31.5 - 36.5 g/dL    RDW 12.9 10.0 - 15.0 %    Platelet Count 260 150 - 450 10e3/uL    % Neutrophils 52 %    % Lymphocytes 33 %    % Monocytes 8 %    % Eosinophils 5 %    % Basophils 2 %    % Immature Granulocytes 0 %    NRBCs per 100 WBC 0 <1 /100    Absolute Neutrophils 3.7 1.6 - 8.3 10e3/uL     Absolute Lymphocytes 2.4 0.8 - 5.3 10e3/uL    Absolute Monocytes 0.5 0.0 - 1.3 10e3/uL    Absolute Eosinophils 0.4 0.0 - 0.7 10e3/uL    Absolute Basophils 0.1 0.0 - 0.2 10e3/uL    Absolute Immature Granulocytes 0.0 <=0.4 10e3/uL    Absolute NRBCs 0.0 10e3/uL   Glucose in a Series: Draw Time Zero    Collection Time: 03/17/22  9:15 AM   Result Value Ref Range    Glucose 123 (H) 70 - 99 mg/dL   Insulin in a Series: Draw Time Zero    Collection Time: 03/17/22  9:15 AM   Result Value Ref Range    Insulin 5.7 3.0 - 25.0 mU/L   Glucose in a Series: Draw +30 minutes    Collection Time: 03/17/22  9:52 AM   Result Value Ref Range    Glucose 231 (H) 70 - 99 mg/dL   Insulin in a Series: Draw +30 minutes    Collection Time: 03/17/22  9:52 AM   Result Value Ref Range    Insulin 13.1 3.0 - 25.0 mU/L   Glucose in a Series: Draw +60 minutes    Collection Time: 03/17/22 10:22 AM   Result Value Ref Range    Glucose 299 (H) 70 - 99 mg/dL   Insulin in a Series: Draw +60 minutes    Collection Time: 03/17/22 10:22 AM   Result Value Ref Range    Insulin 25.4 (H) 3.0 - 25.0 mU/L   Glucose in a Series: Draw +90 minutes    Collection Time: 03/17/22 10:52 AM   Result Value Ref Range    Glucose 302 (H) 70 - 99 mg/dL   Insulin in a Series: Draw +90 minutes    Collection Time: 03/17/22 10:52 AM   Result Value Ref Range    Insulin 36.0 (H) 3.0 - 25.0 mU/L   Glucose in a Series: Draw +120 minutes    Collection Time: 03/17/22 11:22 AM   Result Value Ref Range    Glucose 181 (H) 70 - 99 mg/dL   Glucose by meter    Collection Time: 03/17/22 11:22 AM   Result Value Ref Range    GLUCOSE BY METER POCT 173 (H) 70 - 99 mg/dL   Routine UA with microscopic    Collection Time: 03/17/22 11:27 AM   Result Value Ref Range    Color Urine Yellow Colorless, Straw, Light Yellow, Yellow    Appearance Urine Clear Clear    Glucose Urine 250  (A) Negative mg/dL    Bilirubin Urine Negative Negative    Ketones Urine Negative Negative mg/dL    Specific Gravity Urine 1.025  1.003 - 1.035    Blood Urine Negative Negative    pH Urine 6.0 5.0 - 7.0    Protein Albumin Urine Negative Negative mg/dL    Urobilinogen Urine Normal Normal, 2.0 mg/dL    Nitrite Urine Negative Negative    Leukocyte Esterase Urine Negative Negative    RBC Urine <1 <=2 /HPF    WBC Urine <1 <=5 /HPF   Albumin Random Urine Quantitative with Creat Ratio    Collection Time: 03/17/22 11:27 AM   Result Value Ref Range    Creatinine Urine mg/dL 128 mg/dL    Albumin Urine mg/L <5 mg/L    Albumin Urine mg/g Cr     General PFT Lab (Please always keep checked)    Collection Time: 03/17/22  2:33 PM   Result Value Ref Range    FVC-Pred 5.59 L    FVC-Pre 3.65 L    FVC-%Pred-Pre 65 %    FEV1-Pre 2.70 L    FEV1-%Pred-Pre 57 %    FEV1FVC-Pred 84 %    FEV1FVC-Pre 74 %    FEFMax-Pred 10.26 L/sec    FEFMax-Pre 7.19 L/sec    FEFMax-%Pred-Pre 70 %    FEF2575-Pred 4.88 L/sec    FEF2575-Pre 2.02 L/sec    HAD4558-%Pred-Pre 41 %    ExpTime-Pre 8.32 sec    FIFMax-Pre 4.17 L/sec    FEV1FEV6-Pred 84 %    FEV1FEV6-Pre 75 %              Results as noted above.    PFT Interpretation:  Possible moderately severe restrictive ventilatory defect.  Compared to prior PFTs, within baseline range.  Valid Maneuver      CF Exacerbation  Absent        Respiratory Therapist Note:         Vest                Brand: Hill-Rom - traditional Hill Rom: Frequencies 8, 9, 10 at pressure 10 then frequencies 18, 19, 20 at pressure 6. and Hill-Rom - [place] Frequencies: 13-15, intensity 8-10                Cough Pause: Cough Pause; Yes                Vest Garment Size: Adult Medium                Last Fitting Date: 2022                Frequency of therapy: 14 times per week                Concerns: none         Exercise (purposeful and aerobic for >20 minutes each session): NO.                Does this qualify as additional airway clearance: No         Alternative Airway Clearance:               Nebulized Medications                Bronchodilators: Albuterol                 Mucolytic: Pulmozyme                Antibiotics: COBY                Additional Inhaled Medications:                 Spacer Use:          Review Cleaning: Yes. Top rack of .         Education and Transition Information                Correct order of inhaled medications: Yes                Mechanism of Action of inhaled medications: Yes                Frequency of inhaled medications: Yes                Dosage of inhaled medications: Yes                Other:          Home Care:                Nebulizer Cups (Brand/Type): Carrie                Nebulizer Compressor                            Year Purchased: 2019                            [facility], Phone: [phone], Fax: [phone]                Nebulizer Supply Company:                            [facility], Phone: [phone], Fax: [phone]         Oxygen:                            [facility], Phone: [phone], Fax: [phone]         Pulmonary Rehab                Site:                 Date Completed:          Plan of Care and Goals for next visit: Keep up the good work!      Again, thank you for allowing me to participate in the care of your patient.        Sincerely,        Heriberto Rodriguez MD

## 2022-03-17 NOTE — NURSING NOTE
Chief Complaint   Patient presents with     Cystic Fibrosis     CF Follow up     Vitals were taken and medications were reconciled.    Khadra Mckoy RMA  3:09 PM

## 2022-03-17 NOTE — NURSING NOTE
Demario Abbasi is a 27 year old year old who is being seen for Cystic Fibrosis (CF Follow up)      Medications reviewed and Vital signs taken.    Specimen Collection Type: Sputum    Order(s) placed: CF Aerobic Bacterial (Not enough for AFB)        No results found for: ACIDFAST      Lab Results   Component Value Date    AFBSMS Negative for acid fast bacteria 09/10/2020    AFBSMS  09/10/2020     Less than 5ml of specimen received.  A minimum of 5 mL of sputum or fluid is recommended for recovery of acid fast bacilli   (AFB).  Volumes less than 5 mL are suboptimal and may compromise recovery of AFB from   culture.      AFBSMS  09/10/2020     Assayed at [facility] [address] [place] [id number] [phone]

## 2022-03-17 NOTE — PROGRESS NOTES
Demario is here for a glucose tolerance test for a history of Cystic Fibrosis.  Orders written by Dr. Lilly on 03/17/2022.  Demario arrived NPO, last eating at last evening @ 7 pm.  IV placed in left forearm without difficulty by LAVELLE Bowens LPN.  Requested labs drawn.  Glucose and Insulin levels drawn at -0- 915, +30 952, +60 1022, +90 1052, and + 120 1122.  Demario started drinking the Glucola at 922 and completed within 10 minutes.  Post blood sugar tested and was 173.  Snack given to patient prior to discharge.   PIV discontinued without difficulty.    Demario will follow up, as planned, with his CF team for test results and all future appointments.       LAVELLE Bowens LPN    Administrations This Visit     glucose tolerence test (GLUCOLA) 25% oral liquid 75 g     Admin Date  03/17/2022 Action  Given Dose  75 g Route  Oral Administered By  Dhruv Bowens LPN

## 2022-03-18 LAB
IGE SERPL-ACNC: 9 KU/L (ref 0–114)
TESTOST SERPL-MCNC: 164 NG/DL (ref 240–950)

## 2022-03-20 LAB
A-TOCOPHEROL VIT E SERPL-MCNC: 8.1 MG/L
ANNOTATION COMMENT IMP: NORMAL
BETA+GAMMA TOCOPHEROL SERPL-MCNC: 0.3 MG/L
RETINYL PALMITATE SERPL-MCNC: <0.02 MG/L
VIT A SERPL-MCNC: 0.54 MG/L

## 2022-03-21 DIAGNOSIS — E84.9 CF (CYSTIC FIBROSIS) (H): ICD-10-CM

## 2022-03-21 DIAGNOSIS — E84.0 CYSTIC FIBROSIS WITH PULMONARY MANIFESTATIONS (H): ICD-10-CM

## 2022-03-21 DIAGNOSIS — E84.9 CYSTIC FIBROSIS (H): ICD-10-CM

## 2022-03-21 DIAGNOSIS — K86.89 PANCREATIC INSUFFICIENCY: ICD-10-CM

## 2022-03-21 RX ORDER — PEDIATRIC MULTIVIT 61/D3/VIT K 1500-800
CAPSULE ORAL
Qty: 60 CAPSULE | Refills: 11 | Status: SHIPPED | OUTPATIENT
Start: 2022-03-21 | End: 2023-03-23

## 2022-03-21 RX ORDER — TOBRAMYCIN INHALATION 300 MG/4ML
300 SOLUTION RESPIRATORY (INHALATION) 2 TIMES DAILY
Qty: 224 ML | Refills: 5 | Status: SHIPPED | OUTPATIENT
Start: 2022-03-21 | End: 2023-03-23

## 2022-03-29 LAB
BACTERIA SPT CULT: ABNORMAL

## 2022-03-30 NOTE — PROGRESS NOTES
Respiratory Therapist Note:         Vest                Brand: Hill-Rom - traditional Hill Rom: Frequencies 8, 9, 10 at pressure 10 then frequencies 18, 19, 20 at pressure 6. and Hill-Rom - [place] Frequencies: 13-15, intensity 8-10                Cough Pause: Cough Pause; Yes                Vest Garment Size: Adult Medium                Last Fitting Date: 2022                Frequency of therapy: 14 times per week                Concerns: none         Exercise (purposeful and aerobic for >20 minutes each session): NO.                Does this qualify as additional airway clearance: No         Alternative Airway Clearance:               Nebulized Medications                Bronchodilators: Albuterol                Mucolytic: Pulmozyme                Antibiotics: COBY                Additional Inhaled Medications:                 Spacer Use:          Review Cleaning: Yes. Top rack of .         Education and Transition Information                Correct order of inhaled medications: Yes                Mechanism of Action of inhaled medications: Yes                Frequency of inhaled medications: Yes                Dosage of inhaled medications: Yes                Other:          Home Care:                Nebulizer Cups (Brand/Type): Carrie                Nebulizer Compressor                            Year Purchased: 2019                            [facility], Phone: [phone], Fax: [phone]                Nebulizer Supply Company:                            [facility], Phone: [phone], Fax: [phone]         Oxygen:                            [facility], Phone: [phone], Fax: [phone]         Pulmonary Rehab                Site:                 Date Completed:          Plan of Care and Goals for next visit: Keep up the good work!

## 2022-04-06 NOTE — PROGRESS NOTES
This is a recent snapshot of the patient's [facility] medical record.  For current drug dose and complete information and questions, call [phone] or In Basket pool, fv home infusion ([id number])  CSN Number:  [id number]

## 2022-04-07 NOTE — PROGRESS NOTES
This is a recent snapshot of the patient's [facility] medical record.  For current drug dose and complete information and questions, call [phone] or In Basket pool, fv home infusion ([id number])  CSN Number:  [id number]

## 2022-04-11 NOTE — PROGRESS NOTES
This is a recent snapshot of the patient's [facility] medical record.  For current drug dose and complete information and questions, call [phone] or In Basket pool, fv home infusion ([id number])  CSN Number:  [id number]

## 2022-04-12 DIAGNOSIS — E84.0 CYSTIC FIBROSIS WITH PULMONARY MANIFESTATIONS (H): ICD-10-CM

## 2022-04-12 RX ORDER — ALBUTEROL SULFATE 0.83 MG/ML
2.5 SOLUTION RESPIRATORY (INHALATION) 3 TIMES DAILY
Qty: 270 ML | Refills: 11 | Status: SHIPPED | OUTPATIENT
Start: 2022-04-12 | End: 2023-04-13

## 2022-04-22 DIAGNOSIS — E84.9 CF (CYSTIC FIBROSIS) (H): ICD-10-CM

## 2022-04-22 RX ORDER — AZITHROMYCIN 500 MG/1
TABLET, FILM COATED ORAL
Qty: 12 TABLET | Refills: 11 | Status: SHIPPED | OUTPATIENT
Start: 2022-04-22 | End: 2023-04-13

## 2022-04-22 RX ORDER — PANTOPRAZOLE SODIUM 20 MG/1
20 TABLET, DELAYED RELEASE ORAL DAILY
Qty: 30 TABLET | Refills: 11 | Status: SHIPPED | OUTPATIENT
Start: 2022-04-22 | End: 2023-04-13

## 2022-04-27 NOTE — PROGRESS NOTES
This is a recent snapshot of the patient's [facility] medical record.  For current drug dose and complete information and questions, call [phone] or In Basket pool, fv home infusion ([id number])  CSN Number:  [id number]

## 2022-05-04 NOTE — PROGRESS NOTES
This is a recent snapshot of the patient's [facility] medical record.  For current drug dose and complete information and questions, call [phone] or In Basket pool, fv home infusion ([id number])  CSN Number:  [id number]

## 2022-06-04 NOTE — NURSING NOTE
"Chief Complaint   Patient presents with     RECHECK     CF       Initial /65 (BP Location: Right arm, Patient Position: Supine, Cuff Size: Adult Regular)  Pulse 89  Ht 5' 10.28\" (178.5 cm)  Wt 157 lb 13.6 oz (71.6 kg)  SpO2 96%  BMI 22.47 kg/m2 Estimated body mass index is 22.47 kg/(m^2) as calculated from the following:    Height as of this encounter: 5' 10.28\" (178.5 cm).    Weight as of this encounter: 157 lb 13.6 oz (71.6 kg).  Medication Reconciliation: complete     Douglas Aquino LPN      " Physical Therapy  Visit Type: treatment  Precautions:  Medical precautions:  fall risk and cardiac precautions; standard precautions.  5/28: Pt presented with weakness, confusion black stools, and vomiting, found to be hyperglycemia and ALTAF (Pt reports not taking insulin at home)     PMH: HTN, HLD, DM, CKD, CHFpEF, CAD s/p PCI, GAYE, chronic LBP  Lines:     Basic: telemetry      Lines in chart and on patient reviewed, precautions maintained throughout session.  Safety Measures: bed alarm and bed rails    SUBJECTIVE  Patient agreed to participate in therapy this date.  \"I'm okay.\"  Patient / Family Goal: return to previous functional status, maximize function and return home    Pain     Location: Denied pain   RN informed on pain level     OBJECTIVE     Rest-Semi-zarate  • HR:  76  • BP: 149/78  • SpO2: 99%  • Supplemental O2 (in L): RA    During: sitting   • HR: 85  • BP: 121/69  • SpO2: 99%  • Supplemental O2 (in L): RA    Standing (Asymptomatic)  • HR: 87  • BP: 108/65  • SpO2: 97%  • Supplemental O2 (in L): RA    Returned to sitting: (asymptomatic)  • HR: 84  • BP: 131/79  • SpO2: 97%  • Supplemental O2 (in L): RA    Post Activity-Sitting in chair ( Pt. Performed lateral and anterior retro stepping)  • HR: 80  • BP: 130/74  • SpO2: 99%  • Supplemental O2 (in L): RA    Affect/Behavior: alert, appropriate, calm and cooperative    Bed Mobility:        Supine to sit: stand by assist    Sit to supine: stand by assist  Training completed:    Tasks: all aspects of bed mobility    Education details: patient safety  Transfers:    Assistive devices: 2-wheeled walker and 1 person    Sit to stand: minimal assist    Stand to sit: minimal assist  Training completed:    Tasks: sit to stand and stand to sit    Education details: patient safety  Gait/Ambulation:     Assistance: minimal assist   Assistive device: gait belt and 1 person    Distance (ft): 4; 10  Training Completed:    Tasks: gait training on level surfaces     Education details: patient safety    Slight retro lean with step to pattern      Interventions     Supine    Lower Extremity: Bilateral: heel slides, quad sets, ankle pumps, hip abduction and hip adduction, 10 reps,   Training provided: bed mobility training, HEP training, safety training, transfer training and gait training    Skilled input: Verbal instruction/cues  Verbal Consent: Writer verbally educated and received verbal consent for hand placement, positioning of patient, and techniques to be performed today from patient for clothing adjustments for techniques, hand placement and palpation for techniques and therapist position for techniques as described above and how they are pertinent to the patient's plan of care.       ASSESSMENT   Impairments: activity tolerance, balance and endurance  Functional Limitations: all functional mobility       Discharge Recommendations  Recommendation for Discharge: PT IL: Patient is appropriate for daily Physical Therapy  PT/OT Mobility Equipment for Discharge:   PT/OT ADL Equipment for Discharge: TBD at [facility]    Patient at End of Session:   Location: in chair  Handoff to: nurse assistant    Progress: progressing toward goals    • Skilled therapy is required to address these limitations in attempt to maximize the patient's independence.    Education Provided:   Learning Assessment:  - Primary learner: patient  - Are they ready to learn: yes  - Preferred learning style: verbal  - Barriers to learning: no barriers apparent at this time  Education provided during session:  - Receiving Education: patient  - Results of above outlined education: Verbalizes understanding    PLAN   Interventions: bed mobility, HEP train/position, safety education, functional transfer training and gait training  Agreement to plan and goals: patient agrees with goals and treatment plan        GOALS  Review Date: 6/4/2022  Long Term Goals: (to be met by time of discharge from hospital)  Sidelying to  sit: Patient will complete bed mobility for sidelying to sit modified independent.  Status: progressing/ongoing  Sit to sidelying: Patient will complete bed mobility for sit to sidelying modified independent.  Status: progressing/ongoing  Sit to stand: Patient will complete sit to stand transfer with 2-wheeled walker, supervision.   Status: progressing/ongoing  Stand to sit: Patient will complete stand to sit transfer with 2-wheeled walker, supervision.   Status: progressing/ongoing  Ambulation (even): Patient will ambulate on even surface for 100 feet with 2-wheeled walker, supervision.   Status: progressing/ongoing    Documented in the chart in the following areas: Assessment.      Therapy procedure time and total treatment time can be found documented on the Time Entry flowsheet

## 2022-06-16 ENCOUNTER — OFFICE VISIT (OUTPATIENT)
Dept: PULMONOLOGY | Facility: CLINIC | Age: 27
End: 2022-06-16
Attending: STUDENT IN AN ORGANIZED HEALTH CARE EDUCATION/TRAINING PROGRAM
Payer: COMMERCIAL

## 2022-06-16 VITALS
DIASTOLIC BLOOD PRESSURE: 70 MMHG | SYSTOLIC BLOOD PRESSURE: 111 MMHG | HEART RATE: 57 BPM | OXYGEN SATURATION: 96 % | HEIGHT: 70 IN | WEIGHT: 174 LBS | BODY MASS INDEX: 24.91 KG/M2

## 2022-06-16 DIAGNOSIS — K86.89 PANCREATIC INSUFFICIENCY: ICD-10-CM

## 2022-06-16 DIAGNOSIS — E84.9 CYSTIC FIBROSIS (H): ICD-10-CM

## 2022-06-16 DIAGNOSIS — E84.8 DIABETES MELLITUS RELATED TO CYSTIC FIBROSIS (H): Primary | ICD-10-CM

## 2022-06-16 DIAGNOSIS — E84.0 CYSTIC FIBROSIS WITH PULMONARY EXACERBATION (H): Primary | ICD-10-CM

## 2022-06-16 DIAGNOSIS — E84.9 CF (CYSTIC FIBROSIS) (H): ICD-10-CM

## 2022-06-16 DIAGNOSIS — E84.0 CYSTIC FIBROSIS WITH PULMONARY EXACERBATION (H): ICD-10-CM

## 2022-06-16 DIAGNOSIS — J30.2 SEASONAL ALLERGIES: ICD-10-CM

## 2022-06-16 DIAGNOSIS — E08.9 DIABETES MELLITUS RELATED TO CYSTIC FIBROSIS (H): Primary | ICD-10-CM

## 2022-06-16 LAB
EXPTIME-PRE: 8.31 SEC
FEF2575-%PRED-PRE: 48 %
FEF2575-PRE: 2.38 L/SEC
FEF2575-PRED: 4.88 L/SEC
FEFMAX-%PRED-PRE: 71 %
FEFMAX-PRE: 7.38 L/SEC
FEFMAX-PRED: 10.26 L/SEC
FEV1-%PRED-PRE: 60 %
FEV1-PRE: 2.8 L
FEV1FEV6-PRE: 78 %
FEV1FEV6-PRED: 84 %
FEV1FVC-PRE: 78 %
FEV1FVC-PRED: 84 %
FIFMAX-PRE: 4.74 L/SEC
FVC-%PRED-PRE: 64 %
FVC-PRE: 3.61 L
FVC-PRED: 5.59 L

## 2022-06-16 PROCEDURE — 99214 OFFICE O/P EST MOD 30 MIN: CPT | Mod: GC | Performed by: STUDENT IN AN ORGANIZED HEALTH CARE EDUCATION/TRAINING PROGRAM

## 2022-06-16 PROCEDURE — G0463 HOSPITAL OUTPT CLINIC VISIT: HCPCS | Mod: 25

## 2022-06-16 PROCEDURE — 99207 PR RESPIRATORY FLOW VOLUME LOOP: CPT

## 2022-06-16 PROCEDURE — 87070 CULTURE OTHR SPECIMN AEROBIC: CPT | Performed by: STUDENT IN AN ORGANIZED HEALTH CARE EDUCATION/TRAINING PROGRAM

## 2022-06-16 RX ORDER — AZELASTINE 1 MG/ML
1 SPRAY, METERED NASAL 2 TIMES DAILY
Qty: 30 ML | Refills: 11 | Status: SHIPPED | OUTPATIENT
Start: 2022-06-16 | End: 2023-04-13

## 2022-06-16 RX ORDER — OLOPATADINE HYDROCHLORIDE 1 MG/ML
1 SOLUTION/ DROPS OPHTHALMIC 2 TIMES DAILY
Qty: 5 ML | Refills: 11 | Status: SHIPPED | OUTPATIENT
Start: 2022-06-16 | End: 2023-04-13

## 2022-06-16 RX ORDER — FLUTICASONE PROPIONATE 50 MCG
2 SPRAY, SUSPENSION (ML) NASAL DAILY
Qty: 16 G | Refills: 11 | Status: SHIPPED | OUTPATIENT
Start: 2022-06-16 | End: 2023-04-13

## 2022-06-16 ASSESSMENT — PAIN SCALES - GENERAL: PAINLEVEL: NO PAIN (0)

## 2022-06-16 NOTE — PATIENT INSTRUCTIONS
Cystic Fibrosis Self-Care Plan    RECOMMENDATIONS:   Help us provide the best possible care. If you receive a questionnaire from the CF Foundation about your clinic experience today please fill it out.  It should take less than 5 minutes. Let us know what we are doing well and how we can improve.  Continue current medication, nebs and vest therapy.         [facility] Nurse line:  Emerita Juarez  [phone]     [facility] Fax Number:      [phone]         Cystic Fibrosis Respiratory Therapists:   Margaret Werner              [phone]          May Rao   [phone]  Cystic Fibrosis Dietitians:              Sherie Aparicio              [phone]                            Saba Gill                        [phone]   Cystic Fibrosis Diabetes Nurse:    Ashley Cleveland   [phone]    Cystic Fibrosis Social Workers:     Prerna Paniagua               [phone]                     Courtney Fenton               [phone]  Cystic Fibrosis Pharmacists:           Ashley Kirby                               [phone]         Angeline Quinones      [phone]   Cystic Fibrosis Genetic Counselor:   Yuliana Ghotra    [phone]    [facility] website:  [email or web address]    COVID VACCINES:    You are eligible for the COVID-19 vaccine. Sign up for your COVID vaccine via [employer] [place]. Log in, select the menu bar, select schedule an appointment, and then select COVID-19 Vaccine 1st Dose. You may also schedule by calling this number [phone] however hold times can be long.       OR schedule through the [place] [facility] Vaccine Connector at https://vaccineconnector.[place].gov/ or by calling [phone].      The best vaccine is the one that s available to you first.  All COVID-19 vaccines currently available in the [place] (Rodrigo [employer] Rodrigo, Pfizer and Moderna) have been shown to be highly effect at preventing COVID-19.        We re still learning how vaccines will affect the spread of COVID-19. After you ve been fully vaccinated against COVID-19, you should keep taking precautions in public places like wearing a mask, staying 6 feet apart from others, and avoiding crowds and poorly ventilated spaces until we know more.       MRN: [id number]   Clinic Date: June 16, 2022   Patient: Demario Abbasi     Annual Studies:   IGG   Date Value Ref Range Status   09/10/2020 1,739 (H) 610 - 1,616 mg/dL Final     Immunoglobulin G   Date Value Ref Range Status   03/17/2022 1,453 610-1,616 mg/dL Final     Insulin   Date Value Ref Range Status   03/17/2022 16.7 3.0 - 25.0 mU/L Final   02/22/2019 Canceled, Test credited 3 - 25 mU/L Final     Comment:     Unsatisfactory specimen - hemolyzed  NOTIFIED OZ PHILLIPS MD AT 1450 ON 2/22/19 BY JV       There are no preventive care reminders to display for this patient.    Pulmonary Function Tests  FEV1: amount of air you can blow out in 1 second  FVC: total amount of air you can take in and blow out    Your Goals:         PFT Latest Ref Rng & Units 6/16/2022   FVC L 3.61   FEV1 L 2.80   FVC% % 64   FEV1% % 60          Airway Clearance: The Most Important Way to Keep Your Lungs Healthy  Vest Settings:   Hill-Rom Frequencies: 8, 9, 10 Pressure 10 Then, Frequencies 18, 19, 20 Pressure 6     RespirTech: Quick Start with Pressure of     Do each frequency for 5 minutes; Deflate vest after each frequency & cough 3 times before beginning the next setting.    Vest and Neb Therapy should be done 2 times/day.    Good Nutrition Can Improve Lung Function and Overall Health    Take ALL of your vitamins with food    Take 1/2 of your enzymes before EVERY meal/snack and the other 1/2 mid-meal/snack    Wt Readings from Last 3 Encounters:   06/16/22 78.9 kg (174 lb)   03/17/22 81 kg (178 lb 9.2 oz)   03/17/22 80.8 kg (178 lb 1.6 oz)       Body mass index is 24.77 kg/m .         National CF Foundation  Recommendations for BMI in CF Adults: Women: at least 22 Men: at least 23        Controlling Blood Sugars Helps Prevent Lung Infections & Improves Nutrition  Test blood sugar:    In the morning before eating (goal is )    2 hours after a meal (goal is less than 150)    When pre-meal glucose is greater than 150 add correction    At bedtime (if less than 100 eat a snack with 15 grams of carbohydrates  Last A1C Results:   Hemoglobin A1C POCT   Date Value Ref Range Status   09/10/2020 5.7 (H) 0 - 5.6 % Final     Comment:     Normal <5.7% Prediabetes 5.7-6.4%  Diabetes 6.5% or higher - adopted from ADA   consensus guidelines.       Hemoglobin A1C   Date Value Ref Range Status   03/17/2022 5.6 0.0 - 5.6 % Final     Comment:     Normal <5.7%   Prediabetes 5.7-6.4%    Diabetes 6.5% or higher     Note: Adopted from ADA consensus guidelines.         If diabetic, measure A1C every 6 months. Goal: Under 7%    Staying Healthy  Research:  If you are interested in learning about research opportunities or have questions, please contact the CF Research Team at [phone] or [email or web address].    CF Foundation:  Compass is a personalized resource service to help you with the insurance, financial, legal and other issues you are facing.  It's free, confidential and available to anyone with CF.  Ask your  for more information or contact Compass directly at 054-COMPASS ([phone]) or compass@cff.org, or learn more at cff.org/compass.

## 2022-06-16 NOTE — NURSING NOTE
Demario Abbasi is a 27 year old year old who is being seen for Cystic Fibrosis (CF )      Medications reviewed and Vital signs taken.    Specimen Collection Type: Sputum    Order(s) placed: CF Aerobic Bacterial    Not enough sputum to do a AFB     No results found for: ACIDFAST      Lab Results   Component Value Date    AFBSMS Negative for acid fast bacteria 09/10/2020    AFBSMS  09/10/2020     Less than 5ml of specimen received.  A minimum of 5 mL of sputum or fluid is recommended for recovery of acid fast bacilli   (AFB).  Volumes less than 5 mL are suboptimal and may compromise recovery of AFB from   culture.      AFBSMS  09/10/2020     Assayed at [facility] [address] [place] [id number] [phone]             Vitals were taken and medications were reconciled.     Khadra ZAIDI  3:29 PM

## 2022-06-16 NOTE — LETTER
6/16/2022         RE: Demario Abbasi  [address]  [place] [address]        Dear Colleague,    Thank you for referring your patient, Demario Abbasi, to the [facility] FOR LUNG SCIENCE AND [facility]. Please see a copy of my visit note below.    Reason for Visit  Demario Abbasi is a 26 year old year old male who is being seen for Cystic Fibrosis (CF )    Assessment and plan:   Demario Abbasi is a 26 year old male with cystic fibrosis, pancreatic insufficiency, depression, acne and impaired glucose tolerance who is s/p RUL resection for recurrent exacerbations in 2009 with recent exacerbation in Dec 2021.  Maintenance   Modulator: Not eligible  Mutation:  [id number]/621+1g>t  AW Clearance: Vest BID  Bronchodilators:  Albuterol  Mucolytics: Pulmozyme, Hypertonic saline  Antibiotics Inh:  Bethkis  Antibiotics Oral: Azithromycin  Other:  Colonization Hx: Staph aureus (MSSA), Achromobacter xylosoxidans/denitrificans   Annual Studies: To be completed March 2023  Contraindications to standard of care:  CF/pulmonary: Symptoms appear well controlled and patient has not had a CF exacerbation since 12/2021. He is fully recovered from his COVID-19 pneumonia (treated outpatient in Feb 2022).    PFTs are his personal best today.   He will continue his current airway clearance therapy with BID vesting, albuterol nebs, Pulmozyme and alternating months for his tobramycin inhaler.  He will continue azithromycin daily.   CFTR Modulation: Based on his genotype, the patient is not a candidate for CF TR modulators.  Pancreatic insufficiency: Patient denies symptoms of malabsorption.  He will continue his current pancreatic enzyme replacement and vitamins.  CF related diabetes: Oral glucose tolerance test 3/17 notable for poor insulin response and likely has CF related diabetes despite his A1c being normal. He was referred to Dr. Cross of CF Endocrine, but was unable to schedule an  appointment due to work constraints. He remains interested in meeting with Dr. Cross and will be referred again for evaluation.   CF related sinusitis: No symptoms of active sinusitis at this time.  Iron deficiency anemia:  Annual studies notable for normal iron storages.  Psychosocial: The patient is gainfully employed in agriculture as an   Environmental allergies: Adequately controlled with Zyrtec, Pataday, Astelin and Flonase.  Reflux: Adequately controlled with pantoprazole.  Healthcare maintenance: The patient has received his flu vaccine, Covid vaccination and booster. Recommended ongoing mask wearing given contagiousness of variants.  Annual studies including basic metabolic panel, LFTs, lipid battery, hemoglobin A1c, INR, TSH, total protein, albumin,  level, IgG, IgE, CBC, urinalysis,  CF sputum culture,  fungal culture were within normal limits.  Chest x-ray , fat-soluble vitamin studies, testosterone levels, AFB culture and nocardia culture are up to date as of March 2022.  Return to clinic in 3 months for repeat PFTs and collection of cystic fibrosis cultures.    Seen and discussed with Dr. Lilly.    Heriberto Rodriguez MD  Pulm/[facility]-1  CF HPI  Demario Abbasi is a 26 year old male with cystic fibrosis, pancreatic insufficiency, depression, COVID-19 pneumonia (2/22), acne and impaired glucose tolerance who is s/p RUL resection for recurrent exacerbations in 2009. He presents for follow up today.  Patient states he feels well overall and denies any new symptoms. He reports being active with his job, which includes walking, lifting heavy objects and transporting bags of seeds as an . Patient states his cough is at a baseline, stating he intermittently produces a white-green cough. He continues to vest BID and is adherent to his airway clearance/inhaler regimen.   Review of systems:  Appetite is good  No ear pain, sore throat, sinus pain or rhinorrhea  No palpitations  No nausea,  vomiting, diarrhea or abdominal pain.  No myalgias or arthralgias  Denies anxiety or depression  A complete ROS was otherwise negative except as noted in the HPI.    Current Outpatient Medications   Medication     albuterol (PROAIR HFA) 108 (90 Base) MCG/ACT inhaler     albuterol (PROVENTIL) (2.5 MG/3ML) 0.083% neb solution     amylase-lipase-protease (CREON) 24446-889005-824216 units CPEP     azelastine (ASTELIN) 0.1 % nasal spray     azithromycin (ZITHROMAX) 500 MG tablet     cetirizine (ZYRTEC) 10 MG tablet     citalopram (CELEXA) 20 MG tablet     dornase alpha (PULMOZYME) 2.5 MG/2.5ML neb solution     fluconazole (DIFLUCAN) 150 MG tablet     fluticasone (FLONASE) 50 MCG/ACT nasal spray     mvw complete formulation (SOFTGELS ) capsule     olopatadine (PATANOL) 0.1 % ophthalmic solution     pantoprazole (PROTONIX) 20 MG EC tablet     phytonadione (MEPHYTON) 5 MG tablet     sodium chloride inhalant 7 % NEBU neb solution     tobramycin (BETHKIS) 300 MG/4ML nebulizer solution     No current facility-administered medications for this visit.     Allergies   Allergen Reactions     Augmentin Diarrhea     Past Medical History:   Diagnosis Date     CF (cystic fibrosis) (H)     Genotype: [id number]/621+1g>t     Impaired glucose tolerance      Pancreatic insufficiency      S/P lobectomy of lung 8/4/2015    Right upper lobectomy - 2009       Past Surgical History:   Procedure Laterality Date     H STATISTIC PICC LINE INSERTION >5YR, FAILED Bilateral 03/15/2019    Stenosis in both arms, unable to thread catheter     HC LAP,INGUINAL HERNIA REPR,INITIAL  2002     INSERT PICC LINE Left 10/12/2020    Procedure: INSERTION, PICC @1100;  Surgeon: Felicia Branch MD;  Location: [facility] OR     INSERT PICC LINE Left 12/6/2021    Procedure: INSERTION, PICC;  Surgeon: Tahir Alvarado MD;  Location: [facility] OR     IR PICC PLACEMENT > 5 YRS OF AGE  03/18/2019     IR PICC PLACEMENT > 5 YRS OF AGE  12/30/2019     IR PICC PLACEMENT > 5 YRS OF  "AGE  10/12/2020     IR PICC PLACEMENT > 5 YRS OF AGE  12/6/2021     LOBECTOMY LUNG Right 2009    Right upper lobe       Social History     Socioeconomic History     Marital status: Single     Spouse name: Not on file     Number of children: Not on file     Years of education: Not on file     Highest education level: Not on file   Occupational History     Occupation: Sale    Tobacco Use     Smoking status: Passive Smoke Exposure - Never Smoker     Smokeless tobacco: Current User     Types: Chew     Tobacco comment: dad smokes   Substance and Sexual Activity     Alcohol use: Yes     Comment: <2 drinks per week     Drug use: Not on file     Sexual activity: Not on file   Other Topics Concern     Parent/sibling w/ CABG, MI or angioplasty before 65F 55M? Not Asked   Social History Narrative    12/27/2019  -Graduated college in 2020.  Looking for work as a sales [employer].       Social Determinants of Health     Financial Resource Strain: Not on file   Food Insecurity: Not on file   Transportation Needs: Not on file   Physical Activity: Not on file   Stress: Not on file   Social Connections: Not on file   Intimate Partner Violence: Not on file   Housing Stability: Not on file       Ht 1.785 m (5' 10.28\")   Wt 78.9 kg (174 lb)   BMI 24.77 kg/m    Body mass index is 24.77 kg/m .  Exam:    GENERAL APPEARANCE: Well developed, well nourished, alert, and in no apparent distress.  EYES: PERRL, EOMI  HENT: Nasal mucosa with edema and no hyperemia. No nasal polyps.  EARS: Canals clear, TMs normal  MOUTH: Oral mucosa is moist, without any lesions, no tonsillar enlargement, no oropharyngeal exudate.  NECK: supple, no masses, no thyromegaly.  RESP: normal percussion, good air flow throughout. No crackles or rhonchi present.  CV: normal S1, S2, regular rhythm, normal rate. No murmur.  No rub. No gallop. No LE edema.   ABDOMEN:  bowel sounds normal, soft, nontender, no HSM or masses.   MS: extremities normal. No clubbing. " No cyanosis.  SKIN: no rash, warm and dry.  NEURO: Mentation intact, speech normal, normal strength and tone, normal gait and stance  PSYCH: mentation appears normal. and affect normal/bright  Results:  Recent Results (from the past 168 hour(s))   General PFT Lab (Please always keep checked)    Collection Time: 06/16/22  2:52 PM   Result Value Ref Range    FVC-Pred 5.59 L    FVC-Pre 3.61 L    FVC-%Pred-Pre 64 %    FEV1-Pre 2.80 L    FEV1-%Pred-Pre 60 %    FEV1FVC-Pred 84 %    FEV1FVC-Pre 78 %    FEFMax-Pred 10.26 L/sec    FEFMax-Pre 7.38 L/sec    FEFMax-%Pred-Pre 71 %    FEF2575-Pred 4.88 L/sec    FEF2575-Pre 2.38 L/sec    QRA7769-%Pred-Pre 48 %    ExpTime-Pre 8.31 sec    FIFMax-Pre 4.74 L/sec    FEV1FEV6-Pred 84 %    FEV1FEV6-Pre 78 %           Results as noted above.    PFT Interpretation:  Possible moderately severe restrictive ventilatory defect.  Compared to prior PFTs, improved. FEV1 is at its all time high.  Valid Maneuver    CF Exacerbation  Absent        Again, thank you for allowing me to participate in the care of your patient.        Sincerely,        Heriberto Rodriguez MD

## 2022-06-16 NOTE — PROGRESS NOTES
Reason for Visit  Demario Abbasi is a 26 year old year old male who is being seen for Cystic Fibrosis (CF )    Assessment and plan:   Demario Abbasi is a 26 year old male with cystic fibrosis, pancreatic insufficiency, depression, acne and impaired glucose tolerance who is s/p RUL resection for recurrent exacerbations in 2009 with recent exacerbation in Dec 2021.  Maintenance   Modulator: Not eligible  Mutation:  g542x/621+1g>t  AW Clearance: Vest BID  Bronchodilators:  Albuterol  Mucolytics: Pulmozyme, Hypertonic saline  Antibiotics Inh:  Bethkis  Antibiotics Oral: Azithromycin  Other:  Colonization Hx: Staph aureus (MSSA), Achromobacter xylosoxidans/denitrificans   Annual Studies: To be completed March 2023  Contraindications to standard of care:  CF/pulmonary: Symptoms appear well controlled and patient has not had a CF exacerbation since 12/2021. He is fully recovered from his COVID-19 pneumonia (treated outpatient in Feb 2022).    PFTs are his personal best today.   He will continue his current airway clearance therapy with BID vesting, albuterol nebs, Pulmozyme and alternating months for his tobramycin inhaler.  He will continue azithromycin daily.   CFTR Modulation: Based on his genotype, the patient is not a candidate for CF TR modulators.  Pancreatic insufficiency: Patient denies symptoms of malabsorption.  He will continue his current pancreatic enzyme replacement and vitamins.  CF related diabetes: Oral glucose tolerance test 3/17 notable for poor insulin response and likely has CF related diabetes despite his A1c being normal. He was referred to Dr. Cross of CF Endocrine, but was unable to schedule an appointment due to work constraints. He remains interested in meeting with Dr. Cross and will be referred again for evaluation.   CF related sinusitis: No symptoms of active sinusitis at this time.  Iron deficiency anemia:  Annual studies notable for normal iron storages.  Psychosocial: The  patient is gainfully employed in agriculture as an   Environmental allergies: Adequately controlled with Zyrtec, Pataday, Astelin and Flonase.  Reflux: Adequately controlled with pantoprazole.  Healthcare maintenance: The patient has received his flu vaccine, Covid vaccination and booster. Recommended ongoing mask wearing given contagiousness of variants.  Annual studies including basic metabolic panel, LFTs, lipid battery, hemoglobin A1c, INR, TSH, total protein, albumin,  level, IgG, IgE, CBC, urinalysis,  CF sputum culture,  fungal culture were within normal limits.  Chest x-ray , fat-soluble vitamin studies, testosterone levels, AFB culture and nocardia culture are up to date as of March 2022.  Return to clinic in 3 months for repeat PFTs and collection of cystic fibrosis cultures.    Seen and discussed with Dr. Lilly.    Heriberto Rodriguez MD  Pulm/[facility]-1  CF HPI  Demario Abbasi is a 26 year old male with cystic fibrosis, pancreatic insufficiency, depression, COVID-19 pneumonia (2/22), acne and impaired glucose tolerance who is s/p RUL resection for recurrent exacerbations in 2009. He presents for follow up today.  Patient states he feels well overall and denies any new symptoms. He reports being active with his job, which includes walking, lifting heavy objects and transporting bags of seeds as an . Patient states his cough is at a baseline, stating he intermittently produces a white-green cough. He continues to vest BID and is adherent to his airway clearance/inhaler regimen.   Review of systems:  Appetite is good  No ear pain, sore throat, sinus pain or rhinorrhea  No palpitations  No nausea, vomiting, diarrhea or abdominal pain.  No myalgias or arthralgias  Denies anxiety or depression  A complete ROS was otherwise negative except as noted in the HPI.    Current Outpatient Medications   Medication     albuterol (PROAIR HFA) 108 (90 Base) MCG/ACT inhaler     albuterol (PROVENTIL) (2.5  MG/3ML) 0.083% neb solution     amylase-lipase-protease (CREON) 78812-707456-232236 units CPEP     azelastine (ASTELIN) 0.1 % nasal spray     azithromycin (ZITHROMAX) 500 MG tablet     cetirizine (ZYRTEC) 10 MG tablet     citalopram (CELEXA) 20 MG tablet     dornase alpha (PULMOZYME) 2.5 MG/2.5ML neb solution     fluconazole (DIFLUCAN) 150 MG tablet     fluticasone (FLONASE) 50 MCG/ACT nasal spray     mvw complete formulation (SOFTGELS ) capsule     olopatadine (PATANOL) 0.1 % ophthalmic solution     pantoprazole (PROTONIX) 20 MG EC tablet     phytonadione (MEPHYTON) 5 MG tablet     sodium chloride inhalant 7 % NEBU neb solution     tobramycin (BETHKIS) 300 MG/4ML nebulizer solution     No current facility-administered medications for this visit.     Allergies   Allergen Reactions     Augmentin Diarrhea     Past Medical History:   Diagnosis Date     CF (cystic fibrosis) (H)     Genotype: g542x/621+1g>t     Impaired glucose tolerance      Pancreatic insufficiency      S/P lobectomy of lung 8/4/2015    Right upper lobectomy - 2009       Past Surgical History:   Procedure Laterality Date     H STATISTIC PICC LINE INSERTION >5YR, FAILED Bilateral 03/15/2019    Stenosis in both arms, unable to thread catheter     HC LAP,INGUINAL HERNIA REPR,INITIAL  2002     INSERT PICC LINE Left 10/12/2020    Procedure: INSERTION, PICC @1100;  Surgeon: Felicia Branch MD;  Location: [facility] OR     INSERT PICC LINE Left 12/6/2021    Procedure: INSERTION, PICC;  Surgeon: Tahir Alvarado MD;  Location: [facility] OR     IR PICC PLACEMENT > 5 YRS OF AGE  03/18/2019     IR PICC PLACEMENT > 5 YRS OF AGE  12/30/2019     IR PICC PLACEMENT > 5 YRS OF AGE  10/12/2020     IR PICC PLACEMENT > 5 YRS OF AGE  12/6/2021     LOBECTOMY LUNG Right 2009    Right upper lobe       Social History     Socioeconomic History     Marital status: Single     Spouse name: Not on file     Number of children: Not on file     Years of education: Not on file      "Highest education level: Not on file   Occupational History     Occupation: [employer]   Tobacco Use     Smoking status: Passive Smoke Exposure - Never Smoker     Smokeless tobacco: Current User     Types: Chew     Tobacco comment: dad smokes   Substance and Sexual Activity     Alcohol use: Yes     Comment: <2 drinks per week     Drug use: Not on file     Sexual activity: Not on file   Other Topics Concern     Parent/sibling w/ CABG, MI or angioplasty before 65F 55M? Not Asked   Social History Narrative    12/27/2019  -Graduated college in 2020.  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       Social Determinants of Health     Financial Resource Strain: Not on file   Food Insecurity: Not on file   Transportation Needs: Not on file   Physical Activity: Not on file   Stress: Not on file   Social Connections: Not on file   Intimate Partner Violence: Not on file   Housing Stability: Not on file       Ht 1.785 m (5' 10.28\")   Wt 78.9 kg (174 lb)   BMI 24.77 kg/m    Body mass index is 24.77 kg/m .  Exam:    GENERAL APPEARANCE: Well developed, well nourished, alert, and in no apparent distress.  EYES: PERRL, EOMI  HENT: Nasal mucosa with edema and no hyperemia. No nasal polyps.  EARS: Canals clear, TMs normal  MOUTH: Oral mucosa is moist, without any lesions, no tonsillar enlargement, no oropharyngeal exudate.  NECK: supple, no masses, no thyromegaly.  RESP: normal percussion, good air flow throughout. No crackles or rhonchi present.  CV: normal S1, S2, regular rhythm, normal rate. No murmur.  No rub. No gallop. No LE edema.   ABDOMEN:  bowel sounds normal, soft, nontender, no HSM or masses.   MS: extremities normal. No clubbing. No cyanosis.  SKIN: no rash, warm and dry.  NEURO: Mentation intact, speech normal, normal strength and tone, normal gait and stance  PSYCH: mentation appears normal. and affect normal/bright  Results:  Recent Results (from the past 168 hour(s))   General PFT Lab (Please always keep checked) "    Collection Time: 06/16/22  2:52 PM   Result Value Ref Range    FVC-Pred 5.59 L    FVC-Pre 3.61 L    FVC-%Pred-Pre 64 %    FEV1-Pre 2.80 L    FEV1-%Pred-Pre 60 %    FEV1FVC-Pred 84 %    FEV1FVC-Pre 78 %    FEFMax-Pred 10.26 L/sec    FEFMax-Pre 7.38 L/sec    FEFMax-%Pred-Pre 71 %    FEF2575-Pred 4.88 L/sec    FEF2575-Pre 2.38 L/sec    RKP7407-%Pred-Pre 48 %    ExpTime-Pre 8.31 sec    FIFMax-Pre 4.74 L/sec    FEV1FEV6-Pred 84 %    FEV1FEV6-Pre 78 %           Results as noted above.    PFT Interpretation:  Possible moderately severe restrictive ventilatory defect.  Compared to prior PFTs, improved. FEV1 is at its all time high.  Valid Maneuver    CF Exacerbation  Absent

## 2022-06-19 ENCOUNTER — HEALTH MAINTENANCE LETTER (OUTPATIENT)
Age: 27
End: 2022-06-19

## 2022-06-21 LAB
BACTERIA SPT CULT: ABNORMAL

## 2022-07-26 ENCOUNTER — TELEPHONE (OUTPATIENT)
Dept: PULMONOLOGY | Facility: CLINIC | Age: 27
End: 2022-07-26

## 2022-07-26 DIAGNOSIS — E84.0 CYSTIC FIBROSIS WITH PULMONARY EXACERBATION (H): Primary | ICD-10-CM

## 2022-07-26 RX ORDER — SULFAMETHOXAZOLE/TRIMETHOPRIM 800-160 MG
1 TABLET ORAL 2 TIMES DAILY
Qty: 42 TABLET | Refills: 0 | Status: SHIPPED | OUTPATIENT
Start: 2022-07-26 | End: 2022-08-16

## 2022-07-26 RX ORDER — DOXYCYCLINE HYCLATE 100 MG
100 TABLET ORAL 2 TIMES DAILY
Qty: 42 TABLET | Refills: 0 | Status: SHIPPED | OUTPATIENT
Start: 2022-07-26 | End: 2022-08-16

## 2022-07-26 NOTE — TELEPHONE ENCOUNTER
[facility]  July 26, 2022    Susan Li    Cystic fibrosis Provider: Zana Lilly MD    Caller: Patient     Clinical information:  Demario Abbasi called with complaint of new increase in cough for about 1 week. No increase in sputum, however sputum is a bit thicker than baseline. Did report 1 episode of hemoptysis (about 1 tablespoon) approx 2 weeks ago, none since. Denies any fever, chills, or bodyaches. Currently on ngozi off month. Vesting BID.      Plan:   Discussed with Dr. Lilly  Test for covid  Increase to TID vesting if able  Start Doxycycline 100mg BID and Bactrim 2DS BID x3 weeks      Call back with any new or worsening symptoms/concerns.    Caller verbalized understanding of plan and agrees with advice given.     Tammy George RN

## 2022-08-16 DIAGNOSIS — J30.2 SEASONAL ALLERGIES: ICD-10-CM

## 2022-08-17 RX ORDER — CETIRIZINE HYDROCHLORIDE 10 MG/1
10 TABLET ORAL DAILY
Qty: 30 TABLET | Refills: 11 | Status: SHIPPED | OUTPATIENT
Start: 2022-08-17 | End: 2023-04-13

## 2022-08-26 ENCOUNTER — HOME INFUSION (PRE-WILLOW HOME INFUSION) (OUTPATIENT)
Dept: PHARMACY | Facility: CLINIC | Age: 27
End: 2022-08-26

## 2022-08-26 ENCOUNTER — TELEPHONE (OUTPATIENT)
Dept: PULMONOLOGY | Facility: CLINIC | Age: 27
End: 2022-08-26

## 2022-08-26 DIAGNOSIS — E84.0 CYSTIC FIBROSIS WITH PULMONARY EXACERBATION (H): Primary | ICD-10-CM

## 2022-08-26 RX ORDER — DOXYCYCLINE HYCLATE 100 MG
100 TABLET ORAL 2 TIMES DAILY
Qty: 56 TABLET | Refills: 0 | Status: SHIPPED | OUTPATIENT
Start: 2022-08-26 | End: 2022-09-29

## 2022-08-26 RX ORDER — IMIPENEM AND CILASTATIN 500; 500 MG/1; MG/1
750 INJECTION, POWDER, FOR SOLUTION INTRAVENOUS EVERY 8 HOURS
COMMUNITY
Start: 2022-08-29 | End: 2022-09-29

## 2022-08-26 NOTE — TELEPHONE ENCOUNTER
[facility]  August 26, 2022    Susan Li    Cystic fibrosis Provider: Zana Lilly MD    Caller: Patient     Clinical information:  Demario Abbasi called with complaint of on going symptoms despite PO abx course. Continues with frequent cough, now coughing at night. Mucus continues to be green and thick. No more blood since first episode. Had some right sided chest pain so went to ER, WBC is elevated (patient unsure what the number is and can't see in care everywhere). Patient would like to start IV abx.     Plan:   Discussed with Dr. Ly (Dr. Lilly out of town)    1. Order placed for PICC line placement (patient will call to schedule early next week)    2. Start IV imipenem 750mg q8hrs - V.O. given to Oleg Shahid [facility] pharmacist. [facility] will watch for picc placement, patient will  first shipment of supplies/drugs at [address] home after picc placement.    3. Extend course of PO Doxycycline 100mg BID - sent to [facility] pharmacy.    4. Patient to drop off sputum sample at time of picc placement (CF, Fungal, Nocardia, AFB - orders placed)    5. Will arrange for pulmonary follow up in person in 3-4 weeks after picc line placed.     6. Patient to add HTS7% to neb regimen. Currently doing Albuterol and Pulmozyme, has HTS 7% at home.       Call back with any new or worsening symptoms/concerns.    Caller verbalized understanding of plan and agrees with advice given.     Tammy George RN      Detail Level: Detailed Quality 226: Preventive Care And Screening: Tobacco Use: Screening And Cessation Intervention: Patient screened for tobacco use and is an ex/non-smoker Quality 130: Documentation Of Current Medications In The Medical Record: Current Medications Documented

## 2022-08-31 ENCOUNTER — ANCILLARY PROCEDURE (OUTPATIENT)
Dept: RADIOLOGY | Facility: AMBULATORY SURGERY CENTER | Age: 27
End: 2022-08-31
Attending: INTERNAL MEDICINE
Payer: COMMERCIAL

## 2022-08-31 ENCOUNTER — HOME INFUSION (PRE-WILLOW HOME INFUSION) (OUTPATIENT)
Dept: PHARMACY | Facility: CLINIC | Age: 27
End: 2022-08-31

## 2022-08-31 ENCOUNTER — HOSPITAL ENCOUNTER (OUTPATIENT)
Facility: AMBULATORY SURGERY CENTER | Age: 27
Discharge: HOME | End: 2022-08-31
Attending: RADIOLOGY | Admitting: RADIOLOGY
Payer: COMMERCIAL

## 2022-08-31 VITALS
HEIGHT: 70 IN | WEIGHT: 180 LBS | BODY MASS INDEX: 25.77 KG/M2 | RESPIRATION RATE: 16 BRPM | OXYGEN SATURATION: 95 % | SYSTOLIC BLOOD PRESSURE: 103 MMHG | DIASTOLIC BLOOD PRESSURE: 60 MMHG | HEART RATE: 67 BPM | TEMPERATURE: 98.5 F

## 2022-08-31 DIAGNOSIS — E84.9 CF (CYSTIC FIBROSIS) (H): ICD-10-CM

## 2022-08-31 PROCEDURE — 36573 INSJ PICC RS&I 5 YR+: CPT | Performed by: RADIOLOGY

## 2022-08-31 PROCEDURE — 36573 INSJ PICC RS&I 5 YR+: CPT

## 2022-08-31 DEVICE — IMPLANTABLE DEVICE: Type: IMPLANTABLE DEVICE | Site: ARM | Status: FUNCTIONAL

## 2022-08-31 RX ORDER — HEPARIN SODIUM,PORCINE 10 UNIT/ML
VIAL (ML) INTRAVENOUS PRN
Status: DISCONTINUED | OUTPATIENT
Start: 2022-08-31 | End: 2022-08-31 | Stop reason: HOSPADM

## 2022-08-31 RX ORDER — LIDOCAINE HYDROCHLORIDE 10 MG/ML
INJECTION, SOLUTION EPIDURAL; INFILTRATION; INTRACAUDAL; PERINEURAL PRN
Status: DISCONTINUED | OUTPATIENT
Start: 2022-08-31 | End: 2022-08-31 | Stop reason: HOSPADM

## 2022-08-31 NOTE — PROCEDURES
[facility] Brief Procedure Note    Pre-operative diagnosis: Cystic fibrosis, infective exacerbation   Post-operative diagnosis Same, plus left axillary vein severe stenosis   Procedure:    1. Left upper extremity venogram  2. Left upper extremity PICC placement     Surgeon: Felicia Butler MD   Assistants(s): -     Estimated blood loss: Less than 10 ml        Findings:  Left basilic vein punctured.  Wire would not thread (including 018 Glidewire) through axillary vein.  Venogram performed showed severe left axillary vein stenosis.  Therefore wire manipulated through an adjacent collateral vein into the central veins.  Long peel-away sheath used.  45 cm 4 Czech single-lumen power-injectable PICC placed.  Tip in high right atrium.  Ready for immediate use.   Complications: None.

## 2022-08-31 NOTE — DISCHARGE INSTRUCTIONS
A collaboration between [facility] and [facility]  Experts in minimally invasive, targeted treatments performed using imaging guidance    Venous Access Device,  Picc line    Today you had a procedure today to install a venous access device.    After you go home:  Resume your regular diet unless otherwise ordered by a medical provider.  Keep any applied tape/gauze dressings clean and dry.  Change tape/gauze dressings if they get wet or soiled.  You may shower the following day after procedure, however cover and protect from moisture any tape/gauze dressings.  You may let water hit and run over dried skin glue, but do not scrub.  Pat the area dry after showering.  Do not perform strenuous activities or lift greater than 10 pounds for the next three days.  If there is bleeding or oozing from the procedure site, apply firm pressure to the area for 5-10 minutes.  If the bleeding continues seek medical advice at the numbers below.  Mild procedure site discomfort can be treated with an ice pack and over-the-counter pain relievers.            Call our Interventional Radiology (IR) service if:  If you start bleeding from the procedure site.  If you do start to bleed from the site, lie down and hold some pressure on the site.  Our radiology provider can help you decide if you need to return to the hospital.  If you have new or worsening pain related to the procedure.  If you have concerning swelling at the procedure site.  If you develop hives or a rash or any unexplained itching.  If you have a fever of greater than 100.5  F and chills in the first 5 days after procedure.  Any other concerns related to your procedure.      [facility]  Interventional Radiology (IR)  [address] [place] [address]    Contact Number:  [phone]  (IR control desk)  Monday - Friday 8:00 am - 4:30 pm    After hours for  urgent concerns:  [phone]  After 4:30 pm Monday - Friday, Weekends and Holidays.   Ask for Interventional Radiology on-call.  Someone is available 24 hours a day.  [facility] toll free number:  [phone]

## 2022-08-31 NOTE — PROGRESS NOTES
This is a recent snapshot of the patient's [facility] medical record.  For current drug dose and complete information and questions, call [phone] or In Basket pool, [facility] home infusion ([id number])  CSN Number:  [id number]

## 2022-09-01 ENCOUNTER — HOME INFUSION (PRE-WILLOW HOME INFUSION) (OUTPATIENT)
Dept: PHARMACY | Facility: CLINIC | Age: 27
End: 2022-09-01

## 2022-09-02 NOTE — PROGRESS NOTES
This is a recent snapshot of the patient's [facility] medical record.  For current drug dose and complete information and questions, call [phone] or In Basket pool, fv home infusion ([id number])  CSN Number:  [id number]

## 2022-09-08 ENCOUNTER — TELEPHONE (OUTPATIENT)
Dept: PULMONOLOGY | Facility: CLINIC | Age: 27
End: 2022-09-08

## 2022-09-08 NOTE — TELEPHONE ENCOUNTER
Patient called to report [employer] machine broke. Provided patient with customer service number and instructed him to call ([phone]).  Patient reports everything is going well with IV abx, tolerating IV imipenem well. He is feeling better since starting them.   Follow up appointment scheduled for 9/29.    Tammy George RN

## 2022-09-26 NOTE — PROGRESS NOTES
[facility]  September 29, 2022         Assessment and Plan:   Demario Abbasi is a 27 year old male with cystic fibrosis, pancreatic insufficiency, depression, acne, impaired glucose tolerating and  RUL resection for recurrent exacerbations in 2009 who is seen for follow up. He was started on IV imipenem the end of August, but has clinically plateaued and his PFTs are still well below baseline.    1. Moderate CF exacerbation: improvement has plateaued, notes ongoing fatigue, productive cough of thick mucous above baseline, no hemoptysis. No new dyspnea, but does have some congestion/tightness. Sating 97% on room air; vesting BID. PFTs today are 320 ml below June values (did not have PFTs cristi he started his IV therapy). Previous cultures + for MDR Achromobacter and MSSA. Demario notes that historically, he does well with IV meropenem.  - Will get CXR today  - Stop IV imipenem and start IV meropenem  - Start Bactrim 1 tablet TID and resume ngozi nebs  - Add on extended sensitivities to sputum sample from today  - Continue nebs and vesting, aim for 2-3 per day  - Chronic azithromycin  - Will have patient do home spirometry today and again in 1 week    2. CFTR Modulation: based on his genotype, the patient is not a candidate for CFTR modulators.    3. Pancreatic insufficiency: denies symptoms of malabsorption. BMI > CFF goal.  - Continue enzymes and vitamins    4. 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 He was referred to Dr. Cross of CF Endocrine, but was unable to schedule an appointment due to work constraints.   - Will readdress at next f/u    5. CF related sinusitis:  Environmental allergies: no current issues.  - Continue Zyrtec, Pataday, Astelin and Flonase  - Encouraged to wear a mask at his job    6. GERD: no current issues.  - Continue PPI     RTC: in 3  weeks with routine cultures and spirometry  Annual studies due: March 2023 (overdue for DEXA)  Preventative care: flu shot today    Lisa Lofton PA-C  Pulmonary, Allergy, Critical Care and Sleep Medicine        Interval History:     Feeling better, but not back to baseline, overall feels he's plateaued. Still feeling fatigued, cough is intermittent, still more frequent with thick mucous production. No blood or hemoptysis. No fever or chills, no new or worsening/unexpected shortness of breath. Feels congested and tight. Vesting BID. Can start vesting more frequently next week, worked 140 hours in the last two weeks. No worsening acid reflux, feels a little bit bloated, not eating as much, stools are normal, not loose.          Review of Systems:   Please see HPI. Otherwise, complete 10 point ROS negative.           Past Medical and Surgical History:     Past Medical History:   Diagnosis Date     CF (cystic fibrosis) (H)     Genotype: g542x/621+1g>t     Impaired glucose tolerance      Pancreatic insufficiency      S/P lobectomy of lung 8/4/2015    Right upper lobectomy - 2009     Past Surgical History:   Procedure Laterality Date     H STATISTIC PICC LINE INSERTION >5YR, FAILED Bilateral 03/15/2019    Stenosis in both arms, unable to thread catheter     HC LAP,INGUINAL HERNIA REPR,INITIAL  2002     INSERT PICC LINE Left 10/12/2020    Procedure: INSERTION, PICC @1100;  Surgeon: Felicia Branch MD;  Location: [facility] OR     INSERT PICC LINE Left 12/6/2021    Procedure: INSERTION, PICC;  Surgeon: Tahir Alvarado MD;  Location: [facility] OR     INSERT PICC LINE Left 8/31/2022    Procedure: SINGLE LUMEN INSERTION, PICC;  Surgeon: Felicia Branch MD;  Location: [facility] OR     IR PICC PLACEMENT > 5 YRS OF AGE  03/18/2019     IR PICC PLACEMENT > 5 YRS OF AGE  12/30/2019     IR PICC PLACEMENT > 5 YRS OF AGE  10/12/2020     IR PICC PLACEMENT > 5 YRS OF AGE  12/6/2021     IR PICC PLACEMENT > 5 YRS OF AGE  8/31/2022     LOBECTOMY LUNG  Right 2009    Right upper lobe           Family History:     Family History   Problem Relation Age of Onset     Thyroid Disease Mother         hypothyroid     Other - See Comments Mother         multiple family members with biliary disease     Hypertension Maternal Grandmother      Diabetes Maternal Grandfather      Hypertension Paternal Grandmother      Hypothyroidism Paternal Grandmother      Parkinsonism Paternal Grandmother      Coronary Artery Disease Early Onset Paternal Grandfather 56            Social History:     Social History     Socioeconomic History     Marital status: Single     Spouse name: Not on file     Number of children: Not on file     Years of education: Not on file     Highest education level: Not on file   Occupational History     Occupation: Sale    Tobacco Use     Smoking status: Passive Smoke Exposure - Never Smoker     Smokeless tobacco: Current User     Types: Chew     Tobacco comment: dad smokes   Substance and Sexual Activity     Alcohol use: Yes     Comment: <2 drinks per week     Drug use: Not on file     Sexual activity: Not on file   Other Topics Concern     Parent/sibling w/ CABG, MI or angioplasty before 65F 55M? Not Asked   Social History Narrative    12/27/2019  -Graduated college in 2020.  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       Social Determinants of Health     Financial Resource Strain: Not on file   Food Insecurity: Not on file   Transportation Needs: Not on file   Physical Activity: Not on file   Stress: Not on file   Social Connections: Not on file   Intimate Partner Violence: Not on file   Housing Stability: Not on file            Medications:     Current Outpatient Medications   Medication     phytonadione (MEPHYTON) 5 MG tablet     sulfamethoxazole-trimethoprim (BACTRIM DS) 800-160 MG tablet     albuterol (PROAIR HFA) 108 (90 Base) MCG/ACT inhaler     albuterol (PROVENTIL) (2.5 MG/3ML) 0.083% neb solution     amylase-lipase-protease (CREON 36)  "[id number] units CPEP     azelastine (ASTELIN) 0.1 % nasal spray     azithromycin (ZITHROMAX) 500 MG tablet     cetirizine (ZYRTEC) 10 MG tablet     citalopram (CELEXA) 20 MG tablet     dornase alpha (PULMOZYME) 2.5 MG/2.5ML neb solution     fluconazole (DIFLUCAN) 150 MG tablet     fluticasone (FLONASE) 50 MCG/ACT nasal spray     mvw complete formulation (SOFTGELS ) capsule     olopatadine (PATANOL) 0.1 % ophthalmic solution     pantoprazole (PROTONIX) 20 MG EC tablet     sodium chloride inhalant 7 % NEBU neb solution     tobramycin (BETHKIS) 300 MG/4ML nebulizer solution     No current facility-administered medications for this visit.            Physical Exam:   /62   Pulse 63   Ht 1.785 m (5' 10.28\")   Wt 78 kg (172 lb)   SpO2 97%   BMI 24.48 kg/m      GENERAL: alert, NAD  HEENT: NCAT, EOMI, anicteric sclera, canals and TMs clear; no oral mucosal edema or erythema  Neck: no cervical or supraclavicular adenopathy  Respiratory: moderate airflow, few scattered bilateral crackles  CV: RRR, S1S2, no murmurs noted  Abdomen: normoactive BS, soft and non tender   Lymph: no edema, + digital clubbing  Neuro: AAO X 3, CN 2-12 grossly intact  Psychiatric: normal affect, good eye contact  Skin: no rash, jaundice or lesions on limited exam         Data:   All laboratory and imaging data reviewed.      Cystic Fibrosis Culture  Specimen Description   Date Value Ref Range Status   02/11/2021 Sputum  Final   09/10/2020 Sputum  Final   09/10/2020 Sputum  Final   09/10/2020 Sputum  Final   09/10/2020 Sputum  Final   09/10/2020 Sputum  Final    Culture Micro   Date Value Ref Range Status   02/11/2021 Moderate growth  Normal elmer    Final   02/11/2021 Light growth  Staphylococcus aureus   (A)  Final   02/11/2021 Moderate growth  Strain 2  Staphylococcus aureus   (A)  Final   02/11/2021 (A)  Final    Moderate growth  Achromobacter xylosoxidans/denitrificans          PFT interpretation:  Maneuver: valid and " meets ATS guidelines  Normal ratio with decreased FEV1 and FVC  Compared to prior: FEV1 of 2.48 is 320 ml below prior  The decrease in FVC may represent restrictive physiology.  Lung volumes would be necessary to determine.    Moderate  Increased vest/bronchodilator/execise and Inhaled + oral non-quinolone

## 2022-09-29 ENCOUNTER — OFFICE VISIT (OUTPATIENT)
Dept: PHARMACY | Facility: CLINIC | Age: 27
End: 2022-09-29
Payer: COMMERCIAL

## 2022-09-29 ENCOUNTER — OFFICE VISIT (OUTPATIENT)
Dept: PULMONOLOGY | Facility: CLINIC | Age: 27
End: 2022-09-29
Attending: PHYSICIAN ASSISTANT
Payer: COMMERCIAL

## 2022-09-29 ENCOUNTER — HOME INFUSION (PRE-WILLOW HOME INFUSION) (OUTPATIENT)
Dept: PHARMACY | Facility: CLINIC | Age: 27
End: 2022-09-29

## 2022-09-29 ENCOUNTER — ALLIED HEALTH/NURSE VISIT (OUTPATIENT)
Dept: CARE COORDINATION | Facility: CLINIC | Age: 27
End: 2022-09-29

## 2022-09-29 VITALS
SYSTOLIC BLOOD PRESSURE: 122 MMHG | DIASTOLIC BLOOD PRESSURE: 62 MMHG | HEART RATE: 63 BPM | HEIGHT: 70 IN | BODY MASS INDEX: 24.62 KG/M2 | WEIGHT: 172 LBS | OXYGEN SATURATION: 97 %

## 2022-09-29 DIAGNOSIS — Z71.85 VACCINE COUNSELING: ICD-10-CM

## 2022-09-29 DIAGNOSIS — E84.9 CYSTIC FIBROSIS (H): ICD-10-CM

## 2022-09-29 DIAGNOSIS — F33.0 MAJOR DEPRESSIVE DISORDER, RECURRENT EPISODE, MILD (H): ICD-10-CM

## 2022-09-29 DIAGNOSIS — E84.9 CYSTIC FIBROSIS (H): Primary | ICD-10-CM

## 2022-09-29 DIAGNOSIS — Z13.9 RISK AND FUNCTIONAL ASSESSMENT: Primary | ICD-10-CM

## 2022-09-29 DIAGNOSIS — K86.89 PANCREATIC INSUFFICIENCY: ICD-10-CM

## 2022-09-29 DIAGNOSIS — Z23 NEED FOR VACCINATION: Primary | ICD-10-CM

## 2022-09-29 DIAGNOSIS — E84.0 CYSTIC FIBROSIS WITH PULMONARY EXACERBATION (H): ICD-10-CM

## 2022-09-29 DIAGNOSIS — J30.9 ALLERGIC SINUSITIS: ICD-10-CM

## 2022-09-29 LAB
EXPTIME-PRE: 7.69 SEC
FEF2575-%PRED-PRE: 40 %
FEF2575-PRE: 1.99 L/SEC
FEF2575-PRED: 4.88 L/SEC
FEFMAX-%PRED-PRE: 72 %
FEFMAX-PRE: 7.39 L/SEC
FEFMAX-PRED: 10.26 L/SEC
FEV1-%PRED-PRE: 53 %
FEV1-PRE: 2.48 L
FEV1FEV6-PRE: 76 %
FEV1FEV6-PRED: 84 %
FEV1FVC-PRE: 76 %
FEV1FVC-PRED: 84 %
FIFMAX-PRE: 3.56 L/SEC
FVC-%PRED-PRE: 58 %
FVC-PRE: 3.27 L
FVC-PRED: 5.59 L

## 2022-09-29 PROCEDURE — G0463 HOSPITAL OUTPT CLINIC VISIT: HCPCS | Mod: 25

## 2022-09-29 PROCEDURE — 90686 IIV4 VACC NO PRSV 0.5 ML IM: CPT | Performed by: PHYSICIAN ASSISTANT

## 2022-09-29 PROCEDURE — 99605 MTMS BY PHARM NP 15 MIN: CPT | Performed by: PHARMACIST

## 2022-09-29 PROCEDURE — 87070 CULTURE OTHR SPECIMN AEROBIC: CPT | Performed by: PHYSICIAN ASSISTANT

## 2022-09-29 PROCEDURE — 99214 OFFICE O/P EST MOD 30 MIN: CPT | Mod: 25 | Performed by: PHYSICIAN ASSISTANT

## 2022-09-29 PROCEDURE — 87116 MYCOBACTERIA CULTURE: CPT | Performed by: PHYSICIAN ASSISTANT

## 2022-09-29 PROCEDURE — 99607 MTMS BY PHARM ADDL 15 MIN: CPT | Performed by: PHARMACIST

## 2022-09-29 PROCEDURE — 94375 RESPIRATORY FLOW VOLUME LOOP: CPT | Performed by: PHYSICIAN ASSISTANT

## 2022-09-29 PROCEDURE — G0008 ADMIN INFLUENZA VIRUS VAC: HCPCS | Performed by: PHYSICIAN ASSISTANT

## 2022-09-29 PROCEDURE — 87077 CULTURE AEROBIC IDENTIFY: CPT | Performed by: PHYSICIAN ASSISTANT

## 2022-09-29 PROCEDURE — 87206 SMEAR FLUORESCENT/ACID STAI: CPT | Performed by: PHYSICIAN ASSISTANT

## 2022-09-29 PROCEDURE — 250N000011 HC RX IP 250 OP 636: Performed by: PHYSICIAN ASSISTANT

## 2022-09-29 RX ORDER — MEROPENEM 1 G/1
2 INJECTION, POWDER, FOR SOLUTION INTRAVENOUS EVERY 8 HOURS
COMMUNITY
Start: 2022-09-29 | End: 2022-10-13

## 2022-09-29 RX ORDER — SULFAMETHOXAZOLE/TRIMETHOPRIM 800-160 MG
1 TABLET ORAL 3 TIMES DAILY
Qty: 63 TABLET | Refills: 0 | Status: SHIPPED | OUTPATIENT
Start: 2022-09-29 | End: 2022-10-13

## 2022-09-29 RX ORDER — PHYTONADIONE 5 MG/1
5 TABLET ORAL DAILY PRN
Qty: 3 TABLET | Refills: 0 | Status: ON HOLD | COMMUNITY
Start: 2022-09-29 | End: 2023-02-08

## 2022-09-29 RX ADMIN — INFLUENZA A VIRUS A/VICTORIA/2570/2019 IVR-215 (H1N1) ANTIGEN (FORMALDEHYDE INACTIVATED), INFLUENZA A VIRUS A/DARWIN/9/2021 SAN-010 (H3N2) ANTIGEN (FORMALDEHYDE INACTIVATED), INFLUENZA B VIRUS B/PHUKET/3073/2013 ANTIGEN (FORMALDEHYDE INACTIVATED), AND INFLUENZA B VIRUS B/MICHIGAN/01/2021 ANTIGEN (FORMALDEHYDE INACTIVATED) 0.5 ML: 15; 15; 15; 15 INJECTION, SUSPENSION INTRAMUSCULAR at 10:44

## 2022-09-29 ASSESSMENT — PATIENT HEALTH QUESTIONNAIRE - PHQ9
5. POOR APPETITE OR OVEREATING: NOT AT ALL
SUM OF ALL RESPONSES TO PHQ QUESTIONS 1-9: 1

## 2022-09-29 ASSESSMENT — ANXIETY QUESTIONNAIRES
GAD7 TOTAL SCORE: 1
7. FEELING AFRAID AS IF SOMETHING AWFUL MIGHT HAPPEN: NOT AT ALL
GAD7 TOTAL SCORE: 1
3. WORRYING TOO MUCH ABOUT DIFFERENT THINGS: NOT AT ALL
6. BECOMING EASILY ANNOYED OR IRRITABLE: NOT AT ALL
1. FEELING NERVOUS, ANXIOUS, OR ON EDGE: SEVERAL DAYS
IF YOU CHECKED OFF ANY PROBLEMS ON THIS QUESTIONNAIRE, HOW DIFFICULT HAVE THESE PROBLEMS MADE IT FOR YOU TO DO YOUR WORK, TAKE CARE OF THINGS AT HOME, OR GET ALONG WITH OTHER PEOPLE: NOT DIFFICULT AT ALL
2. NOT BEING ABLE TO STOP OR CONTROL WORRYING: NOT AT ALL
5. BEING SO RESTLESS THAT IT IS HARD TO SIT STILL: NOT AT ALL

## 2022-09-29 ASSESSMENT — PAIN SCALES - GENERAL: PAINLEVEL: NO PAIN (0)

## 2022-09-29 NOTE — NURSING NOTE
"Demario Abbasi is a 27 year old year old who is being seen for Cystic Fibrosis (CF Follow up )      Medications reviewed and Vital signs taken.    Specimen Collection Type: Sputum    Order(s) placed: AFB (Acid Fast Bacilli) and CF Aerobic Bacterial    *IF AFB order placed - please enter \"PRIORITIZE AFB\" to order comments.       No results found for: ACIDFAST      Lab Results   Component Value Date    AFBSMS Negative for acid fast bacteria 09/10/2020    AFBSMS  09/10/2020     Less than 5ml of specimen received.  A minimum of 5 mL of sputum or fluid is recommended for recovery of acid fast bacilli   (AFB).  Volumes less than 5 mL are suboptimal and may compromise recovery of AFB from   culture.      AFBSMS  09/10/2020     Assayed at [facility] [address] [place] [id number] [phone]     Vitals were taken and medications were reconciled.     Khadra ZAIDI  10:46 AM        "

## 2022-09-29 NOTE — PROCEDURES
RT NOTE:    Again discussed with patient the importance of follow thru with home spirometry. He will look for device once home today, and will call the CF office with questions tomorrow if it is found.

## 2022-09-29 NOTE — PATIENT INSTRUCTIONS
Cystic Fibrosis Self-Care Plan       Patient: Demario Abbasi   MRN: [id number]   Clinic Date: September 29, 2022     RECOMMENDATIONS:  1. Continue nebulizers and vest therapy. Aim for 3 vests per day as much as you can.  2. We will stop IV imipenem and start IV meropenem.   3. Start Bactrim 1 tablet 3 times/day.  4. Resume ngozi nebs.  5. Do home spirometry today and send it to us. Do it again next Thursday and send it to us.     Annual Studies:   IGG   Date Value Ref Range Status   09/10/2020 1,739 (H) 610 - 1,616 mg/dL Final     Immunoglobulin G   Date Value Ref Range Status   03/17/2022 1,453 610-1,616 mg/dL Final     Insulin   Date Value Ref Range Status   03/17/2022 16.7 3.0 - 25.0 mU/L Final   02/22/2019 Canceled, Test credited 3 - 25 mU/L Final     Comment:     Unsatisfactory specimen - hemolyzed  NOTIFIED OZ PHILLIPS MD AT 1450 ON 2/22/19 BY JV       There are no preventive care reminders to display for this patient.    Pulmonary Function Tests  FEV1: amount of air you can blow out in 1 second  FVC: total amount of air you can take in and blow out    Your Goals:         PFT Latest Ref Rng & Units 9/29/2022   FVC L 3.27   FEV1 L 2.48   FVC% % 58   FEV1% % 53          Airway Clearance: The Most Important Way to Keep Your Lungs Healthy  Vest Settings:   Hill-Rom Frequencies: 8, 9, 10 Pressure 10 Then, Frequencies 18, 19, 20 Pressure 6     RespirTech: Quick Start with Pressure of     Do each frequency for 5 minutes; Deflate vest after each frequency & cough 3 times before beginning the next setting.    Vest and Neb Therapy should be done 2-3 times/day.    Good Nutrition Can Improve Lung Function and Overall Health    Take ALL of your vitamins with food    Take 1/2 of your enzymes before EVERY meal/snack and the other 1/2 mid-meal/snack    Wt Readings from Last 3 Encounters:   09/29/22 78 kg (172 lb)   08/31/22 81.6 kg (180 lb)   06/16/22 78.9 kg (174 lb)       Body mass index is 24.48 kg/m .          National CF Foundation Recommendations for BMI in CF Adults: Women: at least 22 Men: at least 23        Controlling Blood Sugars Helps Prevent Lung Infections & Improves Nutrition  Test blood sugar:    In the morning before eating (goal is )    2 hours after a meal (goal is less than 150)    When pre-meal glucose is greater than 150 add correction    At bedtime (if less than 100 eat a snack with 15 grams of carbohydrates  Last A1C Results:   Hemoglobin A1C   Date Value Ref Range Status   03/17/2022 5.6 0.0 - 5.6 % Final     Comment:     Normal <5.7%   Prediabetes 5.7-6.4%    Diabetes 6.5% or higher     Note: Adopted from ADA consensus guidelines.   09/10/2020 5.7 (H) 0 - 5.6 % Final     Comment:     Normal <5.7% Prediabetes 5.7-6.4%  Diabetes 6.5% or higher - adopted from ADA   consensus guidelines.           If diabetic, measure A1C every 6 months. Goal is under 7%.    Staying Healthy  Research: If you are interested in learning about research opportunities or have questions, please contact Esmer Tang at [phone] or [email or web address].    CF Foundation: Compass is a personalized resource service to help you with the insurance, financial, legal and other issues you are facing.  It's free, confidential and available to anyone with CF.  Ask your  for more information or contact Compass directly at 958-COMPASS ([phone]) or compass@cff.org, or learn more at cff.org/compass.       CF Nurse Line: Mino Rodríguez: [phone]  Margaret Werner, RT: [phone]    Saba Gill and Sherie Aparicio , Dieticians: [phone]    Ashley Cleveland, Diabetes Nurse: [phone]   Prerna Woodall: [phone] or Courtney Lomas at [phone], Social Workers  [email or web address]

## 2022-09-29 NOTE — PROGRESS NOTES
Medication Therapy Management (MTM) Encounter    ASSESSMENT:                            Medication Adherence/Access: No issues identified    CF: PFTs decreased. Changing antibiotics per Lisa Lofton PA-C.      Pancreatic Insufficiency/Nutrition: annual vitamin labs from last visit are within normal limits.. BMI is at goal of 22 mg/k2 per CFF guidelines.    Depression: Stable    Foot Rash: Stable    Vaccines: Flu vaccine today per visit with provider Lisa Lofton PA-C. Also due for COVID booster, Tdap, and Gardasil per CDC guidelines. Education today    PLAN:                            1. Switching antibiotics to oral Bactrim and IV meropenem per visit with provider Lisa Lofton PA-C    2. Consider COVID booster, Tdap, and Gardasil vaccines at local pharmacy    Follow-up: 1 year or as needed    SUBJECTIVE/OBJECTIVE:                          Demario Abbasi is a 27 year old male seen for a co-visit with Lisa Lofton PA-C.     Reason for visit: Annual Medication Review    Allergies/ADRs: Reviewed in chart  Past Medical History: Reviewed in chart  Tobacco: He reports that he is a non-smoker but has been exposed to tobacco smoke. His smokeless tobacco use includes chew.  Alcohol: 1-3 beverages / week  Genotype: G542X/621+1G>T    Medication Adherence/Access:   Medication: Demario manages his own medications at home  Pharmacy: Demario prefers his local [facility] pharmacy in [place] and [facility] for specialty medications    CF/Seasonal Allergies:    Inhaled medications:   Bronchodilator: albuterol nebs twice daily and albuterol HFA as needed (rarely)   Mucolytic: Pulmozyme twice daily and hypertonic 7% nebs only when sick (not recently)   Antibiotic: Bethkis Twice daily (cycling; off) restarting tomorrow   Other: none  Oral medications:   Azithromycin: 500mg three times weekly   CFTR modulator: Not eligible by genotype   Other: cetirizine 10mg daily,   Other medications: Flonase 2 sprays each nostril  daily, and Astelin 1 spray each nostril twice daily, and olopatadine eye drops  Current FEV1% Pred: 53%  Cultures (last growth): sputum cultures grow Group B Strep (6/16/22), MSSA (6/16/22), Achromobacter xylosoxidans (6/16/22), Aspergillus ochraceus (3/17/22)  Recently completed a course of doxycycline and IV imipenem for CF exacerbation. Per visit with provider Lisa Lofton PA-C still not feeling well, planning a course of oral Bactrim and IV meropenem.    Pancreatic Insufficiency/Nutrition: Pancreatic enzyme replacement with Creon 85260.  Patient is taking 4 capsules with meals and 1 capsule with snacks. Patient is not experiencing sign/symptoms of malabsorption.  Acid reducer:Protonix (pantoprazole) 20mg daily  Bowel regimen: none  Weight and BMI are decreased  Vitamins include: [id number] twice daily , Mephyton 5mg as needed (not needed recently)  Supplements include: none        Depression: Taking citalopram 20mg daily.  He reports this is working well for him, no concerns noted today.  Demario follows with Lacey LONG-CNP, FNP, CNM  at [facility].    Foot Rash: Has fluconazole 150mg available as needed for previous foot rash. He has not had to use this recently, but prefers to keep this on his list. No concerns today.    Vaccines:   Most Recent Immunizations   Administered Date(s) Administered     COVID-19,PF,Pfizer (12+ Yrs) 03/31/2022     DTAP (<7y) 08/14/2002     FLU 6-35 months 09/17/2011     Flu, Unspecified 10/09/2020     Hep B, Peds or Adolescent 1997     Historic Hib Hib-titer 02/01/1999     Influenza (H1N1) 10/27/2009     Influenza (IIV3) PF 10/10/2013     Influenza Vaccine IM > 6 months Valent IIV4 (Alfuria,Fluzone) 09/29/2022     Influenza Vaccine, 6+MO IM (QUADRIVALENT W/PRESERVATIVES) 09/25/2014     MMR 10/09/2015     Meningococcal (Menveo ) 09/25/2014     Polio, Unspecified  1997     Poliovirus, inactivated (IPV) 08/14/2002     Tdap (Adacel,Boostrix) 08/21/2014      "Varicella 08/12/2009       Weight/BMI:      PFTs:      Today's Vitals:   BP Readings from Last 1 Encounters:   09/29/22 122/62     Pulse Readings from Last 1 Encounters:   09/29/22 63     Wt Readings from Last 1 Encounters:   09/29/22 172 lb (78 kg)     Ht Readings from Last 1 Encounters:   09/29/22 5' 10.28\" (1.785 m)     Estimated body mass index is 24.48 kg/m  as calculated from the following:    Height as of an earlier encounter on 9/29/22: 5' 10.28\" (1.785 m).    Weight as of an earlier encounter on 9/29/22: 172 lb (78 kg).    Temp Readings from Last 1 Encounters:   08/31/22 98.5  F (36.9  C)       ----------------      I spent 21 minutes with this patient today. I offer these suggestions for consideration by Lisa Lofton PA-C during covisit.     The patient was given a summary of these recommendations. See Provider note/AVS from today.     Angeline Quinones, Melody  Cystic Fibrosis MTM Pharmacist  [facility]  Voicemail: [phone]           Medication Therapy Recommendations  Vaccine counseling    Rationale: Preventive therapy - Needs additional medication therapy - Indication   Recommendation: Start Medication - PFIZER-BIONTFixmo COVID-19 VACC IM   Status: Patient Agreed - Adherence/Education                "

## 2022-09-29 NOTE — LETTER
9/29/2022         RE: Demario Abbasi  [address]  [place] [address]        Dear Colleague,    Thank you for referring your patient, Demario Abbasi, to the [facility] [place]. Please see a copy of my visit note below.    [facility]  September 29, 2022         Assessment and Plan:   Demario Abbasi is a 27 year old male with cystic fibrosis, pancreatic insufficiency, depression, acne, impaired glucose tolerating and  RUL resection for recurrent exacerbations in 2009 who is seen for follow up. He was started on IV imipenem the end of August, but has clinically plateaued and his PFTs are still well below baseline.    1. Moderate CF exacerbation: improvement has plateaued, notes ongoing fatigue, productive cough of thick mucous above baseline, no hemoptysis. No new dyspnea, but does have some congestion/tightness. Sating 97% on room air; vesting BID. PFTs today are 320 ml below June values (did not have PFTs cristi he started his IV therapy). Previous cultures + for MDR Achromobacter and MSSA. Demario notes that historically, he does well with IV meropenem.  - Will get CXR today  - Stop IV imipenem and start IV meropenem  - Start Bactrim 1 tablet TID and resume ngozi nebs  - Add on extended sensitivities to sputum sample from today  - Continue nebs and vesting, aim for 2-3 per day  - Chronic azithromycin  - Will have patient do home spirometry today and again in 1 week    2. CFTR Modulation: based on his genotype, the patient is not a candidate for CFTR modulators.    3. Pancreatic insufficiency: denies symptoms of malabsorption. BMI > CFF goal.  - Continue enzymes and vitamins    4. 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 He was referred to Dr. Cross of CF Endocrine, but was unable to  schedule an appointment due to work constraints.   - Will readdress at next f/u    5. CF related sinusitis:  Environmental allergies: no current issues.  - Continue Zyrtec, Pataday, Astelin and Flonase  - Encouraged to wear a mask at his job    6. GERD: no current issues.  - Continue PPI     RTC: in 3 weeks with routine cultures and spirometry  Annual studies due: March 2023 (overdue for DEXA)  Preventative care: flu shot today    Lisa Lofton PA-C  Pulmonary, Allergy, Critical Care and Sleep Medicine        Interval History:     Feeling better, but not back to baseline, overall feels he's plateaued. Still feeling fatigued, cough is intermittent, still more frequent with thick mucous production. No blood or hemoptysis. No fever or chills, no new or worsening/unexpected shortness of breath. Feels congested and tight. Vesting BID. Can start vesting more frequently next week, worked 140 hours in the last two weeks. No worsening acid reflux, feels a little bit bloated, not eating as much, stools are normal, not loose.          Review of Systems:   Please see HPI. Otherwise, complete 10 point ROS negative.           Past Medical and Surgical History:     Past Medical History:   Diagnosis Date     CF (cystic fibrosis) (H)     Genotype: g542x/621+1g>t     Impaired glucose tolerance      Pancreatic insufficiency      S/P lobectomy of lung 8/4/2015    Right upper lobectomy - 2009     Past Surgical History:   Procedure Laterality Date     H STATISTIC PICC LINE INSERTION >5YR, FAILED Bilateral 03/15/2019    Stenosis in both arms, unable to thread catheter     HC LAP,INGUINAL HERNIA REPR,INITIAL  2002     INSERT PICC LINE Left 10/12/2020    Procedure: INSERTION, PICC @1100;  Surgeon: Felicia Branch MD;  Location: [facility] OR     INSERT PICC LINE Left 12/6/2021    Procedure: INSERTION, PICC;  Surgeon: Tahir Alvarado MD;  Location: [facility] OR     INSERT PICC LINE Left 8/31/2022    Procedure: SINGLE LUMEN INSERTION, PICC;   Surgeon: Felicia Branch MD;  Location: [facility] OR     IR PICC PLACEMENT > 5 YRS OF AGE  03/18/2019     IR PICC PLACEMENT > 5 YRS OF AGE  12/30/2019     IR PICC PLACEMENT > 5 YRS OF AGE  10/12/2020     IR PICC PLACEMENT > 5 YRS OF AGE  12/6/2021     IR PICC PLACEMENT > 5 YRS OF AGE  8/31/2022     LOBECTOMY LUNG Right 2009    Right upper lobe           Family History:     Family History   Problem Relation Age of Onset     Thyroid Disease Mother         hypothyroid     Other - See Comments Mother         multiple family members with biliary disease     Hypertension Maternal Grandmother      Diabetes Maternal Grandfather      Hypertension Paternal Grandmother      Hypothyroidism Paternal Grandmother      Parkinsonism Paternal Grandmother      Coronary Artery Disease Early Onset Paternal Grandfather 56            Social History:     Social History     Socioeconomic History     Marital status: Single     Spouse name: Not on file     Number of children: Not on file     Years of education: Not on file     Highest education level: Not on file   Occupational History     Occupation: Sale    Tobacco Use     Smoking status: Passive Smoke Exposure - Never Smoker     Smokeless tobacco: Current User     Types: Chew     Tobacco comment: dad smokes   Substance and Sexual Activity     Alcohol use: Yes     Comment: <2 drinks per week     Drug use: Not on file     Sexual activity: Not on file   Other Topics Concern     Parent/sibling w/ CABG, MI or angioplasty before 65F 55M? Not Asked   Social History Narrative    12/27/2019  -Graduated college in 2020.  Looking for work as a sales [employer].       Social Determinants of Health     Financial Resource Strain: Not on file   Food Insecurity: Not on file   Transportation Needs: Not on file   Physical Activity: Not on file   Stress: Not on file   Social Connections: Not on file   Intimate Partner Violence: Not on file   Housing Stability: Not on file            Medications:  "    Current Outpatient Medications   Medication     phytonadione (MEPHYTON) 5 MG tablet     sulfamethoxazole-trimethoprim (BACTRIM DS) 800-160 MG tablet     albuterol (PROAIR HFA) 108 (90 Base) MCG/ACT inhaler     albuterol (PROVENTIL) (2.5 MG/3ML) 0.083% neb solution     amylase-lipase-protease (CREON 36) [id number] units CPEP     azelastine (ASTELIN) 0.1 % nasal spray     azithromycin (ZITHROMAX) 500 MG tablet     cetirizine (ZYRTEC) 10 MG tablet     citalopram (CELEXA) 20 MG tablet     dornase alpha (PULMOZYME) 2.5 MG/2.5ML neb solution     fluconazole (DIFLUCAN) 150 MG tablet     fluticasone (FLONASE) 50 MCG/ACT nasal spray     mvw complete formulation (SOFTGELS ) capsule     olopatadine (PATANOL) 0.1 % ophthalmic solution     pantoprazole (PROTONIX) 20 MG EC tablet     sodium chloride inhalant 7 % NEBU neb solution     tobramycin (BETHKIS) 300 MG/4ML nebulizer solution     No current facility-administered medications for this visit.            Physical Exam:   /62   Pulse 63   Ht 1.785 m (5' 10.28\")   Wt 78 kg (172 lb)   SpO2 97%   BMI 24.48 kg/m      GENERAL: alert, NAD  HEENT: NCAT, EOMI, anicteric sclera, canals and TMs clear; no oral mucosal edema or erythema  Neck: no cervical or supraclavicular adenopathy  Respiratory: moderate airflow, few scattered bilateral crackles  CV: RRR, S1S2, no murmurs noted  Abdomen: normoactive BS, soft and non tender   Lymph: no edema, + digital clubbing  Neuro: AAO X 3, CN 2-12 grossly intact  Psychiatric: normal affect, good eye contact  Skin: no rash, jaundice or lesions on limited exam         Data:   All laboratory and imaging data reviewed.      Cystic Fibrosis Culture  Specimen Description   Date Value Ref Range Status   02/11/2021 Sputum  Final   09/10/2020 Sputum  Final   09/10/2020 Sputum  Final   09/10/2020 Sputum  Final   09/10/2020 Sputum  Final   09/10/2020 Sputum  Final    Culture Micro   Date Value Ref Range Status   02/11/2021 " Moderate growth  Normal elmer    Final   02/11/2021 Light growth  Staphylococcus aureus   (A)  Final   02/11/2021 Moderate growth  Strain 2  Staphylococcus aureus   (A)  Final   02/11/2021 (A)  Final    Moderate growth  Achromobacter xylosoxidans/denitrificans          PFT interpretation:  Maneuver: valid and meets ATS guidelines  Normal ratio with decreased FEV1 and FVC  Compared to prior: FEV1 of 2.48 is 320 ml below prior  The decrease in FVC may represent restrictive physiology.  Lung volumes would be necessary to determine.    Moderate  Increased vest/bronchodilator/execise and Inhaled + oral non-quinolone      Again, thank you for allowing me to participate in the care of your patient.        Sincerely,        Lisa Lofton PA-C

## 2022-09-29 NOTE — PROGRESS NOTES
Adult Cystic Fibrosis Program  Annual Psychosocial Assessment    Presenting Information:  Demario Abbasi is a 27-year-old male with cystic fibrosis, presenting in CF clinic for a routine appointment was with Dr. Lilly today.  Met with Demario for an initial annual psychosocial assessment. Demario was unaccompanied in clinic during SW visit.        Living situation:  Demario is currently living with his parents in [place].  They own the home.  [place].  There are 2 dogs at his parents home (a black lab named Rebeca and a miniature pincher named Glen).  They also have a lake cabin that is 20 mins away from their home in [place] that they go to often all year round.  He denies any concerns about his living situation.      Family Constellation:  Demario was raised by his biological parents: Mirna and Andrew who live in [place], [place].  He has two older sisters: Tammy who has a 7 y.o daughter Luis Angel and lives in [place], [place] and Evi who lives in [place], [place], has 2 sons: a 5 yr old son Bharat and a 2 month old named Misael. His mom is a nurse and his dad is a farmer of soybeans and corn. He shared that his dad was recently found to have a slow heart rate so is still having tests to determine plan. He is the only person in his family with CF.  Demario has never been  and does not have children, he is not currently in a significant relationship.     Social Support:  Demario reports very good social support.  He gets along well with family members and draws additional support from friends and yin community.  He is close with both of his parents, but especially his dad.  He and his family have some involvement with the CF community through fundraising.       Adjustment to Illness:  Demario reports that he was diagnosed with CF in infancy (3 months), he reports that shortly after being brought home from the hospital he was not thriving, gaining weight or growing.  His parents  "brought him to [place] for evaluation and they transferred him to the [facility] where a sweat test was performed and the diagnosis of CF was made.  He reports some complications while growing up, being hospitalized about once per year for CF-related issues which he did not like.  He admits that growing up with CF was hard at times, he struggled with doing his therapies and treatments at times and not having the freedom that other kids had.  In 2009 he had  RUL resection for recurrent exacerbations.  Despite this, he felt that he was still able to have a wonderful childhood/upbringing, participating in sports and being very active in social events. He reports that his parents were very good and helping take care of him and manage CF and states: \"they were on top of it\".  He has been vesting since he was very young, he was unsure of the exact age.      Demario describes his current health status as \"pretty good\". He is currently on IV abx for an exacerbation but otherwise feels stable and has been able to continue working while on IV's. He is not currently a candidate for Trikafta because of his gene mutation. He reports that he uses his vest 2-3 times per day consistently, he says that he \"loves\" the vest and describes it as a relaxing massage.  He reports that he does pretty well with taking medications. He denies any health problems that interfere with activities of daily living.     Demario reports that he is pretty open about his CF diagnosis and has no reservations about telling people.         Health Care Directive:  Demario has previously received Health Care Directive education.  This SW provided/reviewed education, including concept/purpose of health care directive, default health care agents and how to complete a directive.   He is comfortable with default health care agent(s) (his parents) in absence of a directive.  He is not currently interested in completing a health care directive.     Education:  Demario graduated " "from [employer] and graduated from [employer] in May 2020 with a degree in Agronomy (plant science).      Employment:    Demario works full-time for [employer] as a . 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 He anticipates that things will be much calmer after harvest season and his job is year-round. His employer is aware of his CF diagnosis and he reports a supportive work environment with access to insurance, PTO and short-term disability benefits.    Finances:  Demario receives income from wages. He denies having financial concerns at this time.    Insurance:  Demario continues to have Blue Plus (Medicaid) because the peacetime emergency has not taken him off even though he is over-income. He also has health insurance through his employer ([employer]). He reports that everything has been covered and denies having insurance coverage concerns at this time. He understands that once the Peacetime Emergency ends his Medicaid will end.    Mental Health/Coping:  Demario reports a history of depression, the chart review also includes a history of mild anxiety in the past.  Demario reports that his current/recent mood has been \"good\".  He continues to take the antidepressant (serotonin specific reuptake inhibitor): Celexa and feels this medication is beneficial.  In the past he recalls going through a tough period of time in regards to his CF, describing it as \"a stage of understanding\", not wanting to comply with treatments and Celexa did help him get through this difficult time.  He has not sought therapy in the past and does not feel this is something he needs currently.  He zelda with stress by talking to someone such as his dad.  He identifies yin as important part of his life, he was raised Islam and is involved with a Christian community.    Demario completed the following screens today:  PHQ-9: Score: 1, which indicates minimal symptoms of depression and " described as not difficult in daily functioning.  EMY-7: Score: 1, which indicates minimal symptoms of anxiety and described as not difficult in daily functioning.       Demario agreed with the scores and interpretation of the screens above. He does not feel that he needs any mental health resources at this time.    Chemical Health:  Demario does use chewing tobacco, it was difficult for him to quantify but does say that it is not excessive. He denies the use of other tobacco products, denies the use of marijuana or other drugs. He specifically said that he could not/would not use marijuana as he has his CDL (commercial drivers license).  He reports the use of alcohol, he estimates 12 beers per week on average, usually spaced out across the days, not all at once.  He denies any current/past psychosocial impairment caused by alcohol use.        Leisure Activities/Interests:   Demario enjoys hunting, fishing, golf, hockey and football.  He also enjoys being at cabin and outdoor activities there.          Intervention:  -Psychosocial Assessment  -Mental health screening  -Insurance counseling   -Supportive counseling    Assessment:  Demario appeared to be open in open responses and was in good spirits. He is now working full-time in his field and enjoys his work. Despite a current exacerbation, he feels he is doing well from both a physical and mental health perspective. He appears to be coping well with not being a Trikafta candidate at this time. He does have a history of depression and currently feels it is well managed with Celexa. He reports good support system with no concerns reported in the area of financial or insurance. Demario seems to be psychosocially stable overall, with access to relevant resources and supports.  No concerns expressed/noted.  Recommend f/u SW support as noted in plan below.      Plan:  Re-consult for any psychosocial issues that may arise.  Complete psychosocial assessment annually.      Prerna Paniagua,  JONELLE, [facility]  Adult Cystic Fibrosis   Ph: [phone]  Pager: [phone]

## 2022-09-30 ENCOUNTER — TELEPHONE (OUTPATIENT)
Dept: PULMONOLOGY | Facility: CLINIC | Age: 27
End: 2022-09-30

## 2022-09-30 NOTE — PROGRESS NOTES
This is a recent snapshot of the patient's [facility] medical record.  For current drug dose and complete information and questions, call [phone] or In Basket pool, [facility] ([id number])  CSN Number:  [id number]

## 2022-09-30 NOTE — PATIENT INSTRUCTIONS
See provider AVS for a summary of recommendations from today's visit.    Angeline Quinones, PharmD  Cystic Fibrosis MTM Pharmacist  [facility]  Voicemail: [phone]

## 2022-10-04 LAB
BACTERIA SPT CULT: ABNORMAL
BACTERIA SPT CULT: ABNORMAL

## 2022-10-06 ENCOUNTER — TELEPHONE (OUTPATIENT)
Dept: PULMONOLOGY | Facility: CLINIC | Age: 27
End: 2022-10-06

## 2022-10-06 NOTE — TELEPHONE ENCOUNTER
Attempted to contact patient to assist with scheduling follow up appointment. No answer, left message requesting return call.  Tammy George RN

## 2022-10-07 ENCOUNTER — TELEPHONE (OUTPATIENT)
Dept: PULMONOLOGY | Facility: CLINIC | Age: 27
End: 2022-10-07

## 2022-10-07 NOTE — TELEPHONE ENCOUNTER
Prior Authorization Retail Medication Request    Medication/Dose: pantoprazole (PROTONIX) 20 MG EC tablet  ICD code (if different than what is on RX):  CF (cystic fibrosis) (H) [E84.9]   Previously Tried and Failed:   Rationale:      Insurance Name:  [facility]  Insurance ID:  [id number]    Pharmacy Information (if different than what is on RX)  [id number] [place], [place] - [address]  Phone:  [phone]

## 2022-10-10 NOTE — TELEPHONE ENCOUNTER
PRIOR AUTHORIZATION DENIED    Medication: pantoprazole (PROTONIX) 20 MG EC tablet    Denial Date: 10/10/2022    Denial Rational:                 Appeal Information:

## 2022-10-10 NOTE — TELEPHONE ENCOUNTER
Central Prior Authorization Team   Phone: [phone]      PA Initiation    Medication: pantoprazole (PROTONIX) 20 MG EC tablet  Insurance Company: [facility] - Phone [phone] Fax [phone]  [id number] [address]  Filling Pharmacy Phone: [phone]  Filling Pharmacy Fax:    Start Date: 10/10/2022

## 2022-10-11 ENCOUNTER — TELEPHONE (OUTPATIENT)
Dept: PULMONOLOGY | Facility: CLINIC | Age: 27
End: 2022-10-11

## 2022-10-11 NOTE — TELEPHONE ENCOUNTER
Received call from Oriana from [facility] requesting new neb orders for patient. V.O. provided.    Tammy George RN

## 2022-10-13 ENCOUNTER — OFFICE VISIT (OUTPATIENT)
Dept: PULMONOLOGY | Facility: CLINIC | Age: 27
End: 2022-10-13
Attending: PHYSICIAN ASSISTANT
Payer: COMMERCIAL

## 2022-10-13 ENCOUNTER — CLINICAL UPDATE (OUTPATIENT)
Dept: PHARMACY | Facility: CLINIC | Age: 27
End: 2022-10-13

## 2022-10-13 ENCOUNTER — HOME INFUSION (PRE-WILLOW HOME INFUSION) (OUTPATIENT)
Dept: PHARMACY | Facility: CLINIC | Age: 27
End: 2022-10-13

## 2022-10-13 VITALS
OXYGEN SATURATION: 96 % | DIASTOLIC BLOOD PRESSURE: 74 MMHG | SYSTOLIC BLOOD PRESSURE: 110 MMHG | HEART RATE: 77 BPM | BODY MASS INDEX: 24.07 KG/M2 | WEIGHT: 169.09 LBS

## 2022-10-13 DIAGNOSIS — E84.9 DIABETES MELLITUS DUE TO CYSTIC FIBROSIS (H): ICD-10-CM

## 2022-10-13 DIAGNOSIS — E08.9 DIABETES MELLITUS DUE TO CYSTIC FIBROSIS (H): ICD-10-CM

## 2022-10-13 DIAGNOSIS — E84.9 CYSTIC FIBROSIS (H): Primary | ICD-10-CM

## 2022-10-13 DIAGNOSIS — E84.0 CYSTIC FIBROSIS WITH PULMONARY EXACERBATION (H): ICD-10-CM

## 2022-10-13 DIAGNOSIS — K86.89 PANCREATIC INSUFFICIENCY: ICD-10-CM

## 2022-10-13 LAB
EXPTIME-PRE: 9.42 SEC
FEF2575-%PRED-PRE: 39 %
FEF2575-PRE: 1.95 L/SEC
FEF2575-PRED: 4.88 L/SEC
FEFMAX-%PRED-PRE: 77 %
FEFMAX-PRE: 8 L/SEC
FEFMAX-PRED: 10.26 L/SEC
FEV1-%PRED-PRE: 54 %
FEV1-PRE: 2.56 L
FEV1FEV6-PRE: 76 %
FEV1FEV6-PRED: 84 %
FEV1FVC-PRE: 75 %
FEV1FVC-PRED: 84 %
FIFMAX-PRE: 4.19 L/SEC
FVC-%PRED-PRE: 60 %
FVC-PRE: 3.39 L
FVC-PRED: 5.59 L

## 2022-10-13 PROCEDURE — 87070 CULTURE OTHR SPECIMN AEROBIC: CPT | Performed by: PHYSICIAN ASSISTANT

## 2022-10-13 PROCEDURE — 99207 PR NO CHARGE LOS: CPT | Performed by: PHARMACIST

## 2022-10-13 PROCEDURE — 97803 MED NUTRITION INDIV SUBSEQ: CPT | Performed by: DIETITIAN, REGISTERED

## 2022-10-13 PROCEDURE — 99214 OFFICE O/P EST MOD 30 MIN: CPT | Mod: 25 | Performed by: PHYSICIAN ASSISTANT

## 2022-10-13 PROCEDURE — 87116 MYCOBACTERIA CULTURE: CPT | Performed by: PHYSICIAN ASSISTANT

## 2022-10-13 PROCEDURE — 87206 SMEAR FLUORESCENT/ACID STAI: CPT | Performed by: PHYSICIAN ASSISTANT

## 2022-10-13 PROCEDURE — 94375 RESPIRATORY FLOW VOLUME LOOP: CPT | Performed by: PHYSICIAN ASSISTANT

## 2022-10-13 PROCEDURE — G0463 HOSPITAL OUTPT CLINIC VISIT: HCPCS | Mod: 25

## 2022-10-13 RX ORDER — PREDNISONE 20 MG/1
40 TABLET ORAL DAILY
Qty: 10 TABLET | Refills: 0 | Status: SHIPPED | OUTPATIENT
Start: 2022-10-13 | End: 2022-10-13

## 2022-10-13 RX ORDER — SULFAMETHOXAZOLE/TRIMETHOPRIM 800-160 MG
1 TABLET ORAL 3 TIMES DAILY
Qty: 63 TABLET | Refills: 0 | Status: SHIPPED | OUTPATIENT
Start: 2022-10-13 | End: 2022-10-20

## 2022-10-13 ASSESSMENT — PAIN SCALES - GENERAL: PAINLEVEL: NO PAIN (0)

## 2022-10-13 NOTE — TELEPHONE ENCOUNTER
Medication Appeal Initiation      Medication: pantoprazole (PROTONIX) 20 MG EC tablet  Appeal Start Date:  10/13/2022  Insurance Company: [employer] - Phone [phone] Fax [phone]  Comments:  Appeal has been initiated.  I have faxed original denial letter along with Letter of Medical Necessity (letters tab) to [facility] - Attn: Appeals Dept, fax# [phone]. Marked for urgent review.

## 2022-10-13 NOTE — LETTER
October 13, 2022      [facility]     Patient: Demario Abbasi    ID#: [id number]    From the office of Dr. Zana Lilly MD      To whom it may concern,      Demario Abbasi is a pleasant 27-year-old male who unfortunately suffers from cystic fibrosis with pulmonary manifestations (H) (E84.0); gastroesophageal reflux disease with esophagitis (K21.00); exocrine pancreatic insufficiency (K86.81).     In your denial letter for this patient s pantoprazole, you state that the patient is over the plan s set limit of 120 days of treatment duration. We would like to appeal patient s coverage for pantoprazole under Criteria B:  Your prescriber must tell us why you require a higher quantity to treat your condition. They must provide support for their reasons.     Cystic fibrosis is a heterogeneous recessive genetic disorder with pathophysiologic features that reflect mutations in the cystic fibrosis transmembrane conductance regulator (CFTR) gene. Classic cystic fibrosis reflects two loss-of-function mutations in the CFTR gene and is characterized by chronic bacterial infection of the airways and sinuses, fat malabsorption due to pancreatic exocrine insufficiency, and elevated concentrations of chloride in sweat. Gastroesophageal reflux disease (GERD) is a common comorbidity in cystic fibrosis patients occurring in 55 to 90% of patients. Factors that can increase their risk for GERD include: chronic cough, higher gastroesophageal pressure gradient, altered intestinal motility, chest physiotherapy, and a low gastric pH. Untreated GERD exacerbates the chronic bronchopulmonary diseases and contributes to the deterioration in respiratory function due to increasing risk for microaspirations and bronchospasms.     Demario has been well controlled on pantoprazole therapy since January 2018. Unfortunately, he has not tolerated previous trials off therapy due to recurrence of symptoms  including severe abdominal pain, cough, and acid reflux. Therefore, he is indicated to continue pantoprazole as maintenance therapy for GERD.      We feel that pantoprazole is the best choice of therapy for Demario based on his past improvements on therapy and trial off failures to treat his GERD in the setting of cystic fibrosis and pancreatic insufficiency. We would like to pursue this course of therapy and are requesting that you approve our decision to approve pantoprazole to our patient, allowing us to provide the best treatment option available. We thank you for your immediate attention to this issue and we would appreciate haste in your determination.       Sincerely,    Zana Lilly MD   [address] [place] [address]  Phone: [phone] Fax: [phone]    References:  Ofe BECK, Sergey S, and Pedro Luis ARANDA, et al. Focus on gastroesophageal reflux disease in patients with cystic fibrosis. World J Gastroenterol. 2020 Nov 7; 26(70): 8275-8713.

## 2022-10-13 NOTE — PROGRESS NOTES
Clinical Update:                                                    At the request of Dr. Lilly, a chart review was conducted for Demario Abbasi.    Reason for Chart Review: Letter of Medical Necessity    Discussion: Patient received denial to continue pantoprazole therapy for his GERD. Patient has been well controlled on pantoprazole since 2018. Appeal requires LMN.        Plan:  1. LMN created in letter encounter      Angeline Quinones, PharmBABAR  Cystic Fibrosis MTM Pharmacist  [facility] [place]  Voicemail: [phone]

## 2022-10-13 NOTE — PROGRESS NOTES
CF Annual Nutrition Assessment     Reason for Assessment  Assessed during CF clinic visit with Lisa Lofton r/t increased nutrition risk with diagnosis of CF per protocol.     Nutrition Significant PMH  Moderate Lung Disease- not eligible for modulator  Pancreatic Insufficient  Impaired Glucose Tolerance 3/17/22   - >300 at 1-hr, 180 at 2-hr     Anthropometric Assessment  Height: 175.3 cm  Weight: 76.7 kg (169 lbs)  Ideal body weight based on BMI 22 for females and 23 for males per CF Foundation recs: 71 kg (156 lbs)   BMI: 24.1 kg/m      Wt Readings from Last 5 Encounters:   10/13/22 76.7 kg (169 lb 1.5 oz)   22 78 kg (172 lb)   22 81.6 kg (180 lb)   22 78.9 kg (174 lb)   22 81 kg (178 lb 9.2 oz)     Comments: -175 lbs. Weight down slightly likely r/t pulmonary exacerbation, also increased energy expenditure with physical job.      Pancreatic Enzymes  Brand: Creon 36,000  Dosin-3 with meals, 1-2 with snacks = 1400 units lipase/kg/meal  Estimated Daily Intake: 10 caps = 4680 units lipase/kg/day     Comments: self-decreased dose from 5 caps with meals to 2-3 caps. Reports an improvement in bloating; denies s/sx malabsorption with lower dose.  Signs of Malabsorption: No - only if misses enzyme dose  Enzyme Program: Not eligible      Nutrition-Related Lab & Vitamin/Mineral Supplements  Annual studies 3/17/22  Vitamin A- 0.54 wnl  Vitamin D- 31 wnl  Vitamin E- 8.1 wnl  Iron- 94 wnl  Lipid Panel- HDL 38 low     OGTT- , impaired glucose tolerance - changed from previous 2019; >300 at 1-hr and 180 at 2-hr  DEXA - , lowest z-score -0.6     Current Vitamin/Mineral Supplements: MVW Complete  1 softgel BID  Comments: obtains MVW from [facility]     Diet History and Assessment  Diet Preferences/Allergies/Intolerances: Regular  Intake Recall/Comments: Appetite somewhat decreased with pulmonary exacerbation and not eating as much as baseline, although improving now. Eating mostly lunch and  dinner; meals typically consist of meat and potatoes Dad or Demario usually cook supper; enjoys fresh string beans from the garden during summer and grilling steak/burgers.     Calcium: adequate - milk/yogurt/cheese  Salt: adequate - adds to foods + meal choices  Hydration: adequate - 1 can Mtn Dew + reg Gatorade daily, water, milk  Supplements: no      NUTRITION DIAGNOSIS  Impaired nutrient utilization related to CF pancreatic insufficiency as evidenced by requires pancreatic enzyme replacement therapy and vitamin/mineral supplementation in order to maintain ideal body weight and nutrition status.      INTERVENTIONS/RECOMMENDATIONS   1) Oral Intake/Weight:   BEE: 1845  Calorie Goals- 8262-9996 kcals/day (150-175% BEE)   Protein Goals-  grams/day (1.2-1.5 g/kg)     Reviewed current nutrition status including weight trends and adequacy of oral intake with Demario. Weight down somewhat likely r/t decreased appetite with pulm illness as well as increased energy expenditure with physical job/busy season. Encouraged ongoing good PO with adequate protein/calories/hydration.      2) Vitamin/Mineral Supplementation:  Will be due for annual study labs March 2023. Plan to continue with current vitamin supplementation at this time.     3) Glucose Intolerance:  Reviewed results of most recent OGTT 3/17/22. Encouraged pt to schedule with endocrine. Also reviewed diet recommendations including limiting regular soda/simple sugar intake from beverages. Encouraged consistent carbohydrate intake throughout the day.     4) Enzymes/GI:   Noted self-decrease in enzyme dose; per pt tried this to see if it would help with GI symptoms which correlates with decrease in bloating. Denies s/sx malabsorption with lower dose. Encouraged pt to titrate upward with larger, high-fat meals. If ongoing weight loss or difficulty maintaining weight, consider increasing dose.      GOALS:  1) Maintain BMI >23 kg/m2  2) Vitamin levels wnl      FOLLOW-UP/MONITORING:  Visit patient within 12 months for annual nutrition reassessment.     Time Spent In Face-to-Face Patient Interactions: 15 minutes    Sherie Aparicio RD, LD  Cystic Fibrosis/Lung Transplant Dietitian  Pager [phone]

## 2022-10-13 NOTE — PROGRESS NOTES
[facility]  October , 2022         Assessment and Plan:   Demario Abbasi is a 27 year old male with cystic fibrosis, pancreatic insufficiency, depression, acne, impaired glucose tolerating and RUL resection for recurrent exacerbations in 2009 who is seen for follow up. He was started on IV imipenem the end of August, changed to IV meropenem with the addition of Bactrim and ngozi nebs at last visit.     1. Moderate CF exacerbation: feels back to baseline with minimal cough, no further tightness and resolved fatigue. Sating 96% on room air; vesting BID. PFTs improved minimally today, does remain significantly below his June best. Previous cultures + for MDR Achromobacter and MSSA. Demario notes that historically, his symptoms improve first and then his PFTs typically lag and improve later. Would like to stop IVs today and come back for follow up in 1 month. PFTs might also be affected by possible CFRD per his OGTT, has not yet follow up with Endocrine.   - Stop IV meropenem and pull PICC line  - Continue Bactrim x 1 more week; continue ngozi nebs month on/[place]  - Continue nebs and vesting, aim for 2-3 per day  - Chronic azithromycin  - Of note, patient would like to discuss port placement    2. CFTR Modulation: based on his genotype, the patient is not a candidate for CFTR modulators.    3. Pancreatic insufficiency: denies symptoms of malabsorption.   - Continue enzymes and vitamins    4. 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 He was referred to Dr. Cross of CF Endocrine, but has not yet follow up.  - Encouraged visit with Dr. Cross    5. CF related sinusitis:  Environmental allergies: no current issues.  - Continue Zyrtec, Pataday, Astelin and Flonase  - Encouraged to wear a mask at his job    6. GERD: no current issues.  - Continue PPI     RTC: in 4 weeks  with routine cultures and spirometry  Annual studies due: March 2023 (overdue for DEXA)  Preventative care: flu shot completed    Lisa Lofton PA-C  Pulmonary, Allergy, Critical Care and Sleep Medicine        Interval History:     Feeling much better since starting meropenem. No fever or chills, no sinus or nasal drainage. Cough is baseline, not more frequent or productive. No longer coughing at night. No congestion or tightness. No shortness of breath above his baseline and fatigue has improved. Feels very near his baseline. Getting in 2 vests per day, occasionally 3. No bloating or gas, not vomiting. Stools are good, normal.          Review of Systems:   Please see HPI. Otherwise, complete 10 point ROS negative.           Past Medical and Surgical History:     Past Medical History:   Diagnosis Date     CF (cystic fibrosis) (H)     Genotype: g542x/621+1g>t     Impaired glucose tolerance      Pancreatic insufficiency      S/P lobectomy of lung 8/4/2015    Right upper lobectomy - 2009     Past Surgical History:   Procedure Laterality Date     H STATISTIC PICC LINE INSERTION >5YR, FAILED Bilateral 03/15/2019    Stenosis in both arms, unable to thread catheter     HC LAP,INGUINAL HERNIA REPR,INITIAL  2002     INSERT PICC LINE Left 10/12/2020    Procedure: INSERTION, PICC @1100;  Surgeon: Felicia Branch MD;  Location: [facility] OR     INSERT PICC LINE Left 12/6/2021    Procedure: INSERTION, PICC;  Surgeon: Tahir Alvarado MD;  Location: [facility] OR     INSERT PICC LINE Left 8/31/2022    Procedure: SINGLE LUMEN INSERTION, PICC;  Surgeon: Felicia Branch MD;  Location: [facility] OR     IR PICC PLACEMENT > 5 YRS OF AGE  03/18/2019     IR PICC PLACEMENT > 5 YRS OF AGE  12/30/2019     IR PICC PLACEMENT > 5 YRS OF AGE  10/12/2020     IR PICC PLACEMENT > 5 YRS OF AGE  12/6/2021     IR PICC PLACEMENT > 5 YRS OF AGE  8/31/2022     LOBECTOMY LUNG Right 2009    Right upper lobe           Family History:     Family History   Problem  Relation Age of Onset     Thyroid Disease Mother         hypothyroid     Other - See Comments Mother         multiple family members with biliary disease     Hypertension Maternal Grandmother      Diabetes Maternal Grandfather      Hypertension Paternal Grandmother      Hypothyroidism Paternal Grandmother      Parkinsonism Paternal Grandmother      Coronary Artery Disease Early Onset Paternal Grandfather 56            Social History:     Social History     Socioeconomic History     Marital status: Single     Spouse name: Not on file     Number of children: Not on file     Years of education: Not on file     Highest education level: Not on file   Occupational History     Occupation: Sale    Tobacco Use     Smoking status: Never     Passive exposure: Yes     Smokeless tobacco: Current     Types: Chew     Tobacco comments:     dad smokes   Substance and Sexual Activity     Alcohol use: Yes     Comment: <2 drinks per week     Drug use: Not on file     Sexual activity: Not on file   Other Topics Concern     Parent/sibling w/ CABG, MI or angioplasty before 65F 55M? Not Asked   Social History Narrative    12/27/2019  -Graduated college in 2020.  Looking for work as a [employer].       Social Determinants of Health     Financial Resource Strain: Not on file   Food Insecurity: Not on file   Transportation Needs: Not on file   Physical Activity: Not on file   Stress: Not on file   Social Connections: Not on file   Intimate Partner Violence: Not on file   Housing Stability: Not on file            Medications:     Current Outpatient Medications   Medication     sulfamethoxazole-trimethoprim (BACTRIM DS) 800-160 MG tablet     albuterol (PROAIR HFA) 108 (90 Base) MCG/ACT inhaler     albuterol (PROVENTIL) (2.5 MG/3ML) 0.083% neb solution     amylase-lipase-protease (CREON 36) 706098-00390-111723 units CPEP     azelastine (ASTELIN) 0.1 % nasal spray     azithromycin (ZITHROMAX) 500 MG tablet     cetirizine (ZYRTEC)  10 MG tablet     citalopram (CELEXA) 20 MG tablet     dornase alpha (PULMOZYME) 2.5 MG/2.5ML neb solution     fluconazole (DIFLUCAN) 150 MG tablet     fluticasone (FLONASE) 50 MCG/ACT nasal spray     mvw complete formulation (SOFTGELS ) capsule     olopatadine (PATANOL) 0.1 % ophthalmic solution     pantoprazole (PROTONIX) 20 MG EC tablet     phytonadione (MEPHYTON) 5 MG tablet     sodium chloride inhalant 7 % NEBU neb solution     tobramycin (BETHKIS) 300 MG/4ML nebulizer solution     No current facility-administered medications for this visit.            Physical Exam:   /74 (BP Location: Right arm, Patient Position: Chair, Cuff Size: Adult Regular)   Pulse 77   SpO2 96%     GENERAL: alert, NAD  HEENT: NCAT, EOMI, anicteric sclera, canals and TMs clear; no oral mucosal edema or erythema  Neck: no cervical or supraclavicular adenopathy  Respiratory: moderate airflow, few scattered bilateral crackles  CV: RRR, S1S2, no murmurs noted  Abdomen: normoactive BS, soft and non tender   Lymph: no edema, + digital clubbing  Neuro: AAO X 3, CN 2-12 grossly intact  Psychiatric: normal affect, good eye contact  Skin: no rash, jaundice or lesions on limited exam         Data:   All laboratory and imaging data reviewed.      Cystic Fibrosis Culture  Specimen Description   Date Value Ref Range Status   02/11/2021 Sputum  Final   09/10/2020 Sputum  Final   09/10/2020 Sputum  Final   09/10/2020 Sputum  Final   09/10/2020 Sputum  Final   09/10/2020 Sputum  Final    Culture Micro   Date Value Ref Range Status   02/11/2021 Moderate growth  Normal elmer    Final   02/11/2021 Light growth  Staphylococcus aureus   (A)  Final   02/11/2021 Moderate growth  Strain 2  Staphylococcus aureus   (A)  Final   02/11/2021 (A)  Final    Moderate growth  Achromobacter xylosoxidans/denitrificans          PFT interpretation:  Maneuver: valid and meets ATS guidelines  Normal ratio with decreased FEV1 and FVC  Compared to prior: FEV1 of 2.48  is 320 ml below prior  The decrease in FVC may represent restrictive physiology.  Lung volumes would be necessary to determine.

## 2022-10-13 NOTE — NURSING NOTE
Pt PICC line removed. Site Clean,dry and intact. No evidence of infection. PICC length  45 cm. No bleeding after removal. Site cleaned, covered with sterile gauze and secured with Coban. Pt advised to leave dressing on for 24 hours and call CF office with any questions or concerns.    MAGGIE Singletary

## 2022-10-13 NOTE — LETTER
10/13/2022         RE: Demario Abbasi  [address]  [place] [address]        Dear Colleague,    Thank you for referring your patient, Demario Abbasi, to the [facility] [place]. Please see a copy of my visit note below.    [facility]  October , 2022         Assessment and Plan:   Demario Abbasi is a 27 year old male with cystic fibrosis, pancreatic insufficiency, depression, acne, impaired glucose tolerating and RUL resection for recurrent exacerbations in 2009 who is seen for follow up. He was started on IV imipenem the end of August, changed to IV meropenem with the addition of Bactrim and ngozi nebs at last visit.     1. Moderate CF exacerbation: feels back to baseline with minimal cough, no further tightness and resolved fatigue. Sating 96% on room air; vesting BID. PFTs improved minimally today, does remain significantly below his June best. Previous cultures + for MDR Achromobacter and MSSA. Demario notes that historically, his symptoms improve first and then his PFTs typically lag and improve later. Would like to stop IVs today and come back for follow up in 1 month. PFTs might also be affected by possible CFRD per his OGTT, has not yet follow up with Endocrine.   - Stop IV meropenem and pull PICC line  - Continue Bactrim x 1 more week; continue ngozi nebs month on/[place]  - Continue nebs and vesting, aim for 2-3 per day  - Chronic azithromycin  - Of note, patient would like to discuss port placement    2. CFTR Modulation: based on his genotype, the patient is not a candidate for CFTR modulators.    3. Pancreatic insufficiency: denies symptoms of malabsorption.   - Continue enzymes and vitamins    4. 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 He was referred to Dr. Cross of CF  Endocrine, but has not yet follow up.  - Encouraged visit with Dr. Cross    5. CF related sinusitis:  Environmental allergies: no current issues.  - Continue Zyrtec, Pataday, Astelin and Flonase  - Encouraged to wear a mask at his job    6. GERD: no current issues.  - Continue PPI     RTC: in 4 weeks with routine cultures and spirometry  Annual studies due: March 2023 (overdue for DEXA)  Preventative care: flu shot completed    Lisa Lofton PA-C  Pulmonary, Allergy, Critical Care and Sleep Medicine        Interval History:     Feeling much better since starting meropenem. No fever or chills, no sinus or nasal drainage. Cough is baseline, not more frequent or productive. No longer coughing at night. No congestion or tightness. No shortness of breath above his baseline and fatigue has improved. Feels very near his baseline. Getting in 2 vests per day, occasionally 3. No bloating or gas, not vomiting. Stools are good, normal.          Review of Systems:   Please see HPI. Otherwise, complete 10 point ROS negative.           Past Medical and Surgical History:     Past Medical History:   Diagnosis Date     CF (cystic fibrosis) (H)     Genotype: g542x/621+1g>t     Impaired glucose tolerance      Pancreatic insufficiency      S/P lobectomy of lung 8/4/2015    Right upper lobectomy - 2009     Past Surgical History:   Procedure Laterality Date     H STATISTIC PICC LINE INSERTION >5YR, FAILED Bilateral 03/15/2019    Stenosis in both arms, unable to thread catheter     HC LAP,INGUINAL HERNIA REPR,INITIAL  2002     INSERT PICC LINE Left 10/12/2020    Procedure: INSERTION, PICC @1100;  Surgeon: Felicia Branch MD;  Location: [facility] OR     INSERT PICC LINE Left 12/6/2021    Procedure: INSERTION, PICC;  Surgeon: Tahir Alvarado MD;  Location: [facility] OR     INSERT PICC LINE Left 8/31/2022    Procedure: SINGLE LUMEN INSERTION, PICC;  Surgeon: Felicia Branch MD;  Location: [facility] OR     IR PICC PLACEMENT > 5 YRS OF AGE  03/18/2019      IR PICC PLACEMENT > 5 YRS OF AGE  12/30/2019     IR PICC PLACEMENT > 5 YRS OF AGE  10/12/2020     IR PICC PLACEMENT > 5 YRS OF AGE  12/6/2021     IR PICC PLACEMENT > 5 YRS OF AGE  8/31/2022     LOBECTOMY LUNG Right 2009    Right upper lobe           Family History:     Family History   Problem Relation Age of Onset     Thyroid Disease Mother         hypothyroid     Other - See Comments Mother         multiple family members with biliary disease     Hypertension Maternal Grandmother      Diabetes Maternal Grandfather      Hypertension Paternal Grandmother      Hypothyroidism Paternal Grandmother      Parkinsonism Paternal Grandmother      Coronary Artery Disease Early Onset Paternal Grandfather 56            Social History:     Social History     Socioeconomic History     Marital status: Single     Spouse name: Not on file     Number of children: Not on file     Years of education: Not on file     Highest education level: Not on file   Occupational History     Occupation: Sale    Tobacco Use     Smoking status: Never     Passive exposure: Yes     Smokeless tobacco: Current     Types: Chew     Tobacco comments:     dad smokes   Substance and Sexual Activity     Alcohol use: Yes     Comment: <2 drinks per week     Drug use: Not on file     Sexual activity: Not on file   Other Topics Concern     Parent/sibling w/ CABG, MI or angioplasty before 65F 55M? Not Asked   Social History Narrative    12/27/2019  -Graduated college in 2020.  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       Social Determinants of Health     Financial Resource Strain: Not on file   Food Insecurity: Not on file   Transportation Needs: Not on file   Physical Activity: Not on file   Stress: Not on file   Social Connections: Not on file   Intimate Partner Violence: Not on file   Housing Stability: Not on file            Medications:     Current Outpatient Medications   Medication     sulfamethoxazole-trimethoprim (BACTRIM DS) 800-160 MG  tablet     albuterol (PROAIR HFA) 108 (90 Base) MCG/ACT inhaler     albuterol (PROVENTIL) (2.5 MG/3ML) 0.083% neb solution     amylase-lipase-protease (CREON 36) 643159-47700-746000 units CPEP     azelastine (ASTELIN) 0.1 % nasal spray     azithromycin (ZITHROMAX) 500 MG tablet     cetirizine (ZYRTEC) 10 MG tablet     citalopram (CELEXA) 20 MG tablet     dornase alpha (PULMOZYME) 2.5 MG/2.5ML neb solution     fluconazole (DIFLUCAN) 150 MG tablet     fluticasone (FLONASE) 50 MCG/ACT nasal spray     mvw complete formulation (SOFTGELS ) capsule     olopatadine (PATANOL) 0.1 % ophthalmic solution     pantoprazole (PROTONIX) 20 MG EC tablet     phytonadione (MEPHYTON) 5 MG tablet     sodium chloride inhalant 7 % NEBU neb solution     tobramycin (BETHKIS) 300 MG/4ML nebulizer solution     No current facility-administered medications for this visit.            Physical Exam:   /74 (BP Location: Right arm, Patient Position: Chair, Cuff Size: Adult Regular)   Pulse 77   SpO2 96%     GENERAL: alert, NAD  HEENT: NCAT, EOMI, anicteric sclera, canals and TMs clear; no oral mucosal edema or erythema  Neck: no cervical or supraclavicular adenopathy  Respiratory: moderate airflow, few scattered bilateral crackles  CV: RRR, S1S2, no murmurs noted  Abdomen: normoactive BS, soft and non tender   Lymph: no edema, + digital clubbing  Neuro: AAO X 3, CN 2-12 grossly intact  Psychiatric: normal affect, good eye contact  Skin: no rash, jaundice or lesions on limited exam         Data:   All laboratory and imaging data reviewed.      Cystic Fibrosis Culture  Specimen Description   Date Value Ref Range Status   02/11/2021 Sputum  Final   09/10/2020 Sputum  Final   09/10/2020 Sputum  Final   09/10/2020 Sputum  Final   09/10/2020 Sputum  Final   09/10/2020 Sputum  Final    Culture Micro   Date Value Ref Range Status   02/11/2021 Moderate growth  Normal elmer    Final   02/11/2021 Light growth  Staphylococcus aureus   (A)  Final    2021 Moderate growth  Strain 2  Staphylococcus aureus   (A)  Final   2021 (A)  Final    Moderate growth  Achromobacter xylosoxidans/denitrificans          PFT interpretation:  Maneuver: valid and meets ATS guidelines  Normal ratio with decreased FEV1 and FVC  Compared to prior: FEV1 of 2.48 is 320 ml below prior  The decrease in FVC may represent restrictive physiology.  Lung volumes would be necessary to determine.      CF Annual Nutrition Assessment     Reason for Assessment  Assessed during CF clinic visit with Lisa Lofton r/t increased nutrition risk with diagnosis of CF per protocol.     Nutrition Significant PMH  Moderate Lung Disease- not eligible for modulator  Pancreatic Insufficient  Impaired Glucose Tolerance 3/17/22   - >300 at 1-hr, 180 at 2-hr     Anthropometric Assessment  Height: 175.3 cm  Weight: 76.7 kg (169 lbs)  Ideal body weight based on BMI 22 for females and 23 for males per [facility] recs: 71 kg (156 lbs)   BMI: 24.1 kg/m      Wt Readings from Last 5 Encounters:   10/13/22 76.7 kg (169 lb 1.5 oz)   22 78 kg (172 lb)   22 81.6 kg (180 lb)   22 78.9 kg (174 lb)   22 81 kg (178 lb 9.2 oz)     Comments: -175 lbs. Weight down slightly likely r/t pulmonary exacerbation, also increased energy expenditure with physical job.      Pancreatic Enzymes  Brand: Creon 36,000  Dosin-3 with meals, 1-2 with snacks = 1400 units lipase/kg/meal  Estimated Daily Intake: 10 caps = 4680 units lipase/kg/day     Comments: self-decreased dose from 5 caps with meals to 2-3 caps. Reports an improvement in bloating; denies s/sx malabsorption with lower dose.  Signs of Malabsorption: No - only if misses enzyme dose  Enzyme Program: Not eligible      Nutrition-Related Lab & Vitamin/Mineral Supplements  Annual studies 3/17/22  Vitamin A- 0.54 wnl  Vitamin D- 31 wnl  Vitamin E- 8.1 wnl  Iron- 94 wnl  Lipid Panel- HDL 38 low     OGTT- , impaired glucose tolerance -  changed from previous 2019; >300 at 1-hr and 180 at 2-hr  DEXA - 2020, lowest z-score -0.6     Current Vitamin/Mineral Supplements: MVW Complete  1 softgel BID  Comments: obtains MVW from [facility]     Diet History and Assessment  Diet Preferences/Allergies/Intolerances: Regular  Intake Recall/Comments: Appetite somewhat decreased with pulmonary exacerbation and not eating as much as baseline, although improving now. Eating mostly lunch and dinner; meals typically consist of meat and potatoes Dad or Demario usually cook supper; enjoys fresh string beans from the garden during summer and grilling steak/burgers.     Calcium: adequate - milk/yogurt/cheese  Salt: adequate - adds to foods + meal choices  Hydration: adequate - 1 can Mtn Dew + reg Gatorade daily, water, milk  Supplements: no      NUTRITION DIAGNOSIS  Impaired nutrient utilization related to CF pancreatic insufficiency as evidenced by requires pancreatic enzyme replacement therapy and vitamin/mineral supplementation in order to maintain ideal body weight and nutrition status.      INTERVENTIONS/RECOMMENDATIONS   1) Oral Intake/Weight:   BEE: 1845  Calorie Goals- 6065-9018 kcals/day (150-175% BEE)   Protein Goals-  grams/day (1.2-1.5 g/kg)     Reviewed current nutrition status including weight trends and adequacy of oral intake with Demario. Weight down somewhat likely r/t decreased appetite with pulm illness as well as increased energy expenditure with physical job/busy season. Encouraged ongoing good PO with adequate protein/calories/hydration.      2) Vitamin/Mineral Supplementation:  Will be due for annual study labs March 2023. Plan to continue with current vitamin supplementation at this time.     3) Glucose Intolerance:  Reviewed results of most recent OGTT 3/17/22. Encouraged pt to schedule with endocrine. Also reviewed diet recommendations including limiting regular soda/simple sugar intake from beverages. Encouraged consistent carbohydrate intake  throughout the day.     4) Enzymes/GI:   Noted self-decrease in enzyme dose; per pt tried this to see if it would help with GI symptoms which correlates with decrease in bloating. Denies s/sx malabsorption with lower dose. Encouraged pt to titrate upward with larger, high-fat meals. If ongoing weight loss or difficulty maintaining weight, consider increasing dose.      GOALS:  1) Maintain BMI >23 kg/m2  2) Vitamin levels wnl     FOLLOW-UP/MONITORING:  Visit patient within 12 months for annual nutrition reassessment.     Time Spent In Face-to-Face Patient Interactions: 15 minutes    Sherie Aparicio RD, LD  Cystic Fibrosis/Lung Transplant Dietitian  Pager [phone]

## 2022-10-13 NOTE — PATIENT INSTRUCTIONS
Cystic Fibrosis Self-Care Plan       Patient: Dmeario Abbasi   MRN: [id number]   Clinic Date: October 13, 2022     RECOMMENDATIONS:  1. Continue nebulizers and vest therapy.   2. Continue ngozi nebs for the month on/then off  3. Extend Bactrim X 1 more week.   4. Please schedule with Endocrine!    Annual Studies:   IGG   Date Value Ref Range Status   09/10/2020 1,739 (H) 610 - 1,616 mg/dL Final     Immunoglobulin G   Date Value Ref Range Status   03/17/2022 1,453 610-1,616 mg/dL Final     Insulin   Date Value Ref Range Status   03/17/2022 16.7 3.0 - 25.0 mU/L Final   02/22/2019 Canceled, Test credited 3 - 25 mU/L Final     Comment:     Unsatisfactory specimen - hemolyzed  NOTIFIED OZ PHILLIPS MD AT 1450 ON 2/22/19 BY JV       There are no preventive care reminders to display for this patient.    Pulmonary Function Tests  FEV1: amount of air you can blow out in 1 second  FVC: total amount of air you can take in and blow out    Your Goals:         PFT Latest Ref Rng & Units 10/13/2022   FVC L 3.39   FEV1 L 2.56   FVC% % 60   FEV1% % 54          Airway Clearance: The Most Important Way to Keep Your Lungs Healthy  Vest Settings:   Hill-Rom Frequencies: 8, 9, 10 Pressure 10 Then, Frequencies 18, 19, 20 Pressure 6     RespirTech: Quick Start with Pressure of     Do each frequency for 5 minutes; Deflate vest after each frequency & cough 3 times before beginning the next setting.    Vest and Neb Therapy should be done 2 times/day.    Good Nutrition Can Improve Lung Function and Overall Health    Take ALL of your vitamins with food    Take 1/2 of your enzymes before EVERY meal/snack and the other 1/2 mid-meal/snack    Wt Readings from Last 3 Encounters:   09/29/22 78 kg (172 lb)   08/31/22 81.6 kg (180 lb)   06/16/22 78.9 kg (174 lb)       There is no height or weight on file to calculate BMI.         National CF Foundation Recommendations for BMI in CF Adults: Women: at least 22 Men: at least 23         Controlling Blood Sugars Helps Prevent Lung Infections & Improves Nutrition  Test blood sugar:    In the morning before eating (goal is )    2 hours after a meal (goal is less than 150)    When pre-meal glucose is greater than 150 add correction    At bedtime (if less than 100 eat a snack with 15 grams of carbohydrates  Last A1C Results:   Hemoglobin A1C   Date Value Ref Range Status   03/17/2022 5.6 0.0 - 5.6 % Final     Comment:     Normal <5.7%   Prediabetes 5.7-6.4%    Diabetes 6.5% or higher     Note: Adopted from ADA consensus guidelines.   09/10/2020 5.7 (H) 0 - 5.6 % Final     Comment:     Normal <5.7% Prediabetes 5.7-6.4%  Diabetes 6.5% or higher - adopted from ADA   consensus guidelines.           If diabetic, measure A1C every 6 months. Goal is under 7%.    Staying Healthy  Research: If you are interested in learning about research opportunities or have questions, please contact Esmer Tang at [phone] or [email or web address].    CF Foundation: Compass is a personalized resource service to help you with the insurance, financial, legal and other issues you are facing.  It's free, confidential and available to anyone with CF.  Ask your  for more information or contact Compass directly at [facility] ([phone]) or compass@cff.org, or learn more at cff.org/compass.       CF Nurse Line: Mino Rodríguez: [phone]  Margaret Werner, RT: [phone]    Saba Aparicio , Dieticians: [phone]    Ashley Cleveland, Diabetes Nurse: [phone]   Prerna Woodall: [phone] or Courtney Lomas at [phone], Social Workers  [email or web address]

## 2022-10-13 NOTE — NURSING NOTE
"Demario Abbasi is a 27 year old year old who is being seen for Cystic Fibrosis (CF Follow up )      Medications reviewed and Vital signs taken.    Specimen Collection Type: Sputum    Order(s) placed: AFB (Acid Fast Bacilli) and CF Aerobic Bacterial    *IF AFB order placed - please enter \"PRIORITIZE AFB\" to order comments.       No results found for: ACIDFAST      Lab Results   Component Value Date    AFBSMS Negative for acid fast bacteria 09/10/2020    AFBSMS  09/10/2020     Less than 5ml of specimen received.  A minimum of 5 mL of sputum or fluid is recommended for recovery of acid fast bacilli   (AFB).  Volumes less than 5 mL are suboptimal and may compromise recovery of AFB from   culture.      AFBSMS  09/10/2020     Assayed at [facility] [address] [place] [address] [phone]       Vitals were taken and medications were reconciled.    Khadra ZAIDI  11:18 AM      "

## 2022-10-14 NOTE — TELEPHONE ENCOUNTER
Patricia from ins called said appeal has been approved for 12 months; 1 tab a daily. Will be faxing determination letter

## 2022-10-14 NOTE — TELEPHONE ENCOUNTER
MEDICATION APPEAL APPROVED    Medication: pantoprazole (PROTONIX) 20 MG EC tablet  Authorization Effective Date:  07/16/2022  Authorization Expiration Date:  10/14/2023  Approved Dose/Quantity: 30 per 30 days  Reference #: [id number]   Insurance Company: [employer] - Phone [phone] Fax [phone]  Expected CoPay:       Which Pharmacy is filling the prescription (Not needed for infusion/clinic administered): [id number] - [place] - [address]

## 2022-10-17 NOTE — PROGRESS NOTES
This is a recent snapshot of the patient's [facility] medical record.  For current drug dose and complete information and questions, call [phone] or In Basket pool, fv home infusion ([id number])  CSN Number:  [id number]

## 2022-10-20 LAB
BACTERIA SPT CULT: ABNORMAL
BACTERIA SPT CULT: ABNORMAL

## 2022-10-26 NOTE — PROGRESS NOTES
This is a recent snapshot of the patient's [facility] medical record.  For current drug dose and complete information and questions, call [phone] or In Basket pool, fv home infusion ([id number])  CSN Number:  [id number]

## 2022-10-31 ENCOUNTER — TELEPHONE (OUTPATIENT)
Dept: PULMONOLOGY | Facility: CLINIC | Age: 27
End: 2022-10-31

## 2022-10-31 NOTE — TELEPHONE ENCOUNTER
LVM regarding need to schedule follow-up appointment with Lisa Lofton in November. Left call center's phone number to schedule.

## 2022-11-12 NOTE — TELEPHONE ENCOUNTER
Left Voicemail (2nd Attempt) for the patient to call back and schedule the following:    Appointment type: RTN CF  Provider: Rolly  Return date: 11/13/22  Specialty phone number: [phone]  Additional appointment(s) needed: PFT, Dr. Cross  Additonal Notes: NA

## 2022-11-19 ENCOUNTER — HEALTH MAINTENANCE LETTER (OUTPATIENT)
Age: 27
End: 2022-11-19

## 2022-11-19 NOTE — TELEPHONE ENCOUNTER
Left Voicemail (2nd Attempt) for the patient to call back and schedule the following:    Appointment type: RTN CF  Provider: ROCKY  Return date: November 2023  Specialty phone number: [phone]  Additional appointment(s) needed: PFT  Additonal Notes: NA

## 2022-12-09 LAB
ACID FAST STAIN (ARUP): NORMAL

## 2023-01-26 ENCOUNTER — OFFICE VISIT (OUTPATIENT)
Dept: PULMONOLOGY | Facility: CLINIC | Age: 28
End: 2023-01-26
Attending: INTERNAL MEDICINE
Payer: COMMERCIAL

## 2023-01-26 VITALS
HEIGHT: 70 IN | WEIGHT: 165.34 LBS | BODY MASS INDEX: 23.67 KG/M2 | TEMPERATURE: 98.1 F | DIASTOLIC BLOOD PRESSURE: 65 MMHG | OXYGEN SATURATION: 95 % | HEART RATE: 85 BPM | SYSTOLIC BLOOD PRESSURE: 125 MMHG

## 2023-01-26 DIAGNOSIS — J30.2 SEASONAL ALLERGIES: ICD-10-CM

## 2023-01-26 DIAGNOSIS — Z90.2 S/P LOBECTOMY OF LUNG: ICD-10-CM

## 2023-01-26 DIAGNOSIS — E84.9 CF (CYSTIC FIBROSIS) (H): ICD-10-CM

## 2023-01-26 DIAGNOSIS — R73.02 IMPAIRED GLUCOSE TOLERANCE: Primary | ICD-10-CM

## 2023-01-26 DIAGNOSIS — K86.89 PANCREATIC INSUFFICIENCY: ICD-10-CM

## 2023-01-26 DIAGNOSIS — E84.0 CYSTIC FIBROSIS WITH PULMONARY EXACERBATION (H): Primary | ICD-10-CM

## 2023-01-26 PROCEDURE — 94375 RESPIRATORY FLOW VOLUME LOOP: CPT | Performed by: INTERNAL MEDICINE

## 2023-01-26 ASSESSMENT — PAIN SCALES - GENERAL: PAINLEVEL: NO PAIN (0)

## 2023-01-26 NOTE — NURSING NOTE
"Demario Abbasi is a 27 year old year old who is being seen for Cystic Fibrosis (CF Follow up )      Medications reviewed and Vital signs taken.    Specimen Collection Type: Sputum    Order(s) placed: CF Aerobic Bacterial    *IF AFB order placed - please enter \"PRIORITIZE AFB\" to order comments.       Lab Results   Component Value Date    ACIDFAST No acid fast bacilli seen 10/13/2022    ACIDFAST No acid fast bacilli seen 10/13/2022    ACIDFAST No acid fast bacilli seen 10/13/2022         Lab Results   Component Value Date    AFBSMS Negative for acid fast bacteria 09/10/2020    AFBSMS  09/10/2020     Less than 5ml of specimen received.  A minimum of 5 mL of sputum or fluid is recommended for recovery of acid fast bacilli   (AFB).  Volumes less than 5 mL are suboptimal and may compromise recovery of AFB from   culture.      AFBSMS  09/10/2020     Assayed at [facility] [address] [place] [address] [phone]           Vitals were taken and medications were reconciled.    Khadra Mckoy RMA  2:48 PM    "

## 2023-01-26 NOTE — Clinical Note
1/26/2023         RE: Demario Abbasi  [address]  [place] [address]        Dear Colleague,    Thank you for referring your patient, Demario Abbasi, to the [facility] FOR LUNG SCIENCE AND [facility] [place]. Please see a copy of my visit note below.    Reason for Visit  Demario Abbasi is a 28 year old year old male who is being seen for Cystic Fibrosis (CF Follow up )      Assessment and plan: ***      Maintenance   Modulator:   Mutation:   AW Clearance:   Bronchodilators:   Mucolytics:  Antibiotics Inh:   Antibiotics Oral:   Other:  Colonization Hx:  Annual Studies:  Contraindications to standard of care:  COLONOSCOPY: NICE CF [email or web address]          CF HPI  The patient was seen and examined by Zana Lilly MD     A complete ROS was otherwise negative except as noted in the HPI.    Current Outpatient Medications   Medication     albuterol (PROAIR HFA) 108 (90 Base) MCG/ACT inhaler     albuterol (PROVENTIL) (2.5 MG/3ML) 0.083% neb solution     amylase-lipase-protease (CREON 36) 187002-64911-706832 units CPEP     azelastine (ASTELIN) 0.1 % nasal spray     azithromycin (ZITHROMAX) 500 MG tablet     cetirizine (ZYRTEC) 10 MG tablet     citalopram (CELEXA) 20 MG tablet     dornase alpha (PULMOZYME) 2.5 MG/2.5ML neb solution     fluconazole (DIFLUCAN) 150 MG tablet     fluticasone (FLONASE) 50 MCG/ACT nasal spray     mvw complete formulation (SOFTGELS ) capsule     olopatadine (PATANOL) 0.1 % ophthalmic solution     pantoprazole (PROTONIX) 20 MG EC tablet     phytonadione (MEPHYTON) 5 MG tablet     sodium chloride inhalant 7 % NEBU neb solution     tobramycin (BETHKIS) 300 MG/4ML nebulizer solution     No current facility-administered medications for this visit.     Allergies   Allergen Reactions     Augmentin Diarrhea     Past Medical History:   Diagnosis Date     CF (cystic fibrosis) (H)     Genotype: g542x/621+1g>t     Impaired glucose tolerance       Pancreatic insufficiency      S/P lobectomy of lung 8/4/2015    Right upper lobectomy - 2009       Past Surgical History:   Procedure Laterality Date     H STATISTIC PICC LINE INSERTION >5YR, FAILED Bilateral 03/15/2019    Stenosis in both arms, unable to thread catheter     HC LAP,INGUINAL HERNIA REPR,INITIAL  2002     INSERT PICC LINE Left 10/12/2020    Procedure: INSERTION, PICC @1100;  Surgeon: Felicia Branch MD;  Location: [facility] OR     INSERT PICC LINE Left 12/6/2021    Procedure: INSERTION, PICC;  Surgeon: Tahir Alvarado MD;  Location: [facility] OR     INSERT PICC LINE Left 8/31/2022    Procedure: SINGLE LUMEN INSERTION, PICC;  Surgeon: Felicia Bracnh MD;  Location: [facility] OR     IR PICC PLACEMENT > 5 YRS OF AGE  03/18/2019     IR PICC PLACEMENT > 5 YRS OF AGE  12/30/2019     IR PICC PLACEMENT > 5 YRS OF AGE  10/12/2020     IR PICC PLACEMENT > 5 YRS OF AGE  12/6/2021     IR PICC PLACEMENT > 5 YRS OF AGE  8/31/2022     LOBECTOMY LUNG Right 2009    Right upper lobe       Social History     Socioeconomic History     Marital status: Single     Spouse name: Not on file     Number of children: Not on file     Years of education: Not on file     Highest education level: Not on file   Occupational History     Occupation: Sale    Tobacco Use     Smoking status: Never     Passive exposure: Yes     Smokeless tobacco: Current     Types: Chew     Tobacco comments:     dad smokes   Substance and Sexual Activity     Alcohol use: Yes     Comment: <2 drinks per week     Drug use: Not on file     Sexual activity: Not on file   Other Topics Concern     Parent/sibling w/ CABG, MI or angioplasty before 65F 55M? Not Asked   Social History Narrative    12/27/2019  -Graduated college in 2020.  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       Social Determinants of Health     Financial Resource Strain: Not on file   Food Insecurity: Not on file   Transportation Needs: Not on file   Physical Activity: Not on file   Stress: Not  "on file   Social Connections: Not on file   Intimate Partner Violence: Not on file   Housing Stability: Not on file         /65 (BP Location: Right arm, Patient Position: Sitting, Cuff Size: Adult Regular)   Pulse 85   Temp 98.1  F (36.7  C)   Ht 1.78 m (5' 10.08\")   Wt 75 kg (165 lb 5.5 oz)   SpO2 95%   BMI 23.67 kg/m    Body mass index is 23.67 kg/m .  Exam:   GENERAL APPEARANCE: Well developed, well nourished, alert, and in no apparent distress.  EYES: PERRL, EOMI  HENT: Nasal mucosa with no edema and no hyperemia. No nasal polyps.  EARS: Canals clear, TMs normal  MOUTH: Oral mucosa is moist, without any lesions, no tonsillar enlargement, no oropharyngeal exudate.  NECK: supple, no masses, no thyromegaly.  LYMPHATICS: No significant axillary, cervical, or supraclavicular nodes.  RESP: normal percussion, good air flow throughout.  No crackles. No rhonchi. No wheezes.  CV: Normal S1, S2, regular rhythm, normal rate. No murmur.  No rub. No gallop. No LE edema.   ABDOMEN:  Bowel sounds normal, soft, nontender, no HSM or masses.   MS: extremities normal. No clubbing. No cyanosis.  SKIN: no rash on limited exam  NEURO: Mentation intact, speech normal, normal strength and tone, normal gait and stance  PSYCH: mentation appears normal. and affect normal/bright  Results:  Recent Results (from the past 168 hour(s))   General PFT Lab (Please always keep checked)    Collection Time: 01/26/23  2:09 PM   Result Value Ref Range    FVC-Pred 5.59 L    FVC-Pre 3.47 L    FVC-%Pred-Pre 62 %    FEV1-Pre 2.71 L    FEV1-%Pred-Pre 57 %    FEV1FVC-Pred 84 %    FEV1FVC-Pre 78 %    FEFMax-Pred 10.26 L/sec    FEFMax-Pre 7.52 L/sec    FEFMax-%Pred-Pre 73 %    FEF2575-Pred 4.88 L/sec    FEF2575-Pre 2.32 L/sec    DPW0194-%Pred-Pre 47 %    ExpTime-Pre 7.69 sec    FIFMax-Pre 4.27 L/sec    FEV1FEV6-Pred 84 %    FEV1FEV6-Pre 78 %                   Results as noted above.    PFT Interpretation:  {Maxx PFT " interpretation:916958}  {Increase/decrease:530687}  {JDPFT:768057}  Valid Maneuver             CF Exacerbation  {CF EXACERBATION STATUS:501679}            Respiratory Therapist Note:         Vest                Brand: Hill-Rom - traditional Hill Rom: Frequencies 8, 9, 10 at pressure 10 then frequencies 18, 19, 20 at pressure 6. and Hill-Rom - [place] Frequencies: 13-15, intensity 8-10                Cough Pause: Cough Pause; Yes                Vest Garment Size: Adult Medium                Last Fitting Date: 2022                Frequency of therapy: 14 times per week                Concerns:          Exercise (purposeful and aerobic for >20 minutes each session): NO - physical job  But no formal exercise                Does this qualify as additional airway clearance: No         Alternative Airway Clearance:         Nebulized Medications                Bronchodilators: Albuterol                Mucolytic: Pulmozyme                Antibiotics: COBY                Additional Inhaled Medications:                 Spacer Use:          Review Cleaning: Yes. Top rack of .         Education and Transition Information                Correct order of inhaled medications: Yes                Mechanism of Action of inhaled medications: Yes                Frequency of inhaled medications: Yes                Dosage of inhaled medications: Yes                Other:          Home Care:                Nebulizer Cups (Brand/Type): Carrie                Nebulizer Compressor                            Year Purchased: 2022                            [facility], Phone: [phone], Fax: [phone]                Nebulizer Supply Company:                            [facility], Phone: [phone], Fax: [phone]         Oxygen: N/A       Pulmonary Rehab                Site:                 Date Completed:          Plan of Care and Goals for next visit: Please send in a home PFT so we can compare it to  your clinic PFT's to have a baseline for you home spirometer. Please reach out with any airway clearance needs.      Again, thank you for allowing me to participate in the care of your patient.        Sincerely,        Zana Lilly MD

## 2023-01-27 ENCOUNTER — HOSPITAL ENCOUNTER (OUTPATIENT)
Facility: AMBULATORY SURGERY CENTER | Age: 28
End: 2023-01-27
Attending: RADIOLOGY
Payer: COMMERCIAL

## 2023-01-29 NOTE — PROGRESS NOTES
Reason for Visit  Demario Abbasi is a 28 year old year old male who is being seen for Cystic Fibrosis (CF Follow up )      Assessment and plan: ***      Maintenance   Modulator:   Mutation:   AW Clearance:   Bronchodilators:   Mucolytics:  Antibiotics Inh:   Antibiotics Oral:   Other:  Colonization Hx:  Annual Studies:  Contraindications to standard of care:  COLONOSCOPY: NICE CF [email or web address]          CF HPI  The patient was seen and examined by Zana Lilly MD     A complete ROS was otherwise negative except as noted in the HPI.    Current Outpatient Medications   Medication     albuterol (PROAIR HFA) 108 (90 Base) MCG/ACT inhaler     albuterol (PROVENTIL) (2.5 MG/3ML) 0.083% neb solution     amylase-lipase-protease (CREON 36) 881936-59406-079711 units CPEP     azelastine (ASTELIN) 0.1 % nasal spray     azithromycin (ZITHROMAX) 500 MG tablet     cetirizine (ZYRTEC) 10 MG tablet     citalopram (CELEXA) 20 MG tablet     dornase alpha (PULMOZYME) 2.5 MG/2.5ML neb solution     fluconazole (DIFLUCAN) 150 MG tablet     fluticasone (FLONASE) 50 MCG/ACT nasal spray     mvw complete formulation (SOFTGELS ) capsule     olopatadine (PATANOL) 0.1 % ophthalmic solution     pantoprazole (PROTONIX) 20 MG EC tablet     phytonadione (MEPHYTON) 5 MG tablet     sodium chloride inhalant 7 % NEBU neb solution     tobramycin (BETHKIS) 300 MG/4ML nebulizer solution     No current facility-administered medications for this visit.     Allergies   Allergen Reactions     Augmentin Diarrhea     Past Medical History:   Diagnosis Date     CF (cystic fibrosis) (H)     Genotype: g542x/621+1g>t     Impaired glucose tolerance      Pancreatic insufficiency      S/P lobectomy of lung 8/4/2015    Right upper lobectomy - 2009       Past Surgical History:   Procedure Laterality Date     H STATISTIC PICC LINE INSERTION >5YR, FAILED Bilateral 03/15/2019    Stenosis in both arms, unable to thread catheter     HC LAP,INGUINAL HERNIA  "REPR,INITIAL  2002     INSERT PICC LINE Left 10/12/2020    Procedure: INSERTION, PICC @1100;  Surgeon: Felicia Branch MD;  Location: [facility] OR     INSERT PICC LINE Left 12/6/2021    Procedure: INSERTION, PICC;  Surgeon: Tahir Alvarado MD;  Location: [facility] OR     INSERT PICC LINE Left 8/31/2022    Procedure: SINGLE LUMEN INSERTION, PICC;  Surgeon: Felicia Branch MD;  Location: [facility] OR     IR PICC PLACEMENT > 5 YRS OF AGE  03/18/2019     IR PICC PLACEMENT > 5 YRS OF AGE  12/30/2019     IR PICC PLACEMENT > 5 YRS OF AGE  10/12/2020     IR PICC PLACEMENT > 5 YRS OF AGE  12/6/2021     IR PICC PLACEMENT > 5 YRS OF AGE  8/31/2022     LOBECTOMY LUNG Right 2009    Right upper lobe       Social History     Socioeconomic History     Marital status: Single     Spouse name: Not on file     Number of children: Not on file     Years of education: Not on file     Highest education level: Not on file   Occupational History     Occupation: Sale [employer]   Tobacco Use     Smoking status: Never     Passive exposure: Yes     Smokeless tobacco: Current     Types: Chew     Tobacco comments:     dad smokes   Substance and Sexual Activity     Alcohol use: Yes     Comment: <2 drinks per week     Drug use: Not on file     Sexual activity: Not on file   Other Topics Concern     Parent/sibling w/ CABG, MI or angioplasty before 65F 55M? Not Asked   Social History Narrative    12/27/2019  -Graduated college in 2020.  Looking for work as a sales [employer].       Social Determinants of Health     Financial Resource Strain: Not on file   Food Insecurity: Not on file   Transportation Needs: Not on file   Physical Activity: Not on file   Stress: Not on file   Social Connections: Not on file   Intimate Partner Violence: Not on file   Housing Stability: Not on file         /65 (BP Location: Right arm, Patient Position: Sitting, Cuff Size: Adult Regular)   Pulse 85   Temp 98.1  F (36.7  C)   Ht 1.78 m (5' 10.08\")   Wt 75 kg (165 lb 5.5 " oz)   SpO2 95%   BMI 23.67 kg/m    Body mass index is 23.67 kg/m .  Exam:   GENERAL APPEARANCE: Well developed, well nourished, alert, and in no apparent distress.  EYES: PERRL, EOMI  HENT: Nasal mucosa with no edema and no hyperemia. No nasal polyps.  EARS: Canals clear, TMs normal  MOUTH: Oral mucosa is moist, without any lesions, no tonsillar enlargement, no oropharyngeal exudate.  NECK: supple, no masses, no thyromegaly.  LYMPHATICS: No significant axillary, cervical, or supraclavicular nodes.  RESP: normal percussion, good air flow throughout.  No crackles. No rhonchi. No wheezes.  CV: Normal S1, S2, regular rhythm, normal rate. No murmur.  No rub. No gallop. No LE edema.   ABDOMEN:  Bowel sounds normal, soft, nontender, no HSM or masses.   MS: extremities normal. No clubbing. No cyanosis.  SKIN: no rash on limited exam  NEURO: Mentation intact, speech normal, normal strength and tone, normal gait and stance  PSYCH: mentation appears normal. and affect normal/bright  Results:  Recent Results (from the past 168 hour(s))   General PFT Lab (Please always keep checked)    Collection Time: 01/26/23  2:09 PM   Result Value Ref Range    FVC-Pred 5.59 L    FVC-Pre 3.47 L    FVC-%Pred-Pre 62 %    FEV1-Pre 2.71 L    FEV1-%Pred-Pre 57 %    FEV1FVC-Pred 84 %    FEV1FVC-Pre 78 %    FEFMax-Pred 10.26 L/sec    FEFMax-Pre 7.52 L/sec    FEFMax-%Pred-Pre 73 %    FEF2575-Pred 4.88 L/sec    FEF2575-Pre 2.32 L/sec    AMW0900-%Pred-Pre 47 %    ExpTime-Pre 7.69 sec    FIFMax-Pre 4.27 L/sec    FEV1FEV6-Pred 84 %    FEV1FEV6-Pre 78 %                   Results as noted above.    PFT Interpretation:  {Maxx PFT interpretation:242011}  {Increase/decrease:770965}  {JDPFT:820430}  Valid Maneuver             CF Exacerbation  {CF EXACERBATION STATUS:019579}

## 2023-02-02 NOTE — PROGRESS NOTES
Respiratory Therapist Note:         Vest                Brand: Hill-Rom - traditional Hill Rom: Frequencies 8, 9, 10 at pressure 10 then frequencies 18, 19, 20 at pressure 6. and Hill-Rom - [place] Frequencies: 13-15, intensity 8-10                Cough Pause: Cough Pause; Yes                Vest Garment Size: Adult Medium                Last Fitting Date: 2022                Frequency of therapy: 14 times per week                Concerns:          Exercise (purposeful and aerobic for >20 minutes each session): NO - physical job  But no formal exercise                Does this qualify as additional airway clearance: No         Alternative Airway Clearance:         Nebulized Medications                Bronchodilators: Albuterol                Mucolytic: Pulmozyme                Antibiotics: COBY                Additional Inhaled Medications:                 Spacer Use:          Review Cleaning: Yes. Top rack of .         Education and Transition Information                Correct order of inhaled medications: Yes                Mechanism of Action of inhaled medications: Yes                Frequency of inhaled medications: Yes                Dosage of inhaled medications: Yes                Other:          Home Care:                Nebulizer Cups (Brand/Type): Carrie                Nebulizer Compressor                            Year Purchased: 2022                            [facility], Phone: [phone], Fax: [phone]                Nebulizer Supply Company:                            [facility], Phone: [phone], Fax: [phone]         Oxygen: N/A       Pulmonary Rehab                Site:                 Date Completed:          Plan of Care and Goals for next visit: Please send in a home PFT so we can compare it to your clinic PFT's to have a baseline for you home spirometer. Please reach out with any airway clearance needs.

## 2023-02-06 RX ORDER — CEFAZOLIN SODIUM 2 G/50ML
2 SOLUTION INTRAVENOUS
Status: CANCELLED | OUTPATIENT
Start: 2023-02-06

## 2023-02-07 ENCOUNTER — TELEPHONE (OUTPATIENT)
Dept: INTERVENTIONAL RADIOLOGY/VASCULAR | Facility: CLINIC | Age: 28
End: 2023-02-07

## 2023-02-07 ENCOUNTER — MYC MEDICAL ADVICE (OUTPATIENT)
Dept: INTERVENTIONAL RADIOLOGY/VASCULAR | Facility: CLINIC | Age: 28
End: 2023-02-07

## 2023-02-07 NOTE — TELEPHONE ENCOUNTER
Detailed Voicemail message left with request for patient to contact Interventional Radiology Department and acknowledge understanding of MYCHART instructions that were sent in anticipation of procedure scheduled 2/8/2023.      IR contact number provided in [facility].    Prerna BATISTA  Interventional Radiology RN   [phone]

## 2023-02-07 NOTE — TELEPHONE ENCOUNTER
Writer has spoken with Jovany regarding planned procedure with IR via telephone.  jovany acknowledges understanding of pre-procedure instructions.     I have provided Jovany with IR number ([phone]) for questions or concerns.    Prerna BATISTA  Interventional Radiology RN   [phone]

## 2023-02-08 ENCOUNTER — TELEPHONE (OUTPATIENT)
Dept: PHARMACY | Facility: CLINIC | Age: 28
End: 2023-02-08

## 2023-02-08 ENCOUNTER — CARE COORDINATION (OUTPATIENT)
Dept: PULMONOLOGY | Facility: CLINIC | Age: 28
End: 2023-02-08

## 2023-02-08 ENCOUNTER — APPOINTMENT (OUTPATIENT)
Dept: INTERVENTIONAL RADIOLOGY/VASCULAR | Facility: CLINIC | Age: 28
End: 2023-02-08
Attending: INTERNAL MEDICINE
Payer: COMMERCIAL

## 2023-02-08 ENCOUNTER — HOME INFUSION (PRE-WILLOW HOME INFUSION) (OUTPATIENT)
Dept: PHARMACY | Facility: CLINIC | Age: 28
End: 2023-02-08

## 2023-02-08 ENCOUNTER — APPOINTMENT (OUTPATIENT)
Dept: MEDSURG UNIT | Facility: CLINIC | Age: 28
End: 2023-02-08
Attending: RADIOLOGY
Payer: COMMERCIAL

## 2023-02-08 ENCOUNTER — HOSPITAL ENCOUNTER (OUTPATIENT)
Facility: CLINIC | Age: 28
Discharge: HOME | End: 2023-02-08
Attending: RADIOLOGY | Admitting: RADIOLOGY
Payer: COMMERCIAL

## 2023-02-08 VITALS
DIASTOLIC BLOOD PRESSURE: 66 MMHG | OXYGEN SATURATION: 96 % | TEMPERATURE: 98.4 F | RESPIRATION RATE: 15 BRPM | SYSTOLIC BLOOD PRESSURE: 110 MMHG | WEIGHT: 164.3 LBS | HEART RATE: 67 BPM | BODY MASS INDEX: 23.52 KG/M2

## 2023-02-08 DIAGNOSIS — E84.9 CF (CYSTIC FIBROSIS) (H): ICD-10-CM

## 2023-02-08 LAB
ANION GAP SERPL CALCULATED.3IONS-SCNC: 10 MMOL/L (ref 7–15)
BUN SERPL-MCNC: 18.4 MG/DL (ref 6–20)
CALCIUM SERPL-MCNC: 9.1 MG/DL (ref 8.6–10)
CHLORIDE SERPL-SCNC: 104 MMOL/L (ref 98–107)
CREAT SERPL-MCNC: 1.11 MG/DL (ref 0.67–1.17)
DEPRECATED HCO3 PLAS-SCNC: 23 MMOL/L (ref 22–29)
ERYTHROCYTE [DISTWIDTH] IN BLOOD BY AUTOMATED COUNT: 12.8 % (ref 10–15)
GFR SERPL CREATININE-BSD FRML MDRD: >90 ML/MIN/1.73M2
GLUCOSE SERPL-MCNC: 111 MG/DL (ref 70–99)
HCT VFR BLD AUTO: 43 % (ref 40–53)
HGB BLD-MCNC: 14.9 G/DL (ref 13.3–17.7)
INR PPP: 1.06 (ref 0.85–1.15)
MCH RBC QN AUTO: 29.8 PG (ref 26.5–33)
MCHC RBC AUTO-ENTMCNC: 34.7 G/DL (ref 31.5–36.5)
MCV RBC AUTO: 86 FL (ref 78–100)
PLATELET # BLD AUTO: 232 10E3/UL (ref 150–450)
POTASSIUM SERPL-SCNC: 4.3 MMOL/L (ref 3.4–5.3)
RBC # BLD AUTO: 5 10E6/UL (ref 4.4–5.9)
SODIUM SERPL-SCNC: 137 MMOL/L (ref 136–145)
WBC # BLD AUTO: 5.6 10E3/UL (ref 4–11)

## 2023-02-08 PROCEDURE — 272N000504 HC NEEDLE CR4

## 2023-02-08 PROCEDURE — 36561 INSERT TUNNELED CV CATH: CPT | Performed by: PHYSICIAN ASSISTANT

## 2023-02-08 PROCEDURE — 36561 INSERT TUNNELED CV CATH: CPT

## 2023-02-08 PROCEDURE — 999N000142 HC STATISTIC PROCEDURE PREP ONLY

## 2023-02-08 PROCEDURE — C1788 PORT, INDWELLING, IMP: HCPCS

## 2023-02-08 PROCEDURE — 82310 ASSAY OF CALCIUM: CPT | Performed by: NURSE PRACTITIONER

## 2023-02-08 PROCEDURE — C1769 GUIDE WIRE: HCPCS

## 2023-02-08 PROCEDURE — 99152 MOD SED SAME PHYS/QHP 5/>YRS: CPT | Performed by: PHYSICIAN ASSISTANT

## 2023-02-08 PROCEDURE — 999N000132 HC STATISTIC PP CARE STAGE 1

## 2023-02-08 PROCEDURE — 85027 COMPLETE CBC AUTOMATED: CPT | Performed by: NURSE PRACTITIONER

## 2023-02-08 PROCEDURE — 85610 PROTHROMBIN TIME: CPT | Performed by: NURSE PRACTITIONER

## 2023-02-08 PROCEDURE — 272N000602 HC WOUND GLUE CR1

## 2023-02-08 PROCEDURE — 82374 ASSAY BLOOD CARBON DIOXIDE: CPT | Performed by: NURSE PRACTITIONER

## 2023-02-08 PROCEDURE — 250N000009 HC RX 250: Performed by: PHYSICIAN ASSISTANT

## 2023-02-08 PROCEDURE — 76937 US GUIDE VASCULAR ACCESS: CPT | Mod: 26 | Performed by: PHYSICIAN ASSISTANT

## 2023-02-08 PROCEDURE — 36415 COLL VENOUS BLD VENIPUNCTURE: CPT | Performed by: NURSE PRACTITIONER

## 2023-02-08 PROCEDURE — 250N000011 HC RX IP 250 OP 636: Performed by: PHYSICIAN ASSISTANT

## 2023-02-08 PROCEDURE — 77001 FLUOROGUIDE FOR VEIN DEVICE: CPT | Mod: 26 | Performed by: PHYSICIAN ASSISTANT

## 2023-02-08 RX ORDER — NALOXONE HYDROCHLORIDE 0.4 MG/ML
0.4 INJECTION, SOLUTION INTRAMUSCULAR; INTRAVENOUS; SUBCUTANEOUS
Status: DISCONTINUED | OUTPATIENT
Start: 2023-02-08 | End: 2023-02-08 | Stop reason: HOSPADM

## 2023-02-08 RX ORDER — FENTANYL CITRATE 50 UG/ML
25-50 INJECTION, SOLUTION INTRAMUSCULAR; INTRAVENOUS EVERY 5 MIN PRN
Status: DISCONTINUED | OUTPATIENT
Start: 2023-02-08 | End: 2023-02-08 | Stop reason: HOSPADM

## 2023-02-08 RX ORDER — HEPARIN SODIUM (PORCINE) LOCK FLUSH IV SOLN 100 UNIT/ML 100 UNIT/ML
5-10 SOLUTION INTRAVENOUS
Status: DISCONTINUED | OUTPATIENT
Start: 2023-02-08 | End: 2023-02-08 | Stop reason: HOSPADM

## 2023-02-08 RX ORDER — HEPARIN SODIUM,PORCINE 10 UNIT/ML
5-10 VIAL (ML) INTRAVENOUS
Status: DISCONTINUED | OUTPATIENT
Start: 2023-02-08 | End: 2023-02-08 | Stop reason: HOSPADM

## 2023-02-08 RX ORDER — NALOXONE HYDROCHLORIDE 0.4 MG/ML
0.2 INJECTION, SOLUTION INTRAMUSCULAR; INTRAVENOUS; SUBCUTANEOUS
Status: DISCONTINUED | OUTPATIENT
Start: 2023-02-08 | End: 2023-02-08 | Stop reason: HOSPADM

## 2023-02-08 RX ORDER — CEFAZOLIN SODIUM 2 G/100ML
2 INJECTION, SOLUTION INTRAVENOUS
Status: COMPLETED | OUTPATIENT
Start: 2023-02-08 | End: 2023-02-08

## 2023-02-08 RX ORDER — HEPARIN SODIUM,PORCINE 10 UNIT/ML
5-10 VIAL (ML) INTRAVENOUS EVERY 24 HOURS
Status: DISCONTINUED | OUTPATIENT
Start: 2023-02-08 | End: 2023-02-08 | Stop reason: HOSPADM

## 2023-02-08 RX ORDER — HEPARIN SODIUM (PORCINE) LOCK FLUSH IV SOLN 100 UNIT/ML 100 UNIT/ML
10 SOLUTION INTRAVENOUS
Status: COMPLETED | OUTPATIENT
Start: 2023-02-08 | End: 2023-02-08

## 2023-02-08 RX ORDER — FLUMAZENIL 0.1 MG/ML
0.2 INJECTION, SOLUTION INTRAVENOUS
Status: DISCONTINUED | OUTPATIENT
Start: 2023-02-08 | End: 2023-02-08 | Stop reason: HOSPADM

## 2023-02-08 RX ADMIN — FENTANYL CITRATE 50 MCG: 50 INJECTION, SOLUTION INTRAMUSCULAR; INTRAVENOUS at 11:41

## 2023-02-08 RX ADMIN — Medication 10 ML: at 12:11

## 2023-02-08 RX ADMIN — CEFAZOLIN SODIUM 2 G: 2 INJECTION, SOLUTION INTRAVENOUS at 10:01

## 2023-02-08 RX ADMIN — LIDOCAINE HYDROCHLORIDE 23 ML: 10 INJECTION, SOLUTION EPIDURAL; INFILTRATION; INTRACAUDAL; PERINEURAL at 11:47

## 2023-02-08 RX ADMIN — MIDAZOLAM HYDROCHLORIDE 1 MG: 1 INJECTION, SOLUTION INTRAMUSCULAR; INTRAVENOUS at 11:35

## 2023-02-08 RX ADMIN — MIDAZOLAM HYDROCHLORIDE 1 MG: 1 INJECTION, SOLUTION INTRAMUSCULAR; INTRAVENOUS at 11:52

## 2023-02-08 RX ADMIN — MIDAZOLAM HYDROCHLORIDE 1 MG: 1 INJECTION, SOLUTION INTRAMUSCULAR; INTRAVENOUS at 11:39

## 2023-02-08 RX ADMIN — FENTANYL CITRATE 50 MCG: 50 INJECTION, SOLUTION INTRAMUSCULAR; INTRAVENOUS at 11:52

## 2023-02-08 RX ADMIN — FENTANYL CITRATE 50 MCG: 50 INJECTION, SOLUTION INTRAMUSCULAR; INTRAVENOUS at 11:35

## 2023-02-08 ASSESSMENT — ACTIVITIES OF DAILY LIVING (ADL)
ADLS_ACUITY_SCORE: 37
ADLS_ACUITY_SCORE: 37

## 2023-02-08 NOTE — PROGRESS NOTES
Patient has 100% coverage for line care through their [facility] MHCP plan.        Please contact Intake with any questions, [phone] or In Basket pool, [facility] ([phone]).

## 2023-02-08 NOTE — PROCEDURES
[facility]    Procedure: IR Procedure Note    Date/Time: 2/8/2023 12:32 PM  Performed by: Aldo Tucker PA-C  Authorized by: Aldo Tucker PA-C       UNIVERSAL PROTOCOL   Site Marked: NA  Prior Images Obtained and Reviewed:  Yes  Required items: Required blood products, implants, devices and special equipment available    Patient identity confirmed:  Verbally with patient, arm band, provided demographic data and hospital-assigned identification number  Patient was reevaluated immediately before administering moderate or deep sedation or anesthesia  Confirmation Checklist:  Patient's identity using two indicators, relevant allergies, procedure was appropriate and matched the consent or emergent situation and correct equipment/implants were available  Time out: Immediately prior to the procedure a time out was called    Universal Protocol: the Joint Commission Universal Protocol was followed    Preparation: Patient was prepped and draped in usual sterile fashion       ANESTHESIA    Anesthesia: Local infiltration  Local Anesthetic:  Lidocaine 1% without epinephrine      SEDATION  Patient Sedated: Yes    Vital signs: Vital signs monitored during sedation    See dictated procedure note for full details.  Findings: Sedation medications administered: Midazolam 3 mg, Fentanyl 150 mcg  Sedation time: 50 minutes    Specimens: none    Complications: None    Condition: Stable      PROCEDURE  Describe Procedure: Demario Liconakoko  [id number]    Completed placement of a 7 Icelandic (catheter cut at 29 cm marking), Bard brand, double lumen venous low chest NON-power-injectable port via RIJV.  Tip lying in the right atrium. PORT is ready for immediate use.  Dx:  Cystic fibrosis.  Caitlin.  <1    Sedation medications administered: Midazolam 3 mg, Fentanyl 150 mcg  Sedation time: 50 minutes    Patient Tolerance:  Patient tolerated the procedure well with no  immediate complications  Length of time physician/provider present for 1:1 monitoring during sedation: 45

## 2023-02-08 NOTE — PRE-PROCEDURE
GENERAL PRE-PROCEDURE:   Procedure:  Port   Date/Time:  2/8/2023 10:11 AM    Verbal consent obtained?: Yes    Written consent obtained?: Yes    Risks and benefits: Risks, benefits and alternatives were discussed    Consent given by:  Patient  Patient states understanding of procedure being performed: Yes    Patient's understanding of procedure matches consent: Yes    Procedure consent matches procedure scheduled: Yes    Expected level of sedation:  Moderate  Appropriately NPO:  Yes  Mallampati  :  Grade 1- soft palate, uvula, tonsillar pillars, and posterior pharyngeal wall visible  Lungs:  Lungs clear with good breath sounds bilaterally  Heart:  Normal heart sounds and rate  History & Physical reviewed:  History and physical reviewed and no updates needed  Statement of review:  I have reviewed the lab findings, diagnostic data, medications, and the plan for sedation

## 2023-02-08 NOTE — PROGRESS NOTES
Patient getting port-a-cath placed in IR today. Reviewed monthly port maintenance with patient. Called in referral for monthly port flush/line care to [facility]. Of note, in past patient's mother and cousin (both nurses) have provided patient with PICC line dressing changes, plan will be for them to provide monthly port flushes and cares while patient is on IV abx in future.    CF RT provided patient with small ambu mask to bring home to use to protect port while vesting during healing period.     Tammy George RN

## 2023-02-08 NOTE — IR NOTE
Patient Name: Demario Abbasi  Medical Record Number: [id number]  Today's Date: 2/8/2023    Procedure: Chest Port Placement  Proceduralist: Aldo Tucker PA-C  Pathology present: N/A    Procedure Start: 1135  Procedure end: 1222    Sedation medications administered: Midazolam 3 mg, Fentanyl 150 mcg     Report given to: MAGGIE Anderson 2A  : N/A    Other Notes: Pt arrived to IR room 2 from . Consent reviewed. Pt denies any questions or concerns regarding procedure. Pt positioned supine and monitored per protocol.    7 fr tunneled double lumen chest port placed. Catheter tip placement verified with imaging and ready for immediate use. Dual lumen Port flushed with 5 mL of 100 units/mL heparin each lumen. Site cleansed and dressed per protocol.     Pt tolerated procedure without any noted complications. Pt transferred back to .

## 2023-02-08 NOTE — PROGRESS NOTES
Prep complete for port placement. VSS. Consent signed. Awaiting labs. Wes Morales at bedside.

## 2023-02-08 NOTE — PROGRESS NOTES
Pt arrived via cart with RN from IR s/p Port implant. VSS. RIJ, R lateral, and RUQ site CDI with liquid bandage, no hematoma. Patient denied pain.  father at bedside.

## 2023-02-08 NOTE — PROGRESS NOTES
Condition is stable s/p PORT implant. Vital Signs are stable/WNL. R internal jugular, R lateral, RUQ site clean, dry and intact, cms intact. Discharge instructions reviewed with patient and questions answered. Patient verbalizes understanding. IV removed. Pain under control. Patient is ambulating and voiding spontaneously. Patient is tolerating regular diet and denies any N/V. Patient to be discharged to home . Patient has all belongings

## 2023-02-08 NOTE — DISCHARGE INSTRUCTIONS
[facility]        Interventional Radiology  Discharge Instructions  Following Port Placement    Your Port has been closed with  Derma Villafuerte (Skin Glue)  Do not apply any ointments over site  Do not cover with any dressing  This thin layer will slough off in 7-10 days  May gently remove Derma Villafuerte in 10 days if still present    If there is any oozing or bleeding from the site, apply direct pressure for 5-10 minutes with a gauze pad.  If bleeding continues after 10 minutes call your primary doctor.  If bleeding cannot be controlled with direct pressure, call 911.    Call your Doctor if:  Bleeding as above  Swelling in your neck or arm  Sudden onset of Shortness of Breath, Lightheadedness, Palpitations.  Fever greater than 100.5  F  Other signs of infection such as, redness, tenderness, or drainage from the wound    If you were given sedation:  We recommend an adult stay with you for the first 24 hours.  No driving or alcoholic beverages for 24 hours.    Discharge Booklet given    ADDITIONAL INSTRUCTIONS: May use ice packs 3-4 times a day for 15 minutes for minor swelling or pain  No heavy lifting greater than 10 lbs. for one week  No tub bath, hot tub, or swimming until Derma Villafuerte (Skin Glue) is removed  It is OK to shower and get the incision wet but immediately pat it dry  No Emla Cream to Port site for 1 week.  If you are on Coumadin, restart tonight.  Follow up with your Coumadin Clinic or Primary Care MD to have your INR rechecked.    [facility] INTERVENTIONAL RADIOLOGY DEPARTMENT  Procedure Physician: Aldo Alfred    Date of procedure: February 8, 2023  Telephone Numbers: [phone]      Monday-Friday 7:30 am to 4:00 pm  [phone] After 4:00 pm Monday-Friday, Weekends & Holidays.   Ask for the Interventional Radiologist on call.  Someone is on call 24 hrs/day  [facility] toll free number: [phone] Monday-Friday 8:00 am to 4:30 pm  [facility] Emergency Dept: [phone]

## 2023-02-27 LAB
EXPTIME-PRE: 7.69 SEC
FEF2575-%PRED-PRE: 47 %
FEF2575-PRE: 2.32 L/SEC
FEF2575-PRED: 4.88 L/SEC
FEFMAX-%PRED-PRE: 73 %
FEFMAX-PRE: 7.52 L/SEC
FEFMAX-PRED: 10.26 L/SEC
FEV1-%PRED-PRE: 57 %
FEV1-PRE: 2.71 L
FEV1FEV6-PRE: 78 %
FEV1FEV6-PRED: 84 %
FEV1FVC-PRE: 78 %
FEV1FVC-PRED: 84 %
FIFMAX-PRE: 4.27 L/SEC
FVC-%PRED-PRE: 62 %
FVC-PRE: 3.47 L
FVC-PRED: 5.59 L

## 2023-03-15 NOTE — PATIENT INSTRUCTIONS
Cystic Fibrosis Self-Care Plan    RECOMMENDATIONS:   Help us provide the best possible care. If you receive a questionnaire from the CF Foundation about your clinic experience today please fill it out.  It should take less than 5 minutes. Let us know what we are doing well and how we can improve.    We will contact you about a plan for the port-a-cath  Annual studies with next visit.  Continue current medication, exercise, nebs and vest therapy.         Cystic Fibrosis :    Cora Butler  [phone]  [facility] Nurse line:  [facility]  [phone]     [facility] Fax Number:      [phone]         Cystic Fibrosis Respiratory Therapists:   Meghann Gonzalez                          [phone]          May Rao   [phone]  Cystic Fibrosis Dietitians:              Sherie Aparicio              [phone]                            Saba Gill                        [phone]   Cystic Fibrosis Diabetes Nurse:    Ashley Cleveland               [phone]    Cystic Fibrosis Social Workers:     Prerna Paniagua               [phone]                     Courtney Fenton               [phone]  Cystic Fibrosis Pharmacists:           Hannah Orellana                              [phone] (pager)         Angeline Quinones      [phone]   Cystic Fibrosis Genetic Counselor:   Yuliana Ghotra    [phone]    [facility] website:  [email or web address]    COVID VACCINES:    You are eligible for the COVID-19 vaccine. Sign up for your COVID vaccine via [employer] (air taxi). Log in, select the menu bar, select schedule an appointment, and then select COVID-19 Vaccine 1st Dose. You may also schedule by calling this number [phone] however hold times can be long.       OR schedule through the [place] Department of [facility] Vaccine Connector at https://vaccineconnector.[place].gov/ or by calling [phone].      The best vaccine is the one that s available to  you first.  All COVID-19 vaccines currently available in the [place] (Rodrigo & Rodrigo, Pfizer and Moderna) have been shown to be highly effect at preventing COVID-19.       We re still learning how vaccines will affect the spread of COVID-19. After you ve been fully vaccinated against COVID-19, you should keep taking precautions in public places like wearing a mask, staying 6 feet apart from others, and avoiding crowds and poorly ventilated spaces until we know more.       MRN: [id number]   Clinic Date: January 26, 2023   Patient: Demario Abbasi     Annual Studies:   IGG   Date Value Ref Range Status   09/10/2020 1,739 (H) 610 - 1,616 mg/dL Final     Immunoglobulin G   Date Value Ref Range Status   03/17/2022 1,453 610-1,616 mg/dL Final     Insulin   Date Value Ref Range Status   03/17/2022 16.7 3.0 - 25.0 mU/L Final   02/22/2019 Canceled, Test credited 3 - 25 mU/L Final     Comment:     Unsatisfactory specimen - hemolyzed  NOTIFIED OZ PHILLIPS MD AT 1450 ON 2/22/19 BY JV       There are no preventive care reminders to display for this patient.    Pulmonary Function Tests  FEV1: amount of air you can blow out in 1 second  FVC: total amount of air you can take in and blow out    Your Goals:         PFT Latest Ref Rng & Units 1/26/2023   FVC L 3.47   FEV1 L 2.71   FVC% % 62   FEV1% % 57          Airway Clearance: The Most Important Way to Keep Your Lungs Healthy  Vest Settings:   Hill-Rom Frequencies: 8, 9, 10 Pressure 10 Then, Frequencies 18, 19, 20 Pressure 6     RespirTech: Quick Start with Pressure of     Do each frequency for 5 minutes; Deflate vest after each frequency & cough 3 times before beginning the next setting.    Vest and Neb Therapy should be done 2 times/day.    Good Nutrition Can Improve Lung Function and Overall Health    Take ALL of your vitamins with food    Take 1/2 of your enzymes before EVERY meal/snack and the other 1/2 mid-meal/snack    Wt Readings from Last 3 Encounters:    01/26/23 75 kg (165 lb 5.5 oz)   10/13/22 76.7 kg (169 lb 1.5 oz)   09/29/22 78 kg (172 lb)       Body mass index is 23.67 kg/m .         National CF Foundation Recommendations for BMI in CF Adults: Women: at least 22 Men: at least 23        Controlling Blood Sugars Helps Prevent Lung Infections & Improves Nutrition  Test blood sugar:    In the morning before eating (goal is )    2 hours after a meal (goal is less than 150)    When pre-meal glucose is greater than 150 add correction    At bedtime (if less than 100 eat a snack with 15 grams of carbohydrates  Last A1C Results:   Hemoglobin A1C   Date Value Ref Range Status   03/17/2022 5.6 0.0 - 5.6 % Final     Comment:     Normal <5.7%   Prediabetes 5.7-6.4%    Diabetes 6.5% or higher     Note: Adopted from ADA consensus guidelines.   09/10/2020 5.7 (H) 0 - 5.6 % Final     Comment:     Normal <5.7% Prediabetes 5.7-6.4%  Diabetes 6.5% or higher - adopted from ADA   consensus guidelines.           If diabetic, measure A1C every 6 months. Goal: Under 7%    Staying Healthy  Research:  If you are interested in learning about research opportunities or have questions, please contact the CF Research Team at [phone] or [email or web address].    CF Foundation:  Compass is a personalized resource service to help you with the insurance, financial, legal and other issues you are facing.  It's free, confidential and available to anyone with CF.  Ask your  for more information or contact Compass directly at 456-LVBVHES ([phone]) or compass@[email or web address], or learn more at cff.org/compass.                      Valtrex Pregnancy And Lactation Text: this medication is Pregnancy Category B and is considered safe during pregnancy. This medication is not directly found in breast milk but it's metabolite acyclovir is present.

## 2023-03-23 DIAGNOSIS — K86.89 PANCREATIC INSUFFICIENCY: ICD-10-CM

## 2023-03-23 DIAGNOSIS — E84.9 CYSTIC FIBROSIS (H): ICD-10-CM

## 2023-03-23 DIAGNOSIS — E84.9 CF (CYSTIC FIBROSIS) (H): ICD-10-CM

## 2023-03-23 RX ORDER — TOBRAMYCIN INHALATION 300 MG/4ML
300 SOLUTION RESPIRATORY (INHALATION) 2 TIMES DAILY
Qty: 224 ML | Refills: 5 | Status: SHIPPED | OUTPATIENT
Start: 2023-03-23 | End: 2024-03-26

## 2023-03-23 RX ORDER — PEDIATRIC MULTIVIT 61/D3/VIT K 1500-800
CAPSULE ORAL
Qty: 60 CAPSULE | Refills: 11 | Status: SHIPPED | OUTPATIENT
Start: 2023-03-23 | End: 2024-03-26

## 2023-04-09 ENCOUNTER — HEALTH MAINTENANCE LETTER (OUTPATIENT)
Age: 28
End: 2023-04-09

## 2023-04-13 DIAGNOSIS — E84.0 CYSTIC FIBROSIS WITH PULMONARY MANIFESTATIONS (H): ICD-10-CM

## 2023-04-13 DIAGNOSIS — E84.9 CF (CYSTIC FIBROSIS) (H): ICD-10-CM

## 2023-04-13 DIAGNOSIS — E84.9 CYSTIC FIBROSIS EXACERBATION (H): ICD-10-CM

## 2023-04-13 DIAGNOSIS — J30.2 SEASONAL ALLERGIES: ICD-10-CM

## 2023-04-13 DIAGNOSIS — E84.0 CYSTIC FIBROSIS WITH PULMONARY EXACERBATION (H): ICD-10-CM

## 2023-04-13 RX ORDER — CETIRIZINE HYDROCHLORIDE 10 MG/1
10 TABLET ORAL DAILY
Qty: 30 TABLET | Refills: 11 | Status: SHIPPED | OUTPATIENT
Start: 2023-04-13 | End: 2024-05-02

## 2023-04-13 RX ORDER — PANTOPRAZOLE SODIUM 20 MG/1
20 TABLET, DELAYED RELEASE ORAL DAILY
Qty: 30 TABLET | Refills: 11 | Status: SHIPPED | OUTPATIENT
Start: 2023-04-13 | End: 2024-05-02

## 2023-04-13 RX ORDER — ALBUTEROL SULFATE 0.83 MG/ML
SOLUTION RESPIRATORY (INHALATION)
Qty: 270 ML | Refills: 11 | Status: SHIPPED | OUTPATIENT
Start: 2023-04-13 | End: 2024-05-02

## 2023-04-13 RX ORDER — AZELASTINE 1 MG/ML
1 SPRAY, METERED NASAL 2 TIMES DAILY
Qty: 30 ML | Refills: 11 | Status: SHIPPED | OUTPATIENT
Start: 2023-04-13 | End: 2023-06-22

## 2023-04-13 RX ORDER — AZITHROMYCIN 500 MG/1
TABLET, FILM COATED ORAL
Qty: 12 TABLET | Refills: 11 | Status: SHIPPED | OUTPATIENT
Start: 2023-04-13 | End: 2024-05-02

## 2023-04-13 RX ORDER — FLUTICASONE PROPIONATE 50 MCG
2 SPRAY, SUSPENSION (ML) NASAL DAILY
Qty: 16 G | Refills: 11 | Status: SHIPPED | OUTPATIENT
Start: 2023-04-13 | End: 2024-05-23

## 2023-04-13 RX ORDER — SODIUM CHLORIDE FOR INHALATION 7 %
4 VIAL, NEBULIZER (ML) INHALATION 2 TIMES DAILY PRN
Qty: 240 ML | Refills: 11 | Status: SHIPPED | OUTPATIENT
Start: 2023-04-13 | End: 2024-05-23

## 2023-04-13 RX ORDER — ALBUTEROL SULFATE 90 UG/1
2 AEROSOL, METERED RESPIRATORY (INHALATION) EVERY 6 HOURS PRN
Qty: 26 G | Refills: 11 | Status: SHIPPED | OUTPATIENT
Start: 2023-04-13 | End: 2024-05-23

## 2023-04-13 RX ORDER — OLOPATADINE HYDROCHLORIDE 1 MG/ML
1 SOLUTION/ DROPS OPHTHALMIC 2 TIMES DAILY
Qty: 5 ML | Refills: 11 | Status: SHIPPED | OUTPATIENT
Start: 2023-04-13 | End: 2024-05-23

## 2023-06-01 ENCOUNTER — OFFICE VISIT (OUTPATIENT)
Dept: PHARMACY | Facility: CLINIC | Age: 28
End: 2023-06-01
Payer: COMMERCIAL

## 2023-06-01 ENCOUNTER — OFFICE VISIT (OUTPATIENT)
Dept: PULMONOLOGY | Facility: CLINIC | Age: 28
End: 2023-06-01
Attending: INTERNAL MEDICINE
Payer: COMMERCIAL

## 2023-06-01 VITALS
WEIGHT: 167.11 LBS | OXYGEN SATURATION: 95 % | TEMPERATURE: 98.9 F | DIASTOLIC BLOOD PRESSURE: 75 MMHG | BODY MASS INDEX: 23.92 KG/M2 | HEART RATE: 80 BPM | HEIGHT: 70 IN | SYSTOLIC BLOOD PRESSURE: 107 MMHG

## 2023-06-01 DIAGNOSIS — E84.9 CYSTIC FIBROSIS (H): Primary | ICD-10-CM

## 2023-06-01 DIAGNOSIS — E84.9 CF (CYSTIC FIBROSIS) (H): Primary | ICD-10-CM

## 2023-06-01 DIAGNOSIS — K86.89 PANCREATIC INSUFFICIENCY: ICD-10-CM

## 2023-06-01 DIAGNOSIS — E84.9 CF (CYSTIC FIBROSIS) (H): ICD-10-CM

## 2023-06-01 DIAGNOSIS — E84.9 CYSTIC FIBROSIS EXACERBATION (H): Primary | ICD-10-CM

## 2023-06-01 DIAGNOSIS — R73.02 IMPAIRED GLUCOSE TOLERANCE: ICD-10-CM

## 2023-06-01 DIAGNOSIS — F33.0 MAJOR DEPRESSIVE DISORDER, RECURRENT EPISODE, MILD (H): ICD-10-CM

## 2023-06-01 DIAGNOSIS — E84.0 CYSTIC FIBROSIS WITH PULMONARY EXACERBATION (H): ICD-10-CM

## 2023-06-01 LAB
EXPTIME-PRE: 8.17 SEC
FEF2575-%PRED-PRE: 32 %
FEF2575-PRE: 1.47 L/SEC
FEF2575-PRED: 4.58 L/SEC
FEFMAX-%PRED-PRE: 69 %
FEFMAX-PRE: 7.19 L/SEC
FEFMAX-PRED: 10.28 L/SEC
FEV1-%PRED-PRE: 50 %
FEV1-PRE: 2.19 L
FEV1FEV6-PRE: 73 %
FEV1FEV6-PRED: 84 %
FEV1FVC-PRE: 71 %
FEV1FVC-PRED: 85 %
FIFMAX-PRE: 3.99 L/SEC
FVC-%PRED-PRE: 59 %
FVC-PRE: 3.08 L
FVC-PRED: 5.13 L

## 2023-06-01 PROCEDURE — 99215 OFFICE O/P EST HI 40 MIN: CPT | Mod: 25 | Performed by: INTERNAL MEDICINE

## 2023-06-01 PROCEDURE — G0463 HOSPITAL OUTPT CLINIC VISIT: HCPCS | Performed by: INTERNAL MEDICINE

## 2023-06-01 PROCEDURE — 94375 RESPIRATORY FLOW VOLUME LOOP: CPT | Performed by: INTERNAL MEDICINE

## 2023-06-01 PROCEDURE — 87077 CULTURE AEROBIC IDENTIFY: CPT | Performed by: INTERNAL MEDICINE

## 2023-06-01 PROCEDURE — 99605 MTMS BY PHARM NP 15 MIN: CPT | Performed by: PHARMACIST

## 2023-06-01 PROCEDURE — 87070 CULTURE OTHR SPECIMN AEROBIC: CPT | Performed by: INTERNAL MEDICINE

## 2023-06-01 RX ORDER — MEROPENEM 1 G/1
2 INJECTION, POWDER, FOR SOLUTION INTRAVENOUS EVERY 8 HOURS
COMMUNITY
Start: 2023-06-01 | End: 2023-06-22

## 2023-06-01 RX ORDER — SULFAMETHOXAZOLE/TRIMETHOPRIM 800-160 MG
1 TABLET ORAL 3 TIMES DAILY
Qty: 63 TABLET | Refills: 0 | Status: SHIPPED | OUTPATIENT
Start: 2023-06-01 | End: 2023-06-22

## 2023-06-01 NOTE — PATIENT INSTRUCTIONS
Cystic Fibrosis Self-Care Plan    RECOMMENDATIONS:   Help us provide the best possible care. If you receive a questionnaire from the CF Foundation about your clinic experience today please fill it out.  It should take less than 5 minutes. Let us know what we are doing well and how we can improve.    Begin meropenem 2gm every 8 hours.  Begin bactrim 3 times daily.  Continue COBY.  Increase vest to 3 times daily with hypertonic saline with the middle therapy.  Otherwise continue current medication, nebs and vest therapy.   If you are not feeling any better by next week or if you get any worse, call the CF office.        Cystic Fibrosis :    Cora Butler  [phone]  [facility] [place] Nurse line:  Emer[facility]    [phone]     [facility] Fax Number:      [phone]         Cystic Fibrosis Respiratory Therapists:   Meghann Gonzalez                          [phone]          May Rao   [phone]  Cystic Fibrosis Dietitians:              Sherie Aparicio              [phone]                            Saba Gill                        [phone]   Cystic Fibrosis Diabetes Nurse:    Ashley Cleveland               [phone]    Cystic Fibrosis Social Workers:     Prerna Paniagua               [phone]                     Courtney Fenton               [phone]  Cystic Fibrosis Pharmacists:           Hannah Orellana                              [phone] (pager)         Angeline Quinones      [phone]   Cystic Fibrosis Genetic Counselor:   Yuliana Ghotra    [phone]    [facility] website:  [email or web address]    We have recently learned about an albuterol neb solution shortage due to a manufacturing delay. There is still a small supply coming in but not enough to meet the current demand. We do not yet have an estimate for when this will become widely available again.    We our asking our CF community to do the following:    Please  take time to check your supply of albuterol neb solution at home. We recommend keeping at least a 2-week supply of albuterol nebs at home in case of illness.    2.  If you have an albuterol inhaler at home, you can use 4 puffs of the inhaler with a spacer in place of the nebulized albuterol at the start of your treatments. It is important to use a spacer for the best technique. If you do not have a spacer at home or have questions on technique, we will be happy to review and send one to your home address. Please also take a moment to check that your albuterol HFA inhaler is available and not .  inhalers may be less effective as the medication loses its potency or power. In some instances your team may suggest another alternative instead of an albuterol inhaler.    3.  Please take care in requesting refills. Albuterol neb solution is a life-saving medication for patients having severe asthma attacks and other emergency respiratory conditions. Let s work together to make sure that albuterol neb solution is available to those who need it urgently.    Reach out to your care team with questions and to confirm your planned alternative for albuterol nebs. [employer] will be the fastest way to connect. If possible, please reserve the nursing line for more urgent concerns as we are short on nursing staff.    Here are some reputable sites with more information:    https://www.cidrap.[facility].edu/resilient-drug-supply/us-liquid-albuterol-xpxuvuyc-fszaxjio-virwcc-after-major-supplier-shuts-down    https://www.[employer]//health/albuterol-shortage    https://www.ashp.org/drug-shortages/current-shortages/drug-shortage-detail.aspx?aj=991&loginreturnUrl=SSOCheckOnly       MRN: [id number]   Clinic Date: 2023   Patient: Demario Abbasi     Annual Studies:   IGG   Date Value Ref Range Status   09/10/2020 1,739 (H) 610 - 1,616 mg/dL Final     Immunoglobulin G   Date Value Ref Range Status   2022 1,453  610-1,616 mg/dL Final     Insulin   Date Value Ref Range Status   03/17/2022 16.7 3.0 - 25.0 mU/L Final   02/22/2019 Canceled, Test credited 3 - 25 mU/L Final     Comment:     Unsatisfactory specimen - hemolyzed  NOTIFIED OZ PHILLIPS MD AT 1450 ON 2/22/19 BY JV       There are no preventive care reminders to display for this patient.    Pulmonary Function Tests  FEV1: amount of air you can blow out in 1 second  FVC: total amount of air you can take in and blow out    Your Goals:             Latest Ref Rng & Units 6/1/2023     9:30 AM   PFT   FVC L 3.08  P   FEV1 L 2.19  P   FVC% % 59  P   FEV1% % 50  P      P Preliminary result          Airway Clearance: The Most Important Way to Keep Your Lungs Healthy  Vest Settings:   Hill-Rom Frequencies: 8, 9, 10 Pressure 10 Then, Frequencies 18, 19, 20 Pressure 6     RespirTech: Quick Start with Pressure of     Do each frequency for 5 minutes; Deflate vest after each frequency & cough 3 times before beginning the next setting.    Vest and Neb Therapy should be done 3 times/day.    Good Nutrition Can Improve Lung Function and Overall Health    Take ALL of your vitamins with food    Take 1/2 of your enzymes before EVERY meal/snack and the other 1/2 mid-meal/snack    Wt Readings from Last 3 Encounters:   02/08/23 74.5 kg (164 lb 4.8 oz)   01/26/23 75 kg (165 lb 5.5 oz)   10/13/22 76.7 kg (169 lb 1.5 oz)       There is no height or weight on file to calculate BMI.         National CF Foundation Recommendations for BMI in CF Adults: Women: at least 22 Men: at least 23        Controlling Blood Sugars Helps Prevent Lung Infections & Improves Nutrition  Test blood sugar:    In the morning before eating (goal is )    2 hours after a meal (goal is less than 150)    When pre-meal glucose is greater than 150 add correction    At bedtime (if less than 100 eat a snack with 15 grams of carbohydrates  Last A1C Results:   Hemoglobin A1C   Date Value Ref Range Status   03/17/2022  5.6 0.0 - 5.6 % Final     Comment:     Normal <5.7%   Prediabetes 5.7-6.4%    Diabetes 6.5% or higher     Note: Adopted from ADA consensus guidelines.   09/10/2020 5.7 (H) 0 - 5.6 % Final     Comment:     Normal <5.7% Prediabetes 5.7-6.4%  Diabetes 6.5% or higher - adopted from ADA   consensus guidelines.           If diabetic, measure A1C every 6 months. Goal: Under 7%    Staying Healthy  Research:  If you are interested in learning about research opportunities or have questions, please contact the CF Research Team at [phone] or [email or web address].    CF Foundation:  Compass is a personalized resource service to help you with the insurance, financial, legal and other issues you are facing.  It's free, confidential and available to anyone with CF.  Ask your  for more information or contact Compass directly at [phone] ([phone]) or compass@[email or web address], or learn more at cff.org/compass.

## 2023-06-01 NOTE — PROGRESS NOTES
Medication Therapy Management (MTM) Encounter    ASSESSMENT:                            Medication Adherence/Access: No issues identified    CF: Patient would benefit from treating exacerbation as recommended by provider Dr. Lilly.     Pancreatic Insufficiency/Nutrition: annual vitamin labs are overdue, scheduled for next visit. BMI is at goal of 22 mg/k2 per CFF guidelines.    Depression: stable    PLAN:                            1. Start IV meropenem, Bactrim DS three times daily x 21 days and restart hypertonic saline 7% per visit with Dr. Lilly.    2. Annual labs at next visits    Follow-up: 3 months for clinic visit and annual labs    SUBJECTIVE/OBJECTIVE:                          Demario Abbasi is a 28 year old male seen for a co-visit with Dr. Lilly and team.     Reason for visit: Annual Medication Review    Allergies/ADRs: Reviewed in chart  Past Medical History: Reviewed in chart  Tobacco: He reports that he has never smoked. He has been exposed to tobacco smoke. His smokeless tobacco use includes chew.  Alcohol: 1-3 beverages in the evenings, more so on weekends  Other Substance Use: none    Medication Adherence/Access:   Medication: Demario manages his medications at home  Pharmacy: [facility], and [facility]       CF/Seasonal Allergies:    Inhaled medications:   Bronchodilator: albuterol nebs twice daily and albuterol HFA as needed (occassionally)   Mucolytic: Pulmozyme twice daily and hypertonic 7% nebs only when sick (not recently, but planning to restart)   Antibiotic: Bethkis twice daily (cycling; on)   Other: Flonase 2 sprays each nostril daily, and Azelastine nasal spray as needed (none currently)  Oral medications:   Azithromycin: 500mg [facility]   CFTR modulator: not eligible by genotype   Other: cetirizine 10mg daily  Other medications:  olopatadine eye drops  Current FEV1% Pred: 50%  Cultures (last growth): sputum cultures grow MSSA (6/1/23),  Achromobacter (10/13/22), Group  "B Strep (6/16/22), A. ochraceus (3/17/22)  Demario is not feeling well per visit with Dr. Lilly, starting course of IV meropenem and oral Bactrim. Demario has been on this regimen previously, no questions today.    Pancreatic Insufficiency/Nutrition: Pancreatic enzyme replacement with Creon [id number].  Patient is taking 4 capsules with meals and 1 capsule with snacks. Patient is not experiencing sign/symptoms of malabsorption.  Acid reducer: pantoprazole 20mg daily  Bowel regimen: none  Weight and BMI are decreased  Vitamins include: [id number] twice daily  Supplements include: none        Depression: Taking citalopram 20mg daily.  He reports this is working well for him, no concerns noted today.  Demario follows with Lacey LONG-CNP, FNP, CNM  at [facility].      Weight/BMI:      PFTs:      Today's Vitals:   BP Readings from Last 1 Encounters:   06/01/23 107/75     Pulse Readings from Last 1 Encounters:   06/01/23 80     Wt Readings from Last 1 Encounters:   02/08/23 164 lb 4.8 oz (74.5 kg)     Ht Readings from Last 1 Encounters:   01/26/23 5' 10.08\" (1.78 m)     Estimated body mass index is 23.52 kg/m  as calculated from the following:    Height as of 1/26/23: 5' 10.08\" (1.78 m).    Weight as of 2/8/23: 164 lb 4.8 oz (74.5 kg).    Temp Readings from Last 1 Encounters:   06/01/23 98.9  F (37.2  C) (Oral)     ----------------      I spent 5 minutes with this patient today. I offer these suggestions for consideration by Dr. Lilly during covisit.     A summary of these recommendations was given to the patient (see AVS from today's appointment with Dr. Lilly).    Angeline Quinones, PharmD  Cystic Fibrosis MTM Pharmacist  [facility]  Voicemail: [phone]           Medication Therapy Recommendations  No medication therapy recommendations to display       "

## 2023-06-01 NOTE — LETTER
6/1/2023         RE: Demario Abbasi  [address]  [place] [address]        Dear Colleague,    Thank you for referring your patient, Demario Abbasi, to the [facility] FOR LUNG SCIENCE AND [facility]. Please see a copy of my visit note below.    Reason for Visit  Demario Abbasi is a 28 year old year old male who is being seen for RECHECK (Cystic Fibrosis)    Assessment and plan:   Demario Abbasi is a 28 year old male with cystic fibrosis, pancreatic insufficiency, depression, acne and impaired glucose tolerance who is s/p RUL resection for recurrent exacerbations in 2009.   He last required IV antibiotics in follow-up 2022 for a pulmonary exacerbation.  Maintenance   Modulator: Not eligible  Mutation:  g542x/621+1g>t  AW Clearance: Vest  Bronchodilators:  Albuterol  Mucolytics: Pulmozyme, Hypertonic saline  Antibiotics Inh:  Bethkis  Antibiotics Oral: Azithromycin  Other:  Colonization Hx: Staph aureus (MSSA), Achromobacter xylosoxidans/denitrificans   Annual Studies: Due August 2024  Contraindications to standard of care:    CF/pulmonary: The patient has had a decrease in his exercise tolerance. He is oxygenating adequately at 95% but his PFT's have noticeably decreased.  He reports increased cough and difficulty expectorating sputum which has changed from light green-clear to dark green-brown.  Symptoms, exam and PFTs are all consistent with a CF pulmonary exacerbation.  We discussed hospital admission but the patient prefers to initiate therapy at home.  His exercise tolerance is adequately preserved and he has adequate oxygenation so a trial of home IV antibiotics appears safe.  Recent cultures have grown mostly Achromobacter.  Although sensitivities would suggest imipenem would be optimal choice, in the past he has not responded to imipenem but has had a good response to meropenem.  Thus meropenem 2 g IV every 8 hours will be initiated.  This will be  combined with Bactrim double strength 1 tablet 3 times daily.  Vest therapy will be increased to 3 times daily, twice daily of Pulmozyme and once daily with hypertonic saline.  The patient was instructed to contact the CF office with any progression in his symptoms.  He will follow-up in 3 weeks to assess response to above noted treatment plan.  The patient had a Port-A-Cath placed this past February so he has appropriate IV access for the antibiotic course.    CFTR Modulation: Based on his genotype, the patient is not a candidate for CFTR modulators.  At his request, we did review the mechanism of action of current modulators and discussed why he is not a candidate for available therapies.  We did discuss that there are early trials and RNA therapy and other potential options being explored for his mutations.    Pancreatic insufficiency: Patient denies symptoms of malabsorption.  He will continue his current pancreatic enzyme replacement and vitamins.     CF related sinusitis: No symptoms of active sinusitis at this time.  Above-noted antibiotics should cover any component of sinus infection.    Iron deficiency anemia: Continue to monitor iron levels with annual studies.     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  He enjoys his work.     Environmental allergies: Adequately controlled with Zyrtec, Pataday, Astelin and Flonase.     Reflux: Adequately controlled with pantoprazole.     Healthcare maintenance: The patient has received his Covid vaccination.  He has received his influenza vaccine for this flu season. Annual studies due 8/2024    Follow up in 3 weeks with PFT's and sputum culture.  Labs will be checked in 7-10 days to assess for adverse reaction to antibiotics.    Zana Lilly MD         CF HPI  The patient was seen and examined by Zana Lilly MD.  Demario Abbasi is a 28 year old male with  cystic fibrosis, pancreatic insufficiency, depression, acne and impaired glucose tolerance who is s/p RUL resection for recurrent exacerbations in 2009.   He last required IV antibiotics in fall 2022 for a pulmonary exacerbation.  For the past month patient has had a tougher time expectorating sputum, and has increased nighttime cough. He notes his symptoms have persisted for the past month but have  not worsened, but sputum color has changed from clear/light green to dark green/brown during this time.  Increased cough from baseline..  Patient denies any sick contacts. He has kept up with standard vest therapy 2x a day with nebs.    Breathing is comfortable at rest, increased dyspnea with activity compared with baseline. No chest pain, fever, chills, or night sweats.    Review of Systems   Appetite is good  No ear pain, sore throat, sinus pain  No vision changes  No nausea, vomiting, diarrhea or abdominal pain.  A complete ROS was otherwise negative except as noted in the HPI.  Current Outpatient Medications   Medication    albuterol (PROAIR HFA) 108 (90 Base) MCG/ACT inhaler    albuterol (PROVENTIL) (2.5 MG/3ML) 0.083% neb solution    azelastine (ASTELIN) 0.1 % nasal spray    azithromycin (ZITHROMAX) 500 MG tablet    cetirizine (ZYRTEC) 10 MG tablet    citalopram (CELEXA) 20 MG tablet    dornase ren (PULMOZYME) 2.5 MG/2.5ML neb solution    fluticasone (FLONASE) 50 MCG/ACT nasal spray    lipase-protease-amylase (CREON 36) 97323-562359-359412 units CPEP    mvw complete formulation (SOFTGELS ) capsule    olopatadine (PATANOL) 0.1 % ophthalmic solution    pantoprazole (PROTONIX) 20 MG EC tablet    sodium chloride inhalant 7 % NEBU neb solution    tobramycin (BETHKIS) 300 MG/4ML nebulizer solution     No current facility-administered medications for this visit.     Allergies   Allergen Reactions    Amoxicillin-Pot Clavulanate Diarrhea     Past Medical History:   Diagnosis Date    CF (cystic fibrosis) (H)      Genotype: g542x/621+1g>t    Impaired glucose tolerance     Pancreatic insufficiency     S/P lobectomy of lung 8/4/2015    Right upper lobectomy - 2009       Past Surgical History:   Procedure Laterality Date    H STATISTIC PICC LINE INSERTION >5YR, FAILED Bilateral 03/15/2019    Stenosis in both arms, unable to thread catheter    HC LAP,INGUINAL HERNIA REPR,INITIAL  2002    INSERT PICC LINE Left 10/12/2020    Procedure: INSERTION, PICC @1100;  Surgeon: Felicia Branch MD;  Location: [facility] OR    INSERT PICC LINE Left 12/6/2021    Procedure: INSERTION, PICC;  Surgeon: Tahir Alvarado MD;  Location: [facility] OR    INSERT PICC LINE Left 8/31/2022    Procedure: SINGLE LUMEN INSERTION, PICC;  Surgeon: Felicia Branch MD;  Location: [facility] OR    IR CHEST PORT PLACEMENT > 5 YRS OF AGE  2/8/2023    IR PICC PLACEMENT > 5 YRS OF AGE  03/18/2019    IR PICC PLACEMENT > 5 YRS OF AGE  12/30/2019    IR PICC PLACEMENT > 5 YRS OF AGE  10/12/2020    IR PICC PLACEMENT > 5 YRS OF AGE  12/6/2021    IR PICC PLACEMENT > 5 YRS OF AGE  8/31/2022    LOBECTOMY LUNG Right 2009    Right upper lobe       Social History     Socioeconomic History    Marital status: Single     Spouse name: Not on file    Number of children: Not on file    Years of education: Not on file    Highest education level: Not on file   Occupational History    Occupation: Sale    Tobacco Use    Smoking status: Never     Passive exposure: Yes    Smokeless tobacco: Current     Types: Chew    Tobacco comments:     dad smokes   Vaping Use    Vaping status: Not on file   Substance and Sexual Activity    Alcohol use: Yes     Comment: <2 drinks per week    Drug use: Not on file    Sexual activity: Not on file   Other Topics Concern    Parent/sibling w/ CABG, MI or angioplasty before 65F 55M? Not Asked   Social History Narrative    12/27/2019  -Graduated college in 2020.  Looking for work as a sales [employer].       Social Determinants of Health     Financial Resource  Strain: Not on file   Food Insecurity: Not on file   Transportation Needs: Not on file   Physical Activity: Not on file   Stress: Not on file   Social Connections: Not on file   Intimate Partner Violence: Not on file   Housing Stability: Not on file         /75   Pulse 80   Temp 98.9  F (37.2  C) (Oral)   SpO2 95%   There is no height or weight on file to calculate BMI.  Exam:   GENERAL APPEARANCE: Well developed, well nourished, alert, and in no apparent distress.  EYES: PERRL, EOMI  HENT: Nasal mucosa with edema and no hyperemia. No nasal polyps.  EARS: Canals clear, TMs normal  MOUTH: Oral mucosa is moist, without any lesions, no tonsillar enlargement, no oropharyngeal exudate.  NECK: supple, no masses, no thyromegaly.  LYMPHATICS: No significant axillary, cervical, or supraclavicular nodes.  RESP: normal percussion, good air flow throughout. Crackles in left upper lung field. No rhonchi. No wheezes.  CV: Normal S1, S2, regular rhythm, normal rate. No murmur.  No rub. No gallop. No LE edema.   ABDOMEN:  Bowel sounds normal, soft, nontender, no HSM or masses.   MS: extremities normal. No clubbing. No cyanosis.  SKIN: no rash on limited exam  NEURO: Mentation intact, speech normal, normal strength and tone, normal gait and stance  PSYCH: mentation appears normal. and affect normal/bright  Results:  Recent Results (from the past 168 hour(s))   General PFT Lab (Please always keep checked)    Collection Time: 06/01/23  9:30 AM   Result Value Ref Range    FVC-Pred 5.13 L    FVC-Pre 3.08 L    FVC-%Pred-Pre 59 %    FEV1-Pre 2.19 L    FEV1-%Pred-Pre 50 %    FEV1FVC-Pred 85 %    FEV1FVC-Pre 71 %    FEFMax-Pred 10.28 L/sec    FEFMax-Pre 7.19 L/sec    FEFMax-%Pred-Pre 69 %    FEF2575-Pred 4.58 L/sec    FEF2575-Pre 1.47 L/sec    CCX7498-%Pred-Pre 32 %    ExpTime-Pre 8.17 sec    FIFMax-Pre 3.99 L/sec    FEV1FEV6-Pred 84 %    FEV1FEV6-Pre 73 %                   Results as noted above.    PFT Interpretation:  Moderate  obstructive ventilatory defect.  Decreased from previous.  Below recent best.   Valid Maneuver             CF Exacerbation  Severe  Increased vest/bronchodilator/execise, Oral: non-quinolone and Hosp admit/home IV (within 2 wks)      Scribe Disclosure:   I, Arben Mckoy, am serving as a scribe; to document services personally performed by Dr. Zana Lilly- -based on data collection and the provider's statements to me.     Provider Disclosure:  I agree with above History, Review of Systems, Physical exam and Plan.  I have reviewed the content of the documentation and have edited it as needed. I have personally performed the services documented here and the documentation accurately represents those services and the decisions I have made.      Electronically signed by:  Dr. Zana Lilly

## 2023-06-01 NOTE — NURSING NOTE
"Demario Abbasi is a 28 year old year old who is being seen for RECHECK (Cystic Fibrosis)      Medications reviewed and Vital signs taken.    Specimen Collection Type: Sputum    Order(s) placed: CF Aerobic Bacterial    *IF AFB order placed - please enter \"PRIORITIZE AFB\" to order comments.       Lab Results   Component Value Date    ACIDFAST No acid fast bacilli seen 10/13/2022    ACIDFAST No acid fast bacilli seen 10/13/2022    ACIDFAST No acid fast bacilli seen 10/13/2022         Lab Results   Component Value Date    AFBSMS Negative for acid fast bacteria 09/10/2020    AFBSMS  09/10/2020     Less than 5ml of specimen received.  A minimum of 5 mL of sputum or fluid is recommended for recovery of acid fast bacilli   (AFB).  Volumes less than 5 mL are suboptimal and may compromise recovery of AFB from   culture.      AFBSMS  09/10/2020     Assayed at [facility] [address] [place] [id number] [phone]       Medications reviewed and vital signs taken.   Lizy Mireles CMA    "

## 2023-06-01 NOTE — PROGRESS NOTES
Reason for Visit  Demario Abbasi is a 28 year old year old male who is being seen for RECHECK (Cystic Fibrosis)    Assessment and plan:   Demario Abbasi is a 28 year old male with cystic fibrosis, pancreatic insufficiency, depression, acne and impaired glucose tolerance who is s/p RUL resection for recurrent exacerbations in 2009.   He last required IV antibiotics in follow-up 2022 for a pulmonary exacerbation.  Maintenance   Modulator: Not eligible  Mutation:  g542x/621+1g>t  AW Clearance: Vest  Bronchodilators:  Albuterol  Mucolytics: Pulmozyme, Hypertonic saline  Antibiotics Inh:  Bethkis  Antibiotics Oral: Azithromycin  Other:  Colonization Hx: Staph aureus (MSSA), Achromobacter xylosoxidans/denitrificans   Annual Studies: Due August 2024  Contraindications to standard of care:    CF/pulmonary: The patient has had a decrease in his exercise tolerance. He is oxygenating adequately at 95% but his PFT's have noticeably decreased.  He reports increased cough and difficulty expectorating sputum which has changed from light green-clear to dark green-brown.  Symptoms, exam and PFTs are all consistent with a CF pulmonary exacerbation.  We discussed hospital admission but the patient prefers to initiate therapy at home.  His exercise tolerance is adequately preserved and he has adequate oxygenation so a trial of home IV antibiotics appears safe.  Recent cultures have grown mostly Achromobacter.  Although sensitivities would suggest imipenem would be optimal choice, in the past he has not responded to imipenem but has had a good response to meropenem.  Thus meropenem 2 g IV every 8 hours will be initiated.  This will be combined with Bactrim double strength 1 tablet 3 times daily.  Vest therapy will be increased to 3 times daily, twice daily of Pulmozyme and once daily with hypertonic saline.  The patient was instructed to contact the CF office with any progression in his symptoms.  He will follow-up in 3 weeks  to assess response to above noted treatment plan.  The patient had a Port-A-Cath placed this past February so he has appropriate IV access for the antibiotic course.    CFTR Modulation: Based on his genotype, the patient is not a candidate for CFTR modulators.  At his request, we did review the mechanism of action of current modulators and discussed why he is not a candidate for available therapies.  We did discuss that there are early trials and RNA therapy and other potential options being explored for his mutations.    Pancreatic insufficiency: Patient denies symptoms of malabsorption.  He will continue his current pancreatic enzyme replacement and vitamins.     CF related sinusitis: No symptoms of active sinusitis at this time.  Above-noted antibiotics should cover any component of sinus infection.    Iron deficiency anemia: Continue to monitor iron levels with annual studies.     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  He enjoys his work.     Environmental allergies: Adequately controlled with Zyrtec, Pataday, Astelin and Flonase.     Reflux: Adequately controlled with pantoprazole.     Healthcare maintenance: The patient has received his Covid vaccination.  He has received his influenza vaccine for this flu season. Annual studies due 8/2024    Follow up in 3 weeks with PFT's and sputum culture.  Labs will be checked in 7-10 days to assess for adverse reaction to antibiotics.    Zana Lilly MD         CF HPI  The patient was seen and examined by Zana Lilly MD.  Demario Abbasi is a 28 year old male with cystic fibrosis, pancreatic insufficiency, depression, acne and impaired glucose tolerance who is s/p RUL resection for recurrent exacerbations in 2009.   He last required IV antibiotics in fall 2022 for a pulmonary exacerbation.  For the past month patient has had a tougher time expectorating  sputum, and has increased nighttime cough. He notes his symptoms have persisted for the past month but have  not worsened, but sputum color has changed from clear/light green to dark green/brown during this time.  Increased cough from baseline..  Patient denies any sick contacts. He has kept up with standard vest therapy 2x a day with nebs.    Breathing is comfortable at rest, increased dyspnea with activity compared with baseline. No chest pain, fever, chills, or night sweats.    Review of Systems   Appetite is good  No ear pain, sore throat, sinus pain  No vision changes  No nausea, vomiting, diarrhea or abdominal pain.  A complete ROS was otherwise negative except as noted in the HPI.  Current Outpatient Medications   Medication     albuterol (PROAIR HFA) 108 (90 Base) MCG/ACT inhaler     albuterol (PROVENTIL) (2.5 MG/3ML) 0.083% neb solution     azelastine (ASTELIN) 0.1 % nasal spray     azithromycin (ZITHROMAX) 500 MG tablet     cetirizine (ZYRTEC) 10 MG tablet     citalopram (CELEXA) 20 MG tablet     dornase ren (PULMOZYME) 2.5 MG/2.5ML neb solution     fluticasone (FLONASE) 50 MCG/ACT nasal spray     lipase-protease-amylase (CREON 36) 18581-336439-145137 units CPEP     mvw complete formulation (SOFTGELS ) capsule     olopatadine (PATANOL) 0.1 % ophthalmic solution     pantoprazole (PROTONIX) 20 MG EC tablet     sodium chloride inhalant 7 % NEBU neb solution     tobramycin (BETHKIS) 300 MG/4ML nebulizer solution     No current facility-administered medications for this visit.     Allergies   Allergen Reactions     Amoxicillin-Pot Clavulanate Diarrhea     Past Medical History:   Diagnosis Date     CF (cystic fibrosis) (H)     Genotype: g542x/621+1g>t     Impaired glucose tolerance      Pancreatic insufficiency      S/P lobectomy of lung 8/4/2015    Right upper lobectomy - 2009       Past Surgical History:   Procedure Laterality Date     H STATISTIC PICC LINE INSERTION >5YR, FAILED Bilateral 03/15/2019     Stenosis in both arms, unable to thread catheter     HC LAP,INGUINAL HERNIA REPR,INITIAL  2002     INSERT PICC LINE Left 10/12/2020    Procedure: INSERTION, PICC @1100;  Surgeon: Felicia Branch MD;  Location: [facility] OR     INSERT PICC LINE Left 12/6/2021    Procedure: INSERTION, PICC;  Surgeon: Tahir Alvarado MD;  Location: [facility] OR     INSERT PICC LINE Left 8/31/2022    Procedure: SINGLE LUMEN INSERTION, PICC;  Surgeon: Felicia Branch MD;  Location: [facility] OR     IR CHEST PORT PLACEMENT > 5 YRS OF AGE  2/8/2023     IR PICC PLACEMENT > 5 YRS OF AGE  03/18/2019     IR PICC PLACEMENT > 5 YRS OF AGE  12/30/2019     IR PICC PLACEMENT > 5 YRS OF AGE  10/12/2020     IR PICC PLACEMENT > 5 YRS OF AGE  12/6/2021     IR PICC PLACEMENT > 5 YRS OF AGE  8/31/2022     LOBECTOMY LUNG Right 2009    Right upper lobe       Social History     Socioeconomic History     Marital status: Single     Spouse name: Not on file     Number of children: Not on file     Years of education: Not on file     Highest education level: Not on file   Occupational History     Occupation: Sale [employer]   Tobacco Use     Smoking status: Never     Passive exposure: Yes     Smokeless tobacco: Current     Types: Chew     Tobacco comments:     dad smokes   Vaping Use     Vaping status: Not on file   Substance and Sexual Activity     Alcohol use: Yes     Comment: <2 drinks per week     Drug use: Not on file     Sexual activity: Not on file   Other Topics Concern     Parent/sibling w/ CABG, MI or angioplasty before 65F 55M? Not Asked   Social History Narrative    12/27/2019  -Graduated college in 2020.  Looking for work as a sales [employer].       Social Determinants of Health     Financial Resource Strain: Not on file   Food Insecurity: Not on file   Transportation Needs: Not on file   Physical Activity: Not on file   Stress: Not on file   Social Connections: Not on file   Intimate Partner Violence: Not on file   Housing Stability: Not on file         BP  107/75   Pulse 80   Temp 98.9  F (37.2  C) (Oral)   SpO2 95%   There is no height or weight on file to calculate BMI.  Exam:   GENERAL APPEARANCE: Well developed, well nourished, alert, and in no apparent distress.  EYES: PERRL, EOMI  HENT: Nasal mucosa with edema and no hyperemia. No nasal polyps.  EARS: Canals clear, TMs normal  MOUTH: Oral mucosa is moist, without any lesions, no tonsillar enlargement, no oropharyngeal exudate.  NECK: supple, no masses, no thyromegaly.  LYMPHATICS: No significant axillary, cervical, or supraclavicular nodes.  RESP: normal percussion, good air flow throughout. Crackles in left upper lung field. No rhonchi. No wheezes.  CV: Normal S1, S2, regular rhythm, normal rate. No murmur.  No rub. No gallop. No LE edema.   ABDOMEN:  Bowel sounds normal, soft, nontender, no HSM or masses.   MS: extremities normal. No clubbing. No cyanosis.  SKIN: no rash on limited exam  NEURO: Mentation intact, speech normal, normal strength and tone, normal gait and stance  PSYCH: mentation appears normal. and affect normal/bright  Results:  Recent Results (from the past 168 hour(s))   General PFT Lab (Please always keep checked)    Collection Time: 06/01/23  9:30 AM   Result Value Ref Range    FVC-Pred 5.13 L    FVC-Pre 3.08 L    FVC-%Pred-Pre 59 %    FEV1-Pre 2.19 L    FEV1-%Pred-Pre 50 %    FEV1FVC-Pred 85 %    FEV1FVC-Pre 71 %    FEFMax-Pred 10.28 L/sec    FEFMax-Pre 7.19 L/sec    FEFMax-%Pred-Pre 69 %    FEF2575-Pred 4.58 L/sec    FEF2575-Pre 1.47 L/sec    CSG9134-%Pred-Pre 32 %    ExpTime-Pre 8.17 sec    FIFMax-Pre 3.99 L/sec    FEV1FEV6-Pred 84 %    FEV1FEV6-Pre 73 %                   Results as noted above.    PFT Interpretation:  Moderate obstructive ventilatory defect.  Decreased from previous.  Below recent best.   Valid Maneuver             CF Exacerbation  Severe  Increased vest/bronchodilator/execise, Oral: non-quinolone and Hosp admit/home IV (within 2 wks)      Scribe Disclosure:   I,  Arben Mckoy, am serving as a scribe; to document services personally performed by Dr. Zana Lilly- -based on data collection and the provider's statements to me.     Provider Disclosure:  I agree with above History, Review of Systems, Physical exam and Plan.  I have reviewed the content of the documentation and have edited it as needed. I have personally performed the services documented here and the documentation accurately represents those services and the decisions I have made.      Electronically signed by:  Dr. Zana Lilly

## 2023-06-01 NOTE — NURSING NOTE
Started IV Meropenem 2G every 8 hours x 3 weeks. Follow up in clinic at that time to reassess LOT. Dr. Lilly requesting BMP a week after initiation of therapy. Communicated orders with Maximiliano Sawyer CARIN. Patient has port-a-cath. Currently mother and cousin performing month port flush and dressing changes. They will request home care provide needle change teaching to help assist patient. Supplies provided by [facility] per patient and documentation.     MAGGIE Singletary

## 2023-06-05 NOTE — PATIENT INSTRUCTIONS
See provider AVS for a summary of recommendations from today's visit.    Angeline Quinones, PharmD  Cystic Fibrosis MTM Pharmacist  [facility]  Voicemail: [phone]

## 2023-06-07 LAB
BACTERIA SPT CULT: ABNORMAL

## 2023-06-16 ENCOUNTER — TELEPHONE (OUTPATIENT)
Dept: PULMONOLOGY | Facility: CLINIC | Age: 28
End: 2023-06-16
Payer: COMMERCIAL

## 2023-06-16 NOTE — TELEPHONE ENCOUNTER
Patient seen in clinic by Dr. Lilly 6/1  Started IV Meropenem 2G every 8 hours x 3 weeks. Plan for follow up in 3 weeks for repeat PFT and determination of LOT.    Called patient for update. Patient reports feeling much better since starting antibiotic. No issues with flushing port or IV antibiotics. Patient agreeable for follow up visit.     Transferred to [facility] to schedule.    MAGGIE Singletary

## 2023-06-21 NOTE — PROGRESS NOTES
[facility]  June 22, 2023         Assessment and Plan:   Demario Abbasi is a 28 year old male with cystic fibrosis, pancreatic insufficiency, depression, acne and impaired glucose tolerance who is s/p RUL resection for recurrent exacerbations in 2009 who is seen for follow up. He was started on meropenem and Bactrim at his last visit in early June.     1. Moderate cystic fibrosis exacerbation: feels much better and nearly back to his baseline with improved productive cough, decreased congestion and no dyspnea. Sating 96% on room air; nebs and vesting TID. PFTs today improved 470 ml to near his baseline, slightly below his best. Prior cultures + for MSSA and achromobacter. Resolving exacerbation.  - Complete course of meropenem and Bactrim (has port in place)  - Nebs and vesting, TID through the weekend, then back to BID  - On chronic ngozi nebs every other month and chronic azithromycin     2. CFTR modulation: based on his genotype, the patient is not a candidate for CFTR modulators. Prior discussions about early trials and RNA therapy and other potential options being explored for his mutations.     3. Pancreatic insufficiency: no s/s of malabsorption; stools are stable even on antibiotics. BMI > CFF goal.  - Continue enzymes and vitamins    4. CF related sinusitis:  Environmental allergies: no new symptoms, doing okay with the air quality.  - Continue nasal sprays, eye drops and Zyrtec    5. Reflux: no current issues.  - Continue pantoprazole    RTC: as scheduled with Dr. Lilly in August   Annual studies due: August 2023 (scheduled)  Preventative care:    Lisa Lofton PA-C  Pulmonary, Allergy, Critical Care and Sleep Medicine        Interval History:     Feeling a lot better, no fever or chills, not coughing at night, less frequent, less productive of thick mucous. Congestion improved, no tightness or wheezing. No shortness of breath, nebs and  vesting TID. No bloating or gas, stools have been normal. Has been doing meroepenem every 8 hours, very tired.          Review of Systems:     Please see HPI. Otherwise, complete 10 point ROS negative.           Past Medical and Surgical History:     Past Medical History:   Diagnosis Date     CF (cystic fibrosis) (H)     Genotype: g542x/621+1g>t     Impaired glucose tolerance      Pancreatic insufficiency      S/P lobectomy of lung 8/4/2015    Right upper lobectomy - 2009     Past Surgical History:   Procedure Laterality Date     H STATISTIC PICC LINE INSERTION >5YR, FAILED Bilateral 03/15/2019    Stenosis in both arms, unable to thread catheter     HC LAP,INGUINAL HERNIA REPR,INITIAL  2002     INSERT PICC LINE Left 10/12/2020    Procedure: INSERTION, PICC @1100;  Surgeon: Felicia Branch MD;  Location: [facility] OR     INSERT PICC LINE Left 12/6/2021    Procedure: INSERTION, PICC;  Surgeon: Tahir Alvarado MD;  Location: [facility] OR     INSERT PICC LINE Left 8/31/2022    Procedure: SINGLE LUMEN INSERTION, PICC;  Surgeon: Felicia Branch MD;  Location: [facility] OR     IR CHEST PORT PLACEMENT > 5 YRS OF AGE  2/8/2023     IR PICC PLACEMENT > 5 YRS OF AGE  03/18/2019     IR PICC PLACEMENT > 5 YRS OF AGE  12/30/2019     IR PICC PLACEMENT > 5 YRS OF AGE  10/12/2020     IR PICC PLACEMENT > 5 YRS OF AGE  12/6/2021     IR PICC PLACEMENT > 5 YRS OF AGE  8/31/2022     LOBECTOMY LUNG Right 2009    Right upper lobe           Family History:     Family History   Problem Relation Age of Onset     Thyroid Disease Mother         hypothyroid     Other - See Comments Mother         multiple family members with biliary disease     Hypertension Maternal Grandmother      Diabetes Maternal Grandfather      Hypertension Paternal Grandmother      Hypothyroidism Paternal Grandmother      Parkinsonism Paternal Grandmother      Coronary Artery Disease Early Onset Paternal Grandfather 56            Social History:     Social History     Socioeconomic  History     Marital status: Single     Spouse name: Not on file     Number of children: Not on file     Years of education: Not on file     Highest education level: Not on file   Occupational History     Occupation: Sale    Tobacco Use     Smoking status: Never     Passive exposure: Yes     Smokeless tobacco: Current     Types: Chew     Tobacco comments:     dad smokes   Substance and Sexual Activity     Alcohol use: Yes     Comment: 2-3 drinks evenings, mostly weekends     Drug use: Not on file     Sexual activity: Not on file   Other Topics Concern     Parent/sibling w/ CABG, MI or angioplasty before 65F 55M? Not Asked   Social History Narrative    12/27/2019  -Graduated college in 2020.  Looking for work as a [employer].       Social Determinants of Health     Financial Resource Strain: Not on file   Food Insecurity: Not on file   Transportation Needs: Not on file   Physical Activity: Not on file   Stress: Not on file   Social Connections: Not on file   Intimate Partner Violence: Not on file   Housing Stability: Not on file            Medications:     Current Outpatient Medications   Medication     albuterol (PROAIR HFA) 108 (90 Base) MCG/ACT inhaler     albuterol (PROVENTIL) (2.5 MG/3ML) 0.083% neb solution     azelastine (ASTELIN) 0.1 % nasal spray     azithromycin (ZITHROMAX) 500 MG tablet     cetirizine (ZYRTEC) 10 MG tablet     citalopram (CELEXA) 20 MG tablet     dornase ren (PULMOZYME) 2.5 MG/2.5ML neb solution     fluticasone (FLONASE) 50 MCG/ACT nasal spray     lipase-protease-amylase (CREON 36) [id number] units CPEP     mvw complete formulation (SOFTGELS ) capsule     olopatadine (PATANOL) 0.1 % ophthalmic solution     pantoprazole (PROTONIX) 20 MG EC tablet     sodium chloride inhalant 7 % NEBU neb solution     sulfamethoxazole-trimethoprim (BACTRIM DS) 800-160 MG tablet     tobramycin (BETHKIS) 300 MG/4ML nebulizer solution     No current facility-administered  "medications for this visit.            Physical Exam:   /69   Pulse 63   Ht 1.78 m (5' 10.08\")   Wt 73 kg (160 lb 15 oz)   SpO2 96%   BMI 23.04 kg/m      GENERAL: alert, NAD  HEENT: NCAT, EOMI, anicteric sclera, no nasal edema or erythema; canals and TMs clear; no oral mucosal edema or erythema  Neck: no cervical or supraclavicular adenopathy  Respiratory: good air flow, few scattered crackles, but mainly clear  CV: RRR, S1S2, no murmurs noted  Abdomen: normoactive BS, soft and non tender   Lymph: no edema, + digital clubbing  Neuro: AAO X 3, CN 2-12 grossly intact  Psychiatric: normal affect, good eye contact  Skin: no rash, jaundice or lesions on limited exam         Data:   All laboratory and imaging data reviewed.      Cystic Fibrosis Culture  Specimen Description   Date Value Ref Range Status   02/11/2021 Sputum  Final   09/10/2020 Sputum  Final   09/10/2020 Sputum  Final   09/10/2020 Sputum  Final   09/10/2020 Sputum  Final   09/10/2020 Sputum  Final    Culture Micro   Date Value Ref Range Status   02/11/2021 Moderate growth  Normal elmer    Final   02/11/2021 Light growth  Staphylococcus aureus   (A)  Final   02/11/2021 Moderate growth  Strain 2  Staphylococcus aureus   (A)  Final   02/11/2021 (A)  Final    Moderate growth  Achromobacter xylosoxidans/denitrificans          PFT interpretation:  Maneuver: valid and meets ATS guidelines  Moderate obstruction based on Z score  Compared to prior: FEV1 of 2.66 is 470 ml above prior  The decrease in FVC may represent air trapping v. restrictive physiology.  Lung volumes would be necessary to determine.        "

## 2023-06-22 ENCOUNTER — OFFICE VISIT (OUTPATIENT)
Dept: PULMONOLOGY | Facility: CLINIC | Age: 28
End: 2023-06-22
Attending: PHYSICIAN ASSISTANT
Payer: COMMERCIAL

## 2023-06-22 ENCOUNTER — TELEPHONE (OUTPATIENT)
Dept: PULMONOLOGY | Facility: CLINIC | Age: 28
End: 2023-06-22

## 2023-06-22 VITALS
WEIGHT: 160.94 LBS | HEART RATE: 63 BPM | SYSTOLIC BLOOD PRESSURE: 130 MMHG | OXYGEN SATURATION: 96 % | DIASTOLIC BLOOD PRESSURE: 69 MMHG | HEIGHT: 70 IN | BODY MASS INDEX: 23.04 KG/M2

## 2023-06-22 DIAGNOSIS — E84.9 CF (CYSTIC FIBROSIS) (H): Primary | ICD-10-CM

## 2023-06-22 DIAGNOSIS — E84.9 CF (CYSTIC FIBROSIS) (H): ICD-10-CM

## 2023-06-22 DIAGNOSIS — J30.2 SEASONAL ALLERGIES: ICD-10-CM

## 2023-06-22 LAB
EXPTIME-PRE: 8.59 SEC
FEF2575-%PRED-PRE: 41 %
FEF2575-PRE: 1.92 L/SEC
FEF2575-PRED: 4.58 L/SEC
FEFMAX-%PRED-PRE: 79 %
FEFMAX-PRE: 8.12 L/SEC
FEFMAX-PRED: 10.28 L/SEC
FEV1-%PRED-PRE: 61 %
FEV1-PRE: 2.66 L
FEV1FEV6-PRE: 74 %
FEV1FEV6-PRED: 84 %
FEV1FVC-PRE: 74 %
FEV1FVC-PRED: 85 %
FIFMAX-PRE: 4.16 L/SEC
FVC-%PRED-PRE: 70 %
FVC-PRE: 3.62 L
FVC-PRED: 5.13 L

## 2023-06-22 PROCEDURE — G0463 HOSPITAL OUTPT CLINIC VISIT: HCPCS | Performed by: PHYSICIAN ASSISTANT

## 2023-06-22 PROCEDURE — 87077 CULTURE AEROBIC IDENTIFY: CPT | Performed by: PHYSICIAN ASSISTANT

## 2023-06-22 PROCEDURE — 99214 OFFICE O/P EST MOD 30 MIN: CPT | Mod: 25 | Performed by: PHYSICIAN ASSISTANT

## 2023-06-22 PROCEDURE — 94375 RESPIRATORY FLOW VOLUME LOOP: CPT | Performed by: PHYSICIAN ASSISTANT

## 2023-06-22 PROCEDURE — 87070 CULTURE OTHR SPECIMN AEROBIC: CPT | Performed by: PHYSICIAN ASSISTANT

## 2023-06-22 RX ORDER — AZELASTINE 1 MG/ML
1 SPRAY, METERED NASAL DAILY
Qty: 30 ML | Refills: 11
Start: 2023-06-22

## 2023-06-22 ASSESSMENT — PAIN SCALES - GENERAL: PAINLEVEL: NO PAIN (0)

## 2023-06-22 NOTE — PATIENT INSTRUCTIONS
Cystic Fibrosis Self-Care Plan       Patient: Demario Abbasi   MRN: [id number]   Clinic Date: June 22, 2023     RECOMMENDATIONS:  1. Continue nebulizers and vest therapy. Okay to decrease to 2 vests per day after the weekend.  2. Continue course of meropenem and Bactrim.     Annual Studies:   IGG   Date Value Ref Range Status   09/10/2020 1,739 (H) 610 - 1,616 mg/dL Final     Immunoglobulin G   Date Value Ref Range Status   03/17/2022 1,453 610-1,616 mg/dL Final     Insulin   Date Value Ref Range Status   03/17/2022 16.7 3.0 - 25.0 mU/L Final   02/22/2019 Canceled, Test credited 3 - 25 mU/L Final     Comment:     Unsatisfactory specimen - hemolyzed  NOTIFIED OZ PHILLIPS MD AT 1450 ON 2/22/19 BY JV       There are no preventive care reminders to display for this patient.    Pulmonary Function Tests  FEV1: amount of air you can blow out in 1 second  FVC: total amount of air you can take in and blow out    Your Goals:             Latest Ref Rng & Units 6/22/2023     8:46 AM   PFT   FVC L 3.62  P   FEV1 L 2.66  P   FVC% % 70  P   FEV1% % 61  P      P Preliminary result          Airway Clearance: The Most Important Way to Keep Your Lungs Healthy  Vest Settings:   Hill-Rom Frequencies: 8, 9, 10 Pressure 10 Then, Frequencies 18, 19, 20 Pressure 6     RespirTech: Quick Start with Pressure of     Do each frequency for 5 minutes; Deflate vest after each frequency & cough 3 times before beginning the next setting.    Vest and Neb Therapy should be done 2 times/day.    Good Nutrition Can Improve Lung Function and Overall Health    Take ALL of your vitamins with food    Take 1/2 of your enzymes before EVERY meal/snack and the other 1/2 mid-meal/snack    Wt Readings from Last 3 Encounters:   06/22/23 73 kg (160 lb 15 oz)   06/01/23 75.8 kg (167 lb 1.7 oz)   02/08/23 74.5 kg (164 lb 4.8 oz)       Body mass index is 23.04 kg/m .         National CF Foundation Recommendations for BMI in CF Adults: Women: at least 22  Men: at least 23        Controlling Blood Sugars Helps Prevent Lung Infections & Improves Nutrition  Test blood sugar:    In the morning before eating (goal is )    2 hours after a meal (goal is less than 150)    When pre-meal glucose is greater than 150 add correction    At bedtime (if less than 100 eat a snack with 15 grams of carbohydrates  Last A1C Results:   Hemoglobin A1C   Date Value Ref Range Status   03/17/2022 5.6 0.0 - 5.6 % Final     Comment:     Normal <5.7%   Prediabetes 5.7-6.4%    Diabetes 6.5% or higher     Note: Adopted from ADA consensus guidelines.   09/10/2020 5.7 (H) 0 - 5.6 % Final     Comment:     Normal <5.7% Prediabetes 5.7-6.4%  Diabetes 6.5% or higher - adopted from ADA   consensus guidelines.           If diabetic, measure A1C every 6 months. Goal is under 7%.    Staying Healthy  Research: If you are interested in learning about research opportunities or have questions, please contact Esmer Tang at [phone] or [email or web address].    CF Foundation: Compass is a personalized resource service to help you with the insurance, financial, legal and other issues you are facing.  It's free, confidential and available to anyone with CF.  Ask your  for more information or contact Compass directly at [facility] ([phone]) or compass@cff.org, or learn more at cff.org/compass.       CF Nurse Line: Mino Rodríguez: [phone]  Margaret Werner, RT: [phone]    Saba Aparicio , Dieticians: [phone]    Ashley Cleveland, Diabetes Nurse: [phone]   Prerna Woodall: [phone] or Courtney Lomas at [phone], Social Workers  [email or web address]

## 2023-06-22 NOTE — LETTER
6/22/2023         RE: Demario Abbasi  [address]  [place] [address]        Dear Colleague,    Thank you for referring your patient, Demario Abbasi, to the [facility] [place]. Please see a copy of my visit note below.    [facility]  June 22, 2023         Assessment and Plan:   Demario Abbasi is a 28 year old male with cystic fibrosis, pancreatic insufficiency, depression, acne and impaired glucose tolerance who is s/p RUL resection for recurrent exacerbations in 2009 who is seen for follow up. He was started on meropenem and Bactrim at his last visit in early June.     1. Moderate cystic fibrosis exacerbation: feels much better and nearly back to his baseline with improved productive cough, decreased congestion and no dyspnea. Sating 96% on room air; nebs and vesting TID. PFTs today improved 470 ml to near his baseline, slightly below his best. Prior cultures + for MSSA and achromobacter. Resolving exacerbation.  - Complete course of meropenem and Bactrim (has port in place)  - Nebs and vesting, TID through the weekend, then back to BID  - On chronic ngozi nebs every other month and chronic azithromycin     2. CFTR modulation: based on his genotype, the patient is not a candidate for CFTR modulators. Prior discussions about early trials and RNA therapy and other potential options being explored for his mutations.     3. Pancreatic insufficiency: no s/s of malabsorption; stools are stable even on antibiotics. BMI > CFF goal.  - Continue enzymes and vitamins    4. CF related sinusitis:  Environmental allergies: no new symptoms, doing okay with the air quality.  - Continue nasal sprays, eye drops and Zyrtec    5. Reflux: no current issues.  - Continue pantoprazole    RTC: as scheduled with Dr. Lilly in August   Annual studies due: August 2023 (scheduled)  Preventative  care:    Lisa Lofton PA-C  Pulmonary, Allergy, Critical Care and Sleep Medicine        Interval History:     Feeling a lot better, no fever or chills, not coughing at night, less frequent, less productive of thick mucous. Congestion improved, no tightness or wheezing. No shortness of breath, nebs and vesting TID. No bloating or gas, stools have been normal. Has been doing meroepenem every 8 hours, very tired.          Review of Systems:     Please see HPI. Otherwise, complete 10 point ROS negative.           Past Medical and Surgical History:     Past Medical History:   Diagnosis Date     CF (cystic fibrosis) (H)     Genotype: [id number]/621+1g>t     Impaired glucose tolerance      Pancreatic insufficiency      S/P lobectomy of lung 8/4/2015    Right upper lobectomy - 2009     Past Surgical History:   Procedure Laterality Date     H STATISTIC PICC LINE INSERTION >5YR, FAILED Bilateral 03/15/2019    Stenosis in both arms, unable to thread catheter     HC LAP,INGUINAL HERNIA REPR,INITIAL  2002     INSERT PICC LINE Left 10/12/2020    Procedure: INSERTION, PICC @1100;  Surgeon: Felicia Branch MD;  Location: [facility] OR     INSERT PICC LINE Left 12/6/2021    Procedure: INSERTION, PICC;  Surgeon: Tahir Alvarado MD;  Location: [facility] OR     INSERT PICC LINE Left 8/31/2022    Procedure: SINGLE LUMEN INSERTION, PICC;  Surgeon: Felicia Branch MD;  Location: [facility] OR     IR CHEST PORT PLACEMENT > 5 YRS OF AGE  2/8/2023     IR PICC PLACEMENT > 5 YRS OF AGE  03/18/2019     IR PICC PLACEMENT > 5 YRS OF AGE  12/30/2019     IR PICC PLACEMENT > 5 YRS OF AGE  10/12/2020     IR PICC PLACEMENT > 5 YRS OF AGE  12/6/2021     IR PICC PLACEMENT > 5 YRS OF AGE  8/31/2022     LOBECTOMY LUNG Right 2009    Right upper lobe           Family History:     Family History   Problem Relation Age of Onset     Thyroid Disease Mother         hypothyroid     Other - See Comments Mother         multiple family members with biliary disease     Hypertension  Maternal Grandmother      Diabetes Maternal Grandfather      Hypertension Paternal Grandmother      Hypothyroidism Paternal Grandmother      Parkinsonism Paternal Grandmother      Coronary Artery Disease Early Onset Paternal Grandfather 56            Social History:     Social History     Socioeconomic History     Marital status: Single     Spouse name: Not on file     Number of children: Not on file     Years of education: Not on file     Highest education level: Not on file   Occupational History     Occupation: Sale    Tobacco Use     Smoking status: Never     Passive exposure: Yes     Smokeless tobacco: Current     Types: Chew     Tobacco comments:     dad smokes   Substance and Sexual Activity     Alcohol use: Yes     Comment: 2-3 drinks evenings, mostly weekends     Drug use: Not on file     Sexual activity: Not on file   Other Topics Concern     Parent/sibling w/ CABG, MI or angioplasty before 65F 55M? Not Asked   Social History Narrative    12/27/2019  -Graduated college in 2020.  Looking for work as a [employer].       Social Determinants of Health     Financial Resource Strain: Not on file   Food Insecurity: Not on file   Transportation Needs: Not on file   Physical Activity: Not on file   Stress: Not on file   Social Connections: Not on file   Intimate Partner Violence: Not on file   Housing Stability: Not on file            Medications:     Current Outpatient Medications   Medication     albuterol (PROAIR HFA) 108 (90 Base) MCG/ACT inhaler     albuterol (PROVENTIL) (2.5 MG/3ML) 0.083% neb solution     azelastine (ASTELIN) 0.1 % nasal spray     azithromycin (ZITHROMAX) 500 MG tablet     cetirizine (ZYRTEC) 10 MG tablet     citalopram (CELEXA) 20 MG tablet     dornase ren (PULMOZYME) 2.5 MG/2.5ML neb solution     fluticasone (FLONASE) 50 MCG/ACT nasal spray     lipase-protease-amylase (CREON 36) 04796-159796-676825 units CPEP     mvw complete formulation (SOFTGELS ) capsule      "olopatadine (PATANOL) 0.1 % ophthalmic solution     pantoprazole (PROTONIX) 20 MG EC tablet     sodium chloride inhalant 7 % NEBU neb solution     sulfamethoxazole-trimethoprim (BACTRIM DS) 800-160 MG tablet     tobramycin (BETHKIS) 300 MG/4ML nebulizer solution     No current facility-administered medications for this visit.            Physical Exam:   /69   Pulse 63   Ht 1.78 m (5' 10.08\")   Wt 73 kg (160 lb 15 oz)   SpO2 96%   BMI 23.04 kg/m      GENERAL: alert, NAD  HEENT: NCAT, EOMI, anicteric sclera, no nasal edema or erythema; canals and TMs clear; no oral mucosal edema or erythema  Neck: no cervical or supraclavicular adenopathy  Respiratory: good air flow, few scattered crackles, but mainly clear  CV: RRR, S1S2, no murmurs noted  Abdomen: normoactive BS, soft and non tender   Lymph: no edema, + digital clubbing  Neuro: AAO X 3, CN 2-12 grossly intact  Psychiatric: normal affect, good eye contact  Skin: no rash, jaundice or lesions on limited exam         Data:   All laboratory and imaging data reviewed.      Cystic Fibrosis Culture  Specimen Description   Date Value Ref Range Status   02/11/2021 Sputum  Final   09/10/2020 Sputum  Final   09/10/2020 Sputum  Final   09/10/2020 Sputum  Final   09/10/2020 Sputum  Final   09/10/2020 Sputum  Final    Culture Micro   Date Value Ref Range Status   02/11/2021 Moderate growth  Normal elmer    Final   02/11/2021 Light growth  Staphylococcus aureus   (A)  Final   02/11/2021 Moderate growth  Strain 2  Staphylococcus aureus   (A)  Final   02/11/2021 (A)  Final    Moderate growth  Achromobacter xylosoxidans/denitrificans          PFT interpretation:  Maneuver: valid and meets ATS guidelines  Moderate obstruction based on Z score  Compared to prior: FEV1 of 2.66 is 470 ml above prior  The decrease in FVC may represent air trapping v. restrictive physiology.  Lung volumes would be necessary to determine.          Again, thank you for allowing me to participate in " the care of your patient.        Sincerely,        Lisa Lofton PA-C

## 2023-06-22 NOTE — TELEPHONE ENCOUNTER
Pt to stop IV meropenem today, per Lisa, after being seen in clinic. Asked patient who helps with port. He said his mom. Mom to de-access port. [facility] informed about IV discontinuation.  Loretta Guo RN

## 2023-06-22 NOTE — NURSING NOTE
"Demario Abbasi is a 28 year old year old who is being seen for Cystic Fibrosis (CF Follow up )      Medications reviewed and Vital signs taken.    Specimen Collection Type: Sputum    Order(s) placed: CF Aerobic Bacterial    *IF AFB order placed - please enter \"PRIORITIZE AFB\" to order comments.       Lab Results   Component Value Date    ACIDFAST No acid fast bacilli seen 10/13/2022    ACIDFAST No acid fast bacilli seen 10/13/2022    ACIDFAST No acid fast bacilli seen 10/13/2022         Lab Results   Component Value Date    AFBSMS Negative for acid fast bacteria 09/10/2020    AFBSMS  09/10/2020     Less than 5ml of specimen received.  A minimum of 5 mL of sputum or fluid is recommended for recovery of acid fast bacilli   (AFB).  Volumes less than 5 mL are suboptimal and may compromise recovery of AFB from   culture.      AFBSMS  09/10/2020     Assayed at [facility] [address] [place] [id number] [phone]           Vitals were taken and medications were reconciled.    Khadra Mckoy RMA  9:15 AM      "

## 2023-06-27 LAB
BACTERIA SPT CULT: ABNORMAL
BACTERIA SPT CULT: ABNORMAL

## 2023-07-17 NOTE — ADDENDUM NOTE
Addended by: GENESIS MARTINEZ on: 4/13/2023 02:04 PM     Modules accepted: Orders    
Normal for race

## 2023-08-24 ENCOUNTER — OFFICE VISIT (OUTPATIENT)
Dept: PULMONOLOGY | Facility: CLINIC | Age: 28
End: 2023-08-24
Attending: INTERNAL MEDICINE
Payer: COMMERCIAL

## 2023-08-24 ENCOUNTER — ANCILLARY PROCEDURE (OUTPATIENT)
Dept: GENERAL RADIOLOGY | Facility: CLINIC | Age: 28
End: 2023-08-24
Attending: INTERNAL MEDICINE
Payer: COMMERCIAL

## 2023-08-24 ENCOUNTER — ALLIED HEALTH/NURSE VISIT (OUTPATIENT)
Dept: CARE COORDINATION | Facility: CLINIC | Age: 28
End: 2023-08-24

## 2023-08-24 ENCOUNTER — APPOINTMENT (OUTPATIENT)
Dept: LAB | Facility: CLINIC | Age: 28
End: 2023-08-24
Attending: INTERNAL MEDICINE
Payer: COMMERCIAL

## 2023-08-24 ENCOUNTER — ANCILLARY PROCEDURE (OUTPATIENT)
Dept: BONE DENSITY | Facility: CLINIC | Age: 28
End: 2023-08-24
Attending: INTERNAL MEDICINE
Payer: COMMERCIAL

## 2023-08-24 ENCOUNTER — INFUSION THERAPY VISIT (OUTPATIENT)
Dept: INFUSION THERAPY | Facility: CLINIC | Age: 28
End: 2023-08-24
Attending: INTERNAL MEDICINE
Payer: COMMERCIAL

## 2023-08-24 VITALS
WEIGHT: 167.6 LBS | TEMPERATURE: 97.6 F | DIASTOLIC BLOOD PRESSURE: 71 MMHG | RESPIRATION RATE: 16 BRPM | OXYGEN SATURATION: 97 % | BODY MASS INDEX: 23.99 KG/M2 | SYSTOLIC BLOOD PRESSURE: 111 MMHG | HEART RATE: 52 BPM

## 2023-08-24 VITALS
OXYGEN SATURATION: 97 % | WEIGHT: 167.6 LBS | BODY MASS INDEX: 23.99 KG/M2 | TEMPERATURE: 97.6 F | SYSTOLIC BLOOD PRESSURE: 111 MMHG | HEART RATE: 52 BPM | DIASTOLIC BLOOD PRESSURE: 71 MMHG | HEIGHT: 70 IN | RESPIRATION RATE: 16 BRPM

## 2023-08-24 DIAGNOSIS — E84.8 DIABETES MELLITUS RELATED TO CYSTIC FIBROSIS (H): ICD-10-CM

## 2023-08-24 DIAGNOSIS — Z90.2 S/P LOBECTOMY OF LUNG: ICD-10-CM

## 2023-08-24 DIAGNOSIS — E84.9 CF (CYSTIC FIBROSIS) (H): ICD-10-CM

## 2023-08-24 DIAGNOSIS — E84.9 DIABETES MELLITUS DUE TO CYSTIC FIBROSIS (H): ICD-10-CM

## 2023-08-24 DIAGNOSIS — E08.9 DIABETES MELLITUS RELATED TO CYSTIC FIBROSIS (H): ICD-10-CM

## 2023-08-24 DIAGNOSIS — R73.02 IMPAIRED GLUCOSE TOLERANCE: ICD-10-CM

## 2023-08-24 DIAGNOSIS — E08.9 DIABETES MELLITUS DUE TO CYSTIC FIBROSIS (H): ICD-10-CM

## 2023-08-24 DIAGNOSIS — E84.9 CF (CYSTIC FIBROSIS) (H): Primary | ICD-10-CM

## 2023-08-24 DIAGNOSIS — K86.89 PANCREATIC INSUFFICIENCY: ICD-10-CM

## 2023-08-24 DIAGNOSIS — Z71.9 ENCOUNTER FOR COUNSELING: Primary | ICD-10-CM

## 2023-08-24 DIAGNOSIS — K86.89 PANCREATIC INSUFFICIENCY: Primary | ICD-10-CM

## 2023-08-24 DIAGNOSIS — E84.0 CYSTIC FIBROSIS WITH PULMONARY EXACERBATION (H): Primary | ICD-10-CM

## 2023-08-24 LAB
ALBUMIN SERPL BCG-MCNC: 4.3 G/DL (ref 3.5–5.2)
ALBUMIN UR-MCNC: NEGATIVE MG/DL
ALP SERPL-CCNC: 113 U/L (ref 40–129)
ALT SERPL W P-5'-P-CCNC: 13 U/L (ref 0–70)
ANION GAP SERPL CALCULATED.3IONS-SCNC: 11 MMOL/L (ref 7–15)
APPEARANCE UR: CLEAR
AST SERPL W P-5'-P-CCNC: 21 U/L (ref 0–45)
BASOPHILS # BLD AUTO: 0.1 10E3/UL (ref 0–0.2)
BASOPHILS NFR BLD AUTO: 1 %
BILIRUB UR QL STRIP: NEGATIVE
BUN SERPL-MCNC: 12 MG/DL (ref 6–20)
CALCIUM SERPL-MCNC: 8.9 MG/DL (ref 8.6–10)
CHLORIDE SERPL-SCNC: 103 MMOL/L (ref 98–107)
CHOLEST SERPL-MCNC: 77 MG/DL
COLOR UR AUTO: NORMAL
CREAT SERPL-MCNC: 0.99 MG/DL (ref 0.67–1.17)
CREAT UR-MCNC: 107 MG/DL
DEPRECATED CALCIDIOL+CALCIFEROL SERPL-MC: 42 UG/L (ref 20–75)
DEPRECATED HCO3 PLAS-SCNC: 22 MMOL/L (ref 22–29)
EOSINOPHIL # BLD AUTO: 0.3 10E3/UL (ref 0–0.7)
EOSINOPHIL NFR BLD AUTO: 4 %
ERYTHROCYTE [DISTWIDTH] IN BLOOD BY AUTOMATED COUNT: 13 % (ref 10–15)
ERYTHROCYTE [SEDIMENTATION RATE] IN BLOOD BY WESTERGREN METHOD: 13 MM/HR (ref 0–15)
EXPTIME-PRE: 7.52 SEC
FEF2575-%PRED-PRE: 50 %
FEF2575-PRE: 2.29 L/SEC
FEF2575-PRED: 4.58 L/SEC
FEFMAX-%PRED-PRE: 71 %
FEFMAX-PRE: 7.31 L/SEC
FEFMAX-PRED: 10.28 L/SEC
FEV1-%PRED-PRE: 62 %
FEV1-PRE: 2.71 L
FEV1FEV6-PRE: 78 %
FEV1FEV6-PRED: 84 %
FEV1FVC-PRE: 78 %
FEV1FVC-PRED: 85 %
FIFMAX-PRE: 4.22 L/SEC
FVC-%PRED-PRE: 68 %
FVC-PRE: 3.5 L
FVC-PRED: 5.13 L
GFR SERPL CREATININE-BSD FRML MDRD: >90 ML/MIN/1.73M2
GGT SERPL-CCNC: 22 U/L (ref 8–61)
GLUCOSE BLDC GLUCOMTR-MCNC: 225 MG/DL (ref 70–99)
GLUCOSE SERPL-MCNC: 125 MG/DL (ref 70–99)
GLUCOSE SERPL-MCNC: 216 MG/DL (ref 70–99)
GLUCOSE SERPL-MCNC: 230 MG/DL (ref 70–99)
GLUCOSE SERPL-MCNC: 239 MG/DL (ref 70–99)
GLUCOSE SERPL-MCNC: 274 MG/DL (ref 70–99)
GLUCOSE SERPL-MCNC: 311 MG/DL (ref 70–99)
GLUCOSE UR STRIP-MCNC: NEGATIVE MG/DL
HBA1C MFR BLD: 5.9 %
HCT VFR BLD AUTO: 41.8 % (ref 40–53)
HDLC SERPL-MCNC: 37 MG/DL
HGB BLD-MCNC: 14.4 G/DL (ref 13.3–17.7)
HGB UR QL STRIP: NEGATIVE
IGG SERPL-MCNC: 1561 MG/DL (ref 610–1616)
IMM GRANULOCYTES # BLD: 0 10E3/UL
IMM GRANULOCYTES NFR BLD: 0 %
INR PPP: 1.12 (ref 0.85–1.15)
INSULIN SERPL-ACNC: 12.9 UU/ML (ref 2.6–24.9)
INSULIN SERPL-ACNC: 22 UU/ML (ref 2.6–24.9)
INSULIN SERPL-ACNC: 24.4 UU/ML (ref 2.6–24.9)
INSULIN SERPL-ACNC: 34.1 UU/ML (ref 2.6–24.9)
INSULIN SERPL-ACNC: 4.8 UU/ML (ref 2.6–24.9)
IRON SERPL-MCNC: 52 UG/DL (ref 61–157)
KETONES UR STRIP-MCNC: NEGATIVE MG/DL
LDLC SERPL CALC-MCNC: 22 MG/DL
LEUKOCYTE ESTERASE UR QL STRIP: NEGATIVE
LYMPHOCYTES # BLD AUTO: 2.2 10E3/UL (ref 0.8–5.3)
LYMPHOCYTES NFR BLD AUTO: 29 %
MAGNESIUM SERPL-MCNC: 2.2 MG/DL (ref 1.7–2.3)
MCH RBC QN AUTO: 29.6 PG (ref 26.5–33)
MCHC RBC AUTO-ENTMCNC: 34.4 G/DL (ref 31.5–36.5)
MCV RBC AUTO: 86 FL (ref 78–100)
MICROALBUMIN UR-MCNC: <12 MG/L
MICROALBUMIN/CREAT UR: NORMAL MG/G{CREAT}
MONOCYTES # BLD AUTO: 0.6 10E3/UL (ref 0–1.3)
MONOCYTES NFR BLD AUTO: 7 %
NEUTROPHILS # BLD AUTO: 4.3 10E3/UL (ref 1.6–8.3)
NEUTROPHILS NFR BLD AUTO: 59 %
NITRATE UR QL: NEGATIVE
NONHDLC SERPL-MCNC: 40 MG/DL
NRBC # BLD AUTO: 0 10E3/UL
NRBC BLD AUTO-RTO: 0 /100
PH UR STRIP: 5.5 [PH] (ref 5–7)
PHOSPHATE SERPL-MCNC: 2.9 MG/DL (ref 2.5–4.5)
PLATELET # BLD AUTO: 266 10E3/UL (ref 150–450)
POTASSIUM SERPL-SCNC: 4.1 MMOL/L (ref 3.4–5.3)
PROT SERPL-MCNC: 7.6 G/DL (ref 6.4–8.3)
RBC # BLD AUTO: 4.86 10E6/UL (ref 4.4–5.9)
RBC URINE: 2 /HPF
SODIUM SERPL-SCNC: 136 MMOL/L (ref 136–145)
SP GR UR STRIP: 1.01 (ref 1–1.03)
TRIGL SERPL-MCNC: 91 MG/DL
TSH SERPL DL<=0.005 MIU/L-ACNC: 1.88 UIU/ML (ref 0.3–4.2)
UROBILINOGEN UR STRIP-MCNC: NORMAL MG/DL
WBC # BLD AUTO: 7.4 10E3/UL (ref 4–11)
WBC URINE: <1 /HPF

## 2023-08-24 PROCEDURE — 84100 ASSAY OF PHOSPHORUS: CPT

## 2023-08-24 PROCEDURE — 84075 ASSAY ALKALINE PHOSPHATASE: CPT

## 2023-08-24 PROCEDURE — 83735 ASSAY OF MAGNESIUM: CPT

## 2023-08-24 PROCEDURE — 82947 ASSAY GLUCOSE BLOOD QUANT: CPT | Performed by: INTERNAL MEDICINE

## 2023-08-24 PROCEDURE — 82306 VITAMIN D 25 HYDROXY: CPT

## 2023-08-24 PROCEDURE — 82310 ASSAY OF CALCIUM: CPT

## 2023-08-24 PROCEDURE — 36415 COLL VENOUS BLD VENIPUNCTURE: CPT

## 2023-08-24 PROCEDURE — 82977 ASSAY OF GGT: CPT

## 2023-08-24 PROCEDURE — 84450 TRANSFERASE (AST) (SGOT): CPT

## 2023-08-24 PROCEDURE — 82962 GLUCOSE BLOOD TEST: CPT

## 2023-08-24 PROCEDURE — 82784 ASSAY IGA/IGD/IGG/IGM EACH: CPT

## 2023-08-24 PROCEDURE — G0463 HOSPITAL OUTPT CLINIC VISIT: HCPCS | Performed by: INTERNAL MEDICINE

## 2023-08-24 PROCEDURE — 84590 ASSAY OF VITAMIN A: CPT

## 2023-08-24 PROCEDURE — 84155 ASSAY OF PROTEIN SERUM: CPT

## 2023-08-24 PROCEDURE — 94375 RESPIRATORY FLOW VOLUME LOOP: CPT | Performed by: INTERNAL MEDICINE

## 2023-08-24 PROCEDURE — 84403 ASSAY OF TOTAL TESTOSTERONE: CPT

## 2023-08-24 PROCEDURE — 84446 ASSAY OF VITAMIN E: CPT

## 2023-08-24 PROCEDURE — 82947 ASSAY GLUCOSE BLOOD QUANT: CPT | Mod: 91 | Performed by: INTERNAL MEDICINE

## 2023-08-24 PROCEDURE — 85652 RBC SED RATE AUTOMATED: CPT

## 2023-08-24 PROCEDURE — 36415 COLL VENOUS BLD VENIPUNCTURE: CPT | Performed by: INTERNAL MEDICINE

## 2023-08-24 PROCEDURE — 83525 ASSAY OF INSULIN: CPT | Mod: 91 | Performed by: INTERNAL MEDICINE

## 2023-08-24 PROCEDURE — 250N000009 HC RX 250: Performed by: INTERNAL MEDICINE

## 2023-08-24 PROCEDURE — 71046 X-RAY EXAM CHEST 2 VIEWS: CPT | Mod: GC | Performed by: RADIOLOGY

## 2023-08-24 PROCEDURE — 83036 HEMOGLOBIN GLYCOSYLATED A1C: CPT

## 2023-08-24 PROCEDURE — 81001 URINALYSIS AUTO W/SCOPE: CPT

## 2023-08-24 PROCEDURE — 80061 LIPID PANEL: CPT

## 2023-08-24 PROCEDURE — 85610 PROTHROMBIN TIME: CPT

## 2023-08-24 PROCEDURE — 84443 ASSAY THYROID STIM HORMONE: CPT

## 2023-08-24 PROCEDURE — 77080 DXA BONE DENSITY AXIAL: CPT | Performed by: INTERNAL MEDICINE

## 2023-08-24 PROCEDURE — 82570 ASSAY OF URINE CREATININE: CPT

## 2023-08-24 PROCEDURE — 82785 ASSAY OF IGE: CPT

## 2023-08-24 PROCEDURE — 83540 ASSAY OF IRON: CPT

## 2023-08-24 PROCEDURE — 99214 OFFICE O/P EST MOD 30 MIN: CPT | Mod: 25 | Performed by: INTERNAL MEDICINE

## 2023-08-24 PROCEDURE — 80069 RENAL FUNCTION PANEL: CPT

## 2023-08-24 PROCEDURE — 85004 AUTOMATED DIFF WBC COUNT: CPT

## 2023-08-24 PROCEDURE — 84460 ALANINE AMINO (ALT) (SGPT): CPT

## 2023-08-24 PROCEDURE — 87070 CULTURE OTHR SPECIMN AEROBIC: CPT | Performed by: INTERNAL MEDICINE

## 2023-08-24 PROCEDURE — 83525 ASSAY OF INSULIN: CPT | Performed by: INTERNAL MEDICINE

## 2023-08-24 RX ORDER — HEPARIN SODIUM (PORCINE) LOCK FLUSH IV SOLN 100 UNIT/ML 100 UNIT/ML
5 SOLUTION INTRAVENOUS ONCE
Status: DISCONTINUED | OUTPATIENT
Start: 2023-08-24 | End: 2023-08-24 | Stop reason: HOSPADM

## 2023-08-24 RX ADMIN — ALCOHOL 75 G: 65 GEL TOPICAL at 09:17

## 2023-08-24 ASSESSMENT — PAIN SCALES - GENERAL: PAINLEVEL: NO PAIN (0)

## 2023-08-24 NOTE — NURSING NOTE
Chief Complaint   Patient presents with    Blood Draw     IV placement with blood draw by lab RN. Vitals taken and appointment arrived     Patient has urine cup with him.    Angeline Puga RN

## 2023-08-24 NOTE — PROGRESS NOTES
Demario Abbasi presents to [facility] for a glucose tolerance test.  During today's [facility] appointment orders from Zana Lilly MD were completed.    Patient NPO: YES  CF annual labs completed YES  Patient drank 75 grams glucola at 0917, within 10 minutes.      Labs drawn at baseline, +30, +60, +90 and +120 minutes post glucola.    Pt tolerated glucose tolerance test well.    Post test blood sugar 225    Patient given snack YES    Patient discharged at 1125 with father, to next appt.      Faith Anne RN    /71   Pulse 52   Temp 97.6  F (36.4  C) (Oral)   Resp 16   Wt 76 kg (167 lb 9.6 oz)   SpO2 97%   BMI 23.99 kg/m      Administrations This Visit       glucose tolerence test (GLUCOLA) 25% oral liquid 75 g       Admin Date  08/24/2023 Action  $Given Dose  75 g Route  Oral Administered By  Faith Anne, MAGGIE

## 2023-08-24 NOTE — PATIENT INSTRUCTIONS
Dear Demario Abbasi    Thank you for choosing [facility] ([facility]) for your Glucose Tolerance Test.      We look forward to seeing you at your next appointment here at [facility] ([facility]).  Please don t hesitate to call us at [phone] to reschedule any of your appointments or to speak with one of the [facility] registered nurses.  It was a pleasure taking care of you today.    Sincerely,    [facility]  [facility]  [address] [place] [address]  [id number]  Phone:  [phone]

## 2023-08-24 NOTE — PROGRESS NOTES
Adult Cystic Fibrosis Program  Social Work Clinic Consult    Data:   Met with Demario to introduce self as new  and review role and supports available. Demario was accompanied by his father, Andrew, to his regular clinic visit with Dr. Lilly today.     Demario stated that he was having a good summer and work was going well. He shared that he was running short on time and was unable to stay to complete annual Psychosocial Assessment today, but agreed to stay for this assessment during his next clinic visit. Demario stated he knew how to get in touch with SW if needed, and did not express any questions or concerns.       Intervention:  -Introduction to SW and SW role    Assessment: Demario appeared to be open and receptive to Social Work introduction and visit. Demario stated that he is aware of how to get in touch with the SW if needed and has reached out to former SW in the past for insurance questions, specifically. Demario agreed to stay for a longer visit with SW the next time he is in clinic. No concerns identified.     Plan:   -Continue to follow through regular clinic consult.  -Continue to follow for any psychosocial needs that may arise.  -Complete full psychosocial assessment annually.       Yamila Aranda [facility]  Adult Cystic Fibrosis   Ph: [phone], Pager: [phone]

## 2023-08-24 NOTE — LETTER
8/24/2023         RE: Demario Abbasi  [address]  [place] [address]        Dear Colleague,    Thank you for referring your patient, Demario Abbasi, to the [facility]. Please see a copy of my visit note below.    Reason for Visit  Demario Abbasi is a 28 year old year old male who is being seen for RECHECK (Return Cystic Fibrosis )      Assessment and plan:   Demario Abbasi is a 28 year old male with cystic fibrosis, pancreatic insufficiency, depression, acne and impaired glucose tolerance who is s/p RUL resection for recurrent exacerbations in 2009.   He last required IV antibiotics in June 2023 for a pulmonary exacerbation.    Maintenance   Modulator: Not eligible based on mutations  Mutation:  [id number]/621+1g>t  AW Clearance: Vest  Bronchodilators:  Albuterol  Mucolytics: Pulmozyme, Hypertonic saline  Antibiotics Inh:  Bethkis  Antibiotics Oral: Azithromycin  Other:  Colonization Hx: Staph aureus (MSSA), Achromobacter xylosoxidans/denitrificans   Annual Studies: Due August 2024  Contraindications to standard of care:     CF/pulmonary: In June, the patient was treated with IV meropenem for a pulmonary exacerbation with associated decrease in PFT's, darker sputum, and increased cough frequency. He reports he has since returned to baseline. Oxygenation is adequate at 97%.  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  Chest x-rays reviewed by me and compared to imaging from 18 months ago showed moderate CF-related changes but no acute infiltrate no significant change from previous. He does not appear to have a pulmonary exacerbation in clinic at this time. Continue daily vest therapy, nebs and azithromycin.      CFTR Modulation: Based on his genotype, the patient is not a candidate for CFTR modulators.       Pancreatic insufficiency: Patient denies symptoms of  malabsorption.   Vitamin levels were checked and are within normal limits.He will continue his current pancreatic enzyme replacement and vitamins.     CF related pre-diabetes: Patient had a 12 hour fasting glucose of 125. His blood sugar jumped to 311 and decreased to 236 after two hours.  This is consistent with CF-related diabetes.  In 2022, his fasting blood sugar was also high at 122, but two hours after eating, he was under 180. His hemoglobin A1c has increased from 5.6 to 5.9 over the last year.  Endocrine referral was initiated.     CF related sinusitis: No symptoms of active sinusitis at this time.       Iron deficiency anemia: Iron levels were decreased with annual studies completed today. He has had iron supplements in the past. 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      Environmental allergies: Adequately controlled with Zyrtec, Pataday, Astelin and Flonase.     Reflux: Adequately controlled with pantoprazole.     Healthcare maintenance: The patient will be due for COVID booster and influenza vaccine in the fall.  Annual studies were completed today the patient and his father.  Electrolytes are within normal limits.  Normal kidney function.  LFTs within normal limits.  Glucose tolerance test as noted above.  Lipid battery is unremarkable.  Testosterone level is decreased, will defer to endocrine.  CBC within normal limits.  DEXA, reviewed by me, bone density decreased from previous but within normal limits.  Continue vitamin D supplementation and exercise.  Annual studies due 8/2024. Annual studies notable for decreased iron, high fasting glucose.       Follow-up in 3 months with PFTs and sputum culture.  Endocrine referral.    Zana Lilly MD     CF HPI  The patient was seen and examined by Zana Lilly MD.  Demario Abbasi is a  28 year old male with cystic fibrosis, pancreatic insufficiency, depression, acne and impaired glucose tolerance who is s/p RUL resection for recurrent exacerbations in 2009.   He last required IV antibiotics in June 2023 for a pulmonary exacerbation.    IV antibiotics were discontinued in late June at his last clinic visit.  The patient reports he responded well to IV meropenem and has returned to baseline. No illnesses since and is well in clinic today.   Breathing is comfortable at rest. 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  Baseline dyspnea with heavy exertion. Baseline cough and one tablespoon green/white sputum production throughout the day. No fever, chills, or night sweats.  He continues vest therapy twice daily.    Review of Systems   Appetite is good  No ear pain, sore throat, sinus pain  No vision changes  No nausea, vomiting, diarrhea or abdominal pain.  No swollen lymph nodes  A complete ROS was otherwise negative except as noted in the HPI.    Current Outpatient Medications   Medication    albuterol (PROAIR HFA) 108 (90 Base) MCG/ACT inhaler    albuterol (PROVENTIL) (2.5 MG/3ML) 0.083% neb solution    azelastine (ASTELIN) 0.1 % nasal spray    azithromycin (ZITHROMAX) 500 MG tablet    cetirizine (ZYRTEC) 10 MG tablet    citalopram (CELEXA) 20 MG tablet    dornase ren (PULMOZYME) 2.5 MG/2.5ML neb solution    fluticasone (FLONASE) 50 MCG/ACT nasal spray    lipase-protease-amylase (CREON 36) 42637-645331-529644 units CPEP    mvw complete formulation (SOFTGELS ) capsule    olopatadine (PATANOL) 0.1 % ophthalmic solution    pantoprazole (PROTONIX) 20 MG EC tablet    sodium chloride inhalant 7 % NEBU neb solution    tobramycin (BETHKIS) 300 MG/4ML nebulizer solution     No current facility-administered medications for this visit.     Facility-Administered Medications Ordered in Other Visits   Medication    heparin 100 unit/mL injection 5 mL    saline flush for lab use ONLY     Allergies    Allergen Reactions    Amoxicillin-Pot Clavulanate Diarrhea     Past Medical History:   Diagnosis Date    CF (cystic fibrosis) (H)     Genotype: g542x/621+1g>t    Impaired glucose tolerance     Pancreatic insufficiency     S/P lobectomy of lung 8/4/2015    Right upper lobectomy - 2009       Past Surgical History:   Procedure Laterality Date    H STATISTIC PICC LINE INSERTION >5YR, FAILED Bilateral 03/15/2019    Stenosis in both arms, unable to thread catheter    HC LAP,INGUINAL HERNIA REPR,INITIAL  2002    INSERT PICC LINE Left 10/12/2020    Procedure: INSERTION, PICC @1100;  Surgeon: Felicia Branch MD;  Location: [facility] OR    INSERT PICC LINE Left 12/6/2021    Procedure: INSERTION, PICC;  Surgeon: Tahir Alvarado MD;  Location: [facility] OR    INSERT PICC LINE Left 8/31/2022    Procedure: SINGLE LUMEN INSERTION, PICC;  Surgeon: Felicia Branch MD;  Location: [facility] OR    IR CHEST PORT PLACEMENT > 5 YRS OF AGE  2/8/2023    IR PICC PLACEMENT > 5 YRS OF AGE  03/18/2019    IR PICC PLACEMENT > 5 YRS OF AGE  12/30/2019    IR PICC PLACEMENT > 5 YRS OF AGE  10/12/2020    IR PICC PLACEMENT > 5 YRS OF AGE  12/6/2021    IR PICC PLACEMENT > 5 YRS OF AGE  8/31/2022    LOBECTOMY LUNG Right 2009    Right upper lobe       Social History     Socioeconomic History    Marital status: Single     Spouse name: Not on file    Number of children: Not on file    Years of education: Not on file    Highest education level: Not on file   Occupational History    Occupation: Sale    Tobacco Use    Smoking status: Never     Passive exposure: Yes    Smokeless tobacco: Current     Types: Chew    Tobacco comments:     dad smokes   Substance and Sexual Activity    Alcohol use: Yes     Comment: 2-3 drinks evenings, mostly weekends    Drug use: Not on file    Sexual activity: Not on file   Other Topics Concern    Parent/sibling w/ CABG, MI or angioplasty before 65F 55M? Not Asked   Social History Narrative    12/27/2019  -Graduated college in  "2020.  Looking for work as a sales .       Social Determinants of Health     Financial Resource Strain: Not on file   Food Insecurity: Not on file   Transportation Needs: Not on file   Physical Activity: Not on file   Stress: Not on file   Social Connections: Not on file   Intimate Partner Violence: Not on file   Housing Stability: Not on file         /71   Pulse 52   Temp 97.6  F (36.4  C) (Oral)   Resp 16   Ht 1.78 m (5' 10.08\")   Wt 76 kg (167 lb 9.6 oz)   SpO2 97%   BMI 23.99 kg/m    Body mass index is 23.99 kg/m .  Exam:   GENERAL APPEARANCE: Well developed, well nourished, alert, and in no apparent distress.  EYES: PERRL, EOMI  HENT: Nasal mucosa with edema and no hyperemia. No nasal polyps.  EARS: Canals clear, TMs normal  MOUTH: Oral mucosa is moist, without any lesions, no tonsillar enlargement, no oropharyngeal exudate.  NECK: supple, no masses, no thyromegaly.  LYMPHATICS: No significant axillary, cervical, or supraclavicular nodes.  RESP: normal percussion, good air flow throughout.  No crackles. No rhonchi. No wheezes.  CV: Normal S1, S2, regular rhythm, normal rate. No murmur.  No rub. No gallop. No LE edema.   ABDOMEN:  Bowel sounds normal, soft, nontender, no HSM or masses.   MS: extremities normal. No clubbing. No cyanosis.  SKIN: no rash on limited exam  NEURO: Mentation intact, speech normal, normal strength and tone, normal gait and stance  PSYCH: mentation appears normal. and affect normal/bright  Results:  Recent Results (from the past 168 hour(s))   Erythrocyte sedimentation rate auto    Collection Time: 08/24/23  9:06 AM   Result Value Ref Range    Erythrocyte Sedimentation Rate 13 0 - 15 mm/hr   TSH with free T4 reflex    Collection Time: 08/24/23  9:06 AM   Result Value Ref Range    TSH 1.88 0.30 - 4.20 uIU/mL   Protein total    Collection Time: 08/24/23  9:06 AM   Result Value Ref Range    Protein Total 7.6 6.4 - 8.3 g/dL   Phosphorus    Collection Time: 08/24/23  " 9:06 AM   Result Value Ref Range    Phosphorus 2.9 2.5 - 4.5 mg/dL   Magnesium    Collection Time: 08/24/23  9:06 AM   Result Value Ref Range    Magnesium 2.2 1.7 - 2.3 mg/dL   INR    Collection Time: 08/24/23  9:06 AM   Result Value Ref Range    INR 1.12 0.85 - 1.15   IgG    Collection Time: 08/24/23  9:06 AM   Result Value Ref Range    Immunoglobulin G 1,561 610 - 1,616 mg/dL   Hemoglobin A1c    Collection Time: 08/24/23  9:06 AM   Result Value Ref Range    Hemoglobin A1C 5.9 (H) <5.7 %   GGT    Collection Time: 08/24/23  9:06 AM   Result Value Ref Range    GGT 22 8 - 61 U/L   Iron    Collection Time: 08/24/23  9:06 AM   Result Value Ref Range    Iron 52 (L) 61 - 157 ug/dL   AST    Collection Time: 08/24/23  9:06 AM   Result Value Ref Range    AST 21 0 - 45 U/L   Alkaline phosphatase    Collection Time: 08/24/23  9:06 AM   Result Value Ref Range    Alkaline Phosphatase 113 40 - 129 U/L   Albumin level    Collection Time: 08/24/23  9:06 AM   Result Value Ref Range    Albumin 4.3 3.5 - 5.2 g/dL   Lipid Profile    Collection Time: 08/24/23  9:06 AM   Result Value Ref Range    Cholesterol 77 <200 mg/dL    Triglycerides 91 <150 mg/dL    Direct Measure HDL 37 (L) >=40 mg/dL    LDL Cholesterol Calculated 22 <=100 mg/dL    Non HDL Cholesterol 40 <130 mg/dL   ALT    Collection Time: 08/24/23  9:06 AM   Result Value Ref Range    ALT 13 0 - 70 U/L   Glucose in a Series: Draw Time Zero    Collection Time: 08/24/23  9:06 AM   Result Value Ref Range    Glucose 125 (H) 70 - 99 mg/dL   Insulin in a Series: Draw Time Zero    Collection Time: 08/24/23  9:06 AM   Result Value Ref Range    Insulin 4.8 2.6 - 24.9 uU/mL   CBC with platelets and differential    Collection Time: 08/24/23  9:06 AM   Result Value Ref Range    WBC Count 7.4 4.0 - 11.0 10e3/uL    RBC Count 4.86 4.40 - 5.90 10e6/uL    Hemoglobin 14.4 13.3 - 17.7 g/dL    Hematocrit 41.8 40.0 - 53.0 %    MCV 86 78 - 100 fL    MCH 29.6 26.5 - 33.0 pg    MCHC 34.4 31.5 - 36.5  g/dL    RDW 13.0 10.0 - 15.0 %    Platelet Count 266 150 - 450 10e3/uL    % Neutrophils 59 %    % Lymphocytes 29 %    % Monocytes 7 %    % Eosinophils 4 %    % Basophils 1 %    % Immature Granulocytes 0 %    NRBCs per 100 WBC 0 <1 /100    Absolute Neutrophils 4.3 1.6 - 8.3 10e3/uL    Absolute Lymphocytes 2.2 0.8 - 5.3 10e3/uL    Absolute Monocytes 0.6 0.0 - 1.3 10e3/uL    Absolute Eosinophils 0.3 0.0 - 0.7 10e3/uL    Absolute Basophils 0.1 0.0 - 0.2 10e3/uL    Absolute Immature Granulocytes 0.0 <=0.4 10e3/uL    Absolute NRBCs 0.0 10e3/uL   Glucose in a Series: Draw +30 minutes    Collection Time: 08/24/23  9:48 AM   Result Value Ref Range    Glucose 216 (H) 70 - 99 mg/dL   Glucose in a Series: Draw +60 minutes    Collection Time: 08/24/23 10:18 AM   Result Value Ref Range    Glucose 274 (H) 70 - 99 mg/dL   Glucose in a Series: Draw +90 minutes    Collection Time: 08/24/23 10:47 AM   Result Value Ref Range    Glucose 311 (H) 70 - 99 mg/dL   Glucose by meter    Collection Time: 08/24/23 11:21 AM   Result Value Ref Range    GLUCOSE BY METER POCT 225 (H) 70 - 99 mg/dL   Glucose in a Series: Draw +120 minutes    Collection Time: 08/24/23 11:26 AM   Result Value Ref Range    Glucose 239 (H) 70 - 99 mg/dL   General PFT Lab (Please always keep checked)    Collection Time: 08/24/23 12:05 PM   Result Value Ref Range    FVC-Pred 5.13 L    FVC-Pre 3.50 L    FVC-%Pred-Pre 68 %    FEV1-Pre 2.71 L    FEV1-%Pred-Pre 62 %    FEV1FVC-Pred 85 %    FEV1FVC-Pre 78 %    FEFMax-Pred 10.28 L/sec    FEFMax-Pre 7.31 L/sec    FEFMax-%Pred-Pre 71 %    FEF2575-Pred 4.58 L/sec    FEF2575-Pre 2.29 L/sec    EMN4725-%Pred-Pre 50 %    ExpTime-Pre 7.52 sec    FIFMax-Pre 4.22 L/sec    FEV1FEV6-Pred 84 %    FEV1FEV6-Pre 78 %                   Results as noted above.    PFT Interpretation:  Moderate restrictive ventilatory defect.  Increased from previous.  Similar to recent best.  Valid Maneuver             CF Exacerbation  Absent      Scribe  Disclosure:   I, Arben Mckoy, am serving as a scribe; to document services personally performed by Dr. Zana Lilly- -based on data collection and the provider's statements to me.     Provider Disclosure:  I agree with above History, Review of Systems, Physical exam and Plan.  I have reviewed the content of the documentation and have edited it as needed. I have personally performed the services documented here and the documentation accurately represents those services and the decisions I have made.      Electronically signed by:  Dr. Zana Lilly      Again, thank you for allowing me to participate in the care of your patient.        Sincerely,        Zana Lilly MD

## 2023-08-24 NOTE — PATIENT INSTRUCTIONS
Cystic Fibrosis Self-Care Plan    RECOMMENDATIONS:   Help us provide the best possible care. If you receive a questionnaire from the CF Foundation about your clinic experience today please fill it out.  It should take less than 5 minutes. Let us know what we are doing well and how we can improve.    Start Vitron-c (Iron) 1 tablet daily  Endocrine visit to evaluate elevated blood sugars  Otherwise continue current medication, nebs and vest therapy.       Cystic Fibrosis :    Cora Butler  [phone]  [facility] Nurse line:  Felipa CAGE   [phone]     [facility] Fax Number:      [phone]         Cystic Fibrosis Respiratory Therapists:   Meghann Gonzalez                          [phone]          May Rao   [phone]  Cystic Fibrosis Dietitians:              Sherie Aparicio              [phone]                            Saba Gill                        [phone]   Cystic Fibrosis Diabetes Nurse:    Ashley Cleveland               [phone]    Cystic Fibrosis Social Workers:     Yamila Aranda              [phone]                     Courtney Fenton               [phone]  Cystic Fibrosis Pharmacists:           Hannah Orellana                              [phone] (pager)         Angeline Quinones      [phone]   Cystic Fibrosis Genetic Counselor:   Yuliana Ghotra    [phone]    [facility] website:  [email or web address]    We have recently learned about an albuterol neb solution shortage due to a manufacturing delay. There is still a small supply coming in but not enough to meet the current demand. We do not yet have an estimate for when this will become widely available again.    We our asking our CF community to do the following:    Please take time to check your supply of albuterol neb solution at home. We recommend keeping at least a 2-week supply of albuterol nebs at home in case of illness.    2.   If you have an albuterol inhaler at home, you can use 4 puffs of the inhaler with a spacer in place of the nebulized albuterol at the start of your treatments. It is important to use a spacer for the best technique. If you do not have a spacer at home or have questions on technique, we will be happy to review and send one to your home address. Please also take a moment to check that your albuterol HFA inhaler is available and not .  inhalers may be less effective as the medication loses its potency or power. In some instances your team may suggest another alternative instead of an albuterol inhaler.    3.  Please take care in requesting refills. Albuterol neb solution is a life-saving medication for patients having severe asthma attacks and other emergency respiratory conditions. Let s work together to make sure that albuterol neb solution is available to those who need it urgently.    Reach out to your care team with questions and to confirm your planned alternative for albuterol nebs. [email or web address] will be the fastest way to connect. If possible, please reserve the nursing line for more urgent concerns as we are short on nursing staff.    Here are some reputable sites with more information:    https://www.cidrap.[facility]/resilient-drug-supply/us-liquid-albuterol-ojeevzru-lkaaqzbf-omdfyt-after-major-supplier-shuts-down    https://www.[employer]//health/albuterol-shortage    https://www.ashp.org/drug-shortages/current-shortages/drug-shortage-detail.aspx?jd=012&loginreturnUrl=SSOCheckOnly       MRN: [id number]   Clinic Date: 2023   Patient: Demario Abbasi     Annual Studies:   IGG   Date Value Ref Range Status   09/10/2020 1,739 (H) 610 - 1,616 mg/dL Final     Immunoglobulin G   Date Value Ref Range Status   2023 1,561 610 - 1,616 mg/dL Final     Insulin   Date Value Ref Range Status   2023 4.8 2.6 - 24.9 uU/mL Final   2022 16.7 3.0 - 25.0 mU/L Final   2019  Canceled, Test credited 3 - 25 mU/L Final     Comment:     Unsatisfactory specimen - hemolyzed  NOTIFIED OZ PHILLIPS MD AT 1450 ON 2/22/19 BY JV       There are no preventive care reminders to display for this patient.    Pulmonary Function Tests  FEV1: amount of air you can blow out in 1 second  FVC: total amount of air you can take in and blow out    Your Goals:             Latest Ref Rng & Units 8/24/2023    12:05 PM   PFT   FVC L 3.50  P   FEV1 L 2.71  P   FVC% % 68  P   FEV1% % 62  P      P Preliminary result          Airway Clearance: The Most Important Way to Keep Your Lungs Healthy  Vest Settings:   Hill-Rom Frequencies: 8, 9, 10 Pressure 10 Then, Frequencies 18, 19, 20 Pressure 6     RespirTech: Quick Start with Pressure of     Do each frequency for 5 minutes; Deflate vest after each frequency & cough 3 times before beginning the next setting.    Vest and Neb Therapy should be done 2 times/day.    Good Nutrition Can Improve Lung Function and Overall Health    Take ALL of your vitamins with food    Take 1/2 of your enzymes before EVERY meal/snack and the other 1/2 mid-meal/snack    Wt Readings from Last 3 Encounters:   08/24/23 76 kg (167 lb 9.6 oz)   08/24/23 76 kg (167 lb 9.6 oz)   06/22/23 73 kg (160 lb 15 oz)       Body mass index is 23.99 kg/m .         National CF Foundation Recommendations for BMI in CF Adults: Women: at least 22 Men: at least 23        Controlling Blood Sugars Helps Prevent Lung Infections & Improves Nutrition  Test blood sugar:    In the morning before eating (goal is )    2 hours after a meal (goal is less than 150)    When pre-meal glucose is greater than 150 add correction    At bedtime (if less than 100 eat a snack with 15 grams of carbohydrates  Last A1C Results:   Hemoglobin A1C   Date Value Ref Range Status   08/24/2023 5.9 (H) <5.7 % Final     Comment:     Normal <5.7%   Prediabetes 5.7-6.4%    Diabetes 6.5% or higher     Note: Adopted from ADA consensus  guidelines.   09/10/2020 5.7 (H) 0 - 5.6 % Final     Comment:     Normal <5.7% Prediabetes 5.7-6.4%  Diabetes 6.5% or higher - adopted from ADA   consensus guidelines.           If diabetic, measure A1C every 6 months. Goal: Under 7%    Staying Healthy  Research:  If you are interested in learning about research opportunities or have questions, please contact the CF Research Team at [phone] or [email or web address].    CF Foundation:  Compass is a personalized resource service to help you with the insurance, financial, legal and other issues you are facing.  It's free, confidential and available to anyone with CF.  Ask your  for more information or contact [employer] directly at [phone]-OWGDXNS ([phone]) or compass@cff.org, or learn more at cff.org/compass.

## 2023-08-24 NOTE — NURSING NOTE
"Demario Abbasi is a 28 year old year old who is being seen for RECHECK (Return Cystic Fibrosis )      Medications reviewed and Vital signs taken.    Specimen Collection Type: Sputum less then 1ml    Order(s) placed: CF Aerobic Bacterial    *IF AFB order placed - please enter \"PRIORITIZE AFB\" to order comments.       Lab Results   Component Value Date    ACIDFAST No acid fast bacilli seen 10/13/2022    ACIDFAST No acid fast bacilli seen 10/13/2022    ACIDFAST No acid fast bacilli seen 10/13/2022         Lab Results   Component Value Date    AFBSMS Negative for acid fast bacteria 09/10/2020    AFBSMS  09/10/2020     Less than 5ml of specimen received.  A minimum of 5 mL of sputum or fluid is recommended for recovery of acid fast bacilli   (AFB).  Volumes less than 5 mL are suboptimal and may compromise recovery of AFB from   culture.      AFBSMS  09/10/2020     Assayed at [facility] [employer] [address] [place] [id number] [phone]           "

## 2023-08-24 NOTE — PROGRESS NOTES
Reason for Visit  Demario Abbasi is a 28 year old year old male who is being seen for RECHECK (Return Cystic Fibrosis )      Assessment and plan:   Demario Abbasi is a 28 year old male with cystic fibrosis, pancreatic insufficiency, depression, acne and impaired glucose tolerance who is s/p RUL resection for recurrent exacerbations in 2009.   He last required IV antibiotics in June 2023 for a pulmonary exacerbation.    Maintenance   Modulator: Not eligible based on mutations  Mutation:  [id number]/621+1g>t  AW Clearance: Vest  Bronchodilators:  Albuterol  Mucolytics: Pulmozyme, Hypertonic saline  Antibiotics Inh:  Bethkis  Antibiotics Oral: Azithromycin  Other:  Colonization Hx: Staph aureus (MSSA), Achromobacter xylosoxidans/denitrificans   Annual Studies: Due August 2024  Contraindications to standard of care:     CF/pulmonary: In June, the patient was treated with IV meropenem for a pulmonary exacerbation with associated decrease in PFT's, darker sputum, and increased cough frequency. He reports he has since returned to baseline. Oxygenation is adequate at 97%.  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  Chest x-rays reviewed by me and compared to imaging from 18 months ago showed moderate CF-related changes but no acute infiltrate no significant change from previous. He does not appear to have a pulmonary exacerbation in clinic at this time. Continue daily vest therapy, nebs and azithromycin.      CFTR Modulation: Based on his genotype, the patient is not a candidate for CFTR modulators.       Pancreatic insufficiency: Patient denies symptoms of malabsorption.   Vitamin levels were checked and are within normal limits.He will continue his current pancreatic enzyme replacement and vitamins.     CF related pre-diabetes: Patient had a 12 hour fasting glucose of 125. His blood sugar jumped to 311 and decreased to 236 after two hours.  This is  consistent with CF-related diabetes.  In 2022, his fasting blood sugar was also high at 122, but two hours after eating, he was under 180. His hemoglobin A1c has increased from 5.6 to 5.9 over the last year.  Endocrine referral was initiated.     CF related sinusitis: No symptoms of active sinusitis at this time.       Iron deficiency anemia: Iron levels were decreased with annual studies completed today. He has had iron supplements in the past. 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      Environmental allergies: Adequately controlled with Zyrtec, Pataday, Astelin and Flonase.     Reflux: Adequately controlled with pantoprazole.     Healthcare maintenance: The patient will be due for COVID booster and influenza vaccine in the fall.  Annual studies were completed today the patient and his father.  Electrolytes are within normal limits.  Normal kidney function.  LFTs within normal limits.  Glucose tolerance test as noted above.  Lipid battery is unremarkable.  Testosterone level is decreased, will defer to endocrine.  CBC within normal limits.  DEXA, reviewed by me, bone density decreased from previous but within normal limits.  Continue vitamin D supplementation and exercise.  Annual studies due 8/2024. Annual studies notable for decreased iron, high fasting glucose.       Follow-up in 3 months with PFTs and sputum culture.  Endocrine referral.    Zana Lilly MD     CF HPI  The patient was seen and examined by Zana Lilly MD.  Demario Abbasi is a 28 year old male with cystic fibrosis, pancreatic insufficiency, depression, acne and impaired glucose tolerance who is s/p RUL resection for recurrent exacerbations in 2009.   He last required IV antibiotics in June 2023 for a pulmonary exacerbation.    IV antibiotics were discontinued in late  June at his last clinic visit.  The patient reports he responded well to IV meropenem and has returned to baseline. No illnesses since and is well in clinic today.   Breathing is comfortable at rest. 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  Baseline dyspnea with heavy exertion. Baseline cough and one tablespoon green/white sputum production throughout the day. No fever, chills, or night sweats.  He continues vest therapy twice daily.    Review of Systems   Appetite is good  No ear pain, sore throat, sinus pain  No vision changes  No nausea, vomiting, diarrhea or abdominal pain.  No swollen lymph nodes  A complete ROS was otherwise negative except as noted in the HPI.    Current Outpatient Medications   Medication    albuterol (PROAIR HFA) 108 (90 Base) MCG/ACT inhaler    albuterol (PROVENTIL) (2.5 MG/3ML) 0.083% neb solution    azelastine (ASTELIN) 0.1 % nasal spray    azithromycin (ZITHROMAX) 500 MG tablet    cetirizine (ZYRTEC) 10 MG tablet    citalopram (CELEXA) 20 MG tablet    dornase ren (PULMOZYME) 2.5 MG/2.5ML neb solution    fluticasone (FLONASE) 50 MCG/ACT nasal spray    lipase-protease-amylase (CREON 36) 05851-855034-869505 units CPEP    mvw complete formulation (SOFTGELS ) capsule    olopatadine (PATANOL) 0.1 % ophthalmic solution    pantoprazole (PROTONIX) 20 MG EC tablet    sodium chloride inhalant 7 % NEBU neb solution    tobramycin (BETHKIS) 300 MG/4ML nebulizer solution     No current facility-administered medications for this visit.     Facility-Administered Medications Ordered in Other Visits   Medication    heparin 100 unit/mL injection 5 mL    saline flush for lab use ONLY     Allergies   Allergen Reactions    Amoxicillin-Pot Clavulanate Diarrhea     Past Medical History:   Diagnosis Date    CF (cystic fibrosis) (H)     Genotype: g542x/621+1g>t    Impaired glucose tolerance     Pancreatic insufficiency     S/P lobectomy of lung 8/4/2015    Right upper lobectomy - 2009        Past Surgical History:   Procedure Laterality Date    H STATISTIC PICC LINE INSERTION >5YR, FAILED Bilateral 03/15/2019    Stenosis in both arms, unable to thread catheter    HC LAP,INGUINAL HERNIA REPR,INITIAL  2002    INSERT PICC LINE Left 10/12/2020    Procedure: INSERTION, PICC @1100;  Surgeon: Felicia Branch MD;  Location: [facility] OR    INSERT PICC LINE Left 12/6/2021    Procedure: INSERTION, PICC;  Surgeon: Tahir Alvarado MD;  Location: [facility] OR    INSERT PICC LINE Left 8/31/2022    Procedure: SINGLE LUMEN INSERTION, PICC;  Surgeon: Felicia Branch MD;  Location: [facility] OR    IR CHEST PORT PLACEMENT > 5 YRS OF AGE  2/8/2023    IR PICC PLACEMENT > 5 YRS OF AGE  03/18/2019    IR PICC PLACEMENT > 5 YRS OF AGE  12/30/2019    IR PICC PLACEMENT > 5 YRS OF AGE  10/12/2020    IR PICC PLACEMENT > 5 YRS OF AGE  12/6/2021    IR PICC PLACEMENT > 5 YRS OF AGE  8/31/2022    LOBECTOMY LUNG Right 2009    Right upper lobe       Social History     Socioeconomic History    Marital status: Single     Spouse name: Not on file    Number of children: Not on file    Years of education: Not on file    Highest education level: Not on file   Occupational History    Occupation: Sale    Tobacco Use    Smoking status: Never     Passive exposure: Yes    Smokeless tobacco: Current     Types: Chew    Tobacco comments:     dad smokes   Substance and Sexual Activity    Alcohol use: Yes     Comment: 2-3 drinks evenings, mostly weekends    Drug use: Not on file    Sexual activity: Not on file   Other Topics Concern    Parent/sibling w/ CABG, MI or angioplasty before 65F 55M? Not Asked   Social History Narrative    12/27/2019  -Graduated college in 2020.  Looking for work as a sales [employer].       Social Determinants of Health     Financial Resource Strain: Not on file   Food Insecurity: Not on file   Transportation Needs: Not on file   Physical Activity: Not on file   Stress: Not on file   Social Connections: Not on file  "  Intimate Partner Violence: Not on file   Housing Stability: Not on file         /71   Pulse 52   Temp 97.6  F (36.4  C) (Oral)   Resp 16   Ht 1.78 m (5' 10.08\")   Wt 76 kg (167 lb 9.6 oz)   SpO2 97%   BMI 23.99 kg/m    Body mass index is 23.99 kg/m .  Exam:   GENERAL APPEARANCE: Well developed, well nourished, alert, and in no apparent distress.  EYES: PERRL, EOMI  HENT: Nasal mucosa with edema and no hyperemia. No nasal polyps.  EARS: Canals clear, TMs normal  MOUTH: Oral mucosa is moist, without any lesions, no tonsillar enlargement, no oropharyngeal exudate.  NECK: supple, no masses, no thyromegaly.  LYMPHATICS: No significant axillary, cervical, or supraclavicular nodes.  RESP: normal percussion, good air flow throughout.  No crackles. No rhonchi. No wheezes.  CV: Normal S1, S2, regular rhythm, normal rate. No murmur.  No rub. No gallop. No LE edema.   ABDOMEN:  Bowel sounds normal, soft, nontender, no HSM or masses.   MS: extremities normal. No clubbing. No cyanosis.  SKIN: no rash on limited exam  NEURO: Mentation intact, speech normal, normal strength and tone, normal gait and stance  PSYCH: mentation appears normal. and affect normal/bright  Results:  Recent Results (from the past 168 hour(s))   Erythrocyte sedimentation rate auto    Collection Time: 08/24/23  9:06 AM   Result Value Ref Range    Erythrocyte Sedimentation Rate 13 0 - 15 mm/hr   TSH with free T4 reflex    Collection Time: 08/24/23  9:06 AM   Result Value Ref Range    TSH 1.88 0.30 - 4.20 uIU/mL   Protein total    Collection Time: 08/24/23  9:06 AM   Result Value Ref Range    Protein Total 7.6 6.4 - 8.3 g/dL   Phosphorus    Collection Time: 08/24/23  9:06 AM   Result Value Ref Range    Phosphorus 2.9 2.5 - 4.5 mg/dL   Magnesium    Collection Time: 08/24/23  9:06 AM   Result Value Ref Range    Magnesium 2.2 1.7 - 2.3 mg/dL   INR    Collection Time: 08/24/23  9:06 AM   Result Value Ref Range    INR 1.12 0.85 - 1.15   IgG    " Collection Time: 08/24/23  9:06 AM   Result Value Ref Range    Immunoglobulin G 1,561 610 - 1,616 mg/dL   Hemoglobin A1c    Collection Time: 08/24/23  9:06 AM   Result Value Ref Range    Hemoglobin A1C 5.9 (H) <5.7 %   GGT    Collection Time: 08/24/23  9:06 AM   Result Value Ref Range    GGT 22 8 - 61 U/L   Iron    Collection Time: 08/24/23  9:06 AM   Result Value Ref Range    Iron 52 (L) 61 - 157 ug/dL   AST    Collection Time: 08/24/23  9:06 AM   Result Value Ref Range    AST 21 0 - 45 U/L   Alkaline phosphatase    Collection Time: 08/24/23  9:06 AM   Result Value Ref Range    Alkaline Phosphatase 113 40 - 129 U/L   Albumin level    Collection Time: 08/24/23  9:06 AM   Result Value Ref Range    Albumin 4.3 3.5 - 5.2 g/dL   Lipid Profile    Collection Time: 08/24/23  9:06 AM   Result Value Ref Range    Cholesterol 77 <200 mg/dL    Triglycerides 91 <150 mg/dL    Direct Measure HDL 37 (L) >=40 mg/dL    LDL Cholesterol Calculated 22 <=100 mg/dL    Non HDL Cholesterol 40 <130 mg/dL   ALT    Collection Time: 08/24/23  9:06 AM   Result Value Ref Range    ALT 13 0 - 70 U/L   Glucose in a Series: Draw Time Zero    Collection Time: 08/24/23  9:06 AM   Result Value Ref Range    Glucose 125 (H) 70 - 99 mg/dL   Insulin in a Series: Draw Time Zero    Collection Time: 08/24/23  9:06 AM   Result Value Ref Range    Insulin 4.8 2.6 - 24.9 uU/mL   CBC with platelets and differential    Collection Time: 08/24/23  9:06 AM   Result Value Ref Range    WBC Count 7.4 4.0 - 11.0 10e3/uL    RBC Count 4.86 4.40 - 5.90 10e6/uL    Hemoglobin 14.4 13.3 - 17.7 g/dL    Hematocrit 41.8 40.0 - 53.0 %    MCV 86 78 - 100 fL    MCH 29.6 26.5 - 33.0 pg    MCHC 34.4 31.5 - 36.5 g/dL    RDW 13.0 10.0 - 15.0 %    Platelet Count 266 150 - 450 10e3/uL    % Neutrophils 59 %    % Lymphocytes 29 %    % Monocytes 7 %    % Eosinophils 4 %    % Basophils 1 %    % Immature Granulocytes 0 %    NRBCs per 100 WBC 0 <1 /100    Absolute Neutrophils 4.3 1.6 - 8.3  10e3/uL    Absolute Lymphocytes 2.2 0.8 - 5.3 10e3/uL    Absolute Monocytes 0.6 0.0 - 1.3 10e3/uL    Absolute Eosinophils 0.3 0.0 - 0.7 10e3/uL    Absolute Basophils 0.1 0.0 - 0.2 10e3/uL    Absolute Immature Granulocytes 0.0 <=0.4 10e3/uL    Absolute NRBCs 0.0 10e3/uL   Glucose in a Series: Draw +30 minutes    Collection Time: 08/24/23  9:48 AM   Result Value Ref Range    Glucose 216 (H) 70 - 99 mg/dL   Glucose in a Series: Draw +60 minutes    Collection Time: 08/24/23 10:18 AM   Result Value Ref Range    Glucose 274 (H) 70 - 99 mg/dL   Glucose in a Series: Draw +90 minutes    Collection Time: 08/24/23 10:47 AM   Result Value Ref Range    Glucose 311 (H) 70 - 99 mg/dL   Glucose by meter    Collection Time: 08/24/23 11:21 AM   Result Value Ref Range    GLUCOSE BY METER POCT 225 (H) 70 - 99 mg/dL   Glucose in a Series: Draw +120 minutes    Collection Time: 08/24/23 11:26 AM   Result Value Ref Range    Glucose 239 (H) 70 - 99 mg/dL   General PFT Lab (Please always keep checked)    Collection Time: 08/24/23 12:05 PM   Result Value Ref Range    FVC-Pred 5.13 L    FVC-Pre 3.50 L    FVC-%Pred-Pre 68 %    FEV1-Pre 2.71 L    FEV1-%Pred-Pre 62 %    FEV1FVC-Pred 85 %    FEV1FVC-Pre 78 %    FEFMax-Pred 10.28 L/sec    FEFMax-Pre 7.31 L/sec    FEFMax-%Pred-Pre 71 %    FEF2575-Pred 4.58 L/sec    FEF2575-Pre 2.29 L/sec    FNA5577-%Pred-Pre 50 %    ExpTime-Pre 7.52 sec    FIFMax-Pre 4.22 L/sec    FEV1FEV6-Pred 84 %    FEV1FEV6-Pre 78 %                   Results as noted above.    PFT Interpretation:  Moderate restrictive ventilatory defect.  Increased from previous.  Similar to recent best.  Valid Maneuver             CF Exacerbation  Absent      Scribe Disclosure:   CARIN, Arben Mckoy, am serving as a scribe; to document services personally performed by Dr. Zana Lilly- -based on data collection and the provider's statements to me.     Provider Disclosure:  I agree with above History, Review of Systems, Physical exam and Plan.   I have reviewed the content of the documentation and have edited it as needed. I have personally performed the services documented here and the documentation accurately represents those services and the decisions I have made.      Electronically signed by:  Dr. Zana Lilly

## 2023-08-25 LAB — IGE SERPL-ACNC: 10 KU/L (ref 0–114)

## 2023-08-27 LAB
A-TOCOPHEROL VIT E SERPL-MCNC: 6.6 MG/L
ANNOTATION COMMENT IMP: NORMAL
BETA+GAMMA TOCOPHEROL SERPL-MCNC: 0.2 MG/L
RETINYL PALMITATE SERPL-MCNC: <0.02 MG/L
TESTOST SERPL-MCNC: 115 NG/DL (ref 240–950)
VIT A SERPL-MCNC: 0.43 MG/L

## 2023-08-29 LAB
BACTERIA SPT CULT: ABNORMAL

## 2023-08-31 ENCOUNTER — TELEPHONE (OUTPATIENT)
Dept: NUTRITION | Facility: CLINIC | Age: 28
End: 2023-08-31
Payer: COMMERCIAL

## 2023-08-31 NOTE — PROGRESS NOTES
Brief Clinical Nutrition Note    RDN called to review annual labs. RDN left VM and sent [employer] message with results.    Jolynn Nick MS, RDN, LDN  Cystic Fibrosis & Pulmonary Dietitian  [facility]  Voicemail [phone]

## 2023-09-15 ENCOUNTER — TELEPHONE (OUTPATIENT)
Dept: PULMONOLOGY | Facility: CLINIC | Age: 28
End: 2023-09-15
Payer: COMMERCIAL

## 2023-09-15 NOTE — TELEPHONE ENCOUNTER
Left Voicemail (1st Attempt) for the patient to call back and schedule the following:    Appointment type: RCF  Provider: JACQUELINE  Return date: 11/16/23  Specialty phone number: [phone]  Additional appointment(s) needed: PFT  Additonal Notes: N/A

## 2023-09-18 ENCOUNTER — TELEPHONE (OUTPATIENT)
Dept: PULMONOLOGY | Facility: CLINIC | Age: 28
End: 2023-09-18
Payer: COMMERCIAL

## 2023-09-18 NOTE — TELEPHONE ENCOUNTER
LVM (2nd attempt) for the patient to call back and schedule the following:    Appointment type: [facility]  Provider: JACQUELINE  Return date: NOV 2023  Specialty phone number: [phone]  Additional appointment(s) needed: CF LOOP  Additonal Notes: NA

## 2023-11-08 NOTE — TELEPHONE ENCOUNTER
[employer] does not allow more than 120 per 365 days. Patient will have to purchase over the counter. There are no PAs or appeals available. Will call pharmacy and let them know.       
Prior Authorization Retail Medication Request    Medication/Dose: pantoprazole   ICD code (if different than what is on RX):    Previously Tried and Failed:    Rationale:      Insurance Name:    Insurance ID:        Pharmacy Information (if different than what is on RX)  Name:  [facility]   Phone:  [phone]  
- - -

## 2023-12-01 ENCOUNTER — TELEPHONE (OUTPATIENT)
Dept: PULMONOLOGY | Facility: CLINIC | Age: 28
End: 2023-12-01
Payer: COMMERCIAL

## 2023-12-01 DIAGNOSIS — E84.9 CF (CYSTIC FIBROSIS) (H): Primary | ICD-10-CM

## 2023-12-01 RX ORDER — MEROPENEM 1 G/1
2 INJECTION, POWDER, FOR SOLUTION INTRAVENOUS EVERY 8 HOURS
COMMUNITY
Start: 2023-12-01 | End: 2024-01-03

## 2023-12-01 NOTE — TELEPHONE ENCOUNTER
Patient called to report new onset of left sided chest pain. Started about a week ago, was so bad on Tuesday that he went into the ER. ER providers reports xray was clear and pain was likely pleurisy. Patient started on Ibuprofen which has helped a little but pain is still there.  Reports pain as being sharp/stabbing in nature. On front and radiates to back. Past few days has noticed increase in deep cough with slight increase in green sputum. Denies any sinus symptoms. Denies and fevers. Does endorse a couple nights of night sweats over the past week.     Discussed with Dr. Lilly  Obtain viral swab (RSV, COVID, Influenza A/B sent to [facility] ph: [phone], fax: [phone])  Start IV meropenem 2g every 8 hours. V.O. sent to [facility]. Will send out meds and supplies. Patient's mother dose port acces and needle/dressing change. [facility] to fax lab orders.  Patient will continue IV abx until follow up appointment on 12/21.    Tammy George RN

## 2023-12-14 ENCOUNTER — TELEPHONE (OUTPATIENT)
Dept: PULMONOLOGY | Facility: CLINIC | Age: 28
End: 2023-12-14
Payer: COMMERCIAL

## 2023-12-14 NOTE — TELEPHONE ENCOUNTER
Attempted to contact patient re: recently elevated WBC to assess for any new infectious symptoms. No answer, left message requesting call back.    Tammy George RN

## 2023-12-20 ENCOUNTER — TELEPHONE (OUTPATIENT)
Dept: PULMONOLOGY | Facility: CLINIC | Age: 28
End: 2023-12-20
Payer: COMMERCIAL

## 2023-12-20 DIAGNOSIS — E84.9 CF (CYSTIC FIBROSIS) (H): Primary | ICD-10-CM

## 2023-12-20 DIAGNOSIS — E84.0 CYSTIC FIBROSIS WITH PULMONARY EXACERBATION (H): ICD-10-CM

## 2023-12-20 NOTE — TELEPHONE ENCOUNTER
Reviewed recent labs with elevated WBC with Dr. Lilly. Recommend rechecking CBC w/ plt and diff, CMP, Sed Rate, and CRP tomorrow. Scheduled lab draw for 2:15 before pfts. Attempted to contact patient with this information, no answer, left VM with lab appointment time.     Tammy George RN

## 2023-12-21 ENCOUNTER — LAB (OUTPATIENT)
Dept: LAB | Facility: CLINIC | Age: 28
End: 2023-12-21
Payer: COMMERCIAL

## 2023-12-21 ENCOUNTER — OFFICE VISIT (OUTPATIENT)
Dept: PULMONOLOGY | Facility: CLINIC | Age: 28
End: 2023-12-21
Attending: INTERNAL MEDICINE
Payer: COMMERCIAL

## 2023-12-21 VITALS
BODY MASS INDEX: 24.11 KG/M2 | HEART RATE: 90 BPM | DIASTOLIC BLOOD PRESSURE: 67 MMHG | WEIGHT: 168.39 LBS | SYSTOLIC BLOOD PRESSURE: 130 MMHG | HEIGHT: 70 IN | OXYGEN SATURATION: 96 %

## 2023-12-21 DIAGNOSIS — K86.89 PANCREATIC INSUFFICIENCY: ICD-10-CM

## 2023-12-21 DIAGNOSIS — E08.9 DIABETES MELLITUS RELATED TO CYSTIC FIBROSIS (H): Primary | ICD-10-CM

## 2023-12-21 DIAGNOSIS — E84.8 DIABETES MELLITUS RELATED TO CYSTIC FIBROSIS (H): ICD-10-CM

## 2023-12-21 DIAGNOSIS — E08.9 DIABETES MELLITUS DUE TO CYSTIC FIBROSIS (H): ICD-10-CM

## 2023-12-21 DIAGNOSIS — E84.0 CYSTIC FIBROSIS WITH PULMONARY EXACERBATION (H): ICD-10-CM

## 2023-12-21 DIAGNOSIS — Z90.2 S/P LOBECTOMY OF LUNG: ICD-10-CM

## 2023-12-21 DIAGNOSIS — E84.9 CYSTIC FIBROSIS EXACERBATION (H): ICD-10-CM

## 2023-12-21 DIAGNOSIS — E84.9 DIABETES MELLITUS DUE TO CYSTIC FIBROSIS (H): ICD-10-CM

## 2023-12-21 DIAGNOSIS — E84.9 CF (CYSTIC FIBROSIS) (H): ICD-10-CM

## 2023-12-21 DIAGNOSIS — E08.9 DIABETES MELLITUS RELATED TO CYSTIC FIBROSIS (H): ICD-10-CM

## 2023-12-21 DIAGNOSIS — E84.8 DIABETES MELLITUS RELATED TO CYSTIC FIBROSIS (H): Primary | ICD-10-CM

## 2023-12-21 LAB
ALBUMIN SERPL BCG-MCNC: 4.1 G/DL (ref 3.5–5.2)
ALP SERPL-CCNC: 121 U/L (ref 40–150)
ALT SERPL W P-5'-P-CCNC: 27 U/L (ref 0–70)
ANION GAP SERPL CALCULATED.3IONS-SCNC: 9 MMOL/L (ref 7–15)
AST SERPL W P-5'-P-CCNC: 24 U/L (ref 0–45)
BASOPHILS # BLD AUTO: 0.1 10E3/UL (ref 0–0.2)
BASOPHILS NFR BLD AUTO: 2 %
BILIRUB SERPL-MCNC: 0.8 MG/DL
BUN SERPL-MCNC: 12.1 MG/DL (ref 6–20)
CALCIUM SERPL-MCNC: 9.3 MG/DL (ref 8.6–10)
CHLORIDE SERPL-SCNC: 102 MMOL/L (ref 98–107)
CREAT SERPL-MCNC: 0.96 MG/DL (ref 0.67–1.17)
CRP SERPL-MCNC: 8.22 MG/L
DEPRECATED HCO3 PLAS-SCNC: 28 MMOL/L (ref 22–29)
EGFRCR SERPLBLD CKD-EPI 2021: >90 ML/MIN/1.73M2
EOSINOPHIL # BLD AUTO: 0.2 10E3/UL (ref 0–0.7)
EOSINOPHIL NFR BLD AUTO: 2 %
ERYTHROCYTE [DISTWIDTH] IN BLOOD BY AUTOMATED COUNT: 12.2 % (ref 10–15)
ERYTHROCYTE [SEDIMENTATION RATE] IN BLOOD BY WESTERGREN METHOD: 11 MM/HR (ref 0–15)
EXPTIME-PRE: 9.67 SEC
FEF2575-%PRED-PRE: 36 %
FEF2575-PRE: 1.65 L/SEC
FEF2575-PRED: 4.53 L/SEC
FEFMAX-%PRED-PRE: 76 %
FEFMAX-PRE: 7.85 L/SEC
FEFMAX-PRED: 10.29 L/SEC
FEV1-%PRED-PRE: 59 %
FEV1-PRE: 2.58 L
FEV1FEV6-PRE: 73 %
FEV1FEV6-PRED: 84 %
FEV1FVC-PRE: 72 %
FEV1FVC-PRED: 84 %
FIFMAX-PRE: 4.56 L/SEC
FVC-%PRED-PRE: 70 %
FVC-PRE: 3.58 L
FVC-PRED: 5.12 L
GLUCOSE SERPL-MCNC: 134 MG/DL (ref 70–99)
HCT VFR BLD AUTO: 42.4 % (ref 40–53)
HGB BLD-MCNC: 14.5 G/DL (ref 13.3–17.7)
IMM GRANULOCYTES # BLD: 0 10E3/UL
IMM GRANULOCYTES NFR BLD: 0 %
LYMPHOCYTES # BLD AUTO: 0.8 10E3/UL (ref 0.8–5.3)
LYMPHOCYTES NFR BLD AUTO: 11 %
MCH RBC QN AUTO: 28.9 PG (ref 26.5–33)
MCHC RBC AUTO-ENTMCNC: 34.2 G/DL (ref 31.5–36.5)
MCV RBC AUTO: 85 FL (ref 78–100)
MONOCYTES # BLD AUTO: 0.9 10E3/UL (ref 0–1.3)
MONOCYTES NFR BLD AUTO: 13 %
NEUTROPHILS # BLD AUTO: 5.2 10E3/UL (ref 1.6–8.3)
NEUTROPHILS NFR BLD AUTO: 72 %
NRBC # BLD AUTO: 0 10E3/UL
NRBC BLD AUTO-RTO: 0 /100
PLATELET # BLD AUTO: 270 10E3/UL (ref 150–450)
POTASSIUM SERPL-SCNC: 4.2 MMOL/L (ref 3.4–5.3)
PROT SERPL-MCNC: 7.6 G/DL (ref 6.4–8.3)
RBC # BLD AUTO: 5.02 10E6/UL (ref 4.4–5.9)
SODIUM SERPL-SCNC: 139 MMOL/L (ref 135–145)
WBC # BLD AUTO: 7.2 10E3/UL (ref 4–11)

## 2023-12-21 PROCEDURE — G0463 HOSPITAL OUTPT CLINIC VISIT: HCPCS | Performed by: INTERNAL MEDICINE

## 2023-12-21 PROCEDURE — 99214 OFFICE O/P EST MOD 30 MIN: CPT | Mod: 25 | Performed by: INTERNAL MEDICINE

## 2023-12-21 PROCEDURE — 85652 RBC SED RATE AUTOMATED: CPT | Performed by: PATHOLOGY

## 2023-12-21 PROCEDURE — 86140 C-REACTIVE PROTEIN: CPT | Performed by: PATHOLOGY

## 2023-12-21 PROCEDURE — 94375 RESPIRATORY FLOW VOLUME LOOP: CPT | Performed by: INTERNAL MEDICINE

## 2023-12-21 PROCEDURE — 36415 COLL VENOUS BLD VENIPUNCTURE: CPT | Performed by: PATHOLOGY

## 2023-12-21 PROCEDURE — 80053 COMPREHEN METABOLIC PANEL: CPT | Performed by: PATHOLOGY

## 2023-12-21 PROCEDURE — 85025 COMPLETE CBC W/AUTO DIFF WBC: CPT | Performed by: PATHOLOGY

## 2023-12-21 NOTE — PROGRESS NOTES
Reason for Visit  Demario Abbasi is a 28 year old year old male who is being seen for Cystic Fibrosis (F/u)      Assessment and plan:   Demario Abbasi is a 28 year old male with cystic fibrosis, pancreatic insufficiency, depression, acne and impaired glucose tolerance who is s/p RUL resection for recurrent exacerbations in 2009.   He last required IV antibiotics in June 2023 for a pulmonary exacerbation.     Maintenance   Modulator: Not eligible based on mutations  Mutation:  [id number]/621+1g>t  AW Clearance: Vest  Bronchodilators:  Albuterol  Mucolytics: Pulmozyme, Hypertonic saline  Antibiotics Inh:  Bethkis  Antibiotics Oral: Azithromycin  Other:  Colonization Hx: Staph aureus (MSSA), Achromobacter xylosoxidans/denitrificans   Annual Studies: Due August 2024  Contraindications to standard of care:     CF/pulmonary: The patient has now completed about 3 weeks of IV antibiotics for a pulmonary exacerbation.  Although he is symptomatically nearly back to baseline, PFTs remain below his recent past and he does report URI symptoms in the past 24 hours.  His WBC was elevated on 12/12 but have normalized.  CRP remains mildly elevated.  I recommended that he continue IV antibiotics for another 7 days to prevent the current upper respiratory symptoms from precipitating another exacerbation.  He will call into the CF office in 1 week and if he is symptomatically back to baseline, IV antibiotics will be discontinued.  He will continue with his current airway clearance therapy, nebs and chronic azithromycin.      CFTR Modulation: Based on his genotype, the patient is not a candidate for CFTR modulators.         Pancreatic insufficiency: The patient denies symptoms of malabsorption.  He will continue with current pancreatic enzyme replacement and supplemental vitamins.         CF related diabetes: Patient had a recent abnormal glucose tolerance test.  Endocrine referral was recommended but has not been scheduled.  It is  "possible that CF-related diabetes is playing a role in his relatively frequent exacerbations.  I have encouraged him to schedule an appointment with the endocrine clinic.       CF related sinusitis: The patient reports sinus pressure and clear rhinorrhea.  It is unclear if this represents a viral URI or a exacerbation of his sinusitis.  The current meropenem should provide adequate coverage.       Iron deficiency anemia: Hemoglobin is adequate.  Recheck hemoglobin and iron studies with annual studies next summer.      Environmental allergies: Adequately controlled with current medications, most prominent in the winter season.      Reflux: Well-controlled with current pantoprazole.       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           Healthcare maintenance: The patient has received his influenza vaccine for this flu season.  He is due for COVID booster.           Follow-up in 3 months with PFTs and sputum culture.  Schedule endocrine evaluation.     Zana Lilly MD           CF HPI  The patient was seen and examined by Zana Lilly MD   Demario Abbasi is a 28 year old male with cystic fibrosis, pancreatic insufficiency, depression, acne and impaired glucose tolerance who is s/p RUL resection for recurrent exacerbations in 2009.   He last required IV antibiotics in June 2023 for a pulmonary exacerbation.  The patient was generally well until late November when he developed chest tightness and back pain.  He initially consulted a chiropractor but then developed progressive left chest pain which was fairly severe.  He was evaluated in his local emergency room and diagnosed with \"pleurisy\".  He was treated with ibuprofen but had persistent chest pain.  He did not note any significant change in his cough or sputum but did develop fever to 101 and night sweats.  " "Meropenem was initiated for a presumed pulm exacerbation on 12/1.  Chest pain resolved about 2 days.  He has had progressive improvement, now is symptomatically back to baseline except in the past 24 hours he developed a \"head cold\" with sinus infection including nasal burning, sinus pressure and clear rhinorrhea.  No ear pain or sore throat.  Currently breathing is comfortable at rest and exercise tolerance is at baseline.  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  He reports cough and sputum production at baseline.  Cough is currently clear-white and occasionally green, about a teaspoon per day.  No hemoptysis.  No chest pain.  He is doing vest therapy twice occasionally 3 times daily.    Review of systems:  Appetite is good  ENT complaints as noted above.  No nausea, vomiting, diarrhea or abdominal pain  No myalgias or arthralgias  Denies depression or anxiety.  A complete ROS was otherwise negative except as noted in the HPI.    Current Outpatient Medications   Medication    albuterol (PROAIR HFA) 108 (90 Base) MCG/ACT inhaler    albuterol (PROVENTIL) (2.5 MG/3ML) 0.083% neb solution    azelastine (ASTELIN) 0.1 % nasal spray    azithromycin (ZITHROMAX) 500 MG tablet    cetirizine (ZYRTEC) 10 MG tablet    citalopram (CELEXA) 20 MG tablet    dornase rne (PULMOZYME) 2.5 MG/2.5ML neb solution    fluticasone (FLONASE) 50 MCG/ACT nasal spray    lipase-protease-amylase (CREON 36) 01669-402300-117476 units CPEP    meropenem (MERREM) 1 g vial    mvw complete formulation (SOFTGELS ) capsule    olopatadine (PATANOL) 0.1 % ophthalmic solution    pantoprazole (PROTONIX) 20 MG EC tablet    sodium chloride inhalant 7 % NEBU neb solution    tobramycin (BETHKIS) 300 MG/4ML nebulizer solution     No current facility-administered medications for this visit.     Allergies   Allergen Reactions    Amoxicillin-Pot Clavulanate Diarrhea     Past Medical History:   Diagnosis " Date    CF (cystic fibrosis) (H)     Genotype: g542x/621+1g>t    Impaired glucose tolerance     Pancreatic insufficiency     S/P lobectomy of lung 8/4/2015    Right upper lobectomy - 2009       Past Surgical History:   Procedure Laterality Date    H STATISTIC PICC LINE INSERTION >5YR, FAILED Bilateral 03/15/2019    Stenosis in both arms, unable to thread catheter    HC LAP,INGUINAL HERNIA REPR,INITIAL  2002    INSERT PICC LINE Left 10/12/2020    Procedure: INSERTION, PICC @1100;  Surgeon: Felicia Branch MD;  Location: [facility] OR    INSERT PICC LINE Left 12/6/2021    Procedure: INSERTION, PICC;  Surgeon: Tahir Alvarado MD;  Location: [facility] OR    INSERT PICC LINE Left 8/31/2022    Procedure: SINGLE LUMEN INSERTION, PICC;  Surgeon: Felicia Branch MD;  Location: [facility] OR    IR CHEST PORT PLACEMENT > 5 YRS OF AGE  2/8/2023    IR PICC PLACEMENT > 5 YRS OF AGE  03/18/2019    IR PICC PLACEMENT > 5 YRS OF AGE  12/30/2019    IR PICC PLACEMENT > 5 YRS OF AGE  10/12/2020    IR PICC PLACEMENT > 5 YRS OF AGE  12/6/2021    IR PICC PLACEMENT > 5 YRS OF AGE  8/31/2022    LOBECTOMY LUNG Right 2009    Right upper lobe       Social History     Socioeconomic History    Marital status: Single     Spouse name: Not on file    Number of children: Not on file    Years of education: Not on file    Highest education level: Not on file   Occupational History    Occupation: Sale    Tobacco Use    Smoking status: Never     Passive exposure: Yes    Smokeless tobacco: Current     Types: Chew    Tobacco comments:     dad smokes   Substance and Sexual Activity    Alcohol use: Yes     Comment: 2-3 drinks evenings, mostly weekends    Drug use: Not on file    Sexual activity: Not on file   Other Topics Concern    Parent/sibling w/ CABG, MI or angioplasty before 65F 55M? Not Asked   Social History Narrative    12/27/2019  -Graduated college in 2020.  Looking for work as a sales .       Social Determinants of Health     Financial  "Resource Strain: Not on file   Food Insecurity: Not on file   Transportation Needs: Not on file   Physical Activity: Not on file   Stress: Not on file   Social Connections: Not on file   Interpersonal Safety: Not on file   Housing Stability: Not on file         /67   Pulse 90   Ht 1.785 m (5' 10.28\")   Wt 76.4 kg (168 lb 6.2 oz)   SpO2 96%   BMI 23.97 kg/m    Body mass index is 23.97 kg/m .  Exam:   GENERAL APPEARANCE: Well developed, well nourished, alert, and in no apparent distress.  EYES: PERRL, EOMI  HENT: Nasal mucosa with edema and no hyperemia. No nasal polyps.  EARS: Canals clear, TMs normal  MOUTH: Oral mucosa is moist, without any lesions, no tonsillar enlargement, no oropharyngeal exudate.  NECK: supple, no masses, no thyromegaly.  LYMPHATICS: No significant axillary, cervical, or supraclavicular nodes.  RESP: normal percussion, good air flow throughout.  No crackles. No rhonchi. No wheezes.  CV: Normal S1, S2, regular rhythm, normal rate. No murmur.  No rub. No gallop. No LE edema.   ABDOMEN:  Bowel sounds normal, soft, nontender, no HSM or masses.   MS: extremities normal. No clubbing. No cyanosis.  SKIN: no rash on limited exam  NEURO: Mentation intact, speech normal, normal strength and tone, normal gait and stance  PSYCH: mentation appears normal. and affect normal/bright  Results:  Recent Results (from the past 168 hour(s))   ESR: Erythrocyte sedimentation rate    Collection Time: 12/21/23  2:08 PM   Result Value Ref Range    Erythrocyte Sedimentation Rate 11 0 - 15 mm/hr   CRP, inflammation    Collection Time: 12/21/23  2:08 PM   Result Value Ref Range    CRP Inflammation 8.22 (H) <5.00 mg/L   Comprehensive metabolic panel    Collection Time: 12/21/23  2:08 PM   Result Value Ref Range    Sodium 139 135 - 145 mmol/L    Potassium 4.2 3.4 - 5.3 mmol/L    Carbon Dioxide (CO2) 28 22 - 29 mmol/L    Anion Gap 9 7 - 15 mmol/L    Urea Nitrogen 12.1 6.0 - 20.0 mg/dL    Creatinine 0.96 0.67 - 1.17 " mg/dL    GFR Estimate >90 >60 mL/min/1.73m2    Calcium 9.3 8.6 - 10.0 mg/dL    Chloride 102 98 - 107 mmol/L    Glucose 134 (H) 70 - 99 mg/dL    Alkaline Phosphatase 121 40 - 150 U/L    AST 24 0 - 45 U/L    ALT 27 0 - 70 U/L    Protein Total 7.6 6.4 - 8.3 g/dL    Albumin 4.1 3.5 - 5.2 g/dL    Bilirubin Total 0.8 <=1.2 mg/dL   CBC with platelets and differential    Collection Time: 12/21/23  2:08 PM   Result Value Ref Range    WBC Count 7.2 4.0 - 11.0 10e3/uL    RBC Count 5.02 4.40 - 5.90 10e6/uL    Hemoglobin 14.5 13.3 - 17.7 g/dL    Hematocrit 42.4 40.0 - 53.0 %    MCV 85 78 - 100 fL    MCH 28.9 26.5 - 33.0 pg    MCHC 34.2 31.5 - 36.5 g/dL    RDW 12.2 10.0 - 15.0 %    Platelet Count 270 150 - 450 10e3/uL    % Neutrophils 72 %    % Lymphocytes 11 %    % Monocytes 13 %    % Eosinophils 2 %    % Basophils 2 %    % Immature Granulocytes 0 %    NRBCs per 100 WBC 0 <1 /100    Absolute Neutrophils 5.2 1.6 - 8.3 10e3/uL    Absolute Lymphocytes 0.8 0.8 - 5.3 10e3/uL    Absolute Monocytes 0.9 0.0 - 1.3 10e3/uL    Absolute Eosinophils 0.2 0.0 - 0.7 10e3/uL    Absolute Basophils 0.1 0.0 - 0.2 10e3/uL    Absolute Immature Granulocytes 0.0 <=0.4 10e3/uL    Absolute NRBCs 0.0 10e3/uL   General PFT Lab (Please always keep checked)    Collection Time: 12/21/23  2:21 PM   Result Value Ref Range    FVC-Pred 5.12 L    FVC-Pre 3.58 L    FVC-%Pred-Pre 70 %    FEV1-Pre 2.58 L    FEV1-%Pred-Pre 59 %    FEV1FVC-Pred 84 %    FEV1FVC-Pre 72 %    FEFMax-Pred 10.29 L/sec    FEFMax-Pre 7.85 L/sec    FEFMax-%Pred-Pre 76 %    FEF2575-Pred 4.53 L/sec    FEF2575-Pre 1.65 L/sec    UGE5863-%Pred-Pre 36 %    ExpTime-Pre 9.67 sec    FIFMax-Pre 4.56 L/sec    FEV1FEV6-Pred 84 %    FEV1FEV6-Pre 73 %                   Results as noted above.    PFT Interpretation:  Moderate obstructive ventilatory defect.  Decreased from previous.  Below recent best.   Valid Maneuver             CF Exacerbation  Moderate  Hosp admit/home IV (within 2 wks)

## 2023-12-21 NOTE — PROGRESS NOTES
CF Annual Nutrition Assessment     Reason for Assessment  Assessed during CF clinic visit with Dr. Zana Lilly r/t increased nutrition risk with diagnosis of CF per protocol.      Nutrition Significant PMH  Moderate Lung Disease- not eligible for modulator  Pancreatic Insufficient  Impaired Glucose Tolerance 3/17/22, 23     Anthropometric Assessment  Height: 175.3 cm  Weight: 76.4 kg (actual weight)/168 lbs 6.2 oz  Ideal body weight based on BMI 22 for females and 23 for males per [facility] recs: 71 kg (156 lbs)   Body mass index is 23.97 kg/m .    Wt Readings from Last 5 Encounters:   23 76.4 kg (168 lb 6.2 oz)   23 76 kg (167 lb 9.6 oz)   23 76 kg (167 lb 9.6 oz)   23 73 kg (160 lb 15 oz)   23 75.8 kg (167 lb 1.7 oz)      Comments: -175 lbs, within usual range currently.      Pancreatic Enzymes  Brand: Creon 36,000  Dosin-3 with meals, 1-2 with snacks = 1414 units lipase/kg/meal  Estimated Daily Intake: 10 caps = 4712 units lipase/kg/day     Comments: Pt self-decreased dose from 5 caps with meals to 2-3 caps in  - continues at this dose with no issues/sx of uncontrolled EPI.   Signs of Malabsorption: No - only if misses enzyme dose  Enzyme Program: Not eligible      Nutrition-Related Lab & Vitamin/Mineral Supplements  Annual studies 23  Vitamin A- 0.43 wnl  Vitamin D- 42 wnl  Vitamin E- 6.6 wnl  Iron- 52 (slightly low) - normal Hb, hematocrit   Lipid Panel- HDL 37 low     OGTT- 2023, impaired glucose tolerance/criteria for CFRD - referred to endocrinology, pt has not yet scheduled but states he intends to do this soon  DEXA - 2023, normal     Current Vitamin/Mineral Supplements: MVW Complete  1 softgel BID  Comments: obtains MVW from [facility]     Diet History and Assessment  Diet Preferences/Allergies/Intolerances: Regular    Intake Recall/Comments: Reports good appetite and intake consistently, no large change to diet/food choices or  eating pattern. Eating mostly lunch and dinner; meals typically consist of meat and potatoes Dad or Demario usually cook supper.  Denied food insecurity concerns.     Calcium: Drinks milk with meals, other dairy frequency (3+ servings)  Salt: adequate - adds to foods + meal choices  Hydration: adequate - 1 can Mtn Dew + reg Gatorade daily, water, milk  Supplements: no      NUTRITION DIAGNOSIS  Impaired nutrient utilization related to CF pancreatic insufficiency as evidenced by requires pancreatic enzyme replacement therapy and vitamin/mineral supplementation in order to maintain ideal body weight and nutrition status.      INTERVENTIONS/RECOMMENDATIONS   1) Oral Intake/Weight:   BEE: 1845  Calorie Goals- 2295-1955 kcals/day (150-175% BEE)   Protein Goals-  grams/day (1.2-1.5 g/kg)      Reviewed current nutrition status including weight trends and adequacy of oral intake with Demario. Encouraged ongoing good PO with adequate protein/calories/hydration.   Discussed prevention/treatment of hypoglycemia, as below.      2) Enzymes:  Current regimen working well, no changes to dosing or brand.     3) Vitamin/Mineral Supplementation:  Will be due for annual study labs March 2023. Plan to continue with current vitamin supplementation at this time.      4) Glucose Intolerance/Diabetes:  Reviewed results of most recent OGTT. Reviewed diet recommendations including limiting regular soda/simple sugar intake from beverages. Strategies to prevent hypoglycemia, particularly reactive hypoglycemia reviewed.  Pt demonstrated a good understanding of information discussed and verbalized his intent to schedule with CF endocrinology soon.      GOALS:  1) Maintain BMI >23 kg/m2  2) Vitamin levels wnl     FOLLOW-UP/MONITORING:  Visit patient within 12 months for annual nutrition reassessment.      Time Spent In Face-to-Face Patient Interactions: 15 minutes      Saba Gill MS, RD, LD (pager [phone])  Cystic Fibrosis/Lung Transplant  Dietitian      -Available Mon-Thurs 8 AM-4:30 PM. On Fridays please contact pager [phone] (Sherie Aparicio RD) and on weekends/holidays contact coverage dietitian at pager [phone] (inpatient use only).

## 2023-12-21 NOTE — LETTER
12/21/2023         RE: Demario Abbasi  [address]  [place] [address]        Dear Colleague,    Thank you for referring your patient, Demario Abbasi, to the [facility] FOR LUNG SCIENCE AND [facility]. Please see a copy of my visit note below.    Reason for Visit  Demario Abbasi is a 28 year old year old male who is being seen for Cystic Fibrosis (F/u)      Assessment and plan:   Demario Abbasi is a 28 year old male with cystic fibrosis, pancreatic insufficiency, depression, acne and impaired glucose tolerance who is s/p RUL resection for recurrent exacerbations in 2009.   He last required IV antibiotics in June 2023 for a pulmonary exacerbation.     Maintenance   Modulator: Not eligible based on mutations  Mutation:  [id number]/621+1g>t  AW Clearance: Vest  Bronchodilators:  Albuterol  Mucolytics: Pulmozyme, Hypertonic saline  Antibiotics Inh:  Bethkis  Antibiotics Oral: Azithromycin  Other:  Colonization Hx: Staph aureus (MSSA), Achromobacter xylosoxidans/denitrificans   Annual Studies: Due August 2024  Contraindications to standard of care:     CF/pulmonary: The patient has now completed about 3 weeks of IV antibiotics for a pulmonary exacerbation.  Although he is symptomatically nearly back to baseline, PFTs remain below his recent past and he does report URI symptoms in the past 24 hours.  His WBC was elevated on 12/12 but have normalized.  CRP remains mildly elevated.  I recommended that he continue IV antibiotics for another 7 days to prevent the current upper respiratory symptoms from precipitating another exacerbation.  He will call into the CF office in 1 week and if he is symptomatically back to baseline, IV antibiotics will be discontinued.  He will continue with his current airway clearance therapy, nebs and chronic azithromycin.      CFTR Modulation: Based on his genotype, the patient is not a candidate for CFTR modulators.         Pancreatic  insufficiency: The patient denies symptoms of malabsorption.  He will continue with current pancreatic enzyme replacement and supplemental vitamins.         CF related diabetes: Patient had a recent abnormal glucose tolerance test.  Endocrine referral was recommended but has not been scheduled.  It is possible that CF-related diabetes is playing a role in his relatively frequent exacerbations.  I have encouraged him to schedule an appointment with the endocrine clinic.       CF related sinusitis: The patient reports sinus pressure and clear rhinorrhea.  It is unclear if this represents a viral URI or a exacerbation of his sinusitis.  The current meropenem should provide adequate coverage.       Iron deficiency anemia: Hemoglobin is adequate.  Recheck hemoglobin and iron studies with annual studies next summer.      Environmental allergies: Adequately controlled with current medications, most prominent in the winter season.      Reflux: Well-controlled with current pantoprazole.       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           Healthcare maintenance: The patient has received his influenza vaccine for this flu season.  He is due for COVID booster.           Follow-up in 3 months with PFTs and sputum culture.  Schedule endocrine evaluation.     Zana Lilly MD           CF HPI  The patient was seen and examined by Zana Lilly MD   Demario Abbasi is a 28 year old male with cystic fibrosis, pancreatic insufficiency, depression, acne and impaired glucose tolerance who is s/p RUL resection for recurrent exacerbations in 2009.   He last required IV antibiotics in June 2023 for a pulmonary exacerbation.  The patient was generally well until late November when he developed chest tightness and back pain.  He initially consulted a chiropractor but then developed  "progressive left chest pain which was fairly severe.  He was evaluated in his local emergency room and diagnosed with \"pleurisy\".  He was treated with ibuprofen but had persistent chest pain.  He did not note any significant change in his cough or sputum but did develop fever to 101 and night sweats.  Meropenem was initiated for a presumed pulm exacerbation on 12/1.  Chest pain resolved about 2 days.  He has had progressive improvement, now is symptomatically back to baseline except in the past 24 hours he developed a \"head cold\" with sinus infection including nasal burning, sinus pressure and clear rhinorrhea.  No ear pain or sore throat.  Currently breathing is comfortable at rest and exercise tolerance is at baseline.  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  He reports cough and sputum production at baseline.  Cough is currently clear-white and occasionally green, about a teaspoon per day.  No hemoptysis.  No chest pain.  He is doing vest therapy twice occasionally 3 times daily.    Review of systems:  Appetite is good  ENT complaints as noted above.  No nausea, vomiting, diarrhea or abdominal pain  No myalgias or arthralgias  Denies depression or anxiety.  A complete ROS was otherwise negative except as noted in the HPI.    Current Outpatient Medications   Medication     albuterol (PROAIR HFA) 108 (90 Base) MCG/ACT inhaler     albuterol (PROVENTIL) (2.5 MG/3ML) 0.083% neb solution     azelastine (ASTELIN) 0.1 % nasal spray     azithromycin (ZITHROMAX) 500 MG tablet     cetirizine (ZYRTEC) 10 MG tablet     citalopram (CELEXA) 20 MG tablet     dornase ren (PULMOZYME) 2.5 MG/2.5ML neb solution     fluticasone (FLONASE) 50 MCG/ACT nasal spray     lipase-protease-amylase (CREON 36) [id number] units CPEP     meropenem (MERREM) 1 g vial     mvw complete formulation (SOFTGELS ) capsule     olopatadine (PATANOL) 0.1 % ophthalmic solution     " pantoprazole (PROTONIX) 20 MG EC tablet     sodium chloride inhalant 7 % NEBU neb solution     tobramycin (BETHKIS) 300 MG/4ML nebulizer solution     No current facility-administered medications for this visit.     Allergies   Allergen Reactions     Amoxicillin-Pot Clavulanate Diarrhea     Past Medical History:   Diagnosis Date     CF (cystic fibrosis) (H)     Genotype: g542x/621+1g>t     Impaired glucose tolerance      Pancreatic insufficiency      S/P lobectomy of lung 8/4/2015    Right upper lobectomy - 2009       Past Surgical History:   Procedure Laterality Date     H STATISTIC PICC LINE INSERTION >5YR, FAILED Bilateral 03/15/2019    Stenosis in both arms, unable to thread catheter     HC LAP,INGUINAL HERNIA REPR,INITIAL  2002     INSERT PICC LINE Left 10/12/2020    Procedure: INSERTION, PICC @1100;  Surgeon: Felicia Branch MD;  Location: [facility] OR     INSERT PICC LINE Left 12/6/2021    Procedure: INSERTION, PICC;  Surgeon: Tahir Alvarado MD;  Location: [facility] OR     INSERT PICC LINE Left 8/31/2022    Procedure: SINGLE LUMEN INSERTION, PICC;  Surgeon: Felicia Branch MD;  Location: [facility] OR     IR CHEST PORT PLACEMENT > 5 YRS OF AGE  2/8/2023     IR PICC PLACEMENT > 5 YRS OF AGE  03/18/2019     IR PICC PLACEMENT > 5 YRS OF AGE  12/30/2019     IR PICC PLACEMENT > 5 YRS OF AGE  10/12/2020     IR PICC PLACEMENT > 5 YRS OF AGE  12/6/2021     IR PICC PLACEMENT > 5 YRS OF AGE  8/31/2022     LOBECTOMY LUNG Right 2009    Right upper lobe       Social History     Socioeconomic History     Marital status: Single     Spouse name: Not on file     Number of children: Not on file     Years of education: Not on file     Highest education level: Not on file   Occupational History     Occupation: Sale    Tobacco Use     Smoking status: Never     Passive exposure: Yes     Smokeless tobacco: Current     Types: Chew     Tobacco comments:     dad smokes   Substance and Sexual Activity     Alcohol use: Yes     Comment:  "2-3 drinks evenings, mostly weekends     Drug use: Not on file     Sexual activity: Not on file   Other Topics Concern     Parent/sibling w/ CABG, MI or angioplasty before 65F 55M? Not Asked   Social History Narrative    12/27/2019  -Graduated college in 2020.  Looking for work as a sales .       Social Determinants of Health     Financial Resource Strain: Not on file   Food Insecurity: Not on file   Transportation Needs: Not on file   Physical Activity: Not on file   Stress: Not on file   Social Connections: Not on file   Interpersonal Safety: Not on file   Housing Stability: Not on file         /67   Pulse 90   Ht 1.785 m (5' 10.28\")   Wt 76.4 kg (168 lb 6.2 oz)   SpO2 96%   BMI 23.97 kg/m    Body mass index is 23.97 kg/m .  Exam:   GENERAL APPEARANCE: Well developed, well nourished, alert, and in no apparent distress.  EYES: PERRL, EOMI  HENT: Nasal mucosa with edema and no hyperemia. No nasal polyps.  EARS: Canals clear, TMs normal  MOUTH: Oral mucosa is moist, without any lesions, no tonsillar enlargement, no oropharyngeal exudate.  NECK: supple, no masses, no thyromegaly.  LYMPHATICS: No significant axillary, cervical, or supraclavicular nodes.  RESP: normal percussion, good air flow throughout.  No crackles. No rhonchi. No wheezes.  CV: Normal S1, S2, regular rhythm, normal rate. No murmur.  No rub. No gallop. No LE edema.   ABDOMEN:  Bowel sounds normal, soft, nontender, no HSM or masses.   MS: extremities normal. No clubbing. No cyanosis.  SKIN: no rash on limited exam  NEURO: Mentation intact, speech normal, normal strength and tone, normal gait and stance  PSYCH: mentation appears normal. and affect normal/bright  Results:  Recent Results (from the past 168 hour(s))   ESR: Erythrocyte sedimentation rate    Collection Time: 12/21/23  2:08 PM   Result Value Ref Range    Erythrocyte Sedimentation Rate 11 0 - 15 mm/hr   CRP, inflammation    Collection Time: 12/21/23  2:08 PM   Result " Value Ref Range    CRP Inflammation 8.22 (H) <5.00 mg/L   Comprehensive metabolic panel    Collection Time: 12/21/23  2:08 PM   Result Value Ref Range    Sodium 139 135 - 145 mmol/L    Potassium 4.2 3.4 - 5.3 mmol/L    Carbon Dioxide (CO2) 28 22 - 29 mmol/L    Anion Gap 9 7 - 15 mmol/L    Urea Nitrogen 12.1 6.0 - 20.0 mg/dL    Creatinine 0.96 0.67 - 1.17 mg/dL    GFR Estimate >90 >60 mL/min/1.73m2    Calcium 9.3 8.6 - 10.0 mg/dL    Chloride 102 98 - 107 mmol/L    Glucose 134 (H) 70 - 99 mg/dL    Alkaline Phosphatase 121 40 - 150 U/L    AST 24 0 - 45 U/L    ALT 27 0 - 70 U/L    Protein Total 7.6 6.4 - 8.3 g/dL    Albumin 4.1 3.5 - 5.2 g/dL    Bilirubin Total 0.8 <=1.2 mg/dL   CBC with platelets and differential    Collection Time: 12/21/23  2:08 PM   Result Value Ref Range    WBC Count 7.2 4.0 - 11.0 10e3/uL    RBC Count 5.02 4.40 - 5.90 10e6/uL    Hemoglobin 14.5 13.3 - 17.7 g/dL    Hematocrit 42.4 40.0 - 53.0 %    MCV 85 78 - 100 fL    MCH 28.9 26.5 - 33.0 pg    MCHC 34.2 31.5 - 36.5 g/dL    RDW 12.2 10.0 - 15.0 %    Platelet Count 270 150 - 450 10e3/uL    % Neutrophils 72 %    % Lymphocytes 11 %    % Monocytes 13 %    % Eosinophils 2 %    % Basophils 2 %    % Immature Granulocytes 0 %    NRBCs per 100 WBC 0 <1 /100    Absolute Neutrophils 5.2 1.6 - 8.3 10e3/uL    Absolute Lymphocytes 0.8 0.8 - 5.3 10e3/uL    Absolute Monocytes 0.9 0.0 - 1.3 10e3/uL    Absolute Eosinophils 0.2 0.0 - 0.7 10e3/uL    Absolute Basophils 0.1 0.0 - 0.2 10e3/uL    Absolute Immature Granulocytes 0.0 <=0.4 10e3/uL    Absolute NRBCs 0.0 10e3/uL   General PFT Lab (Please always keep checked)    Collection Time: 12/21/23  2:21 PM   Result Value Ref Range    FVC-Pred 5.12 L    FVC-Pre 3.58 L    FVC-%Pred-Pre 70 %    FEV1-Pre 2.58 L    FEV1-%Pred-Pre 59 %    FEV1FVC-Pred 84 %    FEV1FVC-Pre 72 %    FEFMax-Pred 10.29 L/sec    FEFMax-Pre 7.85 L/sec    FEFMax-%Pred-Pre 76 %    FEF2575-Pred 4.53 L/sec    FEF2575-Pre 1.65 L/sec    WMS3876-%Pred-Pre  36 %    ExpTime-Pre 9.67 sec    FIFMax-Pre 4.56 L/sec    FEV1FEV6-Pred 84 %    FEV1FEV6-Pre 73 %                   Results as noted above.    PFT Interpretation:  Moderate obstructive ventilatory defect.  Decreased from previous.  Below recent best.   Valid Maneuver             CF Exacerbation  Moderate  Hosp admit/home IV (within 2 wks)          CF Annual Nutrition Assessment     Reason for Assessment  Assessed during CF clinic visit with Dr. Zana Lilly r/t increased nutrition risk with diagnosis of CF per protocol.      Nutrition Significant PMH  Moderate Lung Disease- not eligible for modulator  Pancreatic Insufficient  Impaired Glucose Tolerance 3/17/22, 23     Anthropometric Assessment  Height: 175.3 cm  Weight: 76.4 kg (actual weight)/168 lbs 6.2 oz  Ideal body weight based on BMI 22 for females and 23 for males per CF Foundation recs: 71 kg (156 lbs)   Body mass index is 23.97 kg/m .    Wt Readings from Last 5 Encounters:   23 76.4 kg (168 lb 6.2 oz)   23 76 kg (167 lb 9.6 oz)   23 76 kg (167 lb 9.6 oz)   23 73 kg (160 lb 15 oz)   23 75.8 kg (167 lb 1.7 oz)      Comments: -175 lbs, within usual range currently.      Pancreatic Enzymes  Brand: Creon 36,000  Dosin-3 with meals, 1-2 with snacks = 1414 units lipase/kg/meal  Estimated Daily Intake: 10 caps = 4712 units lipase/kg/day     Comments: Pt self-decreased dose from 5 caps with meals to 2-3 caps in  - continues at this dose with no issues/sx of uncontrolled EPI.   Signs of Malabsorption: No - only if misses enzyme dose  Enzyme Program: Not eligible      Nutrition-Related Lab & Vitamin/Mineral Supplements  Annual studies 23  Vitamin A- 0.43 wnl  Vitamin D- 42 wnl  Vitamin E- 6.6 wnl  Iron- 52 (slightly low) - normal Hb, hematocrit   Lipid Panel- HDL 37 low     OGTT- 2023, impaired glucose tolerance/criteria for CFRD - referred to endocrinology, pt has not yet scheduled but states he  intends to do this soon  DEXA - August 2023, normal     Current Vitamin/Mineral Supplements: MVW Complete  1 softgel BID  Comments: obtains MVW from [facility]     Diet History and Assessment  Diet Preferences/Allergies/Intolerances: Regular    Intake Recall/Comments: Reports good appetite and intake consistently, no large change to diet/food choices or eating pattern. Eating mostly lunch and dinner; meals typically consist of meat and potatoes Dad or Demario usually cook supper.  Denied food insecurity concerns.     Calcium: Drinks milk with meals, other dairy frequency (3+ servings)  Salt: adequate - adds to foods + meal choices  Hydration: adequate - 1 can Mtn Dew + reg Gatorade daily, water, milk  Supplements: no      NUTRITION DIAGNOSIS  Impaired nutrient utilization related to CF pancreatic insufficiency as evidenced by requires pancreatic enzyme replacement therapy and vitamin/mineral supplementation in order to maintain ideal body weight and nutrition status.      INTERVENTIONS/RECOMMENDATIONS   1) Oral Intake/Weight:   BEE: 1845  Calorie Goals- 3461-3302 kcals/day (150-175% BEE)   Protein Goals-  grams/day (1.2-1.5 g/kg)      Reviewed current nutrition status including weight trends and adequacy of oral intake with Demario. Encouraged ongoing good PO with adequate protein/calories/hydration.   Discussed prevention/treatment of hypoglycemia, as below.      2) Enzymes:  Current regimen working well, no changes to dosing or brand.     3) Vitamin/Mineral Supplementation:  Will be due for annual study labs March 2023. Plan to continue with current vitamin supplementation at this time.      4) Glucose Intolerance/Diabetes:  Reviewed results of most recent OGTT. Reviewed diet recommendations including limiting regular soda/simple sugar intake from beverages. Strategies to prevent hypoglycemia, particularly reactive hypoglycemia reviewed.  Pt demonstrated a good understanding of information discussed and verbalized  his intent to schedule with CF endocrinology soon.      GOALS:  1) Maintain BMI >23 kg/m2  2) Vitamin levels wnl     FOLLOW-UP/MONITORING:  Visit patient within 12 months for annual nutrition reassessment.      Time Spent In Face-to-Face Patient Interactions: 15 minutes      Saba Gill MS, RD, LD (pager [phone])  Cystic Fibrosis/Lung Transplant Dietitian      -Available Mon-Thurs 8 AM-4:30 PM. On Fridays please contact pager [phone] (Sherie Aparicio RD) and on weekends/holidays contact coverage dietitian at pager [phone] (inpatient use only).       Again, thank you for allowing me to participate in the care of your patient.        Sincerely,        Zana Lilly MD

## 2023-12-21 NOTE — PATIENT INSTRUCTIONS
Cystic Fibrosis Self-Care Plan    RECOMMENDATIONS:   Help us provide the best possible care. If you receive a questionnaire from the CF Foundation about your clinic experience today please fill it out.  It should take less than 5 minutes. Let us know what we are doing well and how we can improve.    Continue meropenem for 1 more week.  Call the CF office next Wednesday to let us know how you are feeling.  Otherwise continue current medication, nebs and vest therapy.         Cystic Fibrosis :    Cora Butler  [phone]  [facility] Nurse line:  Felipa CAGE   [phone]     [facility] Fax Number:      [phone]         Cystic Fibrosis Respiratory Therapists:   Meghann Gonzalez                          [phone]          May Rao   [phone]  Cystic Fibrosis Dietitians:              Sherie Aparicio              [phone]                            Saba Gill                        [phone]   Cystic Fibrosis Diabetes Nurse:    Ashley Cleveland               [phone]    Cystic Fibrosis Social Workers:     Yamila Aranda              [phone]                     Courtney Fenton               [phone]  Cystic Fibrosis Pharmacists:           Hannah Orellana                              [phone] (pager)         Angeline Quinones      [phone]   Cystic Fibrosis Genetic Counselor:   Yuliana Ghotra    [phone]    [facility] website:  [email or web address]    We have recently learned about an albuterol neb solution shortage due to a manufacturing delay. There is still a small supply coming in but not enough to meet the current demand. We do not yet have an estimate for when this will become widely available again.    We our asking our CF community to do the following:    Please take time to check your supply of albuterol neb solution at home. We recommend keeping at least a 2-week supply of albuterol nebs at home in  case of illness.    2.  If you have an albuterol inhaler at home, you can use 4 puffs of the inhaler with a spacer in place of the nebulized albuterol at the start of your treatments. It is important to use a spacer for the best technique. If you do not have a spacer at home or have questions on technique, we will be happy to review and send one to your home address. Please also take a moment to check that your albuterol HFA inhaler is available and not .  inhalers may be less effective as the medication loses its potency or power. In some instances your team may suggest another alternative instead of an albuterol inhaler.    3.  Please take care in requesting refills. Albuterol neb solution is a life-saving medication for patients having severe asthma attacks and other emergency respiratory conditions. Let s work together to make sure that albuterol neb solution is available to those who need it urgently.    Reach out to your care team with questions and to confirm your planned alternative for albuterol nebs. [employer] will be the fastest way to connect. If possible, please reserve the nursing line for more urgent concerns as we are short on nursing staff.    Here are some reputable sites with more information:    https://www.cidrap.[facility].edu/resilient-drug-supply/us-liquid-albuterol-ibpxqqmw-bjhcvwmo-dzmegw-after-major-supplier-shuts-down    https://www.[employer]//health/albuterol-shortage    https://www.ashp.org/drug-shortages/current-shortages/drug-shortage-detail.aspx?yh=333&loginreturnUrl=SSOCheckOnly       MRN: [id number]   Clinic Date: 2023   Patient: Demario Abbasi     Annual Studies:   IGG   Date Value Ref Range Status   09/10/2020 1,739 (H) 610 - 1,616 mg/dL Final     Immunoglobulin G   Date Value Ref Range Status   2023 1,561 610 - 1,616 mg/dL Final     Insulin   Date Value Ref Range Status   2023 22.0 2.6 - 24.9 uU/mL Final   2022 16.7 3.0 - 25.0  mU/L Final   02/22/2019 Canceled, Test credited 3 - 25 mU/L Final     Comment:     Unsatisfactory specimen - hemolyzed  NOTIFIED OZ PHILLIPS MD AT 1450 ON 2/22/19 BY JV       There are no preventive care reminders to display for this patient.    Pulmonary Function Tests  FEV1: amount of air you can blow out in 1 second  FVC: total amount of air you can take in and blow out    Your Goals:             Latest Ref Rng & Units 12/21/2023     2:21 PM   PFT   FVC L 3.58  P   FEV1 L 2.58  P   FVC% % 70  P   FEV1% % 59  P      P Preliminary result          Airway Clearance: The Most Important Way to Keep Your Lungs Healthy  Vest Settings:   Hill-Rom Frequencies: 8, 9, 10 Pressure 10 Then, Frequencies 18, 19, 20 Pressure 6     [employer]pirTech: Quick Start with Pressure of     Do each frequency for 5 minutes; Deflate vest after each frequency & cough 3 times before beginning the next setting.    Vest and Neb Therapy should be done 2-3 times/day.    Good Nutrition Can Improve Lung Function and Overall Health    Take ALL of your vitamins with food    Take 1/2 of your enzymes before EVERY meal/snack and the other 1/2 mid-meal/snack    Wt Readings from Last 3 Encounters:   12/21/23 76.4 kg (168 lb 6.2 oz)   08/24/23 76 kg (167 lb 9.6 oz)   08/24/23 76 kg (167 lb 9.6 oz)       Body mass index is 23.97 kg/m .         National CF Foundation Recommendations for BMI in CF Adults: Women: at least 22 Men: at least 23        Controlling Blood Sugars Helps Prevent Lung Infections & Improves Nutrition  Test blood sugar:    In the morning before eating (goal is )    2 hours after a meal (goal is less than 150)    When pre-meal glucose is greater than 150 add correction    At bedtime (if less than 100 eat a snack with 15 grams of carbohydrates  Last A1C Results:   Hemoglobin A1C   Date Value Ref Range Status   08/24/2023 5.9 (H) <5.7 % Final     Comment:     Normal <5.7%   Prediabetes 5.7-6.4%    Diabetes 6.5% or higher     Note:  Adopted from ADA consensus guidelines.   09/10/2020 5.7 (H) 0 - 5.6 % Final     Comment:     Normal <5.7% Prediabetes 5.7-6.4%  Diabetes 6.5% or higher - adopted from ADA   consensus guidelines.           If diabetic, measure A1C every 6 months. Goal: Under 7%    Staying Healthy  Research:  If you are interested in learning about research opportunities or have questions, please contact the CF Research Team at [phone] or [email or web address].    CF Foundation:  Compass is a personalized resource service to help you with the insurance, financial, legal and other issues you are facing.  It's free, confidential and available to anyone with CF.  Ask your  for more information or contact Compass directly at 271-IWXLGDM ([phone]) or compass@cff.org, or learn more at cff.org/compass.

## 2023-12-21 NOTE — NURSING NOTE
RN reached out to [employer] with order to extend IV kulwinder another week. Patient to call CF office with update. If feeling better, OK to discontinue.     MAGGIE Singletary

## 2023-12-28 ENCOUNTER — TELEPHONE (OUTPATIENT)
Dept: PULMONOLOGY | Facility: CLINIC | Age: 28
End: 2023-12-28
Payer: COMMERCIAL

## 2023-12-28 NOTE — TELEPHONE ENCOUNTER
Patient seen in clinic 12/21 with plan to extend IV meropenem another week. Patient instructed to call office with update. If back to baseline, OK to discontinue IV antibiotics.    RN sent message to patient and left voicemail.     Once patient responds, RN will reach out to [employer] with plan.     MAGGIE Singletary

## 2023-12-29 NOTE — TELEPHONE ENCOUNTER
Patient reports feeling back to baseline. [facility] had sent out additional supply of IV Meropenem through 1/3 then we will discontinue and deaccess port with plan to stay on service for line care supplies.    MAGGIE Singletary

## 2024-01-27 ENCOUNTER — HEALTH MAINTENANCE LETTER (OUTPATIENT)
Age: 29
End: 2024-01-27

## 2024-03-26 DIAGNOSIS — E84.9 CF (CYSTIC FIBROSIS) (H): ICD-10-CM

## 2024-03-26 DIAGNOSIS — E84.9 CYSTIC FIBROSIS (H): ICD-10-CM

## 2024-03-26 DIAGNOSIS — K86.89 PANCREATIC INSUFFICIENCY: ICD-10-CM

## 2024-03-26 RX ORDER — TOBRAMYCIN INHALATION 300 MG/4ML
300 SOLUTION RESPIRATORY (INHALATION) 2 TIMES DAILY
Qty: 224 ML | Refills: 5 | Status: SHIPPED | OUTPATIENT
Start: 2024-03-26

## 2024-03-26 RX ORDER — PEDIATRIC MULTIVIT 61/D3/VIT K 1500-800
CAPSULE ORAL
Qty: 60 CAPSULE | Refills: 11 | Status: SHIPPED | OUTPATIENT
Start: 2024-03-26

## 2024-05-02 DIAGNOSIS — E84.0 CYSTIC FIBROSIS WITH PULMONARY MANIFESTATIONS (H): ICD-10-CM

## 2024-05-02 DIAGNOSIS — J30.2 SEASONAL ALLERGIES: ICD-10-CM

## 2024-05-02 DIAGNOSIS — E84.9 CF (CYSTIC FIBROSIS) (H): ICD-10-CM

## 2024-05-02 RX ORDER — CETIRIZINE HYDROCHLORIDE 10 MG/1
10 TABLET ORAL DAILY
Qty: 30 TABLET | Refills: 10 | Status: SHIPPED | OUTPATIENT
Start: 2024-05-02

## 2024-05-02 RX ORDER — PANTOPRAZOLE SODIUM 20 MG/1
20 TABLET, DELAYED RELEASE ORAL DAILY
Qty: 30 TABLET | Refills: 10 | Status: SHIPPED | OUTPATIENT
Start: 2024-05-02

## 2024-05-02 RX ORDER — ALBUTEROL SULFATE 0.83 MG/ML
SOLUTION RESPIRATORY (INHALATION)
Qty: 270 ML | Refills: 10 | Status: SHIPPED | OUTPATIENT
Start: 2024-05-02

## 2024-05-02 RX ORDER — AZITHROMYCIN 500 MG/1
TABLET, FILM COATED ORAL
Qty: 12 TABLET | Refills: 10 | Status: SHIPPED | OUTPATIENT
Start: 2024-05-02

## 2024-05-21 ENCOUNTER — OFFICE VISIT (OUTPATIENT)
Dept: ENDOCRINOLOGY | Facility: CLINIC | Age: 29
End: 2024-05-21
Attending: INTERNAL MEDICINE
Payer: COMMERCIAL

## 2024-05-21 ENCOUNTER — OFFICE VISIT (OUTPATIENT)
Dept: EDUCATION SERVICES | Facility: CLINIC | Age: 29
End: 2024-05-21
Payer: COMMERCIAL

## 2024-05-21 VITALS — OXYGEN SATURATION: 96 % | HEART RATE: 71 BPM | DIASTOLIC BLOOD PRESSURE: 64 MMHG | SYSTOLIC BLOOD PRESSURE: 108 MMHG

## 2024-05-21 DIAGNOSIS — E84.8 DIABETES MELLITUS RELATED TO CYSTIC FIBROSIS (H): Primary | ICD-10-CM

## 2024-05-21 DIAGNOSIS — E08.9 DIABETES MELLITUS RELATED TO CYSTIC FIBROSIS (H): Primary | ICD-10-CM

## 2024-05-21 PROCEDURE — G0108 DIAB MANAGE TRN  PER INDIV: HCPCS | Performed by: NUTRITIONIST

## 2024-05-21 PROCEDURE — 99204 OFFICE O/P NEW MOD 45 MIN: CPT | Performed by: INTERNAL MEDICINE

## 2024-05-21 PROCEDURE — 99211 OFF/OP EST MAY X REQ PHY/QHP: CPT | Performed by: INTERNAL MEDICINE

## 2024-05-21 RX ORDER — BLOOD-GLUCOSE SENSOR
1 EACH MISCELLANEOUS
Qty: 2 EACH | Refills: 5 | Status: SHIPPED | OUTPATIENT
Start: 2024-05-21

## 2024-05-21 NOTE — PROGRESS NOTES
Diabetes Self-Management Education & Support    Demario Abbasi presents today for education related to Cystic Fibrosis Related Diabetes (CFRD)    Patient is being treated with:  Diet  He is accompanied by father    Year of diagnosis: 2024  Referring provider:  Tay      PATIENT CONCERNS/REASON FOR REFERRAL new diagnosis   CGM / SMBG  Carb counting        ASSESSMENT:    Taking Medication:     Current Diabetes Management per Patient:  Taking diabetes medications? no    Monitoring    SMBG and CGM taught today    Patient's most recent   Lab Results   Component Value Date    A1C 5.9 08/24/2023    A1C 5.7 09/10/2020      Patient's A1C goal: <7.0    Healthy Eating:        Problem Solving:      Patient is at risk of hypoglycemia?: NO  Hospitalizations for hyper or hypoglycemia: No    EDUCATION and INSTRUCTION PROVIDED AT THIS VISIT:    Education provided today on SMBG with a meter including technique shown to patient.  CGM - see AVS. Erica 3 started successfully. Patient connected to [employer]    Reviewed carb counting and consistent carb diet with patient.    Showed patient how to inject insulin using demo pen, in case MD recommends insulin start. Also discussed long versus rapid acting insulin and function of each.    Patient-stated goal written and given to Demario Abbasi.  Verbalized and demonstrated understanding of instructions.     PLAN:    See patient instructions  AVS printed and given to patient    FOLLOW-UP:        Time spent with patient at today's visit was 60 minutes.      Any diabetes medication dose changes were made via the CDE Protocol and Collaborative Practice Agreement with [place] and  [facility].  A copy of this encounter was provided to patient's referring provider.

## 2024-05-21 NOTE — PATIENT INSTRUCTIONS
Parts of your Erica Sensor        Sensor and reader.                                     Cell phone andrew and sensor.      1. Plan to wear your Erica sensor for 14-days.  The reader/andrew will tell you when your sensor session is ending.  A one month supply is two sensors.    2. There is a one-hour warm up period each time you insert a new sensor.  You will not get any glucose data during this time.    4. When your glucose result is on the screen of the reader you will see a pencil in the upper right corner of the screen.  If you press the pencil it will take you to a screen where you can check boxes for insulin and or food.  This will allow your healthcare team to see when the food and/or insulin go in.     5. Andrew users can hook to our clinic portal using the code: [id number].  You cannot hook to our portal if you use a reader but you can upload your reader to [employer].    6. If you have any issues with keeping the Erica sensor on, please let us know.  There are several products that can be used to help it stay in place. This tends to be more of an issue during the summer months.     7.  If you have a skin reaction related to the Erica tape, let us know.  There are some products that can be used under that can reduce the reaction.    8.  Important:  The Erica sensor is reading the glucose in the interstitial fluid.  Your blood glucose meter is reading the glucose in the bloodstream.  These two values often mirror each other.  However, after you eat glucose goes to your bloodstream first and then takes an additional 20-30 minutes to get out to the interstitial fluid where the sensor can read it.  This means after treating a low (with food) it will take the sensor longer to recognize that you have recovered.  Do not eat until the sensor value comes back up, this will cause you to rebound high.  Do a fingerstick 15 minutes after treating a low to check recovery.    9. Compression lows: If you lay on the sensor you may  "prevent the interstitial fluid from getting to the sensor.  The sensor may read this as a low blood sugar.  The glucose will typically come right back up if you reposition yourself.  If you are unsure if you are low, do a fingerstick.    10. You will get some notice when your sensor session is ending.  There will be messages that give you a countdown until your sensor ends.  When you see the \"change sensor now\" message remove the old sensor.  It peels off like a Band-Aid and insert a new sensor.      11. Erica sensors are typically covered by your pharmacy benefit.  There are often coupons available on-line for people who do not have coverage.  Medicare covers them as durable medical equipment and they must be ordered through a company who can bill that way.  [facility] or [employer] are examples of companies who can bill this way.    Tips for Wearing Your Erica Sensor:    Clothing: Scan right through your clothes. Be careful when dressing to avoid disturbing the sensor.    Showering, bathing, and swimming: Sensor is water-resistant. Do not submerge more than 3 feet (1 meter) or keep under water longer than 30 minutes at a time. Gently pat dry after getting it wet.    Exercising: Use skin adhesive if sweating loosens sensor. Try an over-bandage if playing contact sports.    Traveling: System is safe to use while on an aircraft. Do not expose the sensor to airport full-body scanners. Request another type of screening to avoid removing your sensor.    Medical procedures: Notify your healthcare provider and remove your sensor when necessary. Exposing the sensor to MRI, CT scan, diathermy, or X-ray may cause damage and incorrect readings.    Tips to Help Keep Your Sensor in Place:  Some people use the following products for extra stickiness and skin protection:    1. Torbot Skin Tac   Hypoallergenic and latex-free  tacky  skin barrier  2. Skin-Prep  Protective Barrier Wipe  Protective liquid " "dressing that allows skin to breathe so tapes and films adhere better  3. Mastisol  Liquid Adhesive:  Clear, non-irritating, nonwater-soluble liquid adhesive  4. Tegaderm I.V.   A transparent film that provides adhesive strength  5.Over-bandage  Be sure to use only medical-grade adhesive, bandage, or tape.  Apply and remove at the same time you apply or remove your sensor. Leave the opening/hole over the center of the sensor uncovered so it can breathe.    Products that can be helpful for removal but are optional:   Baby Oil: Soft moisturizer   Remove  Adhesive: Removes adhesive residue on the skin   UNI-SOLVE  Adhesive Remover: Formulated to reduce adhesive trauma to the skin by thoroughly dissolving dressing    Remember the sensor is measuring the glucose in the interstitial fluid.  Your meter is measuring the fluid in your bloodstream.  Most of the time these values will mirror each other but after eating glucose gets to your blood stream first and then goes out to your interstitial fluid.  There is about a 20-30 minute \"lag time\" between the two.  If you have eaten in the last 2 hours then you may find that your meter is reading higher than your sensor and that is expected.  Hydration status can come into play with sensor accuracy.  The sensor is reading glucose in the interstitial fluid, if you are dehydrated there is less fluid to read and this can impact accuracy.  You don't need to push fluids when using a sensor but you do want to make sure you are drinking throughout the day.  Interfering Factors:  Vitamin C breaks down into a molecule the same size as glucose.  If you take more than 500 mg per day of Vitamin C you could be falsely high on the sensor.  Compression Lows are something that happens if you are laying on the arm with the sensor in it.  Essentially you cut off the interstitial fluid that can get to the sensor with your body weight.  The sensor will then read this as low.  If you reposition the " "sensor will often quickly adjust and come back up.  The first day of a new sensor is the least accurate.  For some reason all sensors get more accurate as they adjust to the \"environment\" of your body.  Some people double check the first day by doing a fingerstick to confirm readings.  When doing a fingerstick to compare to the sensor make sure that you are washing your hands well and dry them thouroughly.  We want to make sure that your fingerstick result is an accurate as possible.   "

## 2024-05-21 NOTE — LETTER
5/21/2024       RE: Demario Abbasi  [address]  [place] [address]     Dear Colleague,    Thank you for referring your patient, Demario Abbasi, to the [facility] [place] at [facility]. Please see a copy of my visit note below.    [facility]  Consultation:  Diabetes  :   Patient: Demario Abbasi MRN# [id number]   YOB: 1995 Age: 29 year old   Date of Visit: 05/21/2024 .    Dear Dr. Zana Lilly:    I had the pleasure of seeing your patient, Demario Abbasi in the [facility], [facility] , for a consultation regarding CFRD .           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     Priority: Medium     Pancreatic insufficiency 05/25/2018     Priority: Medium     Major depressive disorder, recurrent episode, mild (H24) 10/29/2015     Priority: Medium     S/P lobectomy of lung 08/04/2015     Priority: Medium     Right upper lobectomy - 2009       Impaired glucose tolerance 08/15/2013     Priority: Medium     Diabetes mellitus related to cystic fibrosis (H) 10/20/2011     Priority: Medium     SWEAT TEST:  Date: 1995          Laboratory: [facility]  Sample #1  239 mg           107 mmol/L Cl  Sample #2  280 mg           104 mmol/L Cl     GENOTYPING:  Date: 1995               Laboratory: [facility]  Genotype: g542x/621+1g>t  Poly T Variant:  [  ]/[  ]    CF Standards of Care     Pulmozyme: On, once daily    Hypertonic Saline: Not on   COBY: On every other month (for Achromobacter, not P. Aeruginosa)    Azithromycin: On      Problem list name updated by automated process. Provider to review              HPI:   Demario is a 29 year old male with Cystic Fibrosis Related Diabetes Mellitus (CFRD).    I have reviewed the available past laboratory evaluations, imaging studies, and medical records available to me at this visit.   History was obtained from the patient and the medical record.  I have reviewed the notes of the pulmonary care team  Patient was accompanied by his father during the clinic visit    Patient with history of impaired glucose tolerance.  He was noted to have glucose readings in the diabetes range on the most recent OGTT in August 2023.  Patient had elevated fasting glucose along with 2-hour glucose readings above 300.    Denies any prior use of insulin.  History of recurrent pulmonary exacerbation with most recent use of IV antibiotics was about 4 months ago.  Reports that his weight has been stable  Concern that uncontrolled diabetes may be contributing to worsening lung function    His family history of type 2 diabetes on his mother side.     Latest Reference Range & Units 02/08/23 10:00 08/24/23 09:06 08/24/23 09:48 08/24/23 10:18 08/24/23 10:47   Glucose 70 - 99 mg/dL 111 (H) 125 (H) 216 (H) 274 (H) 311 (H)   Insulin 2.6 - 24.9 uU/mL  4.8 12.9 24.4 34.1 (H)   (H): Data is abnormally high      A1c:Previous two HbA1c results:   Lab Results   Component Value Date    A1C 5.9 08/24/2023    A1C 5.6 03/17/2022    A1C 5.7 09/10/2020    A1C 5.9 02/22/2019      Result was discussed at today's visit.     Current insulin regimen:   None    Family history and social history were reviewed and updated           Past Medical History:     Past Medical History:   Diagnosis Date     CF (cystic fibrosis) (H)     Genotype: g542x/621+1g>t     Impaired glucose tolerance      Pancreatic  insufficiency      S/P lobectomy of lung 8/4/2015    Right upper lobectomy - 2009            Past Surgical History:     Past Surgical History:   Procedure Laterality Date     H STATISTIC PICC LINE INSERTION >5YR, FAILED Bilateral 03/15/2019    Stenosis in both arms, unable to thread catheter     HC LAP,INGUINAL HERNIA REPR,INITIAL  2002     INSERT PICC LINE Left 10/12/2020    Procedure: INSERTION, PICC @1100;  Surgeon: Felicia Branch MD;  Location: [facility] OR     INSERT PICC LINE Left 12/6/2021    Procedure: INSERTION, PICC;  Surgeon: Tahir Alvarado MD;  Location: [facility] OR     INSERT PICC LINE Left 8/31/2022    Procedure: SINGLE LUMEN INSERTION, PICC;  Surgeon: Felicia Branch MD;  Location: [facility] OR     IR CHEST PORT PLACEMENT > 5 YRS OF AGE  2/8/2023     IR PICC PLACEMENT > 5 YRS OF AGE  03/18/2019     IR PICC PLACEMENT > 5 YRS OF AGE  12/30/2019     IR PICC PLACEMENT > 5 YRS OF AGE  10/12/2020     IR PICC PLACEMENT > 5 YRS OF AGE  12/6/2021     IR PICC PLACEMENT > 5 YRS OF AGE  8/31/2022     LOBECTOMY LUNG Right 2009    Right upper lobe               Social History:     Social History     Social History Narrative    12/27/2019  -Graduated college in 2020.  Looking for work as a sales .                Family History:     Family History   Problem Relation Age of Onset     Thyroid Disease Mother         hypothyroid     Other - See Comments Mother         multiple family members with biliary disease     Hypertension Maternal Grandmother      Diabetes Maternal Grandfather      Hypertension Paternal Grandmother      Hypothyroidism Paternal Grandmother      Parkinsonism Paternal Grandmother      Coronary Artery Disease Early Onset Paternal Grandfather 56            Allergies:     Allergies   Allergen Reactions     Amoxicillin-Pot Clavulanate Diarrhea             Medications:     Current Outpatient Rx   Medication Sig Dispense Refill     albuterol (PROAIR HFA) 108 (90 Base) MCG/ACT inhaler Inhale 2 puffs  into the lungs every 6 hours as needed for shortness of breath or wheezing 26 g 11     albuterol (PROVENTIL) (2.5 MG/3ML) 0.083% neb solution INHALE 1 VIAL BY NEBULIZATION THREE TIMES DAILY 270 mL 10     ALLERGY RELIEF CETIRIZINE 10 MG tablet TAKE 1 TABLET (10 MG) BY MOUTH DAILY 30 tablet 10     azelastine (ASTELIN) 0.1 % nasal spray Spray 1 spray into both nostrils daily 30 mL 11     azithromycin (ZITHROMAX) 500 MG tablet TAKE ONE TABLET BY MOUTH ON MONDAY, WEDNESDAY AND FRIDAY MORNING 12 tablet 10     citalopram (CELEXA) 20 MG tablet Take 1 tablet (20 mg) by mouth daily 30 tablet 1     dornase ren (PULMOZYME) 2.5 MG/2.5ML neb solution Inhale 2.5 mg into the lungs 2 times daily INHALE 2.5MG INTO THE LUNGS TWO TIMES A DAYINHALE 2.5MG INTO THE LUNGS TWO TIMES A  mL 11     fluticasone (FLONASE) 50 MCG/ACT nasal spray Spray 2 sprays into both nostrils daily 16 g 11     lipase-protease-amylase (CREON 36) 00518-057258-930781 units CPEP 2-3 with meals.       mvw complete formulation (SOFTGELS ) capsule TAKE ONE CAPSULE BY MOUTH TWICE A DAY 60 capsule 11     olopatadine (PATANOL) 0.1 % ophthalmic solution Place 1 drop into both eyes 2 times daily 5 mL 11     pantoprazole (PROTONIX) 20 MG EC tablet TAKE 1 TABLET (20 MG) BY MOUTH DAILY 30 tablet 10     sodium chloride inhalant 7 % NEBU neb solution Take 4 mLs by nebulization 2 times daily as needed for wheezing 240 mL 11     tobramycin (BETHKIS) 300 MG/4ML nebulizer solution Take 4 mLs (300 mg) by nebulization 2 times daily 28 days and 28 days off 224 mL 5             Review of Systems:             Physical Exam:   Blood pressure 108/64, pulse 71, SpO2 96%.    GENERAL: alert and no distress  EYES: Eyes grossly normal to inspection.  No discharge or erythema, or obvious scleral/conjunctival abnormalities.  RESP: No audible wheeze, cough, or visible cyanosis.    SKIN: Visible skin clear. No significant rash, abnormal pigmentation or lesions.  NEURO: Cranial nerves  grossly intact.  Mentation and speech appropriate for age.  PSYCH: Appropriate affect, tone, and pace of words         Laboratory results:     TSH   Date Value Ref Range Status   08/24/2023 1.88 0.30 - 4.20 uIU/mL Final   03/17/2022 1.34 0.40 - 4.00 mU/L Final   09/10/2020 1.42 0.40 - 4.00 mU/L Final     Testosterone Total   Date Value Ref Range Status   08/24/2023 115 (L) 240 - 950 ng/dL Final   09/10/2020 365 240 - 950 ng/dL Final     Comment:     This test was developed and its performance characteristics determined by the   [facility].   It has not been cleared or approved by the FDA. The laboratory is regulated   under CLIA as qualified to perform high-complexity testing. This test is used   for clinical purposes. It should not be regarded as investigational or for   research.       Cholesterol   Date Value Ref Range Status   08/24/2023 77 <200 mg/dL Final   09/10/2020 101 <200 mg/dL Final     Albumin Urine mg/L   Date Value Ref Range Status   08/24/2023 <12.0 mg/L Final     Comment:     The reference ranges have not been established in urine albumin. The results should be integrated into the clinical context for interpretation.   03/17/2022 <5 mg/L Final   09/10/2020 <5 mg/L Final     Triglycerides   Date Value Ref Range Status   08/24/2023 91 <150 mg/dL Final   09/10/2020 156 (H) <150 mg/dL Final     Comment:     Borderline high:  150-199 mg/dl  High:             200-499 mg/dl  Very high:       >499 mg/dl       HDL Cholesterol   Date Value Ref Range Status   09/10/2020 43 >39 mg/dL Final     Direct Measure HDL   Date Value Ref Range Status   08/24/2023 37 (L) >=40 mg/dL Final     LDL Cholesterol Calculated   Date Value Ref Range Status   08/24/2023 22 <=100 mg/dL Final   09/10/2020 27 <100 mg/dL Final     Comment:     Desirable:       <100 mg/dl     Cholesterol/HDL Ratio   Date Value Ref Range Status   05/02/2013 2.5 0.0 - 5.0 Final     Non  "HDL Cholesterol   Date Value Ref Range Status   08/24/2023 40 <130 mg/dL Final   09/10/2020 58 <130 mg/dL Final     Lab Results   Component Value Date    A1C 5.9 08/24/2023    A1C 5.6 03/17/2022    A1C 5.7 09/10/2020    A1C 5.9 02/22/2019    A1C 5.9 11/03/2016    A1C 6.0 08/13/2015    A1C 6.1 07/10/2015    No results found for: \"HEMOGLOBINA1\"        CF  Diabetes Health Maintenance    Date of Diabetes Diagnosis: 2023    Special Notes (if any):     Date Last Eye Exam:      Date Last Dental Appointment:     Dates of Episodes Severe* Hypoglycemia (month/year, cumulative, ongoing, assess each visit):    *Severe=patient unconscious, seizure, unable to help self    Last 25-Vitamin D (every year):     Last DXA, lowest Z-score (every 2 years):        ?Bisphosphonates (yes/no):     Last Urine Microalbumin (every year):      No results found for: \"MICROALBUMIN\"    Last Testosterone:   Testosterone Total   Date Value Ref Range Status   08/24/2023 115 (L) 240 - 950 ng/dL Final   09/10/2020 365 240 - 950 ng/dL Final     Comment:     This test was developed and its performance characteristics determined by the   [facility].   It has not been cleared or approved by the FDA. The laboratory is regulated   under CLIA as qualified to perform high-complexity testing. This test is used   for clinical purposes. It should not be regarded as investigational or for   research.        On testosterone therapy (yes/no)?     Date of Last Diabetes Visit:         Assessment and Plan:   Demario is a 29 year old male with cystic fibrosis, pancreatic insufficiency and new diagnosis of CF-related diabetes    CF related diabetes, new diagnosis:  Patient and father were counseled regarding CF-related diabetes including impact of diabetes on nutrition and lung function and long-term complications of diabetes.  Discussed management of CF-related diabetes with insulin  Also reviewed risk of " hypoglycemia and home glucose monitoring  Patient will meet with diabetes educator today to learn about carb counting, use of fingerstick glucose monitoring and use of insulin pen.  Will also place a diagnostic CGM today  Patient will likely need basal bolus insulin regimen  Will start on insulin after reviewing home glucose data    Return to clinic in 4 weeks    Diabetes is a complicated and dangerous illness which requires intensive monitoring and treatment to prevent both short-term and long-term consequences to various organs. Insulin therapy is life-saving, but is also associated with life-threatening toxicity (hypoglycemia).  Careful and continuous attention to balancing glucose levels, activity, diet and insulin dosage is necessary.    I have reviewed this plan with the patient and with the diabetes nurse educator, who will communicate with the patient between visits for insulin adjustment.    Thank you for allowing me to participate in the care of your patient.  Please do not hesitate to call with questions or concerns.    Sincerely,    Kenya Cross MD    Note: Chart documentation done in part with Dragon Voice Recognition software. Although reviewed after completion, some word and grammatical errors may remain.  Please consider this when interpreting information in this chart     CC  OZ PHILLIPS             Again, thank you for allowing me to participate in the care of your patient.      Sincerely,    Kenya Cross MD

## 2024-05-21 NOTE — NURSING NOTE
Chief Complaint   Patient presents with    Cystic Fibrosis     F/u     Vitals were taken and medications were reconciled.     DEJUAN Perez

## 2024-05-21 NOTE — PROGRESS NOTES
CF Endocrinology  Consultation:  Diabetes  :   Patient: Demario Abbasi MRN# [id number]   YOB: 1995 Age: 29 year old   Date of Visit: 05/21/2024 .    Dear Dr. Zana Lilly:    I had the pleasure of seeing your patient, Demario Abbasi in the CF Endocrinology Clinic, [facility] , for a consultation regarding CFRD .           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     Priority: Medium    Pancreatic insufficiency 05/25/2018     Priority: Medium    Major depressive disorder, recurrent episode, mild (H24) 10/29/2015     Priority: Medium    S/P lobectomy of lung 08/04/2015     Priority: Medium     Right upper lobectomy - 2009      Impaired glucose tolerance 08/15/2013     Priority: Medium    Diabetes mellitus related to cystic fibrosis (H) 10/20/2011     Priority: Medium     SWEAT TEST:  Date: 1995          Laboratory: [facility]  Sample #1  239 mg           107 mmol/L Cl  Sample #2  280 mg           104 mmol/L Cl     GENOTYPING:  Date: 1995               Laboratory: [facility]  Genotype: g542x/621+1g>t  Poly T Variant:  [  ]/[  ]    CF Standards of Care    Pulmozyme: On, once daily    Hypertonic Saline: Not on   COBY: On every other month (for Achromobacter, not P. Aeruginosa)    Azithromycin: On      Problem list name updated by automated process. Provider to review              HPI:   Demario is a 29 year old male with Cystic Fibrosis Related Diabetes Mellitus (CFRD).    I have reviewed the  available past laboratory evaluations, imaging studies, and medical records available to me at this visit.   History was obtained from the patient and the medical record.  I have reviewed the notes of the pulmonary care team  Patient was accompanied by his father during the clinic visit    Patient with history of impaired glucose tolerance.  He was noted to have glucose readings in the diabetes range on the most recent OGTT in August 2023.  Patient had elevated fasting glucose along with 2-hour glucose readings above 300.    Denies any prior use of insulin.  History of recurrent pulmonary exacerbation with most recent use of IV antibiotics was about 4 months ago.  Reports that his weight has been stable  Concern that uncontrolled diabetes may be contributing to worsening lung function    His family history of type 2 diabetes on his mother side.     Latest Reference Range & Units 02/08/23 10:00 08/24/23 09:06 08/24/23 09:48 08/24/23 10:18 08/24/23 10:47   Glucose 70 - 99 mg/dL 111 (H) 125 (H) 216 (H) 274 (H) 311 (H)   Insulin 2.6 - 24.9 uU/mL  4.8 12.9 24.4 34.1 (H)   (H): Data is abnormally high      A1c:Previous two HbA1c results:   Lab Results   Component Value Date    A1C 5.9 08/24/2023    A1C 5.6 03/17/2022    A1C 5.7 09/10/2020    A1C 5.9 02/22/2019      Result was discussed at today's visit.     Current insulin regimen:   None    Family history and social history were reviewed and updated           Past Medical History:     Past Medical History:   Diagnosis Date    CF (cystic fibrosis) (H)     Genotype: g542x/621+1g>t    Impaired glucose tolerance     Pancreatic insufficiency     S/P lobectomy of lung 8/4/2015    Right upper lobectomy - 2009            Past Surgical History:     Past Surgical History:   Procedure Laterality Date    H STATISTIC PICC LINE INSERTION >5YR, FAILED Bilateral 03/15/2019    Stenosis in both arms, unable to thread catheter    HC LAP,INGUINAL HERNIA REPR,INITIAL  2002    INSERT PICC  LINE Left 10/12/2020    Procedure: INSERTION, PICC @1100;  Surgeon: Felicia Branch MD;  Location: [facility] OR    INSERT PICC LINE Left 12/6/2021    Procedure: INSERTION, PICC;  Surgeon: Tahir Alvarado MD;  Location: [facility] OR    INSERT PICC LINE Left 8/31/2022    Procedure: SINGLE LUMEN INSERTION, PICC;  Surgeon: Felicia Branch MD;  Location: [facility] OR    IR CHEST PORT PLACEMENT > 5 YRS OF AGE  2/8/2023    IR PICC PLACEMENT > 5 YRS OF AGE  03/18/2019    IR PICC PLACEMENT > 5 YRS OF AGE  12/30/2019    IR PICC PLACEMENT > 5 YRS OF AGE  10/12/2020    IR PICC PLACEMENT > 5 YRS OF AGE  12/6/2021    IR PICC PLACEMENT > 5 YRS OF AGE  8/31/2022    LOBECTOMY LUNG Right 2009    Right upper lobe               Social History:     Social History     Social History Narrative    12/27/2019  -Graduated college in 2020.  Looking for work as a sales .                Family History:     Family History   Problem Relation Age of Onset    Thyroid Disease Mother         hypothyroid    Other - See Comments Mother         multiple family members with biliary disease    Hypertension Maternal Grandmother     Diabetes Maternal Grandfather     Hypertension Paternal Grandmother     Hypothyroidism Paternal Grandmother     Parkinsonism Paternal Grandmother     Coronary Artery Disease Early Onset Paternal Grandfather 56            Allergies:     Allergies   Allergen Reactions    Amoxicillin-Pot Clavulanate Diarrhea             Medications:     Current Outpatient Rx   Medication Sig Dispense Refill    albuterol (PROAIR HFA) 108 (90 Base) MCG/ACT inhaler Inhale 2 puffs into the lungs every 6 hours as needed for shortness of breath or wheezing 26 g 11    albuterol (PROVENTIL) (2.5 MG/3ML) 0.083% neb solution INHALE 1 VIAL BY NEBULIZATION THREE TIMES DAILY 270 mL 10    ALLERGY RELIEF CETIRIZINE 10 MG tablet TAKE 1 TABLET (10 MG) BY MOUTH DAILY 30 tablet 10    azelastine (ASTELIN) 0.1 % nasal spray Spray 1 spray into both nostrils daily 30 mL  11    azithromycin (ZITHROMAX) 500 MG tablet TAKE ONE TABLET BY MOUTH ON MONDAY, WEDNESDAY AND FRIDAY MORNING 12 tablet 10    citalopram (CELEXA) 20 MG tablet Take 1 tablet (20 mg) by mouth daily 30 tablet 1    dornase ren (PULMOZYME) 2.5 MG/2.5ML neb solution Inhale 2.5 mg into the lungs 2 times daily INHALE 2.5MG INTO THE LUNGS TWO TIMES A DAYINHALE 2.5MG INTO THE LUNGS TWO TIMES A  mL 11    fluticasone (FLONASE) 50 MCG/ACT nasal spray Spray 2 sprays into both nostrils daily 16 g 11    lipase-protease-amylase (CREON 36) [id number] units CPEP 2-3 with meals.      mvw complete formulation (SOFTGELS ) capsule TAKE ONE CAPSULE BY MOUTH TWICE A DAY 60 capsule 11    olopatadine (PATANOL) 0.1 % ophthalmic solution Place 1 drop into both eyes 2 times daily 5 mL 11    pantoprazole (PROTONIX) 20 MG EC tablet TAKE 1 TABLET (20 MG) BY MOUTH DAILY 30 tablet 10    sodium chloride inhalant 7 % NEBU neb solution Take 4 mLs by nebulization 2 times daily as needed for wheezing 240 mL 11    tobramycin (BETHKIS) 300 MG/4ML nebulizer solution Take 4 mLs (300 mg) by nebulization 2 times daily 28 days and 28 days off 224 mL 5             Review of Systems:             Physical Exam:   Blood pressure 108/64, pulse 71, SpO2 96%.    GENERAL: alert and no distress  EYES: Eyes grossly normal to inspection.  No discharge or erythema, or obvious scleral/conjunctival abnormalities.  RESP: No audible wheeze, cough, or visible cyanosis.    SKIN: Visible skin clear. No significant rash, abnormal pigmentation or lesions.  NEURO: Cranial nerves grossly intact.  Mentation and speech appropriate for age.  PSYCH: Appropriate affect, tone, and pace of words         Laboratory results:     TSH   Date Value Ref Range Status   08/24/2023 1.88 0.30 - 4.20 uIU/mL Final   03/17/2022 1.34 0.40 - 4.00 mU/L Final   09/10/2020 1.42 0.40 - 4.00 mU/L Final     Testosterone Total   Date Value Ref Range Status   08/24/2023 115 (L) 240 - 950  "ng/dL Final   09/10/2020 365 240 - 950 ng/dL Final     Comment:     This test was developed and its performance characteristics determined by the   [facility].   It has not been cleared or approved by the FDA. The laboratory is regulated   under CLIA as qualified to perform high-complexity testing. This test is used   for clinical purposes. It should not be regarded as investigational or for   research.       Cholesterol   Date Value Ref Range Status   08/24/2023 77 <200 mg/dL Final   09/10/2020 101 <200 mg/dL Final     Albumin Urine mg/L   Date Value Ref Range Status   08/24/2023 <12.0 mg/L Final     Comment:     The reference ranges have not been established in urine albumin. The results should be integrated into the clinical context for interpretation.   03/17/2022 <5 mg/L Final   09/10/2020 <5 mg/L Final     Triglycerides   Date Value Ref Range Status   08/24/2023 91 <150 mg/dL Final   09/10/2020 156 (H) <150 mg/dL Final     Comment:     Borderline high:  150-199 mg/dl  High:             200-499 mg/dl  Very high:       >499 mg/dl       HDL Cholesterol   Date Value Ref Range Status   09/10/2020 43 >39 mg/dL Final     Direct Measure HDL   Date Value Ref Range Status   08/24/2023 37 (L) >=40 mg/dL Final     LDL Cholesterol Calculated   Date Value Ref Range Status   08/24/2023 22 <=100 mg/dL Final   09/10/2020 27 <100 mg/dL Final     Comment:     Desirable:       <100 mg/dl     Cholesterol/HDL Ratio   Date Value Ref Range Status   05/02/2013 2.5 0.0 - 5.0 Final     Non HDL Cholesterol   Date Value Ref Range Status   08/24/2023 40 <130 mg/dL Final   09/10/2020 58 <130 mg/dL Final     Lab Results   Component Value Date    A1C 5.9 08/24/2023    A1C 5.6 03/17/2022    A1C 5.7 09/10/2020    A1C 5.9 02/22/2019    A1C 5.9 11/03/2016    A1C 6.0 08/13/2015    A1C 6.1 07/10/2015    No results found for: \"HEMOGLOBINA1\"        CF  Diabetes Health Maintenance  " "  Date of Diabetes Diagnosis: 2023    Special Notes (if any):     Date Last Eye Exam:      Date Last Dental Appointment:     Dates of Episodes Severe* Hypoglycemia (month/year, cumulative, ongoing, assess each visit):    *Severe=patient unconscious, seizure, unable to help self    Last 25-Vitamin D (every year):     Last DXA, lowest Z-score (every 2 years):        ?Bisphosphonates (yes/no):     Last Urine Microalbumin (every year):      No results found for: \"MICROALBUMIN\"    Last Testosterone:   Testosterone Total   Date Value Ref Range Status   08/24/2023 115 (L) 240 - 950 ng/dL Final   09/10/2020 365 240 - 950 ng/dL Final     Comment:     This test was developed and its performance characteristics determined by the   [facility].   It has not been cleared or approved by the FDA. The laboratory is regulated   under CLIA as qualified to perform high-complexity testing. This test is used   for clinical purposes. It should not be regarded as investigational or for   research.        On testosterone therapy (yes/no)?     Date of Last Diabetes Visit:         Assessment and Plan:   Demario is a 29 year old male with cystic fibrosis, pancreatic insufficiency and new diagnosis of CF-related diabetes    CF related diabetes, new diagnosis:  Patient and father were counseled regarding CF-related diabetes including impact of diabetes on nutrition and lung function and long-term complications of diabetes.  Discussed management of CF-related diabetes with insulin  Also reviewed risk of hypoglycemia and home glucose monitoring  Patient will meet with diabetes educator today to learn about carb counting, use of fingerstick glucose monitoring and use of insulin pen.  Will also place a diagnostic CGM today  Patient will likely need basal bolus insulin regimen  Will start on insulin after reviewing home glucose data    Return to clinic in 4 weeks    Diabetes is a " complicated and dangerous illness which requires intensive monitoring and treatment to prevent both short-term and long-term consequences to various organs. Insulin therapy is life-saving, but is also associated with life-threatening toxicity (hypoglycemia).  Careful and continuous attention to balancing glucose levels, activity, diet and insulin dosage is necessary.    I have reviewed this plan with the patient and with the diabetes nurse educator, who will communicate with the patient between visits for insulin adjustment.    Thank you for allowing me to participate in the care of your patient.  Please do not hesitate to call with questions or concerns.    Sincerely,    Kenya Cross MD    Note: Chart documentation done in part with Dragon Voice Recognition software. Although reviewed after completion, some word and grammatical errors may remain.  Please consider this when interpreting information in this chart     CC  OZ PHILLIPS

## 2024-05-22 DIAGNOSIS — E84.0 CYSTIC FIBROSIS WITH PULMONARY MANIFESTATIONS (H): Primary | ICD-10-CM

## 2024-05-22 DIAGNOSIS — E84.8 DIABETES MELLITUS RELATED TO CYSTIC FIBROSIS (H): ICD-10-CM

## 2024-05-22 DIAGNOSIS — E08.9 DIABETES MELLITUS RELATED TO CYSTIC FIBROSIS (H): ICD-10-CM

## 2024-05-22 DIAGNOSIS — K86.89 PANCREATIC INSUFFICIENCY: ICD-10-CM

## 2024-05-22 NOTE — PROGRESS NOTES
Annual lab orders entered prior to clinic visit.    Faith Louis, BSN, RN  RN Care Coordinator [facility]rosis Adult Clinic

## 2024-05-23 ENCOUNTER — ALLIED HEALTH/NURSE VISIT (OUTPATIENT)
Dept: CARE COORDINATION | Facility: CLINIC | Age: 29
End: 2024-05-23

## 2024-05-23 ENCOUNTER — OFFICE VISIT (OUTPATIENT)
Dept: PHARMACY | Facility: CLINIC | Age: 29
End: 2024-05-23
Payer: COMMERCIAL

## 2024-05-23 ENCOUNTER — OFFICE VISIT (OUTPATIENT)
Dept: PULMONOLOGY | Facility: CLINIC | Age: 29
End: 2024-05-23
Attending: INTERNAL MEDICINE
Payer: COMMERCIAL

## 2024-05-23 VITALS
SYSTOLIC BLOOD PRESSURE: 128 MMHG | HEART RATE: 60 BPM | HEIGHT: 70 IN | OXYGEN SATURATION: 95 % | DIASTOLIC BLOOD PRESSURE: 75 MMHG | WEIGHT: 170.64 LBS | BODY MASS INDEX: 24.43 KG/M2

## 2024-05-23 DIAGNOSIS — F33.0 MAJOR DEPRESSIVE DISORDER, RECURRENT EPISODE, MILD (H): ICD-10-CM

## 2024-05-23 DIAGNOSIS — E08.9 DIABETES MELLITUS RELATED TO CYSTIC FIBROSIS (H): ICD-10-CM

## 2024-05-23 DIAGNOSIS — Z13.9 RISK AND FUNCTIONAL ASSESSMENT: Primary | ICD-10-CM

## 2024-05-23 DIAGNOSIS — E84.9 CF (CYSTIC FIBROSIS) (H): ICD-10-CM

## 2024-05-23 DIAGNOSIS — E84.0 CYSTIC FIBROSIS WITH PULMONARY MANIFESTATIONS (H): ICD-10-CM

## 2024-05-23 DIAGNOSIS — K86.89 PANCREATIC INSUFFICIENCY: ICD-10-CM

## 2024-05-23 DIAGNOSIS — E84.8 DIABETES MELLITUS RELATED TO CYSTIC FIBROSIS (H): ICD-10-CM

## 2024-05-23 DIAGNOSIS — E84.0 CYSTIC FIBROSIS WITH PULMONARY EXACERBATION (H): ICD-10-CM

## 2024-05-23 DIAGNOSIS — E84.9 CYSTIC FIBROSIS (H): Primary | ICD-10-CM

## 2024-05-23 DIAGNOSIS — J30.2 SEASONAL ALLERGIES: ICD-10-CM

## 2024-05-23 LAB
EXPTIME-PRE: 10.15 SEC
FEF2575-%PRED-PRE: 43 %
FEF2575-PRE: 1.94 L/SEC
FEF2575-PRED: 4.5 L/SEC
FEFMAX-%PRED-PRE: 72 %
FEFMAX-PRE: 7.47 L/SEC
FEFMAX-PRED: 10.29 L/SEC
FEV1-%PRED-PRE: 61 %
FEV1-PRE: 2.66 L
FEV1FEV6-PRE: 77 %
FEV1FEV6-PRED: 84 %
FEV1FVC-PRE: 74 %
FEV1FVC-PRED: 84 %
FIFMAX-PRE: 4.04 L/SEC
FVC-%PRED-PRE: 70 %
FVC-PRE: 3.58 L
FVC-PRED: 5.11 L

## 2024-05-23 PROCEDURE — 87206 SMEAR FLUORESCENT/ACID STAI: CPT | Performed by: INTERNAL MEDICINE

## 2024-05-23 PROCEDURE — 99213 OFFICE O/P EST LOW 20 MIN: CPT | Performed by: INTERNAL MEDICINE

## 2024-05-23 PROCEDURE — 94375 RESPIRATORY FLOW VOLUME LOOP: CPT | Performed by: INTERNAL MEDICINE

## 2024-05-23 PROCEDURE — 99605 MTMS BY PHARM NP 15 MIN: CPT | Performed by: PHARMACIST

## 2024-05-23 PROCEDURE — 99214 OFFICE O/P EST MOD 30 MIN: CPT | Mod: 25 | Performed by: INTERNAL MEDICINE

## 2024-05-23 PROCEDURE — 87186 SC STD MICRODIL/AGAR DIL: CPT | Performed by: INTERNAL MEDICINE

## 2024-05-23 RX ORDER — FLUTICASONE PROPIONATE 50 MCG
2 SPRAY, SUSPENSION (ML) NASAL DAILY
Qty: 16 G | Refills: 11 | Status: SHIPPED | OUTPATIENT
Start: 2024-05-23

## 2024-05-23 RX ORDER — OLOPATADINE HYDROCHLORIDE 1 MG/ML
1 SOLUTION/ DROPS OPHTHALMIC 2 TIMES DAILY
Qty: 5 ML | Refills: 11 | Status: SHIPPED | OUTPATIENT
Start: 2024-05-23

## 2024-05-23 RX ORDER — ALBUTEROL SULFATE 90 UG/1
2 AEROSOL, METERED RESPIRATORY (INHALATION) EVERY 6 HOURS PRN
Qty: 26 G | Refills: 11 | Status: SHIPPED | OUTPATIENT
Start: 2024-05-23

## 2024-05-23 RX ORDER — SODIUM CHLORIDE FOR INHALATION 7 %
4 VIAL, NEBULIZER (ML) INHALATION 2 TIMES DAILY PRN
Qty: 240 ML | Refills: 11 | Status: SHIPPED | OUTPATIENT
Start: 2024-05-23

## 2024-05-23 ASSESSMENT — PAIN SCALES - GENERAL: PAINLEVEL: NO PAIN (0)

## 2024-05-23 NOTE — PROGRESS NOTES
Adult Cystic Fibrosis Program  Annual Psychosocial Assessment    Presenting Information:  Demario Abbasi is a 29-year-old male with cystic fibrosis, presenting in CF clinic for a routine appointment was with Dr. Lilly today.  Met with Demario for an updated annual psychosocial assessment. Demario was unaccompanied in clinic during SW visit.         Living situation:  Demario is currently living with his parents in [place]. They own the home. [place] There is 1 dog at his parents home (a miniature pincher named Glen), their lab passed away last November.  They also have a lake cabin that is 20 mins away from their home in [place] that they go to often all year round. He denies any concerns about his living situation.       Family Constellation:  Demario was raised by his biological parents: Mirna and Andrew who live in [place], [place]. He has two older sisters: Tammy who has a 9 y.o daughter Luis Angel and lives in [place], [place] and Evi who lives in [place], [place], has 3 children: a 6 yr old son Bharat and a 2 year old named Misael, and a 9 month old niece. His mom is a nurse and his dad is a farmer of soybeans and corn. He is the only person in his family with CF. Demario has never been  and does not have children, he is not currently in a significant relationship.      Social Support:  Demario reports very good social support. He gets along well with family members and draws additional support from friends and yin community. He is close with both of his parents, but especially his dad. He and his family have some involvement with the CF community through fundraising, and he shared that he and his dad plan to go to a CF [employer] golf event in [place] this summer.        Adjustment to Illness:  Demario reports that he was diagnosed with CF in infancy (3 months), he reports that shortly after being brought home from the hospital he was not thriving, gaining weight or  "growing. His parents brought him to [facility] for evaluation and they transferred him to the [facility] where a sweat test was performed and the diagnosis of CF was made. He reports some complications while growing up, being hospitalized about once per year for CF-related issues which he did not like. He admits that growing up with CF was hard at times, he struggled with doing his therapies and treatments at times and not having the freedom that other kids had. In 2009 he had  RUL resection for recurrent exacerbations. Despite this, he felt that he was still able to have a wonderful childhood/upbringing, participating in sports and being very active in social events. He reports that his parents were very good and helping take care of him and manage CF and states: \"they were on top of it\". He has been vesting since he was very young, he was unsure of the exact age.       Demario describes his current health status as \"feeling good\" and noted that he typically feels much better in the summer. He is not currently a candidate for Trikafta because of his gene mutation. He reports that he uses his vest 2 times per day consistently, he says that he \"loves\" the vest and describes it as a relaxing massage. He reports that he does pretty well with taking medications. He denies any health problems that interfere with activities of daily living.      Demario reports that he is pretty open about his CF diagnosis and has no reservations about telling people.          Health Care Directive:  Demario has previously received Health Care Directive education. This SW reviewed education, including concept/purpose of health care directive, default health care agents and how to complete a directive. He is comfortable with default health care agent(s) (his parents) in absence of a directive. He is not currently interested in completing a health care directive.      Education:  Demario graduated from [employer] and graduated from [employer] in May 2020 " "with a degree in Agronomy (plant science).       Employment:  Demario works full-time for [employer] as a . 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 He states that things will be much calmer after harvest season and his job is year-round. His employer is aware of his CF diagnosis and he reports a supportive work environment with access to insurance, PTO and short-term disability benefits.     Finances:  Demario receives income from wages. He denies having financial concerns at this time.     Insurance:  Demario continues to have health insurance through his employer ([employer]). He reports that everything has been covered and denies having insurance coverage concerns at this time. He previously had medical assistance, but was no longer eligible once COVID peacetime emergency ended.      Mental Health/Coping:  Demario reports a history of depression, the chart review also includes a history of mild anxiety in the past. Demario reports that his current/recent mood has been \"good\". He continues to take the antidepressant (serotonin specific reuptake inhibitor): Celexa and feels this medication is beneficial. In the past he recalls going through a tough period of time in regards to his CF, describing it as \"a stage of understanding\", not wanting to comply with treatments and Celexa did help him get through this difficult time.  He has not sought therapy in the past and does not feel this is something he needs currently. He stated that while work can be stressful, especially during the harvest season, he feels he is able to manage his stress well and \"doesn't get too fired up about it.\" He zelda with stress by talking to someone such as his dad. He identifies yin as important part of his life, he was raised Voodoo and is involved with a Zoroastrian community.     Demario declined completing the mental health screeners today. He does not feel that he needs any mental " health resources at this time.    Chemical Health:  Demario does use chewing tobacco, it was difficult for him to quantify but does say that it is not excessive. He denies the use of other tobacco products, denies the use of marijuana or other drugs. He specifically said that he could not/would not use marijuana as he has his CDL (commercial drivers license). He reports the use of alcohol, he estimates 12 beers per week on average, usually spaced out across the days, not all at once. He denies any current/past psychosocial impairment caused by alcohol use.         Leisure Activities/Interests:   Demario enjoys hunting, fishing, and checking out local breweries. He also enjoys being at his family cabin and doing outdoor activities there.          Intervention:  -Psychosocial Assessment    Assessment:  Demario appeared to be open in his responses. He continues to live with his parents and work full-time as a . Demario feels that he has strong family and social support, and reports no mental health concerns. While he no longer has Medical Assistance for a supplemental health insurance, he feels his employer-offered health insurance is sufficient. Demario seems to be psychosocially stable overall, with access to relevant resources and supports.  No concerns expressed/noted.    Plan:  Re-consult for any psychosocial needs that may arise.    Complete psychosocial assessment annually.  Continue to follow for regular clinic consult.    Yamila Aranda [facility]  Adult Cystic Fibrosis   Ph: [phone]

## 2024-05-23 NOTE — PATIENT INSTRUCTIONS
See provider AVS for a summary of recommendations from today's visit.    Angeline Quinones, PharmD  Cystic Fibrosis MTM Pharmacist  [facility]  Voicemail: [phone]

## 2024-05-23 NOTE — NURSING NOTE
"Demario Abbasi is a 29 year old year old who is being seen for Cystic Fibrosis (CF follow up )      Medications reviewed and Vital signs taken.    Specimen Collection Type: Sputum    Order(s) placed: AFB (Acid Fast Bacilli) and CF Aerobic Bacterial    *IF AFB order placed - please enter \"PRIORITIZE AFB\" to order comments.       Lab Results   Component Value Date    ACIDFAST No acid fast bacilli seen 10/13/2022    ACIDFAST No acid fast bacilli seen 10/13/2022    ACIDFAST No acid fast bacilli seen 10/13/2022         Lab Results   Component Value Date    AFBSMS Negative for acid fast bacteria 09/10/2020    AFBSMS  09/10/2020     Less than 5ml of specimen received.  A minimum of 5 mL of sputum or fluid is recommended for recovery of acid fast bacilli   (AFB).  Volumes less than 5 mL are suboptimal and may compromise recovery of AFB from   culture.      AFBSMS  09/10/2020     Assayed at [facility] [address] [place] [id number] [phone]         Vitals were taken and medications were reconciled.    Khadra Mckoy RMA  9:44 AM    "

## 2024-05-23 NOTE — PROGRESS NOTES
Medication Therapy Management (MTM) Encounter    ASSESSMENT:                            Medication Adherence/Access: No issues identified    CF:   Stable .     Pancreatic Insufficiency/Nutrition:   Stable       CFRD:   Patient is meeting A1c goal of <7% per CFF guidelines. Patient would benefit from continued follow-up with endocrine.    Depression:   Stable     PLAN:                            Keep up the great work on medications!  Follow up with endocrine team as scheduled    Follow-up: per Dr. Lilly    SUBJECTIVE/OBJECTIVE:                          Demario Abbasi is a 29 year old male seen for a co-visit with Dr. Lilly and team.     Reason for visit: Annual Medication Review    Allergies/ADRs: Reviewed in chart  Past Medical History: Reviewed in chart  Tobacco: He reports that he has never smoked. He has been exposed to tobacco smoke. He has quit using smokeless tobacco.  His smokeless tobacco use included chew.  Alcohol: 2-3 drinks in evenings on weekends  Other Substance Use: none    Medication Adherence/Access:   Medication: Demario manages his medications at home  Ph[facility]acy: [facility], and [facility]       CF:    Inhaled medications:   Bronchodilator: albuterol nebs twice daily and albuterol HFA as needed (occassionally)   Mucolytic: Pulmozyme twice daily and hypertonic 7% for illness (none recently)   Antibiotic: Bethkis twice daily (cycling; off)   Other: Flonase 2 sprays each nostril daily, and Azelastine 1 spray each nostril  Oral medications:   Azithromycin: 500mg F   CFTR modulator: not eligible by genotype   Other: cetirizine 10mg daily  Other medications:  olopatadine eye drops    Genotype: G542X/621+1G>T  Cultures (last growth): sputum cultures grow MSSA (8/24/23),  Achromobacter (8/24/23), Group B Strep (6/16/22), A. ochraceus (3/17/22)      Pancreatic Insufficiency/Nutrition:   Creon [id number] taking 2 to 3 capsules with meals and snacks  Acid reducer: pantoprazole 20mg  "daily  Vitamins/Supplements: [id number] twice daily    Patient is not experiencing sign/symptoms of malabsorption.      CFRD:    SMBG: Freestyle Libre3      Patient is not experiencing hypoglycemia  Recent symptoms of high blood sugar? none  Most recent HgA1C:   Lab Results   Component Value Date    A1C 5.9 08/24/2023    A1C 5.6 03/17/2022    A1C 5.7 09/10/2020     Currently just trending blood sugars. No lows, does occasionally spike after meals but resolves on its own. No concerns today.  Patient follows with Dr. Cross for endocrinology. Last visit 5/21/24.      Depression:   citalopram 20mg daily    He reports this is working well for him, no concerns noted today.      PFTs:        Today's Vitals:   BP Readings from Last 1 Encounters:   05/23/24 128/75     Pulse Readings from Last 1 Encounters:   05/23/24 60     Wt Readings from Last 1 Encounters:   05/23/24 170 lb 10.2 oz (77.4 kg)     Ht Readings from Last 1 Encounters:   05/23/24 5' 10.28\" (1.785 m)     Estimated body mass index is 24.29 kg/m  as calculated from the following:    Height as of an earlier encounter on 5/23/24: 5' 10.28\" (1.785 m).    Weight as of an earlier encounter on 5/23/24: 170 lb 10.2 oz (77.4 kg).    ----------------      I spent 10 minutes with this patient today. I offer these suggestions for consideration by Dr. Lilly during covisit today.     A summary of these recommendations was given to the patient (see AVS from today's appointment with Dr. Lilly).    Angeline Quinones, PharmD  Cystic Fibrosis MTM Pharmacist  [facility]  Voicemail: [phone]           Medication Therapy Recommendations  No medication therapy recommendations to display   "

## 2024-05-23 NOTE — LETTER
5/23/2024         RE: Demario Abbasi  [address]  [place] [address]        Dear Colleague,    Thank you for referring your patient, Demario Abbasi, to the [facility] LUNG [facility] AND [facility]. Please see a copy of my visit note below.    Reason for Visit  Demario Abbasi is a 29 year old year old male who is being seen for Cystic Fibrosis (CF follow up )      Assessment and plan:   Demario Abbasi is a 29 year old male with cystic fibrosis, pancreatic insufficiency, depression, acne and impaired glucose tolerance who is s/p RUL resection for recurrent exacerbations in 2009.   He last required IV antibiotics in June 2023 for a pulmonary exacerbation.     Maintenance   Modulator: Not eligible based on mutations  Mutation:  g542x/621+1g>t  AW Clearance: Vest  Bronchodilators:  Albuterol  Mucolytics: Pulmozyme, Hypertonic saline  Antibiotics Inh:  Bethkis  Antibiotics Oral: Azithromycin  Other:  Colonization Hx: Staph aureus (MSSA), Achromobacter xylosoxidans/denitrificans   Annual Studies: Due August 2024  Contraindications to standard of care:     CF/pulmonary: The patient completed IV antibiotics shortly after his last clinic visit and has been well since that time.  Reports excellent exercise tolerance.  Cough and sputum are at baseline.  The patient does not appear to be having a pulmonary exacerbation at this time.  Oxygenation is adequate at 95%.  PFTs with a moderate obstructive ventilatory defect, similar to baseline.  This does also reflect his previous lobectomy.  He will continue current twice daily vest therapy with albuterol, Pulmozyme, hypertonic saline and every other month COBY.  Continue azithromycin.    CFTR Modulation: Based on the above-noted genotype, the patient is not a candidate for modulator therapy at this time.     Pancreatic insufficiency: The patient denies symptoms of malabsorption.  His weight is in an excellent range.  He  will continue his current pancreatic enzyme replacement and supplemental vitamins.  Vitamin levels be checked with annual studies with his next visit.     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  He is currently monitoring glucoses and will follow-up in the endocrine clinic.     CF related sinusitis: No symptoms of acute sinusitis at this time.    Iron deficiency anemia: Iron studies and CBC will be checked at the next visit with annual studies.    Environmental allergies: Adequately controlled with current azelastine, cetirizine, Flonase and Patanol eyedrops.     Reflux: Adequately controlled with current pantoprazole.     Healthcare maintenance: Annual studies with next visit.  The patient has received his influenza vaccine for this flu season.    Follow-up in 3 months with annual studies, no glucose tolerance test or DEXA.  The longitudinal plan of care for the diagnosis(es)/condition(s) as documented were addressed during this visit. Due to the added complexity in care, I will continue to support Demario in the subsequent management and with ongoing continuity of care.  Zana Lilly MD       CF HPI  The patient was seen and examined by Zana Lilly MD   At the last visit, the patient was near the end of her course of IV antibiotics for a pulmonary exacerbation.  He completed meropenem on January 3.  He has felt well since that time.  Breathing is comfortable at rest and with all usual activity.  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  Reports occasional cough at baseline.  Sputum is at baseline light green to clear no recent change in the color or volume.  No chest pain.  No hemoptysis.  Vest therapy twice daily.  No fever, chills or night sweats.    Review of systems:  Appetite is good  No ear pain, sore throat, sinus pain or rhinorrhea  No nausea, vomiting, diarrhea or abdominal  pain.  Blood sugars 100-150  A complete ROS was otherwise negative except as noted in the HPI.    Current Outpatient Medications   Medication Sig Dispense Refill     albuterol (PROVENTIL) (2.5 MG/3ML) 0.083% neb solution INHALE 1 VIAL BY NEBULIZATION THREE TIMES DAILY 270 mL 10     ALLERGY RELIEF CETIRIZINE 10 MG tablet TAKE 1 TABLET (10 MG) BY MOUTH DAILY 30 tablet 10     azelastine (ASTELIN) 0.1 % nasal spray Spray 1 spray into both nostrils daily 30 mL 11     azithromycin (ZITHROMAX) 500 MG tablet TAKE ONE TABLET BY MOUTH ON MONDAY, WEDNESDAY AND FRIDAY MORNING 12 tablet 10     citalopram (CELEXA) 20 MG tablet Take 1 tablet (20 mg) by mouth daily 30 tablet 1     Continuous Glucose Sensor (FREESTYLE MARQUES 3 SENSOR) MISC 1 each every 14 days 2 each 5     dornase ren (PULMOZYME) 2.5 MG/2.5ML neb solution Inhale 2.5 mg into the lungs 2 times daily INHALE 2.5MG INTO THE LUNGS TWO TIMES A DAYINHALE 2.5MG INTO THE LUNGS TWO TIMES A  mL 11     fluticasone (FLONASE) 50 MCG/ACT nasal spray Spray 2 sprays into both nostrils daily 16 g 11     lipase-protease-amylase (CREON 36) 24205-844432-258467 units CPEP 2-3 with meals.       mvw complete formulation (SOFTGELS ) capsule TAKE ONE CAPSULE BY MOUTH TWICE A DAY 60 capsule 11     olopatadine (PATANOL) 0.1 % ophthalmic solution Place 1 drop into both eyes 2 times daily 5 mL 11     pantoprazole (PROTONIX) 20 MG EC tablet TAKE 1 TABLET (20 MG) BY MOUTH DAILY 30 tablet 10     albuterol (PROAIR HFA) 108 (90 Base) MCG/ACT inhaler Inhale 2 puffs into the lungs every 6 hours as needed for shortness of breath or wheezing (Patient not taking: Reported on 5/23/2024) 26 g 11     sodium chloride inhalant 7 % NEBU neb solution Take 4 mLs by nebulization 2 times daily as needed for wheezing (Patient not taking: Reported on 5/23/2024) 240 mL 11     tobramycin (BETHKIS) 300 MG/4ML nebulizer solution Take 4 mLs (300 mg) by nebulization 2 times daily 28 days and 28 days off (Patient  not taking: Reported on 5/23/2024) 224 mL 5     No current facility-administered medications for this visit.     Allergies   Allergen Reactions     Amoxicillin-Pot Clavulanate Diarrhea     Past Medical History:   Diagnosis Date     CF (cystic fibrosis) (H)     Genotype: g542x/621+1g>t     Impaired glucose tolerance      Pancreatic insufficiency      S/P lobectomy of lung 8/4/2015    Right upper lobectomy - 2009       Past Surgical History:   Procedure Laterality Date     H STATISTIC PICC LINE INSERTION >5YR, FAILED Bilateral 03/15/2019    Stenosis in both arms, unable to thread catheter     HC LAP,INGUINAL HERNIA REPR,INITIAL  2002     INSERT PICC LINE Left 10/12/2020    Procedure: INSERTION, PICC @1100;  Surgeon: Felicia Branch MD;  Location: [facility] OR     INSERT PICC LINE Left 12/6/2021    Procedure: INSERTION, PICC;  Surgeon: Tahir Alvarado MD;  Location: [facility] OR     INSERT PICC LINE Left 8/31/2022    Procedure: SINGLE LUMEN INSERTION, PICC;  Surgeon: Felicia Branch MD;  Location: [facility] OR     IR CHEST PORT PLACEMENT > 5 YRS OF AGE  2/8/2023     IR PICC PLACEMENT > 5 YRS OF AGE  03/18/2019     IR PICC PLACEMENT > 5 YRS OF AGE  12/30/2019     IR PICC PLACEMENT > 5 YRS OF AGE  10/12/2020     IR PICC PLACEMENT > 5 YRS OF AGE  12/6/2021     IR PICC PLACEMENT > 5 YRS OF AGE  8/31/2022     LOBECTOMY LUNG Right 2009    Right upper lobe       Social History     Socioeconomic History     Marital status: Single     Spouse name: Not on file     Number of children: Not on file     Years of education: Not on file     Highest education level: Not on file   Occupational History     Occupation: [employer]   Tobacco Use     Smoking status: Never     Passive exposure: Past     Smokeless tobacco: Former     Types: Chew     Tobacco comments:     dad smokes   Vaping Use     Vaping status: Never Used   Substance and Sexual Activity     Alcohol use: Yes     Comment: 2-3 drinks evenings, mostly weekends     Drug use: Never  "    Sexual activity: Not on file   Other Topics Concern     Parent/sibling w/ CABG, MI or angioplasty before 65F 55M? Not Asked   Social History Narrative    12/27/2019  -Graduated college in 2020.  Looking for work as a sales .       Social Determinants of Health     Financial Resource Strain: Not on file   Food Insecurity: Not on file   Transportation Needs: Not on file   Physical Activity: Not on file   Stress: Not on file   Social Connections: Not on file   Interpersonal Safety: Unknown (11/28/2023)    [facility]    Intimate Partner Violence      Are you in a relationship where you are physically hurt, threatened and/or made to feel afraid?: Unable to assess   Housing Stability: Not on file         /75   Pulse 60   Ht 1.785 m (5' 10.28\")   Wt 77.4 kg (170 lb 10.2 oz)   SpO2 95%   BMI 24.29 kg/m    Body mass index is 24.29 kg/m .  Exam:   GENERAL APPEARANCE: Well developed, well nourished, alert, and in no apparent distress.  EYES: PERRL, EOMI  HENT: Nasal mucosa with edema and no hyperemia. No nasal polyps.  EARS: Canals clear, TMs normal  MOUTH: Oral mucosa is moist, without any lesions, no tonsillar enlargement, no oropharyngeal exudate.  NECK: supple, no masses, no thyromegaly.  LYMPHATICS: No significant axillary, cervical, or supraclavicular nodes.  RESP: normal percussion, good air flow throughout.  Mild scattered crackles. No rhonchi. No wheezes.  CV: Normal S1, S2, regular rhythm, normal rate. No murmur.  No rub. No gallop. No LE edema.   ABDOMEN:  Bowel sounds normal, soft, nontender, no HSM or masses.   MS: extremities normal. No clubbing. No cyanosis.  SKIN: no rash on limited exam  NEURO: Mentation intact, speech normal, normal strength and tone, normal gait and stance  PSYCH: mentation appears normal. and affect normal/bright  Results:  Recent Results (from the past 168 hour(s))   General PFT Lab (Please always keep checked)    Collection " Time: 05/23/24  8:59 AM   Result Value Ref Range    FVC-Pred 5.11 L    FVC-Pre 3.58 L    FVC-%Pred-Pre 70 %    FEV1-Pre 2.66 L    FEV1-%Pred-Pre 61 %    FEV1FVC-Pred 84 %    FEV1FVC-Pre 74 %    FEFMax-Pred 10.29 L/sec    FEFMax-Pre 7.47 L/sec    FEFMax-%Pred-Pre 72 %    FEF2575-Pred 4.50 L/sec    FEF2575-Pre 1.94 L/sec    CCN8302-%Pred-Pre 43 %    ExpTime-Pre 10.15 sec    FIFMax-Pre 4.04 L/sec    FEV1FEV6-Pred 84 %    FEV1FEV6-Pre 77 %                   Results as noted above.    PFT Interpretation:  Moderate obstructive ventilatory defect.  Increased from previous.  Similar to recent best.  Valid Maneuver             CF Exacerbation  Absent            Respiratory Therapist Note:         Vest                Brand: Hill-Rom - traditional Hill Rom: Frequencies 8, 9, 10 at pressure 10 then frequencies 18, 19, 20 at pressure 6. and Hill-Rom - [place] Frequencies: 13-15, intensity 8-10                Cough Pause: Cough Pause; Yes                Vest Garment Size: Adult Medium                Last Fitting Date: Due as needed                Frequency of therapy: 14 times per week                Concerns: None         Exercise (purposeful and aerobic for >20 minutes each session): No. Physical job but no formal exercise routine                Does this qualify as additional airway clearance: No         Alternative Airway Clearance:          Nebulized Medications                Bronchodilators: Albuterol                Mucolytic: Pulmozyme                Antibiotics: COBY                Additional Inhaled Medications: MDI - Albuterol as needed                Spacer Use:          Review Cleaning: Yes. Top rack of .         Education and Transition Information                Correct order of inhaled medications: Yes                Mechanism of Action of inhaled medications: Yes                Frequency of inhaled medications: Yes                Dosage of inhaled medications: Yes                Other:          Home  Care:                Nebulizer Cups (Brand/Type): Carrie                Nebulizer Compressor                            Year Purchased: Recently replaced                            [facility], Phone: [phone], Fax: [phone]                Nebulizer Supply Company:                            [facility], Phone: [phone], Fax: [phone]         Oxygen: N/A                                Pulmonary Rehab: N/A         Plan of Care and Goals for next visit:  It was great seeing you in clinic, please reach out with any airway clearance needs.      Again, thank you for allowing me to participate in the care of your patient.        Sincerely,        Zana Lilly MD

## 2024-05-23 NOTE — PATIENT INSTRUCTIONS
Cystic Fibrosis Self-Care Plan    RECOMMENDATIONS:   Help us provide the best possible care. If you receive a questionnaire from the CF Foundation about your clinic experience today please fill it out.  It should take less than 5 minutes. Let us know what we are doing well and how we can improve.    Continue current medication, exercise, nebs and vest therapy.   Follow up with endocrinology as scheduled.        Cystic Fibrosis :    Cora Butler  [phone]  [facility] Nurse line:  [facility]    [phone]     [facility] Fax Number:      [phone]         Cystic Fibrosis Respiratory Therapists:   Meghann Gonzalez                          [phone]          May Rao   [phone]  Cystic Fibrosis Dietitians:              Sherie Aparicio              [phone]                            Saba Gill                        [phone]   Cystic Fibrosis Diabetes Nurse:    Ashley Cleveland               [phone]    Cystic Fibrosis Social Workers:     Yamila Aranda              [phone]                     Courtney Fenton               [phone]  Cystic Fibrosis Pharmacists:           Hannah Orellana                              [phone] (pager)         Angeline Quinones      [phone]   Cystic Fibrosis Genetic Counselor:   Yuliana Ghotra    [phone]    [facility] website:  [email or web address]    We have recently learned about an albuterol neb solution shortage due to a manufacturing delay. There is still a small supply coming in but not enough to meet the current demand. We do not yet have an estimate for when this will become widely available again.    We our asking our CF community to do the following:    Please take time to check your supply of albuterol neb solution at home. We recommend keeping at least a 2-week supply of albuterol nebs at home in case of illness.    2.  If you have an albuterol inhaler at home, you  can use 4 puffs of the inhaler with a spacer in place of the nebulized albuterol at the start of your treatments. It is important to use a spacer for the best technique. If you do not have a spacer at home or have questions on technique, we will be happy to review and send one to your home address. Please also take a moment to check that your albuterol HFA inhaler is available and not .  inhalers may be less effective as the medication loses its potency or power. In some instances your team may suggest another alternative instead of an albuterol inhaler.    3.  Please take care in requesting refills. Albuterol neb solution is a life-saving medication for patients having severe asthma attacks and other emergency respiratory conditions. Let s work together to make sure that albuterol neb solution is available to those who need it urgently.    Reach out to your care team with questions and to confirm your planned alternative for albuterol nebs. Tist[facility] will be the fastest way to connect. If possible, please reserve the nursing line for more urgent concerns as we are short on nursing staff.    Here are some reputable sites with more information:    https://www.cidrap.[facility]/resilient-drug-supply/us-liquid-albuterol-ckzhwonj-aymunyqr-xmfdci-after-major-supplier-shuts-down    https://www.[employer]//health/albuterol-shortage    https://www.ashp.org/drug-shortages/current-shortages/drug-shortage-detail.aspx?va=214&loginreturnUrl=SSOCheckOnly       MRN: [id number]   Clinic Date: May 23, 2024   Patient: Demario Abbasi     Annual Studies:   IGG   Date Value Ref Range Status   09/10/2020 1,739 (H) 610 - 1,616 mg/dL Final     Immunoglobulin G   Date Value Ref Range Status   2023 1,561 610 - 1,616 mg/dL Final     Insulin   Date Value Ref Range Status   2023 22.0 2.6 - 24.9 uU/mL Final   2022 16.7 3.0 - 25.0 mU/L Final   2019 Canceled, Test credited 3 - 25 mU/L Final     Comment:      Unsatisfactory specimen - hemolyzed  NOTIFIED OZ PHILLIPS MD AT 1450 ON 2/22/19 BY JV       There are no preventive care reminders to display for this patient.    Pulmonary Function Tests  FEV1: amount of air you can blow out in 1 second  FVC: total amount of air you can take in and blow out    Your Goals:             Latest Ref Rng & Units 5/23/2024     8:59 AM   PFT   FVC L 3.58  P   FEV1 L 2.66  P   FVC% % 70  P   FEV1% % 61  P      P Preliminary result          Airway Clearance: The Most Important Way to Keep Your Lungs Healthy  Vest Settings:   Hill-Rom Frequencies: 8, 9, 10 Pressure 10 Then, Frequencies 18, 19, 20 Pressure 6     RespirTech: Quick Start with Pressure of     Do each frequency for 5 minutes; Deflate vest after each frequency & cough 3 times before beginning the next setting.    Vest and Neb Therapy should be done 2 times/day.    Good Nutrition Can Improve Lung Function and Overall Health    Take ALL of your vitamins with food    Take 1/2 of your enzymes before EVERY meal/snack and the other 1/2 mid-meal/snack    Wt Readings from Last 3 Encounters:   05/23/24 77.4 kg (170 lb 10.2 oz)   12/21/23 76.4 kg (168 lb 6.2 oz)   08/24/23 76 kg (167 lb 9.6 oz)       Body mass index is 24.29 kg/m .         National CF Foundation Recommendations for BMI in CF Adults: Women: at least 22 Men: at least 23        Controlling Blood Sugars Helps Prevent Lung Infections & Improves Nutrition  Test blood sugar:    In the morning before eating (goal is )    2 hours after a meal (goal is less than 150)    When pre-meal glucose is greater than 150 add correction    At bedtime (if less than 100 eat a snack with 15 grams of carbohydrates  Last A1C Results:   Hemoglobin A1C   Date Value Ref Range Status   08/24/2023 5.9 (H) <5.7 % Final     Comment:     Normal <5.7%   Prediabetes 5.7-6.4%    Diabetes 6.5% or higher     Note: Adopted from ADA consensus guidelines.   09/10/2020 5.7 (H) 0 - 5.6 % Final      Comment:     Normal <5.7% Prediabetes 5.7-6.4%  Diabetes 6.5% or higher - adopted from ADA   consensus guidelines.           If diabetic, measure A1C every 6 months. Goal: Under 7%    Staying Healthy  Research:  If you are interested in learning about research opportunities or have questions, please contact the CF Research Team at [phone] or [email or web address].    CF Foundation:  Compass is a personalized resource service to help you with the insurance, financial, legal and other issues you are facing.  It's free, confidential and available to anyone with CF.  Ask your  for more information or contact Compass directly at 979-COMPASS ([phone]) or compass@cff.org, or learn more at cff.org/compass.

## 2024-05-23 NOTE — PROGRESS NOTES
Reason for Visit  Demario Abbasi is a 29 year old year old male who is being seen for Cystic Fibrosis (CF follow up )      Assessment and plan:   Demario Abbasi is a 29 year old male with cystic fibrosis, pancreatic insufficiency, depression, acne and impaired glucose tolerance who is s/p RUL resection for recurrent exacerbations in 2009.   He last required IV antibiotics in June 2023 for a pulmonary exacerbation.     Maintenance   Modulator: Not eligible based on mutations  Mutation:  [id number]/621+1g>t  AW Clearance: Vest  Bronchodilators:  Albuterol  Mucolytics: Pulmozyme, Hypertonic saline  Antibiotics Inh:  Bethkis  Antibiotics Oral: Azithromycin  Other:  Colonization Hx: Staph aureus (MSSA), Achromobacter xylosoxidans/denitrificans   Annual Studies: Due August 2024  Contraindications to standard of care:     CF/pulmonary: The patient completed IV antibiotics shortly after his last clinic visit and has been well since that time.  Reports excellent exercise tolerance.  Cough and sputum are at baseline.  The patient does not appear to be having a pulmonary exacerbation at this time.  Oxygenation is adequate at 95%.  PFTs with a moderate obstructive ventilatory defect, similar to baseline.  This does also reflect his previous lobectomy.  He will continue current twice daily vest therapy with albuterol, Pulmozyme, hypertonic saline and every other month COBY.  Continue azithromycin.    CFTR Modulation: Based on the above-noted genotype, the patient is not a candidate for modulator therapy at this time.     Pancreatic insufficiency: The patient denies symptoms of malabsorption.  His weight is in an excellent range.  He will continue his current pancreatic enzyme replacement and supplemental vitamins.  Vitamin levels be checked with annual studies with his next visit.     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  He is currently monitoring glucoses and will follow-up in  the endocrine clinic.     CF related sinusitis: No symptoms of acute sinusitis at this time.    Iron deficiency anemia: Iron studies and CBC will be checked at the next visit with annual studies.    Environmental allergies: Adequately controlled with current azelastine, cetirizine, Flonase and Patanol eyedrops.     Reflux: Adequately controlled with current pantoprazole.     Healthcare maintenance: Annual studies with next visit.  The patient has received his influenza vaccine for this flu season.    Follow-up in 3 months with annual studies, no glucose tolerance test or DEXA.  The longitudinal plan of care for the diagnosis(es)/condition(s) as documented were addressed during this visit. Due to the added complexity in care, I will continue to support Demario in the subsequent management and with ongoing continuity of care.  Zana Lilly MD       CF HPI  The patient was seen and examined by Zana Lilly MD   At the last visit, the patient was near the end of her course of IV antibiotics for a pulmonary exacerbation.  He completed meropenem on January 3.  He has felt well since that time.  Breathing is comfortable at rest and with all usual activity.  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  Reports occasional cough at baseline.  Sputum is at baseline light green to clear no recent change in the color or volume.  No chest pain.  No hemoptysis.  Vest therapy twice daily.  No fever, chills or night sweats.    Review of systems:  Appetite is good  No ear pain, sore throat, sinus pain or rhinorrhea  No nausea, vomiting, diarrhea or abdominal pain.  Blood sugars 100-150  A complete ROS was otherwise negative except as noted in the HPI.    Current Outpatient Medications   Medication Sig Dispense Refill    albuterol (PROVENTIL) (2.5 MG/3ML) 0.083% neb solution INHALE 1 VIAL BY NEBULIZATION THREE TIMES DAILY 270 mL 10    ALLERGY RELIEF  CETIRIZINE 10 MG tablet TAKE 1 TABLET (10 MG) BY MOUTH DAILY 30 tablet 10    azelastine (ASTELIN) 0.1 % nasal spray Spray 1 spray into both nostrils daily 30 mL 11    azithromycin (ZITHROMAX) 500 MG tablet TAKE ONE TABLET BY MOUTH ON MONDAY, WEDNESDAY AND FRIDAY MORNING 12 tablet 10    citalopram (CELEXA) 20 MG tablet Take 1 tablet (20 mg) by mouth daily 30 tablet 1    Continuous Glucose Sensor (FREESTYLE MARQUES 3 SENSOR) MISC 1 each every 14 days 2 each 5    dornase ren (PULMOZYME) 2.5 MG/2.5ML neb solution Inhale 2.5 mg into the lungs 2 times daily INHALE 2.5MG INTO THE LUNGS TWO TIMES A DAYINHALE 2.5MG INTO THE LUNGS TWO TIMES A  mL 11    fluticasone (FLONASE) 50 MCG/ACT nasal spray Spray 2 sprays into both nostrils daily 16 g 11    lipase-protease-amylase (CREON 36) 83272-543315-278261 units CPEP 2-3 with meals.      mvw complete formulation (SOFTGELS ) capsule TAKE ONE CAPSULE BY MOUTH TWICE A DAY 60 capsule 11    olopatadine (PATANOL) 0.1 % ophthalmic solution Place 1 drop into both eyes 2 times daily 5 mL 11    pantoprazole (PROTONIX) 20 MG EC tablet TAKE 1 TABLET (20 MG) BY MOUTH DAILY 30 tablet 10    albuterol (PROAIR HFA) 108 (90 Base) MCG/ACT inhaler Inhale 2 puffs into the lungs every 6 hours as needed for shortness of breath or wheezing (Patient not taking: Reported on 5/23/2024) 26 g 11    sodium chloride inhalant 7 % NEBU neb solution Take 4 mLs by nebulization 2 times daily as needed for wheezing (Patient not taking: Reported on 5/23/2024) 240 mL 11    tobramycin (BETHKIS) 300 MG/4ML nebulizer solution Take 4 mLs (300 mg) by nebulization 2 times daily 28 days and 28 days off (Patient not taking: Reported on 5/23/2024) 224 mL 5     No current facility-administered medications for this visit.     Allergies   Allergen Reactions    Amoxicillin-Pot Clavulanate Diarrhea     Past Medical History:   Diagnosis Date    CF (cystic fibrosis) (H)     Genotype: g542x/621+1g>t    Impaired glucose  tolerance     Pancreatic insufficiency     S/P lobectomy of lung 8/4/2015    Right upper lobectomy - 2009       Past Surgical History:   Procedure Laterality Date    H STATISTIC PICC LINE INSERTION >5YR, FAILED Bilateral 03/15/2019    Stenosis in both arms, unable to thread catheter    HC LAP,INGUINAL HERNIA REPR,INITIAL  2002    INSERT PICC LINE Left 10/12/2020    Procedure: INSERTION, PICC @1100;  Surgeon: Felicia Branch MD;  Location: [facility] OR    INSERT PICC LINE Left 12/6/2021    Procedure: INSERTION, PICC;  Surgeon: Tahir Alvarado MD;  Location: [facility] OR    INSERT PICC LINE Left 8/31/2022    Procedure: SINGLE LUMEN INSERTION, PICC;  Surgeon: Felicia Branch MD;  Location: [facility] OR    IR CHEST PORT PLACEMENT > 5 YRS OF AGE  2/8/2023    IR PICC PLACEMENT > 5 YRS OF AGE  03/18/2019    IR PICC PLACEMENT > 5 YRS OF AGE  12/30/2019    IR PICC PLACEMENT > 5 YRS OF AGE  10/12/2020    IR PICC PLACEMENT > 5 YRS OF AGE  12/6/2021    IR PICC PLACEMENT > 5 YRS OF AGE  8/31/2022    LOBECTOMY LUNG Right 2009    Right upper lobe       Social History     Socioeconomic History    Marital status: Single     Spouse name: Not on file    Number of children: Not on file    Years of education: Not on file    Highest education level: Not on file   Occupational History    Occupation: Sale    Tobacco Use    Smoking status: Never     Passive exposure: Past    Smokeless tobacco: Former     Types: Chew    Tobacco comments:     dad smokes   Vaping Use    Vaping status: Never Used   Substance and Sexual Activity    Alcohol use: Yes     Comment: 2-3 drinks evenings, mostly weekends    Drug use: Never    Sexual activity: Not on file   Other Topics Concern    Parent/sibling w/ CABG, MI or angioplasty before 65F 55M? Not Asked   Social History Narrative    12/27/2019  -Graduated college in 2020.  Looking for work as a sales .       Social Determinants of Health     Financial Resource Strain: Not on file   Food Insecurity:  "Not on file   Transportation Needs: Not on file   Physical Activity: Not on file   Stress: Not on file   Social Connections: Not on file   Interpersonal Safety: Unknown (11/28/2023)    [facility]    Intimate Partner Violence     Are you in a relationship where you are physically hurt, threatened and/or made to feel afraid?: Unable to assess   Housing Stability: Not on file         /75   Pulse 60   Ht 1.785 m (5' 10.28\")   Wt 77.4 kg (170 lb 10.2 oz)   SpO2 95%   BMI 24.29 kg/m    Body mass index is 24.29 kg/m .  Exam:   GENERAL APPEARANCE: Well developed, well nourished, alert, and in no apparent distress.  EYES: PERRL, EOMI  HENT: Nasal mucosa with edema and no hyperemia. No nasal polyps.  EARS: Canals clear, TMs normal  MOUTH: Oral mucosa is moist, without any lesions, no tonsillar enlargement, no oropharyngeal exudate.  NECK: supple, no masses, no thyromegaly.  LYMPHATICS: No significant axillary, cervical, or supraclavicular nodes.  RESP: normal percussion, good air flow throughout.  Mild scattered crackles. No rhonchi. No wheezes.  CV: Normal S1, S2, regular rhythm, normal rate. No murmur.  No rub. No gallop. No LE edema.   ABDOMEN:  Bowel sounds normal, soft, nontender, no HSM or masses.   MS: extremities normal. No clubbing. No cyanosis.  SKIN: no rash on limited exam  NEURO: Mentation intact, speech normal, normal strength and tone, normal gait and stance  PSYCH: mentation appears normal. and affect normal/bright  Results:  Recent Results (from the past 168 hour(s))   General PFT Lab (Please always keep checked)    Collection Time: 05/23/24  8:59 AM   Result Value Ref Range    FVC-Pred 5.11 L    FVC-Pre 3.58 L    FVC-%Pred-Pre 70 %    FEV1-Pre 2.66 L    FEV1-%Pred-Pre 61 %    FEV1FVC-Pred 84 %    FEV1FVC-Pre 74 %    FEFMax-Pred 10.29 L/sec    FEFMax-Pre 7.47 L/sec    FEFMax-%Pred-Pre 72 %    FEF2575-Pred 4.50 L/sec    FEF2575-Pre 1.94 L/sec    FUT7127-%Pred-Pre " 43 %    ExpTime-Pre 10.15 sec    FIFMax-Pre 4.04 L/sec    FEV1FEV6-Pred 84 %    FEV1FEV6-Pre 77 %                   Results as noted above.    PFT Interpretation:  Moderate obstructive ventilatory defect.  Increased from previous.  Similar to recent best.  Valid Maneuver             CF Exacerbation  Absent

## 2024-05-24 ENCOUNTER — TELEPHONE (OUTPATIENT)
Dept: PULMONOLOGY | Facility: CLINIC | Age: 29
End: 2024-05-24
Payer: COMMERCIAL

## 2024-05-28 LAB
BACTERIA SPT CULT: ABNORMAL

## 2024-05-30 NOTE — TELEPHONE ENCOUNTER
PRIOR AUTHORIZATION DENIED    Medication: [facility] OLOPATADINE HCL 0.1 % OP SOLN  Insurance Company: [employer] [place] - Phone [phone] Fax [phone]  Denial Date: 5/24/2024  Denial Reason(s):     Appeal Information: N/A  Patient Notified: No

## 2024-05-31 ENCOUNTER — TELEPHONE (OUTPATIENT)
Dept: PULMONOLOGY | Facility: CLINIC | Age: 29
End: 2024-05-31
Payer: COMMERCIAL

## 2024-05-31 NOTE — PROGRESS NOTES
Respiratory Therapist Note:         Vest                Brand: Hill-Rom - traditional Hill Rom: Frequencies 8, 9, 10 at pressure 10 then frequencies 18, 19, 20 at pressure 6. and Hill-Rom - [place] Frequencies: 13-15, intensity 8-10                Cough Pause: Cough Pause; Yes                Vest Garment Size: Adult Medium                Last Fitting Date: Due as needed                Frequency of therapy: 14 times per week                Concerns: None         Exercise (purposeful and aerobic for >20 minutes each session): No. Physical job but no formal exercise routine                Does this qualify as additional airway clearance: No         Alternative Airway Clearance:          Nebulized Medications                Bronchodilators: Albuterol                Mucolytic: Pulmozyme                Antibiotics: COBY                Additional Inhaled Medications: MDI - Albuterol as needed                Spacer Use:          Review Cleaning: Yes. Top rack of .         Education and Transition Information                Correct order of inhaled medications: Yes                Mechanism of Action of inhaled medications: Yes                Frequency of inhaled medications: Yes                Dosage of inhaled medications: Yes                Other:          Home Care:                Nebulizer Cups (Brand/Type): Rogate                Nebulizer Compressor                            Year Purchased: Recently replaced                            [facility], Phone: [phone], Fax: [phone]                Nebulizer Supply Company:                            [facility], Phone: [phone], Fax: [phone]         Oxygen: N/A                                Pulmonary Rehab: N/A         Plan of Care and Goals for next visit:  It was great seeing you in clinic, please reach out with any airway clearance needs.

## 2024-05-31 NOTE — TELEPHONE ENCOUNTER
Received call from patient re: inability to flush port during monthly port flush. Clinic administered TPA without successful flushing after. Wondering what next steps are.    Provided patient with IR Triage phone number: [phone] and instructed patient to call.    Tammy George RN

## 2024-06-04 ENCOUNTER — TELEPHONE (OUTPATIENT)
Dept: INTERVENTIONAL RADIOLOGY/VASCULAR | Facility: CLINIC | Age: 29
End: 2024-06-04

## 2024-06-04 ENCOUNTER — TELEPHONE (OUTPATIENT)
Dept: PULMONOLOGY | Facility: CLINIC | Age: 29
End: 2024-06-04
Payer: COMMERCIAL

## 2024-06-04 DIAGNOSIS — E84.9 CF (CYSTIC FIBROSIS) (H): Primary | ICD-10-CM

## 2024-06-04 NOTE — TELEPHONE ENCOUNTER
Patient called triage, he had spoken with IR nurse. IR is requesting for a referral to be place to investigate his port after the did not have a successful response to altepase.    This RN will place order. Discussed with patient that referral needs to be signed by Dr. Lilly.    Encouraged patient to call IR tomorrow am if he has not heard from them to schedule investigation of port.    Patient verbalized understanding.    FANNIE MataN, RN  RN Care Coordinator [facility]

## 2024-06-04 NOTE — TELEPHONE ENCOUNTER
"Message left for patient, referencing [employer] message sent last week regarding insurance denial for eye drop-    \"Your insurance denied approval for your OLOPATADINE HCL 0.1 % OP SOLN .     They said it can be purchased over the counter. We appealed and it was denied.     You can purchase it over the counter. I don't know if you have ever used GoodRx, however when I looked at the prices to buy it through [employer], it is still cheaper to to buy it over the counter.     Let us know if we need to send a new prescription for you to use GoodRx.\"    Asked patient to call back with further questions or to reply to [employer] message.    FANNIE MataN, RN  RN Care Coordinator [facility]    "

## 2024-06-04 NOTE — TELEPHONE ENCOUNTER
Pt. Calls to report his port is not flushing or drawing back blood. [facility] attempted alteplase without success. Pt. Wondering how to be seen. Explained process of needing IR port check referral. Pt. Agrees with plan and is going to update his MD. Has IR triage number if needs any further assistance.

## 2024-06-07 ENCOUNTER — VIRTUAL VISIT (OUTPATIENT)
Dept: EDUCATION SERVICES | Facility: CLINIC | Age: 29
End: 2024-06-07
Payer: COMMERCIAL

## 2024-06-07 ENCOUNTER — TELEPHONE (OUTPATIENT)
Dept: EDUCATION SERVICES | Facility: CLINIC | Age: 29
End: 2024-06-07

## 2024-06-07 DIAGNOSIS — E08.9 DIABETES MELLITUS RELATED TO CYSTIC FIBROSIS (H): ICD-10-CM

## 2024-06-07 DIAGNOSIS — E84.8 DIABETES MELLITUS RELATED TO CYSTIC FIBROSIS (H): ICD-10-CM

## 2024-06-07 PROCEDURE — 99207 PR NO BILLABLE SERVICE THIS VISIT: CPT | Mod: 95 | Performed by: NUTRITIONIST

## 2024-06-07 NOTE — NURSING NOTE
Is the patient currently in the state of [place]? YES    Visit mode:VIDEO    If the visit is dropped, the patient can be reconnected by: VIDEO VISIT: Text to cell phone:   Telephone Information:   Mobile [phone]       Will anyone else be joining the visit? NO  (If patient encounters technical issues they should call [phone])    How would you like to obtain your AVS? MyChart    Are changes needed to the allergy or medication list? Pt stated no med changes    Are refills needed on medications prescribed by this physician? NO    Reason for visit: JITENDRA STEVENS

## 2024-06-07 NOTE — PROGRESS NOTES
Virtual Visit Details    Type of service:  Video Visit   Joined the call at 6/7/2024, 11:38:59 am.  Left the call at 6/7/2024, 11:46:59 am.  You were on the call for 8 minutes  Originating Location (pt. Location): Home    Distant Location (provider location):  Off-site  Platform used for Video Visit: Lingorami    Diabetes Self-Management Education & Support    Demario Abbasi presents today for education related to Cystic Fibrosis Related Diabetes (CFRD)    Patient is being treated with:  Diet  He is accompanied by father    Year of diagnosis: 2024  Referring provider:  Tay      PATIENT CONCERNS/REASON FOR REFERRAL Follow up to review braeden report        ASSESSMENT:    Taking Medication:     Current Diabetes Management per Patient:  Taking diabetes medications? no    Monitoring    See telephone encounter from today for detailed report    Patient's most recent   Lab Results   Component Value Date    A1C 5.9 08/24/2023    A1C 5.7 09/10/2020      Patient's A1C goal: <7.0    Healthy Eating:        Problem Solving:      Patient is at risk of hypoglycemia?: NO  Hospitalizations for hyper or hypoglycemia: No    EDUCATION and INSTRUCTION PROVIDED AT THIS VISIT:    Reviewed report with patient. Report sent to Dr. Cross for further assessment and planning.  Patient has been taught insulin and carb counting at last visit. He thinks he would be able to use an insulin to carb ratio if prescribed.     Patient-stated goal written and given to Demario Abbasi.  Verbalized and demonstrated understanding of instructions.       FOLLOW-UP:      Any diabetes medication dose changes were made via the [facility]DE Protocol and Collaborative Practice Agreement with [facility].  A copy of this encounter was provided to patient's referring provider.

## 2024-06-10 ENCOUNTER — ANCILLARY PROCEDURE (OUTPATIENT)
Dept: INTERVENTIONAL RADIOLOGY/VASCULAR | Facility: CLINIC | Age: 29
End: 2024-06-10
Attending: INTERNAL MEDICINE
Payer: COMMERCIAL

## 2024-06-10 DIAGNOSIS — E84.9 CF (CYSTIC FIBROSIS) (H): ICD-10-CM

## 2024-06-10 PROCEDURE — 36598 INJ W/FLUOR EVAL CV DEVICE: CPT | Performed by: PHYSICIAN ASSISTANT

## 2024-06-10 PROCEDURE — 2894A VOIDCORRECT: CPT | Performed by: PHYSICIAN ASSISTANT

## 2024-06-10 RX ORDER — IODIXANOL 320 MG/ML
50 INJECTION, SOLUTION INTRAVASCULAR ONCE
Status: COMPLETED | OUTPATIENT
Start: 2024-06-10 | End: 2024-06-10

## 2024-06-10 RX ORDER — HEPARIN SODIUM (PORCINE) LOCK FLUSH IV SOLN 100 UNIT/ML 100 UNIT/ML
5 SOLUTION INTRAVENOUS ONCE
Status: COMPLETED | OUTPATIENT
Start: 2024-06-10 | End: 2024-06-10

## 2024-06-10 RX ADMIN — IODIXANOL 5 ML: 320 INJECTION, SOLUTION INTRAVASCULAR at 13:39

## 2024-06-10 RX ADMIN — HEPARIN SODIUM (PORCINE) LOCK FLUSH IV SOLN 100 UNIT/ML 10 ML: 100 SOLUTION at 13:39

## 2024-06-10 NOTE — PROGRESS NOTES
Demario Abbasi  [id number]    Patient with cystic fibrosis and frequent venous access needs. He had a RIGHT sided low chest dual lumen port placed 2/2023. On routine flush maintenance of lower reservoir it did not flush or aspirate and he had alteplase therapy for VAD clearance.    He has never used the upper reservoir.    The reservoirs site looks normal and well healed. The reservoirs were easily palpated and accessed with non-coring needles.    Both gave blood return and accepted saline flush.    Contrast injected in lower port showed some extravasation where the catheter connects to the reservoir. Palpation over the site and injection revealed a pressure bulge.    The system likely has an extensive fibrin sheath and contrast is refluxing back to the reservoir versus a defect in the connection of the catheter and reservoir.    Recommend not to use the port and replacement as patient needs ongoing reliable venous access.     Patient is agreeable and will discuss with his CF provider for port revision order.

## 2024-06-12 ENCOUNTER — TELEPHONE (OUTPATIENT)
Dept: PULMONOLOGY | Facility: CLINIC | Age: 29
End: 2024-06-12
Payer: COMMERCIAL

## 2024-06-12 DIAGNOSIS — E84.0 CYSTIC FIBROSIS WITH PULMONARY EXACERBATION (H): Primary | ICD-10-CM

## 2024-06-12 NOTE — TELEPHONE ENCOUNTER
IR referral placed per recommendations below. Will route order to Dr. Lilly for signature.    Faith Louis, FANNIEN, RN  RN Care Coordinator [facility]      ----- Message from Zana Lilly MD sent at 6/11/2024  5:14 PM CDT -----  Regarding: Replce port  It looks like we need to replace Demario's port.  Zana  ----- Message -----  From: Aldo Tucker PA-C  Sent: 6/10/2024   1:51 PM CDT  To: Zana Lilly MD; #    Demario Garciaemann  [id number]    Patient with cystic fibrosis and frequent venous access needs. He had a RIGHT sided low chest dual lumen port placed 2/2023. On routine flush maintenance of lower reservoir it did not flush or aspirate and he had alteplase therapy for VAD clearance.    He has never used the upper reservoir.    The reservoirs site looks normal and well healed. The reservoirs were easily palpated and accessed with non-coring needles.    Both gave blood return and accepted saline flush.    Contrast injected in lower port showed some extravasation where the catheter connects to the reservoir. Palpation over the site and injection revealed a pressure bulge.    The system likely has an extensive fibrin sheath and contrast is refluxing back to the reservoir versus a defect in the connection of the catheter and reservoir.    Recommend not to use the port and replacement as patient needs ongoing reliable venous access.     Patient is agreeable and will discuss with his CF provider for port revision order.

## 2024-06-14 ENCOUNTER — TELEPHONE (OUTPATIENT)
Dept: PULMONOLOGY | Facility: CLINIC | Age: 29
End: 2024-06-14
Payer: COMMERCIAL

## 2024-06-14 NOTE — TELEPHONE ENCOUNTER
Left Voicemail (1st Attempt) for the patient to call back and schedule the following:    Appointment type: F  Provider: JACQUELINE  Return date: 08/23/2024  Specialty phone number: [phone]  Additional appointment(s) needed: CF LOOP  Additonal Notes: Able to schedule in afternoon Thursday clinic for Dr. Lilly unless it is held for [facility] clinic

## 2024-06-15 ENCOUNTER — HEALTH MAINTENANCE LETTER (OUTPATIENT)
Age: 29
End: 2024-06-15

## 2024-06-17 ENCOUNTER — TELEPHONE (OUTPATIENT)
Dept: PULMONOLOGY | Facility: CLINIC | Age: 29
End: 2024-06-17
Payer: COMMERCIAL

## 2024-06-17 NOTE — TELEPHONE ENCOUNTER
Patient called to check on status of IR port replacement, he has not received a call yet from IR to set it up. Reviewed that the order is in epic and has been signed. IR had contacted Dr. Lilly last Friday 6/14 to review the case, plan is to proceed with single lumen port with this placement. Notified patient that IR should be calling in the next couple days, if no call by mid-week, instructed patient to call back.    Tammy eGorge RN

## 2024-06-27 NOTE — TELEPHONE ENCOUNTER
6/27 - patient called stating he still has not heard from IR to schedule port replacement. Provided patient with IR scheduling line.    Tammy George RN

## 2024-06-28 RX ORDER — CEFAZOLIN SODIUM 2 G/50ML
2 SOLUTION INTRAVENOUS
Status: CANCELLED | OUTPATIENT
Start: 2024-06-28

## 2024-07-09 ENCOUNTER — TELEPHONE (OUTPATIENT)
Dept: ENDOCRINOLOGY | Facility: CLINIC | Age: 29
End: 2024-07-09
Payer: COMMERCIAL

## 2024-07-09 NOTE — TELEPHONE ENCOUNTER
Patient confirmed scheduled appointment:  Date: 7/23   Time: 10:30 am   Visit type: return cystic fibrosis   Provider: Tay   [place] [facility] [place]  Testing/imaging: NA   Additional notes: Spoke to pt and scheduled soonest avail appt per below message   Jolynn Holden CARIN have copied our clinic coordinators to this message.    Please schedule patient to see me in the CF endocrinology clinic.    Thank you (Per Dr. Cross)     Lacey Heller on 7/9/2024 at 8:28 AM

## 2024-07-16 ENCOUNTER — ANCILLARY PROCEDURE (OUTPATIENT)
Dept: RADIOLOGY | Facility: AMBULATORY SURGERY CENTER | Age: 29
End: 2024-07-16
Attending: INTERNAL MEDICINE
Payer: COMMERCIAL

## 2024-07-16 ENCOUNTER — HOSPITAL ENCOUNTER (OUTPATIENT)
Facility: AMBULATORY SURGERY CENTER | Age: 29
Discharge: HOME | End: 2024-07-16
Attending: RADIOLOGY | Admitting: RADIOLOGY
Payer: COMMERCIAL

## 2024-07-16 VITALS
WEIGHT: 175 LBS | HEIGHT: 70 IN | HEART RATE: 56 BPM | TEMPERATURE: 97.2 F | OXYGEN SATURATION: 95 % | DIASTOLIC BLOOD PRESSURE: 71 MMHG | RESPIRATION RATE: 18 BRPM | SYSTOLIC BLOOD PRESSURE: 110 MMHG | BODY MASS INDEX: 25.05 KG/M2

## 2024-07-16 DIAGNOSIS — E84.0 CYSTIC FIBROSIS WITH PULMONARY EXACERBATION (H): ICD-10-CM

## 2024-07-16 LAB
ERYTHROCYTE [DISTWIDTH] IN BLOOD BY AUTOMATED COUNT: 12.7 % (ref 10–15)
HCT VFR BLD AUTO: 45.1 % (ref 40–53)
HGB BLD-MCNC: 15.8 G/DL (ref 13.3–17.7)
INR PPP: 1.04 (ref 0.85–1.15)
MCH RBC QN AUTO: 29.4 PG (ref 26.5–33)
MCHC RBC AUTO-ENTMCNC: 35 G/DL (ref 31.5–36.5)
MCV RBC AUTO: 84 FL (ref 78–100)
PLATELET # BLD AUTO: 293 10E3/UL (ref 150–450)
RBC # BLD AUTO: 5.38 10E6/UL (ref 4.4–5.9)
WBC # BLD AUTO: 10.2 10E3/UL (ref 4–11)

## 2024-07-16 PROCEDURE — 36590 REMOVAL TUNNELED CV CATH: CPT

## 2024-07-16 PROCEDURE — 76937 US GUIDE VASCULAR ACCESS: CPT | Mod: 26 | Performed by: RADIOLOGY

## 2024-07-16 PROCEDURE — 85027 COMPLETE CBC AUTOMATED: CPT | Performed by: PATHOLOGY

## 2024-07-16 PROCEDURE — 85610 PROTHROMBIN TIME: CPT | Performed by: PATHOLOGY

## 2024-07-16 PROCEDURE — 99152 MOD SED SAME PHYS/QHP 5/>YRS: CPT | Performed by: RADIOLOGY

## 2024-07-16 PROCEDURE — 36561 INSERT TUNNELED CV CATH: CPT

## 2024-07-16 PROCEDURE — 36561 INSERT TUNNELED CV CATH: CPT | Mod: GC | Performed by: RADIOLOGY

## 2024-07-16 PROCEDURE — 36590 REMOVAL TUNNELED CV CATH: CPT | Mod: GC | Performed by: RADIOLOGY

## 2024-07-16 PROCEDURE — 77001 FLUOROGUIDE FOR VEIN DEVICE: CPT | Mod: 26 | Performed by: RADIOLOGY

## 2024-07-16 DEVICE — IMP SMART PORT LOW PROFILE 300 PSI 63CMX6FR H787CT60LPPDVI0: Type: IMPLANTABLE DEVICE | Site: ABDOMEN | Status: FUNCTIONAL

## 2024-07-16 RX ORDER — NALOXONE HYDROCHLORIDE 0.4 MG/ML
0.4 INJECTION, SOLUTION INTRAMUSCULAR; INTRAVENOUS; SUBCUTANEOUS
Status: DISCONTINUED | OUTPATIENT
Start: 2024-07-16 | End: 2024-07-17 | Stop reason: HOSPADM

## 2024-07-16 RX ORDER — CEFAZOLIN SODIUM 2 G/50ML
2 SOLUTION INTRAVENOUS
Status: COMPLETED | OUTPATIENT
Start: 2024-07-16 | End: 2024-07-16

## 2024-07-16 RX ORDER — LIDOCAINE 40 MG/G
CREAM TOPICAL
Status: DISCONTINUED | OUTPATIENT
Start: 2024-07-16 | End: 2024-07-17 | Stop reason: HOSPADM

## 2024-07-16 RX ORDER — ONDANSETRON 2 MG/ML
4 INJECTION INTRAMUSCULAR; INTRAVENOUS
Status: DISCONTINUED | OUTPATIENT
Start: 2024-07-16 | End: 2024-07-17 | Stop reason: HOSPADM

## 2024-07-16 RX ORDER — NALOXONE HYDROCHLORIDE 0.4 MG/ML
0.2 INJECTION, SOLUTION INTRAMUSCULAR; INTRAVENOUS; SUBCUTANEOUS
Status: DISCONTINUED | OUTPATIENT
Start: 2024-07-16 | End: 2024-07-17 | Stop reason: HOSPADM

## 2024-07-16 RX ORDER — FENTANYL CITRATE 50 UG/ML
25-50 INJECTION, SOLUTION INTRAMUSCULAR; INTRAVENOUS EVERY 5 MIN PRN
Status: DISCONTINUED | OUTPATIENT
Start: 2024-07-16 | End: 2024-07-17 | Stop reason: HOSPADM

## 2024-07-16 RX ORDER — HEPARIN SODIUM (PORCINE) LOCK FLUSH IV SOLN 100 UNIT/ML 100 UNIT/ML
5-10 SOLUTION INTRAVENOUS
Status: DISCONTINUED | OUTPATIENT
Start: 2024-07-16 | End: 2024-07-17 | Stop reason: HOSPADM

## 2024-07-16 RX ORDER — HEPARIN SODIUM (PORCINE) LOCK FLUSH IV SOLN 100 UNIT/ML 100 UNIT/ML
SOLUTION INTRAVENOUS PRN
Status: DISCONTINUED | OUTPATIENT
Start: 2024-07-16 | End: 2024-07-16 | Stop reason: HOSPADM

## 2024-07-16 RX ORDER — FLUMAZENIL 0.1 MG/ML
0.2 INJECTION, SOLUTION INTRAVENOUS
Status: DISCONTINUED | OUTPATIENT
Start: 2024-07-16 | End: 2024-07-17 | Stop reason: HOSPADM

## 2024-07-16 RX ORDER — HEPARIN SODIUM,PORCINE 10 UNIT/ML
5-10 VIAL (ML) INTRAVENOUS
Status: DISCONTINUED | OUTPATIENT
Start: 2024-07-16 | End: 2024-07-17 | Stop reason: HOSPADM

## 2024-07-16 RX ORDER — HEPARIN SODIUM,PORCINE 10 UNIT/ML
5-10 VIAL (ML) INTRAVENOUS EVERY 24 HOURS
Status: DISCONTINUED | OUTPATIENT
Start: 2024-07-16 | End: 2024-07-17 | Stop reason: HOSPADM

## 2024-07-16 RX ORDER — FENTANYL CITRATE 50 UG/ML
INJECTION, SOLUTION INTRAMUSCULAR; INTRAVENOUS DAILY PRN
Status: DISCONTINUED | OUTPATIENT
Start: 2024-07-16 | End: 2024-07-16 | Stop reason: HOSPADM

## 2024-07-16 RX ORDER — ACETAMINOPHEN 325 MG/1
975 TABLET ORAL ONCE
Status: DISCONTINUED | OUTPATIENT
Start: 2024-07-16 | End: 2024-07-17 | Stop reason: HOSPADM

## 2024-07-16 RX ADMIN — CEFAZOLIN SODIUM 2 G: 2 SOLUTION INTRAVENOUS at 10:11

## 2024-07-16 NOTE — H&P
"    Interventional Radiology   History of Physical  07/16/24     HPI: 29 year old male here for removal of double lumen subcutaneous venous port and placement of new single lumen subcutaneous venous port to be placed via lower abdomen.      Patient has history of CF with frequently pulmonary exacerbations requiring vascular access.  His existing port is a 7FR (catheter cut to 29 cm) BARD double lumen venous port placed subcutaneously via low right chest placed on 2/8/2023.      6/10/2024 was seen for right port check after attempted alteplase therapy.  Superior reservoir never used.  Lower reservoir with contrast reflux back to reservoir vs defect in the connection of catheter and reservoir.      Denies any CP, SOB, abdominal pain, N/V/D, fevers or chills, issues with fentanyl or versed, sleep apnea or asthma.      Interventional Radiology Pre-Procedure Sedation Assessment   Time of Assessment: 9:21 AM    Expected Level: Moderate Sedation    Indication: Sedation is required for the following type of Procedure:  right lower chest port removal and single lumen port placement    Sedation and procedural consent: Risks, benefits and alternatives were discussed with Patient    PO Intake: Appropriately NPO for procedure    ASA Class: Class 2 - MILD SYSTEMIC DISEASE, NO ACUTE PROBLEMS, NO FUNCTIONAL LIMITATIONS.    Vitals:   /76 (BP Location: Right arm)   Pulse 76   Temp 97.1  F (36.2  C) (Temporal)   Resp 18   Ht 1.778 m (5' 10\")   Wt 79.4 kg (175 lb)   SpO2 99%   BMI 25.11 kg/m      Mallampati: Grade 1:  Soft palate, uvula, tonsillar pillars, and posterior pharyngeal wall visible    Lungs: Lungs Clear with good breath sounds bilaterally    Heart: Normal heart sounds and rate    Focused history and physical completed prior to procedure. I have reviewed the lab findings, diagnostic data, medications, and the plan for sedation. I have determined this patient to be an appropriate candidate for the planned " sedation and procedure and have reassessed the patient IMMEDIATELY PRIOR to sedation and procedure.    Pertinent Labs:     Lab Results   Component Value Date    WBC 7.2 12/21/2023    WBC 7.4 08/24/2023    WBC 5.6 02/08/2023    WBC 10.1 09/10/2020    WBC 6.7 12/31/2019    WBC 11.2 (H) 12/30/2019     Lab Results   Component Value Date    HGB 14.5 12/21/2023    HGB 14.4 08/24/2023    HGB 14.9 02/08/2023    HGB 15.8 09/10/2020    HGB 14.6 12/31/2019    HGB 14.6 12/30/2019     Lab Results   Component Value Date     12/21/2023     08/24/2023     02/08/2023     09/10/2020     12/31/2019     12/30/2019         COVID-19 Antibody Results, Testing for Immunity           No data to display              COVID-19 PCR Results          8/26/2022    02:17 12/1/2023    13:34   COVID-19 PCR Results   COVID-19 Virus by PCR (External Result) Not Detected     Not Detected        Not detected          Details          This result is from an external source.               Priyanka Brice PA-C  Interventional Radiology  Pager: [phone]

## 2024-07-16 NOTE — BRIEF OP NOTE
[facility]    Brief Operative Note    Pre-operative diagnosis: Cystic fibrosis with pulmonary manifestations (H) [E84.0]  Post-operative diagnosis Same as pre-operative diagnosis    Procedure: single lumen Insert port vascular access, Right - Chest  Physician:  Dr. Kentrell Best  Assistant:  Dr. Home Mills  Anesthesia: Moderate Sedation; 4 mg versed, 250 mcg fentanyl  for 1 hour and 10 minutes  Estimated Blood Loss: Less than 10 ml    Drains: None  Specimens: * No specimens in log *  Findings:  Completed image-guided placement of 6 Croatian 48 cm single lumen power-injectable central venous chest port via right internal jugular vein with tip in high RA.  Aspirates and flushes freely, heparin locked and is ready for immediate use.    Completed removal of right double lumen subcutaneous venous lower chest port in its entirety.  Please see dictated note under imaging for more detailed information    Complications: None.  Implants:   Implant Name Type Inv. Item Serial No.  Lot No. LRB No. Used Action   IMP SMART PORT LOW PROFILE 300 PSI [id number] - [id number] Port IMP SMART PORT LOW PROFILE 300 PSI [id number]  [employer] [id number] Right 1 Implanted

## 2024-07-16 NOTE — DISCHARGE INSTRUCTIONS
A collaboration between [facility] and [facility]  Experts in minimally invasive, targeted treatments performed using imaging guidance    Venous Access Device,  Port Catheter or Tunneled or Non-Tunneled Central Line Placement    Today you had a procedure today to install a venous access device; either a tunneled central vein catheter or a subcutaneous port catheter.    After you go home:  Drink plenty of fluids.  Generally 6-8 (8 ounce) glasses a day is recommended.  Resume your regular diet unless otherwise ordered by a medical provider.  Keep any applied tape/gauze dressings clean and dry.  Change tape/gauze dressings if they get wet or soiled.  You may shower the following day after procedure, however cover and protect from moisture any tape/gauze dressings.  You may let water hit and run over dried skin glue, but do not scrub.  Pat the area dry after showering.  Port placement incisions are closed with absorbable suture, meaning they do not need to be removed at a later date, and a topical skin adhesive (skin glue).  This glue will wear off in 7-14 days.  Do not remove before this time.  If 14 days have passed and residual glue is present, you may gently remove it.  Do not apply gels, lotions, or ointments to the glue site for the first 10 days as this may cause the glue to prematurely soften and fail.  Do not perform strenuous activities or lift greater than 10 pounds for the next three days.  If there is bleeding or oozing from the procedure site, apply firm pressure to the area for 5-10 minutes.  If the bleeding continues seek medical advice at the numbers below.  Mild procedure site discomfort can be treated with an ice pack and over-the-counter pain relievers.        For 24 hours after any sedation used:  Relax and take it easy.  No strenuous activities.  Do not drive or operate machines at home or at work.  No alcohol consumption.  Do not make any  important or legal decisions.    Call our Interventional Radiology (IR) service if:  If you start bleeding from the procedure site.  If you do start to bleed from the site, lie down and hold some pressure on the site.  Our radiology provider can help you decide if you need to return to the hospital.  If you have new or worsening pain related to the procedure.  If you have concerning swelling at the procedure site.  If you develop persistent nausea or vomiting.  If you develop hives or a rash or any unexplained itching.  If you have a fever of greater than 100.5  F and chills in the first 5 days after procedure.  Any other concerns related to your procedure.      [facility]  Interventional Radiology (IR)  [address]  [address] [facility] [place] [address]    Contact Number:  [phone]  (IR Nurse Triage)  Monday - Friday 7am - 4pm    After hours for urgent concerns:  [phone]  After 4pm Monday - Friday, Weekends and Holidays.   Ask for Interventional Radiology on-call.  Someone is available 24 hours a day.  [facility] toll free number:  [phone]

## 2024-07-18 LAB
ACID FAST STAIN (ARUP): NORMAL

## 2024-07-25 ENCOUNTER — MYC MEDICAL ADVICE (OUTPATIENT)
Dept: PULMONOLOGY | Facility: CLINIC | Age: 29
End: 2024-07-25
Payer: COMMERCIAL

## 2024-08-01 ENCOUNTER — OFFICE VISIT (OUTPATIENT)
Dept: PULMONOLOGY | Facility: CLINIC | Age: 29
End: 2024-08-01
Attending: INTERNAL MEDICINE
Payer: COMMERCIAL

## 2024-08-01 ENCOUNTER — LAB (OUTPATIENT)
Dept: LAB | Facility: CLINIC | Age: 29
End: 2024-08-01
Attending: INTERNAL MEDICINE
Payer: COMMERCIAL

## 2024-08-01 VITALS
TEMPERATURE: 99.1 F | BODY MASS INDEX: 23.71 KG/M2 | HEART RATE: 87 BPM | HEIGHT: 70 IN | DIASTOLIC BLOOD PRESSURE: 74 MMHG | OXYGEN SATURATION: 97 % | WEIGHT: 165.6 LBS | SYSTOLIC BLOOD PRESSURE: 114 MMHG

## 2024-08-01 DIAGNOSIS — K86.89 PANCREATIC INSUFFICIENCY: ICD-10-CM

## 2024-08-01 DIAGNOSIS — E84.0 CYSTIC FIBROSIS WITH PULMONARY EXACERBATION (H): ICD-10-CM

## 2024-08-01 DIAGNOSIS — E84.9 DIABETES MELLITUS DUE TO CYSTIC FIBROSIS (H): ICD-10-CM

## 2024-08-01 DIAGNOSIS — E84.9 CF (CYSTIC FIBROSIS) (H): ICD-10-CM

## 2024-08-01 DIAGNOSIS — E84.8 DIABETES MELLITUS RELATED TO CYSTIC FIBROSIS (H): ICD-10-CM

## 2024-08-01 DIAGNOSIS — E08.9 DIABETES MELLITUS DUE TO CYSTIC FIBROSIS (H): ICD-10-CM

## 2024-08-01 DIAGNOSIS — J30.2 SEASONAL ALLERGIES: ICD-10-CM

## 2024-08-01 DIAGNOSIS — Z90.2 S/P LOBECTOMY OF LUNG: ICD-10-CM

## 2024-08-01 DIAGNOSIS — E08.9 DIABETES MELLITUS RELATED TO CYSTIC FIBROSIS (H): ICD-10-CM

## 2024-08-01 DIAGNOSIS — E84.0 CYSTIC FIBROSIS WITH PULMONARY MANIFESTATIONS (H): ICD-10-CM

## 2024-08-01 DIAGNOSIS — E84.9 CYSTIC FIBROSIS EXACERBATION (H): ICD-10-CM

## 2024-08-01 LAB
ALBUMIN SERPL BCG-MCNC: 4.3 G/DL (ref 3.5–5.2)
ALBUMIN UR-MCNC: NEGATIVE MG/DL
ALP SERPL-CCNC: 153 U/L (ref 40–150)
ALT SERPL W P-5'-P-CCNC: 46 U/L (ref 0–70)
ANION GAP SERPL CALCULATED.3IONS-SCNC: 13 MMOL/L (ref 7–15)
APPEARANCE UR: CLEAR
AST SERPL W P-5'-P-CCNC: 39 U/L (ref 0–45)
BASOPHILS # BLD AUTO: 0.1 10E3/UL (ref 0–0.2)
BASOPHILS NFR BLD AUTO: 1 %
BILIRUB DIRECT SERPL-MCNC: <0.2 MG/DL (ref 0–0.3)
BILIRUB SERPL-MCNC: 0.7 MG/DL
BILIRUB UR QL STRIP: NEGATIVE
BUN SERPL-MCNC: 12 MG/DL (ref 6–20)
CALCIUM SERPL-MCNC: 9.3 MG/DL (ref 8.8–10.4)
CHLORIDE SERPL-SCNC: 98 MMOL/L (ref 98–107)
CHOLEST SERPL-MCNC: 117 MG/DL
COLOR UR AUTO: YELLOW
CREAT SERPL-MCNC: 1.1 MG/DL (ref 0.67–1.17)
EGFRCR SERPLBLD CKD-EPI 2021: >90 ML/MIN/1.73M2
EOSINOPHIL # BLD AUTO: 0.4 10E3/UL (ref 0–0.7)
EOSINOPHIL NFR BLD AUTO: 2 %
ERYTHROCYTE [DISTWIDTH] IN BLOOD BY AUTOMATED COUNT: 12.8 % (ref 10–15)
ERYTHROCYTE [SEDIMENTATION RATE] IN BLOOD BY WESTERGREN METHOD: 10 MM/HR (ref 0–15)
EXPTIME-PRE: 7.35 SEC
FASTING STATUS PATIENT QL REPORTED: NO
FASTING STATUS PATIENT QL REPORTED: NO
FEF2575-%PRED-PRE: 26 %
FEF2575-PRE: 1.19 L/SEC
FEF2575-PRED: 4.49 L/SEC
FEFMAX-%PRED-PRE: 59 %
FEFMAX-PRE: 6.08 L/SEC
FEFMAX-PRED: 10.3 L/SEC
FEV1-%PRED-PRE: 49 %
FEV1-PRE: 2.13 L
FEV1FEV6-PRE: 69 %
FEV1FEV6-PRED: 84 %
FEV1FVC-PRE: 69 %
FEV1FVC-PRED: 84 %
FIFMAX-PRE: 3.71 L/SEC
FVC-%PRED-PRE: 60 %
FVC-PRE: 3.1 L
FVC-PRED: 5.11 L
GGT SERPL-CCNC: 68 U/L (ref 8–61)
GLUCOSE SERPL-MCNC: 96 MG/DL (ref 70–99)
GLUCOSE UR STRIP-MCNC: NEGATIVE MG/DL
HBA1C MFR BLD: 6.2 %
HCO3 SERPL-SCNC: 25 MMOL/L (ref 22–29)
HCT VFR BLD AUTO: 46.5 % (ref 40–53)
HDLC SERPL-MCNC: 46 MG/DL
HGB BLD-MCNC: 15.8 G/DL (ref 13.3–17.7)
HGB UR QL STRIP: NEGATIVE
IMM GRANULOCYTES # BLD: 0.1 10E3/UL
IMM GRANULOCYTES NFR BLD: 1 %
INR PPP: 1.05 (ref 0.85–1.15)
IRON SERPL-MCNC: 40 UG/DL (ref 61–157)
KETONES UR STRIP-MCNC: NEGATIVE MG/DL
LDLC SERPL CALC-MCNC: 30 MG/DL
LEUKOCYTE ESTERASE UR QL STRIP: NEGATIVE
LYMPHOCYTES # BLD AUTO: 1.7 10E3/UL (ref 0.8–5.3)
LYMPHOCYTES NFR BLD AUTO: 11 %
MAGNESIUM SERPL-MCNC: 2.1 MG/DL (ref 1.7–2.3)
MCH RBC QN AUTO: 29.3 PG (ref 26.5–33)
MCHC RBC AUTO-ENTMCNC: 34 G/DL (ref 31.5–36.5)
MCV RBC AUTO: 86 FL (ref 78–100)
MONOCYTES # BLD AUTO: 1.2 10E3/UL (ref 0–1.3)
MONOCYTES NFR BLD AUTO: 7 %
MUCOUS THREADS #/AREA URNS LPF: PRESENT /LPF
NEUTROPHILS # BLD AUTO: 12.7 10E3/UL (ref 1.6–8.3)
NEUTROPHILS NFR BLD AUTO: 78 %
NITRATE UR QL: NEGATIVE
NONHDLC SERPL-MCNC: 71 MG/DL
NRBC # BLD AUTO: 0 10E3/UL
NRBC BLD AUTO-RTO: 0 /100
PH UR STRIP: 7 [PH] (ref 5–7)
PHOSPHATE SERPL-MCNC: 3.1 MG/DL (ref 2.5–4.5)
PLATELET # BLD AUTO: 311 10E3/UL (ref 150–450)
POTASSIUM SERPL-SCNC: 4.1 MMOL/L (ref 3.4–5.3)
PROT SERPL-MCNC: 8 G/DL (ref 6.4–8.3)
RBC # BLD AUTO: 5.4 10E6/UL (ref 4.4–5.9)
RBC URINE: 2 /HPF
SODIUM SERPL-SCNC: 136 MMOL/L (ref 135–145)
SP GR UR STRIP: 1.02 (ref 1–1.03)
TRIGL SERPL-MCNC: 205 MG/DL
TSH SERPL DL<=0.005 MIU/L-ACNC: 0.77 UIU/ML (ref 0.3–4.2)
UROBILINOGEN UR STRIP-MCNC: NORMAL MG/DL
WBC # BLD AUTO: 16.2 10E3/UL (ref 4–11)
WBC URINE: <1 /HPF

## 2024-08-01 PROCEDURE — 36415 COLL VENOUS BLD VENIPUNCTURE: CPT | Performed by: PATHOLOGY

## 2024-08-01 PROCEDURE — 80061 LIPID PANEL: CPT | Performed by: PATHOLOGY

## 2024-08-01 PROCEDURE — 84100 ASSAY OF PHOSPHORUS: CPT | Performed by: PATHOLOGY

## 2024-08-01 PROCEDURE — 94375 RESPIRATORY FLOW VOLUME LOOP: CPT | Performed by: INTERNAL MEDICINE

## 2024-08-01 PROCEDURE — 82248 BILIRUBIN DIRECT: CPT | Performed by: PATHOLOGY

## 2024-08-01 PROCEDURE — 84443 ASSAY THYROID STIM HORMONE: CPT | Performed by: PATHOLOGY

## 2024-08-01 PROCEDURE — 82043 UR ALBUMIN QUANTITATIVE: CPT | Performed by: INTERNAL MEDICINE

## 2024-08-01 PROCEDURE — 87186 SC STD MICRODIL/AGAR DIL: CPT | Performed by: INTERNAL MEDICINE

## 2024-08-01 PROCEDURE — 83540 ASSAY OF IRON: CPT | Performed by: PATHOLOGY

## 2024-08-01 PROCEDURE — 83735 ASSAY OF MAGNESIUM: CPT | Performed by: PATHOLOGY

## 2024-08-01 PROCEDURE — 83036 HEMOGLOBIN GLYCOSYLATED A1C: CPT | Performed by: INTERNAL MEDICINE

## 2024-08-01 PROCEDURE — 80053 COMPREHEN METABOLIC PANEL: CPT | Performed by: PATHOLOGY

## 2024-08-01 PROCEDURE — 99000 SPECIMEN HANDLING OFFICE-LAB: CPT | Performed by: PATHOLOGY

## 2024-08-01 PROCEDURE — 82784 ASSAY IGA/IGD/IGG/IGM EACH: CPT | Performed by: INTERNAL MEDICINE

## 2024-08-01 PROCEDURE — 87102 FUNGUS ISOLATION CULTURE: CPT | Performed by: INTERNAL MEDICINE

## 2024-08-01 PROCEDURE — 84446 ASSAY OF VITAMIN E: CPT | Mod: 90 | Performed by: PATHOLOGY

## 2024-08-01 PROCEDURE — 82785 ASSAY OF IGE: CPT | Performed by: INTERNAL MEDICINE

## 2024-08-01 PROCEDURE — 99417 PROLNG OP E/M EACH 15 MIN: CPT | Performed by: INTERNAL MEDICINE

## 2024-08-01 PROCEDURE — 82977 ASSAY OF GGT: CPT | Performed by: PATHOLOGY

## 2024-08-01 PROCEDURE — 85610 PROTHROMBIN TIME: CPT | Performed by: PATHOLOGY

## 2024-08-01 PROCEDURE — 82306 VITAMIN D 25 HYDROXY: CPT | Performed by: INTERNAL MEDICINE

## 2024-08-01 PROCEDURE — 85652 RBC SED RATE AUTOMATED: CPT | Performed by: PATHOLOGY

## 2024-08-01 PROCEDURE — 99215 OFFICE O/P EST HI 40 MIN: CPT | Mod: 25 | Performed by: INTERNAL MEDICINE

## 2024-08-01 PROCEDURE — 84403 ASSAY OF TOTAL TESTOSTERONE: CPT | Performed by: INTERNAL MEDICINE

## 2024-08-01 PROCEDURE — 81001 URINALYSIS AUTO W/SCOPE: CPT | Performed by: PATHOLOGY

## 2024-08-01 PROCEDURE — 85025 COMPLETE CBC W/AUTO DIFF WBC: CPT | Performed by: PATHOLOGY

## 2024-08-01 PROCEDURE — 99213 OFFICE O/P EST LOW 20 MIN: CPT | Performed by: INTERNAL MEDICINE

## 2024-08-01 RX ORDER — SULFAMETHOXAZOLE/TRIMETHOPRIM 800-160 MG
1 TABLET ORAL 3 TIMES DAILY
Qty: 63 TABLET | Refills: 0 | Status: SHIPPED | OUTPATIENT
Start: 2024-08-01 | End: 2024-08-22

## 2024-08-01 RX ORDER — DOXYCYCLINE 100 MG/1
100 CAPSULE ORAL 2 TIMES DAILY
Qty: 42 CAPSULE | Refills: 0 | Status: SHIPPED | OUTPATIENT
Start: 2024-08-01 | End: 2024-08-22

## 2024-08-01 NOTE — PATIENT INSTRUCTIONS
Cystic Fibrosis Self-Care Plan    RECOMMENDATIONS:       Doxycycline 100mg twice daily for 3 weeks.  Take with food to avoid nausea.  Be careful with sun exposure.   Bactrim 1 tablet 3 times daily.  Vest therapy 3 times daily when you can.  PFTs at home in 2 weeks, we will try to find a PFT lab.  Otherwise continue current medication, nebs and vest therapy.         Cystic Fibrosis :    Cora Butler   [phone]  [facility] Nurse line:  [place]   [phone]     [facility] Fax Number:      [phone]         Cystic Fibrosis Respiratory Therapists:   Meghann Gonzalez                          [phone]          May Rao   [phone]  Cystic Fibrosis Dietitians:              Sherie Aparicio              [phone]                            Saba Gill                        [phone]   Cystic Fibrosis Diabetes Nurse:    Ashley Cleveland               [phone]    Cystic Fibrosis Social Workers:     Yamila Aranda              [phone]                     Courtney Fenton               [phone]  Cystic Fibrosis Pharmacists:           Hannah Orellana                              [phone] (pager)         Angeline Quinones      [phone]   Cystic Fibrosis Genetic Counselor:   Yuliana Ghotra    [phone]    [facility] website:  [email or web address]    We have recently learned about an albuterol neb solution shortage due to a manufacturing delay. There is still a small supply coming in but not enough to meet the current demand. We do not yet have an estimate for when this will become widely available again.    We our asking our CF community to do the following:    Please take time to check your supply of albuterol neb solution at home. We recommend keeping at least a 2-week supply of albuterol nebs at home in case of illness.    2.  If you have an albuterol inhaler at home, you can use 4 puffs of the inhaler with a spacer  in place of the nebulized albuterol at the start of your treatments. It is important to use a spacer for the best technique. If you do not have a spacer at home or have questions on technique, we will be happy to review and send one to your home address. Please also take a moment to check that your albuterol HFA inhaler is available and not .  inhalers may be less effective as the medication loses its potency or power. In some instances your team may suggest another alternative instead of an albuterol inhaler.    3.  Please take care in requesting refills. Albuterol neb solution is a life-saving medication for patients having severe asthma attacks and other emergency respiratory conditions. Let s work together to make sure that albuterol neb solution is available to those who need it urgently.    Reach out to your care team with questions and to confirm your planned alternative for albuterol nebs. [employer] will be the fastest way to connect. If possible, please reserve the nursing line for more urgent concerns as we are short on nursing staff.    Here are some reputable sites with more information:    https://www.[email or web address]/resilient-drug-supply/us-liquid-albuterol-qepatnhz-ufzcjusp-wxjifc-after-major-supplier-shuts-down    https://www.[employer]//health/albuterol-shortage    https://www.ashp.org/drug-shortages/current-shortages/drug-shortage-detail.aspx?xu=722&loginreturnUrl=SSOCheckOnly       MRN: [id number]   Clinic Date: 2024   Patient: Demario Abbasi     Annual Studies:   IGG   Date Value Ref Range Status   09/10/2020 1,739 (H) 610 - 1,616 mg/dL Final     Immunoglobulin G   Date Value Ref Range Status   2023 1,561 610 - 1,616 mg/dL Final     Insulin   Date Value Ref Range Status   2023 22.0 2.6 - 24.9 uU/mL Final   2022 16.7 3.0 - 25.0 mU/L Final   2019 Canceled, Test credited 3 - 25 mU/L Final     Comment:     Unsatisfactory specimen -  hemolyzed  NOTIFIED OZ PHILLIPS MD AT 1450 ON 2/22/19 BY JV       There are no preventive care reminders to display for this patient.    Pulmonary Function Tests  FEV1: amount of air you can blow out in 1 second  FVC: total amount of air you can take in and blow out    Your Goals:             Latest Ref Rng & Units 8/1/2024     3:01 PM   PFT   FVC L 3.10  P   FEV1 L 2.13  P   FVC% % 60  P   FEV1% % 49  P      P Preliminary result          Airway Clearance: The Most Important Way to Keep Your Lungs Healthy  Vest Settings:   Hill-Rom Frequencies: 8, 9, 10 Pressure 10 Then, Frequencies 18, 19, 20 Pressure 6     RespirTech: Quick Start with Pressure of     Do each frequency for 5 minutes; Deflate vest after each frequency & cough 3 times before beginning the next setting.    Vest and Neb Therapy should be done 2-3 times/day.    Good Nutrition Can Improve Lung Function and Overall Health    Take ALL of your vitamins with food    Take 1/2 of your enzymes before EVERY meal/snack and the other 1/2 mid-meal/snack    Wt Readings from Last 3 Encounters:   08/01/24 75.1 kg (165 lb 9.6 oz)   07/16/24 79.4 kg (175 lb)   05/23/24 77.4 kg (170 lb 10.2 oz)       Body mass index is 23.57 kg/m .         National CF Foundation Recommendations for BMI in CF Adults: Women: at least 22 Men: at least 23        Controlling Blood Sugars Helps Prevent Lung Infections & Improves Nutrition  Test blood sugar:    In the morning before eating (goal is )    2 hours after a meal (goal is less than 150)    When pre-meal glucose is greater than 150 add correction    At bedtime (if less than 100 eat a snack with 15 grams of carbohydrates  Last A1C Results:   Hemoglobin A1C   Date Value Ref Range Status   08/24/2023 5.9 (H) <5.7 % Final     Comment:     Normal <5.7%   Prediabetes 5.7-6.4%    Diabetes 6.5% or higher     Note: Adopted from ADA consensus guidelines.   09/10/2020 5.7 (H) 0 - 5.6 % Final     Comment:     Normal <5.7%  Prediabetes 5.7-6.4%  Diabetes 6.5% or higher - adopted from ADA   consensus guidelines.           If diabetic, measure A1C every 6 months. Goal: Under 7%    Staying Healthy  Research:  If you are interested in learning about research opportunities or have questions, please contact the CF Research Team at [phone] or [email or web address].    CF Foundation:  Compass is a personalized resource service to help you with the insurance, financial, legal and other issues you are facing.  It's free, confidential and available to anyone with CF.  Ask your  for more information or contact [employer] directly at [phone]-COMPASS ([phone]) or compass@cff.org, or learn more at cff.org/compass.

## 2024-08-01 NOTE — PROGRESS NOTES
Reason for Visit  Demario Abbasi is a 29 year old year old male who is being seen for Cystic Fibrosis (F/u)      Assessment and plan:   Demario Abbasi is a 29 year old male with cystic fibrosis, pancreatic insufficiency, depression, acne and impaired glucose tolerance who is s/p RUL resection for recurrent exacerbations in 2009.   He last required IV antibiotics in June 2023 for a pulmonary exacerbation.     Maintenance   Modulator: Not eligible based on mutations  Mutation:  [id number]/621+1g>t  AW Clearance: Vest  Bronchodilators:  Albuterol  Mucolytics: Pulmozyme, Hypertonic saline  Antibiotics Inh:  Bethkis  Antibiotics Oral: Azithromycin  Other:  Colonization Hx: Staph aureus (MSSA), Achromobacter xylosoxidans/denitrificans   Annual Studies: Completed today after the visit.  Contraindications to standard of care:    Pulmonary/cystic fibrosis: The patient reports a slight increase in cough.  No change in exercise tolerance, sputum color or volume.  Exam with faint crackles in the upper lung fields.  Oxygen saturation very good at 97%.  PFTs with moderate obstructive ventilatory defect significantly reduced from previous.  It is unclear whether there is such a discrepancy between PFTs and symptoms.  Nevertheless, with increased cough and decreased PFTs this is most consistent with a CF pulmonary exacerbation.  Antibiotic options are limited due to the resistance of the patient's organisms.  He typically grows Achromobacter and MRSA with sensitivities as noted in micro results.  He does not seem quite sick enough to warrant IV antibiotics.  A trial of oral Bactrim 1 double strength 3 times daily and oral doxycycline 100 mg twice daily, both for 3 weeks will be initiated.  Antibiotics may be adjusted when current culture results are available.  Vest therapy will be increased to 3 times daily when able.  PFTs will be arranged in the next couple of weeks through a local clinic (the patient has misplaced his home  spirometer).  If symptoms worsen or PFTs do not respond to the above-noted intervention, IV antibiotics will be considered.    CF-related diabetes: Diagnosed based on abnormal glucose tolerance test.  Thus far the patient has not required insulin.  Hemoglobin A1c will be included in today's annual studies but is pending at this time.    Pancreatic insufficiency: The patient denies symptoms of malabsorption.  His weight is adequate and fairly stable.  He will continue current pancreatic enzyme replacement and supplemental vitamins.  Vitamin levels will be checked with today's annual studies and vitamin dosing will be adjusted accordingly.    Iron deficiency anemia: Iron and hemoglobin will be checked with annual studies.    Environmental allergies: Moderately well-controlled with current azelastine, cetirizine, Flonase and Patanol eyedrops.    Reflux: Well-controlled with current pantoprazole.    CF-related sinus disease: No symptoms of acute sinusitis at this time.  Above-noted antibiotic should cover most of the patient's sinus organisms as well.    Hand and foot rash: Etiology is unclear.  The patient was encouraged to seek dermatology evaluation if the rash recurs.    CFTR Modulation: The patient is not eligible for any of the currently available CFTR modulators.  We did discuss participation in an RNA trial if he is a candidate and timing will allow.    Healthcare maintenance: The patient is overdue for COVID revaccination.  He has received his influenza vaccine for the 23-24 flu season.  Annual study labs will be drawn after today's visit and reviewed when available.    Follow-up in 3 months with PFTs and sputum culture or in the interim if respiratory symptoms do not return to baseline.  PFTs locally in approximately 2 weeks.    Zana DEL ROSARIO, have spent 55 minutes on the day of the visit to review the chart, interview and examine the patient, review labs and imaging, formulate a plan, document and submit  orders. Time documented is excluding time spent for PFT interpretation.     The longitudinal plan of care for the diagnosis(es)/condition(s) as documented were addressed during this visit. Due to the added complexity in care, I will continue to support Demario in the subsequent management and with ongoing continuity of care.  Zana Lilly MD         CF HPI  The patient was seen and examined by Zana Lilly MD   Since his last visit, the patient had his port replaced, previously double-lumen replaced with single-lumen.  Patient had an episode of rash on his hands and feet about 2 weeks ago described as hot, itchy, red and swollen.  He was evaluated locally and treated with prednisone, hydroxyzine, fluconazole, cetirizine with resolution.  Etiology is unclear.    Breathing is comfortable at rest and with all usual activity.  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  He does note a slight increase in his cough recently which he has attributed to environmental allergies.  Cough is productive of green sputum, baseline in color and volume.  No hemoptysis.  No chest pain.  No fever, chills or night sweats.  He is doing vest therapy twice daily.    Review of systems:  Clear rhinorrhea, no ear pain, sore throat or sinus pain  Appetite is good  No palpitations  No nausea, vomiting, diarrhea or abdominal pain  Previous rash on hands and feet, now resolved.  Blood sugar  in the morning, spikes with meals but normalizes within a couple of hours.  Currently not requiring any insulin.  A complete ROS was otherwise negative except as noted in the HPI.    Current Outpatient Medications   Medication Sig Dispense Refill    albuterol (PROAIR HFA) 108 (90 Base) MCG/ACT inhaler Inhale 2 puffs into the lungs every 6 hours as needed for shortness of breath or wheezing 26 g 11    albuterol (PROVENTIL) (2.5 MG/3ML) 0.083% neb solution INHALE 1 VIAL BY NEBULIZATION THREE  TIMES DAILY 270 mL 10    ALLERGY RELIEF CETIRIZINE 10 MG tablet TAKE 1 TABLET (10 MG) BY MOUTH DAILY 30 tablet 10    azelastine (ASTELIN) 0.1 % nasal spray Spray 1 spray into both nostrils daily 30 mL 11    azithromycin (ZITHROMAX) 500 MG tablet TAKE ONE TABLET BY MOUTH ON MONDAY, WEDNESDAY AND FRIDAY MORNING 12 tablet 10    citalopram (CELEXA) 20 MG tablet Take 1 tablet (20 mg) by mouth daily 30 tablet 1    dornase ren (PULMOZYME) 2.5 MG/2.5ML neb solution Inhale 2.5 mg into the lungs 2 times daily INHALE 2.5MG INTO THE LUNGS TWO TIMES A DAYINHALE 2.5MG INTO THE LUNGS TWO TIMES A  mL 11    doxycycline hyclate (VIBRAMYCIN) 100 MG capsule Take 1 capsule (100 mg) by mouth 2 times daily for 21 days 42 capsule 0    fluticasone (FLONASE) 50 MCG/ACT nasal spray Spray 2 sprays into both nostrils daily 16 g 11    lipase-protease-amylase (CREON 36) 80850-358095-249602 units CPEP 2-3 with meals.      mvw complete formulation (SOFTGELS ) capsule TAKE ONE CAPSULE BY MOUTH TWICE A DAY 60 capsule 11    olopatadine (PATANOL) 0.1 % ophthalmic solution Place 1 drop into both eyes 2 times daily 5 mL 11    pantoprazole (PROTONIX) 20 MG EC tablet TAKE 1 TABLET (20 MG) BY MOUTH DAILY 30 tablet 10    sodium chloride inhalant 7 % NEBU neb solution Take 4 mLs by nebulization 2 times daily as needed for wheezing 240 mL 11    sulfamethoxazole-trimethoprim (BACTRIM DS) 800-160 MG tablet Take 1 tablet by mouth 3 times daily for 21 days 63 tablet 0    tobramycin (BETHKIS) 300 MG/4ML nebulizer solution Take 4 mLs (300 mg) by nebulization 2 times daily 28 days and 28 days off 224 mL 5    Continuous Glucose Sensor (FREESTYLE MARQUES 3 SENSOR) MISC 1 each every 14 days 2 each 5     No current facility-administered medications for this visit.     Allergies   Allergen Reactions    Amoxicillin-Pot Clavulanate Diarrhea     Past Medical History:   Diagnosis Date    CF (cystic fibrosis) (H)     Genotype: g542x/621+1g>t    Impaired glucose  tolerance     Pancreatic insufficiency     S/P lobectomy of lung 8/4/2015    Right upper lobectomy - 2009       Past Surgical History:   Procedure Laterality Date    H STATISTIC PICC LINE INSERTION >5YR, FAILED Bilateral 03/15/2019    Stenosis in both arms, unable to thread catheter    HC LAP,INGUINAL HERNIA REPR,INITIAL  2002    INSERT PICC LINE Left 10/12/2020    Procedure: INSERTION, PICC @1100;  Surgeon: Felicia Branch MD;  Location: [facility] OR    INSERT PICC LINE Left 12/6/2021    Procedure: INSERTION, PICC;  Surgeon: Tahir Alvarado MD;  Location: [facility] OR    INSERT PICC LINE Left 8/31/2022    Procedure: SINGLE LUMEN INSERTION, PICC;  Surgeon: Felicia Branch MD;  Location: [facility] OR    INSERT PORT VASCULAR ACCESS Right 7/16/2024    Procedure: single lumen Insert port vascular access;  Surgeon: Kentrell Best MD;  Location: [facility] OR    IR CHEST PORT PLACEMENT > 5 YRS OF AGE  2/8/2023    IR CHEST PORT PLACEMENT > 5 YRS OF AGE  7/16/2024    IR PICC PLACEMENT > 5 YRS OF AGE  03/18/2019    IR PICC PLACEMENT > 5 YRS OF AGE  12/30/2019    IR PICC PLACEMENT > 5 YRS OF AGE  10/12/2020    IR PICC PLACEMENT > 5 YRS OF AGE  12/6/2021    IR PICC PLACEMENT > 5 YRS OF AGE  8/31/2022    IR PORT CHECK RIGHT  6/10/2024    LOBECTOMY LUNG Right 2009    Right upper lobe       Social History     Socioeconomic History    Marital status: Single     Spouse name: Not on file    Number of children: Not on file    Years of education: Not on file    Highest education level: Not on file   Occupational History    Occupation: [employer]   Tobacco Use    Smoking status: Never     Passive exposure: Past    Smokeless tobacco: Former     Types: Chew    Tobacco comments:     dad smokes   Vaping Use    Vaping status: Never Used   Substance and Sexual Activity    Alcohol use: Yes     Comment: 2-3 drinks evenings, mostly weekends    Drug use: Never    Sexual activity: Not on file   Other Topics Concern    Parent/sibling w/ CABG, MI or  "angioplasty before 65F 55M? Not Asked   Social History Narrative    12/27/2019  -Graduated college in 2020.  Looking for work as a sales .       Social Determinants of Health     Financial Resource Strain: Not on file   Food Insecurity: Not on file   Transportation Needs: Not on file   Physical Activity: Not on file   Stress: Not on file   Social Connections: Not on file   Interpersonal Safety: Unknown (11/28/2023)    [facility]    Intimate Partner Violence     Are you in a relationship where you are physically hurt, threatened and/or made to feel afraid?: Unable to assess   Housing Stability: Not on file         /74   Pulse 87   Temp 99.1  F (37.3  C)   Ht 1.785 m (5' 10.28\")   Wt 75.1 kg (165 lb 9.6 oz)   SpO2 97%   BMI 23.57 kg/m    Body mass index is 23.57 kg/m .  Exam:   GENERAL APPEARANCE: Well developed, well nourished, alert, and in no apparent distress.  EYES: PERRL, EOMI  HENT: Nasal mucosa with edema and no hyperemia. No nasal polyps.  EARS: Canals clear, TMs normal  MOUTH: Oral mucosa is moist, without any lesions, no tonsillar enlargement, no oropharyngeal exudate.  NECK: supple, no masses, no thyromegaly.  LYMPHATICS: No significant axillary, cervical, or supraclavicular nodes.  RESP: normal percussion, mildly diminished airflow on the right.  Bilateral upper lung crackles. No rhonchi. No wheezes.  CV: Normal S1, S2, regular rhythm, normal rate. No murmur.  No rub. No gallop. No LE edema.   ABDOMEN:  Bowel sounds normal, soft, nontender, no HSM or masses.   MS: extremities normal. No clubbing. No cyanosis.  SKIN: no rash on limited exam  NEURO: Mentation intact, speech normal, normal strength and tone, normal gait and stance  PSYCH: mentation appears normal. and affect normal/bright  Results:  Recent Results (from the past 168 hour(s))   General PFT Lab (Please always keep checked)    Collection Time: 08/01/24  3:01 PM   Result Value Ref Range "    FVC-Pred 5.11 L    FVC-Pre 3.10 L    FVC-%Pred-Pre 60 %    FEV1-Pre 2.13 L    FEV1-%Pred-Pre 49 %    FEV1FVC-Pred 84 %    FEV1FVC-Pre 69 %    FEFMax-Pred 10.30 L/sec    FEFMax-Pre 6.08 L/sec    FEFMax-%Pred-Pre 59 %    FEF2575-Pred 4.49 L/sec    FEF2575-Pre 1.19 L/sec    NFY5760-%Pred-Pre 26 %    ExpTime-Pre 7.35 sec    FIFMax-Pre 3.71 L/sec    FEV1FEV6-Pred 84 %    FEV1FEV6-Pre 69 %   Routine UA with microscopic    Collection Time: 08/01/24  4:50 PM   Result Value Ref Range    Color Urine Yellow Colorless, Straw, Light Yellow, Yellow    Appearance Urine Clear Clear    Glucose Urine Negative Negative mg/dL    Bilirubin Urine Negative Negative    Ketones Urine Negative Negative mg/dL    Specific Gravity Urine 1.020 1.003 - 1.035    Blood Urine Negative Negative    pH Urine 7.0 5.0 - 7.0    Protein Albumin Urine Negative Negative mg/dL    Urobilinogen Urine Normal Normal, 2.0 mg/dL    Nitrite Urine Negative Negative    Leukocyte Esterase Urine Negative Negative    Mucus Urine Present (A) None Seen /LPF    RBC Urine 2 <=2 /HPF    WBC Urine <1 <=5 /HPF   Erythrocyte sedimentation rate auto    Collection Time: 08/01/24  4:51 PM   Result Value Ref Range    Erythrocyte Sedimentation Rate 10 0 - 15 mm/hr   TSH with free T4 reflex    Collection Time: 08/01/24  4:51 PM   Result Value Ref Range    TSH 0.77 0.30 - 4.20 uIU/mL   Phosphorus    Collection Time: 08/01/24  4:51 PM   Result Value Ref Range    Phosphorus 3.1 2.5 - 4.5 mg/dL   Magnesium    Collection Time: 08/01/24  4:51 PM   Result Value Ref Range    Magnesium 2.1 1.7 - 2.3 mg/dL   INR    Collection Time: 08/01/24  4:51 PM   Result Value Ref Range    INR 1.05 0.85 - 1.15   GGT    Collection Time: 08/01/24  4:51 PM   Result Value Ref Range    GGT 68 (H) 8 - 61 U/L   Iron    Collection Time: 08/01/24  4:51 PM   Result Value Ref Range    Iron 40 (L) 61 - 157 ug/dL   Lipid Profile    Collection Time: 08/01/24  4:51 PM   Result Value Ref Range    Cholesterol 117 <200  mg/dL    Triglycerides 205 (H) <150 mg/dL    Direct Measure HDL 46 >=40 mg/dL    LDL Cholesterol Calculated 30 <=100 mg/dL    Non HDL Cholesterol 71 <130 mg/dL    Patient Fasting > 8hrs? No    Basic metabolic panel    Collection Time: 08/01/24  4:51 PM   Result Value Ref Range    Sodium 136 135 - 145 mmol/L    Potassium 4.1 3.4 - 5.3 mmol/L    Chloride 98 98 - 107 mmol/L    Carbon Dioxide (CO2) 25 22 - 29 mmol/L    Anion Gap 13 7 - 15 mmol/L    Urea Nitrogen 12.0 6.0 - 20.0 mg/dL    Creatinine 1.10 0.67 - 1.17 mg/dL    GFR Estimate >90 >60 mL/min/1.73m2    Calcium 9.3 8.8 - 10.4 mg/dL    Glucose 96 70 - 99 mg/dL    Patient Fasting > 8hrs? No    Hepatic function panel    Collection Time: 08/01/24  4:51 PM   Result Value Ref Range    Protein Total 8.0 6.4 - 8.3 g/dL    Albumin 4.3 3.5 - 5.2 g/dL    Bilirubin Total 0.7 <=1.2 mg/dL    Alkaline Phosphatase 153 (H) 40 - 150 U/L    AST 39 0 - 45 U/L    ALT 46 0 - 70 U/L    Bilirubin Direct <0.20 0.00 - 0.30 mg/dL   CBC with platelets and differential    Collection Time: 08/01/24  4:51 PM   Result Value Ref Range    WBC Count 16.2 (H) 4.0 - 11.0 10e3/uL    RBC Count 5.40 4.40 - 5.90 10e6/uL    Hemoglobin 15.8 13.3 - 17.7 g/dL    Hematocrit 46.5 40.0 - 53.0 %    MCV 86 78 - 100 fL    MCH 29.3 26.5 - 33.0 pg    MCHC 34.0 31.5 - 36.5 g/dL    RDW 12.8 10.0 - 15.0 %    Platelet Count 311 150 - 450 10e3/uL    % Neutrophils 78 %    % Lymphocytes 11 %    % Monocytes 7 %    % Eosinophils 2 %    % Basophils 1 %    % Immature Granulocytes 1 %    NRBCs per 100 WBC 0 <1 /100    Absolute Neutrophils 12.7 (H) 1.6 - 8.3 10e3/uL    Absolute Lymphocytes 1.7 0.8 - 5.3 10e3/uL    Absolute Monocytes 1.2 0.0 - 1.3 10e3/uL    Absolute Eosinophils 0.4 0.0 - 0.7 10e3/uL    Absolute Basophils 0.1 0.0 - 0.2 10e3/uL    Absolute Immature Granulocytes 0.1 <=0.4 10e3/uL    Absolute NRBCs 0.0 10e3/uL                   Results as noted above.    PFT Interpretation:  Moderate obstructive ventilatory  defect.  Decreased from previous.  Below recent best.   Valid Maneuver             CF Exacerbation  Moderate  Increased vest/bronchodilator/execise and Oral: non-quinolone

## 2024-08-01 NOTE — LETTER
8/1/2024      Demario Abbasi  [address]  [place] [address]      Dear Colleague,    Thank you for referring your patient, Demario Abbasi, to the [facility] LUNG [facility] AND [facility]. Please see a copy of my visit note below.    Reason for Visit  Demario Abbasi is a 29 year old year old male who is being seen for Cystic Fibrosis (F/u)      Assessment and plan:   Demario Abbasi is a 29 year old male with cystic fibrosis, pancreatic insufficiency, depression, acne and impaired glucose tolerance who is s/p RUL resection for recurrent exacerbations in 2009.   He last required IV antibiotics in June 2023 for a pulmonary exacerbation.     Maintenance   Modulator: Not eligible based on mutations  Mutation:  [id number]/621+1g>t  AW Clearance: Vest  Bronchodilators:  Albuterol  Mucolytics: Pulmozyme, Hypertonic saline  Antibiotics Inh:  Bethkis  Antibiotics Oral: Azithromycin  Other:  Colonization Hx: Staph aureus (MSSA), Achromobacter xylosoxidans/denitrificans   Annual Studies: Completed today after the visit.  Contraindications to standard of care:    Pulmonary/cystic fibrosis: The patient reports a slight increase in cough.  No change in exercise tolerance, sputum color or volume.  Exam with faint crackles in the upper lung fields.  Oxygen saturation very good at 97%.  PFTs with moderate obstructive ventilatory defect significantly reduced from previous.  It is unclear whether there is such a discrepancy between PFTs and symptoms.  Nevertheless, with increased cough and decreased PFTs this is most consistent with a CF pulmonary exacerbation.  Antibiotic options are limited due to the resistance of the patient's organisms.  He typically grows Achromobacter and MRSA with sensitivities as noted in micro results.  He does not seem quite sick enough to warrant IV antibiotics.  A trial of oral Bactrim 1 double strength 3 times daily and oral doxycycline 100 mg twice  daily, both for 3 weeks will be initiated.  Antibiotics may be adjusted when current culture results are available.  Vest therapy will be increased to 3 times daily when able.  PFTs will be arranged in the next couple of weeks through a local clinic (the patient has misplaced his home spirometer).  If symptoms worsen or PFTs do not respond to the above-noted intervention, IV antibiotics will be considered.    CF-related diabetes: Diagnosed based on abnormal glucose tolerance test.  Thus far the patient has not required insulin.  Hemoglobin A1c will be included in today's annual studies but is pending at this time.    Pancreatic insufficiency: The patient denies symptoms of malabsorption.  His weight is adequate and fairly stable.  He will continue current pancreatic enzyme replacement and supplemental vitamins.  Vitamin levels will be checked with today's annual studies and vitamin dosing will be adjusted accordingly.    Iron deficiency anemia: Iron and hemoglobin will be checked with annual studies.    Environmental allergies: Moderately well-controlled with current azelastine, cetirizine, Flonase and Patanol eyedrops.    Reflux: Well-controlled with current pantoprazole.    CF-related sinus disease: No symptoms of acute sinusitis at this time.  Above-noted antibiotic should cover most of the patient's sinus organisms as well.    Hand and foot rash: Etiology is unclear.  The patient was encouraged to seek dermatology evaluation if the rash recurs.    CFTR Modulation: The patient is not eligible for any of the currently available CFTR modulators.  We did discuss participation in an RNA trial if he is a candidate and timing will allow.    Healthcare maintenance: The patient is overdue for COVID revaccination.  He has received his influenza vaccine for the 23-24 flu season.  Annual study labs will be drawn after today's visit and reviewed when available.    Follow-up in 3 months with PFTs and sputum culture or in the  interim if respiratory symptoms do not return to baseline.  PFTs locally in approximately 2 weeks.    I, Zana Lilly, have spent 55 minutes on the day of the visit to review the chart, interview and examine the patient, review labs and imaging, formulate a plan, document and submit orders. Time documented is excluding time spent for PFT interpretation.     The longitudinal plan of care for the diagnosis(es)/condition(s) as documented were addressed during this visit. Due to the added complexity in care, I will continue to support Demario in the subsequent management and with ongoing continuity of care.  Zana Lilly MD         CF HPI  The patient was seen and examined by Zana Lilly MD   Since his last visit, the patient had his port replaced, previously double-lumen replaced with single-lumen.  Patient had an episode of rash on his hands and feet about 2 weeks ago described as hot, itchy, red and swollen.  He was evaluated locally and treated with prednisone, hydroxyzine, fluconazole, cetirizine with resolution.  Etiology is unclear.    Breathing is comfortable at rest and with all usual activity.  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  He does note a slight increase in his cough recently which he has attributed to environmental allergies.  Cough is productive of green sputum, baseline in color and volume.  No hemoptysis.  No chest pain.  No fever, chills or night sweats.  He is doing vest therapy twice daily.    Review of systems:  Clear rhinorrhea, no ear pain, sore throat or sinus pain  Appetite is good  No palpitations  No nausea, vomiting, diarrhea or abdominal pain  Previous rash on hands and feet, now resolved.  Blood sugar  in the morning, spikes with meals but normalizes within a couple of hours.  Currently not requiring any insulin.  A complete ROS was otherwise negative except as noted in the HPI.    Current Outpatient Medications    Medication Sig Dispense Refill     albuterol (PROAIR HFA) 108 (90 Base) MCG/ACT inhaler Inhale 2 puffs into the lungs every 6 hours as needed for shortness of breath or wheezing 26 g 11     albuterol (PROVENTIL) (2.5 MG/3ML) 0.083% neb solution INHALE 1 VIAL BY NEBULIZATION THREE TIMES DAILY 270 mL 10     ALLERGY RELIEF CETIRIZINE 10 MG tablet TAKE 1 TABLET (10 MG) BY MOUTH DAILY 30 tablet 10     azelastine (ASTELIN) 0.1 % nasal spray Spray 1 spray into both nostrils daily 30 mL 11     azithromycin (ZITHROMAX) 500 MG tablet TAKE ONE TABLET BY MOUTH ON MONDAY, WEDNESDAY AND FRIDAY MORNING 12 tablet 10     citalopram (CELEXA) 20 MG tablet Take 1 tablet (20 mg) by mouth daily 30 tablet 1     dornase ren (PULMOZYME) 2.5 MG/2.5ML neb solution Inhale 2.5 mg into the lungs 2 times daily INHALE 2.5MG INTO THE LUNGS TWO TIMES A DAYINHALE 2.5MG INTO THE LUNGS TWO TIMES A  mL 11     doxycycline hyclate (VIBRAMYCIN) 100 MG capsule Take 1 capsule (100 mg) by mouth 2 times daily for 21 days 42 capsule 0     fluticasone (FLONASE) 50 MCG/ACT nasal spray Spray 2 sprays into both nostrils daily 16 g 11     lipase-protease-amylase (CREON 36) 83898-541602-250721 units CPEP 2-3 with meals.       mvw complete formulation (SOFTGELS ) capsule TAKE ONE CAPSULE BY MOUTH TWICE A DAY 60 capsule 11     olopatadine (PATANOL) 0.1 % ophthalmic solution Place 1 drop into both eyes 2 times daily 5 mL 11     pantoprazole (PROTONIX) 20 MG EC tablet TAKE 1 TABLET (20 MG) BY MOUTH DAILY 30 tablet 10     sodium chloride inhalant 7 % NEBU neb solution Take 4 mLs by nebulization 2 times daily as needed for wheezing 240 mL 11     sulfamethoxazole-trimethoprim (BACTRIM DS) 800-160 MG tablet Take 1 tablet by mouth 3 times daily for 21 days 63 tablet 0     tobramycin (BETHKIS) 300 MG/4ML nebulizer solution Take 4 mLs (300 mg) by nebulization 2 times daily 28 days and 28 days off 224 mL 5     Continuous Glucose Sensor (FREESTYLE MARQUES 3 SENSOR)  MISC 1 each every 14 days 2 each 5     No current facility-administered medications for this visit.     Allergies   Allergen Reactions     Amoxicillin-Pot Clavulanate Diarrhea     Past Medical History:   Diagnosis Date     CF (cystic fibrosis) (H)     Genotype: [id number]/621+1g>t     Impaired glucose tolerance      Pancreatic insufficiency      S/P lobectomy of lung 8/4/2015    Right upper lobectomy - 2009       Past Surgical History:   Procedure Laterality Date     H STATISTIC PICC LINE INSERTION >5YR, FAILED Bilateral 03/15/2019    Stenosis in both arms, unable to thread catheter     HC LAP,INGUINAL HERNIA REPR,INITIAL  2002     INSERT PICC LINE Left 10/12/2020    Procedure: INSERTION, PICC @1100;  Surgeon: Felicia Branch MD;  Location: [facility] OR     INSERT PICC LINE Left 12/6/2021    Procedure: INSERTION, PICC;  Surgeon: Tahir Alvarado MD;  Location: [facility] OR     INSERT PICC LINE Left 8/31/2022    Procedure: SINGLE LUMEN INSERTION, PICC;  Surgeon: Felicia Branch MD;  Location: [facility] OR     INSERT PORT VASCULAR ACCESS Right 7/16/2024    Procedure: single lumen Insert port vascular access;  Surgeon: Kentrell Best MD;  Location: [facility] OR     IR CHEST PORT PLACEMENT > 5 YRS OF AGE  2/8/2023     IR CHEST PORT PLACEMENT > 5 YRS OF AGE  7/16/2024     IR PICC PLACEMENT > 5 YRS OF AGE  03/18/2019     IR PICC PLACEMENT > 5 YRS OF AGE  12/30/2019     IR PICC PLACEMENT > 5 YRS OF AGE  10/12/2020     IR PICC PLACEMENT > 5 YRS OF AGE  12/6/2021     IR PICC PLACEMENT > 5 YRS OF AGE  8/31/2022     IR PORT CHECK RIGHT  6/10/2024     LOBECTOMY LUNG Right 2009    Right upper lobe       Social History     Socioeconomic History     Marital status: Single     Spouse name: Not on file     Number of children: Not on file     Years of education: Not on file     Highest education level: Not on file   Occupational History     Occupation: [employer]   Tobacco Use     Smoking status: Never     Passive exposure: Past     Smokeless  "tobacco: Former     Types: Chew     Tobacco comments:     dad smokes   Vaping Use     Vaping status: Never Used   Substance and Sexual Activity     Alcohol use: Yes     Comment: 2-3 drinks evenings, mostly weekends     Drug use: Never     Sexual activity: Not on file   Other Topics Concern     Parent/sibling w/ CABG, MI or angioplasty before 65F 55M? Not Asked   Social History Narrative    12/27/2019  -Graduated college in 2020.  Looking for work as a sales .       Social Determinants of Health     Financial Resource Strain: Not on file   Food Insecurity: Not on file   Transportation Needs: Not on file   Physical Activity: Not on file   Stress: Not on file   Social Connections: Not on file   Interpersonal Safety: Unknown (11/28/2023)    [facility]    Intimate Partner Violence      Are you in a relationship where you are physically hurt, threatened and/or made to feel afraid?: Unable to assess   Housing Stability: Not on file         /74   Pulse 87   Temp 99.1  F (37.3  C)   Ht 1.785 m (5' 10.28\")   Wt 75.1 kg (165 lb 9.6 oz)   SpO2 97%   BMI 23.57 kg/m    Body mass index is 23.57 kg/m .  Exam:   GENERAL APPEARANCE: Well developed, well nourished, alert, and in no apparent distress.  EYES: PERRL, EOMI  HENT: Nasal mucosa with edema and no hyperemia. No nasal polyps.  EARS: Canals clear, TMs normal  MOUTH: Oral mucosa is moist, without any lesions, no tonsillar enlargement, no oropharyngeal exudate.  NECK: supple, no masses, no thyromegaly.  LYMPHATICS: No significant axillary, cervical, or supraclavicular nodes.  RESP: normal percussion, mildly diminished airflow on the right.  Bilateral upper lung crackles. No rhonchi. No wheezes.  CV: Normal S1, S2, regular rhythm, normal rate. No murmur.  No rub. No gallop. No LE edema.   ABDOMEN:  Bowel sounds normal, soft, nontender, no HSM or masses.   MS: extremities normal. No clubbing. No cyanosis.  SKIN: no rash on " limited exam  NEURO: Mentation intact, speech normal, normal strength and tone, normal gait and stance  PSYCH: mentation appears normal. and affect normal/bright  Results:  Recent Results (from the past 168 hour(s))   General PFT Lab (Please always keep checked)    Collection Time: 08/01/24  3:01 PM   Result Value Ref Range    FVC-Pred 5.11 L    FVC-Pre 3.10 L    FVC-%Pred-Pre 60 %    FEV1-Pre 2.13 L    FEV1-%Pred-Pre 49 %    FEV1FVC-Pred 84 %    FEV1FVC-Pre 69 %    FEFMax-Pred 10.30 L/sec    FEFMax-Pre 6.08 L/sec    FEFMax-%Pred-Pre 59 %    FEF2575-Pred 4.49 L/sec    FEF2575-Pre 1.19 L/sec    LCW6265-%Pred-Pre 26 %    ExpTime-Pre 7.35 sec    FIFMax-Pre 3.71 L/sec    FEV1FEV6-Pred 84 %    FEV1FEV6-Pre 69 %   Routine UA with microscopic    Collection Time: 08/01/24  4:50 PM   Result Value Ref Range    Color Urine Yellow Colorless, Straw, Light Yellow, Yellow    Appearance Urine Clear Clear    Glucose Urine Negative Negative mg/dL    Bilirubin Urine Negative Negative    Ketones Urine Negative Negative mg/dL    Specific Gravity Urine 1.020 1.003 - 1.035    Blood Urine Negative Negative    pH Urine 7.0 5.0 - 7.0    Protein Albumin Urine Negative Negative mg/dL    Urobilinogen Urine Normal Normal, 2.0 mg/dL    Nitrite Urine Negative Negative    Leukocyte Esterase Urine Negative Negative    Mucus Urine Present (A) None Seen /LPF    RBC Urine 2 <=2 /HPF    WBC Urine <1 <=5 /HPF   Erythrocyte sedimentation rate auto    Collection Time: 08/01/24  4:51 PM   Result Value Ref Range    Erythrocyte Sedimentation Rate 10 0 - 15 mm/hr   TSH with free T4 reflex    Collection Time: 08/01/24  4:51 PM   Result Value Ref Range    TSH 0.77 0.30 - 4.20 uIU/mL   Phosphorus    Collection Time: 08/01/24  4:51 PM   Result Value Ref Range    Phosphorus 3.1 2.5 - 4.5 mg/dL   Magnesium    Collection Time: 08/01/24  4:51 PM   Result Value Ref Range    Magnesium 2.1 1.7 - 2.3 mg/dL   INR    Collection Time: 08/01/24  4:51 PM   Result Value Ref  Range    INR 1.05 0.85 - 1.15   GGT    Collection Time: 08/01/24  4:51 PM   Result Value Ref Range    GGT 68 (H) 8 - 61 U/L   Iron    Collection Time: 08/01/24  4:51 PM   Result Value Ref Range    Iron 40 (L) 61 - 157 ug/dL   Lipid Profile    Collection Time: 08/01/24  4:51 PM   Result Value Ref Range    Cholesterol 117 <200 mg/dL    Triglycerides 205 (H) <150 mg/dL    Direct Measure HDL 46 >=40 mg/dL    LDL Cholesterol Calculated 30 <=100 mg/dL    Non HDL Cholesterol 71 <130 mg/dL    Patient Fasting > 8hrs? No    Basic metabolic panel    Collection Time: 08/01/24  4:51 PM   Result Value Ref Range    Sodium 136 135 - 145 mmol/L    Potassium 4.1 3.4 - 5.3 mmol/L    Chloride 98 98 - 107 mmol/L    Carbon Dioxide (CO2) 25 22 - 29 mmol/L    Anion Gap 13 7 - 15 mmol/L    Urea Nitrogen 12.0 6.0 - 20.0 mg/dL    Creatinine 1.10 0.67 - 1.17 mg/dL    GFR Estimate >90 >60 mL/min/1.73m2    Calcium 9.3 8.8 - 10.4 mg/dL    Glucose 96 70 - 99 mg/dL    Patient Fasting > 8hrs? No    Hepatic function panel    Collection Time: 08/01/24  4:51 PM   Result Value Ref Range    Protein Total 8.0 6.4 - 8.3 g/dL    Albumin 4.3 3.5 - 5.2 g/dL    Bilirubin Total 0.7 <=1.2 mg/dL    Alkaline Phosphatase 153 (H) 40 - 150 U/L    AST 39 0 - 45 U/L    ALT 46 0 - 70 U/L    Bilirubin Direct <0.20 0.00 - 0.30 mg/dL   CBC with platelets and differential    Collection Time: 08/01/24  4:51 PM   Result Value Ref Range    WBC Count 16.2 (H) 4.0 - 11.0 10e3/uL    RBC Count 5.40 4.40 - 5.90 10e6/uL    Hemoglobin 15.8 13.3 - 17.7 g/dL    Hematocrit 46.5 40.0 - 53.0 %    MCV 86 78 - 100 fL    MCH 29.3 26.5 - 33.0 pg    MCHC 34.0 31.5 - 36.5 g/dL    RDW 12.8 10.0 - 15.0 %    Platelet Count 311 150 - 450 10e3/uL    % Neutrophils 78 %    % Lymphocytes 11 %    % Monocytes 7 %    % Eosinophils 2 %    % Basophils 1 %    % Immature Granulocytes 1 %    NRBCs per 100 WBC 0 <1 /100    Absolute Neutrophils 12.7 (H) 1.6 - 8.3 10e3/uL    Absolute Lymphocytes 1.7 0.8 - 5.3  10e3/uL    Absolute Monocytes 1.2 0.0 - 1.3 10e3/uL    Absolute Eosinophils 0.4 0.0 - 0.7 10e3/uL    Absolute Basophils 0.1 0.0 - 0.2 10e3/uL    Absolute Immature Granulocytes 0.1 <=0.4 10e3/uL    Absolute NRBCs 0.0 10e3/uL                   Results as noted above.    PFT Interpretation:  Moderate obstructive ventilatory defect.  Decreased from previous.  Below recent best.   Valid Maneuver             CF Exacerbation  Moderate  Increased vest/bronchodilator/execise and Oral: non-quinolone          Again, thank you for allowing me to participate in the care of your patient.        Sincerely,        Zana Lilly MD

## 2024-08-01 NOTE — NURSING NOTE
"Demario Abbasi is a 29 year old year old who is being seen for Cystic Fibrosis (F/u)      Medications reviewed and Vital signs taken.    Specimen Collection Type: Sputum    Order(s) placed: CF Aerobic Bacterial    *IF AFB order placed - please enter \"PRIORITIZE AFB\" to order comments.       Lab Results   Component Value Date    ACIDFAST No acid fast bacilli seen 05/23/2024    ACIDFAST No acid fast bacilli seen 05/23/2024    ACIDFAST No acid fast bacilli seen 05/23/2024         Lab Results   Component Value Date    AFBSMS Negative for acid fast bacteria 09/10/2020    AFBSMS  09/10/2020     Less than 5ml of specimen received.  A minimum of 5 mL of sputum or fluid is recommended for recovery of acid fast bacilli   (AFB).  Volumes less than 5 mL are suboptimal and may compromise recovery of AFB from   culture.      AFBSMS  09/10/2020     Assayed at [facility] [address] [place] [id number] [phone]           "

## 2024-08-02 LAB
CREAT UR-MCNC: 117 MG/DL
IGA SERPL-MCNC: 250 MG/DL (ref 84–499)
IGE SERPL-ACNC: 27 KU/L (ref 0–114)
IGG SERPL-MCNC: 1481 MG/DL (ref 610–1616)
MICROALBUMIN UR-MCNC: <12 MG/L
MICROALBUMIN/CREAT UR: NORMAL MG/G{CREAT}
VIT D+METAB SERPL-MCNC: 27 NG/ML (ref 20–50)

## 2024-08-04 LAB
A-TOCOPHEROL VIT E SERPL-MCNC: 11.6 MG/L
BETA+GAMMA TOCOPHEROL SERPL-MCNC: 0.3 MG/L
TESTOST SERPL-MCNC: 206 NG/DL (ref 240–950)

## 2024-08-06 LAB
BACTERIA SPT CULT: ABNORMAL

## 2024-08-14 NOTE — PROGRESS NOTES
Nutrition Note    Annual studies nutrition results sent by provider for RD review. Labs obtained 8/1.     Vitamin A - not ordered, unclear for rationale. Previously wnl.   Vitamin D - 27 slightly low/borderline  Vitamin E - 11.6 wnl  Iron - 40 low; however, level may be impacted by acute illness/pulmonary exacerbation.   Lipid Panel - abnormal; unlikely that pt was fasting    Interventions/Recommendations:   Vitamin D and iron levels slightly low, however may be impacted by acute illness at time of pulmonary exacerbation with lab draw. No planned changes to baseline supplementation today.     Recommend obtain Vitamin A, D, and ferritin levels in 3-6 months to assess status.      Sherie Aparicio RD, LD, CACFD  Cystic Fibrosis/Lung Transplant Dietitian  Available via [employer]

## 2024-08-19 ENCOUNTER — EXTERNAL ORDER RESULTS (OUTPATIENT)
Dept: PULMONOLOGY | Facility: CLINIC | Age: 29
End: 2024-08-19
Payer: COMMERCIAL

## 2024-08-19 ENCOUNTER — TRANSFERRED RECORDS (OUTPATIENT)
Dept: HEALTH INFORMATION MANAGEMENT | Facility: CLINIC | Age: 29
End: 2024-08-19
Payer: COMMERCIAL

## 2024-08-19 LAB
FEV-1: NORMAL
FVC: NORMAL

## 2024-08-29 LAB
BACTERIA SPT CULT: NO GROWTH
BACTERIA SPT CULT: NO GROWTH

## 2024-09-05 ENCOUNTER — ENROLLMENT (OUTPATIENT)
Dept: HOME HEALTH SERVICES | Facility: HOME HEALTH | Age: 29
End: 2024-09-05
Payer: COMMERCIAL

## 2024-09-16 NOTE — PROGRESS NOTES
"Pediatrics Pulmonary - Provider Note  Cystic Fibrosis - Return Visit    Patient: Demario Abbasi MRN# [id number]   Encounter: 2018  : 1995      Opening Statement  We had the pleasure of consulting on Demario at the [facility] at the [facility] for a one month follow-up visit.  He was accompanied by his parents to this visit.     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 He had a right upper lobectomy in . He was most recently hospitalized at [facility] last year from  - 3/2/17 for a CF pulmonary exacerbation complicated by hemoptysis and influenza A infection.  Demario presented with dorita hemoptysis, fever, cough, fatigue and weight loss.  His initial FEV1 at admission was 35% predicted.  Following treatment, his FEV1 improved up to a max of 60% predicted and he gained the weight back that he lost.     I last saw Demario over the summer, prior to him heading to [employer] in [place] to continue his schooling.  While away, he said that he did relatively well without any illness.  However, there are two issues that Demario brought up today of concern.  From a pulmonary standpoint, Demario reports that over  break, he started to feel different from a lung standpoint. He reports more up and down days, characterized by chest and back tightness and an increase in the production and thickness in his sputum.  His cough is increased and more frequent in nature.  He reports one episode of hemoptysis that occurred around Malick time.  He said that he felt it was coming, went to the bathroom, and coughed up bright red blood with mucus.  This lasted only a short period of time.  He did not tell his family or call us.  It resolved; however, he has continued to have occasional brown, rust-colored sputum that has a distinctive, \"wheat\" " taste to it (different from his norm).  His cough and sputum production do not worsen or improve with VEST treatments.  He reports that it is often difficult to cough mucus up because of its thickness.  When he is well, he does VEST twice daily with Albuterol and Pulmozyme.  He cycles COBY for his Achromobacter.  He takes azithromycin three times weekly.  He also has an albuterol inhaler that he will use intermittently.      Demario's second issue concerns his GI tract.  He reports that he is having intermittent, excruciating episodes of abdominal pain since last December 2016 that appear to be increasing in frequency and severity.  He points to the middle of his belly just below his sternum, and that it is in the same place each time.  Demario first thought it might be related to drinking beer, as they first started to happen in conjunction with drinking.  However, they have continued and have occurred out of the blue as well.  He reports them as severe, causing him to double over and one episode caused him to crumple on his steps at home and vomit.  They will last anywhere from minutes to over an hour, and then they are completely gone. He does not report any belly pain inbetween.  His last episode was about a week and a half ago.  He is on daily prevacid and takes this consistently.  He has a normal appetite and no trouble eating.  Food does not trigger episodes.  He has had normal stools and otherwise no emesis.  He continues on Creon 13837 capsule enzymes, taking 5 with meals and 3 with snacks. Demario has been good about taking his enzymes and they appear to be working well for him. Demario reports normal voids and well formed stools, 2-3 per day.    Demario has had issues with allergies in the past - he identifies this as more of a problem this spring/summer.  He takes daily Allegra. He has been taking his Nasonex on a daily basis and says that it has significantly helped his allergy symptoms.        Demario just finished his  first semester at [employer] in [place].  He lives with roommates in an off campus house.      Past Medical History:   Diagnosis Date     CF (cystic fibrosis) (H)      Impaired glucose tolerance      Pancreatic insufficiency      S/P lobectomy of lung 8/4/2015    Right upper lobectomy - 2009     Allergies  Allergies as of 01/04/2018 - Jose Luis as Reviewed 01/04/2018   Allergen Reaction Noted     Augmentin  10/25/2011     Current Outpatient Prescriptions   Medication Sig Dispense Refill     levofloxacin (LEVAQUIN) 750 MG tablet Take 1 tablet (750 mg) by mouth daily for 14 days Please stop your azithromycin while you are on the levaquin. 14 tablet 0     tobramycin, PF, (COBY) 300 MG/5ML neb solution Take 5 mLs (300 mg) by nebulization 2 times daily Cycle 28 days on/off 280 mL 0     fexofenadine (ALLEGRA) 180 MG tablet Take 1 tablet (180 mg) by mouth daily 30 tablet 0     LANsoprazole (PREVACID) 15 MG CR capsule Take 1 capsule (15 mg) by mouth daily Take 30-60 minutes before a meal. 30 capsule 0     dornase alpha (PULMOZYME) 1 MG/ML neb solution Inhale 2.5 mg into the lungs 2 times daily 150 mL 12     albuterol (ALBUTEROL) 108 (90 BASE) MCG/ACT Inhaler Inhale 2 puffs into the lungs every 6 hours as needed for shortness of breath / dyspnea or wheezing 1 Inhaler 3     azithromycin (ZITHROMAX) 500 MG tablet Take one tablet on Monday, Wednesday and Friday morning 12 tablet 12     fluticasone (FLONASE) 50 MCG/ACT spray Spray 1 spray into both nostrils daily 1 Bottle 3     amylase-lipase-protease (CREON 36) 73264 UNITS CPEP Take 5 capsules (180,000 Units) by mouth 3 times daily (with meals) 450 capsule 11     multivitamin CF formula (MVW COMPLETE FORMULATION ) softgel cap Take 2 capsules by mouth daily 60 capsule 3     albuterol (2.5 MG/3ML) 0.083% neb solution Take 1 vial (2.5 mg) by nebulization 3 times daily 270 mL 6     sodium chloride inhalant 7 % NEBU neb solution Take 4 mLs by nebulization 2 times daily 240 mL 11      "melatonin 5 MG tablet Take 1-2 tablets (5-10 mg) by mouth nightly as needed 60 tablet 1     citalopram (CELEXA) 20 MG tablet Take 1 tablet (20 mg) by mouth daily 30 tablet 1     azelastine (ASTELIN) 0.1 % spray Use 1-2 sprays both nostrils twice daily as needed 1 Bottle 1     tretinoin (RETIN-A) 0.05 % cream Apply topically every morning as directed 30 g 1     MINOCYCLINE HCL PO Take 100 mg by mouth 2 times daily       adapalene (DIFFERIN) 0.1 % gel Apply 1 applicator topically At Bedtime         PMH    Past medical history reviewed with patient/parent today, no changes.    Immunization History   Administered Date(s) Administered     Influenza (IIV3) PF 09/01/2012     Influenza Vaccine IM 3yrs+ 4 Valent IIV4 11/03/2016       PSH    Past surgical history reviewed with patient/parent today, no changes.    FH    Family history reviewed with patient/parent today, no changes.    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  History of using chewing tobacco.    ROS    A comprehensive review of systems was performed and is negative except as noted in the HPI.    Last CF Annual Studies date: November 2016 (unable to be arranged for today; to be scheduled at his next visit)    Objective:     Physical Exam    Vital Signs:  /65  Pulse 70  Temp 97.6  F (36.4  C)  Resp 19  Ht 5' 10.28\" (178.5 cm)  Wt 166 lb 3.6 oz (75.4 kg)  SpO2 97%  BMI 23.66 kg/m2       Wt Readings from Last 4 Encounters:   01/04/18 166 lb 3.6 oz (75.4 kg)   07/20/17 164 lb 7.4 oz (74.6 kg)   06/22/17 161 lb 9.6 oz (73.3 kg)   04/13/17 164 lb 14.5 oz (74.8 kg)     /65  Pulse 70  Temp 97.6  F (36.4  C)  Resp 19  Ht 5' 10.28\" (178.5 cm)  Wt 166 lb 3.6 oz (75.4 kg)  SpO2 97%  BMI 23.66 kg/m2    Constitutional: No distress, " comfortable, pleasant. Occasional cough, not productive.    Vital signs: Reviewed and normal.  Eyes: Anicteric, normal extra-ocular movements, Pupils are equal and reactive to light  Ears, Nose and Throat: Tympanic membranes clear, nose clear and free of lesions, throat clear. New braces in place.  Neck: Supple with full range of motion, no thyromegaly.   Cardiovascular: Regular rate and rhythm, no murmurs, rubs or gallops, peripheral pulses full and symmetric  Chest: Symmetrical, no retractions.  Respiratory: Good effort, no crackles; no distress, normal breath sounds  Gastrointestinal: Positive bowel sounds, nontender, no hepatosplenomegaly, no masses  Musculoskeletal: Full range of motion, no edema.  Skin: No concerning lesions, no jaundice.  Neurological: Normal muscle tone and strength.    Spirometry was done 1/4/2018    Results for orders placed or performed in visit on 01/04/18   General PFT Lab (Please always keep checked)   Result Value Ref Range    FVC-Pred 5.57 L    FVC-Pre 3.51 L    FVC-%Pred-Pre 62 %    FEV1-Pre 2.83 L    FEV1-%Pred-Pre 59 %    FEV1FVC-Pred 85 %    FEV1FVC-Pre 81 %    FEFMax-Pred 10.17 L/sec    FEFMax-Pre 8.28 L/sec    FEFMax-%Pred-Pre 81 %    FEF2575-Pred 5.08 L/sec    FEF2575-Pre 2.67 L/sec    UDX6366-%Pred-Pre 52 %    ExpTime-Pre 6.91 sec    FIFMax-Pre 3.60 L/sec    FEV1FEV6-Pred 84 %    FEV1FEV6-Pre 81 %       Spirometry Interpretation:  Good effort and acceptable for interpretation.  Evidence of restrictive lung disease given a decreased FVC.  FEV1/FVC is improved from prior.    Culture (3/2017):  Light growth Normal elmer; Light growth Achromobacter xylosoxidans/denitrificans     Culture (4/2017):  S. Aureus, two strains Achromobacter    Culture (6/2017):  Not done    Culture (7/2017):  S. Aureus, achromobacter    Culture 1/2018:  pending    Laboratory or other tests ordered were reviewed.    Assessment       Demario is a 22 year old young man with CF and pancreatic insufficiency s/p  right upper lobectomy in 2009. His genotype contains a STOP mutation, so he is not a candidate for the CF drug, Orkambi.  He was hospitalized for a severe CF pulmonary exacerbation complicated by hemoptysis and influenza A in late February - early March 2017 and recovered from this.  He grows two species of Achromobacter and MSSA.  He is reporting symptoms consistent with a CF pulmonary exacerbation although his FEV1 is unchanged.  Will send his sputum for culture and AFB to look for new infection.  Will treat him with oral antibiotics, although the choices are not ideal given resistance patterns of his Achromobacter.  His belly pain is concerning as well.  Will start with blood work and abdominal ultrasound and a referral to adult GI for evaluation.  We were not able to coordinate his OGTT and annual studies to be done today, this will need to be done at his next visit to adult CF clinic.  He will be touring the clinic today with Maria G, our .      CF Pulmonary Exacerbation:  Mild - treated with quinolone and increased VEST     Plan:       1. Please call Cora in the CF office to schedule your first adult clinic visit. She will also coordinate your annual studies. Cora's number is [phone].    2. Your ultrasound is scheduled in radiology here on the [facility] at 4pm. You may have clear liquids until 2 pm. Do not eat anything until after your ultrasound. Do not drink anything after 2pm.    3. Blood work today to be done to look for causes of your belly pain.    4. We will work to schedule an appointment for you with our Adult GI physician who specializes in CF.    5.  I will be sending an antibiotic to your local pharmacy for you to take for the next two weeks.  Please do not take your azithromycin while you are taking the Levaquin.      6.  Follow-up in the adult clinic - you will need annual studies at the time of this visit.    7.  I will call you later with lab and ultrasound results.    It has  been wonderful to be your physician - I'm proud of you, Demario.  Good luck and keep in touch!    Mary Grace Álvarez MD [facility]  Pediatric Pulmonology        CC  YEN ROY    Copy to patient  DEAN DELACRUZ,WOLFGANG  [address]  [place] [address]   Quality 226: Preventive Care And Screening: Tobacco Use: Screening And Cessation Intervention: Patient screened for tobacco use and is an ex/non-smoker Detail Level: Detailed

## 2024-10-07 ENCOUNTER — HOME INFUSION (OUTPATIENT)
Dept: HOME HEALTH SERVICES | Facility: HOME HEALTH | Age: 29
End: 2024-10-07
Payer: COMMERCIAL

## 2024-10-22 ENCOUNTER — TELEPHONE (OUTPATIENT)
Dept: PULMONOLOGY | Facility: CLINIC | Age: 29
End: 2024-10-22
Payer: COMMERCIAL

## 2024-10-22 ENCOUNTER — TRANSFERRED RECORDS (OUTPATIENT)
Dept: HEALTH INFORMATION MANAGEMENT | Facility: CLINIC | Age: 29
End: 2024-10-22
Payer: COMMERCIAL

## 2024-10-22 DIAGNOSIS — E84.0 CYSTIC FIBROSIS WITH PULMONARY MANIFESTATIONS (H): Primary | ICD-10-CM

## 2024-10-22 RX ORDER — MEROPENEM 1 G/1
2 INJECTION, POWDER, FOR SOLUTION INTRAVENOUS EVERY 8 HOURS
COMMUNITY
Start: 2024-10-23 | End: 2024-11-13

## 2024-10-22 NOTE — TELEPHONE ENCOUNTER
Follow up call to patient to discuss coverage information provided by [facility]-    The patient has coverage for Meropenem 2 gm IV every 8 hours for 3 weeks.   The patient has satisfied their deductible for the year. The patient is covered at 70% until out of pocket max is met for the year.     This RN asked patient to call his insurance to determine if he has met his out of pocket max for the year or not.  Asked patient to call triage line back with update.  Patient verbalized understanding.    JOSSELIN Mata, RN  RN Care Coordinator Cystic Fibrosis Adult Clinic

## 2024-10-22 NOTE — TELEPHONE ENCOUNTER
Patient returned call to clinic after speaking with his insurance.    Patient stated to SHEILA Rodríguez, that he has met $6500 of his $7000 out of pocket expense for the year.  Patient stated that he is willing to pay the $500 yet to meet his out of pocket max and proceed with IV Meropenem.    Will update [facility].    FANNIE MataN, RN  RN Care Coordinator Cystic Fibrosis Adult Clinic

## 2024-10-23 ENCOUNTER — TELEPHONE (OUTPATIENT)
Dept: PULMONOLOGY | Facility: CLINIC | Age: 29
End: 2024-10-23
Payer: COMMERCIAL

## 2024-10-23 DIAGNOSIS — E84.0 CYSTIC FIBROSIS WITH PULMONARY MANIFESTATIONS (H): Primary | ICD-10-CM

## 2024-10-23 NOTE — TELEPHONE ENCOUNTER
Pt called into triage line to ask if Dr. Lilly had reviewed his CXR from yesterday. This RN had just reviewed the report and images with Dr. Lilly.  Pneumonia noted, continue with current antibiotic plan of oral bactrim and IV meropenem.  Dr. Lilly would like patient seen in clinic in approximately 3 weeks with follow up CXR.   Patient stated [facility] will be delivering the meropenem to his home today.    Patient verbalized understanding of treatment plan.    Will have Cora reach out to patient to schedule follow-up.    All questions answered for now.    Faith Louis BSN, RN  RN Care Coordinator [facility]rosis Adult Clinic

## 2024-10-23 NOTE — TELEPHONE ENCOUNTER
Reviewed with patient that [facility] pharmacy said delivery should arrive by 1700 today. Provided patient with phone number to [facility], encouraged to call if meropenem does not arrive.    Patient verbalized understanding.    FANNIE MataN, RN  RN Care Coordinator [facility]rosis Adult Clinic

## 2024-10-23 NOTE — TELEPHONE ENCOUNTER
Call to [facility] pharmacy regarding delivery of Meropenem. Delivery is expected by 5pm tonight via [employer], tracking number  [id number].  Will update patient.    FANNIE MataN, RN  RN Care Coordinator [facility]rosis Adult Clinic

## 2024-10-23 NOTE — TELEPHONE ENCOUNTER
Patient returned call to this RN. He said 2 weeks ago when his mom accessed his port, it flushed easily, ,good blood return.    Patient has not yet accessed his port for the meropenem, it has not arrived from [facility].  This RN will call [facility].    This RN asked patient to call or send mycGigamon message tomorrow after his mom has accessed his port. If it flushes easily, aspirates, good blood return, safe to use.    Patient verbalized understanding.    Faith Louis BSN, RN  RN Care Coordinator [facility] Adult Clinic

## 2024-10-23 NOTE — TELEPHONE ENCOUNTER
Follow up call to patient to inquire about port, blood return, ease of flushing/aspirating. Will also send mychart message    Asked patient to call RN Triage line back to discuss updates from Radiologist that placed the port.FANNIE MataN, RN  RN Care Coordinator [facility] Fibrosis Adult Clinic            ----- Message from Kentrell Best sent at 10/23/2024  2:47 PM CDT -----  Regarding: RE: loop in portacath  [facility] chest x-ray 10/22/2024 reviewed.    Can't tell by the x-ray whether the  catheter is looped intra- or extravascularly. My guess is that its looped in the internal jugular vein, but the only way to tell would be with a CT. The tip is retracted into high-mid superior vena cava above the zoie. My guess is that the migration is due to coughing.     The venotomy site and catheter track looked good in the placement images.    Does it still aspirate and flush?     If it does, we could just leave it alone and use it, but the loop in the internal jugular vein takes up more space than it usually should and may cause thrombus or fibrin formation.    We could try to snare and pull  the catheter back down into position  from a femoral  vein approach but these catheters have a tendency to develop a memory and may just retract back.    Otherwise, we need to remove it and place a new one.    If it does not aspirate and flush adequately we need to remove it and place a new one.    Hope that helpsNasir  ----- Message -----  From: Zana Lilly MD  Sent: 10/23/2024   9:39 AM CDT  To: Kentrell Best MD; Cf Nurses-  Subject: loop in portacath                                Kentrell,  You placed a portacath for Demario Abbasi in July.  We got a CXR for a CF exacerbation (outside film in PACS) and there is a loop in the subclavian.  Does that need to be addressed? How should we proceed?  Thank you,  Zana Lilly

## 2024-10-24 ENCOUNTER — HOME INFUSION (OUTPATIENT)
Dept: HOME HEALTH SERVICES | Facility: HOME HEALTH | Age: 29
End: 2024-10-24
Payer: COMMERCIAL

## 2024-10-24 ENCOUNTER — MYC MEDICAL ADVICE (OUTPATIENT)
Dept: PULMONOLOGY | Facility: CLINIC | Age: 29
End: 2024-10-24
Payer: COMMERCIAL

## 2024-10-24 ENCOUNTER — HOME INFUSION BILLING (OUTPATIENT)
Dept: HOME HEALTH SERVICES | Facility: HOME HEALTH | Age: 29
End: 2024-10-24
Payer: COMMERCIAL

## 2024-10-24 DIAGNOSIS — E84.0 CYSTIC FIBROSIS WITH PULMONARY EXACERBATION (H): Primary | ICD-10-CM

## 2024-10-25 ENCOUNTER — ENROLLMENT (OUTPATIENT)
Dept: HOME HEALTH SERVICES | Facility: HOME HEALTH | Age: 29
End: 2024-10-25
Payer: COMMERCIAL

## 2024-10-25 PROCEDURE — A4215 STERILE NEEDLE: HCPCS

## 2024-10-30 ENCOUNTER — HOME INFUSION (OUTPATIENT)
Dept: HOME HEALTH SERVICES | Facility: HOME HEALTH | Age: 29
End: 2024-10-30
Payer: COMMERCIAL

## 2024-10-31 ENCOUNTER — TELEPHONE (OUTPATIENT)
Dept: HOME HEALTH SERVICES | Facility: HOME HEALTH | Age: 29
End: 2024-10-31
Payer: COMMERCIAL

## 2024-10-31 ENCOUNTER — HOME INFUSION BILLING (OUTPATIENT)
Dept: HOME HEALTH SERVICES | Facility: HOME HEALTH | Age: 29
End: 2024-10-31
Payer: COMMERCIAL

## 2024-10-31 PROCEDURE — A4215 STERILE NEEDLE: HCPCS

## 2024-10-31 NOTE — TELEPHONE ENCOUNTER
[facility] - Progress Note    Patient is open to [facility] for IV abx; plan was for pt to receive IV line cares and labs at [facility], however when pt went to clinic on Mon 10/28 he was told [facility] did not have the orders.    Today, [facility] care plan (with labs and line care orders) was faxed to [facility] at [phone].       Irish Negron, RN, BSN  Resource RN Team  [facility]  Ph [phone]

## 2024-11-02 ENCOUNTER — HEALTH MAINTENANCE LETTER (OUTPATIENT)
Age: 29
End: 2024-11-02

## 2024-11-05 ENCOUNTER — HOME INFUSION (OUTPATIENT)
Dept: HOME HEALTH SERVICES | Facility: HOME HEALTH | Age: 29
End: 2024-11-05
Payer: COMMERCIAL

## 2024-11-06 ENCOUNTER — TELEPHONE (OUTPATIENT)
Dept: PULMONOLOGY | Facility: CLINIC | Age: 29
End: 2024-11-06
Payer: COMMERCIAL

## 2024-11-06 NOTE — TELEPHONE ENCOUNTER
Call to patient to review update from Dr. Rondon noted below, message left.  Will also send Missingamest message.  Will fax lab orders.    ----- Message from Zana Lilly sent at 11/6/2024  8:13 AM CST -----  Regarding: Labs  LFTs and creatinine are elevated. Can we have Demario reduce Bactrim to 1 tablet twice daily and recheck BMP and LFTs on Friday?  Thank you,  FANNIE HarmonN, RN  RN Care Coordinator [facility] Fibrosis Adult Clinic

## 2024-11-06 NOTE — LETTER
PHYSICIAN ORDERS      DATE & TIME ISSUED: 2024 9:13 AM  PATIENT NAME: Demario Abbasi   : 1995     [facility] MR# [if applicable]:   DIAGNOSIS:  Cystic Fibrosis E84.0                  Please draw/collect the following labs on 2024                   1. BMP- basic metabolic panel                   2. Hepatic Function Panel        Any questions please call: [phone]    Please fax these results to [phone].      .  Zana Lilly MD  Division of Pulmonary, Allergy, Critical Care and Sleep Medicine  Professor of Medicine

## 2024-11-07 ENCOUNTER — TELEPHONE (OUTPATIENT)
Dept: PULMONOLOGY | Facility: CLINIC | Age: 29
End: 2024-11-07
Payer: COMMERCIAL

## 2024-11-07 ENCOUNTER — HOME INFUSION BILLING (OUTPATIENT)
Dept: HOME HEALTH SERVICES | Facility: HOME HEALTH | Age: 29
End: 2024-11-07
Payer: COMMERCIAL

## 2024-11-07 PROCEDURE — A4215 STERILE NEEDLE: HCPCS

## 2024-11-07 NOTE — TELEPHONE ENCOUNTER
Follow up call to patient to confirm he received the voicemail that was left yesterday-     Dr. Lilly reviewed your lab work.    He said the  LFTs(liver function tests) and creatinine are elevated.   Can we have Demario reduce Bactrim to 1 tablet twice daily and recheck BMP and LFTs on Friday?     Patient will do his best to get the lab work done tomorrow and he decreased his bactrim dose.    All questions answered.    Faith Louis, FANNIEN, RN  RN Care Coordinator [facility]yst[facility] Fibrosis Adult Clinic

## 2024-11-09 PROCEDURE — S9502 HIT ANTIBIOTIC Q8H DIEM: HCPCS

## 2024-11-10 PROCEDURE — S9502 HIT ANTIBIOTIC Q8H DIEM: HCPCS

## 2024-11-11 ENCOUNTER — HOME INFUSION (OUTPATIENT)
Dept: HOME HEALTH SERVICES | Facility: HOME HEALTH | Age: 29
End: 2024-11-11
Payer: COMMERCIAL

## 2024-11-11 DIAGNOSIS — E84.0 CYSTIC FIBROSIS WITH PULMONARY EXACERBATION (H): Primary | ICD-10-CM

## 2024-11-11 PROCEDURE — S9502 HIT ANTIBIOTIC Q8H DIEM: HCPCS

## 2024-11-12 ENCOUNTER — HOME INFUSION BILLING (OUTPATIENT)
Dept: HOME HEALTH SERVICES | Facility: HOME HEALTH | Age: 29
End: 2024-11-12
Payer: COMMERCIAL

## 2024-11-12 ENCOUNTER — MYC MEDICAL ADVICE (OUTPATIENT)
Dept: PULMONOLOGY | Facility: CLINIC | Age: 29
End: 2024-11-12
Payer: COMMERCIAL

## 2024-11-12 PROCEDURE — S9502 HIT ANTIBIOTIC Q8H DIEM: HCPCS

## 2024-11-12 NOTE — TELEPHONE ENCOUNTER
Reviewed patient's updated lab work with Dr. Lilly on the phone, completed after decreasing dose of bactrim. Creatinine improved, lab work stable.    Will send message to patient.    FANNIE MataN, RN  RN Care Coordinator [facility] Adult [facility]

## 2024-11-13 PROCEDURE — S9502 HIT ANTIBIOTIC Q8H DIEM: HCPCS

## 2024-11-15 ENCOUNTER — ANCILLARY PROCEDURE (OUTPATIENT)
Dept: GENERAL RADIOLOGY | Facility: CLINIC | Age: 29
End: 2024-11-15
Attending: INTERNAL MEDICINE
Payer: COMMERCIAL

## 2024-11-15 ENCOUNTER — OFFICE VISIT (OUTPATIENT)
Dept: PULMONOLOGY | Facility: CLINIC | Age: 29
End: 2024-11-15
Attending: INTERNAL MEDICINE
Payer: COMMERCIAL

## 2024-11-15 ENCOUNTER — TELEPHONE (OUTPATIENT)
Dept: PULMONOLOGY | Facility: CLINIC | Age: 29
End: 2024-11-15

## 2024-11-15 ENCOUNTER — MYC MEDICAL ADVICE (OUTPATIENT)
Dept: PULMONOLOGY | Facility: CLINIC | Age: 29
End: 2024-11-15

## 2024-11-15 ENCOUNTER — HOME INFUSION BILLING (OUTPATIENT)
Dept: HOME HEALTH SERVICES | Facility: HOME HEALTH | Age: 29
End: 2024-11-15
Payer: COMMERCIAL

## 2024-11-15 VITALS
SYSTOLIC BLOOD PRESSURE: 114 MMHG | WEIGHT: 165.12 LBS | HEART RATE: 78 BPM | DIASTOLIC BLOOD PRESSURE: 70 MMHG | OXYGEN SATURATION: 95 % | BODY MASS INDEX: 23.64 KG/M2 | HEIGHT: 70 IN

## 2024-11-15 DIAGNOSIS — E84.9 CYSTIC FIBROSIS EXACERBATION (H): ICD-10-CM

## 2024-11-15 DIAGNOSIS — E84.0 CYSTIC FIBROSIS WITH PULMONARY MANIFESTATIONS (H): ICD-10-CM

## 2024-11-15 DIAGNOSIS — E08.9 DIABETES MELLITUS DUE TO CYSTIC FIBROSIS (H): ICD-10-CM

## 2024-11-15 DIAGNOSIS — J30.2 SEASONAL ALLERGIES: ICD-10-CM

## 2024-11-15 DIAGNOSIS — K86.89 PANCREATIC INSUFFICIENCY: ICD-10-CM

## 2024-11-15 DIAGNOSIS — J15.69 ACHROMOBACTER PNEUMONIA (H): ICD-10-CM

## 2024-11-15 DIAGNOSIS — E84.8 DIABETES MELLITUS RELATED TO CYSTIC FIBROSIS (H): ICD-10-CM

## 2024-11-15 DIAGNOSIS — E84.9 DIABETES MELLITUS DUE TO CYSTIC FIBROSIS (H): ICD-10-CM

## 2024-11-15 DIAGNOSIS — Z23 NEED FOR VACCINATION: Primary | ICD-10-CM

## 2024-11-15 DIAGNOSIS — E08.9 DIABETES MELLITUS RELATED TO CYSTIC FIBROSIS (H): ICD-10-CM

## 2024-11-15 DIAGNOSIS — E84.9 CF (CYSTIC FIBROSIS) (H): ICD-10-CM

## 2024-11-15 LAB
EXPTIME-PRE: 8.89 SEC
FEF2575-%PRED-PRE: 35 %
FEF2575-PRE: 1.6 L/SEC
FEF2575-PRED: 4.48 L/SEC
FEFMAX-%PRED-PRE: 67 %
FEFMAX-PRE: 6.92 L/SEC
FEFMAX-PRED: 10.3 L/SEC
FEV1-%PRED-PRE: 56 %
FEV1-PRE: 2.41 L
FEV1FEV6-PRE: 73 %
FEV1FEV6-PRED: 83 %
FEV1FVC-PRE: 71 %
FEV1FVC-PRED: 84 %
FIFMAX-PRE: 3.81 L/SEC
FVC-%PRED-PRE: 65 %
FVC-PRE: 3.37 L
FVC-PRED: 5.1 L

## 2024-11-15 PROCEDURE — 97803 MED NUTRITION INDIV SUBSEQ: CPT | Performed by: DIETITIAN, REGISTERED

## 2024-11-15 PROCEDURE — G0008 ADMIN INFLUENZA VIRUS VAC: HCPCS | Performed by: INTERNAL MEDICINE

## 2024-11-15 PROCEDURE — 90656 IIV3 VACC NO PRSV 0.5 ML IM: CPT | Performed by: INTERNAL MEDICINE

## 2024-11-15 PROCEDURE — 250N000011 HC RX IP 250 OP 636: Performed by: INTERNAL MEDICINE

## 2024-11-15 PROCEDURE — 99213 OFFICE O/P EST LOW 20 MIN: CPT | Performed by: INTERNAL MEDICINE

## 2024-11-15 PROCEDURE — 71046 X-RAY EXAM CHEST 2 VIEWS: CPT | Mod: GC | Performed by: RADIOLOGY

## 2024-11-15 PROCEDURE — 87070 CULTURE OTHR SPECIMN AEROBIC: CPT | Performed by: INTERNAL MEDICINE

## 2024-11-15 RX ORDER — HEPARIN SODIUM (PORCINE) LOCK FLUSH IV SOLN 100 UNIT/ML 100 UNIT/ML
5 SOLUTION INTRAVENOUS PRN
Qty: 999999 ML | Refills: 0 | Status: DISPENSED | OUTPATIENT
Start: 2024-11-15 | End: 2024-12-28

## 2024-11-15 RX ORDER — SULFAMETHOXAZOLE AND TRIMETHOPRIM 800; 160 MG/1; MG/1
1 TABLET ORAL 2 TIMES DAILY
Status: SHIPPED
Start: 2024-11-15

## 2024-11-15 RX ADMIN — INFLUENZA A VIRUS A/VICTORIA/4897/2022 IVR-238 (H1N1) ANTIGEN (FORMALDEHYDE INACTIVATED), INFLUENZA A VIRUS A/CALIFORNIA/122/2022 SAN-022 (H3N2) ANTIGEN (FORMALDEHYDE INACTIVATED), AND INFLUENZA B VIRUS B/MICHIGAN/01/2021 ANTIGEN (FORMALDEHYDE INACTIVATED) 0.5 ML: 15; 15; 15 INJECTION, SUSPENSION INTRAMUSCULAR at 09:58

## 2024-11-15 ASSESSMENT — PAIN SCALES - GENERAL: PAINLEVEL_OUTOF10: NO PAIN (0)

## 2024-11-15 NOTE — TELEPHONE ENCOUNTER
Dr. Lilly reached out to this RN following patient's clinic visit.  Patient will finish out the doses of meropenem he has at home and then stop IV therapy.  Dr. Lilly would like heparin 100u/ml, 5ml to be placed in the port at de-access.     Call to \[facility]\"", spoke with pharmacist Bel Lebron. She asked for clarification on how many doses patient has left to administer.  Bel took the order for the heparin and will send to the home.  Reviewed with Bel that mother perform port cares and he needs supplies delivered to the home.    Call to patient, message left referencing the heparin to be sent to his home by \[facility]\"" for de-access and clarification on how many remaining doses of meropenem. Also referenced ultra sound Dr. Lilly would like completed at his next visit.  Asked patient to call triage line. Will also send Collect.it message.    FANNIE MataN, RN  RN Care Coordinator [facility] Fibrosis Adult Clinic

## 2024-11-15 NOTE — LETTER
11/15/2024      Demario Abbasi  [address]  [place] [address]      Dear Colleague,    Thank you for referring your patient, Demario Abbasi, to the [facility] LUNG [facility] AND [facility]. Please see a copy of my visit note below.    Reason for Visit  Demario Abbasi is a 29 year old year old male who is being seen for Follow Up (Return CF)      Assessment and plan:   Demario Abbasi is a 29 year old male with cystic fibrosis, pancreatic insufficiency, depression, acne and impaired glucose tolerance who is s/p RUL resection for recurrent exacerbations in 2009.  Currently on IV antibiotics for a right sided pneumonia.     Maintenance   Modulator: Not eligible based on mutations  Mutation:  [id number]/621+1g>t  AW Clearance: Vest  Bronchodilators:  Albuterol  Mucolytics: Pulmozyme, Hypertonic saline  Antibiotics Inh:  Bethkis  Antibiotics Oral: Azithromycin  Other:  Colonization Hx: Staph aureus (MSSA), Achromobacter xylosoxidans/denitrificans   Annual Studies: Due August 2025  Contraindications to standard of care:    Pulmonary/cystic fibrosis: The patient developed chest congestion, increased shortness of breath, fatigue and chest tightness in mid-late October.  Local chest x-ray revealed a new right midlung pneumonia.  Patient was treated with meropenem and Bactrim.  He reports symptoms are back to baseline with very good exercise tolerance, decreased cough and light sputum.  Chest x-ray, reviewed by me with marked improvement in the right midlung infiltrate although not quite complete resolution.  In addition there are scattered CF changes similar to baseline.  PFTs with moderate obstructive ventilatory defect, improved from previous although still slightly below baseline.  Based on symptomatic resolution, it appears that the infectious component is largely resolved though there may be some residual inflammation.  The patient will complete current supply of  meropenem and Bactrim through the beginning of next week.  He was encouraged to increase vest therapy to 3 times daily whenever able for the next few weeks.  He will continue Pulmozyme twice daily and use hypertonic saline for third vest.  He will contact the CF office if respiratory symptoms worsen after stopping antibiotics.  Continue azithromycin.  Pancreatic insufficiency: The patient reports loose stool, likely related to recent antibiotics rather than malabsorption.  He will continue current pancreatic enzyme replacement.  Vitamin A level was not included in annual studies in August so this will be included with labs in February follow-up.  Vitamin D level was at the low end of the normal range, the patient met with the CF dietitian today, he will be switched to the  MVW and vitamin D level will be rechecked with the next visit.    Iron deficiency: Continue iron replacement.  Iron studies and ferritin will be checked at the next visit.    Renal: Creatinine was increased, likely secondary to Bactrim.  Recheck creatinine with next visit.    Elevated LFTs: Alkaline phosphatase was elevated, likely due to antibiotics for the exacerbation.  Recheck LFTs at the next visit.    CF related diabetes: Patient is not requiring insulin currently.  He was encouraged to monitor his blood sugars.    CFTR Modulation: The patient is not eligible for any of the currently available CFTR modulators.  His records are being reviewed for possible inclusion in an RNA replacement study.    Environmental allergies: Adequately controlled with current azelastine, cetirizine, Flonase and Patanol.    Reflux: Continue pantoprazole.    CF related sinus disease: No symptoms of acute sinusitis at this time.    Healthcare maintenance: Influenza vaccine today.  Annual studies were completed in August.    Follow-up in 3 months with PFTs, sputum culture, chest x-ray to follow-up pneumonia, and labs.    Zana DEL ROSARIO, have spent 53 minutes  on the day of the visit to review the chart, interview and examine the patient, review labs and imaging, formulate a plan, document and submit orders. Time documented is excluding time spent for PFT interpretation.     The longitudinal plan of care for the diagnosis(es)/condition(s) as documented were addressed during this visit. Due to the added complexity in care, I will continue to support Demario in the subsequent management and with ongoing continuity of care.      Zana Lilly MD  Contact via [employer]    Note: Chart documentation done in part with Dragon Voice Recognition software. Although reviewed after completion, some word and grammatical errors may remain. Please consider this when interpreting information in this chart.       CF HPI  The patient was seen and examined by Zana Lilly MD   At the last visit, the patient had increased respiratory symptoms and decreased PFTs.  He was treated with oral doxycycline and oral Bactrim for 3 weeks.  He also increased his airway clearance therapy.  He slowly returned to baseline.  In mid October he developed increased chest congestion, shortness of breath, fatigue and chest tightness.  Symptoms developed over a few days.  No fever, chills or night sweats.  Chest x-ray performed locally with new right midlung infiltrate.  Based on previous cultures IV meropenem and oral Bactrim was initiated.  Bactrim initially to double strength twice daily but dose reduced due to GI intolerance and elevated LFTs and creatinine.  The patient is now feeling well.  Breathing is comfortable at rest and with all activity.  He feels he is symptomatically back to baseline.  Occasional cough at baseline.  Sputum white to light green which is lighter than usual with baseline volume.  No chest pain.  No fever, chills or night sweats.  He is doing vest therapy twice daily.  When he was initially ill he was doing vest therapy 3-4 times daily for about 7-10 days.    Review of  "systems:  Appetite is good  No ear pain, sore throat, sinus pain or rhinorrhea  No palpitations  No nausea, vomiting abdominal pain.  He does report loose stool with the antibiotics about 3 times daily.  A complete ROS was otherwise negative except as noted in the HPI.    Current Outpatient Medications   Medication Sig Dispense Refill     albuterol (PROAIR HFA) 108 (90 Base) MCG/ACT inhaler Inhale 2 puffs into the lungs every 6 hours as needed for shortness of breath or wheezing 26 g 11     albuterol (PROVENTIL) (2.5 MG/3ML) 0.083% neb solution INHALE 1 VIAL BY NEBULIZATION THREE TIMES DAILY 270 mL 10     ALLERGY RELIEF CETIRIZINE 10 MG tablet TAKE 1 TABLET (10 MG) BY MOUTH DAILY 30 tablet 10     azelastine (ASTELIN) 0.1 % nasal spray Spray 1 spray into both nostrils daily 30 mL 11     azithromycin (ZITHROMAX) 500 MG tablet TAKE ONE TABLET BY MOUTH ON MONDAY, WEDNESDAY AND FRIDAY MORNING 12 tablet 10     citalopram (CELEXA) 20 MG tablet Take 1 tablet (20 mg) by mouth daily 30 tablet 1     Continuous Glucose Sensor (FREESTYLE MARQUES 3 SENSOR) MISC 1 each every 14 days 2 each 5     dornase ren (PULMOZYME) 2.5 MG/2.5ML neb solution Inhale 2.5 mg into the lungs 2 times daily INHALE 2.5MG INTO THE LUNGS TWO TIMES A DAYINHALE 2.5MG INTO THE LUNGS TWO TIMES A  mL 11     Emergency Supply Kit, Central, Patient use for emergency only. Contents: 3 sodium chloride 0.9% flushes, 1 dressing kit, 1 microclave ext set 14\", 4 nitrile gloves (med), 6 alcohol prep pads, 1 bacitracin, 1 syringe (10 cc 20 G 1\"). Call [phone] to reorder. 130580 kit 0     fluticasone (FLONASE) 50 MCG/ACT nasal spray Spray 2 sprays into both nostrils daily 16 g 11     lipase-protease-amylase (CREON 36) 15369-728376-246967 units CPEP 2-3 with meals.       meropenem (MERREM) 2 g in sodium chloride 0.9 % 200 mL via SMARTeZ pump Infuse 2 g at 200 mL/hr over 1 hours into the vein every 8 hours. 05365 mL 0     mvw complete formulation (SOFTGELS " ) capsule TAKE ONE CAPSULE BY MOUTH TWICE A DAY 60 capsule 11     olopatadine (PATANOL) 0.1 % ophthalmic solution Place 1 drop into both eyes 2 times daily 5 mL 11     pantoprazole (PROTONIX) 20 MG EC tablet TAKE 1 TABLET (20 MG) BY MOUTH DAILY 30 tablet 10     Port Access Kit For nurse use only.  Do not remove items from bag.  Use for port access.  Do not place syringe on sterile field. [id number] kit 0     sodium chloride inhalant 7 % NEBU neb solution Take 4 mLs by nebulization 2 times daily as needed for wheezing 240 mL 11     sodium chloride, PF, 0.9% PF flush Inject 10 mLs into the vein as needed for line flush. Flush IV before and after med administration as directed and/or at least every 24 hours, or prior to deaccessing for no further use and/or at least every 4 weeks when not accessed. 623519 mL 0     sulfamethoxazole-trimethoprim (BACTRIM DS) 800-160 MG tablet Take 1 tablet by mouth 2 times daily.       tobramycin (BETHKIS) 300 MG/4ML nebulizer solution Take 4 mLs (300 mg) by nebulization 2 times daily 28 days and 28 days off 224 mL 5     No current facility-administered medications for this visit.     Allergies   Allergen Reactions     Amoxicillin-Pot Clavulanate Diarrhea     Past Medical History:   Diagnosis Date     CF (cystic fibrosis) (H)     Genotype: g542x/621+1g>t     Impaired glucose tolerance      Pancreatic insufficiency      S/P lobectomy of lung 8/4/2015    Right upper lobectomy - 2009       Past Surgical History:   Procedure Laterality Date     H STATISTIC PICC LINE INSERTION >5YR, FAILED Bilateral 03/15/2019    Stenosis in both arms, unable to thread catheter     HC LAP,INGUINAL HERNIA REPR,INITIAL  2002     INSERT PICC LINE Left 10/12/2020    Procedure: INSERTION, PICC @1100;  Surgeon: Felicia Branch MD;  Location: [facility] OR     INSERT PICC LINE Left 12/6/2021    Procedure: INSERTION, PICC;  Surgeon: Tahir Alvarado MD;  Location: [facility] OR     INSERT PICC LINE Left 8/31/2022     Procedure: SINGLE LUMEN INSERTION, PICC;  Surgeon: Felicia Branch MD;  Location: [facility] OR     INSERT PORT VASCULAR ACCESS Right 7/16/2024    Procedure: single lumen Insert port vascular access;  Surgeon: Kentrell Best MD;  Location: [facility] OR     IR CHEST PORT PLACEMENT > 5 YRS OF AGE  2/8/2023     IR CHEST PORT PLACEMENT > 5 YRS OF AGE  7/16/2024     IR PICC PLACEMENT > 5 YRS OF AGE  03/18/2019     IR PICC PLACEMENT > 5 YRS OF AGE  12/30/2019     IR PICC PLACEMENT > 5 YRS OF AGE  10/12/2020     IR PICC PLACEMENT > 5 YRS OF AGE  12/6/2021     IR PICC PLACEMENT > 5 YRS OF AGE  8/31/2022     IR PORT CHECK RIGHT  6/10/2024     LOBECTOMY LUNG Right 2009    Right upper lobe       Social History     Socioeconomic History     Marital status: Single     Spouse name: Not on file     Number of children: Not on file     Years of education: Not on file     Highest education level: Not on file   Occupational History     Occupation: Sale [employer]   Tobacco Use     Smoking status: Never     Passive exposure: Past     Smokeless tobacco: Former     Types: Chew     Tobacco comments:     dad smokes   Vaping Use     Vaping status: Never Used   Substance and Sexual Activity     Alcohol use: Yes     Comment: 2-3 drinks evenings, mostly weekends     Drug use: Never     Sexual activity: Not on file   Other Topics Concern     Parent/sibling w/ CABG, MI or angioplasty before 65F 55M? Not Asked   Social History Narrative    12/27/2019  -Graduated college in 2020.  Looking for work as a sales [employer].       Social Drivers of Health     Financial Resource Strain: Not on file   Food Insecurity: Not on file   Transportation Needs: Not on file   Physical Activity: Not on file   Stress: Not on file   Social Connections: Not on file   Interpersonal Safety: Unknown (11/28/2023)    Received from [facility] and Pending sale to [facility]    Intimate Partner Violence      Are you in a relationship where you are physically hurt, threatened and/or made  "to feel afraid?: Unable to assess   Housing Stability: Not on file         /70 (BP Location: Right arm, Patient Position: Chair, Cuff Size: Adult Regular)   Pulse 78   Ht 1.785 m (5' 10.28\")   Wt 74.9 kg (165 lb 2 oz)   SpO2 95%   BMI 23.51 kg/m    Body mass index is 23.51 kg/m .  Exam:   GENERAL APPEARANCE: Well developed, well nourished, alert, and in no apparent distress.  EYES: PERRL, EOMI  HENT: Nasal mucosa with edema and no hyperemia. No nasal polyps.  EARS: Canals clear, TMs normal  MOUTH: Oral mucosa is moist, without any lesions, no tonsillar enlargement, no oropharyngeal exudate.  NECK: supple, no masses, no thyromegaly.  LYMPHATICS: No significant axillary, cervical, or supraclavicular nodes.  RESP: normal percussion, good air flow throughout.  Faint right upper and bilateral basilar crackles. No rhonchi. No wheezes.  CV: Normal S1, S2, regular rhythm, normal rate. No murmur.  No rub. No gallop. No LE edema.   ABDOMEN:  Bowel sounds normal, soft, nontender, no HSM or masses.   MS: extremities normal. (+) clubbing. No cyanosis.  SKIN: no rash on limited exam  NEURO: Mentation intact, speech normal, normal strength and tone, normal gait and stance  PSYCH: mentation appears normal. and affect normal/bright  Results:  Recent Results (from the past week)   CBC with Platelets & Differential    Collection Time: 11/12/24  9:31 AM   Result Value Ref Range    WBC Count (External) 6.4 4.5 - 11.0 10(3)/uL    RBC Count (External) 5.29 4.30 - 5.90 10(6)/uL    Hemoglobin (External) 15.0 13.0 - 16.5 g/dL    Hematocrit (External) 45.7 39.0 - 51.0 %    MCV (External) 86.3 81.0 - 99.0 fL    MCH (External) 28.4 27.0 - 31.0 pg    MCHC (External) 32.8 32.0 - 36.0 g/dL    RDW (External) 13.0 11.5 - 15.5 %    Platelet Count (External) 270 130 - 450 10(3)/uL    % Neutrophils (External) 55.1 40.0 - 75.0 %    % Lymphocytes (External) 30.1 15.0 - 50.0 %    % Monocytes (External) 7.4 2.0 - 13.0 %    % Eosinophils " (External) 7.0 0.0 - 8.0 %    % Basophils (External) 0.5 0.0 - 2.0 %    Absolute Neutrophils (External) 3.6 2.0 - 7.8 10(3)/uL    Absolute Lymphocytes (External) 1.9 1.0 - 4.0 10(3)/uL    Absolute Monocytes (External) 0.5 0.1 - 1.0 10(3)/uL    Absolute Eosinophils (External) 0.5 0.2 - 0.7 10(3)/uL    Absolute Basophils (External) 0.0 <=0.2 10(3)/uL   Hepatic function panel    Collection Time: 11/12/24  9:31 AM   Result Value Ref Range    Albumin (External) 3.9 3.5 - 5.0 g/dL    Alk Phosphatase (External) 150 (H) 42 - 128 U/L    ALT (External) 14 5 - 30 U/L    AST (External) 12 7 - 31 U/L    Bilirubin Total (External) 0.7 0.2 - 1.0 mg/dL    Protein Total (External) 7.8 6.4 - 8.3 g/dL   Basic metabolic panel    Collection Time: 11/12/24  9:31 AM   Result Value Ref Range    Sodium (External) 140 136 - 145 mmol/L    Potassium (External) 4.5 3.5 - 5.1 mmol/L    Chloride (External) 105 98 - 107 mmol/L    CO2 (External) 27 23 - 29 mmol/L    Creatinine (External) 1.15 0.70 - 1.30 mg/dL    Urea Nitrogen (External) 10.0 7.0 - 18.0 mg/dL    Calcium (External) 9.2 8.5 - 10.5 mg/dL    Glucose (External) 161 (H) 70 - 105 mg/dL    GFR Estimated (External) >60 60 - 200 ml/min/1.73m2   General PFT Lab (Please always keep checked)    Collection Time: 11/15/24  9:09 AM   Result Value Ref Range    FVC-Pred 5.10 L    FVC-Pre 3.37 L    FVC-%Pred-Pre 65 %    FEV1-Pre 2.41 L    FEV1-%Pred-Pre 56 %    FEV1FVC-Pred 84 %    FEV1FVC-Pre 71 %    FEFMax-Pred 10.30 L/sec    FEFMax-Pre 6.92 L/sec    FEFMax-%Pred-Pre 67 %    FEF2575-Pred 4.48 L/sec    FEF2575-Pre 1.60 L/sec    GUA8353-%Pred-Pre 35 %    ExpTime-Pre 8.89 sec    FIFMax-Pre 3.81 L/sec    FEV1FEV6-Pred 83 %    FEV1FEV6-Pre 73 %                   Results as noted above.    PFT Interpretation:  Moderate obstructive ventilatory defect.  Increased from previous.  Below recent best.   Valid Maneuver             CF Exacerbation  Moderate  Increased vest/bronchodilator/execise and Hosp  admit/home IV (within 2 wks)  (IV antibiotics initiated 10/23 as an outpatient.  Exacerbation resolving.)        Again, thank you for allowing me to participate in the care of your patient.        Sincerely,        Zana Lilly MD

## 2024-11-15 NOTE — PROGRESS NOTES
Reason for Visit  Demario Abbasi is a 29 year old year old male who is being seen for Follow Up (Return CF)      Assessment and plan:   Demario Abbasi is a 29 year old male with cystic fibrosis, pancreatic insufficiency, depression, acne and impaired glucose tolerance who is s/p RUL resection for recurrent exacerbations in 2009.  Currently on IV antibiotics for a right sided pneumonia.     Maintenance   Modulator: Not eligible based on mutations  Mutation:  [id number]/621+1g>t  AW Clearance: Vest  Bronchodilators:  Albuterol  Mucolytics: Pulmozyme, Hypertonic saline  Antibiotics Inh:  Bethkis  Antibiotics Oral: Azithromycin  Other:  Colonization Hx: Staph aureus (MSSA), Achromobacter xylosoxidans/denitrificans   Annual Studies: Due August 2025  Contraindications to standard of care:    Pulmonary/cystic fibrosis: The patient developed chest congestion, increased shortness of breath, fatigue and chest tightness in mid-late October.  Local chest x-ray revealed a new right midlung pneumonia.  Patient was treated with meropenem and Bactrim.  He reports symptoms are back to baseline with very good exercise tolerance, decreased cough and light sputum.  Chest x-ray, reviewed by me with marked improvement in the right midlung infiltrate although not quite complete resolution.  In addition there are scattered CF changes similar to baseline.  PFTs with moderate obstructive ventilatory defect, improved from previous although still slightly below baseline.  Based on symptomatic resolution, it appears that the infectious component is largely resolved though there may be some residual inflammation.  The patient will complete current supply of meropenem and Bactrim through the beginning of next week.  He was encouraged to increase vest therapy to 3 times daily whenever able for the next few weeks.  He will continue Pulmozyme twice daily and use hypertonic saline for third vest.  He will contact the CF office if respiratory  symptoms worsen after stopping antibiotics.  Continue azithromycin.  Pancreatic insufficiency: The patient reports loose stool, likely related to recent antibiotics rather than malabsorption.  He will continue current pancreatic enzyme replacement.  Vitamin A level was not included in annual studies in August so this will be included with labs in February follow-up.  Vitamin D level was at the low end of the normal range, the patient met with the CF dietitian today, he will be switched to the  MVW and vitamin D level will be rechecked with the next visit.    Port-A-Cath: Port-A-Cath noted to be looped in the axillary vein.  Will check ultrasound for next visit.    Iron deficiency: Continue iron replacement.  Iron studies and ferritin will be checked at the next visit.    Renal: Creatinine was increased, likely secondary to Bactrim.  Recheck creatinine with next visit.    Elevated LFTs: Alkaline phosphatase was elevated, likely due to antibiotics for the exacerbation.  Recheck LFTs at the next visit.    CF related diabetes: Patient is not requiring insulin currently.  He was encouraged to monitor his blood sugars.    CFTR Modulation: The patient is not eligible for any of the currently available CFTR modulators.  His records are being reviewed for possible inclusion in an RNA replacement study.    Environmental allergies: Adequately controlled with current azelastine, cetirizine, Flonase and Patanol.    Reflux: Continue pantoprazole.    CF related sinus disease: No symptoms of acute sinusitis at this time.    Healthcare maintenance: Influenza vaccine today.  Annual studies were completed in August.    Follow-up in 3 months with PFTs, sputum culture, chest x-ray to follow-up pneumonia, and labs.    Zana DEL ROSARIO, have spent 53 minutes on the day of the visit to review the chart, interview and examine the patient, review labs and imaging, formulate a plan, document and submit orders. Time documented is  excluding time spent for PFT interpretation.     The longitudinal plan of care for the diagnosis(es)/condition(s) as documented were addressed during this visit. Due to the added complexity in care, I will continue to support Demario in the subsequent management and with ongoing continuity of care.      Zana Lilly MD  Contact via [employer]    Note: Chart documentation done in part with Dragon Voice Recognition software. Although reviewed after completion, some word and grammatical errors may remain. Please consider this when interpreting information in this chart.       CF HPI  The patient was seen and examined by Zana Lilly MD   At the last visit, the patient had increased respiratory symptoms and decreased PFTs.  He was treated with oral doxycycline and oral Bactrim for 3 weeks.  He also increased his airway clearance therapy.  He slowly returned to baseline.  In mid October he developed increased chest congestion, shortness of breath, fatigue and chest tightness.  Symptoms developed over a few days.  No fever, chills or night sweats.  Chest x-ray performed locally with new right midlung infiltrate.  Based on previous cultures IV meropenem and oral Bactrim was initiated.  Bactrim initially to double strength twice daily but dose reduced due to GI intolerance and elevated LFTs and creatinine.  The patient is now feeling well.  Breathing is comfortable at rest and with all activity.  He feels he is symptomatically back to baseline.  Occasional cough at baseline.  Sputum white to light green which is lighter than usual with baseline volume.  No chest pain.  No fever, chills or night sweats.  He is doing vest therapy twice daily.  When he was initially ill he was doing vest therapy 3-4 times daily for about 7-10 days.    Review of systems:  Appetite is good  No ear pain, sore throat, sinus pain or rhinorrhea  No palpitations  No nausea, vomiting abdominal pain.  He does report loose stool with the  "antibiotics about 3 times daily.  A complete ROS was otherwise negative except as noted in the HPI.    Current Outpatient Medications   Medication Sig Dispense Refill    albuterol (PROAIR HFA) 108 (90 Base) MCG/ACT inhaler Inhale 2 puffs into the lungs every 6 hours as needed for shortness of breath or wheezing 26 g 11    albuterol (PROVENTIL) (2.5 MG/3ML) 0.083% neb solution INHALE 1 VIAL BY NEBULIZATION THREE TIMES DAILY 270 mL 10    ALLERGY RELIEF CETIRIZINE 10 MG tablet TAKE 1 TABLET (10 MG) BY MOUTH DAILY 30 tablet 10    azelastine (ASTELIN) 0.1 % nasal spray Spray 1 spray into both nostrils daily 30 mL 11    azithromycin (ZITHROMAX) 500 MG tablet TAKE ONE TABLET BY MOUTH ON MONDAY, WEDNESDAY AND FRIDAY MORNING 12 tablet 10    citalopram (CELEXA) 20 MG tablet Take 1 tablet (20 mg) by mouth daily 30 tablet 1    Continuous Glucose Sensor (FREESTYLE MARQUES 3 SENSOR) MISC 1 each every 14 days 2 each 5    dornase ren (PULMOZYME) 2.5 MG/2.5ML neb solution Inhale 2.5 mg into the lungs 2 times daily INHALE 2.5MG INTO THE LUNGS TWO TIMES A DAYINHALE 2.5MG INTO THE LUNGS TWO TIMES A  mL 11    Emergency Supply Kit, Central, Patient use for emergency only. Contents: 3 sodium chloride 0.9% flushes, 1 dressing kit, 1 microclave ext set 14\", 4 nitrile gloves (med), 6 alcohol prep pads, 1 bacitracin, 1 syringe (10 cc 20 G 1\"). Call [phone] to reorder. 848019 kit 0    fluticasone (FLONASE) 50 MCG/ACT nasal spray Spray 2 sprays into both nostrils daily 16 g 11    lipase-protease-amylase (CREON 36) 44921-821310-724210 units CPEP 2-3 with meals.      meropenem (MERREM) 2 g in sodium chloride 0.9 % 200 mL via SMARTeZ pump Infuse 2 g at 200 mL/hr over 1 hours into the vein every 8 hours. 88858 mL 0    mvw complete formulation (SOFTGELS ) capsule TAKE ONE CAPSULE BY MOUTH TWICE A DAY 60 capsule 11    olopatadine (PATANOL) 0.1 % ophthalmic solution Place 1 drop into both eyes 2 times daily 5 mL 11    pantoprazole " (PROTONIX) 20 MG EC tablet TAKE 1 TABLET (20 MG) BY MOUTH DAILY 30 tablet 10    Port Access Kit For nurse use only.  Do not remove items from bag.  Use for port access.  Do not place syringe on sterile field. [id number] kit 0    sodium chloride inhalant 7 % NEBU neb solution Take 4 mLs by nebulization 2 times daily as needed for wheezing 240 mL 11    sodium chloride, PF, 0.9% PF flush Inject 10 mLs into the vein as needed for line flush. Flush IV before and after med administration as directed and/or at least every 24 hours, or prior to deaccessing for no further use and/or at least every 4 weeks when not accessed. 928858 mL 0    sulfamethoxazole-trimethoprim (BACTRIM DS) 800-160 MG tablet Take 1 tablet by mouth 2 times daily.      tobramycin (BETHKIS) 300 MG/4ML nebulizer solution Take 4 mLs (300 mg) by nebulization 2 times daily 28 days and 28 days off 224 mL 5     No current facility-administered medications for this visit.     Allergies   Allergen Reactions    Amoxicillin-Pot Clavulanate Diarrhea     Past Medical History:   Diagnosis Date    CF (cystic fibrosis) (H)     Genotype: g542x/621+1g>t    Impaired glucose tolerance     Pancreatic insufficiency     S/P lobectomy of lung 8/4/2015    Right upper lobectomy - 2009       Past Surgical History:   Procedure Laterality Date    H STATISTIC PICC LINE INSERTION >5YR, FAILED Bilateral 03/15/2019    Stenosis in both arms, unable to thread catheter    HC LAP,INGUINAL HERNIA REPR,INITIAL  2002    INSERT PICC LINE Left 10/12/2020    Procedure: INSERTION, PICC @1100;  Surgeon: Felicia Branch MD;  Location: [facility] OR    INSERT PICC LINE Left 12/6/2021    Procedure: INSERTION, PICC;  Surgeon: Tahir Alvarado MD;  Location: [facility] OR    INSERT PICC LINE Left 8/31/2022    Procedure: SINGLE LUMEN INSERTION, PICC;  Surgeon: Felicia Branch MD;  Location: [facility] OR    INSERT PORT VASCULAR ACCESS Right 7/16/2024    Procedure: single lumen Insert port vascular access;  Surgeon:  "Kentrell Best MD;  Location: [facility] OR    IR CHEST PORT PLACEMENT > 5 YRS OF AGE  2/8/2023    IR CHEST PORT PLACEMENT > 5 YRS OF AGE  7/16/2024    IR PICC PLACEMENT > 5 YRS OF AGE  03/18/2019    IR PICC PLACEMENT > 5 YRS OF AGE  12/30/2019    IR PICC PLACEMENT > 5 YRS OF AGE  10/12/2020    IR PICC PLACEMENT > 5 YRS OF AGE  12/6/2021    IR PICC PLACEMENT > 5 YRS OF AGE  8/31/2022    IR PORT CHECK RIGHT  6/10/2024    LOBECTOMY LUNG Right 2009    Right upper lobe       Social History     Socioeconomic History    Marital status: Single     Spouse name: Not on file    Number of children: Not on file    Years of education: Not on file    Highest education level: Not on file   Occupational History    Occupation: [employer]   Tobacco Use    Smoking status: Never     Passive exposure: Past    Smokeless tobacco: Former     Types: Chew    Tobacco comments:     dad smokes   Vaping Use    Vaping status: Never Used   Substance and Sexual Activity    Alcohol use: Yes     Comment: 2-3 drinks evenings, mostly weekends    Drug use: Never    Sexual activity: Not on file   Other Topics Concern    Parent/sibling w/ CABG, MI or angioplasty before 65F 55M? Not Asked   Social History Narrative    12/27/2019  -Graduated college in 2020.  Looking for work as a sales [employer].       Social Drivers of Health     Financial Resource Strain: Not on file   Food Insecurity: Not on file   Transportation Needs: Not on file   Physical Activity: Not on file   Stress: Not on file   Social Connections: Not on file   Interpersonal Safety: Unknown (11/28/2023)    Received from [facility]    Intimate Partner Violence     Are you in a relationship where you are physically hurt, threatened and/or made to feel afraid?: Unable to assess   Housing Stability: Not on file         /70 (BP Location: Right arm, Patient Position: Chair, Cuff Size: Adult Regular)   Pulse 78   Ht 1.785 m (5' 10.28\")   Wt 74.9 kg (165 lb 2 oz)   " SpO2 95%   BMI 23.51 kg/m    Body mass index is 23.51 kg/m .  Exam:   GENERAL APPEARANCE: Well developed, well nourished, alert, and in no apparent distress.  EYES: PERRL, EOMI  HENT: Nasal mucosa with edema and no hyperemia. No nasal polyps.  EARS: Canals clear, TMs normal  MOUTH: Oral mucosa is moist, without any lesions, no tonsillar enlargement, no oropharyngeal exudate.  NECK: supple, no masses, no thyromegaly.  LYMPHATICS: No significant axillary, cervical, or supraclavicular nodes.  RESP: normal percussion, good air flow throughout.  Faint right upper and bilateral basilar crackles. No rhonchi. No wheezes.  CV: Normal S1, S2, regular rhythm, normal rate. No murmur.  No rub. No gallop. No LE edema.   ABDOMEN:  Bowel sounds normal, soft, nontender, no HSM or masses.   MS: extremities normal. (+) clubbing. No cyanosis.  SKIN: no rash on limited exam  NEURO: Mentation intact, speech normal, normal strength and tone, normal gait and stance  PSYCH: mentation appears normal. and affect normal/bright  Results:  Recent Results (from the past week)   CBC with Platelets & Differential    Collection Time: 11/12/24  9:31 AM   Result Value Ref Range    WBC Count (External) 6.4 4.5 - 11.0 10(3)/uL    RBC Count (External) 5.29 4.30 - 5.90 10(6)/uL    Hemoglobin (External) 15.0 13.0 - 16.5 g/dL    Hematocrit (External) 45.7 39.0 - 51.0 %    MCV (External) 86.3 81.0 - 99.0 fL    MCH (External) 28.4 27.0 - 31.0 pg    MCHC (External) 32.8 32.0 - 36.0 g/dL    RDW (External) 13.0 11.5 - 15.5 %    Platelet Count (External) 270 130 - 450 10(3)/uL    % Neutrophils (External) 55.1 40.0 - 75.0 %    % Lymphocytes (External) 30.1 15.0 - 50.0 %    % Monocytes (External) 7.4 2.0 - 13.0 %    % Eosinophils (External) 7.0 0.0 - 8.0 %    % Basophils (External) 0.5 0.0 - 2.0 %    Absolute Neutrophils (External) 3.6 2.0 - 7.8 10(3)/uL    Absolute Lymphocytes (External) 1.9 1.0 - 4.0 10(3)/uL    Absolute Monocytes (External) 0.5 0.1 - 1.0  10(3)/uL    Absolute Eosinophils (External) 0.5 0.2 - 0.7 10(3)/uL    Absolute Basophils (External) 0.0 <=0.2 10(3)/uL   Hepatic function panel    Collection Time: 11/12/24  9:31 AM   Result Value Ref Range    Albumin (External) 3.9 3.5 - 5.0 g/dL    Alk Phosphatase (External) 150 (H) 42 - 128 U/L    ALT (External) 14 5 - 30 U/L    AST (External) 12 7 - 31 U/L    Bilirubin Total (External) 0.7 0.2 - 1.0 mg/dL    Protein Total (External) 7.8 6.4 - 8.3 g/dL   Basic metabolic panel    Collection Time: 11/12/24  9:31 AM   Result Value Ref Range    Sodium (External) 140 136 - 145 mmol/L    Potassium (External) 4.5 3.5 - 5.1 mmol/L    Chloride (External) 105 98 - 107 mmol/L    CO2 (External) 27 23 - 29 mmol/L    Creatinine (External) 1.15 0.70 - 1.30 mg/dL    Urea Nitrogen (External) 10.0 7.0 - 18.0 mg/dL    Calcium (External) 9.2 8.5 - 10.5 mg/dL    Glucose (External) 161 (H) 70 - 105 mg/dL    GFR Estimated (External) >60 60 - 200 ml/min/1.73m2   General PFT Lab (Please always keep checked)    Collection Time: 11/15/24  9:09 AM   Result Value Ref Range    FVC-Pred 5.10 L    FVC-Pre 3.37 L    FVC-%Pred-Pre 65 %    FEV1-Pre 2.41 L    FEV1-%Pred-Pre 56 %    FEV1FVC-Pred 84 %    FEV1FVC-Pre 71 %    FEFMax-Pred 10.30 L/sec    FEFMax-Pre 6.92 L/sec    FEFMax-%Pred-Pre 67 %    FEF2575-Pred 4.48 L/sec    FEF2575-Pre 1.60 L/sec    NDY7058-%Pred-Pre 35 %    ExpTime-Pre 8.89 sec    FIFMax-Pre 3.81 L/sec    FEV1FEV6-Pred 83 %    FEV1FEV6-Pre 73 %                   Results as noted above.    PFT Interpretation:  Moderate obstructive ventilatory defect.  Increased from previous.  Below recent best.   Valid Maneuver             CF Exacerbation  Moderate  Increased vest/bronchodilator/execise and Hosp admit/home IV (within 2 wks)  (IV antibiotics initiated 10/23 as an outpatient.  Exacerbation resolving.)

## 2024-11-15 NOTE — PATIENT INSTRUCTIONS
Cystic Fibrosis Self-Care Plan    RECOMMENDATIONS:       - Complete current antibiotics then stop the meropenem and bactrim  - Increase to 3 vest therapies whenever possible for the next 3 weeks, hypertonic saline with third vest  - Call if you feel worse off the antibiotics  - Otherwise continue current medication, nebs and vest therapy.         Cystic Fibrosis :    Cora Butler   [phone]  [facility] [place] Nurse line:  Atul Singletary  [phone]     [facility] [place] Fax Number:      [phone]         Cystic Fibrosis Respiratory Therapists:   Meghann Gonzalez                          [phone]          May Rao   [phone]  Cystic Fibrosis Dietitians:              Sherie Aparicio              [phone]                            Saba Gill                        [phone]   Cystic Fibrosis Diabetes Nurse:    Ashley Cleveland               [phone]    Cystic Fibrosis Social Workers:     Yamila Aranda              [phone]                     Courtney Fenton               [phone]  Cystic Fibrosis Pharmacists:           Hannah Orellana                              [phone]         Angeline Quinones      [phone]   Cystic Fibrosis Genetic Counselor:   Yuliana Ghotra    [phone]    [facility] website:  [email or web address]    We have recently learned about an albuterol neb solution shortage due to a manufacturing delay. There is still a small supply coming in but not enough to meet the current demand. We do not yet have an estimate for when this will become widely available again.    We our asking our CF community to do the following:    Please take time to check your supply of albuterol neb solution at home. We recommend keeping at least a 2-week supply of albuterol nebs at home in case of illness.    2.  If you have an albuterol inhaler at home, you can use 4 puffs of the inhaler with a spacer in place of the  nebulized albuterol at the start of your treatments. It is important to use a spacer for the best technique. If you do not have a spacer at home or have questions on technique, we will be happy to review and send one to your home address. Please also take a moment to check that your albuterol HFA inhaler is available and not .  inhalers may be less effective as the medication loses its potency or power. In some instances your team may suggest another alternative instead of an albuterol inhaler.    3.  Please take care in requesting refills. Albuterol neb solution is a life-saving medication for patients having severe asthma attacks and other emergency respiratory conditions. Let s work together to make sure that albuterol neb solution is available to those who need it urgently.    Reach out to your care team with questions and to confirm your planned alternative for albuterol nebs. MyChart will be the fastest way to connect. If possible, please reserve the nursing line for more urgent concerns as we are short on nursing staff.    Here are some reputable sites with more information:    https://www.cidrap.[facility].edu/resilient-drug-supply/us-liquid-albuterol-giujfryb-nmzhqcfx-ohuhpy-after-major-supplier-shuts-down    https://www.[employer]//health/albuterol-shortage    https://www.ashp.org/drug-shortages/current-shortages/drug-shortage-detail.aspx?ip=733&loginreturnUrl=SSOCheckOnly       MRN: [id number]   Clinic Date: November 15, 2024   Patient: Demario Abbasi     Annual Studies:   IGG   Date Value Ref Range Status   09/10/2020 1,739 (H) 610 - 1,616 mg/dL Final     Immunoglobulin G   Date Value Ref Range Status   2024 1,481 610 - 1,616 mg/dL Final     Insulin   Date Value Ref Range Status   2023 22.0 2.6 - 24.9 uU/mL Final   2022 16.7 3.0 - 25.0 mU/L Final   2019 Canceled, Test credited 3 - 25 mU/L Final     Comment:     Unsatisfactory specimen - hemolyzed  NOTIFIED  OZ PHILLIPS MD AT 1450 ON 2/22/19 BY YOHANA       There are no preventive care reminders to display for this patient.    Pulmonary Function Tests  FEV1: amount of air you can blow out in 1 second  FVC: total amount of air you can take in and blow out    Your Goals:             Latest Ref Rng & Units 11/15/2024     9:09 AM   PFT   FVC L 3.37  P   FEV1 L 2.41  P   FVC% % 65  P   FEV1% % 56  P      P Preliminary result          Airway Clearance: The Most Important Way to Keep Your Lungs Healthy  Vest Settings:   Hill-Rom Frequencies: 8, 9, 10 Pressure 10 Then, Frequencies 18, 19, 20 Pressure 6     RespirTech: Quick Start with Pressure of     Do each frequency for 5 minutes; Deflate vest after each frequency & cough 3 times before beginning the next setting.    Vest and Neb Therapy should be done 2-3 times/day.    Good Nutrition Can Improve Lung Function and Overall Health    Take ALL of your vitamins with food    Take 1/2 of your enzymes before EVERY meal/snack and the other 1/2 mid-meal/snack    Wt Readings from Last 3 Encounters:   11/15/24 74.9 kg (165 lb 2 oz)   08/01/24 75.1 kg (165 lb 9.6 oz)   07/16/24 79.4 kg (175 lb)       Body mass index is 23.51 kg/m .         National CF Foundation Recommendations for BMI in CF Adults: Women: at least 22 Men: at least 23        Controlling Blood Sugars Helps Prevent Lung Infections & Improves Nutrition  Test blood sugar:    In the morning before eating (goal is )    2 hours after a meal (goal is less than 150)    When pre-meal glucose is greater than 150 add correction    At bedtime (if less than 100 eat a snack with 15 grams of carbohydrates  Last A1C Results:   Hemoglobin A1C   Date Value Ref Range Status   08/01/2024 6.2 (H) <5.7 % Final     Comment:     Normal <5.7%   Prediabetes 5.7-6.4%    Diabetes 6.5% or higher     Note: Adopted from ADA consensus guidelines.   09/10/2020 5.7 (H) 0 - 5.6 % Final     Comment:     Normal <5.7% Prediabetes 5.7-6.4%  Diabetes  6.5% or higher - adopted from ADA   consensus guidelines.           If diabetic, measure A1C every 6 months. Goal: Under 7%    Staying Healthy  Research:  If you are interested in learning about research opportunities or have questions, please contact the CF Research Team at [phone] or [email or web address].    CF Foundation:  Compass is a personalized resource service to help you with the insurance, financial, legal and other issues you are facing.  It's free, confidential and available to anyone with CF.  Ask your  for more information or contact Compass directly at 800-KOWPBZR ([phone]) or compass@cff.org, or learn more at cff.org/compass.

## 2024-11-15 NOTE — PROGRESS NOTES
CF Annual Nutrition Visit    Recs/Interventions:  1) Switch from [facility]  to  (send orders to [facility])  2) Obtain updated Vit A, D, and iron panel with next lab draw.     Face to Face Time: 15 minutes    Sherie Aparicio RD, SERENA, CACFD  Cystic Fibrosis/Lung Transplant Dietitian  Available via [employer]

## 2024-11-15 NOTE — NURSING NOTE
Chief Complaint   Patient presents with    Follow Up     Return CF       Vitals were taken, medications reconciled.    Ever Oliveira, Clinic Assistant   9:51 AM

## 2024-11-18 ENCOUNTER — TELEPHONE (OUTPATIENT)
Dept: PULMONOLOGY | Facility: CLINIC | Age: 29
End: 2024-11-18
Payer: COMMERCIAL

## 2024-11-18 NOTE — TELEPHONE ENCOUNTER
Left Voicemail (1st Attempt) for the patient to call back and schedule the following:    Appointment type: F  Provider: JACQUELINE  Return date: 02/15/2025  Specialty phone number: [phone]  Additional appointment(s) needed: CF LOOP, LABS  Additonal Notes: N/A

## 2024-11-19 ENCOUNTER — HOME INFUSION BILLING (OUTPATIENT)
Dept: HOME HEALTH SERVICES | Facility: HOME HEALTH | Age: 29
End: 2024-11-19
Payer: COMMERCIAL

## 2024-11-20 LAB
BACTERIA SPT CULT: ABNORMAL
BACTERIA SPT CULT: ABNORMAL

## 2024-11-20 NOTE — TELEPHONE ENCOUNTER
Left Voicemail (2nd Attempt) for the patient to call back and schedule the following:    Appointment type: F  Provider: JACQUELINE  Return date: 02/15/2025  Specialty phone number: [phone]  Additional appointment(s) needed: CF LOOP, LABS  Additonal Notes: N/A

## 2024-11-21 DIAGNOSIS — E84.9 CF (CYSTIC FIBROSIS) (H): Primary | ICD-10-CM

## 2024-11-21 DIAGNOSIS — K86.89 PANCREATIC INSUFFICIENCY: ICD-10-CM

## 2024-11-21 DIAGNOSIS — E08.9 DIABETES MELLITUS RELATED TO CYSTIC FIBROSIS (H): ICD-10-CM

## 2024-11-21 DIAGNOSIS — E84.8 DIABETES MELLITUS RELATED TO CYSTIC FIBROSIS (H): ICD-10-CM

## 2024-11-21 RX ORDER — PEDIATRIC MULTIVIT 61/D3/VIT K 1500-800
1 CAPSULE ORAL 2 TIMES DAILY
Qty: 60 CAPSULE | Refills: 11 | Status: SHIPPED | OUTPATIENT
Start: 2024-11-21

## 2024-11-28 DIAGNOSIS — E84.9 CF (CYSTIC FIBROSIS) (H): Primary | ICD-10-CM

## 2024-12-10 DIAGNOSIS — Z00.6 RESEARCH STUDY PATIENT: Primary | ICD-10-CM

## 2024-12-15 RX ORDER — FLUMAZENIL 0.1 MG/ML
0.2 INJECTION, SOLUTION INTRAVENOUS
Status: CANCELLED | OUTPATIENT
Start: 2024-12-15 | End: 2024-12-15

## 2024-12-15 RX ORDER — ONDANSETRON 2 MG/ML
4 INJECTION INTRAMUSCULAR; INTRAVENOUS
Status: CANCELLED | OUTPATIENT
Start: 2024-12-15

## 2024-12-15 RX ORDER — NALOXONE HYDROCHLORIDE 0.4 MG/ML
0.4 INJECTION, SOLUTION INTRAMUSCULAR; INTRAVENOUS; SUBCUTANEOUS
Status: CANCELLED | OUTPATIENT
Start: 2024-12-15

## 2024-12-15 RX ORDER — NALOXONE HYDROCHLORIDE 0.4 MG/ML
0.2 INJECTION, SOLUTION INTRAMUSCULAR; INTRAVENOUS; SUBCUTANEOUS
Status: CANCELLED | OUTPATIENT
Start: 2024-12-15

## 2024-12-15 RX ORDER — ONDANSETRON 2 MG/ML
4 INJECTION INTRAMUSCULAR; INTRAVENOUS EVERY 6 HOURS PRN
Status: CANCELLED | OUTPATIENT
Start: 2024-12-15

## 2024-12-15 RX ORDER — LIDOCAINE 40 MG/G
CREAM TOPICAL
Status: CANCELLED | OUTPATIENT
Start: 2024-12-15

## 2024-12-15 RX ORDER — ONDANSETRON 4 MG/1
4 TABLET, ORALLY DISINTEGRATING ORAL EVERY 6 HOURS PRN
Status: CANCELLED | OUTPATIENT
Start: 2024-12-15

## 2024-12-15 RX ORDER — PROCHLORPERAZINE MALEATE 10 MG
10 TABLET ORAL EVERY 6 HOURS PRN
Status: CANCELLED | OUTPATIENT
Start: 2024-12-15

## 2024-12-16 ENCOUNTER — HOSPITAL ENCOUNTER (OUTPATIENT)
Facility: CLINIC | Age: 29
Discharge: HOME | End: 2024-12-16
Attending: INTERNAL MEDICINE | Admitting: INTERNAL MEDICINE
Payer: COMMERCIAL

## 2024-12-16 ENCOUNTER — HOSPITAL ENCOUNTER (OUTPATIENT)
Dept: RESEARCH | Facility: CLINIC | Age: 29
Discharge: HOME | End: 2024-12-16
Attending: INTERNAL MEDICINE
Payer: COMMERCIAL

## 2024-12-16 VITALS
RESPIRATION RATE: 14 BRPM | SYSTOLIC BLOOD PRESSURE: 130 MMHG | OXYGEN SATURATION: 100 % | DIASTOLIC BLOOD PRESSURE: 70 MMHG

## 2024-12-16 DIAGNOSIS — K86.89 PANCREATIC INSUFFICIENCY: ICD-10-CM

## 2024-12-16 DIAGNOSIS — E84.0 CYSTIC FIBROSIS WITH PULMONARY MANIFESTATIONS (H): ICD-10-CM

## 2024-12-16 DIAGNOSIS — E84.8 DIABETES MELLITUS RELATED TO CYSTIC FIBROSIS (H): ICD-10-CM

## 2024-12-16 DIAGNOSIS — E08.9 DIABETES MELLITUS RELATED TO CYSTIC FIBROSIS (H): ICD-10-CM

## 2024-12-16 DIAGNOSIS — E84.9 DIABETES MELLITUS DUE TO CYSTIC FIBROSIS (H): ICD-10-CM

## 2024-12-16 DIAGNOSIS — E08.9 DIABETES MELLITUS DUE TO CYSTIC FIBROSIS (H): ICD-10-CM

## 2024-12-16 DIAGNOSIS — Z00.6 EXAMINATION OF PARTICIPANT OR CONTROL IN CLINICAL RESEARCH: Primary | ICD-10-CM

## 2024-12-16 DIAGNOSIS — E84.9 CYSTIC FIBROSIS EXACERBATION (H): ICD-10-CM

## 2024-12-16 LAB
BASOPHILS # BLD AUTO: 0.1 10E3/UL (ref 0–0.2)
BASOPHILS NFR BLD AUTO: 1 %
EOSINOPHIL # BLD AUTO: 0.5 10E3/UL (ref 0–0.7)
EOSINOPHIL NFR BLD AUTO: 6 %
ERYTHROCYTE [DISTWIDTH] IN BLOOD BY AUTOMATED COUNT: 14.4 % (ref 10–15)
FLEXIBLE SIGMOIDOSCOPY: NORMAL
HCT VFR BLD AUTO: 41.6 % (ref 40–53)
HGB BLD-MCNC: 13.9 G/DL (ref 13.3–17.7)
IMM GRANULOCYTES # BLD: 0 10E3/UL
IMM GRANULOCYTES NFR BLD: 0 %
INR PPP: 1.1 (ref 0.85–1.15)
LYMPHOCYTES # BLD AUTO: 2.9 10E3/UL (ref 0.8–5.3)
LYMPHOCYTES NFR BLD AUTO: 36 %
MCH RBC QN AUTO: 27.9 PG (ref 26.5–33)
MCHC RBC AUTO-ENTMCNC: 33.4 G/DL (ref 31.5–36.5)
MCV RBC AUTO: 83 FL (ref 78–100)
MONOCYTES # BLD AUTO: 0.7 10E3/UL (ref 0–1.3)
MONOCYTES NFR BLD AUTO: 8 %
NEUTROPHILS # BLD AUTO: 3.9 10E3/UL (ref 1.6–8.3)
NEUTROPHILS NFR BLD AUTO: 48 %
NRBC # BLD AUTO: 0 10E3/UL
NRBC BLD AUTO-RTO: 0 /100
PLATELET # BLD AUTO: 258 10E3/UL (ref 150–450)
RBC # BLD AUTO: 4.99 10E6/UL (ref 4.4–5.9)
WBC # BLD AUTO: 8.1 10E3/UL (ref 4–11)

## 2024-12-16 PROCEDURE — 36591 DRAW BLOOD OFF VENOUS DEVICE: CPT | Performed by: INTERNAL MEDICINE

## 2024-12-16 PROCEDURE — 85610 PROTHROMBIN TIME: CPT | Performed by: INTERNAL MEDICINE

## 2024-12-16 PROCEDURE — 85048 AUTOMATED LEUKOCYTE COUNT: CPT | Performed by: INTERNAL MEDICINE

## 2024-12-16 PROCEDURE — 45331 SIGMOIDOSCOPY AND BIOPSY: CPT | Performed by: INTERNAL MEDICINE

## 2024-12-16 PROCEDURE — 85004 AUTOMATED DIFF WBC COUNT: CPT | Performed by: INTERNAL MEDICINE

## 2024-12-16 PROCEDURE — 510N000009 HC RESEARCH FACILITY, PER 15 MIN

## 2024-12-16 PROCEDURE — 510N000017 HC CRU PATIENT CARE, PER 15 MIN

## 2024-12-16 RX ORDER — SODIUM PHOSPHATE,MONO-DIBASIC 19G-7G/118
1 ENEMA (ML) RECTAL ONCE
Status: DISCONTINUED | OUTPATIENT
Start: 2024-12-16 | End: 2024-12-17 | Stop reason: HOSPADM

## 2024-12-16 ASSESSMENT — ACTIVITIES OF DAILY LIVING (ADL): ADLS_ACUITY_SCORE: 46

## 2024-12-16 NOTE — H&P
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)    CF (cystic fibrosis) (H)       Past Surgical History:    Past Surgical History:   Procedure Laterality Date    H STATISTIC PICC LINE INSERTION >5YR, FAILED Bilateral 03/15/2019    Stenosis in both arms, unable to thread catheter    HC LAP,INGUINAL HERNIA REPR,INITIAL  2002    INSERT PICC LINE Left 10/12/2020    Procedure: INSERTION, PICC @1100;  Surgeon: Felicia Branch MD;  Location: [facility] OR    INSERT PICC LINE Left 12/6/2021    Procedure: INSERTION, PICC;  Surgeon: Tahir Alvarado MD;  Location: [facility] OR    INSERT PICC LINE Left 8/31/2022    Procedure: SINGLE LUMEN INSERTION, PICC;  Surgeon: Felicia Branch MD;  Location: [facility] OR    INSERT PORT VASCULAR ACCESS Right 7/16/2024    Procedure: single lumen Insert port vascular access;  Surgeon: Kentrell Best MD;  Location: [facility] OR    IR CHEST PORT PLACEMENT > 5 YRS OF AGE  2/8/2023    IR CHEST PORT PLACEMENT > 5 YRS OF AGE  7/16/2024    IR PICC PLACEMENT > 5 YRS OF AGE  03/18/2019    IR PICC PLACEMENT > 5 YRS OF AGE  12/30/2019    IR PICC PLACEMENT > 5 YRS OF AGE  10/12/2020    IR PICC PLACEMENT > 5 YRS OF AGE  12/6/2021    IR PICC PLACEMENT > 5 YRS OF AGE  8/31/2022    IR PORT CHECK RIGHT  6/10/2024    LOBECTOMY LUNG Right 2009    Right upper lobe       Past Medical History:   Past Medical History:   Diagnosis Date    CF (cystic fibrosis) (H)     Genotype: g542x/621+1g>t    Impaired glucose  tolerance     Pancreatic insufficiency     S/P lobectomy of lung 8/4/2015    Right upper lobectomy - 2009       Social History:   Social History     Tobacco Use    Smoking status: Never     Passive exposure: Past    Smokeless tobacco: Former     Types: Chew    Tobacco comments:     dad smokes   Substance Use Topics    Alcohol use: Yes     Comment: 2-3 drinks evenings, mostly weekends       Family History:   Family History   Problem Relation Age of Onset    Thyroid Disease Mother         hypothyroid    Other - See Comments Mother         multiple family members with biliary disease    Hypertension Maternal Grandmother     Diabetes Maternal Grandfather     Hypertension Paternal Grandmother     Hypothyroidism Paternal Grandmother     Parkinsonism Paternal Grandmother     Coronary Artery Disease Early Onset Paternal Grandfather 56       Allergies:   Allergies   Allergen Reactions    Amoxicillin-Pot Clavulanate Diarrhea       Active Medications:   Current Outpatient Medications   Medication Sig Dispense Refill    albuterol (PROAIR HFA) 108 (90 Base) MCG/ACT inhaler Inhale 2 puffs into the lungs every 6 hours as needed for shortness of breath or wheezing 26 g 11    albuterol (PROVENTIL) (2.5 MG/3ML) 0.083% neb solution INHALE 1 VIAL BY NEBULIZATION THREE TIMES DAILY 270 mL 10    ALLERGY RELIEF CETIRIZINE 10 MG tablet TAKE 1 TABLET (10 MG) BY MOUTH DAILY 30 tablet 10    azelastine (ASTELIN) 0.1 % nasal spray Spray 1 spray into both nostrils daily 30 mL 11    azithromycin (ZITHROMAX) 500 MG tablet TAKE ONE TABLET BY MOUTH ON MONDAY, WEDNESDAY AND FRIDAY MORNING 12 tablet 10    citalopram (CELEXA) 20 MG tablet Take 1 tablet (20 mg) by mouth daily 30 tablet 1    Continuous Glucose Sensor (FREESTYLE MARQUES 3 SENSOR) MISC 1 each every 14 days 2 each 5    dornase ren (PULMOZYME) 2.5 MG/2.5ML neb solution Inhale 2.5 mg into the lungs 2 times daily INHALE 2.5MG INTO THE LUNGS TWO TIMES A DAYINHALE 2.5MG INTO THE LUNGS TWO TIMES A  " mL 11    Emergency Supply Kit, Central, Patient use for emergency only. Contents: 3 sodium chloride 0.9% flushes, 1 dressing kit, 1 microclave ext set 14\", 4 nitrile gloves (med), 6 alcohol prep pads, 1 bacitracin, 1 syringe (10 cc 20 G 1\"). Call [phone] to reorder. [id number] kit 0    fluticasone (FLONASE) 50 MCG/ACT nasal spray Spray 2 sprays into both nostrils daily 16 g 11    heparin lock flush 100 unit/mL Inject 5 mLs into catheter as needed for line flush. Flush IV prior to deaccessing for no further use and/or at least every 4 weeks when not accessed. 922335 mL 0    lipase-protease-amylase (CREON 36) 45152-369200-712217 units CPEP 2-3 with meals.      mvw complete formulation (SOFTGELS ) capsule Take 1 capsule by mouth 2 times daily. 60 capsule 11    olopatadine (PATANOL) 0.1 % ophthalmic solution Place 1 drop into both eyes 2 times daily 5 mL 11    pantoprazole (PROTONIX) 20 MG EC tablet TAKE 1 TABLET (20 MG) BY MOUTH DAILY 30 tablet 10    Port Access Kit For nurse use only.  Do not remove items from bag.  Use for port access.  Do not place syringe on sterile field. [id number] kit 0    sodium chloride inhalant 7 % NEBU neb solution Take 4 mLs by nebulization 2 times daily as needed for wheezing 240 mL 11    sodium chloride, PF, 0.9% PF flush Inject 10 mLs into the vein as needed for line flush. Flush IV before and after med administration as directed and/or at least every 24 hours, or prior to deaccessing for no further use and/or at least every 4 weeks when not accessed. 366325 mL 0    sulfamethoxazole-trimethoprim (BACTRIM DS) 800-160 MG tablet Take 1 tablet by mouth 2 times daily.      tobramycin (BETHKIS) 300 MG/4ML nebulizer solution Take 4 mLs (300 mg) by nebulization 2 times daily 28 days and 28 days off 224 mL 5       Systemic Review:   CONSTITUTIONAL: NEGATIVE for fever, chills, change in weight  ENT/MOUTH: NEGATIVE for ear, mouth and throat problems  RESP: NEGATIVE for significant cough " or SOB  CV: NEGATIVE for chest pain, palpitations or peripheral edema    Physical Examination:   Vital Signs: There were no vitals taken for this visit.  GENERAL: healthy, alert and no distress  NECK: no adenopathy, no asymmetry, masses, or scars  RESP: lungs clear to auscultation - no rales, rhonchi or wheezes  CV: regular rate and rhythm, normal S1 S2, no S3 or S4, no murmur, click or rub, no peripheral edema and peripheral pulses strong  ABDOMEN: soft, nontender, no hepatosplenomegaly, no masses and bowel sounds normal  MS: no gross musculoskeletal defects noted, no edema    Plan: Appropriate to proceed as scheduled.      Tangela Heath MD  12/16/2024    PCP:  Susan Li

## 2024-12-16 NOTE — OR NURSING
Procedure: flex sig  Sedation: none  Specimens: research bxs taken, sent with research RN,   O2: none  Tolerated procedure: easily, very well  Pt escorted out of department by research RN  Other:  n/a    All belongings with patient at time of transfer.

## 2024-12-17 NOTE — ADDENDUM NOTE
Encounter addended by: Mirna Gastelum RN on: 12/17/2024 12:18 PM   Actions taken: Charge Capture section accepted

## 2024-12-17 NOTE — ADDENDUM NOTE
Encounter addended by: Oriana Bryan on: 12/17/2024 12:55 PM   Actions taken: Charge Capture section accepted

## 2025-01-07 ENCOUNTER — HOME INFUSION (OUTPATIENT)
Dept: HOME HEALTH SERVICES | Facility: HOME HEALTH | Age: 30
End: 2025-01-07
Payer: COMMERCIAL

## 2025-01-07 DIAGNOSIS — E84.0 CYSTIC FIBROSIS WITH PULMONARY EXACERBATION (H): ICD-10-CM

## 2025-01-14 RX ORDER — HEPARIN SODIUM (PORCINE) LOCK FLUSH IV SOLN 100 UNIT/ML 100 UNIT/ML
5 SOLUTION INTRAVENOUS PRN
Qty: 999999 ML | Refills: 0 | Status: DISPENSED | OUTPATIENT
Start: 2025-01-14 | End: 2025-10-21

## 2025-01-16 ENCOUNTER — HOME INFUSION BILLING (OUTPATIENT)
Dept: HOME HEALTH SERVICES | Facility: HOME HEALTH | Age: 30
End: 2025-01-16
Payer: COMMERCIAL

## 2025-02-13 ENCOUNTER — TELEPHONE (OUTPATIENT)
Dept: PULMONOLOGY | Facility: CLINIC | Age: 30
End: 2025-02-13
Payer: COMMERCIAL

## 2025-02-13 NOTE — TELEPHONE ENCOUNTER
[place] APPROVED    Medication: TOBRAMYCIN (COBY) 300 MG/5ML IN NEBU  Amount: $ 15,000  [facility] Name: [facility]    [facility] Effective Date: 3/3/2025  Foundation Expiration Date: 3/2/2026  Additional Information:   Patient Notified:

## 2025-02-13 NOTE — TELEPHONE ENCOUNTER
[place] INITIATED    Medication: TOBRAMYCIN (COBY) 300 MG/5ML IN [facility] Name: [facility]  Date submitted: 2/13/2025  3:16 PM   Foundation Phone:    Foundation Fax:

## 2025-03-01 ENCOUNTER — HEALTH MAINTENANCE LETTER (OUTPATIENT)
Age: 30
End: 2025-03-01

## 2025-04-03 ENCOUNTER — HOME INFUSION BILLING (OUTPATIENT)
Dept: HOME HEALTH SERVICES | Facility: HOME HEALTH | Age: 30
End: 2025-04-03
Payer: COMMERCIAL

## 2025-04-25 ENCOUNTER — HOME INFUSION (OUTPATIENT)
Dept: HOME HEALTH SERVICES | Facility: HOME HEALTH | Age: 30
End: 2025-04-25
Payer: COMMERCIAL

## 2025-04-29 DIAGNOSIS — E84.9 CF (CYSTIC FIBROSIS) (H): ICD-10-CM

## 2025-04-29 RX ORDER — TOBRAMYCIN INHALATION 300 MG/4ML
300 SOLUTION RESPIRATORY (INHALATION) 2 TIMES DAILY
Qty: 224 ML | Refills: 5 | Status: SHIPPED | OUTPATIENT
Start: 2025-04-29

## 2025-05-05 ENCOUNTER — HOME INFUSION BILLING (OUTPATIENT)
Dept: HOME HEALTH SERVICES | Facility: HOME HEALTH | Age: 30
End: 2025-05-05
Payer: COMMERCIAL

## 2025-06-04 ENCOUNTER — TELEPHONE (OUTPATIENT)
Dept: PULMONOLOGY | Facility: CLINIC | Age: 30
End: 2025-06-04
Payer: COMMERCIAL

## 2025-06-04 NOTE — TELEPHONE ENCOUNTER
Patient called CF Triage line. Requesting call from [place] to help schedule follow up appointment.    RN routed to [place] to assist with scheduling CF follow up visit.    MAGGIE Singletary

## 2025-06-15 ENCOUNTER — HEALTH MAINTENANCE LETTER (OUTPATIENT)
Age: 30
End: 2025-06-15

## 2025-06-17 NOTE — PROGRESS NOTES
Call placed to Demario's home number for post hospital follow-up. Message left with follow-up appt information. Dr. Álvarez will see Demario on 3/9 at 1 pm with PFTs at 12:30. Requested patient or parent call CF office to confirm they received message and let us know how Demario is doing today.    May Maldonado, RN, [facility]  P Pediatric Cystic Fibrosis/Pulmonary Care Coordinator   CF RN phone: [phone]    
No

## 2025-06-20 ENCOUNTER — HOME INFUSION (OUTPATIENT)
Dept: HOME HEALTH SERVICES | Facility: HOME HEALTH | Age: 30
End: 2025-06-20
Payer: COMMERCIAL

## 2025-06-20 DIAGNOSIS — E84.9 CF (CYSTIC FIBROSIS) (H): ICD-10-CM

## 2025-06-20 DIAGNOSIS — J30.2 SEASONAL ALLERGIES: ICD-10-CM

## 2025-06-20 DIAGNOSIS — E84.0 CYSTIC FIBROSIS WITH PULMONARY MANIFESTATIONS (H): ICD-10-CM

## 2025-06-23 RX ORDER — PANTOPRAZOLE SODIUM 20 MG/1
20 TABLET, DELAYED RELEASE ORAL DAILY
Qty: 30 TABLET | Refills: 5 | Status: SHIPPED | OUTPATIENT
Start: 2025-06-23

## 2025-06-23 RX ORDER — ALBUTEROL SULFATE 0.83 MG/ML
SOLUTION RESPIRATORY (INHALATION)
Qty: 270 ML | Refills: 5 | Status: SHIPPED | OUTPATIENT
Start: 2025-06-23

## 2025-06-23 RX ORDER — AZITHROMYCIN 500 MG/1
TABLET, FILM COATED ORAL
Qty: 12 TABLET | Refills: 5 | Status: SHIPPED | OUTPATIENT
Start: 2025-06-23

## 2025-06-23 RX ORDER — CETIRIZINE HYDROCHLORIDE 10 MG/1
10 TABLET ORAL DAILY
Qty: 30 TABLET | Refills: 5 | Status: SHIPPED | OUTPATIENT
Start: 2025-06-23

## 2025-06-26 ENCOUNTER — HOME INFUSION BILLING (OUTPATIENT)
Dept: HOME HEALTH SERVICES | Facility: HOME HEALTH | Age: 30
End: 2025-06-26
Payer: COMMERCIAL

## 2025-06-26 DIAGNOSIS — E84.9 CF (CYSTIC FIBROSIS) (H): ICD-10-CM

## 2025-06-26 DIAGNOSIS — E84.0 CYSTIC FIBROSIS WITH PULMONARY EXACERBATION (H): ICD-10-CM

## 2025-06-26 RX ORDER — FLUTICASONE PROPIONATE 50 MCG
2 SPRAY, SUSPENSION (ML) NASAL DAILY
Qty: 16 G | Refills: 10 | Status: SHIPPED | OUTPATIENT
Start: 2025-06-26

## 2025-07-06 ENCOUNTER — HEALTH MAINTENANCE LETTER (OUTPATIENT)
Age: 30
End: 2025-07-06

## 2025-07-15 NOTE — PROGRESS NOTES
[facility]  July 17, 2025         Assessment and Plan:   Demario Abbasi is a 30 year old male with cystic fibrosis, pancreatic insufficiency, depression, acne and impaired glucose tolerance who is s/p RUL resection for recurrent exacerbations in 2009 who is seen today for routine follow up. He was last seen in November in 2024.     1. Mild cystic fibrosis exacerbation: never fully recovered his PFTs last fall and since that time has generally felt well, although did have two episodes of bloody mixed sputum a week ago. Sating 97% on room air; doing nebs/vesting BID. PFTs today are down 160 ml from prior, but down 410 ml over the last year. Previous cultures are + for Achromobacter and MSSA.  - Continue nebs and vesting BID  - Start doxycycline and Bactrim x 3 weeks  - On month for ngozi nebs  - Chronic azithromycin    2. CFTR modulation: not eligible based on mutations of g542x/621+1g>t.    3. Iron deficiency: do not see that patient is on replacement.  - Will check iron studies with annuals at next visit    4. Elevated alk phos: at last check.  - Recheck with annuals     5. CF related diabetes: does not check BS, had a CGM that he's not using. Overdue for follow up with Dr. Cross, may be a contributor to declining pulmonary function.  - Recheck check BS prior to a scheduled visit with Dr. Cross    6. Environmental allergies: no current issues.  - Continue ht azelastine, cetirizine, Flonase and Patanol.     7. Reflux: no complaints.   - Continue pantoprazole.    RTC: 3-4 weeks with annuals  Annual studies due: August 2025 (DEXA ordered as well)  Preventative care:    Lisa Lofton PA-C  Pulmonary, Allergy, Critical Care and Sleep Medicine        Interval History:     Since his last visit, patient was not sick over the winter. This spring/summer, patient has been okay. Did cough up blood X 2 one week ago, no hemoptysis, more blood mixed with sputum.  Nothing since that time. Cough is baseline, productive, but not more. No congestion, tightness or wheezing. No new shortness of breath at baseline. Nebs and vesting twice/day. Just started ngozi one week ago. No new GI issues, BMs are normal. Only using enzymes with greasier foods, feels bloated when he takes his enzymes.          Review of Systems:   Please see HPI. Otherwise, complete 10 point ROS negative.           Past Medical and Surgical History:     Past Medical History:   Diagnosis Date    CF (cystic fibrosis) (H)     Genotype: g542x/621+1g>t    Impaired glucose tolerance     Pancreatic insufficiency     S/P lobectomy of lung 8/4/2015    Right upper lobectomy - 2009     Past Surgical History:   Procedure Laterality Date    H STATISTIC PICC LINE INSERTION >5YR, FAILED Bilateral 03/15/2019    Stenosis in both arms, unable to thread catheter    HC LAP,INGUINAL HERNIA REPR,INITIAL  2002    INSERT PICC LINE Left 10/12/2020    Procedure: INSERTION, PICC @1100;  Surgeon: Felicia Branch MD;  Location: [facility] OR    INSERT PICC LINE Left 12/6/2021    Procedure: INSERTION, PICC;  Surgeon: Tahir Alvarado MD;  Location: [facility] OR    INSERT PICC LINE Left 8/31/2022    Procedure: SINGLE LUMEN INSERTION, PICC;  Surgeon: Felicia Branch MD;  Location: [facility] OR    INSERT PORT VASCULAR ACCESS Right 7/16/2024    Procedure: single lumen Insert port vascular access;  Surgeon: Kentrell Best MD;  Location: [facility] OR    IR CHEST PORT PLACEMENT > 5 YRS OF AGE  2/8/2023    IR CHEST PORT PLACEMENT > 5 YRS OF AGE  7/16/2024    IR PICC PLACEMENT > 5 YRS OF AGE  03/18/2019    IR PICC PLACEMENT > 5 YRS OF AGE  12/30/2019    IR PICC PLACEMENT > 5 YRS OF AGE  10/12/2020    IR PICC PLACEMENT > 5 YRS OF AGE  12/6/2021    IR PICC PLACEMENT > 5 YRS OF AGE  8/31/2022    IR PORT CHECK RIGHT  6/10/2024    LOBECTOMY LUNG Right 2009    Right upper lobe           Family History:     Family History   Problem Relation Age of Onset    Thyroid  Disease Mother         hypothyroid    Other - See Comments Mother         multiple family members with biliary disease    Hypertension Maternal Grandmother     Diabetes Maternal Grandfather     Hypertension Paternal Grandmother     Hypothyroidism Paternal Grandmother     Parkinsonism Paternal Grandmother     Coronary Artery Disease Early Onset Paternal Grandfather 56            Social History:     Social History     Socioeconomic History    Marital status: Single     Spouse name: Not on file    Number of children: Not on file    Years of education: Not on file    Highest education level: Not on file   Occupational History    Occupation: Sale    Tobacco Use    Smoking status: Never     Passive exposure: Past    Smokeless tobacco: Current     Types: Chew    Tobacco comments:     One nicotine pouch a day    Vaping Use    Vaping status: Never Used   Substance and Sexual Activity    Alcohol use: Yes     Comment: 2-3 drinks evenings, mostly weekends    Drug use: Never    Sexual activity: Not on file   Other Topics Concern    Parent/sibling w/ CABG, MI or angioplasty before 65F 55M? Not Asked   Social History Narrative    12/27/2019  -Graduated college in 2020.  Looking for work as a sales [employer].       Social Drivers of Health     Financial Resource Strain: Not on file   Food Insecurity: Not on file   Transportation Needs: Not on file   Physical Activity: Not on file   Stress: Not on file   Social Connections: Not on file   Interpersonal Safety: Not At Risk (12/30/2024)    [facility]    Intimate Partner Violence     Are you in a relationship where you are physically hurt, threatened and/or made to feel afraid?: No   Housing Stability: Not on file            Medications:     Current Outpatient Medications   Medication Sig Dispense Refill    albuterol (PROAIR HFA) 108 (90 Base) MCG/ACT inhaler Inhale 2 puffs into the lungs every 6 hours as needed for shortness of breath or  "wheezing. 26 g 11    azelastine (ASTELIN) 0.1 % nasal spray Spray 1 spray into both nostrils daily as needed for rhinitis.      doxycycline hyclate (VIBRAMYCIN) 100 MG capsule Take 1 capsule (100 mg) by mouth 2 times daily for 21 days. 42 capsule 0    sulfamethoxazole-trimethoprim (BACTRIM DS) 800-160 MG tablet Take 1 tablet by mouth 3 times daily for 21 days. 63 tablet 0    albuterol (PROVENTIL) (2.5 MG/3ML) 0.083% neb solution INHALE 1 VIAL BY NEBULIZATION THREE TIMES DAILY 270 mL 5    azithromycin (ZITHROMAX) 500 MG tablet TAKE ONE TABLET BY MOUTH ON MONDAY, WEDNESDAY AND FRIDAY MORNING 12 tablet 5    cetirizine (ALLERGY RELIEF CETIRIZINE) 10 MG tablet TAKE 1 TABLET (10 MG) BY MOUTH DAILY 30 tablet 5    citalopram (CELEXA) 20 MG tablet Take 1 tablet (20 mg) by mouth daily 30 tablet 1    Continuous Glucose Sensor (FREESTYLE MARQUES 3 SENSOR) MISC 1 each every 14 days 2 each 5    dornase ren (PULMOZYME) 2.5 MG/2.5ML neb solution Inhale 2.5 mg into the lungs 2 times daily. INHALE 2.5MG INTO THE LUNGS TWO TIMES A DAYINHALE 2.5MG INTO THE LUNGS TWO TIMES A  mL 11    Emergency Supply Kit, Central, Patient use for emergency only. Contents: 3 sodium chloride 0.9% flushes, 1 dressing kit, 1 microclave ext set 14\", 4 nitrile gloves (med), 6 alcohol prep pads, 1 bacitracin, 1 syringe (10 cc 20 G 1\"). Call [phone] to reorder. [id number] kit 0    fluticasone (FLONASE) 50 MCG/ACT nasal spray SPRAY 2 SPRAYS INTO BOTH NOSTRILS DAILY 16 g 10    heparin lock flush 100 unit/mL Inject 5 mLs into catheter as needed for line flush. Flush IV prior to deaccessing for no further use and/or at least every 4 weeks when not accessed. [id number] mL 0    lipase-protease-amylase (CREON 36) [id number] units CPEP 2-3 with meals.      mvw complete formulation (SOFTGELS ) capsule Take 1 capsule by mouth 2 times daily. 60 capsule 11    olopatadine (PATANOL) 0.1 % ophthalmic solution Place 1 drop into both eyes 2 times daily 5 mL " "11    pantoprazole (PROTONIX) 20 MG EC tablet TAKE 1 TABLET (20 MG) BY MOUTH DAILY 30 tablet 5    Port Access Kit For nurse use only.  Do not remove items from bag.  Use for port access.  Do not place syringe on sterile field. [id number] kit 0    sodium chloride inhalant 7 % NEBU neb solution Take 4 mLs by nebulization 2 times daily as needed for wheezing 240 mL 11    sodium chloride, PF, 0.9% PF flush Inject 10 mLs into the vein as needed for line flush. Flush IV before and after med administration as directed and/or at least every 24 hours, or prior to deaccessing for no further use and/or at least every 4 weeks when not accessed. 219816 mL 0    tobramycin (BETHKIS) 300 MG/4ML nebulizer solution Take 4 mLs (300 mg) by nebulization 2 times daily. 28 days and 28 days off 224 mL 5     No current facility-administered medications for this visit.            Physical Exam:   /76 (BP Location: Right arm, Patient Position: Sitting, Cuff Size: Adult Regular)   Pulse 59   Ht 1.765 m (5' 9.5\")   Wt 74.9 kg (165 lb 3.2 oz)   SpO2 97%   BMI 24.05 kg/m      GENERAL: alert, NAD  HEENT: NCAT, EOMI, anicteric sclera,  no oral mucosal edema or erythema  Neck: no cervical or supraclavicular adenopathy  Respiratory: moderate airflow, scattered basilar crackles  CV: RRR, S1S2, no murmurs noted  Abdomen: normoactive BS, soft   Lymph: no edema, + digital clubbing  Neuro: AAO X 3, CN 2-12 grossly intact  Psychiatric: normal affect, good eye contact  Skin: no rash, jaundice or lesions on limited exam         Data:   All laboratory and imaging data reviewed.      Cystic Fibrosis Culture  Specimen Description   Date Value Ref Range Status   02/11/2021 Sputum  Final   09/10/2020 Sputum  Final   09/10/2020 Sputum  Final   09/10/2020 Sputum  Final   09/10/2020 Sputum  Final   09/10/2020 Sputum  Final    Culture Micro   Date Value Ref Range Status   02/11/2021 Moderate growth  Normal elmer    Final   02/11/2021 Light " growth  Staphylococcus aureus   (A)  Final   02/11/2021 Moderate growth  Strain 2  Staphylococcus aureus   (A)  Final   02/11/2021 (A)  Final    Moderate growth  Achromobacter xylosoxidans/denitrificans          PFT interpretation:  Maneuver: valid and meets ATS guidelines  Moderate obstruction based on Z score  Compared to prior: FEV1 of 2.25 is 160 ml below prior  The decrease in FVC may represent air trapping v. restrictive physiology.  Lung volumes would be necessary to determine.    Mild Oral: non-quinolone

## 2025-07-17 ENCOUNTER — OFFICE VISIT (OUTPATIENT)
Dept: PULMONOLOGY | Facility: CLINIC | Age: 30
End: 2025-07-17
Attending: INTERNAL MEDICINE
Payer: COMMERCIAL

## 2025-07-17 ENCOUNTER — ALLIED HEALTH/NURSE VISIT (OUTPATIENT)
Dept: CARE COORDINATION | Facility: CLINIC | Age: 30
End: 2025-07-17

## 2025-07-17 ENCOUNTER — OFFICE VISIT (OUTPATIENT)
Dept: PHARMACY | Facility: CLINIC | Age: 30
End: 2025-07-17
Payer: COMMERCIAL

## 2025-07-17 VITALS
HEIGHT: 70 IN | SYSTOLIC BLOOD PRESSURE: 122 MMHG | DIASTOLIC BLOOD PRESSURE: 76 MMHG | BODY MASS INDEX: 23.65 KG/M2 | OXYGEN SATURATION: 97 % | HEART RATE: 59 BPM | WEIGHT: 165.2 LBS

## 2025-07-17 DIAGNOSIS — R73.02 IMPAIRED GLUCOSE TOLERANCE: ICD-10-CM

## 2025-07-17 DIAGNOSIS — E08.9 DIABETES MELLITUS DUE TO CYSTIC FIBROSIS (H): ICD-10-CM

## 2025-07-17 DIAGNOSIS — Z13.9 RISK AND FUNCTIONAL ASSESSMENT: Primary | ICD-10-CM

## 2025-07-17 DIAGNOSIS — K86.89 PANCREATIC INSUFFICIENCY: ICD-10-CM

## 2025-07-17 DIAGNOSIS — E84.9 CYSTIC FIBROSIS EXACERBATION (H): ICD-10-CM

## 2025-07-17 DIAGNOSIS — E84.9 CF (CYSTIC FIBROSIS) (H): ICD-10-CM

## 2025-07-17 DIAGNOSIS — E08.9 DIABETES MELLITUS RELATED TO CYSTIC FIBROSIS (H): Primary | ICD-10-CM

## 2025-07-17 DIAGNOSIS — E84.9 CF (CYSTIC FIBROSIS) (H): Primary | ICD-10-CM

## 2025-07-17 DIAGNOSIS — J30.2 SEASONAL ALLERGIES: ICD-10-CM

## 2025-07-17 DIAGNOSIS — E84.9 CYSTIC FIBROSIS (H): Primary | ICD-10-CM

## 2025-07-17 DIAGNOSIS — E84.9 DIABETES MELLITUS DUE TO CYSTIC FIBROSIS (H): ICD-10-CM

## 2025-07-17 DIAGNOSIS — E84.8 DIABETES MELLITUS RELATED TO CYSTIC FIBROSIS (H): Primary | ICD-10-CM

## 2025-07-17 DIAGNOSIS — E84.0 CYSTIC FIBROSIS WITH PULMONARY EXACERBATION (H): ICD-10-CM

## 2025-07-17 LAB
EXPTIME-PRE: 12.32 SEC
FEF2575-%PRED-PRE: 21 %
FEF2575-PRE: 0.96 L/SEC
FEF2575-PRED: 4.36 L/SEC
FEFMAX-%PRED-PRE: 72 %
FEFMAX-PRE: 7.34 L/SEC
FEFMAX-PRED: 10.13 L/SEC
FEV1-%PRED-PRE: 53 %
FEV1-PRE: 2.25 L
FEV1FEV6-PRE: 69 %
FEV1FEV6-PRED: 83 %
FEV1FVC-PRE: 62 %
FEV1FVC-PRED: 84 %
FEV1SVC-PRED: 77 L
FIFMAX-PRE: 3.78 L/SEC
FVC-%PRED-PRE: 71 %
FVC-PRE: 3.6 L
FVC-PRED: 5.01 L
Lab: 74 %

## 2025-07-17 PROCEDURE — 99213 OFFICE O/P EST LOW 20 MIN: CPT | Performed by: PHYSICIAN ASSISTANT

## 2025-07-17 RX ORDER — SODIUM CHLORIDE FOR INHALATION 7 %
4 VIAL, NEBULIZER (ML) INHALATION 2 TIMES DAILY PRN
Qty: 240 ML | Refills: 11 | Status: SHIPPED | OUTPATIENT
Start: 2025-07-17

## 2025-07-17 RX ORDER — DOXYCYCLINE 100 MG/1
100 CAPSULE ORAL 2 TIMES DAILY
Qty: 42 CAPSULE | Refills: 0 | Status: SHIPPED | OUTPATIENT
Start: 2025-07-17 | End: 2025-08-07

## 2025-07-17 RX ORDER — SULFAMETHOXAZOLE AND TRIMETHOPRIM 800; 160 MG/1; MG/1
1 TABLET ORAL 3 TIMES DAILY
Qty: 63 TABLET | Refills: 0 | Status: SHIPPED | OUTPATIENT
Start: 2025-07-17 | End: 2025-08-07

## 2025-07-17 RX ORDER — ALBUTEROL SULFATE 90 UG/1
2 INHALANT RESPIRATORY (INHALATION) EVERY 6 HOURS PRN
Qty: 26 G | Refills: 11 | Status: SHIPPED | OUTPATIENT
Start: 2025-07-17

## 2025-07-17 RX ORDER — OLOPATADINE HYDROCHLORIDE 1 MG/ML
1 SOLUTION OPHTHALMIC 2 TIMES DAILY
Qty: 5 ML | Refills: 11 | Status: SHIPPED | OUTPATIENT
Start: 2025-07-17

## 2025-07-17 RX ORDER — AZELASTINE 1 MG/ML
1 SPRAY, METERED NASAL DAILY PRN
Status: SHIPPED
Start: 2025-07-17

## 2025-07-17 ASSESSMENT — ANXIETY QUESTIONNAIRES
2. NOT BEING ABLE TO STOP OR CONTROL WORRYING: NOT AT ALL
6. BECOMING EASILY ANNOYED OR IRRITABLE: SEVERAL DAYS
GAD7 TOTAL SCORE: 2
1. FEELING NERVOUS, ANXIOUS, OR ON EDGE: NOT AT ALL
7. FEELING AFRAID AS IF SOMETHING AWFUL MIGHT HAPPEN: NOT AT ALL
5. BEING SO RESTLESS THAT IT IS HARD TO SIT STILL: NOT AT ALL
GAD7 TOTAL SCORE: 2
3. WORRYING TOO MUCH ABOUT DIFFERENT THINGS: SEVERAL DAYS

## 2025-07-17 ASSESSMENT — PAIN SCALES - GENERAL: PAINLEVEL_OUTOF10: NO PAIN (0)

## 2025-07-17 ASSESSMENT — PATIENT HEALTH QUESTIONNAIRE - PHQ9
SUM OF ALL RESPONSES TO PHQ QUESTIONS 1-9: 1
5. POOR APPETITE OR OVEREATING: NOT AT ALL

## 2025-07-17 NOTE — PROGRESS NOTES
Respiratory Therapist Note:         Vest                Brand: Hill-Rom - traditional Hill Rom: Frequencies 8, 9, 10 at pressure 10 then frequencies 18, 19, 20 at pressure 6. and Hill-Rom - [place] Frequencies: 13-15, intensity 8-10                Cough Pause: Cough Pause; Yes                Vest Garment Size: Adult Medium                Last Fitting Date: Due as needed                Frequency of therapy: 14 times per week                Other: Increasing therapy to 3x/day and will follow up in 3-4 weeks. Hill Rom 105 is primary vest.         Alternative Airway Clearance:          Nebulized Medications                Bronchodilators: Albuterol                Mucolytic: Pulmozyme                Antibiotics: COBY                Additional Inhaled Medications:                 Spacer Use:          Review Cleaning: Yes. Top rack of .         Education and Transition Information                Correct order of inhaled medications: Yes                Mechanism of Action of inhaled medications: Yes                Frequency of inhaled medications: Yes                Dosage of inhaled medications: Yes                Other:          Home Care:                Nebulizer Cups (Brand/Type): Primarily uses disposables bought online                Nebulizer Compressor                            Year Purchased: Works, year of purchase unknown                            [facility], Phone: [phone], Fax: [phone]         Oxygen: N/A                                Pulmonary Rehab: N/A                  Plan of Care and Goals for next visit:  Please reach out with any future airway clearance needs.

## 2025-07-17 NOTE — PROGRESS NOTES
Disease State Management Encounter:                          Demario Abbasi is a 30 year old male seen for a co-visit with Lisa Lofton PA-C.Patient was accompanied by Wes Morales.    Reason for visit: CF Annual Medication Review    Medication Adherence/Access:   Medication: Demario manages his medications at home  Pharmacy: [facility], and [facility]   No concerns with medication cost or access noted today      CF:    Inhaled medications:   Bronchodilator: albuterol nebs twice daily and albuterol HFA as needed (occassionally)   Mucolytic: Pulmozyme twice daily and hypertonic 7% for illness (none recently)   Antibiotic: Bethkis twice daily (cycling; on)   Other: Flonase 2 sprays each nostril daily, and Azelastine 1 spray each nostril  Oral medications:   Azithromycin: 500mg MWF   CFTR modulator: not eligible by genotype   Other: cetirizine 10mg daily  Other medications:  olopatadine eye drops    Genotype: G542X/621+1G>T  Demario participates in VEST/neb therapy twice daily. Denies medication side effects.      Pancreatic Insufficiency/Nutrition:   Creon 53999 taking 2 to 3 capsules with meals ~2-3x/week  Acid reducer: pantoprazole 20mg daily  Vitamins/Supplements: [id number] twice daily    Patient is experiencing sign/symptoms of malabsorption. Denies medication side effects. Per visit with dietitian planning to restart enzymes after this visit.      Assessment/Plan:    Keep up the great work on medications! Refills sent to pharmacy  Restart enzymes per discussion with dietitian      Follow-up: per Lisa Lofton PA-C     I spent 10- minutes with this patient today. I offer these suggestions for consideration by Lisa Lofton PA-C during covisit today.     A summary of these recommendations was given to the patient (see AVS from today's appointment with Lisa Lofton PA-C).    Angeline Quinones, PharmD  Cystic Fibrosis [place] Pharmacist  [facility]  Voicemail: [phone]          Medication Therapy Recommendations  No medication therapy recommendations to display

## 2025-07-17 NOTE — PATIENT INSTRUCTIONS
Cystic Fibrosis Self-Care Plan       Patient: Demario Abbasi   MRN: [id number]   Clinic Date: July 17, 2025     RECOMMENDATIONS:  1. Continue nebulizers and vest therapy at least twice/day.  2. Start doxycycline 1 tablet twice/day and Bactrim 1 tablet three times/day X 21 days.  3. Continue ngozi nebs.   4. Schedule with Dr. Cross. Make sure to get some blood sugar data prior to that visit.     Annual Studies:   IGG   Date Value Ref Range Status   09/10/2020 1,739 (H) 610 - 1,616 mg/dL Final     Immunoglobulin G   Date Value Ref Range Status   08/01/2024 1,481 610 - 1,616 mg/dL Final     Insulin   Date Value Ref Range Status   08/24/2023 22.0 2.6 - 24.9 uU/mL Final   03/17/2022 16.7 3.0 - 25.0 mU/L Final   02/22/2019 Canceled, Test credited 3 - 25 mU/L Final     Comment:     Unsatisfactory specimen - hemolyzed  NOTIFIED OZ PHILLIPS MD AT 1450 ON 2/22/19 BY JV       There are no preventive care reminders to display for this patient.    Pulmonary Function Tests  FEV1: amount of air you can blow out in 1 second  FVC: total amount of air you can take in and blow out    Your Goals:             Latest Ref Rng & Units 7/17/2025     9:48 AM   PFT   FVC L 3.60  P   FEV1 L 2.25  P   FVC% % 71  P   FEV1% % 53  P      P Preliminary result          Airway Clearance: The Most Important Way to Keep Your Lungs Healthy  Vest Settings:   Hill-Rom Frequencies: 8, 9, 10 Pressure 10 Then, Frequencies 18, 19, 20 Pressure 6     RespirTech: Quick Start with Pressure of     Do each frequency for 5 minutes; Deflate vest after each frequency & cough 3 times before beginning the next setting.    Vest and Neb Therapy should be done 2-3 times/day.    Good Nutrition Can Improve Lung Function and Overall Health    Take ALL of your vitamins with food    Take 1/2 of your enzymes before EVERY meal/snack and the other 1/2 mid-meal/snack    Wt Readings from Last 3 Encounters:   07/17/25 74.9 kg (165 lb 3.2 oz)   11/15/24 74.9 kg (165 lb 2  oz)   08/01/24 75.1 kg (165 lb 9.6 oz)       Body mass index is 24.05 kg/m .         National CF Foundation Recommendations for BMI in CF Adults: Women: at least 22 Men: at least 23        Controlling Blood Sugars Helps Prevent Lung Infections & Improves Nutrition  Test blood sugar:    In the morning before eating (goal is )    2 hours after a meal (goal is less than 150)    When pre-meal glucose is greater than 150 add correction    At bedtime (if less than 100 eat a snack with 15 grams of carbohydrates  Last A1C Results:   Hemoglobin A1C   Date Value Ref Range Status   08/01/2024 6.2 (H) <5.7 % Final     Comment:     Normal <5.7%   Prediabetes 5.7-6.4%    Diabetes 6.5% or higher     Note: Adopted from ADA consensus guidelines.   09/10/2020 5.7 (H) 0 - 5.6 % Final     Comment:     Normal <5.7% Prediabetes 5.7-6.4%  Diabetes 6.5% or higher - adopted from ADA   consensus guidelines.           If diabetic, measure A1C every 6 months. Goal is under 7%.    Staying Healthy  Research: If you are interested in learning about research opportunities or have questions, please contact Esmer Tang at [phone] or [email or web address].    CF Foundation: Compass is a personalized resource service to help you with the insurance, financial, legal and other issues you are facing.  It's free, confidential and available to anyone with CF.  Ask your  for more information or contact Compass directly at [facility] ([phone]) or compass@cff.org, or learn more at cff.org/compass.       CF Nurse Line: Gemini Montanez and KJ: [phone]  Meghann Gonzalez or May Rao RT: [phone]    Saba Gill and Sherie Aparicio , Dieticians: [phone]    Ashley Cleveland, Diabetes Nurse: [phone]   Yamila Aranda: [phone] or Courtney Fenton at [phone], Social Workers  [email or web address]

## 2025-07-17 NOTE — PROGRESS NOTES
Adult Cystic Fibrosis Program  Annual Psychosocial Assessment     Presenting Information:  Demario Abbasi is a 29-year-old male with cystic fibrosis, presenting in CF clinic for a routine appointment was with Lisa Lofton today.  Sw team met with Demario for an updated annual psychosocial assessment. Demario was accompanied by his father for the visit today. Yamila Aranda, CF SW, conducted assessment and this SW observed and took notes of the encounter.        Living situation:  Demario is still living with his parents in [place] in the home that they own.[place]. There is 1 dog at his parents home (a miniature pincher named Glen). They also have a lake cabin that is 20 mins away from their home in [place] that they go to often all year round. He has been mostly on his own at the house this summer as his parents spend most of their time at the family cabin.      Family Constellation:  Demario was raised by his biological parents: Mirna and Andrew who live in [place], [place]. He has two older sisters: Tammy who has a 9 y.o daughter Luis Angel and lives in [place], [place] and Evi who lives in [place], [place], has 3 children: a 6 yr old son Bharat and a 2 year old named Misael, and a 9 month old niece. His mom is a nurse and his dad is a farmer of soybeans and corn. He is the only person in his family with CF. Demario has never been  and does not have children, he is not currently in a significant relationship. Demario reports that he has another niece on the way and that many members of his extended family and friends are also having children. There are no concerns or issues in his family system at this time.      Social Support:  Demario reports very good social support. He shares that he has several groups of friends he can call on. Demario also attends [employer] and is involved in the Fire Department. He is not connected to the CF community at this time. He had been hoping to be involved  "in the Golf Tournament but it appears not to be happening this year.      Adjustment to Illness:  Demairo reports that he was diagnosed with CF in infancy (3 months), he reports that shortly after being brought home from the hospital he was not thriving, gaining weight or growing. His parents brought him to [place] for evaluation and they transferred him to the [facility] where a sweat test was performed and the diagnosis of CF was made. He reports some complications while growing up, being hospitalized about once per year for CF-related issues which he did not like. He admits that growing up with CF was hard at times, he struggled with doing his therapies and treatments at times and not having the freedom that other kids had. In 2009 he had  RUL resection for recurrent exacerbations. Despite this, he felt that he was still able to have a wonderful childhood/upbringing, participating in sports and being very active in social events. He reports that his parents were very good and helping take care of him and manage CF and states: \"they were on top of it\". He has been vesting since he was very young, he was unsure of the exact age.  He is not currently a candidate for Trikafta because of his gene mutation.      Demario reports that he has been feeling good and has no health concerns at this time. Clinically, Demario has moderate lung disease, pancreatic insufficiency, and CF related diabetes. He has been vesting two times a day and has no concerns to discuss related to his CF or management of his illness.          Health Care Directive:  Demario has previously received Health Care Directive education. This SW reviewed education, including concept/purpose of health care directive, default health care agents and how to complete a directive. He is comfortable with default health care agent(s) (his parents) in absence of a directive. SW provided him with paperwork should he chose to formalize his wishes.      Education:  Demario graduated " "from [employer] and graduated from [employer] in May 2020 with a degree in Agronomy (plant science).  He is not working towards any other educational goals at this time.      Employment:  Demario works full-time for [employer] as a . 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 He states that things will be much calmer after harvest season and his job is year-round. His employer is aware of his CF diagnosis and he reports a supportive work environment with access to insurance, PTO and short-term disability benefits.     Finances:  Demario receives income from wages. He denies having financial concerns at this time.     Insurance:  Demario continues to have health insurance through his employer ([employer]). He reports that everything has been covered and denies having insurance coverage concerns at this time. He is able to pay for all medical copays and is still using a fund through the pharmaceutical company and [employer] to pay for his Pulmozyme.     Mental Health/Coping:  Demario reports a history of depression, the chart review also includes a history of mild anxiety in the past. Demario reports that his current/recent mood has been \"good\". He continues to take the antidepressant (serotonin specific reuptake inhibitor): Celexa and feels this medication is beneficial. In the past he recalls going through a tough period of time in regards to his CF, describing it as \"a stage of understanding\", not wanting to comply with treatments and Celexa did help him get through this difficult time.  He has not sought therapy in the past and does not feel this is something he needs currently. He stated that while work can be stressful, especially during the harvest season, he feels he is able to manage his stress well and \"doesn't get too fired up about it.\" He zelda with stress by talking to someone such as his dad. He identifies yin as important part of his life, he " was raised Yarsanism and is involved with a Uats[employer] community.    Demario completed the following screens today.  PHQ-9: Score 1  EMY-7: Score 2  These scores both indicate mild to no symptoms of depression or anxiety.      Demario reports that his mental health has been good overall. He reports some stress related to work as it is a very busy season with work. When he is stressed he says that he tries to put worries out of his mind, vents to boss or support system and then feels he is able to move on from whatever has been stressful to him. He is still taking Celexa and is having those meds managed by a primary care provider. He scored very low on both depression and anxiety screeners and shares that these results feel accurate to him.      Chemical Health:  Demario says he does not have any exposure to second hand smoke but says that he chews tobacco about once a day. He reports no drug use and says that he drinks about a case of beer a week. He is not concerned about his substance use.     Leisure Activities/Interests:   Demario enjoys hunting, fishing, and checking out local breweries. He also enjoys being at his family cabin and doing outdoor activities there. He will be going to a music festival in the coming week and has several weddings upcoming this summer/fall.      Intervention:  -Psychosocial Assessment     Assessment:  Demario appeared to be open in his responses. He continues to live with his parents and work full-time as a . Demario feels that he has strong family and social support, and reports no mental health concerns. Demario seems to be psychosocially stable overall, with access to relevant resources and supports.  No concerns expressed/noted.     Plan:  Re-consult for any psychosocial needs that may arise.    Complete psychosocial assessment annually.  Continue to follow for regular clinic consult.      JONELLE Conti, LGSW  Cystic Fibrosis   [facility] Acute Care Management   Searchable on  [place]

## 2025-07-17 NOTE — LETTER
7/17/2025      Demario Abbasi  [address]  [place] [address]      Dear Colleague,    Thank you for referring your patient, Demario Abbasi, to the [facility] [place]. Please see a copy of my visit note below.    [facility]  July 17, 2025         Assessment and Plan:   Demario Abbasi is a 30 year old male with cystic fibrosis, pancreatic insufficiency, depression, acne and impaired glucose tolerance who is s/p RUL resection for recurrent exacerbations in 2009 who is seen today for routine follow up. He was last seen in November in 2024.     1. Mild cystic fibrosis exacerbation: never fully recovered his PFTs last fall and since that time has generally felt well, although did have two episodes of bloody mixed sputum a week ago. Sating 97% on room air; doing nebs/vesting BID. PFTs today are down 160 ml from prior, but down 410 ml over the last year. Previous cultures are + for Achromobacter and MSSA.  - Continue nebs and vesting BID  - Start doxycycline and Bactrim x 3 weeks  - On month for ngozi nebs  - Chronic azithromycin    2. CFTR modulation: not eligible based on mutations of g542x/621+1g>t.    3. Iron deficiency: do not see that patient is on replacement.  - Will check iron studies with annuals at next visit    4. Elevated alk phos: at last check.  - Recheck with annuals     5. CF related diabetes: does not check BS, had a CGM that he's not using. Overdue for follow up with Dr. Cross, may be a contributor to declining pulmonary function.  - Recheck check BS prior to a scheduled visit with Dr. Cross    6. Environmental allergies: no current issues.  - Continue ht azelastine, cetirizine, Flonase and Patanol.     7. Reflux: no complaints.   - Continue pantoprazole.    RTC: 3-4 weeks with annuals  Annual studies due: August 2025 (DEXA ordered as well)  Preventative care:    Lisa  Rolly MCCANN  Pulmonary, Allergy, Critical Care and Sleep Medicine        Interval History:     Since his last visit, patient was not sick over the winter. This spring/summer, patient has been okay. Did cough up blood X 2 one week ago, no hemoptysis, more blood mixed with sputum. Nothing since that time. Cough is baseline, productive, but not more. No congestion, tightness or wheezing. No new shortness of breath at baseline. Nebs and vesting twice/day. Just started ngozi one week ago. No new GI issues, BMs are normal. Only using enzymes with greasier foods, feels bloated when he takes his enzymes.          Review of Systems:   Please see HPI. Otherwise, complete 10 point ROS negative.           Past Medical and Surgical History:     Past Medical History:   Diagnosis Date     CF (cystic fibrosis) (H)     Genotype: g542x/621+1g>t     Impaired glucose tolerance      Pancreatic insufficiency      S/P lobectomy of lung 8/4/2015    Right upper lobectomy - 2009     Past Surgical History:   Procedure Laterality Date     H STATISTIC PICC LINE INSERTION >5YR, FAILED Bilateral 03/15/2019    Stenosis in both arms, unable to thread catheter     HC LAP,INGUINAL HERNIA REPR,INITIAL  2002     INSERT PICC LINE Left 10/12/2020    Procedure: INSERTION, PICC @1100;  Surgeon: Felicia Branch MD;  Location: [facility] OR     INSERT PICC LINE Left 12/6/2021    Procedure: INSERTION, PICC;  Surgeon: Tahir Alvarado MD;  Location: [facility] OR     INSERT PICC LINE Left 8/31/2022    Procedure: SINGLE LUMEN INSERTION, PICC;  Surgeon: Felicia Branch MD;  Location: [facility] OR     INSERT PORT VASCULAR ACCESS Right 7/16/2024    Procedure: single lumen Insert port vascular access;  Surgeon: Kentrell Best MD;  Location: [facility] OR     IR CHEST PORT PLACEMENT > 5 YRS OF AGE  2/8/2023     IR CHEST PORT PLACEMENT > 5 YRS OF AGE  7/16/2024     IR PICC PLACEMENT > 5 YRS OF AGE  03/18/2019     IR PICC PLACEMENT > 5 YRS OF AGE  12/30/2019     IR PICC PLACEMENT >  5 YRS OF AGE  10/12/2020     IR PICC PLACEMENT > 5 YRS OF AGE  12/6/2021     IR PICC PLACEMENT > 5 YRS OF AGE  8/31/2022     IR PORT CHECK RIGHT  6/10/2024     LOBECTOMY LUNG Right 2009    Right upper lobe           Family History:     Family History   Problem Relation Age of Onset     Thyroid Disease Mother         hypothyroid     Other - See Comments Mother         multiple family members with biliary disease     Hypertension Maternal Grandmother      Diabetes Maternal Grandfather      Hypertension Paternal Grandmother      Hypothyroidism Paternal Grandmother      Parkinsonism Paternal Grandmother      Coronary Artery Disease Early Onset Paternal Grandfather 56            Social History:     Social History     Socioeconomic History     Marital status: Single     Spouse name: Not on file     Number of children: Not on file     Years of education: Not on file     Highest education level: Not on file   Occupational History     Occupation: [employer]   Tobacco Use     Smoking status: Never     Passive exposure: Past     Smokeless tobacco: Current     Types: Chew     Tobacco comments:     One nicotine pouch a day    Vaping Use     Vaping status: Never Used   Substance and Sexual Activity     Alcohol use: Yes     Comment: 2-3 drinks evenings, mostly weekends     Drug use: Never     Sexual activity: Not on file   Other Topics Concern     Parent/sibling w/ CABG, MI or angioplasty before 65F 55M? Not Asked   Social History Narrative    12/27/2019  -Graduated college in 2020.  Looking for work as a sales [employer].       Social Drivers of Health     Financial Resource Strain: Not on file   Food Insecurity: Not on file   Transportation Needs: Not on file   Physical Activity: Not on file   Stress: Not on file   Social Connections: Not on file   Interpersonal Safety: Not At Risk (12/30/2024)    [facility]    Intimate Partner Violence      Are you in a relationship where you are  "physically hurt, threatened and/or made to feel afraid?: No   Housing Stability: Not on file            Medications:     Current Outpatient Medications   Medication Sig Dispense Refill     albuterol (PROAIR HFA) 108 (90 Base) MCG/ACT inhaler Inhale 2 puffs into the lungs every 6 hours as needed for shortness of breath or wheezing. 26 g 11     azelastine (ASTELIN) 0.1 % nasal spray Spray 1 spray into both nostrils daily as needed for rhinitis.       doxycycline hyclate (VIBRAMYCIN) 100 MG capsule Take 1 capsule (100 mg) by mouth 2 times daily for 21 days. 42 capsule 0     sulfamethoxazole-trimethoprim (BACTRIM DS) 800-160 MG tablet Take 1 tablet by mouth 3 times daily for 21 days. 63 tablet 0     albuterol (PROVENTIL) (2.5 MG/3ML) 0.083% neb solution INHALE 1 VIAL BY NEBULIZATION THREE TIMES DAILY 270 mL 5     azithromycin (ZITHROMAX) 500 MG tablet TAKE ONE TABLET BY MOUTH ON MONDAY, WEDNESDAY AND FRIDAY MORNING 12 tablet 5     cetirizine (ALLERGY RELIEF CETIRIZINE) 10 MG tablet TAKE 1 TABLET (10 MG) BY MOUTH DAILY 30 tablet 5     citalopram (CELEXA) 20 MG tablet Take 1 tablet (20 mg) by mouth daily 30 tablet 1     Continuous Glucose Sensor (FREESTYLE MARQUES 3 SENSOR) MISC 1 each every 14 days 2 each 5     dornase ren (PULMOZYME) 2.5 MG/2.5ML neb solution Inhale 2.5 mg into the lungs 2 times daily. INHALE 2.5MG INTO THE LUNGS TWO TIMES A DAYINHALE 2.5MG INTO THE LUNGS TWO TIMES A  mL 11     Emergency Supply Kit, Central, Patient use for emergency only. Contents: 3 sodium chloride 0.9% flushes, 1 dressing kit, 1 microclave ext set 14\", 4 nitrile gloves (med), 6 alcohol prep pads, 1 bacitracin, 1 syringe (10 cc 20 G 1\"). Call [phone] to reorder. [id number] kit 0     fluticasone (FLONASE) 50 MCG/ACT nasal spray SPRAY 2 SPRAYS INTO BOTH NOSTRILS DAILY 16 g 10     heparin lock flush 100 unit/mL Inject 5 mLs into catheter as needed for line flush. Flush IV prior to deaccessing for no further use and/or at least " "every 4 weeks when not accessed. [id number] mL 0     lipase-protease-amylase (CREON 36) [id number] units CPEP 2-3 with meals.       mvw complete formulation (SOFTGELS ) capsule Take 1 capsule by mouth 2 times daily. 60 capsule 11     olopatadine (PATANOL) 0.1 % ophthalmic solution Place 1 drop into both eyes 2 times daily 5 mL 11     pantoprazole (PROTONIX) 20 MG EC tablet TAKE 1 TABLET (20 MG) BY MOUTH DAILY 30 tablet 5     Port Access Kit For nurse use only.  Do not remove items from bag.  Use for port access.  Do not place syringe on sterile field. [id number] kit 0     sodium chloride inhalant 7 % NEBU neb solution Take 4 mLs by nebulization 2 times daily as needed for wheezing 240 mL 11     sodium chloride, PF, 0.9% PF flush Inject 10 mLs into the vein as needed for line flush. Flush IV before and after med administration as directed and/or at least every 24 hours, or prior to deaccessing for no further use and/or at least every 4 weeks when not accessed. 708918 mL 0     tobramycin (BETHKIS) 300 MG/4ML nebulizer solution Take 4 mLs (300 mg) by nebulization 2 times daily. 28 days and 28 days off 224 mL 5     No current facility-administered medications for this visit.            Physical Exam:   /76 (BP Location: Right arm, Patient Position: Sitting, Cuff Size: Adult Regular)   Pulse 59   Ht 1.765 m (5' 9.5\")   Wt 74.9 kg (165 lb 3.2 oz)   SpO2 97%   BMI 24.05 kg/m      GENERAL: alert, NAD  HEENT: NCAT, EOMI, anicteric sclera,  no oral mucosal edema or erythema  Neck: no cervical or supraclavicular adenopathy  Respiratory: moderate airflow, scattered basilar crackles  CV: RRR, S1S2, no murmurs noted  Abdomen: normoactive BS, soft   Lymph: no edema, + digital clubbing  Neuro: AAO X 3, CN 2-12 grossly intact  Psychiatric: normal affect, good eye contact  Skin: no rash, jaundice or lesions on limited exam         Data:   All laboratory and imaging data reviewed.      Cystic Fibrosis " Culture  Specimen Description   Date Value Ref Range Status   02/11/2021 Sputum  Final   09/10/2020 Sputum  Final   09/10/2020 Sputum  Final   09/10/2020 Sputum  Final   09/10/2020 Sputum  Final   09/10/2020 Sputum  Final    Culture Micro   Date Value Ref Range Status   02/11/2021 Moderate growth  Normal elmer    Final   02/11/2021 Light growth  Staphylococcus aureus   (A)  Final   02/11/2021 Moderate growth  Strain 2  Staphylococcus aureus   (A)  Final   02/11/2021 (A)  Final    Moderate growth  Achromobacter xylosoxidans/denitrificans          PFT interpretation:  Maneuver: valid and meets ATS guidelines  Moderate obstruction based on Z score  Compared to prior: FEV1 of 2.25 is 160 ml below prior  The decrease in FVC may represent air trapping v. restrictive physiology.  Lung volumes would be necessary to determine.    Mild Oral: non-quinolone            Respiratory Therapist Note:         Vest                Brand: Hill-Rom - traditional Hill Rom: Frequencies 8, 9, 10 at pressure 10 then frequencies 18, 19, 20 at pressure 6. and Hill-Rom - [place]erson Frequencies: 13-15, intensity 8-10                Cough Pause: Cough Pause; Yes                Vest Garment Size: Adult Medium                Last Fitting Date: Due as needed                Frequency of therapy: 14 times per week                Other: Increasing therapy to 3x/day and will follow up in 3-4 weeks. Hill Rom 105 is primary vest.         Alternative Airway Clearance:          Nebulized Medications                Bronchodilators: Albuterol                Mucolytic: Pulmozyme                Antibiotics: COBY                Additional Inhaled Medications:                 Spacer Use:          Review Cleaning: Yes. Top rack of .         Education and Transition Information                Correct order of inhaled medications: Yes                Mechanism of Action of inhaled medications: Yes                Frequency of inhaled medications: Yes                 Dosage of inhaled medications: Yes                Other:          Home Care:                Nebulizer Cups (Brand/Type): Primarily uses disposables bought online                Nebulizer Compressor                            Year Purchased: Works, year of purchase unknown                            [facility], Phone: [phone], Fax: [phone]         Oxygen: N/A                                Pulmonary Rehab: N/A                  Plan of Care and Goals for next visit:  Please reach out with any future airway clearance needs.     Again, thank you for allowing me to participate in the care of your patient.        Sincerely,        Lisa Lofton PA-C    Electronically signed

## 2025-07-17 NOTE — PATIENT INSTRUCTIONS
See provider AVS for a summary of recommendations from today's visit.    Angeline Quinones, PharmD  Cystic Fibrosis MTM Pharmacist  [facility]  Voicemail: [phone]

## 2025-07-25 ENCOUNTER — HOME INFUSION (OUTPATIENT)
Dept: HOME HEALTH SERVICES | Facility: HOME HEALTH | Age: 30
End: 2025-07-25
Payer: COMMERCIAL

## 2025-07-28 ENCOUNTER — TELEPHONE (OUTPATIENT)
Dept: PULMONOLOGY | Facility: CLINIC | Age: 30
End: 2025-07-28
Payer: COMMERCIAL

## 2025-07-28 DIAGNOSIS — E84.9 CYSTIC FIBROSIS EXACERBATION (H): ICD-10-CM

## 2025-07-28 NOTE — TELEPHONE ENCOUNTER
Prior Authorization Not Needed per Insurance    Medication: CREON [id number] UNITS PO CPEP  Insurance Company: [facility] - Phone [phone] Fax [phone]  Expected CoPay: $  0.00  [facility] - [place], [place] - [address]   Pharmacy Notified: Requested new rx  Patient Notified: Yes

## 2025-07-28 NOTE — TELEPHONE ENCOUNTER
COPAY CARD OBTAINED    Medication: CREON [id number] UNITS PO CPEP    Expected Copay: $    Copay card Monthly Max Amount: $ 225  Copay card Annual Amount: $ 8,000

## 2025-07-28 NOTE — PROGRESS NOTES
Nutrition Note    Notified by pt that he has not been able to fill Creon due to cost. Reviewed plan with pharmacy liaison and will use new copay card. Sent prescription to [facility].     Sherie Aparicio RD, LD, CACFD  Cystic Fibrosis/Lung Transplant Dietitian  Available via [employer]

## 2025-08-05 ENCOUNTER — HOME INFUSION BILLING (OUTPATIENT)
Dept: HOME HEALTH SERVICES | Facility: HOME HEALTH | Age: 30
End: 2025-08-05
Payer: COMMERCIAL

## 2025-08-05 PROCEDURE — A4215 STERILE NEEDLE: HCPCS

## 2025-08-07 PROCEDURE — S5502 HIT INTERIM CATH CARE: HCPCS

## 2025-08-08 PROCEDURE — S5502 HIT INTERIM CATH CARE: HCPCS

## (undated) DEVICE — SU MONOCRYL 4-0 P-3 18" UND Y494G

## (undated) DEVICE — DRSG BIOPATCH GERMICIDAL SPLIT SPONGE 4MM MED 4150

## (undated) DEVICE — LINEN GOWN XLG 5407

## (undated) DEVICE — DRSG BIOPATCH GERMICIDAL SPLIT SPONGE 1.5MM SM 4151

## (undated) DEVICE — COVER EASY EQUIP BAG W/BAND LATEX FREE EZ-28

## (undated) DEVICE — Device

## (undated) DEVICE — LINEN TOWEL PACK X5 5464

## (undated) DEVICE — DEVICE ANCHORING FLEXI-TRAK 37449

## (undated) DEVICE — GOWN XLG DISP 9545

## (undated) DEVICE — CONNECTOR MALE TO MALE LL

## (undated) DEVICE — TEAR AWAY INTRODUCER

## (undated) DEVICE — CAST STOCKINETTE 3"

## (undated) DEVICE — SU DERMABOND ADVANCED .7ML DNX12

## (undated) DEVICE — DECANTER BAG 2002S

## (undated) DEVICE — GUIDEWIRE VASC GLIDE GOLD 0.018"X180CM 70DEG GM1814

## (undated) DEVICE — GLOVE BIOGEL PI ULTRATOUCH G SZ 8.0 42180

## (undated) DEVICE — CAP LUER LOCK MALE/FEMALE DUAL 2C6250

## (undated) DEVICE — NDL SPINAL 22GA 3.5" QUINCKE 405181

## (undated) DEVICE — GLOVE PROTEXIS POWDER FREE SMT 7.5  2D72PT75X

## (undated) DEVICE — WIRE GUIDE NITINOL FLOPPY 6.0CM PLT TIP .018X60CM M001207110

## (undated) DEVICE — KIT INTRODUCER FLUENT MICRO 5FRX10CM ECHO TIP KIT-038-04

## (undated) DEVICE — PROBE COVER INTRAOPERATIVE 5"X96" PC1308

## (undated) DEVICE — DRSG TEGADERM IV ADVANCED 3.5X4.5" 1685

## (undated) DEVICE — PACK CENTRAL LINE INSERTION SAN32CLFCG

## (undated) DEVICE — COVER ULTRASOUND PROBE W/GEL FLEXI-FEEL 6"X58" LF  25-FF658

## (undated) DEVICE — GLIDEWIRE TERUMO .035X180 ANG STIFF GS3508

## (undated) DEVICE — KNIFE HANDLE W/15 BLADE 371615

## (undated) DEVICE — CATH ANGIO MARINER KUMPE 4FRX40CM 11732704

## (undated) DEVICE — SU VICRYL 4-0 P-3 18" UND  J494H

## (undated) RX ORDER — LIDOCAINE HYDROCHLORIDE 10 MG/ML
INJECTION, SOLUTION EPIDURAL; INFILTRATION; INTRACAUDAL; PERINEURAL
Status: DISPENSED
Start: 2019-12-30

## (undated) RX ORDER — LIDOCAINE HYDROCHLORIDE 10 MG/ML
INJECTION, SOLUTION EPIDURAL; INFILTRATION; INTRACAUDAL; PERINEURAL
Status: DISPENSED
Start: 2023-02-08

## (undated) RX ORDER — CEFAZOLIN SODIUM 2 G/50ML
SOLUTION INTRAVENOUS
Status: DISPENSED
Start: 2024-07-16

## (undated) RX ORDER — FENTANYL CITRATE 50 UG/ML
INJECTION, SOLUTION INTRAMUSCULAR; INTRAVENOUS
Status: DISPENSED
Start: 2024-07-16

## (undated) RX ORDER — CEFAZOLIN SODIUM 2 G/100ML
INJECTION, SOLUTION INTRAVENOUS
Status: DISPENSED
Start: 2023-02-08

## (undated) RX ORDER — LIDOCAINE HYDROCHLORIDE 10 MG/ML
INJECTION, SOLUTION EPIDURAL; INFILTRATION; INTRACAUDAL; PERINEURAL
Status: DISPENSED
Start: 2018-03-14

## (undated) RX ORDER — FENTANYL CITRATE 50 UG/ML
INJECTION, SOLUTION INTRAMUSCULAR; INTRAVENOUS
Status: DISPENSED
Start: 2023-02-08

## (undated) RX ORDER — HEPARIN SODIUM (PORCINE) LOCK FLUSH IV SOLN 100 UNIT/ML 100 UNIT/ML
SOLUTION INTRAVENOUS
Status: DISPENSED
Start: 2023-02-08

## (undated) RX ORDER — HEPARIN SODIUM,PORCINE 10 UNIT/ML
VIAL (ML) INTRAVENOUS
Status: DISPENSED
Start: 2019-03-18

## (undated) RX ORDER — HEPARIN SODIUM (PORCINE) LOCK FLUSH IV SOLN 100 UNIT/ML 100 UNIT/ML
SOLUTION INTRAVENOUS
Status: DISPENSED
Start: 2024-06-10

## (undated) RX ORDER — SODIUM CHLORIDE 9 MG/ML
INJECTION, SOLUTION INTRAVENOUS
Status: DISPENSED
Start: 2023-02-08

## (undated) RX ORDER — LIDOCAINE HYDROCHLORIDE 10 MG/ML
INJECTION, SOLUTION EPIDURAL; INFILTRATION; INTRACAUDAL; PERINEURAL
Status: DISPENSED
Start: 2019-03-18

## (undated) RX ORDER — LIDOCAINE HYDROCHLORIDE 10 MG/ML
INJECTION, SOLUTION EPIDURAL; INFILTRATION; INTRACAUDAL; PERINEURAL
Status: DISPENSED
Start: 2019-03-15

## (undated) RX ORDER — HEPARIN SODIUM,PORCINE 10 UNIT/ML
VIAL (ML) INTRAVENOUS
Status: DISPENSED
Start: 2019-12-30